# Patient Record
Sex: FEMALE | Race: WHITE | Employment: OTHER | URBAN - METROPOLITAN AREA
[De-identification: names, ages, dates, MRNs, and addresses within clinical notes are randomized per-mention and may not be internally consistent; named-entity substitution may affect disease eponyms.]

---

## 2017-01-02 ENCOUNTER — APPOINTMENT (OUTPATIENT)
Dept: LAB | Age: 77
End: 2017-01-02
Payer: MEDICARE

## 2017-01-02 DIAGNOSIS — C50.911 MALIGNANT NEOPLASM OF RIGHT FEMALE BREAST, UNSPECIFIED SITE OF BREAST: Chronic | ICD-10-CM

## 2017-01-02 LAB
ATRIAL RATE: 56 BPM
P AXIS: 83 DEGREES
P-R INTERVAL: 196 MS
Q-T INTERVAL: 462 MS
QRS DURATION: 96 MS
QTC CALCULATION (BEZET): 445 MS
R AXIS: -42 DEGREES
T AXIS: 43 DEGREES
VENTRICULAR RATE: 56 BPM

## 2017-01-02 PROCEDURE — 93005 ELECTROCARDIOGRAM TRACING: CPT

## 2017-01-02 PROCEDURE — 93010 ELECTROCARDIOGRAM REPORT: CPT | Performed by: INTERNAL MEDICINE

## 2017-01-09 ENCOUNTER — TELEPHONE (OUTPATIENT)
Dept: INTERNAL MEDICINE CLINIC | Facility: CLINIC | Age: 77
End: 2017-01-09

## 2017-01-09 NOTE — TELEPHONE ENCOUNTER
Received a call from [facility] with [facility] [facility] requesting a referral for PT for pt. I will need to speak with [facility] for further details. [facility]

## 2017-01-11 ENCOUNTER — ANESTHESIA EVENT (OUTPATIENT)
Dept: SURGERY | Facility: HOSPITAL | Age: 77
End: 2017-01-11
Payer: MEDICARE

## 2017-01-11 ENCOUNTER — ANESTHESIA (OUTPATIENT)
Dept: SURGERY | Facility: HOSPITAL | Age: 77
End: 2017-01-11
Payer: MEDICARE

## 2017-01-11 ENCOUNTER — SURGERY (OUTPATIENT)
Age: 77
End: 2017-01-11

## 2017-01-11 NOTE — ANESTHESIA PREPROCEDURE EVALUATION
PRE-OP EVALUATION    Patient Name: Melinda Das    Pre-op Diagnosis: AMASTIA ACQUIRED      Procedure(s):  FAT GRAFTING TO BILATERAL RECONSTRUCTED BREASTS      Surgeon(s) and Role:     Erick Alonzo MD - Primary    Pre-op vitals reviewed.           C Khushi  1.   Normal Lexiscan sestamibi myocardial perfusion scan. 2.   Normal left ventricular wall motion.    3.   Left ventricular ejection fraction of 73%.    4.   Compared to previous study in November 2013, a previous questionable perfusion defect is COLONOSCOPY, POSSIBLE BIOPSY, POSSIBLE POLYPECTOMY 00696;  Surgeon: Leslie Pablo MD;  Location: [facility]    MASTECTOMY RIGHT  7/2015    REDUCTION LEFT  10/2015    LUMPECTOMY RIGHT Right [id number]    OTHER SURGICAL HISTORY  1992    Comment JOSE MARTIN

## 2017-01-11 NOTE — ANESTHESIA POSTPROCEDURE EVALUATION
[address] Patient Status:  Hospital Outpatient Surgery   Age/Gender 77year old female MRN [id number]   [facility] SUR[facility]ERY Attending Roosevelt Hobbs MD   Hosp Day # 0 PCP Erika Wright MD       Anesthesia Post-op Not

## 2017-01-12 ENCOUNTER — HOSPITAL ENCOUNTER (OUTPATIENT)
Age: 77
Discharge: HOME | End: 2017-01-12
Attending: FAMILY MEDICINE
Payer: MEDICARE

## 2017-01-12 VITALS
SYSTOLIC BLOOD PRESSURE: 138 MMHG | RESPIRATION RATE: 18 BRPM | OXYGEN SATURATION: 97 % | DIASTOLIC BLOOD PRESSURE: 73 MMHG | TEMPERATURE: 98 F | HEART RATE: 97 BPM

## 2017-01-12 DIAGNOSIS — R33.8 ACUTE URINARY RETENTION: Primary | ICD-10-CM

## 2017-01-12 DIAGNOSIS — N30.01 ACUTE CYSTITIS WITH HEMATURIA: ICD-10-CM

## 2017-01-12 LAB
POCT BILIRUBIN URINE: NEGATIVE
POCT GLUCOSE URINE: NEGATIVE MG/DL
POCT KETONE URINE: NEGATIVE MG/DL
POCT NITRITE URINE: NEGATIVE
POCT PH URINE: 7 (ref 5–8)
POCT PROTEIN URINE: NEGATIVE MG/DL
POCT SPECIFIC GRAVITY URINE: 1.01
POCT URINE COLOR: YELLOW
POCT UROBILINOGEN URINE: 0.2 MG/DL

## 2017-01-12 PROCEDURE — 87086 URINE CULTURE/COLONY COUNT: CPT | Performed by: FAMILY MEDICINE

## 2017-01-12 PROCEDURE — 99214 OFFICE O/P EST MOD 30 MIN: CPT

## 2017-01-12 PROCEDURE — 51701 INSERT BLADDER CATHETER: CPT

## 2017-01-12 PROCEDURE — 81002 URINALYSIS NONAUTO W/O SCOPE: CPT | Performed by: FAMILY MEDICINE

## 2017-01-12 RX ORDER — CIPROFLOXACIN 250 MG/1
250 TABLET, FILM COATED ORAL 2 TIMES DAILY
Qty: 14 TABLET | Refills: 0 | Status: SHIPPED | OUTPATIENT
Start: 2017-01-12 | End: 2017-01-19

## 2017-01-12 NOTE — ED PROVIDER NOTES
Patient Seen in: [address] [facility]    History   Patient presents with:  Dysuria    Stated Complaint: poss uti s/p surgery    HPI    77year old female with difficulty in micturition, frequency , with lower abdominal pressure since 1 am Postmenopausal atrophic vaginitis 3/2/2016   • Rectocele 6/1/2016   • Female stress incontinence 4/18/2016   • Primary localized osteoarthrosis, lower leg 2/22/2012   • Coronary atherosclerosis      stents   • Cancer [facility]) 1993     right breast cancer   • Surgeon: Rhina Rogers MD;  Location: [facility]    PATIENT DOCUMENTED NOT TO HAVE EXPERIENCED ANY OF THE FOLLOWING EVENTS N/A 4/20/2016    Comment Procedure: COLONOSCOPY, POSSIBLE BIOPSY, POSSIBLE POLYPECTOMY 03377;  Surgeon: Rhina Rogers • Breast Cancer Self 52         Smoking Status: Former Smoker                   Packs/Day: 0.25  Years: 22        Quit date: 06/17/1997    Smokeless Status: Never Used                        Comment: SMOKED 20 YEARS QUIT 1997    Alcohol Use: Yes normal mood and affect.  Her behavior is normal. Judgment and thought content normal.            ED Course     Labs Reviewed   POCT URINALYSIS DIPSTICK - Abnormal; Notable for the following:     Urine Clarity Cloudy (*)     Blood, Urine Small (*)     Leukoc

## 2017-01-12 NOTE — ED INITIAL ASSESSMENT (HPI)
Pt states she had surgery yesterday to do some kind of clean up procedures following a right breast implant. The procedure lasted 3 hours. She did not have any urinary catheter.   States she was awake since 1am with urinary urgency frequency and pressure

## 2017-01-12 NOTE — ED NOTES
Pt had 600 ccs residual urine per straight cath after voiding. Dr. Williams Carvalho notified. Pt tolerated well.

## 2017-01-31 ENCOUNTER — TELEPHONE (OUTPATIENT)
Dept: UROLOGY | Facility: HOSPITAL | Age: 77
End: 2017-01-31

## 2017-01-31 NOTE — TELEPHONE ENCOUNTER
Pt's daughter called. Pt had surgery 1/11/17 for breast reconstruction revision. Her daughter took her into urgent care for urinary retention. They treated her w/ IV cleocin based on urine dipstick which showed moderate leukocytes.  She went home on cipro 2

## 2017-02-01 ENCOUNTER — TELEPHONE (OUTPATIENT)
Dept: UROLOGY | Facility: HOSPITAL | Age: 77
End: 2017-02-01

## 2017-02-01 NOTE — TELEPHONE ENCOUNTER
After speaking w/ Dr Castro Oneil today, I spoke w/ pt daughter. Discussed that we looked back at Dr Fransisco Garsia notes from 2014 and she had incomplete emptying then at times. It is important for pt to [id number] when she gets into these bouts of her being unable to empty.  Edwin Cruz

## 2017-02-08 ENCOUNTER — PRIOR ORIGINAL RECORDS (OUTPATIENT)
Dept: OTHER | Age: 77
End: 2017-02-08

## 2017-02-08 RX ORDER — LEVOTHYROXINE SODIUM 0.1 MG/1
TABLET ORAL
Qty: 90 TABLET | Refills: 0 | Status: SHIPPED | OUTPATIENT
Start: 2017-02-08 | End: 2017-05-05

## 2017-02-22 ENCOUNTER — OFFICE VISIT (OUTPATIENT)
Dept: UROLOGY | Facility: HOSPITAL | Age: 77
End: 2017-02-22
Attending: OBSTETRICS & GYNECOLOGY
Payer: MEDICARE

## 2017-02-22 VITALS
HEIGHT: 62.5 IN | BODY MASS INDEX: 23.5 KG/M2 | DIASTOLIC BLOOD PRESSURE: 72 MMHG | SYSTOLIC BLOOD PRESSURE: 118 MMHG | WEIGHT: 131 LBS

## 2017-02-22 DIAGNOSIS — Z98.890 POST-OPERATIVE STATE: ICD-10-CM

## 2017-02-22 DIAGNOSIS — R33.9 INCOMPLETE EMPTYING OF BLADDER: Primary | ICD-10-CM

## 2017-02-22 DIAGNOSIS — N39.0 RECURRENT UTI: ICD-10-CM

## 2017-02-22 LAB
BILIRUB UR QL STRIP.AUTO: NEGATIVE
COLOR UR AUTO: YELLOW
CONTROL RUN WITHIN 24 HOURS?: YES
GLUCOSE UR STRIP.AUTO-MCNC: NEGATIVE MG/DL
KETONES UR STRIP.AUTO-MCNC: NEGATIVE MG/DL
NITRITE UR QL STRIP.AUTO: NEGATIVE
NITRITE URINE: NEGATIVE
PH UR STRIP.AUTO: 7 [PH] (ref 4.5–8)
PROT UR STRIP.AUTO-MCNC: NEGATIVE MG/DL
SP GR UR STRIP.AUTO: 1.01 (ref 1–1.03)
UROBILINOGEN UR STRIP.AUTO-MCNC: <2 MG/DL
WBC #/AREA URNS AUTO: >50 /HPF
WBC CLUMPS UR QL AUTO: PRESENT

## 2017-02-22 PROCEDURE — 87086 URINE CULTURE/COLONY COUNT: CPT | Performed by: OBSTETRICS & GYNECOLOGY

## 2017-02-22 PROCEDURE — 81002 URINALYSIS NONAUTO W/O SCOPE: CPT

## 2017-02-22 PROCEDURE — 99211 OFF/OP EST MAY X REQ PHY/QHP: CPT

## 2017-02-22 PROCEDURE — 87077 CULTURE AEROBIC IDENTIFY: CPT | Performed by: OBSTETRICS & GYNECOLOGY

## 2017-02-22 PROCEDURE — 81001 URINALYSIS AUTO W/SCOPE: CPT | Performed by: OBSTETRICS & GYNECOLOGY

## 2017-02-22 RX ORDER — MENTHOL 5.8 MG/1
LOZENGE ORAL
COMMUNITY
End: 2021-11-16

## 2017-02-23 ENCOUNTER — LAB ENCOUNTER (OUTPATIENT)
Dept: LAB | Age: 77
End: 2017-02-23
Attending: INTERNAL MEDICINE
Payer: MEDICARE

## 2017-02-23 ENCOUNTER — HOSPITAL ENCOUNTER (OUTPATIENT)
Dept: GENERAL RADIOLOGY | Age: 77
Discharge: HOME | End: 2017-02-23
Attending: INTERNAL MEDICINE
Payer: MEDICARE

## 2017-02-23 ENCOUNTER — OFFICE VISIT (OUTPATIENT)
Dept: INTERNAL MEDICINE CLINIC | Facility: CLINIC | Age: 77
End: 2017-02-23

## 2017-02-23 VITALS
TEMPERATURE: 99 F | HEIGHT: 62 IN | SYSTOLIC BLOOD PRESSURE: 128 MMHG | RESPIRATION RATE: 17 BRPM | WEIGHT: 134.25 LBS | DIASTOLIC BLOOD PRESSURE: 70 MMHG | OXYGEN SATURATION: 97 % | BODY MASS INDEX: 24.7 KG/M2 | HEART RATE: 86 BPM

## 2017-02-23 DIAGNOSIS — R05.9 COUGH: ICD-10-CM

## 2017-02-23 DIAGNOSIS — E11.9 TYPE 2 DIABETES MELLITUS WITHOUT COMPLICATION, WITHOUT LONG-TERM CURRENT USE OF INSULIN (HCC): Primary | ICD-10-CM

## 2017-02-23 DIAGNOSIS — E11.9 TYPE 2 DIABETES MELLITUS WITHOUT COMPLICATION, WITHOUT LONG-TERM CURRENT USE OF INSULIN (HCC): Chronic | ICD-10-CM

## 2017-02-23 DIAGNOSIS — R05.9 COUGH: Primary | ICD-10-CM

## 2017-02-23 LAB
EST. AVERAGE GLUCOSE BLD GHB EST-MCNC: 151 MG/DL (ref 68–126)
HBA1C MFR BLD HPLC: 6.9 % (ref ?–5.7)

## 2017-02-23 PROCEDURE — 99213 OFFICE O/P EST LOW 20 MIN: CPT | Performed by: INTERNAL MEDICINE

## 2017-02-23 PROCEDURE — 36415 COLL VENOUS BLD VENIPUNCTURE: CPT

## 2017-02-23 PROCEDURE — 83036 HEMOGLOBIN GLYCOSYLATED A1C: CPT

## 2017-02-23 PROCEDURE — 71020 XR CHEST PA + LAT CHEST (CPT=71020): CPT

## 2017-02-23 RX ORDER — CODEINE PHOSPHATE AND GUAIFENESIN 10; 100 MG/5ML; MG/5ML
5 SOLUTION ORAL EVERY 6 HOURS PRN
Qty: 240 ML | Refills: 0 | Status: SHIPPED | OUTPATIENT
Start: 2017-02-23 | End: 2017-03-05

## 2017-02-23 RX ORDER — LEVOFLOXACIN 500 MG/1
500 TABLET, FILM COATED ORAL DAILY
Qty: 10 TABLET | Refills: 0 | Status: SHIPPED | OUTPATIENT
Start: 2017-02-23 | End: 2017-03-05

## 2017-02-23 RX ORDER — ALBUTEROL SULFATE 90 UG/1
2 AEROSOL, METERED RESPIRATORY (INHALATION) EVERY 6 HOURS PRN
Qty: 2 INHALER | Refills: 0 | Status: SHIPPED | OUTPATIENT
Start: 2017-02-23 | End: 2017-03-25

## 2017-02-23 NOTE — PROGRESS NOTES
Rachael Garces [facility]  is a 77year old female who presents for upper respiratory symptoms    Patient presents with:  Cough: with phlem x 2 weeks        HPI:   Pt reports  respiratory symptoms for  Few days   .        Current Outpatient Prescriptions Oral Tab Take 1 tablet (250 mg total) by mouth 2 (two) times daily.  Disp: 14 tablet Rfl: 0      Past Medical History   Diagnosis Date   • Other and unspecified hyperlipidemia    • Esophageal reflux    • Hypothyroidism    • Pure hypercholesterolemia    • Un pseudocholinesterase deficiency.    • PONV (postoperative nausea and vomiting)         Smoking Status: Former Smoker                   Packs/Day: 0.25  Years: 22        Quit date: 06/17/1997    Smokeless Status: Never Used                        Comment: ASHLEY these issues and agrees to the plan. The patient is asked to Return in about 1 week (around 3/2/2017). Deb Lea MD

## 2017-04-13 ENCOUNTER — OFFICE VISIT (OUTPATIENT)
Dept: INTERNAL MEDICINE CLINIC | Facility: CLINIC | Age: 77
End: 2017-04-13

## 2017-04-13 ENCOUNTER — TELEPHONE (OUTPATIENT)
Dept: UROLOGY | Facility: HOSPITAL | Age: 77
End: 2017-04-13

## 2017-04-13 VITALS
WEIGHT: 134.25 LBS | HEART RATE: 64 BPM | SYSTOLIC BLOOD PRESSURE: 140 MMHG | TEMPERATURE: 98 F | BODY MASS INDEX: 25.02 KG/M2 | OXYGEN SATURATION: 97 % | RESPIRATION RATE: 16 BRPM | HEIGHT: 61.5 IN | DIASTOLIC BLOOD PRESSURE: 70 MMHG

## 2017-04-13 DIAGNOSIS — N30.00 ACUTE CYSTITIS WITHOUT HEMATURIA: Primary | ICD-10-CM

## 2017-04-13 DIAGNOSIS — N39.0 URINARY TRACT INFECTION, SITE UNSPECIFIED: ICD-10-CM

## 2017-04-13 PROCEDURE — 99213 OFFICE O/P EST LOW 20 MIN: CPT | Performed by: INTERNAL MEDICINE

## 2017-04-13 PROCEDURE — 81001 URINALYSIS AUTO W/SCOPE: CPT | Performed by: INTERNAL MEDICINE

## 2017-04-13 RX ORDER — SULFAMETHOXAZOLE AND TRIMETHOPRIM 800; 160 MG/1; MG/1
1 TABLET ORAL 2 TIMES DAILY
Qty: 20 TABLET | Refills: 0 | Status: SHIPPED | OUTPATIENT
Start: 2017-04-13 | End: 2017-04-23

## 2017-04-13 RX ORDER — PHENAZOPYRIDINE HYDROCHLORIDE 200 MG/1
200 TABLET, FILM COATED ORAL 3 TIMES DAILY PRN
Qty: 10 TABLET | Refills: 0 | Status: SHIPPED | OUTPATIENT
Start: 2017-04-13 | End: 2017-04-16

## 2017-04-13 NOTE — TELEPHONE ENCOUNTER
Pt left message on voice mail, feels like she has a UTI, called patient back, no answer, left message to call the office to see if she can drop off a specimen

## 2017-04-13 NOTE — PROGRESS NOTES
Andrea [facility] [address]  is a 77year old female.     Patient presents with:  UTI      HPI:   Patient presents with symptoms of UTI  Few days   The maintenance dose Cipro as given to her by the gynecologist    Current Outpatient Prescriptions:  Sulfam disorder of thyroid    • Recurrent UTI      group B strep   • 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)    • Incomplete bladder emptying Melva Keith)      trial of CIC June 2014   • Cervical spondylosis without myelopathy    • Vitamin D defici Comment: SMOKED 20 YEARS QUIT 1997    Alcohol Use: Yes           0.0 oz/week       0 Standard drinks or equivalent per week       Comment: 1-2 a month        REVIEW OF SYSTEMS:   GENERAL HEALTH: feels well otherwise  SKIN: denies any unusual skin lesions o

## 2017-04-17 ENCOUNTER — OFFICE VISIT (OUTPATIENT)
Dept: UROLOGY | Facility: HOSPITAL | Age: 77
End: 2017-04-17
Attending: OBSTETRICS & GYNECOLOGY
Payer: MEDICARE

## 2017-04-17 VITALS
HEIGHT: 61.5 IN | WEIGHT: 134 LBS | SYSTOLIC BLOOD PRESSURE: 120 MMHG | DIASTOLIC BLOOD PRESSURE: 72 MMHG | BODY MASS INDEX: 24.98 KG/M2

## 2017-04-17 DIAGNOSIS — R33.9 INCOMPLETE EMPTYING OF BLADDER: Primary | ICD-10-CM

## 2017-04-17 DIAGNOSIS — N39.0 RECURRENT UTI: ICD-10-CM

## 2017-04-17 DIAGNOSIS — N95.2 POSTMENOPAUSAL ATROPHIC VAGINITIS: ICD-10-CM

## 2017-04-17 PROCEDURE — 51701 INSERT BLADDER CATHETER: CPT

## 2017-04-17 RX ORDER — CIPROFLOXACIN 250 MG/1
250 TABLET, FILM COATED ORAL DAILY
Qty: 90 TABLET | Refills: 3 | Status: SHIPPED | OUTPATIENT
Start: 2017-04-17 | End: 2018-03-03

## 2017-04-17 NOTE — PROCEDURES
Spoke w/ Dr Imani Nicholas today and informed him of PVR.  Also looked at past cultures w/ him:  4/13/17- UTI w/ no culture  2/22/17- >100,000-Staph  1/12/17- No growth  11/10/16->100,000 Staph  9/27/16- 50,000 Pseudomonas a.  8/30/16- > 100,000 Pseudomonas  8/8/16-

## 2017-04-17 NOTE — PROCEDURES
Patient here for PVR check. Has hx of not emptying completely. Pt was recently tx for UTI 4/13/17, taking Bactrim DS. I spoke w/ Fady Baker from Dr Elijah Gonsales office and apparently they did not send a culture  Patient up to the bathroom, voided 175 mL in hat.   Sharonda Mueller

## 2017-05-05 RX ORDER — LEVOTHYROXINE SODIUM 0.1 MG/1
TABLET ORAL
Qty: 90 TABLET | Refills: 0 | Status: SHIPPED | OUTPATIENT
Start: 2017-05-05 | End: 2017-08-02

## 2017-05-12 ENCOUNTER — APPOINTMENT (OUTPATIENT)
Dept: LAB | Facility: HOSPITAL | Age: 77
End: 2017-05-12
Attending: OBSTETRICS & GYNECOLOGY
Payer: MEDICARE

## 2017-05-12 ENCOUNTER — HOSPITAL ENCOUNTER (OUTPATIENT)
Dept: MAMMOGRAPHY | Facility: HOSPITAL | Age: 77
Discharge: HOME | End: 2017-05-12
Attending: INTERNAL MEDICINE
Payer: MEDICARE

## 2017-05-12 DIAGNOSIS — N95.2 POSTMENOPAUSAL ATROPHIC VAGINITIS: ICD-10-CM

## 2017-05-12 DIAGNOSIS — D05.82 OTHER SPECIFIED TYPE OF CARCINOMA IN SITU OF LEFT BREAST: ICD-10-CM

## 2017-05-12 DIAGNOSIS — N39.0 RECURRENT UTI: ICD-10-CM

## 2017-05-12 DIAGNOSIS — R33.9 INCOMPLETE EMPTYING OF BLADDER: ICD-10-CM

## 2017-05-12 DIAGNOSIS — R92.8 ABNORMAL MAMMOGRAM OF LEFT BREAST: ICD-10-CM

## 2017-05-12 PROCEDURE — 87086 URINE CULTURE/COLONY COUNT: CPT

## 2017-05-12 PROCEDURE — 77061 BREAST TOMOSYNTHESIS UNI: CPT | Performed by: INTERNAL MEDICINE

## 2017-05-12 PROCEDURE — 76642 ULTRASOUND BREAST LIMITED: CPT | Performed by: INTERNAL MEDICINE

## 2017-05-12 PROCEDURE — 81001 URINALYSIS AUTO W/SCOPE: CPT

## 2017-05-12 PROCEDURE — 77065 DX MAMMO INCL CAD UNI: CPT | Performed by: INTERNAL MEDICINE

## 2017-05-15 ENCOUNTER — TELEPHONE (OUTPATIENT)
Dept: UROLOGY | Facility: HOSPITAL | Age: 77
End: 2017-05-15

## 2017-07-14 ENCOUNTER — APPOINTMENT (OUTPATIENT)
Dept: GENERAL RADIOLOGY | Age: 77
End: 2017-07-14
Attending: FAMILY MEDICINE
Payer: MEDICARE

## 2017-07-14 ENCOUNTER — HOSPITAL ENCOUNTER (OUTPATIENT)
Age: 77
Discharge: HOME | End: 2017-07-14
Attending: FAMILY MEDICINE
Payer: MEDICARE

## 2017-07-14 VITALS
OXYGEN SATURATION: 99 % | SYSTOLIC BLOOD PRESSURE: 127 MMHG | RESPIRATION RATE: 20 BRPM | DIASTOLIC BLOOD PRESSURE: 50 MMHG | HEART RATE: 62 BPM | TEMPERATURE: 98 F | BODY MASS INDEX: 25 KG/M2 | WEIGHT: 132 LBS

## 2017-07-14 DIAGNOSIS — S99.922A FOOT TRAUMA, LEFT, INITIAL ENCOUNTER: Primary | ICD-10-CM

## 2017-07-14 PROCEDURE — 73630 X-RAY EXAM OF FOOT: CPT | Performed by: FAMILY MEDICINE

## 2017-07-14 PROCEDURE — 99213 OFFICE O/P EST LOW 20 MIN: CPT

## 2017-07-14 PROCEDURE — 99214 OFFICE O/P EST MOD 30 MIN: CPT

## 2017-07-14 RX ORDER — NAPROXEN 500 MG/1
500 TABLET ORAL 2 TIMES DAILY PRN
Qty: 20 TABLET | Refills: 0 | Status: SHIPPED | OUTPATIENT
Start: 2017-07-14 | End: 2017-07-14

## 2017-07-14 NOTE — ED INITIAL ASSESSMENT (HPI)
Patient states about 10 days ago she started to have foot pain. Patient states today she can't bend her toes up or down without having pain.

## 2017-07-17 RX ORDER — NAPROXEN 500 MG/1
TABLET ORAL
Qty: 180 TABLET | Refills: 0 | Status: SHIPPED | OUTPATIENT
Start: 2017-07-17 | End: 2018-03-15 | Stop reason: ALTCHOICE

## 2017-08-03 RX ORDER — LEVOTHYROXINE SODIUM 0.1 MG/1
TABLET ORAL
Qty: 90 TABLET | Refills: 0 | Status: SHIPPED | OUTPATIENT
Start: 2017-08-03 | End: 2017-10-22

## 2017-10-23 RX ORDER — LEVOTHYROXINE SODIUM 0.1 MG/1
TABLET ORAL
Qty: 90 TABLET | Refills: 0 | Status: SHIPPED | OUTPATIENT
Start: 2017-10-23 | End: 2018-01-22

## 2017-12-11 ENCOUNTER — TELEPHONE (OUTPATIENT)
Dept: UROLOGY | Facility: HOSPITAL | Age: 77
End: 2017-12-11

## 2017-12-11 RX ORDER — ESTRADIOL 10 UG/1
10 INSERT VAGINAL
Qty: 24 TABLET | Refills: 3 | Status: SHIPPED | OUTPATIENT
Start: 2017-12-11 | End: 2018-01-10

## 2018-01-23 RX ORDER — LEVOTHYROXINE SODIUM 0.1 MG/1
TABLET ORAL
Qty: 90 TABLET | Refills: 0 | Status: SHIPPED | OUTPATIENT
Start: 2018-01-23 | End: 2018-05-01

## 2018-02-21 PROBLEM — M19.122 POST-TRAUMATIC OSTEOARTHRITIS OF LEFT ELBOW: Status: ACTIVE | Noted: 2018-02-21

## 2018-03-03 ENCOUNTER — OFFICE VISIT (OUTPATIENT)
Dept: INTERNAL MEDICINE CLINIC | Facility: CLINIC | Age: 78
End: 2018-03-03

## 2018-03-03 VITALS
HEIGHT: 62 IN | DIASTOLIC BLOOD PRESSURE: 70 MMHG | WEIGHT: 137.25 LBS | SYSTOLIC BLOOD PRESSURE: 118 MMHG | TEMPERATURE: 99 F | RESPIRATION RATE: 14 BRPM | HEART RATE: 68 BPM | BODY MASS INDEX: 25.26 KG/M2

## 2018-03-03 DIAGNOSIS — E11.9 TYPE 2 DIABETES MELLITUS WITHOUT COMPLICATION, WITHOUT LONG-TERM CURRENT USE OF INSULIN (HCC): Chronic | ICD-10-CM

## 2018-03-03 DIAGNOSIS — R35.0 FREQUENCY OF URINATION: Primary | ICD-10-CM

## 2018-03-03 DIAGNOSIS — Z00.00 LABORATORY EXAMINATION ORDERED AS PART OF A ROUTINE GENERAL MEDICAL EXAMINATION: ICD-10-CM

## 2018-03-03 LAB
BILIRUBIN: NEGATIVE
GLUCOSE (URINE DIPSTICK): NEGATIVE MG/DL
KETONES (URINE DIPSTICK): NEGATIVE MG/DL
MULTISTIX EXPIRATION DATE: NORMAL DATE
MULTISTIX LOT#: NORMAL NUMERIC
NITRITE, URINE: NEGATIVE
PH, URINE: 7 (ref 4.5–8)
PROTEIN (URINE DIPSTICK): NEGATIVE MG/DL
SPECIFIC GRAVITY: 1.02 (ref 1–1.03)
URINE-COLOR: YELLOW
UROBILINOGEN,SEMI-QN: 0.2 MG/DL (ref 0–1.9)

## 2018-03-03 PROCEDURE — 87077 CULTURE AEROBIC IDENTIFY: CPT | Performed by: INTERNAL MEDICINE

## 2018-03-03 PROCEDURE — 87086 URINE CULTURE/COLONY COUNT: CPT | Performed by: INTERNAL MEDICINE

## 2018-03-03 PROCEDURE — 99213 OFFICE O/P EST LOW 20 MIN: CPT | Performed by: INTERNAL MEDICINE

## 2018-03-03 PROCEDURE — 81003 URINALYSIS AUTO W/O SCOPE: CPT | Performed by: INTERNAL MEDICINE

## 2018-03-03 RX ORDER — CIPROFLOXACIN 500 MG/1
500 TABLET, FILM COATED ORAL 2 TIMES DAILY
Qty: 10 TABLET | Refills: 0 | Status: SHIPPED | OUTPATIENT
Start: 2018-03-03 | End: 2018-03-24 | Stop reason: ALTCHOICE

## 2018-03-03 RX ORDER — PHENAZOPYRIDINE HYDROCHLORIDE 200 MG/1
200 TABLET, FILM COATED ORAL 3 TIMES DAILY PRN
Qty: 10 TABLET | Refills: 0 | Status: SHIPPED | OUTPATIENT
Start: 2018-03-03 | End: 2018-03-06

## 2018-03-03 NOTE — PROGRESS NOTES
Tico Cabrera [facility]   is a 69year old female. Patient presents with:  UTI: Est Pt. c/c back pain, urgency,       HPI:   Patient presents with symptoms of UTI few days  No fever.   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  Denies any vaginal symptoms,menstural abnormalities- does have some suprapubic discomfort    GI: no nausea or vomiting  NEURO: denies headaches    EXAM:

## 2018-03-05 ENCOUNTER — APPOINTMENT (OUTPATIENT)
Dept: CT IMAGING | Age: 78
End: 2018-03-05
Attending: FAMILY MEDICINE
Payer: MEDICARE

## 2018-03-05 ENCOUNTER — TELEPHONE (OUTPATIENT)
Dept: INTERNAL MEDICINE CLINIC | Facility: CLINIC | Age: 78
End: 2018-03-05

## 2018-03-05 ENCOUNTER — OFFICE VISIT (OUTPATIENT)
Dept: INTERNAL MEDICINE CLINIC | Facility: CLINIC | Age: 78
End: 2018-03-05

## 2018-03-05 ENCOUNTER — HOSPITAL ENCOUNTER (OUTPATIENT)
Age: 78
Discharge: HOME | End: 2018-03-05
Attending: FAMILY MEDICINE
Payer: MEDICARE

## 2018-03-05 VITALS
RESPIRATION RATE: 18 BRPM | OXYGEN SATURATION: 98 % | DIASTOLIC BLOOD PRESSURE: 58 MMHG | HEART RATE: 62 BPM | SYSTOLIC BLOOD PRESSURE: 147 MMHG | TEMPERATURE: 98 F

## 2018-03-05 VITALS
TEMPERATURE: 99 F | HEART RATE: 66 BPM | DIASTOLIC BLOOD PRESSURE: 70 MMHG | BODY MASS INDEX: 25.21 KG/M2 | HEIGHT: 62 IN | SYSTOLIC BLOOD PRESSURE: 118 MMHG | RESPIRATION RATE: 12 BRPM | WEIGHT: 137 LBS

## 2018-03-05 DIAGNOSIS — M54.50 ACUTE LEFT-SIDED LOW BACK PAIN WITHOUT SCIATICA: Primary | ICD-10-CM

## 2018-03-05 DIAGNOSIS — N39.0 URINARY TRACT INFECTION WITHOUT HEMATURIA, SITE UNSPECIFIED: ICD-10-CM

## 2018-03-05 DIAGNOSIS — N12 PYELONEPHRITIS: ICD-10-CM

## 2018-03-05 DIAGNOSIS — R10.9 LEFT FLANK PAIN: Primary | ICD-10-CM

## 2018-03-05 LAB
#LYMPHOCYTE IC: 1.7 X10ˆ3/UL (ref 0.9–3.2)
#MXD IC: 0.9 X10ˆ3/UL (ref 0.1–1)
#NEUTROPHIL IC: 6 X10ˆ3/UL (ref 1.3–6.7)
CREAT SERPL-MCNC: 0.7 MG/DL (ref 0.55–1.02)
GLUCOSE BLD-MCNC: 139 MG/DL (ref 70–99)
HCT IC: 38.3 % (ref 37–54)
HGB IC: 12.7 G/DL (ref 11.7–16)
ISTAT BLOOD GAS TCO2: 32 MMOL/L (ref 22–32)
ISTAT BUN: 15 MG/DL (ref 8–20)
ISTAT CHLORIDE: 99 MMOL/L (ref 101–111)
ISTAT HEMATOCRIT: 40 % (ref 34–50)
ISTAT IONIZED CALCIUM: 1.23 MMOL/L (ref 1.12–1.32)
ISTAT POTASSIUM: 4.1 MMOL/L (ref 3.6–5.1)
ISTAT SODIUM: 139 MMOL/L (ref 136–144)
LYMPHOCYTES NFR BLD AUTO: 20.3 %
MCH IC: 29.9 PG (ref 27–33.2)
MCHC IC: 33.2 G/DL (ref 31–37)
MCV IC: 90.1 FL (ref 81–100)
MIXED CELL %: 10.6 %
NEUTROPHILS NFR BLD AUTO: 69.1 %
PLT IC: 192 X10ˆ3/UL (ref 150–450)
POCT BILIRUBIN URINE: NEGATIVE
POCT GLUCOSE URINE: 100 MG/DL
POCT KETONE URINE: NEGATIVE MG/DL
POCT LEUKOCYTE ESTERASE URINE: NEGATIVE
POCT NITRITE URINE: POSITIVE
POCT PH URINE: 6.5 (ref 5–8)
POCT PROTEIN URINE: NEGATIVE MG/DL
POCT SPECIFIC GRAVITY URINE: 1.01
POCT URINE CLARITY: CLEAR
POCT UROBILINOGEN URINE: 1 MG/DL
RBC IC: 4.25 X10ˆ6/UL (ref 3.8–5.1)
WBC IC: 8.6 X10ˆ3/UL (ref 4–13)

## 2018-03-05 PROCEDURE — 74176 CT ABD & PELVIS W/O CONTRAST: CPT | Performed by: FAMILY MEDICINE

## 2018-03-05 PROCEDURE — 99215 OFFICE O/P EST HI 40 MIN: CPT

## 2018-03-05 PROCEDURE — 87086 URINE CULTURE/COLONY COUNT: CPT | Performed by: FAMILY MEDICINE

## 2018-03-05 PROCEDURE — 96374 THER/PROPH/DIAG INJ IV PUSH: CPT

## 2018-03-05 PROCEDURE — 99213 OFFICE O/P EST LOW 20 MIN: CPT | Performed by: INTERNAL MEDICINE

## 2018-03-05 PROCEDURE — 81002 URINALYSIS NONAUTO W/O SCOPE: CPT | Performed by: FAMILY MEDICINE

## 2018-03-05 PROCEDURE — 80047 BASIC METABLC PNL IONIZED CA: CPT

## 2018-03-05 PROCEDURE — 85025 COMPLETE CBC W/AUTO DIFF WBC: CPT | Performed by: FAMILY MEDICINE

## 2018-03-05 PROCEDURE — 99214 OFFICE O/P EST MOD 30 MIN: CPT

## 2018-03-05 RX ORDER — KETOROLAC TROMETHAMINE 30 MG/ML
30 INJECTION, SOLUTION INTRAMUSCULAR; INTRAVENOUS ONCE
Status: COMPLETED | OUTPATIENT
Start: 2018-03-05 | End: 2018-03-05

## 2018-03-05 RX ORDER — SULFAMETHOXAZOLE AND TRIMETHOPRIM 800; 160 MG/1; MG/1
1 TABLET ORAL 2 TIMES DAILY
Qty: 14 TABLET | Refills: 0 | Status: SHIPPED | OUTPATIENT
Start: 2018-03-05 | End: 2018-03-24 | Stop reason: ALTCHOICE

## 2018-03-05 NOTE — TELEPHONE ENCOUNTER
Patient having fever and chills see needs to be seen in the [facility] care

## 2018-03-05 NOTE — ED INITIAL ASSESSMENT (HPI)
Pt began 1 week ago with frequent urination and lower pelvis pain and back pain, She wasa placed on medicine after seeing Dr Alix Gagnon, and now symptoms have worsened and gone to her back

## 2018-03-05 NOTE — PATIENT INSTRUCTIONS
Patient claims that the pain is quite intense and feels feverish with chills was advised to go to the emergency room as needed some stat blood work possible CT scan versus an ultrasound of the abdomen to rule out any intra-abdominal pathology possible pyel

## 2018-03-05 NOTE — TELEPHONE ENCOUNTER
Daughter stated that mother has taken cipro and phenazopyridine with no relief. Daughter feels that she is getting worse. C/o chills, feeling feverish, nausea, abdominal pain, LBP. Advised to come into office for f/u.  Would you like pt to have labs

## 2018-03-05 NOTE — TELEPHONE ENCOUNTER
Patient's daughter called stating that her mother was seen on 03/03/18 for frequent urination and burning but she seems to be getting worse with chills and fever. Daughter concerned.

## 2018-03-05 NOTE — ED PROVIDER NOTES
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 Lower mid back paraspinal muscle tenderness. No CVA tenderness. Neurological: She is alert and oriented to person, place, and time. Nursing note and vitals reviewed.           ED Course     Labs Reviewed   POCT URINALYSIS DIPSTICK - Abnorma

## 2018-03-05 NOTE — PROGRESS NOTES
Sammi Moreno    is a 69year old female.     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  Denies any vaginal symptoms,menstural abnorm

## 2018-03-12 ENCOUNTER — APPOINTMENT (OUTPATIENT)
Dept: LAB | Age: 78
End: 2018-03-12
Attending: INTERNAL MEDICINE
Payer: MEDICARE

## 2018-03-12 LAB
ALBUMIN SERPL-MCNC: 3.5 G/DL (ref 3.5–4.8)
ALP LIVER SERPL-CCNC: 78 U/L (ref 55–142)
ALT SERPL-CCNC: 19 U/L (ref 14–54)
AST SERPL-CCNC: 17 U/L (ref 15–41)
BASOPHILS # BLD AUTO: 0.06 X10(3) UL (ref 0–0.1)
BASOPHILS NFR BLD AUTO: 1.6 %
BILIRUB SERPL-MCNC: 0.3 MG/DL (ref 0.1–2)
BUN BLD-MCNC: 14 MG/DL (ref 8–20)
CALCIUM BLD-MCNC: 9.4 MG/DL (ref 8.3–10.3)
CHLORIDE: 102 MMOL/L (ref 101–111)
CHOLEST SMN-MCNC: 174 MG/DL (ref ?–200)
CO2: 28 MMOL/L (ref 22–32)
CREAT BLD-MCNC: 1.04 MG/DL (ref 0.55–1.02)
CREAT UR-SCNC: 33.3 MG/DL
EOSINOPHIL # BLD AUTO: 0.14 X10(3) UL (ref 0–0.3)
EOSINOPHIL NFR BLD AUTO: 3.7 %
ERYTHROCYTE [DISTWIDTH] IN BLOOD BY AUTOMATED COUNT: 13.6 % (ref 11.5–16)
EST. AVERAGE GLUCOSE BLD GHB EST-MCNC: 148 MG/DL (ref 68–126)
GLUCOSE BLD-MCNC: 92 MG/DL (ref 70–99)
HBA1C MFR BLD HPLC: 6.8 % (ref ?–5.7)
HCT VFR BLD AUTO: 38 % (ref 34–50)
HDLC SERPL-MCNC: 61 MG/DL (ref 45–?)
HDLC SERPL: 2.85 {RATIO} (ref ?–4.44)
HGB BLD-MCNC: 12 G/DL (ref 12–16)
IMMATURE GRANULOCYTE COUNT: 0.01 X10(3) UL (ref 0–1)
IMMATURE GRANULOCYTE RATIO %: 0.3 %
LDLC SERPL CALC-MCNC: 93 MG/DL (ref ?–130)
LYMPHOCYTES # BLD AUTO: 1.67 X10(3) UL (ref 0.9–4)
LYMPHOCYTES NFR BLD AUTO: 44.3 %
M PROTEIN MFR SERPL ELPH: 7.8 G/DL (ref 6.1–8.3)
MCH RBC QN AUTO: 29 PG (ref 27–33.2)
MCHC RBC AUTO-ENTMCNC: 31.6 G/DL (ref 31–37)
MCV RBC AUTO: 91.8 FL (ref 81–100)
MICROALBUMIN UR-MCNC: <0.5 MG/DL
MONOCYTES # BLD AUTO: 0.44 X10(3) UL (ref 0.1–1)
MONOCYTES NFR BLD AUTO: 11.7 %
NEUTROPHIL ABS PRELIM: 1.45 X10 (3) UL (ref 1.3–6.7)
NEUTROPHILS # BLD AUTO: 1.45 X10(3) UL (ref 1.3–6.7)
NEUTROPHILS NFR BLD AUTO: 38.4 %
NONHDLC SERPL-MCNC: 113 MG/DL (ref ?–130)
PLATELET # BLD AUTO: 281 10(3)UL (ref 150–450)
POTASSIUM SERPL-SCNC: 4.3 MMOL/L (ref 3.6–5.1)
RBC # BLD AUTO: 4.14 X10(6)UL (ref 3.8–5.1)
RED CELL DISTRIBUTION WIDTH-SD: 45.8 FL (ref 35.1–46.3)
SODIUM SERPL-SCNC: 136 MMOL/L (ref 136–144)
THYROXINE (T4): 7.2 UG/DL (ref 4.5–10.9)
TRIGL SERPL-MCNC: 101 MG/DL (ref ?–150)
TSI SER-ACNC: 3.96 MIU/ML (ref 0.35–5.5)
VLDLC SERPL CALC-MCNC: 20 MG/DL (ref 5–40)
WBC # BLD AUTO: 3.8 X10(3) UL (ref 4–13)

## 2018-03-12 PROCEDURE — 82570 ASSAY OF URINE CREATININE: CPT | Performed by: INTERNAL MEDICINE

## 2018-03-12 PROCEDURE — 84436 ASSAY OF TOTAL THYROXINE: CPT | Performed by: INTERNAL MEDICINE

## 2018-03-12 PROCEDURE — 80053 COMPREHEN METABOLIC PANEL: CPT | Performed by: INTERNAL MEDICINE

## 2018-03-12 PROCEDURE — 36415 COLL VENOUS BLD VENIPUNCTURE: CPT | Performed by: INTERNAL MEDICINE

## 2018-03-12 PROCEDURE — 84443 ASSAY THYROID STIM HORMONE: CPT | Performed by: INTERNAL MEDICINE

## 2018-03-12 PROCEDURE — 83036 HEMOGLOBIN GLYCOSYLATED A1C: CPT | Performed by: INTERNAL MEDICINE

## 2018-03-12 PROCEDURE — 82043 UR ALBUMIN QUANTITATIVE: CPT | Performed by: INTERNAL MEDICINE

## 2018-03-12 PROCEDURE — 85025 COMPLETE CBC W/AUTO DIFF WBC: CPT | Performed by: INTERNAL MEDICINE

## 2018-03-12 PROCEDURE — 80061 LIPID PANEL: CPT | Performed by: INTERNAL MEDICINE

## 2018-03-15 ENCOUNTER — OFFICE VISIT (OUTPATIENT)
Dept: INTERNAL MEDICINE CLINIC | Facility: CLINIC | Age: 78
End: 2018-03-15

## 2018-03-15 VITALS
HEART RATE: 78 BPM | HEIGHT: 62 IN | BODY MASS INDEX: 24.66 KG/M2 | SYSTOLIC BLOOD PRESSURE: 144 MMHG | RESPIRATION RATE: 16 BRPM | TEMPERATURE: 98 F | OXYGEN SATURATION: 97 % | DIASTOLIC BLOOD PRESSURE: 66 MMHG | WEIGHT: 134 LBS

## 2018-03-15 DIAGNOSIS — E11.9 TYPE 2 DIABETES MELLITUS WITHOUT COMPLICATION, WITHOUT LONG-TERM CURRENT USE OF INSULIN (HCC): Chronic | ICD-10-CM

## 2018-03-15 DIAGNOSIS — Z00.00 ROUTINE GENERAL MEDICAL EXAMINATION AT A HEALTH CARE FACILITY: Primary | ICD-10-CM

## 2018-03-15 DIAGNOSIS — I10 ESSENTIAL HYPERTENSION: Chronic | ICD-10-CM

## 2018-03-15 DIAGNOSIS — I25.810 CORONARY ARTERY DISEASE INVOLVING CORONARY BYPASS GRAFT OF NATIVE HEART WITHOUT ANGINA PECTORIS: Chronic | ICD-10-CM

## 2018-03-15 PROCEDURE — G0009 ADMIN PNEUMOCOCCAL VACCINE: HCPCS | Performed by: INTERNAL MEDICINE

## 2018-03-15 PROCEDURE — 90732 PPSV23 VACC 2 YRS+ SUBQ/IM: CPT | Performed by: INTERNAL MEDICINE

## 2018-03-15 PROCEDURE — G0439 PPPS, SUBSEQ VISIT: HCPCS | Performed by: INTERNAL MEDICINE

## 2018-03-15 RX ORDER — METFORMIN HYDROCHLORIDE 750 MG/1
750 TABLET, EXTENDED RELEASE ORAL DAILY
Qty: 30 TABLET | Refills: 3 | Status: SHIPPED | OUTPATIENT
Start: 2018-03-15 | End: 2019-01-11

## 2018-03-15 RX ORDER — LOSARTAN POTASSIUM 50 MG/1
50 TABLET ORAL DAILY
Qty: 30 TABLET | Refills: 3 | Status: SHIPPED | OUTPATIENT
Start: 2018-03-15 | End: 2018-06-13

## 2018-03-15 NOTE — PROGRESS NOTES
Mike BAER [birth year] is a 69year old female. Patient presents with:  Physical      HPI:   No new cc    Current Outpatient Prescriptions:  MetFORMIN HCl  MG Oral Tablet 24 Hr Take 1 tablet (750 mg total) by mouth daily.  Disp: 30 tablet Rf 1992 8/4/2015   • Esophageal reflux    • Exposure to unspecified radiation     last dose 1992   • Female stress incontinence 4/18/2016   • Gall stones    • GERD (gastroesophageal reflux disease) 2/22/2012   • Glucose intolerance (impaired glucose tolerance no weight loss or gain . HEENT/Neck:   Head no headache, no dizziness, no lightheadedness. Eyes sees an eye MD will send her records, no redness, no drainage. Ears OCC earaches, no fullness, normal hearing, no tinnitus.  Nose and Sinuses no recurrent cold none. Insomnia none. EXAM:   /66   Pulse 78   Temp 98.3 °F (36.8 °C) (Oral)   Resp 16   Ht 62\"   Wt 134 lb   SpO2 97%   BMI 24.51 kg/m²     GENERAL:   Build: normal .   General Appearance: alert and oriented, pleasant.    HEENT:   Ear DIABETIC FOOT EXAM BILATERAL  INSPECTION normal  CIRCULATION normal  MONOFILAMENT normal           ASSESSMENT AND PLAN:   Danya Roth was seen today for physical.    Diagnoses and all orders for this visit:    Routine general medical examination at [facility]

## 2018-03-21 ENCOUNTER — PRIOR ORIGINAL RECORDS (OUTPATIENT)
Dept: OTHER | Age: 78
End: 2018-03-21

## 2018-03-24 ENCOUNTER — TELEPHONE (OUTPATIENT)
Dept: INTERNAL MEDICINE CLINIC | Facility: CLINIC | Age: 78
End: 2018-03-24

## 2018-03-24 DIAGNOSIS — N30.00 ACUTE CYSTITIS WITHOUT HEMATURIA: Primary | ICD-10-CM

## 2018-03-24 RX ORDER — NITROFURANTOIN 25; 75 MG/1; MG/1
100 CAPSULE ORAL 2 TIMES DAILY
Qty: 14 CAPSULE | Refills: 0 | Status: SHIPPED | OUTPATIENT
Start: 2018-03-24 | End: 2018-03-31

## 2018-03-24 NOTE — TELEPHONE ENCOUNTER
Patient called complaining of ongoing UTI. Was seen on 3/15/18 and continues to feel burning sensatilon. Please advise.

## 2018-04-04 ENCOUNTER — OFFICE VISIT (OUTPATIENT)
Dept: INTERNAL MEDICINE CLINIC | Facility: CLINIC | Age: 78
End: 2018-04-04

## 2018-04-04 ENCOUNTER — HOSPITAL ENCOUNTER (OUTPATIENT)
Dept: GENERAL RADIOLOGY | Age: 78
Discharge: HOME | End: 2018-04-04
Attending: NURSE PRACTITIONER
Payer: MEDICARE

## 2018-04-04 ENCOUNTER — OFFICE VISIT (OUTPATIENT)
Dept: UROLOGY | Facility: HOSPITAL | Age: 78
End: 2018-04-04
Attending: OBSTETRICS & GYNECOLOGY
Payer: MEDICARE

## 2018-04-04 VITALS
TEMPERATURE: 98 F | RESPIRATION RATE: 16 BRPM | WEIGHT: 135 LBS | HEART RATE: 53 BPM | SYSTOLIC BLOOD PRESSURE: 136 MMHG | BODY MASS INDEX: 24.84 KG/M2 | OXYGEN SATURATION: 98 % | DIASTOLIC BLOOD PRESSURE: 74 MMHG | HEIGHT: 62 IN

## 2018-04-04 VITALS
HEIGHT: 62 IN | WEIGHT: 135 LBS | SYSTOLIC BLOOD PRESSURE: 120 MMHG | BODY MASS INDEX: 24.84 KG/M2 | DIASTOLIC BLOOD PRESSURE: 68 MMHG

## 2018-04-04 DIAGNOSIS — N39.41 URGE INCONTINENCE: ICD-10-CM

## 2018-04-04 DIAGNOSIS — M25.511 ARTHRALGIA OF RIGHT ACROMIOCLAVICULAR JOINT: Primary | ICD-10-CM

## 2018-04-04 DIAGNOSIS — N95.2 POSTMENOPAUSAL ATROPHIC VAGINITIS: ICD-10-CM

## 2018-04-04 DIAGNOSIS — N39.0 RECURRENT UTI: ICD-10-CM

## 2018-04-04 DIAGNOSIS — R33.9 INCOMPLETE EMPTYING OF BLADDER: Primary | ICD-10-CM

## 2018-04-04 DIAGNOSIS — M25.511 ARTHRALGIA OF RIGHT ACROMIOCLAVICULAR JOINT: ICD-10-CM

## 2018-04-04 PROCEDURE — 87086 URINE CULTURE/COLONY COUNT: CPT | Performed by: OBSTETRICS & GYNECOLOGY

## 2018-04-04 PROCEDURE — 51701 INSERT BLADDER CATHETER: CPT

## 2018-04-04 PROCEDURE — 81002 URINALYSIS NONAUTO W/O SCOPE: CPT

## 2018-04-04 PROCEDURE — 99211 OFF/OP EST MAY X REQ PHY/QHP: CPT

## 2018-04-04 PROCEDURE — 99213 OFFICE O/P EST LOW 20 MIN: CPT | Performed by: NURSE PRACTITIONER

## 2018-04-04 PROCEDURE — 73000 X-RAY EXAM OF COLLAR BONE: CPT | Performed by: NURSE PRACTITIONER

## 2018-04-04 RX ORDER — EZETIMIBE 10 MG/1
10 TABLET ORAL
COMMUNITY
End: 2019-08-07

## 2018-04-04 RX ORDER — SULFAMETHOXAZOLE AND TRIMETHOPRIM 400; 80 MG/1; MG/1
1 TABLET ORAL NIGHTLY
Qty: 90 TABLET | Refills: 3 | Status: SHIPPED | OUTPATIENT
Start: 2018-04-04 | End: 2019-01-11

## 2018-04-04 RX ORDER — CYCLOBENZAPRINE HYDROCHLORIDE 7.5 MG/1
7.5 TABLET, FILM COATED ORAL 3 TIMES DAILY PRN
Qty: 15 TABLET | Refills: 0 | Status: SHIPPED | OUTPATIENT
Start: 2018-04-04 | End: 2019-01-11 | Stop reason: ALTCHOICE

## 2018-04-04 RX ORDER — ESTRADIOL 0.1 MG/G
CREAM VAGINAL
Qty: 1 TUBE | Refills: 3 | Status: SHIPPED | OUTPATIENT
Start: 2018-04-04

## 2018-04-04 NOTE — PROGRESS NOTES
.Patient agrees to self  catheterization. Discussed procedure and steps with patient, and written instructions were also provided.   Patient demonstrated procedure with RN, discussed frequency of self-cathing, ordering supplies, follow up and signs and sym

## 2018-04-04 NOTE — PROGRESS NOTES
CHIEF COMPLAINT:     Patient presents with:  Shoulder Pain: 1x week Right shoulder pain - pt did not fall or injure herself      HPI:   Radha Kitchen is a 69year old female.  Patient's presenting with an injury to: Right shoulder  Pertinent history: Radi COMPLEX/VITAMIN C) Oral Tab Take by mouth. Disp:  Rfl:    Fluticasone Propionate 50 MCG/ACT Nasal Suspension 1 spray by Each Nare route daily. Disp:  Rfl:    Pravastatin Sodium 10 MG Oral Tab Take 10 mg by mouth daily.  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   Exam of Right shoulder and neck   - swelling: none  - tenderness:  pain with palpation over right sternoclavicular joint  - deformity/defect: none obvious  - crepitus: none  - ecchymo

## 2018-04-05 ENCOUNTER — TELEPHONE (OUTPATIENT)
Dept: UROLOGY | Facility: HOSPITAL | Age: 78
End: 2018-04-05

## 2018-04-05 NOTE — TELEPHONE ENCOUNTER
Called LM on pt VM regarding how she did with CISC this morning. Also informed pt we need to measure voiding amounts. Request pt call back and come get measuring equipment.

## 2018-04-06 ENCOUNTER — NURSE ONLY (OUTPATIENT)
Dept: UROLOGY | Facility: HOSPITAL | Age: 78
End: 2018-04-06
Attending: OBSTETRICS & GYNECOLOGY
Payer: MEDICARE

## 2018-04-06 VITALS — RESPIRATION RATE: 20 BRPM | BODY MASS INDEX: 24.84 KG/M2 | HEIGHT: 62 IN | WEIGHT: 135 LBS

## 2018-04-06 DIAGNOSIS — N95.2 POSTMENOPAUSAL ATROPHIC VAGINITIS: ICD-10-CM

## 2018-04-06 DIAGNOSIS — R33.9 INCOMPLETE EMPTYING OF BLADDER: Primary | ICD-10-CM

## 2018-04-06 PROCEDURE — 51701 INSERT BLADDER CATHETER: CPT

## 2018-04-06 NOTE — PROCEDURES
.Patient here for education on self catheterization. Pt had a difficult time trying to find urethra at home after having CISC teaching last visit. Discussed procedure and steps with patient, and written instructions were also provided.   Patient demonstrat

## 2018-04-09 ENCOUNTER — HOSPITAL ENCOUNTER (OUTPATIENT)
Dept: GENERAL RADIOLOGY | Age: 78
Discharge: HOME | End: 2018-04-09
Attending: THORACIC SURGERY (CARDIOTHORACIC VASCULAR SURGERY)
Payer: MEDICARE

## 2018-04-09 DIAGNOSIS — T81.89XA PROTRUDING STERNAL WIRES, INITIAL ENCOUNTER: ICD-10-CM

## 2018-04-09 PROCEDURE — 71046 X-RAY EXAM CHEST 2 VIEWS: CPT | Performed by: THORACIC SURGERY (CARDIOTHORACIC VASCULAR SURGERY)

## 2018-05-01 ENCOUNTER — TELEPHONE (OUTPATIENT)
Dept: UROLOGY | Facility: HOSPITAL | Age: 78
End: 2018-05-01

## 2018-05-01 RX ORDER — LEVOTHYROXINE SODIUM 0.1 MG/1
TABLET ORAL
Qty: 90 TABLET | Refills: 0 | Status: SHIPPED | OUTPATIENT
Start: 2018-05-01 | End: 2018-08-06

## 2018-06-14 NOTE — TELEPHONE ENCOUNTER
Letty Feliz - will need to [address] for an indefinite period of time due to incomplete emptying - will be CISC TID to QID and PRN

## 2018-06-21 ENCOUNTER — HOSPITAL ENCOUNTER (OUTPATIENT)
Dept: MAMMOGRAPHY | Facility: HOSPITAL | Age: 78
Discharge: HOME | End: 2018-06-21
Attending: INTERNAL MEDICINE
Payer: MEDICARE

## 2018-06-21 DIAGNOSIS — Z86.000 PERSONAL HISTORY OF IN-SITU NEOPLASM OF BREAST: ICD-10-CM

## 2018-06-21 PROCEDURE — 77061 BREAST TOMOSYNTHESIS UNI: CPT | Performed by: INTERNAL MEDICINE

## 2018-06-21 PROCEDURE — 76641 ULTRASOUND BREAST COMPLETE: CPT | Performed by: INTERNAL MEDICINE

## 2018-06-21 PROCEDURE — 77065 DX MAMMO INCL CAD UNI: CPT | Performed by: INTERNAL MEDICINE

## 2018-08-07 RX ORDER — LEVOTHYROXINE SODIUM 0.1 MG/1
TABLET ORAL
Qty: 90 TABLET | Refills: 0 | Status: SHIPPED | OUTPATIENT
Start: 2018-08-07 | End: 2018-11-01

## 2018-08-07 NOTE — TELEPHONE ENCOUNTER
Medication(s) to Refill:   Pending Prescriptions Disp Refills    LEVOTHYROXINE SODIUM 100 MCG Oral Tab [Pharmacy Med Name: LEVOTHYROXINE 0.100MG (100MCG) TAB] 90 tablet 0     Sig: TAKE 1 TABLET BY MOUTH EVERY DAY AS DIRECTED           Last Time Medication

## 2018-10-02 ENCOUNTER — OFFICE VISIT (OUTPATIENT)
Dept: INTERNAL MEDICINE CLINIC | Facility: CLINIC | Age: 78
End: 2018-10-02
Payer: MEDICARE

## 2018-10-02 DIAGNOSIS — H66.92 OTITIS OF LEFT EAR: ICD-10-CM

## 2018-10-02 DIAGNOSIS — J01.00 ACUTE NON-RECURRENT MAXILLARY SINUSITIS: ICD-10-CM

## 2018-10-02 DIAGNOSIS — N30.01 ACUTE CYSTITIS WITH HEMATURIA: Primary | ICD-10-CM

## 2018-10-02 PROCEDURE — 87086 URINE CULTURE/COLONY COUNT: CPT | Performed by: NURSE PRACTITIONER

## 2018-10-02 PROCEDURE — 87088 URINE BACTERIA CULTURE: CPT | Performed by: NURSE PRACTITIONER

## 2018-10-02 PROCEDURE — 87186 SC STD MICRODIL/AGAR DIL: CPT | Performed by: NURSE PRACTITIONER

## 2018-10-02 PROCEDURE — 81003 URINALYSIS AUTO W/O SCOPE: CPT | Performed by: NURSE PRACTITIONER

## 2018-10-02 PROCEDURE — 99213 OFFICE O/P EST LOW 20 MIN: CPT | Performed by: NURSE PRACTITIONER

## 2018-10-02 RX ORDER — CEFUROXIME AXETIL 250 MG/1
500 TABLET ORAL 2 TIMES DAILY
Qty: 40 TABLET | Refills: 0 | Status: SHIPPED | OUTPATIENT
Start: 2018-10-02 | End: 2018-10-20

## 2018-10-02 NOTE — PROGRESS NOTES
CHIEF COMPLAINT:   Patient presents with:  UTI: burning, pressure, frequency       HPI:   Stalin Bernard is a 69year old female who presents with symptoms of UTI. Complaining of urinary frequency, urgency, dysuria for last 7 days.  Pt does catheterize t Pravastatin Sodium 10 MG Oral Tab Take 10 mg by mouth daily. Disp:  Rfl:    ascorbic acid 500 MG Oral Tab Take 500 mg by mouth daily. Disp:  Rfl:    Calcium Carbonate-Vitamin D (CALCIUM + D OR) Take 1 tablet by mouth daily.  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 ASSESSMENT AND PLAN:   Idris Goel is a 69year old female presents with UTI symptoms as well as URI and Otitis.  Discussed antibiotic choice to cover multiple system infectio

## 2018-10-20 DIAGNOSIS — J01.00 ACUTE NON-RECURRENT MAXILLARY SINUSITIS: ICD-10-CM

## 2018-10-20 DIAGNOSIS — N30.01 ACUTE CYSTITIS WITH HEMATURIA: ICD-10-CM

## 2018-10-20 DIAGNOSIS — H66.92 OTITIS OF LEFT EAR: ICD-10-CM

## 2018-10-20 RX ORDER — CEFUROXIME AXETIL 250 MG/1
500 TABLET ORAL 2 TIMES DAILY
Qty: 40 TABLET | Refills: 0 | Status: SHIPPED | OUTPATIENT
Start: 2018-10-20 | End: 2018-10-30

## 2018-10-20 NOTE — PROGRESS NOTES
Received call from patient's daughter, Paradise Hooker (in chart as contact). Patient with recurrent urinary symptoms. She was seen in clinic on 10/2, prescribed cefuroxime for treatment of both UTI and sinusitis. Grew e.  Coli resistant to ampicillin, bact

## 2018-11-01 ENCOUNTER — TELEPHONE (OUTPATIENT)
Dept: UROLOGY | Facility: HOSPITAL | Age: 78
End: 2018-11-01

## 2018-11-01 DIAGNOSIS — R35.0 FREQUENCY OF URINATION: Primary | ICD-10-CM

## 2018-11-01 RX ORDER — LEVOTHYROXINE SODIUM 0.1 MG/1
TABLET ORAL
Qty: 90 TABLET | Refills: 0 | Status: SHIPPED | OUTPATIENT
Start: 2018-11-01 | End: 2019-02-06

## 2018-11-01 NOTE — TELEPHONE ENCOUNTER
Per daughter,  Patient recently finished  Cefuroxime Axetil 250 MG Oral Tab;  Take 2 tablets (500 mg total) by mouth BID for 10 days - she had a positive urine culture on 10/2/18 - The rx was taken for total of 20 days because the first ten days patient was

## 2018-11-01 NOTE — TELEPHONE ENCOUNTER
Refill requested:   Requested Prescriptions     Pending Prescriptions Disp Refills   • LEVOTHYROXINE SODIUM 100 MCG Oral Tab [Pharmacy Med Name: LEVOTHYROXINE 0.100MG (100MCG) TAB] 90 tablet 0     Sig: TAKE 1 TABLET BY MOUTH EVERY DAY AS DIRECTED         P

## 2018-11-02 ENCOUNTER — APPOINTMENT (OUTPATIENT)
Dept: LAB | Age: 78
End: 2018-11-02
Attending: INTERNAL MEDICINE
Payer: MEDICARE

## 2018-11-02 DIAGNOSIS — R35.0 FREQUENCY OF URINATION: ICD-10-CM

## 2018-11-02 PROCEDURE — 87186 SC STD MICRODIL/AGAR DIL: CPT

## 2018-11-02 PROCEDURE — 87088 URINE BACTERIA CULTURE: CPT

## 2018-11-02 PROCEDURE — 87086 URINE CULTURE/COLONY COUNT: CPT

## 2018-11-02 PROCEDURE — 81001 URINALYSIS AUTO W/SCOPE: CPT

## 2018-11-05 ENCOUNTER — TELEPHONE (OUTPATIENT)
Dept: UROLOGY | Facility: HOSPITAL | Age: 78
End: 2018-11-05

## 2018-11-05 RX ORDER — NITROFURANTOIN 25; 75 MG/1; MG/1
100 CAPSULE ORAL 2 TIMES DAILY
Qty: 14 CAPSULE | Refills: 0 | Status: SHIPPED | OUTPATIENT
Start: 2018-11-05 | End: 2018-11-12

## 2018-11-05 RX ORDER — CIPROFLOXACIN 500 MG/1
500 TABLET, FILM COATED ORAL 2 TIMES DAILY
Qty: 14 TABLET | Refills: 0 | Status: SHIPPED | OUTPATIENT
Start: 2018-11-05 | End: 2018-11-05

## 2018-11-05 RX ORDER — NITROFURANTOIN MACROCRYSTALS 100 MG/1
100 CAPSULE ORAL NIGHTLY
Qty: 30 CAPSULE | Refills: 3 | Status: SHIPPED | OUTPATIENT
Start: 2018-11-05 | End: 2019-03-01 | Stop reason: ALTCHOICE

## 2018-11-05 NOTE — TELEPHONE ENCOUNTER
After speaking to Dr Bebo Owusu 100mg BID po X 7days. Reviewed w/ pt plan will be to start macrodantin suppression nightly. Pt and daughter verbalized an understanding and agreed to plan.

## 2018-11-05 NOTE — TELEPHONE ENCOUNTER
Dr. Vaughn Parra reviewed urine culture results, VORB Cipro 500mg BID x 7 days and then pt to start SS Macrodantin QD (discontinue SS Bactrim).   Called pt daughter, Joe Restrepo, reviewed lab results, new orders, and encouraged pt need to CISC more frequent to improve em

## 2018-11-05 NOTE — TELEPHONE ENCOUNTER
Addendum: Pharmacy called to say pt had adverse reaction to Cipro in past.  Called daughter, Louis Angeles, states that she remembers they went to ER for pt with hives after starting Cipro she now remembers. Added to allergy list. Dr. Gracie Moreno paged.

## 2018-11-28 ENCOUNTER — TELEPHONE (OUTPATIENT)
Dept: UROLOGY | Facility: HOSPITAL | Age: 78
End: 2018-11-28

## 2018-11-28 ENCOUNTER — APPOINTMENT (OUTPATIENT)
Dept: LAB | Age: 78
End: 2018-11-28
Attending: OBSTETRICS & GYNECOLOGY
Payer: MEDICARE

## 2018-11-28 DIAGNOSIS — R30.0 DYSURIA: Primary | ICD-10-CM

## 2018-11-28 DIAGNOSIS — R30.0 DYSURIA: ICD-10-CM

## 2018-11-28 PROCEDURE — 87086 URINE CULTURE/COLONY COUNT: CPT

## 2018-11-28 RX ORDER — CEPHALEXIN 500 MG/1
500 CAPSULE ORAL 4 TIMES DAILY
Qty: 28 CAPSULE | Refills: 0 | Status: SHIPPED | OUTPATIENT
Start: 2018-11-28 | End: 2019-01-11 | Stop reason: ALTCHOICE

## 2018-11-28 NOTE — TELEPHONE ENCOUNTER
Gerardo Uriostegui daughter called, stating her mother is having burning, odor and abd pain. Daughter states she's afraid it's another UTI.    Informed pt to have Conception Spoon give a urine sample at a nearby [place] and we will put order in for Urine Culture

## 2018-11-28 NOTE — TELEPHONE ENCOUNTER
Spoke with dr. Isma Freeman regarding pt's urinary c/o. Dr. Isma Freeman ordered Keflex for her. Order placed,  Informed daughter of this.

## 2018-11-30 ENCOUNTER — TELEPHONE (OUTPATIENT)
Dept: UROLOGY | Facility: HOSPITAL | Age: 78
End: 2018-11-30

## 2018-11-30 NOTE — TELEPHONE ENCOUNTER
Phoned pt and informed of neg urine cx. Pt states she is feeling better on abx. Informed pt she did not need to take it. Pt verbalized an understanding.

## 2018-12-31 ENCOUNTER — PRIOR ORIGINAL RECORDS (OUTPATIENT)
Dept: OTHER | Age: 78
End: 2018-12-31

## 2019-01-08 ENCOUNTER — HOSPITAL ENCOUNTER (EMERGENCY)
Facility: HOSPITAL | Age: 79
Discharge: HOME | End: 2019-01-08
Attending: EMERGENCY MEDICINE
Payer: MEDICARE

## 2019-01-08 ENCOUNTER — APPOINTMENT (OUTPATIENT)
Dept: CT IMAGING | Facility: HOSPITAL | Age: 79
End: 2019-01-08
Attending: EMERGENCY MEDICINE
Payer: MEDICARE

## 2019-01-08 VITALS
SYSTOLIC BLOOD PRESSURE: 146 MMHG | RESPIRATION RATE: 18 BRPM | DIASTOLIC BLOOD PRESSURE: 63 MMHG | HEIGHT: 62 IN | HEART RATE: 60 BPM | OXYGEN SATURATION: 97 % | WEIGHT: 132 LBS | TEMPERATURE: 99 F | BODY MASS INDEX: 24.29 KG/M2

## 2019-01-08 DIAGNOSIS — R10.9 FLANK PAIN: Primary | ICD-10-CM

## 2019-01-08 LAB
ALBUMIN SERPL-MCNC: 3.6 G/DL (ref 3.1–4.5)
ALBUMIN/GLOB SERPL: 0.8 {RATIO} (ref 1–2)
ALP LIVER SERPL-CCNC: 84 U/L (ref 55–142)
ALT SERPL-CCNC: 22 U/L (ref 14–54)
ANION GAP SERPL CALC-SCNC: 4 MMOL/L (ref 0–18)
AST SERPL-CCNC: 19 U/L (ref 15–41)
BASOPHILS # BLD AUTO: 0.05 X10(3) UL (ref 0–0.1)
BASOPHILS NFR BLD AUTO: 0.8 %
BILIRUB SERPL-MCNC: 0.3 MG/DL (ref 0.1–2)
BILIRUB UR QL STRIP.AUTO: NEGATIVE
BUN BLD-MCNC: 13 MG/DL (ref 8–20)
BUN/CREAT SERPL: 18.1 (ref 10–20)
CALCIUM BLD-MCNC: 9.4 MG/DL (ref 8.3–10.3)
CHLORIDE SERPL-SCNC: 105 MMOL/L (ref 101–111)
CLARITY UR REFRACT.AUTO: CLEAR
CO2 SERPL-SCNC: 31 MMOL/L (ref 22–32)
CREAT BLD-MCNC: 0.72 MG/DL (ref 0.55–1.02)
EOSINOPHIL # BLD AUTO: 0.2 X10(3) UL (ref 0–0.3)
EOSINOPHIL NFR BLD AUTO: 3.3 %
ERYTHROCYTE [DISTWIDTH] IN BLOOD BY AUTOMATED COUNT: 13.8 % (ref 11.5–16)
GLOBULIN PLAS-MCNC: 4.5 G/DL (ref 2.8–4.4)
GLUCOSE BLD-MCNC: 119 MG/DL (ref 70–99)
GLUCOSE UR STRIP.AUTO-MCNC: NEGATIVE MG/DL
HCT VFR BLD AUTO: 39 % (ref 34–50)
HGB BLD-MCNC: 12.7 G/DL (ref 12–16)
IMMATURE GRANULOCYTE COUNT: 0.01 X10(3) UL (ref 0–1)
IMMATURE GRANULOCYTE RATIO %: 0.2 %
KETONES UR STRIP.AUTO-MCNC: NEGATIVE MG/DL
LYMPHOCYTES # BLD AUTO: 1.62 X10(3) UL (ref 0.9–4)
LYMPHOCYTES NFR BLD AUTO: 26.6 %
M PROTEIN MFR SERPL ELPH: 8.1 G/DL (ref 6.4–8.2)
MCH RBC QN AUTO: 29.2 PG (ref 27–33.2)
MCHC RBC AUTO-ENTMCNC: 32.6 G/DL (ref 31–37)
MCV RBC AUTO: 89.7 FL (ref 81–100)
MONOCYTES # BLD AUTO: 0.66 X10(3) UL (ref 0.1–1)
MONOCYTES NFR BLD AUTO: 10.9 %
NEUTROPHIL ABS PRELIM: 3.54 X10 (3) UL (ref 1.3–6.7)
NEUTROPHILS # BLD AUTO: 3.54 X10(3) UL (ref 1.3–6.7)
NEUTROPHILS NFR BLD AUTO: 58.2 %
NITRITE UR QL STRIP.AUTO: NEGATIVE
OSMOLALITY SERPL CALC.SUM OF ELEC: 291 MOSM/KG (ref 275–295)
PH UR STRIP.AUTO: 7 [PH] (ref 4.5–8)
PLATELET # BLD AUTO: 212 10(3)UL (ref 150–450)
POTASSIUM SERPL-SCNC: 3.9 MMOL/L (ref 3.6–5.1)
PROT UR STRIP.AUTO-MCNC: NEGATIVE MG/DL
RBC # BLD AUTO: 4.35 X10(6)UL (ref 3.8–5.1)
RED CELL DISTRIBUTION WIDTH-SD: 45.4 FL (ref 35.1–46.3)
SODIUM SERPL-SCNC: 140 MMOL/L (ref 136–144)
SP GR UR STRIP.AUTO: <1.005 (ref 1–1.03)
UROBILINOGEN UR STRIP.AUTO-MCNC: <2 MG/DL
WBC # BLD AUTO: 6.1 X10(3) UL (ref 4–13)

## 2019-01-08 PROCEDURE — 74176 CT ABD & PELVIS W/O CONTRAST: CPT | Performed by: EMERGENCY MEDICINE

## 2019-01-08 PROCEDURE — 87086 URINE CULTURE/COLONY COUNT: CPT | Performed by: EMERGENCY MEDICINE

## 2019-01-08 PROCEDURE — 81001 URINALYSIS AUTO W/SCOPE: CPT | Performed by: EMERGENCY MEDICINE

## 2019-01-08 PROCEDURE — 99285 EMERGENCY DEPT VISIT HI MDM: CPT

## 2019-01-08 PROCEDURE — 36415 COLL VENOUS BLD VENIPUNCTURE: CPT

## 2019-01-08 PROCEDURE — 80053 COMPREHEN METABOLIC PANEL: CPT | Performed by: EMERGENCY MEDICINE

## 2019-01-08 PROCEDURE — 99284 EMERGENCY DEPT VISIT MOD MDM: CPT

## 2019-01-08 PROCEDURE — 85025 COMPLETE CBC W/AUTO DIFF WBC: CPT | Performed by: EMERGENCY MEDICINE

## 2019-01-08 NOTE — ED INITIAL ASSESSMENT (HPI)
Pt here with c/o urinary symptoms with right sided flank pain, intermittently since October, frequent UTI. Patient states she is currently being treated, on abx, intermittent fevers/chills, with nausea.

## 2019-01-08 NOTE — ED PROVIDER NOTES
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  w/ BSO   • KNEE REPLACEMENT SURGERY  12/27/1 BMI 24.14 kg/m²         Physical Exam  General: This a pleasant nontoxic appearing patient in no apparent distress alert and oriented ×3  HEENT: Pupils are equal reactive to light. Extra ocular motions are intact.   No scleral icterus or conjunctival pallo Please view results for these tests on the individual orders.    RAINBOW DRAW BLUE   RAINBOW DRAW LAVENDER   RAINBOW DRAW LIGHT GREEN   RAINBOW DRAW GOLD   URINE CULTURE, ROUTINE   CBC W/ DIFFERENTIAL          Ct Abdomen+pelvis Kidneystone 2d Rndr(no Iv

## 2019-01-09 NOTE — ED NOTES
Assumed care, report received from [facility], [address] [place] [address]. Pt resting in bed, states pain better 4/10. She denies LUZ ELENA. Pt requesting water, OK'd by Dr. Orlando Salguero. She was also given juice brought in by family.  Pt denies further need, call light in reach

## 2019-01-11 ENCOUNTER — OFFICE VISIT (OUTPATIENT)
Dept: INTERNAL MEDICINE CLINIC | Facility: CLINIC | Age: 79
End: 2019-01-11
Payer: MEDICARE

## 2019-01-11 VITALS
OXYGEN SATURATION: 95 % | TEMPERATURE: 97 F | HEART RATE: 61 BPM | HEIGHT: 62 IN | RESPIRATION RATE: 20 BRPM | SYSTOLIC BLOOD PRESSURE: 130 MMHG | DIASTOLIC BLOOD PRESSURE: 74 MMHG | BODY MASS INDEX: 25.17 KG/M2 | WEIGHT: 136.75 LBS

## 2019-01-11 DIAGNOSIS — R35.0 URINE FREQUENCY: Primary | ICD-10-CM

## 2019-01-11 PROCEDURE — 1111F DSCHRG MED/CURRENT MED MERGE: CPT | Performed by: INTERNAL MEDICINE

## 2019-01-11 PROCEDURE — 99213 OFFICE O/P EST LOW 20 MIN: CPT | Performed by: INTERNAL MEDICINE

## 2019-01-11 RX ORDER — PHENAZOPYRIDINE HYDROCHLORIDE 200 MG/1
200 TABLET, FILM COATED ORAL 3 TIMES DAILY PRN
Qty: 10 TABLET | Refills: 0 | Status: SHIPPED | OUTPATIENT
Start: 2019-01-11 | End: 2019-01-14

## 2019-01-11 NOTE — PROGRESS NOTES
Idris Goel    is a 69year old female. Patient presents with:  Hospital F/U      HPI:   Go up emergency room. Patient had extensive workup. Feels the need to go to the bathroom to urinate along with urgency.   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

## 2019-01-14 ENCOUNTER — TELEPHONE (OUTPATIENT)
Dept: UROLOGY | Facility: HOSPITAL | Age: 79
End: 2019-01-14

## 2019-01-15 NOTE — TELEPHONE ENCOUNTER
Pt daughter called after speaking to Dr Tolu Clark. OK to switch to Dr Scarlet Galicia. Pt daughter only wanting her mom to see Dr Amirah Mooney.  Appt made for end of Feb

## 2019-02-06 RX ORDER — LEVOTHYROXINE SODIUM 0.1 MG/1
TABLET ORAL
Qty: 90 TABLET | Refills: 0 | Status: SHIPPED | OUTPATIENT
Start: 2019-02-06 | End: 2019-04-25

## 2019-02-06 NOTE — TELEPHONE ENCOUNTER
Last OV: 1/11/19 with Dr. Zach Hoffmann  Last refill date: 11/1/18     #/refills: #90, 0 refills  When pt was asked to return for OV: 4 weeks (around 2/8/19)   Upcoming appt/reason: no upcoming appt  Last labs 1/8/19  Last thyroid level 3/12/18

## 2019-02-26 ENCOUNTER — OFFICE VISIT (OUTPATIENT)
Dept: UROLOGY | Facility: HOSPITAL | Age: 79
End: 2019-02-26
Attending: OBSTETRICS & GYNECOLOGY
Payer: MEDICARE

## 2019-02-26 VITALS
HEIGHT: 62 IN | WEIGHT: 136 LBS | SYSTOLIC BLOOD PRESSURE: 122 MMHG | BODY MASS INDEX: 25.03 KG/M2 | DIASTOLIC BLOOD PRESSURE: 74 MMHG

## 2019-02-26 DIAGNOSIS — N32.81 OVERACTIVE DETRUSOR: ICD-10-CM

## 2019-02-26 DIAGNOSIS — N39.41 URGE INCONTINENCE: ICD-10-CM

## 2019-02-26 DIAGNOSIS — R33.9 INCOMPLETE EMPTYING OF BLADDER: Primary | ICD-10-CM

## 2019-02-26 DIAGNOSIS — N39.0 RECURRENT UTI: ICD-10-CM

## 2019-02-26 DIAGNOSIS — N95.2 POSTMENOPAUSAL ATROPHIC VAGINITIS: ICD-10-CM

## 2019-02-26 LAB
BILIRUB UR QL STRIP.AUTO: NEGATIVE
CLARITY UR REFRACT.AUTO: CLEAR
COLOR UR AUTO: YELLOW
CONTROL RUN WITHIN 24 HOURS?: YES
GLUCOSE UR STRIP.AUTO-MCNC: NEGATIVE MG/DL
KETONES UR STRIP.AUTO-MCNC: NEGATIVE MG/DL
LEUKOCYTE ESTERASE URINE: NEGATIVE
NITRITE UR QL STRIP.AUTO: NEGATIVE
NITRITE URINE: NEGATIVE
PH UR STRIP.AUTO: 7 [PH] (ref 4.5–8)
PROT UR STRIP.AUTO-MCNC: NEGATIVE MG/DL
SP GR UR STRIP.AUTO: 1.01 (ref 1–1.03)
UROBILINOGEN UR STRIP.AUTO-MCNC: <2 MG/DL

## 2019-02-26 PROCEDURE — 81001 URINALYSIS AUTO W/SCOPE: CPT | Performed by: OBSTETRICS & GYNECOLOGY

## 2019-02-26 PROCEDURE — 81002 URINALYSIS NONAUTO W/O SCOPE: CPT

## 2019-02-26 PROCEDURE — 99212 OFFICE O/P EST SF 10 MIN: CPT

## 2019-02-26 PROCEDURE — 87186 SC STD MICRODIL/AGAR DIL: CPT | Performed by: OBSTETRICS & GYNECOLOGY

## 2019-02-26 PROCEDURE — 87077 CULTURE AEROBIC IDENTIFY: CPT | Performed by: OBSTETRICS & GYNECOLOGY

## 2019-02-26 PROCEDURE — 87086 URINE CULTURE/COLONY COUNT: CPT | Performed by: OBSTETRICS & GYNECOLOGY

## 2019-02-26 NOTE — PROGRESS NOTES
Patient presents to follow up incomplete bladder emptying    She is currently using CISC 2x/d (100-400cc)  Nocturia x2-3  UDS w/ DO in past    Vag estrogen twice weekly  2016 USVS, RI, MUS, cysto    Denies bulge  No RITESH  Some UUI, urinary urgency  Bowels r routine/constipation regimen    Diagnostic Items:  urine testing    Medications Discussed:  Estrace Cream  Fiber  Miralax  OAB meds, abx for UTIs, suppression for UTIs    Treatment Plan, Non-surgical:   RN teaching/pt education done  Fiber supplement  Stim

## 2019-02-26 NOTE — PATIENT INSTRUCTIONS
Stop nitrofurantoin at bedtime  Continue vag estrogen twice weekly  Daily fiber  Continue to cath twice daily  Pursue pelvic floor PT    TRAVIS [facility]    BOWEL REGIMEN    Constipation can have detrimental effec

## 2019-03-01 ENCOUNTER — TELEPHONE (OUTPATIENT)
Dept: UROLOGY | Facility: HOSPITAL | Age: 79
End: 2019-03-01

## 2019-03-01 RX ORDER — NITROFURANTOIN 25; 75 MG/1; MG/1
100 CAPSULE ORAL 2 TIMES DAILY
Qty: 14 CAPSULE | Refills: 0 | Status: SHIPPED | OUTPATIENT
Start: 2019-03-01 | End: 2019-03-13

## 2019-03-01 NOTE — TELEPHONE ENCOUNTER
Dr. John Prescott reviewed labs, TORB Macrobid BID x 7 days. Called pt and notified of culture results and new orders. Pt reports she is having more burning and discomfort at night. Verbalizes understanding of new orders and agreeable to plan.   Pt to call if sx per

## 2019-03-04 ENCOUNTER — TELEPHONE (OUTPATIENT)
Dept: UROLOGY | Facility: HOSPITAL | Age: 79
End: 2019-03-04

## 2019-03-04 DIAGNOSIS — N39.0 UTI (URINARY TRACT INFECTION): Primary | ICD-10-CM

## 2019-03-04 NOTE — TELEPHONE ENCOUNTER
Called Patient [phone] fax line. Called mobile number and left message to do a FERNANDO 3 days after finish of antibiotics. Monday, March 11. IF patient started antibiotics on March 1.   TORZAHRA Patterson 3 days after completion of antibiotics  Placed

## 2019-03-11 ENCOUNTER — APPOINTMENT (OUTPATIENT)
Dept: LAB | Age: 79
End: 2019-03-11
Attending: OBSTETRICS & GYNECOLOGY
Payer: MEDICARE

## 2019-03-11 DIAGNOSIS — N39.0 UTI (URINARY TRACT INFECTION): ICD-10-CM

## 2019-03-11 PROCEDURE — 87086 URINE CULTURE/COLONY COUNT: CPT

## 2019-03-11 PROCEDURE — 87088 URINE BACTERIA CULTURE: CPT

## 2019-03-11 PROCEDURE — 87186 SC STD MICRODIL/AGAR DIL: CPT

## 2019-03-13 ENCOUNTER — TELEPHONE (OUTPATIENT)
Dept: UROLOGY | Facility: HOSPITAL | Age: 79
End: 2019-03-13

## 2019-03-13 RX ORDER — METHENAMINE HIPPURATE 1000 MG/1
1 TABLET ORAL 2 TIMES DAILY
Qty: 60 TABLET | Refills: 3 | Status: SHIPPED | OUTPATIENT
Start: 2019-03-24 | End: 2019-07-01

## 2019-03-13 RX ORDER — NITROFURANTOIN 25; 75 MG/1; MG/1
100 CAPSULE ORAL 2 TIMES DAILY
Qty: 20 CAPSULE | Refills: 0 | Status: SHIPPED | OUTPATIENT
Start: 2019-03-13 | End: 2019-03-23

## 2019-03-13 NOTE — TELEPHONE ENCOUNTER
Phoned pt daughter w/ urine cx results. TORB Dr Governor Charles macrobid 100mg bid X 10days. Pt then will start hiprex 1g BID po. Pt should also take Vit c w/ hiprex. Pt daughter, Paramjit Vernon verbalized an understanding and agreed to plan.

## 2019-03-14 ENCOUNTER — TELEPHONE (OUTPATIENT)
Dept: UROLOGY | Facility: HOSPITAL | Age: 79
End: 2019-03-14

## 2019-03-14 NOTE — TELEPHONE ENCOUNTER
Spoke to pt daughter Thuan Aguero. She had asked yesterday if mom needed a FERNANDO. After speaking to Dr Adi Thomson, she would only like pt tested if she is sx. Daughter already had known this and was familiar w/ urine that can be colonized. Will call if mom has sx.

## 2019-03-19 ENCOUNTER — TELEPHONE (OUTPATIENT)
Dept: UROLOGY | Facility: HOSPITAL | Age: 79
End: 2019-03-19

## 2019-03-19 NOTE — TELEPHONE ENCOUNTER
Daughter called to let it be known that the cause of the burning with cathing is from hexachlorobenzene in the lubricant. Daughter called [employer] as well to change lubricant.

## 2019-03-20 ENCOUNTER — APPOINTMENT (OUTPATIENT)
Dept: PHYSICAL THERAPY | Facility: HOSPITAL | Age: 79
End: 2019-03-20
Attending: OBSTETRICS & GYNECOLOGY
Payer: MEDICARE

## 2019-03-25 ENCOUNTER — HOSPITAL ENCOUNTER (OUTPATIENT)
Dept: PHYSICAL THERAPY | Facility: HOSPITAL | Age: 79
Setting detail: THERAPIES SERIES
Discharge: HOME | End: 2019-03-25
Attending: OBSTETRICS & GYNECOLOGY
Payer: MEDICARE

## 2019-03-25 PROCEDURE — 97163 PT EVAL HIGH COMPLEX 45 MIN: CPT

## 2019-03-25 PROCEDURE — 97110 THERAPEUTIC EXERCISES: CPT

## 2019-03-25 PROCEDURE — 97112 NEUROMUSCULAR REEDUCATION: CPT

## 2019-03-25 NOTE — PROGRESS NOTES
MUSCULOSKELETAL AND PELVIC FLOOR EVALUATION:   Referring Physician: Dr. Christy Huitron  Diagnosis: Incomplete emptying of bladder, overactive detrusor     Date of Service: 3/25/2019     PATIENT SUMMARY   Jerry Miranda is a 69year old y/o female who presents decreased pelvic floor muscle contraction and coordination, nocturia and poor bowel and bladder habits.  Chronic UTI's appear to have overly sensitized pelvic floor muscle and surrounding tissues leading to excessive urgency and poor bowel and bladder habit have some pelvic floor muscle contraction with manual cueing from physical therapist.  Charges: PT Kerial High Complexity, April, NM Re-ed      Total Timed Treatment: 65 min     Total Treatment Time: 65 min   In agreement with PFDI score and clinical rationale care.    X___________________________________________________ Date____________________    Certification From: 5/98/2602  To:6/23/2019

## 2019-04-01 ENCOUNTER — HOSPITAL ENCOUNTER (OUTPATIENT)
Dept: PHYSICAL THERAPY | Facility: HOSPITAL | Age: 79
Setting detail: THERAPIES SERIES
Discharge: HOME | End: 2019-04-01
Attending: OBSTETRICS & GYNECOLOGY
Payer: MEDICARE

## 2019-04-01 PROCEDURE — 97112 NEUROMUSCULAR REEDUCATION: CPT

## 2019-04-01 PROCEDURE — 97110 THERAPEUTIC EXERCISES: CPT

## 2019-04-01 NOTE — PROGRESS NOTES
Dx: Incomplete emptying of bladder, overactive detrusor          Authorized # of Visits:  Medicare         Next MD visit: none scheduled  Fall Risk: standard         Precautions: n/a             Subjective: Doing home exercise program and trying to not per interpretation of results.      External sensor was placed and leads were attached  Resting tone  kegel's  Tonic  Phasic  circles                                                                                           Charges: TEx2, NM Re-edx1   Total Dade Marts

## 2019-04-04 ENCOUNTER — PRIOR ORIGINAL RECORDS (OUTPATIENT)
Dept: OTHER | Age: 79
End: 2019-04-04

## 2019-04-08 ENCOUNTER — HOSPITAL ENCOUNTER (OUTPATIENT)
Dept: PHYSICAL THERAPY | Facility: HOSPITAL | Age: 79
Setting detail: THERAPIES SERIES
Discharge: HOME | End: 2019-04-08
Attending: OBSTETRICS & GYNECOLOGY
Payer: MEDICARE

## 2019-04-08 PROCEDURE — 97112 NEUROMUSCULAR REEDUCATION: CPT

## 2019-04-08 PROCEDURE — 97140 MANUAL THERAPY 1/> REGIONS: CPT

## 2019-04-08 PROCEDURE — 97110 THERAPEUTIC EXERCISES: CPT

## 2019-04-08 NOTE — PROGRESS NOTES
Dx: Incomplete emptying of bladder, overactive detrusor          Authorized # of Visits:  Medicare         Next MD visit: none scheduled  Fall Risk: standard         Precautions: n/a             Subjective: Was sick last week.  Did not remember to do kegel reviewed    Calming SNS strategies         Biofeedback with constant interpretation of results.      External sensor was placed and leads were attached  Resting tone  kegel's  Tonic  Phasic  circles       NuStep 5min L3      Kegel +  iso hip add  10\" x10

## 2019-04-15 ENCOUNTER — HOSPITAL ENCOUNTER (OUTPATIENT)
Dept: PHYSICAL THERAPY | Facility: HOSPITAL | Age: 79
Setting detail: THERAPIES SERIES
Discharge: HOME | End: 2019-04-15
Attending: OBSTETRICS & GYNECOLOGY
Payer: MEDICARE

## 2019-04-15 PROCEDURE — 97110 THERAPEUTIC EXERCISES: CPT

## 2019-04-15 PROCEDURE — 97140 MANUAL THERAPY 1/> REGIONS: CPT

## 2019-04-15 PROCEDURE — 97112 NEUROMUSCULAR REEDUCATION: CPT

## 2019-04-15 NOTE — PROGRESS NOTES
Dx: Incomplete emptying of bladder, overactive detrusor          Authorized # of Visits:  Medicare         Next MD visit: none scheduled  Fall Risk: standard         Precautions: n/a             Subjective: Some times she's able to control bladder better. 4/1/2019  Tx#: 2/8 Date: 4/8  Tx#: 3/ Date: 4/15  Tx#: 4/ Date: Tx#: 5/ Date: Tx#: 6/ Date: Tx#: 7/ Date:    Tx#: 8/   PFM NM  Re-ed     Bladder diary    Bladder habit education    Alberto    RITESH/UI strategies    PFM NM  Re-ed     Bladder diary again

## 2019-04-16 ENCOUNTER — TELEPHONE (OUTPATIENT)
Dept: UROLOGY | Facility: HOSPITAL | Age: 79
End: 2019-04-16

## 2019-04-16 NOTE — TELEPHONE ENCOUNTER
Called patient to see if still using the Estrace Cream, she is and is happy with it. Explained we have a new compound company we can go through, but  Patient States she is happy with [employer], does not want to change at this time.   Ian erickson

## 2019-04-22 ENCOUNTER — HOSPITAL ENCOUNTER (OUTPATIENT)
Dept: PHYSICAL THERAPY | Facility: HOSPITAL | Age: 79
Setting detail: THERAPIES SERIES
Discharge: HOME | End: 2019-04-22
Attending: OBSTETRICS & GYNECOLOGY
Payer: MEDICARE

## 2019-04-22 PROCEDURE — 97112 NEUROMUSCULAR REEDUCATION: CPT

## 2019-04-22 PROCEDURE — 97110 THERAPEUTIC EXERCISES: CPT

## 2019-04-22 NOTE — PROGRESS NOTES
Dx: Incomplete emptying of bladder, overactive detrusor          Authorized # of Visits:  Medicare         Next MD visit: none scheduled  Fall Risk: standard         Precautions: n/a             Subjective: Frequency decreasing.  Leakage is less and using a of care as pt tolerates with focus on pelvic floor muscle strengthening and coordination with bladder retraining. Date: 4/1/2019  Tx#: 2/8 Date: 4/8  Tx#: 3/ Date: 4/15  Tx#: 4/ Date: 4/22  Tx#: 5/ Date: Tx#: 6/ Date: Tx#: 7/ Date:    Tx#: 8/   PFM

## 2019-04-26 RX ORDER — LEVOTHYROXINE SODIUM 0.1 MG/1
TABLET ORAL
Qty: 90 TABLET | Refills: 0 | Status: SHIPPED | OUTPATIENT
Start: 2019-04-26 | End: 2019-08-02

## 2019-04-26 NOTE — TELEPHONE ENCOUNTER
Last OV: 1/11/19 with Dr. Anayeli Moore  Last refill date: 2/6/19     #/refills: #90, 0 refills  When pt was asked to return for OV: 4 weeks  Upcoming appt/reason: no upcoming appt  Last labs 1/8/19 (CMP and CBC)  Last thyroid levels 3/12/18

## 2019-05-06 ENCOUNTER — APPOINTMENT (OUTPATIENT)
Dept: PHYSICAL THERAPY | Facility: HOSPITAL | Age: 79
End: 2019-05-06
Attending: OBSTETRICS & GYNECOLOGY
Payer: MEDICARE

## 2019-05-12 ENCOUNTER — HOSPITAL ENCOUNTER (EMERGENCY)
Facility: HOSPITAL | Age: 79
Discharge: HOME | End: 2019-05-12
Attending: EMERGENCY MEDICINE
Payer: MEDICARE

## 2019-05-12 ENCOUNTER — APPOINTMENT (OUTPATIENT)
Dept: GENERAL RADIOLOGY | Facility: HOSPITAL | Age: 79
End: 2019-05-12
Attending: EMERGENCY MEDICINE
Payer: MEDICARE

## 2019-05-12 VITALS
OXYGEN SATURATION: 96 % | HEIGHT: 62 IN | WEIGHT: 134 LBS | BODY MASS INDEX: 24.66 KG/M2 | HEART RATE: 53 BPM | SYSTOLIC BLOOD PRESSURE: 127 MMHG | RESPIRATION RATE: 17 BRPM | DIASTOLIC BLOOD PRESSURE: 58 MMHG | TEMPERATURE: 97 F

## 2019-05-12 DIAGNOSIS — R55 VASOVAGAL NEAR SYNCOPE: Primary | ICD-10-CM

## 2019-05-12 PROCEDURE — 71045 X-RAY EXAM CHEST 1 VIEW: CPT | Performed by: EMERGENCY MEDICINE

## 2019-05-12 PROCEDURE — 93005 ELECTROCARDIOGRAM TRACING: CPT

## 2019-05-12 PROCEDURE — 85025 COMPLETE CBC W/AUTO DIFF WBC: CPT | Performed by: EMERGENCY MEDICINE

## 2019-05-12 PROCEDURE — 99285 EMERGENCY DEPT VISIT HI MDM: CPT

## 2019-05-12 PROCEDURE — 99284 EMERGENCY DEPT VISIT MOD MDM: CPT

## 2019-05-12 PROCEDURE — 93010 ELECTROCARDIOGRAM REPORT: CPT

## 2019-05-12 PROCEDURE — 36415 COLL VENOUS BLD VENIPUNCTURE: CPT

## 2019-05-12 PROCEDURE — 80053 COMPREHEN METABOLIC PANEL: CPT | Performed by: EMERGENCY MEDICINE

## 2019-05-12 PROCEDURE — 84484 ASSAY OF TROPONIN QUANT: CPT | Performed by: EMERGENCY MEDICINE

## 2019-05-12 NOTE — ED INITIAL ASSESSMENT (HPI)
About a half hour ago patient started having right arm pain, chest pain, dizzy, diaphoretic. BS at home was 135.

## 2019-05-12 NOTE — ED PROVIDER NOTES
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  w/ BSO   • KNEE REPLACEMENT SURGERY  12/27/13    left    • KNEE TOTAL REPLACEMENT Left 1 nontoxic appearing patient in no apparent distress alert and oriented ×3  HEENT: Pupils are equal reactive to light. Extra ocular motions are intact. No scleral icterus or conjunctival pallor: Neck is supple without tenderness on palpation.   Head is atra EKG    Rate, intervals and axes as noted on EKG Report.   Rate: 56  Rhythm: Sinus Rhythm  Reading: Sinus rhythm left axis deviation nonspecific ST-T wave changes unchanged from previous EKG January 2017              Xr Chest Ap Portable  (cpt=71045)

## 2019-05-20 ENCOUNTER — TELEPHONE (OUTPATIENT)
Dept: INTERNAL MEDICINE CLINIC | Facility: CLINIC | Age: 79
End: 2019-05-20

## 2019-05-20 NOTE — TELEPHONE ENCOUNTER
Dr. Ricarda Larson office requesting patients most recent labs from our office - pt has not had labs ordered by PCP since 3/12/18 - the labs were printed and faxed to Dr. Ricarda Larson office @ [phone]

## 2019-05-21 ENCOUNTER — APPOINTMENT (OUTPATIENT)
Dept: LAB | Age: 79
End: 2019-05-21
Attending: OBSTETRICS & GYNECOLOGY
Payer: MEDICARE

## 2019-05-21 ENCOUNTER — TELEPHONE (OUTPATIENT)
Dept: UROLOGY | Facility: HOSPITAL | Age: 79
End: 2019-05-21

## 2019-05-21 DIAGNOSIS — R30.0 BURNING WITH URINATION: ICD-10-CM

## 2019-05-21 DIAGNOSIS — R30.0 DYSURIA: Primary | ICD-10-CM

## 2019-05-21 DIAGNOSIS — R30.0 DYSURIA: ICD-10-CM

## 2019-05-21 PROCEDURE — 87186 SC STD MICRODIL/AGAR DIL: CPT

## 2019-05-21 PROCEDURE — 87086 URINE CULTURE/COLONY COUNT: CPT

## 2019-05-21 PROCEDURE — 87077 CULTURE AEROBIC IDENTIFY: CPT

## 2019-05-21 PROCEDURE — 87147 CULTURE TYPE IMMUNOLOGIC: CPT

## 2019-05-21 RX ORDER — SULFAMETHOXAZOLE AND TRIMETHOPRIM 800; 160 MG/1; MG/1
1 TABLET ORAL 2 TIMES DAILY
Qty: 14 TABLET | Refills: 0 | Status: SHIPPED | OUTPATIENT
Start: 2019-05-21 | End: 2019-05-28

## 2019-05-21 NOTE — TELEPHONE ENCOUNTER
Pt's daughter called that Mariano Watkins has dark yellow urine with burning, painful and odorus.    Instructed patient will place order for urine culture and will talk to Dr. Elda Muñoz what kind of abx she wants started and will call daughter, Stacy Mejia, back with that

## 2019-05-23 ENCOUNTER — TELEPHONE (OUTPATIENT)
Dept: UROLOGY | Facility: HOSPITAL | Age: 79
End: 2019-05-23

## 2019-05-23 ENCOUNTER — HOSPITAL ENCOUNTER (OUTPATIENT)
Dept: CV DIAGNOSTICS | Facility: HOSPITAL | Age: 79
Discharge: HOME | End: 2019-05-23
Attending: SPECIALIST
Payer: MEDICARE

## 2019-05-23 DIAGNOSIS — I10 HTN (HYPERTENSION): ICD-10-CM

## 2019-05-23 DIAGNOSIS — E78.5 HYPERLIPIDEMIA: ICD-10-CM

## 2019-05-23 DIAGNOSIS — R94.31 ABNORMAL EKG: ICD-10-CM

## 2019-05-23 DIAGNOSIS — E11.9 DIABETES (HCC): ICD-10-CM

## 2019-05-23 DIAGNOSIS — E03.9 HYPOTHYROIDISM: ICD-10-CM

## 2019-05-23 PROCEDURE — 93306 TTE W/DOPPLER COMPLETE: CPT | Performed by: SPECIALIST

## 2019-05-23 PROCEDURE — 78452 HT MUSCLE IMAGE SPECT MULT: CPT | Performed by: SPECIALIST

## 2019-05-23 PROCEDURE — 93018 CV STRESS TEST I&R ONLY: CPT | Performed by: SPECIALIST

## 2019-05-23 PROCEDURE — 93017 CV STRESS TEST TRACING ONLY: CPT | Performed by: SPECIALIST

## 2019-05-23 RX ORDER — NITROFURANTOIN 25; 75 MG/1; MG/1
100 CAPSULE ORAL 2 TIMES DAILY
Qty: 14 CAPSULE | Refills: 0 | Status: SHIPPED | OUTPATIENT
Start: 2019-05-23 | End: 2019-05-30

## 2019-05-23 NOTE — TELEPHONE ENCOUNTER
Called patients daughter, Korina Hael,  to inform her , Rach Freeman is on the wrong abx. We will call in new abx, macrobid, but Rach Freeman should stop the hiprex for the 7 days of macrobid. Sandi Silence understanding & will stop the hiprex.

## 2019-05-28 ENCOUNTER — OFFICE VISIT (OUTPATIENT)
Dept: UROLOGY | Facility: HOSPITAL | Age: 79
End: 2019-05-28
Attending: OBSTETRICS & GYNECOLOGY
Payer: MEDICARE

## 2019-05-28 VITALS
WEIGHT: 134 LBS | DIASTOLIC BLOOD PRESSURE: 58 MMHG | SYSTOLIC BLOOD PRESSURE: 102 MMHG | BODY MASS INDEX: 24.66 KG/M2 | HEIGHT: 62 IN

## 2019-05-28 DIAGNOSIS — N39.0 RECURRENT UTI: ICD-10-CM

## 2019-05-28 DIAGNOSIS — N81.84 PELVIC MUSCLE WASTING: ICD-10-CM

## 2019-05-28 DIAGNOSIS — N95.2 POSTMENOPAUSAL ATROPHIC VAGINITIS: ICD-10-CM

## 2019-05-28 DIAGNOSIS — R33.9 INCOMPLETE EMPTYING OF BLADDER: Primary | ICD-10-CM

## 2019-05-28 PROCEDURE — 99211 OFF/OP EST MAY X REQ PHY/QHP: CPT

## 2019-05-28 NOTE — PROGRESS NOTES
Patient presents to follow up UTIs, incomplete bladder emptying  Here with daughter    She is currently using CISC 2x/d, PT     She reports +improvement   Wants more PT  Currently being treated for UTI  Less bladder spasms, better bladder control      BP [facility]  [phone]

## 2019-06-10 ENCOUNTER — TELEPHONE (OUTPATIENT)
Dept: UROLOGY | Facility: HOSPITAL | Age: 79
End: 2019-06-10

## 2019-06-10 ENCOUNTER — APPOINTMENT (OUTPATIENT)
Dept: LAB | Age: 79
End: 2019-06-10
Attending: INTERNAL MEDICINE
Payer: MEDICARE

## 2019-06-10 DIAGNOSIS — R30.0 DYSURIA: Primary | ICD-10-CM

## 2019-06-10 DIAGNOSIS — R30.0 DYSURIA: ICD-10-CM

## 2019-06-10 PROCEDURE — 87186 SC STD MICRODIL/AGAR DIL: CPT

## 2019-06-10 PROCEDURE — 87086 URINE CULTURE/COLONY COUNT: CPT

## 2019-06-10 PROCEDURE — 87088 URINE BACTERIA CULTURE: CPT

## 2019-06-10 NOTE — TELEPHONE ENCOUNTER
Finished second week of Macrobid on 6/5/19 - and UTI symptoms subsided - slight dysuria began over weekend  - is using Estrace and Hipprex  Will order urine culture

## 2019-06-11 ENCOUNTER — TELEPHONE (OUTPATIENT)
Dept: UROLOGY | Facility: HOSPITAL | Age: 79
End: 2019-06-11

## 2019-06-11 RX ORDER — NITROFURANTOIN 25; 75 MG/1; MG/1
100 CAPSULE ORAL 2 TIMES DAILY
Qty: 14 CAPSULE | Refills: 0 | Status: SHIPPED | OUTPATIENT
Start: 2019-06-11 | End: 2019-06-18

## 2019-06-11 NOTE — TELEPHONE ENCOUNTER
Called daughter, Thuan Aguero, back to let her know  Dori Valdez is starting her on the Macrobid x 7 days. Order sent to her pharmacy.

## 2019-06-12 ENCOUNTER — TELEPHONE (OUTPATIENT)
Dept: UROLOGY | Facility: HOSPITAL | Age: 79
End: 2019-06-12

## 2019-06-12 NOTE — TELEPHONE ENCOUNTER
Talked with patient regarding culture results and to continue macrobid, she states she is feeling better and will complete the course of ABX.

## 2019-06-12 NOTE — TELEPHONE ENCOUNTER
Pt daughter Abby Ronquillo called right after Chace Navarro RN got off the phone w/ pt. Chace Navarro had called Catherine Raphael to confirm her UTI and to continue macrobid.  Abby Ronquillo called to ask if Dr Dalton Lamb could order macrobid x 14 days because this is the 3rd UTI in a roll and she feels she is no

## 2019-06-17 RX ORDER — CEPHALEXIN 250 MG/1
250 CAPSULE ORAL NIGHTLY
Qty: 90 CAPSULE | Refills: 1 | Status: SHIPPED | OUTPATIENT
Start: 2019-06-17 | End: 2019-08-20 | Stop reason: ALTCHOICE

## 2019-06-18 ENCOUNTER — HOSPITAL ENCOUNTER (OUTPATIENT)
Dept: MAMMOGRAPHY | Facility: HOSPITAL | Age: 79
Discharge: HOME | End: 2019-06-18
Attending: INTERNAL MEDICINE
Payer: MEDICARE

## 2019-06-18 ENCOUNTER — HOSPITAL ENCOUNTER (OUTPATIENT)
Dept: PHYSICAL THERAPY | Facility: HOSPITAL | Age: 79
Setting detail: THERAPIES SERIES
Discharge: HOME | End: 2019-06-18
Attending: OBSTETRICS & GYNECOLOGY
Payer: MEDICARE

## 2019-06-18 DIAGNOSIS — R33.9 INCOMPLETE EMPTYING OF BLADDER: ICD-10-CM

## 2019-06-18 DIAGNOSIS — D05.82 OTHER TYPE OF CARCINOMA IN SITU OF LEFT BREAST: ICD-10-CM

## 2019-06-18 DIAGNOSIS — Z92.3 PERSONAL HISTORY OF RADIATION EXPOSURE: ICD-10-CM

## 2019-06-18 DIAGNOSIS — Z86.000 PERSONAL HISTORY OF IN-SITU NEOPLASM OF BREAST: ICD-10-CM

## 2019-06-18 PROCEDURE — 97110 THERAPEUTIC EXERCISES: CPT

## 2019-06-18 PROCEDURE — 77061 BREAST TOMOSYNTHESIS UNI: CPT | Performed by: INTERNAL MEDICINE

## 2019-06-18 PROCEDURE — 77065 DX MAMMO INCL CAD UNI: CPT | Performed by: INTERNAL MEDICINE

## 2019-06-18 PROCEDURE — 97112 NEUROMUSCULAR REEDUCATION: CPT

## 2019-06-18 NOTE — PROGRESS NOTES
Dx: Incomplete emptying of bladder, overactive detrusor          Authorized # of Visits:  Medicare         Next MD visit: none scheduled  Fall Risk: standard         Precautions: n/a             Subjective: Was doing very well in physical therapy, but then restrictions  ROM Summary: grossly WFL (B) LE - R TKA   Flexibility Summary: WFL  Strength Summary: not tested secondary to time restrictions     Informed consent for internal evaluation consent given:  Yes     External Observation:   Voluntary contraction: visits -progressing  1. Independent in HEP and progression with improved understanding of bladder/bowel health, bladder retraining and long-term management.     2. Patient will demonstrate improved bladder voiding habits to voiding every 2 hours without uri hip add + squat with ball behind back x20    diaphragmatic breathing x30-with instructions   pelvic brace with coordinated breathing x2 min     Sit on ball-  Quick flicks N92    Sit on ball-  pelvic brace + U/LE lift x10    Reviewed HEP      internal pelvi

## 2019-06-25 ENCOUNTER — HOSPITAL ENCOUNTER (OUTPATIENT)
Dept: PHYSICAL THERAPY | Facility: HOSPITAL | Age: 79
Setting detail: THERAPIES SERIES
Discharge: HOME | End: 2019-06-25
Attending: OBSTETRICS & GYNECOLOGY
Payer: MEDICARE

## 2019-06-25 DIAGNOSIS — R33.9 INCOMPLETE EMPTYING OF BLADDER: ICD-10-CM

## 2019-06-25 PROCEDURE — 97110 THERAPEUTIC EXERCISES: CPT

## 2019-06-25 PROCEDURE — 97112 NEUROMUSCULAR REEDUCATION: CPT

## 2019-06-25 NOTE — PROGRESS NOTES
Dx: Incomplete emptying of bladder, overactive detrusor          Authorized # of Visits:  Medicare         Next MD visit: none scheduled  Fall Risk: standard         Precautions: n/a             Subjective: Doing bladder retraining.  Having less OAB and les bladder retraining 1x/week. Date: 4/1/2019  Tx#: 2/8 Date: 4/8  Tx#: 3/ Date: 4/15  Tx#: 4/ Date: 4/22  Tx#: 5/ Date: 6/18  Tx#: 6/ Date: 6/25  Tx#: 7/ Date:    Tx#: 8/   PFM NM  Re-ed     Bladder diary    Bladder habit education    Alberto    RITESH/UI belindat manual cueing retraining supine<>sit with pelvic brace  Discussed incorporated pelvic brace with activities of daily living and when leakage occurs NuStep 5min L5            Hamstring, piriformis stretching  30 sec x1 R/L    diaphragmatic breathing x30-wit

## 2019-07-01 ENCOUNTER — OFFICE VISIT (OUTPATIENT)
Dept: INTERNAL MEDICINE CLINIC | Facility: CLINIC | Age: 79
End: 2019-07-01
Payer: MEDICARE

## 2019-07-01 ENCOUNTER — HOSPITAL ENCOUNTER (OUTPATIENT)
Dept: PHYSICAL THERAPY | Facility: HOSPITAL | Age: 79
Setting detail: THERAPIES SERIES
Discharge: HOME | End: 2019-07-01
Attending: OBSTETRICS & GYNECOLOGY
Payer: MEDICARE

## 2019-07-01 VITALS
TEMPERATURE: 98 F | BODY MASS INDEX: 24.22 KG/M2 | SYSTOLIC BLOOD PRESSURE: 124 MMHG | DIASTOLIC BLOOD PRESSURE: 62 MMHG | HEART RATE: 62 BPM | HEIGHT: 62.5 IN | WEIGHT: 135 LBS | RESPIRATION RATE: 16 BRPM | OXYGEN SATURATION: 98 %

## 2019-07-01 DIAGNOSIS — Z79.4 TYPE 2 DIABETES MELLITUS WITH BOTH EYES AFFECTED BY MILD NONPROLIFERATIVE RETINOPATHY WITHOUT MACULAR EDEMA, WITH LONG-TERM CURRENT USE OF INSULIN (HCC): Chronic | ICD-10-CM

## 2019-07-01 DIAGNOSIS — E11.3293 TYPE 2 DIABETES MELLITUS WITH BOTH EYES AFFECTED BY MILD NONPROLIFERATIVE RETINOPATHY WITHOUT MACULAR EDEMA, WITH LONG-TERM CURRENT USE OF INSULIN (HCC): Chronic | ICD-10-CM

## 2019-07-01 DIAGNOSIS — Z78.0 ASYMPTOMATIC MENOPAUSAL STATE: ICD-10-CM

## 2019-07-01 DIAGNOSIS — Z00.00 ROUTINE GENERAL MEDICAL EXAMINATION AT A HEALTH CARE FACILITY: Primary | ICD-10-CM

## 2019-07-01 DIAGNOSIS — R33.9 INCOMPLETE EMPTYING OF BLADDER: ICD-10-CM

## 2019-07-01 PROCEDURE — 97110 THERAPEUTIC EXERCISES: CPT

## 2019-07-01 PROCEDURE — 99213 OFFICE O/P EST LOW 20 MIN: CPT | Performed by: INTERNAL MEDICINE

## 2019-07-01 PROCEDURE — G0439 PPPS, SUBSEQ VISIT: HCPCS | Performed by: INTERNAL MEDICINE

## 2019-07-01 PROCEDURE — 97112 NEUROMUSCULAR REEDUCATION: CPT

## 2019-07-01 PROCEDURE — 97140 MANUAL THERAPY 1/> REGIONS: CPT

## 2019-07-01 NOTE — PATIENT INSTRUCTIONS
Patient will see me after all tests  We will send ophthalmology records.   At this moment does not desire any hip pain work-up

## 2019-07-01 NOTE — PROGRESS NOTES
Dee Dee Lott [facility]  is a 67year old female. Patient presents with:  Physical      HPI:   No new cc    Current Outpatient Medications:  cephALEXin (KEFLEX) 250 MG Oral Cap Take 1 capsule (250 mg total) by mouth nightly.  Disp: 90 capsule Rfl: 1 4/18/2016   • Gall stones    • GERD (gastroesophageal reflux disease) 2/22/2012   • Glucose intolerance (impaired glucose tolerance)    • High blood pressure    • High cholesterol    • History of blood transfusion     1992   • Hypothyroidism    • Incomplet dizziness, no lightheadedness. Eyes sees an eye MD will send her records, no redness, no drainage. Ears OCC earaches, no fullness, normal hearing, no tinnitus.  Nose and Sinuses no recurrent colds, no discharge, no itching, no hay fever, no nosebleeds, no s EXAM:   /62   Pulse 62   Temp 98.2 °F (36.8 °C) (Oral)   Resp 16   Ht 62.5\"   Wt 135 lb   SpO2 98%   BMI 24.30 kg/m²     GENERAL:   Build: normal .   General Appearance: alert and oriented, pleasant.    HEENT:   Ear canals: normal.   E Inguinal: no adenopathy. Supraclavicular: none. DERMATOLOGY:   Rash: no.      DIABETIC FOOT EXAM BILATERAL  INSPECTION normal  CIRCULATION normal  MONOFILAMENT normal           ASSESSMENT AND PLAN:   Jose Wick was seen today for physical.    Diagnoses an

## 2019-07-01 NOTE — PROGRESS NOTES
Dx: Incomplete emptying of bladder, overactive detrusor          Authorized # of Visits:  Medicare, POC 8 +8         Next MD visit: none scheduled  Fall Risk: standard         Precautions: n/a             Subjective:  Went on a 4 mile walk this morning and floor muscle strengthening and coordination with bladder retraining 1x/week.     Date: 4/1/2019  Tx#: 2/8 Date: 4/8  Tx#: 3/ Date: 4/15  Tx#: 4/ Date: 4/22  Tx#: 5/ Date: 6/18  Tx#: 6/ Date: 6/25  Tx#: 7/ Date: 7/1  Tx#: 8/   PFM NM  Re-ed     Bladder diary x20          pelvic brace + clamshell w RTB x20 R/L Sit on ball-  Quick flicks Y13    Sit on ball-  pelvic brace + U/LE lift x30        Supine-  pelvic brace + iso hip add + bridge x20    STM abdominal/bladder-minimal Manual kegel's,   with coordinated stella

## 2019-07-07 ENCOUNTER — APPOINTMENT (OUTPATIENT)
Dept: ULTRASOUND IMAGING | Facility: HOSPITAL | Age: 79
End: 2019-07-07
Attending: EMERGENCY MEDICINE
Payer: MEDICARE

## 2019-07-07 ENCOUNTER — HOSPITAL ENCOUNTER (EMERGENCY)
Facility: HOSPITAL | Age: 79
Discharge: HOME | End: 2019-07-07
Attending: EMERGENCY MEDICINE
Payer: MEDICARE

## 2019-07-07 ENCOUNTER — APPOINTMENT (OUTPATIENT)
Dept: GENERAL RADIOLOGY | Facility: HOSPITAL | Age: 79
End: 2019-07-07
Attending: EMERGENCY MEDICINE
Payer: MEDICARE

## 2019-07-07 VITALS
OXYGEN SATURATION: 98 % | BODY MASS INDEX: 24.22 KG/M2 | TEMPERATURE: 98 F | DIASTOLIC BLOOD PRESSURE: 47 MMHG | SYSTOLIC BLOOD PRESSURE: 104 MMHG | RESPIRATION RATE: 16 BRPM | HEIGHT: 62.5 IN | WEIGHT: 135 LBS | HEART RATE: 62 BPM

## 2019-07-07 DIAGNOSIS — M25.561 ACUTE PAIN OF RIGHT KNEE: Primary | ICD-10-CM

## 2019-07-07 PROCEDURE — 99284 EMERGENCY DEPT VISIT MOD MDM: CPT | Performed by: EMERGENCY MEDICINE

## 2019-07-07 PROCEDURE — 93971 EXTREMITY STUDY: CPT | Performed by: EMERGENCY MEDICINE

## 2019-07-07 PROCEDURE — 73560 X-RAY EXAM OF KNEE 1 OR 2: CPT | Performed by: EMERGENCY MEDICINE

## 2019-07-07 RX ORDER — TRAMADOL HYDROCHLORIDE 50 MG/1
50 TABLET ORAL EVERY 8 HOURS PRN
Qty: 10 TABLET | Refills: 0 | Status: SHIPPED | OUTPATIENT
Start: 2019-07-07 | End: 2019-08-07 | Stop reason: ALTCHOICE

## 2019-07-07 NOTE — ED PROVIDER NOTES
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  w/ BSO   • KNEE REPLACEMENT SURGERY  12/27/13    left    • KNEE TOTAL REPLACEMENT Left 12/27/2013    Performed by Tanja Reaves Physical Exam  GENERAL: Well-developed, well-nourished female sitting up breathing easily in no apparent distress. Patient is nontoxic in appearance. EXTREMITIES: There is no cyanosis, clubbing, or edema appreciated.  Pulses are 2+ and equal in al have suffered a ligamentous injury to her knee and/or may have a meniscal injury to her knee. Patient instructed to rest at home and return the emergency room if symptoms worsen or if any other problems arise. Patient discharged home at this time.   She w

## 2019-07-08 PROBLEM — M17.11 PRIMARY OSTEOARTHRITIS OF RIGHT KNEE: Status: ACTIVE | Noted: 2019-07-08

## 2019-07-15 ENCOUNTER — HOSPITAL ENCOUNTER (OUTPATIENT)
Dept: PHYSICAL THERAPY | Facility: HOSPITAL | Age: 79
Setting detail: THERAPIES SERIES
Discharge: HOME | End: 2019-07-15
Attending: OBSTETRICS & GYNECOLOGY
Payer: MEDICARE

## 2019-07-15 DIAGNOSIS — R33.9 INCOMPLETE EMPTYING OF BLADDER: ICD-10-CM

## 2019-07-15 PROCEDURE — 97110 THERAPEUTIC EXERCISES: CPT

## 2019-07-15 PROCEDURE — 97014 ELECTRIC STIMULATION THERAPY: CPT

## 2019-07-15 PROCEDURE — 97140 MANUAL THERAPY 1/> REGIONS: CPT

## 2019-07-15 NOTE — PROGRESS NOTES
Dx: Incomplete emptying of bladder, overactive detrusor          Authorized # of Visits:  Medicare, POC 8 +8         Next MD visit: none scheduled  Fall Risk: standard         Precautions: n/a             Subjective: Hurt R knee over the weekend.  Had more 1x/night for improved sleep. 5. PFM contraction before increase intra-abdominal pressure. -MET     PFDI: 101.1/300, 34% Impairment      Plan: Continue plan of care as pt tolerates with focus on pelvic floor muscle strengthening and coordination with shereen x20    diaphragmatic breathing x30-with instructions   pelvic brace with coordinated breathing x2 min     Sit on ball-  Quick flicks X83    Sit on ball-  pelvic brace + U/LE lift x10    Reviewed HEP      internal pelvic floor muscles manual retraining   Si

## 2019-07-17 ENCOUNTER — HOSPITAL ENCOUNTER (OUTPATIENT)
Dept: BONE DENSITY | Age: 79
Discharge: HOME | End: 2019-07-17
Attending: INTERNAL MEDICINE
Payer: MEDICARE

## 2019-07-17 DIAGNOSIS — Z00.00 ROUTINE GENERAL MEDICAL EXAMINATION AT A HEALTH CARE FACILITY: ICD-10-CM

## 2019-07-17 DIAGNOSIS — Z78.0 ASYMPTOMATIC MENOPAUSAL STATE: ICD-10-CM

## 2019-07-17 PROCEDURE — 77080 DXA BONE DENSITY AXIAL: CPT | Performed by: INTERNAL MEDICINE

## 2019-07-22 ENCOUNTER — HOSPITAL ENCOUNTER (OUTPATIENT)
Dept: PHYSICAL THERAPY | Facility: HOSPITAL | Age: 79
Setting detail: THERAPIES SERIES
Discharge: HOME | End: 2019-07-22
Attending: OBSTETRICS & GYNECOLOGY
Payer: MEDICARE

## 2019-07-22 DIAGNOSIS — R33.9 INCOMPLETE EMPTYING OF BLADDER: ICD-10-CM

## 2019-07-22 PROCEDURE — 97110 THERAPEUTIC EXERCISES: CPT

## 2019-07-22 PROCEDURE — 97140 MANUAL THERAPY 1/> REGIONS: CPT

## 2019-07-22 PROCEDURE — 97014 ELECTRIC STIMULATION THERAPY: CPT

## 2019-07-22 NOTE — PROGRESS NOTES
Dx: Incomplete emptying of bladder, overactive detrusor          Authorized # of Visits:  Medicare, POC 8 +8         Next MD visit: none scheduled  Fall Risk: standard         Precautions: n/a             Subjective: Noticed a difference with decreased alexa Plan: Continue plan of care as pt tolerates with focus on pelvic floor muscle strengthening and coordination with bladder retraining 1x/week.  Next visit: re-assess benefit of ES and MFR    Date: 4/1/2019  Tx#: 2/8 Date: 4/8  Tx#: 3/ Date: 4/15  Tx#: 4/ D back x20    diaphragmatic breathing x30-with instructions   pelvic brace with coordinated breathing x2 min     Sit on ball-  Quick flicks O54    Sit on ball-  pelvic brace + U/LE lift x10    Reviewed HEP      internal pelvic floor muscles manual retraining mobilization/MFR/IASTM scar , abdominal MFR, skin rolling abdominal/bladder/sacral multifidus    IFC pudendal nerve-electrodes lumbosacral region x12 min- high ,target  sidelying position         Charges: April (NMR incorporated into charge), ES, MT  Total T

## 2019-07-25 ENCOUNTER — TELEPHONE (OUTPATIENT)
Dept: INTERNAL MEDICINE CLINIC | Facility: CLINIC | Age: 79
End: 2019-07-25

## 2019-07-25 LAB
ABSOLUTE BASOPHILS: 50 CELLS/UL (ref 0–200)
ABSOLUTE EOSINOPHILS: 120 CELLS/UL (ref 15–500)
ABSOLUTE LYMPHOCYTES: 1655 CELLS/UL (ref 850–3900)
ABSOLUTE MONOCYTES: 610 CELLS/UL (ref 200–950)
ABSOLUTE NEUTROPHILS: 2565 CELLS/UL (ref 1500–7800)
ALBUMIN/GLOBULIN RATIO: 1.4 (CALC) (ref 1–2.5)
ALBUMIN: 4.2 G/DL (ref 3.6–5.1)
ALKALINE PHOSPHATASE: 74 U/L (ref 33–130)
ALT: 12 U/L (ref 6–29)
APPEARANCE: CLEAR
AST: 17 U/L (ref 10–35)
BASOPHILS: 1 %
BILIRUBIN, TOTAL: 0.7 MG/DL (ref 0.2–1.2)
BILIRUBIN: NEGATIVE
BUN: 16 MG/DL (ref 7–25)
CALCIUM: 9.6 MG/DL (ref 8.6–10.4)
CARBON DIOXIDE: 32 MMOL/L (ref 20–32)
CHLORIDE: 99 MMOL/L (ref 98–110)
CHOL/HDLC RATIO: 2.2 (CALC)
CHOLESTEROL, TOTAL: 164 MG/DL
COLOR: YELLOW
CREATININE, RANDOM URINE: 52 MG/DL (ref 20–275)
CREATININE: 0.82 MG/DL (ref 0.6–0.93)
EGFR IF AFRICN AM: 79 ML/MIN/1.73M2
EGFR IF NONAFRICN AM: 69 ML/MIN/1.73M2
EOSINOPHILS: 2.4 %
GLOBULIN: 3 G/DL (CALC) (ref 1.9–3.7)
GLUCOSE: 114 MG/DL (ref 65–99)
GLUCOSE: NEGATIVE
HDL CHOLESTEROL: 75 MG/DL
HEMATOCRIT: 40.9 % (ref 35–45)
HEMOGLOBIN A1C: 6.8 % OF TOTAL HGB
HEMOGLOBIN: 12.5 G/DL (ref 11.7–15.5)
KETONES: NEGATIVE
LDL-CHOLESTEROL: 74 MG/DL (CALC)
LYMPHOCYTES: 33.1 %
MCH: 27.5 PG (ref 27–33)
MCHC: 30.6 G/DL (ref 32–36)
MCV: 89.9 FL (ref 80–100)
MICROALBUMIN/CREATININE RATIO, RANDOM URINE: 10 MCG/MG CREAT
MICROALBUMIN: 0.5 MG/DL
MONOCYTES: 12.2 %
MPV: 11.2 FL (ref 7.5–12.5)
NEUTROPHILS: 51.3 %
NITRITE: NEGATIVE
NON-HDL CHOLESTEROL: 89 MG/DL (CALC)
PH: 7.5 (ref 5–8)
PLATELET COUNT: 225 THOUSAND/UL (ref 140–400)
POTASSIUM: 4.3 MMOL/L (ref 3.5–5.3)
PROTEIN, TOTAL: 7.2 G/DL (ref 6.1–8.1)
PROTEIN: NEGATIVE
RDW: 12.7 % (ref 11–15)
RED BLOOD CELL COUNT: 4.55 MILLION/UL (ref 3.8–5.1)
SODIUM: 137 MMOL/L (ref 135–146)
SPECIFIC GRAVITY: 1.01 (ref 1–1.03)
T4 (THYROXINE), TOTAL: 7.1 MCG/DL (ref 5.1–11.9)
TRIGLYCERIDES: 70 MG/DL
TSH: 0.71 MIU/L (ref 0.4–4.5)
WHITE BLOOD CELL COUNT: 5 THOUSAND/UL (ref 3.8–10.8)

## 2019-07-25 NOTE — TELEPHONE ENCOUNTER
Spoke with pt's daughter Stormy Che and advised that Dr. Cristian Toth was currently out of the office this week and would not be able to send in rx.  Advised that any of the covering providers would require an office visit to verify correct medication is being sent i

## 2019-07-25 NOTE — TELEPHONE ENCOUNTER
Daughter informed unable to Rx without appt.  Also advised Kr was unable to return call today     Pt said that is ok will be encouraging mother to change drs

## 2019-07-25 NOTE — TELEPHONE ENCOUNTER
Daughter called back and cancelled the appointment for 3:30pm today with [facility]. Daughter stated that she wants to speak with [facility] directly. I advised her that Glorine Goldberg is currently seeing patients and is unable to call her.  Daughter sta

## 2019-07-25 NOTE — TELEPHONE ENCOUNTER
Spoke with Daughter who is frustrated that mother is stuck in traffic trying to get out of [facility] for the 330 appt today with KR for a sinus infection.  Symptoms have been going on for 2 days Burning in nose and yellow nasal discharge ,no fever , Mother ha

## 2019-07-25 NOTE — TELEPHONE ENCOUNTER
Pt's Daughter calling in on behalf of Manav. Stated she as a bad sinus infection and is seeking a prescription. Offered an appointment, however daughter declined due to her Mother going out of town tomorrow. Please call to discuss symptoms.       Jh

## 2019-07-29 ENCOUNTER — HOSPITAL ENCOUNTER (OUTPATIENT)
Dept: PHYSICAL THERAPY | Facility: HOSPITAL | Age: 79
Setting detail: THERAPIES SERIES
Discharge: HOME | End: 2019-07-29
Attending: OBSTETRICS & GYNECOLOGY
Payer: MEDICARE

## 2019-07-29 DIAGNOSIS — R33.9 INCOMPLETE EMPTYING OF BLADDER: ICD-10-CM

## 2019-07-29 PROCEDURE — 97140 MANUAL THERAPY 1/> REGIONS: CPT

## 2019-07-29 PROCEDURE — 97112 NEUROMUSCULAR REEDUCATION: CPT

## 2019-07-29 PROCEDURE — 97110 THERAPEUTIC EXERCISES: CPT

## 2019-07-29 NOTE — PROGRESS NOTES
Discharge Summary  Pt has attended 11 visits in Physical Therapy.       Dx: Incomplete emptying of bladder, overactive detrusor          Authorized # of Visits:  Medicare, POC 8 +8         Next MD visit: none scheduled  Fall Risk: standard         Precauti incision>restricted in all directions, B lumbar paraspinal tightness- no pain      Skin condition: normal      Internal Evaluation:     Pain: Negative  Spasm: Negative     Voluntary contraction: present (was:absent)  Voluntary relaxation: present (was:abse has agreed to actively participate in planning and for this course of care. Thank you for your referral. If you have any questions, please contact me at Dept: [phone].     Sincerely,  Electronically signed by therapist: Lora Berman PT sec x1 R/L    diaphragmatic breathing x30-with instructions - Shuttle- DLP  25# with pelvic brace + iso hip adduction x30 reps    pelvic brace + sit<>stand x10    standing-  pelvic brace + iso hip add + squat with ball behind back     pelvic brace + lift 2

## 2019-08-05 RX ORDER — LEVOTHYROXINE SODIUM 0.1 MG/1
TABLET ORAL
Qty: 90 TABLET | Refills: 0 | Status: SHIPPED | OUTPATIENT
Start: 2019-08-05 | End: 2019-11-01

## 2019-08-05 NOTE — TELEPHONE ENCOUNTER
Passed protocol    Medication(s) to Refill:   Requested Prescriptions     Pending Prescriptions Disp Refills   • LEVOTHYROXINE SODIUM 100 MCG Oral Tab [Pharmacy Med Name: LEVOTHYROXINE 0.100MG (100MCG) TAB] 90 tablet 0     Sig: TAKE 1 TABLET BY MOUTH EVERY

## 2019-08-07 ENCOUNTER — APPOINTMENT (OUTPATIENT)
Dept: LAB | Age: 79
End: 2019-08-07
Attending: INTERNAL MEDICINE
Payer: MEDICARE

## 2019-08-07 ENCOUNTER — OFFICE VISIT (OUTPATIENT)
Dept: INTERNAL MEDICINE CLINIC | Facility: CLINIC | Age: 79
End: 2019-08-07
Payer: MEDICARE

## 2019-08-07 VITALS
BODY MASS INDEX: 24.56 KG/M2 | HEART RATE: 60 BPM | SYSTOLIC BLOOD PRESSURE: 146 MMHG | TEMPERATURE: 98 F | RESPIRATION RATE: 12 BRPM | HEIGHT: 62 IN | DIASTOLIC BLOOD PRESSURE: 76 MMHG | WEIGHT: 133.5 LBS

## 2019-08-07 DIAGNOSIS — E78.00 PURE HYPERCHOLESTEROLEMIA: ICD-10-CM

## 2019-08-07 DIAGNOSIS — E11.9 TYPE 2 DIABETES MELLITUS WITHOUT COMPLICATION, WITHOUT LONG-TERM CURRENT USE OF INSULIN (HCC): ICD-10-CM

## 2019-08-07 DIAGNOSIS — R82.90 ABNORMAL URINE: Primary | ICD-10-CM

## 2019-08-07 LAB
BILIRUB UR QL STRIP.AUTO: NEGATIVE
CLARITY UR REFRACT.AUTO: CLEAR
GLUCOSE UR STRIP.AUTO-MCNC: NEGATIVE MG/DL
KETONES UR STRIP.AUTO-MCNC: NEGATIVE MG/DL
NITRITE UR QL STRIP.AUTO: NEGATIVE
PH UR STRIP.AUTO: 7 [PH] (ref 4.5–8)
PROT UR STRIP.AUTO-MCNC: NEGATIVE MG/DL
SP GR UR STRIP.AUTO: <1.005 (ref 1–1.03)
UROBILINOGEN UR STRIP.AUTO-MCNC: <2 MG/DL

## 2019-08-07 PROCEDURE — 99213 OFFICE O/P EST LOW 20 MIN: CPT | Performed by: INTERNAL MEDICINE

## 2019-08-07 PROCEDURE — 81001 URINALYSIS AUTO W/SCOPE: CPT | Performed by: INTERNAL MEDICINE

## 2019-08-07 RX ORDER — PRAVASTATIN SODIUM 10 MG
10 TABLET ORAL NIGHTLY
Qty: 90 TABLET | Refills: 1 | Status: SHIPPED | OUTPATIENT
Start: 2019-08-07 | End: 2020-02-14

## 2019-08-07 RX ORDER — EZETIMIBE 10 MG/1
10 TABLET ORAL NIGHTLY
Qty: 90 TABLET | Refills: 1 | Status: SHIPPED | OUTPATIENT
Start: 2019-08-07 | End: 2020-08-13

## 2019-08-07 NOTE — PATIENT INSTRUCTIONS
Months reinforced diet and exercise prior to any medicines for diabetes. Will recheck this again in 6 months.   Patient does have an appointment with a uro-GYN abnormal urine  Labs and DEXA scan discussed with patient

## 2019-08-07 NOTE — PROGRESS NOTES
Elisa Fountain [facility]  is a 67year old female. Patient presents with:  Test Results      HPI:   For lab results and DEXA scan.   Patient has no complaints    Current Outpatient Medications:  Pravastatin Sodium 10 MG Oral Tab Take 1 tablet (10 mg • Esophageal reflux    • Exposure to unspecified radiation     last dose 1992   • Female stress incontinence 4/18/2016   • Gall stones    • GERD (gastroesophageal reflux disease) 2/22/2012   • Glucose intolerance (impaired glucose tolerance)    • High bl BMI 24.42 kg/m²     na      ASSESSMENT AND PLAN:   Jose Wick was seen today for test results.     Diagnoses and all orders for this visit:    Abnormal urine  -     URINALYSIS, ROUTINE    Type 2 diabetes mellitus without complication, without long-term current

## 2019-08-09 ENCOUNTER — OFFICE VISIT (OUTPATIENT)
Dept: INTERNAL MEDICINE CLINIC | Facility: CLINIC | Age: 79
End: 2019-08-09
Payer: MEDICARE

## 2019-08-09 VITALS
DIASTOLIC BLOOD PRESSURE: 62 MMHG | BODY MASS INDEX: 24.75 KG/M2 | HEIGHT: 62 IN | TEMPERATURE: 98 F | RESPIRATION RATE: 12 BRPM | WEIGHT: 134.5 LBS | OXYGEN SATURATION: 96 % | SYSTOLIC BLOOD PRESSURE: 136 MMHG | HEART RATE: 56 BPM

## 2019-08-09 DIAGNOSIS — R09.82 POST-NASAL DRIP: ICD-10-CM

## 2019-08-09 DIAGNOSIS — J02.9 SORE THROAT: Primary | ICD-10-CM

## 2019-08-09 LAB
CONTROL LINE PRESENT WITH A CLEAR BACKGROUND (YES/NO): YES YES/NO
STREP GRP A CUL-SCR: NEGATIVE

## 2019-08-09 PROCEDURE — 99213 OFFICE O/P EST LOW 20 MIN: CPT | Performed by: NURSE PRACTITIONER

## 2019-08-09 PROCEDURE — 87880 STREP A ASSAY W/OPTIC: CPT | Performed by: NURSE PRACTITIONER

## 2019-08-09 RX ORDER — CARBINOXAMINE MALEATE 6 MG/1
1 TABLET ORAL 3 TIMES DAILY
Qty: 12 TABLET | Refills: 0 | COMMUNITY
Start: 2019-08-09 | End: 2019-08-14

## 2019-08-09 NOTE — PATIENT INSTRUCTIONS
Your symptoms are due to post nasal drip and eustachian tube dysfunction (pressure or fluid in your ears). This can be a result of viral illness or allergies.      To help you feel better faster drink plenty of water and get rest. You can also use Flonase o

## 2019-08-09 NOTE — PROGRESS NOTES
CHIEF COMPLAINT:   Patient presents with:  Sinusitis      HPI:   Jeramy Sullivan is a 67year old female who presents for upper respiratory symptoms for  3 weeks.  Patient reports sore throat, yellow colored nasal discharge, denies fever, denies cough, rafa 6/21/2016   • Anesthesia complication     Pt's sons have a history of pseudocholinesterase deficiency.    • Antral gastritis    • Breast CA [facility])    • Calculus of kidney    • Cancer [facility]) 1993    right breast cancer   • Carpal tunnel syndrome    • Cervical Procedure Laterality Date   • ANTERIOR POSTERIOR REPAIR N/A 6/1/2016    Performed by Sendy Juarez MD at [facility] MAIN OR   • BLADDER TRANSVAGINAL TAPING SUSPENSION URETHROLYSIS N/A 6/1/2016    Performed by Sendy Juarez MD at [facility] MAIN OR   • BREAST LAT Social History    Tobacco Use      Smoking status: Former Smoker        Packs/day: 0.25        Years: 25.00        Pack years: 6.25        Quit date: 1997        Years since quittin.1      Smokeless tobacco: Never Used      Tobacco comment: SMO are due to post nasal drip and eustachian tube dysfunction (pressure or fluid in your ears). This can be a result of viral illness or allergies.      To help you feel better faster drink plenty of water and get rest. You can also use Flonase or Nasocort roger

## 2019-08-12 ENCOUNTER — TELEPHONE (OUTPATIENT)
Dept: INTERNAL MEDICINE CLINIC | Facility: CLINIC | Age: 79
End: 2019-08-12

## 2019-08-12 DIAGNOSIS — R82.90 ABNORMAL URINALYSIS: Primary | ICD-10-CM

## 2019-08-12 RX ORDER — SULFAMETHOXAZOLE AND TRIMETHOPRIM 800; 160 MG/1; MG/1
1 TABLET ORAL 2 TIMES DAILY
Qty: 20 TABLET | Refills: 0 | Status: SHIPPED | OUTPATIENT
Start: 2019-08-12 | End: 2019-08-22

## 2019-08-12 NOTE — TELEPHONE ENCOUNTER
Notes recorded by Savannah Meade MD on 8/12/2019 at 9:25 AM CDT  Reviewed results   Would like to give Bactrim double strength 1 twice daily for 10 days.  Repeat urinalysis and urine culture after treatment    Pt notified of results and provider instructi

## 2019-08-14 ENCOUNTER — TELEPHONE (OUTPATIENT)
Dept: INTERNAL MEDICINE CLINIC | Facility: CLINIC | Age: 79
End: 2019-08-14

## 2019-08-14 DIAGNOSIS — R09.82 POST-NASAL DRIP: ICD-10-CM

## 2019-08-14 RX ORDER — CARBINOXAMINE MALEATE 6 MG/1
1 TABLET ORAL 3 TIMES DAILY
Qty: 90 TABLET | Refills: 0 | Status: SHIPPED | OUTPATIENT
Start: 2019-08-14 | End: 2019-08-20 | Stop reason: ALTCHOICE

## 2019-08-14 NOTE — TELEPHONE ENCOUNTER
Patient calling in, without complete information on the samples she was given from [address] [place] [address]. Pt asking for a full prescription to be sent to her Pharmacy. Please call pt with the samples name/brand.

## 2019-08-14 NOTE — TELEPHONE ENCOUNTER
Initiated PA  KEY: [id number]    Your information has been submitted to [employer] is reviewing the PA request and you will receive an electronic response.  You may check for the updated outcome later by reopening this request. The standard fax

## 2019-08-19 NOTE — TELEPHONE ENCOUNTER
Received fax from Insurance stating PA for Ryvent 6 MG tabs have been approved. Approval date: 5-18-19 through 08-. Placed approval in blue tickler file on desk.

## 2019-08-20 ENCOUNTER — TELEPHONE (OUTPATIENT)
Dept: INTERNAL MEDICINE CLINIC | Facility: CLINIC | Age: 79
End: 2019-08-20

## 2019-08-20 ENCOUNTER — OFFICE VISIT (OUTPATIENT)
Dept: INTERNAL MEDICINE CLINIC | Facility: CLINIC | Age: 79
End: 2019-08-20
Payer: MEDICARE

## 2019-08-20 VITALS
TEMPERATURE: 98 F | SYSTOLIC BLOOD PRESSURE: 112 MMHG | HEART RATE: 60 BPM | HEIGHT: 62 IN | DIASTOLIC BLOOD PRESSURE: 58 MMHG | BODY MASS INDEX: 24.38 KG/M2 | RESPIRATION RATE: 12 BRPM | WEIGHT: 132.5 LBS

## 2019-08-20 DIAGNOSIS — R09.82 POST-NASAL DRIP: ICD-10-CM

## 2019-08-20 DIAGNOSIS — H66.92 OTITIS OF LEFT EAR: Primary | ICD-10-CM

## 2019-08-20 PROCEDURE — 99213 OFFICE O/P EST LOW 20 MIN: CPT | Performed by: NURSE PRACTITIONER

## 2019-08-20 NOTE — TELEPHONE ENCOUNTER
Pt did not see PRIYA Pal. Pt saw SHAHID Pierre on 8/9/19. PA for ryvent states approved per TE on 8/14/19.  Spoke with MA.

## 2019-08-20 NOTE — TELEPHONE ENCOUNTER
Patient is having a stabbing ear pain and nasaly, saw edis last week and she gave her samples (ryvent)  but the prescription that she took in the insurance didn't pay and costing her 250.00.  Daughter would like to know if we can prescribe something else

## 2019-08-20 NOTE — PROGRESS NOTES
CHIEF COMPLAINT:   Patient presents with:  Ear Pain: x 2 days   Runny Nose      HPI:   Kavita Mckeon is a non-toxic, well appearing 67year old female for complaints of left ear pain and continued allergic rhinitis with PND.  Pt started on ryvent for PND Vitamin D3 2000 UNITS Oral Cap Take 2,000 Units by mouth daily. Disp:  Rfl:    Olopatadine HCl 0.6 % Nasal Solution 1 spray by Nasal route nightly as needed. Disp:  Rfl: 4   aspirin 81 MG Oral Tab Take 81 mg by mouth daily.  Disp:  Rfl:       Past Medical • Pure hypercholesterolemia    • Rectocele 6/1/2016   • Recurrent UTI     group B strep   • Recurrent UTI 6/24/2014   • S/P total knee replacement using cement 2/4/2015   • Unspecified disorder of thyroid    • Visual impairment     reading glasses   • Mary EXTREMITIES: no cyanosis, clubbing or edema  LYMPH: no lymphadenopathy.       ASSESSMENT AND PLAN:   Tico Cabrera is a 67year old female who presents with:    ASSESSMENT:  Otitis of left ear  (primary encounter diagnosis)  Post-nasal drip    PLAN: Meds · You may use acetaminophen or ibuprofen to control pain, unless another medicine was prescribed.  Note: If you have chronic liver or kidney disease or ever had a stomach ulcer or GI bleeding, talk to your healthcare provider before taking any of these medi Take this medicine by mouth with a glass of water. Take it at night. The tablet may be split in half. Do not chew the tablets. Follow the directions on the prescription label. You can take it with or without food. Do not take more medicine than directed.  Guerda Bridges · an unusual or allergic reaction to levocetirizine, cetirizine, hydroxyzine, other medicines, foods, dyes, or preservatives  · pregnant or trying to get pregnant  · breast-feeding  What should I watch for while using this medicine?   Visit your doctor or h

## 2019-08-20 NOTE — TELEPHONE ENCOUNTER
Pt did not have ear infection at last visit and is on antibiotics currently for a UTI so yes I need to see her

## 2019-08-20 NOTE — PATIENT INSTRUCTIONS
1. Use ear drops as prescribed.   2. Try Xyzal (or generic as listed below) for allergies and post nasal drip    External Ear Infection (Adult)    External otitis (also called “swimmer’s ear”) is an infection in the ear canal. It is often caused by bacter Follow up with your healthcare provider in 1 week, or as advised.   When to seek medical advice  Call your healthcare provider right away if any of these occur:  · Ear pain becomes worse or doesn’t improve after 3 days of treatment  · Redness or swelling of Side effects that usually do not require medical attention (report to your doctor or health care professional if they continue or are bothersome):  · cough  · dizziness  · drowsiness or tiredness  · dry mouth  · muscle aches  What may interact with this me NOTE:This sheet is a summary. It may not cover all possible information. If you have questions about this medicine, talk to your doctor, pharmacist, or health care provider.  Copyright© 2019 [employer]

## 2019-08-20 NOTE — TELEPHONE ENCOUNTER
Talked to daughter, to let her know we did receive fax yesterday stating [place]bra [place] was approved until 8-. Pt went to pharmacy yesterday and cost was at $250.  Told daughter I will contact pharmacy and see why it is so much, if it is $250, I will talk

## 2019-08-23 ENCOUNTER — APPOINTMENT (OUTPATIENT)
Dept: LAB | Age: 79
End: 2019-08-23
Attending: INTERNAL MEDICINE
Payer: MEDICARE

## 2019-08-23 DIAGNOSIS — R82.90 ABNORMAL URINALYSIS: ICD-10-CM

## 2019-08-23 LAB
BILIRUB UR QL STRIP.AUTO: NEGATIVE
COLOR UR AUTO: YELLOW
GLUCOSE UR STRIP.AUTO-MCNC: NEGATIVE MG/DL
HYALINE CASTS #/AREA URNS AUTO: PRESENT /LPF
KETONES UR STRIP.AUTO-MCNC: NEGATIVE MG/DL
NITRITE UR QL STRIP.AUTO: NEGATIVE
PH UR STRIP.AUTO: 6 [PH] (ref 4.5–8)
PROT UR STRIP.AUTO-MCNC: NEGATIVE MG/DL
RBC UR QL AUTO: NEGATIVE
SP GR UR STRIP.AUTO: 1.01 (ref 1–1.03)
UROBILINOGEN UR STRIP.AUTO-MCNC: <2 MG/DL
WBC #/AREA URNS AUTO: >50 /HPF
WBC CLUMPS UR QL AUTO: PRESENT

## 2019-08-23 PROCEDURE — 87088 URINE BACTERIA CULTURE: CPT

## 2019-08-23 PROCEDURE — 87086 URINE CULTURE/COLONY COUNT: CPT

## 2019-08-23 PROCEDURE — 87186 SC STD MICRODIL/AGAR DIL: CPT

## 2019-08-23 PROCEDURE — 81001 URINALYSIS AUTO W/SCOPE: CPT

## 2019-08-27 ENCOUNTER — OFFICE VISIT (OUTPATIENT)
Dept: UROLOGY | Facility: HOSPITAL | Age: 79
End: 2019-08-27
Attending: OBSTETRICS & GYNECOLOGY
Payer: MEDICARE

## 2019-08-27 VITALS
SYSTOLIC BLOOD PRESSURE: 126 MMHG | DIASTOLIC BLOOD PRESSURE: 74 MMHG | WEIGHT: 132 LBS | BODY MASS INDEX: 24.29 KG/M2 | HEIGHT: 62 IN

## 2019-08-27 DIAGNOSIS — N81.84 PELVIC MUSCLE WASTING: ICD-10-CM

## 2019-08-27 DIAGNOSIS — N39.0 FREQUENT UTI: Primary | ICD-10-CM

## 2019-08-27 DIAGNOSIS — N95.2 POSTMENOPAUSAL ATROPHIC VAGINITIS: ICD-10-CM

## 2019-08-27 DIAGNOSIS — R33.9 INCOMPLETE EMPTYING OF BLADDER: ICD-10-CM

## 2019-08-27 PROCEDURE — 99211 OFF/OP EST MAY X REQ PHY/QHP: CPT

## 2019-08-27 RX ORDER — NITROFURANTOIN 25; 75 MG/1; MG/1
100 CAPSULE ORAL 2 TIMES DAILY
Qty: 20 CAPSULE | Refills: 0 | Status: SHIPPED | OUTPATIENT
Start: 2019-08-27 | End: 2019-09-06

## 2019-08-27 RX ORDER — CEPHALEXIN 250 MG/1
250 CAPSULE ORAL NIGHTLY
COMMUNITY
End: 2019-10-22

## 2019-08-27 NOTE — PATIENT INSTRUCTIONS
Cont vaginal estrogen  Stop keflex nightly while taking macrobid  macrobid 100mg twice daily x10d  Keep bowels moving  Cont pelvic floor PT  CISC twice daily    Plan for ucx upon return from [facility]

## 2019-08-27 NOTE — PROGRESS NOTES
Patient presents to follow up UTIs, voiding dysfunction    She is currently using pelvic floor PT  Keflex qhs  8/23 + UTI  Bowels improved  CISC 2x/d    50% improved with PT  Still bothered by urinary urgency, UTIs  Vag estrogen twice weekly    Hipprex 1g

## 2019-09-10 ENCOUNTER — TELEPHONE (OUTPATIENT)
Dept: UROLOGY | Facility: HOSPITAL | Age: 79
End: 2019-09-10

## 2019-09-10 ENCOUNTER — APPOINTMENT (OUTPATIENT)
Dept: LAB | Age: 79
End: 2019-09-10
Attending: OBSTETRICS & GYNECOLOGY
Payer: MEDICARE

## 2019-09-10 DIAGNOSIS — N39.0 FREQUENT UTI: ICD-10-CM

## 2019-09-10 PROCEDURE — 87186 SC STD MICRODIL/AGAR DIL: CPT

## 2019-09-10 PROCEDURE — 87088 URINE BACTERIA CULTURE: CPT

## 2019-09-10 PROCEDURE — 87086 URINE CULTURE/COLONY COUNT: CPT

## 2019-09-10 NOTE — TELEPHONE ENCOUNTER
Pt's daughter called to let us know that patient gave urine specimen to r/o culture. Called daughter back to let her know we will be watching for it and call when we get results. Daughter agrees to plan.

## 2019-09-12 ENCOUNTER — TELEPHONE (OUTPATIENT)
Dept: UROLOGY | Facility: HOSPITAL | Age: 79
End: 2019-09-12

## 2019-09-12 DIAGNOSIS — R39.9 UTI SYMPTOMS: Primary | ICD-10-CM

## 2019-09-12 RX ORDER — NITROFURANTOIN 25; 75 MG/1; MG/1
100 CAPSULE ORAL 2 TIMES DAILY
Qty: 20 CAPSULE | Refills: 0 | Status: SHIPPED | OUTPATIENT
Start: 2019-09-12 | End: 2019-09-22

## 2019-09-12 NOTE — TELEPHONE ENCOUNTER
Pt called to see results of urine culture. Pt does have a UTI. TORB by Dr. Krzysztof Cedillo, macrobid 100mg 2x/day x 10 days. Daughter demanding an order for another urine culture on the 11th day, day after finishing abx.   Will order a future urine culture, ex

## 2019-10-22 ENCOUNTER — TELEPHONE (OUTPATIENT)
Dept: UROLOGY | Facility: HOSPITAL | Age: 79
End: 2019-10-22

## 2019-10-22 RX ORDER — CEPHALEXIN 250 MG/1
250 CAPSULE ORAL NIGHTLY
Qty: 30 CAPSULE | Refills: 2 | Status: SHIPPED | OUTPATIENT
Start: 2019-10-22 | End: 2020-01-22

## 2019-10-22 NOTE — TELEPHONE ENCOUNTER
Pt's daughter called and stated pharmacy is saying Dr. Breanne Vega stopped the keflex. , Pt was not to take   Talked with Dr. Breanne Vega   Pt to be on suppression for 6 months. Informed daughter   Pt was only to be off Keflex while on the [address] for UTI.   Re

## 2019-10-24 ENCOUNTER — TELEPHONE (OUTPATIENT)
Dept: INTERNAL MEDICINE CLINIC | Facility: CLINIC | Age: 79
End: 2019-10-24

## 2019-10-24 ENCOUNTER — IMMUNIZATION (OUTPATIENT)
Dept: INTERNAL MEDICINE CLINIC | Facility: CLINIC | Age: 79
End: 2019-10-24
Payer: MEDICARE

## 2019-10-24 DIAGNOSIS — Z23 NEED FOR VACCINATION: ICD-10-CM

## 2019-10-24 PROCEDURE — G0008 ADMIN INFLUENZA VIRUS VAC: HCPCS | Performed by: INTERNAL MEDICINE

## 2019-10-24 PROCEDURE — 90686 IIV4 VACC NO PRSV 0.5 ML IM: CPT | Performed by: INTERNAL MEDICINE

## 2019-10-24 NOTE — TELEPHONE ENCOUNTER
Patient's Daughter calling in on behalf of George Zavala. Requesting the pneumonia vaccine- order. Pt would like to receive it at the same time she comes in for her flu shot. Please place order.

## 2019-10-24 NOTE — TELEPHONE ENCOUNTER
Per Nurse Keri Tong, patient is not due for the pneumonia vaccine until 2023. Called patient's Daughter and left voice message to call back.

## 2019-10-24 NOTE — TELEPHONE ENCOUNTER
Prevnar 13 given 11/26/16  Pneumovax 23 given 3/15/18. Pt is not due for pneumonia vaccine until at least 3/15/23.

## 2019-11-04 RX ORDER — LEVOTHYROXINE SODIUM 0.1 MG/1
TABLET ORAL
Qty: 90 TABLET | Refills: 0 | Status: SHIPPED | OUTPATIENT
Start: 2019-11-04 | End: 2020-01-31

## 2019-11-04 NOTE — TELEPHONE ENCOUNTER
Passed protocol     Last refill:  8/5/2019 Levothyroxine 100 mcg #90 NR    TSH - 7/24/2019     LOV:   8/20/2019 Conception Wilberforce RTC ?   No FOV scheduled

## 2019-11-21 ENCOUNTER — OFFICE VISIT (OUTPATIENT)
Dept: INTERNAL MEDICINE CLINIC | Facility: CLINIC | Age: 79
End: 2019-11-21
Payer: MEDICARE

## 2019-11-21 VITALS
HEIGHT: 62 IN | SYSTOLIC BLOOD PRESSURE: 126 MMHG | HEART RATE: 61 BPM | TEMPERATURE: 99 F | RESPIRATION RATE: 12 BRPM | WEIGHT: 137.5 LBS | DIASTOLIC BLOOD PRESSURE: 56 MMHG | OXYGEN SATURATION: 97 % | BODY MASS INDEX: 25.3 KG/M2

## 2019-11-21 DIAGNOSIS — J40 BRONCHITIS: Primary | ICD-10-CM

## 2019-11-21 PROCEDURE — 99213 OFFICE O/P EST LOW 20 MIN: CPT | Performed by: NURSE PRACTITIONER

## 2019-11-21 RX ORDER — BENZONATATE 200 MG/1
200 CAPSULE ORAL 3 TIMES DAILY PRN
Qty: 30 CAPSULE | Refills: 0 | Status: SHIPPED | OUTPATIENT
Start: 2019-11-21 | End: 2020-11-11 | Stop reason: ALTCHOICE

## 2019-11-21 NOTE — PATIENT INSTRUCTIONS
1. Tylenol 500 mg every 6-8 hours as needed for ear pain   2. Tessalon cough pills three times a day for cough  3. Cough syrup before bed as needed  4.  Call if no better next week or sooner if worsening shortness of breath, high fever  Acute Bronchitis  Yo provider may prescribe an inhaler to help open up the bronchial tubes. Most of the time, acute bronchitis is caused by a viral infection. Antibiotics are usually not prescribed for viral infections.   · Drink plenty of fluids, such as water, juice, or warm

## 2019-11-21 NOTE — PROGRESS NOTES
CHIEF COMPLAINT:   Patient presents with:  URI      HPI:   Cherise Ortiz is a 67year old female who presents for upper respiratory symptoms for  5 days. Patient reports sore throat, chest congestion, cough with yellow colored sputum, ear pain.  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   • Coronary atherosclerosis     stents   • Coronary atherosclerosis of gato • BLADDER TRANSVAGINAL TAPING SUSPENSION URETHROLYSIS N/A 6/1/2016    Performed by Tamy Tyler MD at [facility] MAIN OR   • BREAST LATISSIMUS FLAP RECONSTRUCTION Right 7/29/2015    Performed by Tressa Winkler MD at [facility] MAIN OR   • BREAST LUMPECTOMY Right 6/ Pack years: 6.25        Quit date: 1997        Years since quittin.4      Smokeless tobacco: Never Used      Tobacco comment: SMOKED 20 YEARS QUIT     Alcohol use: Yes      Comment: 1-2 drinks a month    Drug use: No        REVIEW OF SY Patient Instructions     1. Tylenol 500 mg every 6-8 hours as needed for ear pain   2. Tessalon cough pills three times a day for cough  3. Cough syrup before bed as needed  4.  Call if no better next week or sooner if worsening shortness of breath, high fe · Take medicine as directed. You may be told to take ibuprofen or other over-the-counter medicines. These help relieve inflammation in your bronchial tubes. Your healthcare provider may prescribe an inhaler to help open up the bronchial tubes.  Most of the

## 2019-12-31 ENCOUNTER — PRIOR ORIGINAL RECORDS (OUTPATIENT)
Dept: OTHER | Age: 79
End: 2019-12-31

## 2020-01-13 ENCOUNTER — PATIENT OUTREACH (OUTPATIENT)
Dept: CASE MANAGEMENT | Age: 80
End: 2020-01-13

## 2020-01-22 ENCOUNTER — TELEPHONE (OUTPATIENT)
Dept: UROLOGY | Facility: HOSPITAL | Age: 80
End: 2020-01-22

## 2020-01-22 ENCOUNTER — APPOINTMENT (OUTPATIENT)
Dept: LAB | Age: 80
End: 2020-01-22
Attending: INTERNAL MEDICINE
Payer: MEDICARE

## 2020-01-22 DIAGNOSIS — R39.9 UTI SYMPTOMS: ICD-10-CM

## 2020-01-22 DIAGNOSIS — R39.9 UTI SYMPTOMS: Primary | ICD-10-CM

## 2020-01-22 PROCEDURE — 87086 URINE CULTURE/COLONY COUNT: CPT

## 2020-01-22 RX ORDER — CEPHALEXIN 250 MG/1
CAPSULE ORAL
Qty: 60 CAPSULE | Refills: 2 | Status: SHIPPED | OUTPATIENT
Start: 2020-01-22 | End: 2020-08-31

## 2020-01-22 RX ORDER — NITROFURANTOIN 25; 75 MG/1; MG/1
100 CAPSULE ORAL 2 TIMES DAILY
Qty: 20 CAPSULE | Refills: 0 | Status: SHIPPED | OUTPATIENT
Start: 2020-01-22 | End: 2020-02-01

## 2020-01-22 NOTE — TELEPHONE ENCOUNTER
Pt daughter calling saying her mom has a UTI. Wanting abx called in. Discussed we need a ucx as per our office protocol. Boston Roverto agreeing to take her mom today for testing.  MARICRUZ Raymundo Button macrobid 100mg BID po X 10d

## 2020-01-24 ENCOUNTER — TELEPHONE (OUTPATIENT)
Dept: UROLOGY | Facility: HOSPITAL | Age: 80
End: 2020-01-24

## 2020-01-24 NOTE — TELEPHONE ENCOUNTER
Patient aware of no growth in urine, taking macrobid patient states has had with relief. Patient would like to continue antibiotic. Will start suppression after macrobid is complete.

## 2020-01-31 RX ORDER — LEVOTHYROXINE SODIUM 0.1 MG/1
TABLET ORAL
Qty: 90 TABLET | Refills: 0 | Status: SHIPPED | OUTPATIENT
Start: 2020-01-31 | End: 2020-04-30

## 2020-01-31 NOTE — TELEPHONE ENCOUNTER
Passed protocol    Requesting LEVOTHYROXINE SODIUM 100 MCG Oral Tab  LOV: 8/7/19  RTC: 6 months  Last Relevant Labs: 7/24/19  Filled: 11/4/19 #90 with 0 refills    No future appointments.

## 2020-02-14 DIAGNOSIS — E78.00 PURE HYPERCHOLESTEROLEMIA: ICD-10-CM

## 2020-02-14 RX ORDER — PRAVASTATIN SODIUM 10 MG
TABLET ORAL
Qty: 90 TABLET | Refills: 0 | Status: SHIPPED | OUTPATIENT
Start: 2020-02-14 | End: 2020-11-05

## 2020-02-14 NOTE — TELEPHONE ENCOUNTER
Last OV: 11/21/19 with REBECA Correia   Last refill date: 8/7/19     #/refills: #90, 1 refill  When pt was asked to return for OV: \"The patient is asked to return if sx's persist or worsen\"  Upcoming appt/reason: no upcoming appt  Last labs 7/24/19

## 2020-03-17 ENCOUNTER — TELEPHONE (OUTPATIENT)
Dept: UROLOGY | Facility: HOSPITAL | Age: 80
End: 2020-03-17

## 2020-03-17 ENCOUNTER — APPOINTMENT (OUTPATIENT)
Dept: LAB | Age: 80
End: 2020-03-17
Attending: OBSTETRICS & GYNECOLOGY
Payer: MEDICARE

## 2020-03-17 DIAGNOSIS — R30.0 DYSURIA: ICD-10-CM

## 2020-03-17 DIAGNOSIS — R30.0 DYSURIA: Primary | ICD-10-CM

## 2020-03-17 PROCEDURE — 87086 URINE CULTURE/COLONY COUNT: CPT

## 2020-03-17 RX ORDER — NITROFURANTOIN 25; 75 MG/1; MG/1
100 CAPSULE ORAL 2 TIMES DAILY
Qty: 20 CAPSULE | Refills: 0 | Status: SHIPPED | OUTPATIENT
Start: 2020-03-17 | End: 2020-06-17 | Stop reason: ALTCHOICE

## 2020-03-17 NOTE — TELEPHONE ENCOUNTER
Daughter, Paramjit Vernon, called with reports mom has UTI sx, requesting urine culture. Orders placed. Pt is having burning,pain with urination, very uncomfortable. Asked daughter about CISC #s, pt is cathing BID.  She is unsure of PVR volumes but states \"they a

## 2020-03-23 ENCOUNTER — TELEPHONE (OUTPATIENT)
Dept: UROLOGY | Facility: HOSPITAL | Age: 80
End: 2020-03-23

## 2020-03-23 NOTE — TELEPHONE ENCOUNTER
Daughter called, requests Estrace refill through [facility], Ok per last visit notes.  Called to [facility]

## 2020-04-30 RX ORDER — LEVOTHYROXINE SODIUM 0.1 MG/1
TABLET ORAL
Qty: 90 TABLET | Refills: 0 | Status: SHIPPED | OUTPATIENT
Start: 2020-04-30 | End: 2020-07-27

## 2020-06-04 ENCOUNTER — TELEPHONE (OUTPATIENT)
Dept: UROLOGY | Facility: HOSPITAL | Age: 80
End: 2020-06-04

## 2020-06-04 ENCOUNTER — APPOINTMENT (OUTPATIENT)
Dept: LAB | Age: 80
End: 2020-06-04
Attending: OBSTETRICS & GYNECOLOGY
Payer: MEDICARE

## 2020-06-04 DIAGNOSIS — R30.0 DYSURIA: Primary | ICD-10-CM

## 2020-06-04 DIAGNOSIS — R30.0 DYSURIA: ICD-10-CM

## 2020-06-04 PROCEDURE — 87086 URINE CULTURE/COLONY COUNT: CPT

## 2020-06-04 RX ORDER — NITROFURANTOIN 25; 75 MG/1; MG/1
100 CAPSULE ORAL 2 TIMES DAILY
Qty: 14 CAPSULE | Refills: 0 | Status: SHIPPED | OUTPATIENT
Start: 2020-06-04 | End: 2020-06-17 | Stop reason: ALTCHOICE

## 2020-06-04 NOTE — TELEPHONE ENCOUNTER
Patient's daughter called Patient having frequency, burning and bloating. Would like to to get a urine culture. Cliff Ramirez urine culture and Macrobid 100 mg BID for 7 days disp.  14 tabs will wait for results if symptoms get worse would like to have

## 2020-06-17 ENCOUNTER — TELEPHONE (OUTPATIENT)
Dept: INTERNAL MEDICINE CLINIC | Facility: CLINIC | Age: 80
End: 2020-06-17

## 2020-06-17 ENCOUNTER — TELEMEDICINE (OUTPATIENT)
Dept: INTERNAL MEDICINE CLINIC | Facility: CLINIC | Age: 80
End: 2020-06-17
Payer: MEDICARE

## 2020-06-17 DIAGNOSIS — J01.90 ACUTE NON-RECURRENT SINUSITIS, UNSPECIFIED LOCATION: Primary | ICD-10-CM

## 2020-06-17 PROCEDURE — 99213 OFFICE O/P EST LOW 20 MIN: CPT | Performed by: NURSE PRACTITIONER

## 2020-06-17 RX ORDER — METHYLPREDNISOLONE 4 MG/1
TABLET ORAL
Qty: 1 KIT | Refills: 0 | Status: SHIPPED | OUTPATIENT
Start: 2020-06-17 | End: 2020-09-03 | Stop reason: ALTCHOICE

## 2020-06-17 RX ORDER — OLOPATADINE HYDROCHLORIDE 665 UG/1
1 SPRAY NASAL NIGHTLY PRN
Qty: 1 BOTTLE | Refills: 1 | Status: SHIPPED | OUTPATIENT
Start: 2020-06-17 | End: 2021-09-01

## 2020-06-17 RX ORDER — AZITHROMYCIN 250 MG/1
TABLET, FILM COATED ORAL
Qty: 6 TABLET | Refills: 0 | Status: SHIPPED | OUTPATIENT
Start: 2020-06-17 | End: 2020-06-22

## 2020-06-17 NOTE — PROGRESS NOTES
Virtual Telephone Check-In    Iva Crigler verbally consents to a Virtual/Telephone Check-In visit on 06/17/20. Patient verified identification with name and date of birth.      Patient understands and accepts financial responsibility for any deductible, Suspension 1 spray by Each Nare route daily. • ascorbic acid 500 MG Oral Tab Take 500 mg by mouth daily. • Calcium Carbonate-Vitamin D (CALCIUM + D OR) Take 1 tablet by mouth daily.      • Vitamin D3 2000 UNITS Oral Cap Take 2,000 Units by mouth ricky vaginal vault after hysterectomy (aka 7901)    • Pseudocholinesterase deficiency    • Pure hypercholesterolemia    • Rectocele 6/1/2016   • Recurrent UTI     group B strep   • Recurrent UTI 6/24/2014   • S/P total knee replacement using cement 2/4/2015   • provide continuity of care in the best interest of the provider-patient relationship, due to the ongoing public health crisis/national emergency and because of restrictions of visitation.   There are limitations of this visit as no physical exam could be pe

## 2020-06-22 ENCOUNTER — APPOINTMENT (OUTPATIENT)
Dept: LAB | Age: 80
End: 2020-06-22
Attending: OBSTETRICS & GYNECOLOGY
Payer: MEDICARE

## 2020-06-22 ENCOUNTER — TELEPHONE (OUTPATIENT)
Dept: UROLOGY | Facility: HOSPITAL | Age: 80
End: 2020-06-22

## 2020-06-22 DIAGNOSIS — R30.0 DYSURIA: ICD-10-CM

## 2020-06-22 DIAGNOSIS — R39.15 URGENCY OF URINATION: ICD-10-CM

## 2020-06-22 DIAGNOSIS — R30.0 DYSURIA: Primary | ICD-10-CM

## 2020-06-22 DIAGNOSIS — R39.9 UTI SYMPTOMS: ICD-10-CM

## 2020-06-22 PROCEDURE — 87086 URINE CULTURE/COLONY COUNT: CPT

## 2020-06-22 PROCEDURE — 87186 SC STD MICRODIL/AGAR DIL: CPT

## 2020-06-22 PROCEDURE — 87077 CULTURE AEROBIC IDENTIFY: CPT

## 2020-06-22 NOTE — TELEPHONE ENCOUNTER
Spoke to Daughter Patient having dysuria, urgency and odorous urine. Patient stopped 6/17 nightly Keflex, had a sinus infection was prescribed azithromycin and medrol pack. Plan order urine culture and await results.

## 2020-06-24 ENCOUNTER — HOSPITAL ENCOUNTER (OUTPATIENT)
Dept: MAMMOGRAPHY | Facility: HOSPITAL | Age: 80
Discharge: HOME | End: 2020-06-24
Attending: INTERNAL MEDICINE
Payer: MEDICARE

## 2020-06-24 DIAGNOSIS — Z92.3 PERSONAL HISTORY OF IRRADIATION: ICD-10-CM

## 2020-06-24 DIAGNOSIS — Z86.000 PERSONAL HISTORY OF IN-SITU NEOPLASM OF BREAST: ICD-10-CM

## 2020-06-24 DIAGNOSIS — D05.82 OTHER SPECIFIED TYPE OF CARCINOMA IN SITU OF LEFT BREAST: ICD-10-CM

## 2020-06-24 PROCEDURE — 77065 DX MAMMO INCL CAD UNI: CPT | Performed by: INTERNAL MEDICINE

## 2020-06-24 PROCEDURE — 77061 BREAST TOMOSYNTHESIS UNI: CPT | Performed by: INTERNAL MEDICINE

## 2020-06-25 ENCOUNTER — TELEPHONE (OUTPATIENT)
Dept: UROLOGY | Facility: HOSPITAL | Age: 80
End: 2020-06-25

## 2020-06-25 RX ORDER — NITROFURANTOIN 25; 75 MG/1; MG/1
100 CAPSULE ORAL 2 TIMES DAILY
Qty: 20 CAPSULE | Refills: 0 | Status: SHIPPED | OUTPATIENT
Start: 2020-06-25 | End: 2020-07-05

## 2020-06-25 RX ORDER — NITROFURANTOIN 25; 75 MG/1; MG/1
100 CAPSULE ORAL 2 TIMES DAILY
Qty: 14 CAPSULE | Refills: 0 | Status: SHIPPED | OUTPATIENT
Start: 2020-06-25 | End: 2020-06-25 | Stop reason: CLARIF

## 2020-06-25 NOTE — TELEPHONE ENCOUNTER
MARICRUZ per Dr. Frye Jessica 100 mg tab one tab po BID for 10 days, patient to resume nightly Keflex 250 mg nightly, discussed with patients daughter Lor Oh, verbalizing understanding,medication e-cribed

## 2020-06-30 ENCOUNTER — TELEPHONE (OUTPATIENT)
Dept: UROLOGY | Facility: HOSPITAL | Age: 80
End: 2020-06-30

## 2020-06-30 DIAGNOSIS — R30.0 DYSURIA: Primary | ICD-10-CM

## 2020-06-30 RX ORDER — PHENAZOPYRIDINE HYDROCHLORIDE 200 MG/1
200 TABLET, FILM COATED ORAL 3 TIMES DAILY PRN
Qty: 20 TABLET | Refills: 0 | Status: SHIPPED | OUTPATIENT
Start: 2020-06-30 | End: 2020-09-03 | Stop reason: ALTCHOICE

## 2020-06-30 RX ORDER — POLYETHYLENE GLYCOL 3350 17 G/17G
17 POWDER, FOR SOLUTION ORAL DAILY
COMMUNITY
End: 2020-09-03 | Stop reason: ALTCHOICE

## 2020-06-30 RX ORDER — CALCIUM POLYCARBOPHIL 625 MG
TABLET ORAL
COMMUNITY
End: 2022-02-01

## 2020-06-30 RX ORDER — NITROFURANTOIN 25; 75 MG/1; MG/1
100 CAPSULE ORAL 2 TIMES DAILY
Qty: 10 CAPSULE | Refills: 0 | Status: SHIPPED | OUTPATIENT
Start: 2020-06-30 | End: 2020-07-05

## 2020-06-30 NOTE — TELEPHONE ENCOUNTER
Started Macrobid 100 mg po BID on 6/25/20 for UTI - rx was sent to pharmacy for 10 day supply - ( 20 capsules) - patient reports only has a 7 day supply and also is concerned because she does not feel like it is helping the UTI symptoms of increased urgenc

## 2020-07-22 ENCOUNTER — APPOINTMENT (OUTPATIENT)
Dept: LAB | Age: 80
End: 2020-07-22
Attending: OBSTETRICS & GYNECOLOGY
Payer: MEDICARE

## 2020-07-22 ENCOUNTER — TELEPHONE (OUTPATIENT)
Dept: UROLOGY | Facility: HOSPITAL | Age: 80
End: 2020-07-22

## 2020-07-22 DIAGNOSIS — R30.0 DYSURIA: ICD-10-CM

## 2020-07-22 DIAGNOSIS — R30.0 DYSURIA: Primary | ICD-10-CM

## 2020-07-22 PROCEDURE — 87086 URINE CULTURE/COLONY COUNT: CPT

## 2020-07-22 RX ORDER — NITROFURANTOIN 25; 75 MG/1; MG/1
100 CAPSULE ORAL 2 TIMES DAILY
Qty: 20 CAPSULE | Refills: 0 | Status: SHIPPED | OUTPATIENT
Start: 2020-07-22 | End: 2020-09-03 | Stop reason: ALTCHOICE

## 2020-07-22 NOTE — TELEPHONE ENCOUNTER
Daughter, Michelle Altamirano called, mom is complaining of right sided back hip pain, burning with urination, odorous urine and vaginal mucous discharge.  Leaked large volume of urine yesterday x 2 after cathing before going out.  Denies fever   She is taking daily Bear Chetan

## 2020-07-23 ENCOUNTER — TELEPHONE (OUTPATIENT)
Dept: UROLOGY | Facility: HOSPITAL | Age: 80
End: 2020-07-23

## 2020-07-23 NOTE — TELEPHONE ENCOUNTER
.Phoned patient's daughter and informed of normal urine culture results. She verbalized an understanding, will plan to have patient stop macrobid and resume daily Keflex suppression.   Pt has f/u with Dr. Breanne Vega in August.

## 2020-07-28 RX ORDER — LEVOTHYROXINE SODIUM 0.1 MG/1
100 TABLET ORAL DAILY
Qty: 30 TABLET | Refills: 0 | Status: SHIPPED | OUTPATIENT
Start: 2020-07-28 | End: 2020-09-03

## 2020-07-29 RX ORDER — LEVOTHYROXINE SODIUM 0.1 MG/1
TABLET ORAL
Qty: 90 TABLET | Refills: 0 | OUTPATIENT
Start: 2020-07-29

## 2020-07-29 NOTE — TELEPHONE ENCOUNTER
Refused - duplicate request, filled on 7/28/20 - pt requesting a #90 refused due to pt needing appointment.

## 2020-07-30 ENCOUNTER — OFFICE VISIT (OUTPATIENT)
Dept: SURGERY | Facility: CLINIC | Age: 80
End: 2020-07-30
Payer: MEDICARE

## 2020-07-30 ENCOUNTER — PRIOR ORIGINAL RECORDS (OUTPATIENT)
Dept: OTHER | Age: 80
End: 2020-07-30

## 2020-07-30 VITALS — SYSTOLIC BLOOD PRESSURE: 162 MMHG | HEART RATE: 56 BPM | DIASTOLIC BLOOD PRESSURE: 70 MMHG

## 2020-07-30 DIAGNOSIS — R82.90 URINE FINDING: Primary | ICD-10-CM

## 2020-07-30 DIAGNOSIS — N39.0 RECURRENT UTI: ICD-10-CM

## 2020-07-30 DIAGNOSIS — R33.9 INCOMPLETE BLADDER EMPTYING: ICD-10-CM

## 2020-07-30 LAB
APPEARANCE: CLEAR
BILIRUB UR QL: NEGATIVE
CLARITY UR: CLEAR
COLOR UR: YELLOW
GLUCOSE UR-MCNC: NEGATIVE MG/DL
HYALINE CASTS #/AREA URNS AUTO: 1 /LPF
KETONES UR-MCNC: NEGATIVE MG/DL
MULTISTIX LOT#: 1044 NUMERIC
NITRITE UR QL STRIP.AUTO: NEGATIVE
PH UR: 7 [PH] (ref 5–8)
PH, URINE: 7.5 (ref 4.5–8)
PROT UR-MCNC: NEGATIVE MG/DL
RBC #/AREA URNS AUTO: 1 /HPF
SP GR UR STRIP: 1.01 (ref 1–1.03)
SPECIFIC GRAVITY: 1.01 (ref 1–1.03)
URINE-COLOR: YELLOW
UROBILINOGEN UR STRIP-ACNC: <2
UROBILINOGEN,SEMI-QN: 0.2 MG/DL (ref 0–1.9)
WBC #/AREA URNS AUTO: 2 /HPF

## 2020-07-30 PROCEDURE — 99203 OFFICE O/P NEW LOW 30 MIN: CPT | Performed by: UROLOGY

## 2020-07-30 PROCEDURE — 81003 URINALYSIS AUTO W/O SCOPE: CPT | Performed by: UROLOGY

## 2020-07-30 NOTE — PROGRESS NOTES
Rooming Clinician:     Ever Tadeo is a 79year old female.   Patient presents with:  Consult: Patient presents today c/o frequent UTIs  Kidney Problem: c/o kidney stone   Incontinence:  Stream: varies   Daytime frequency: 8 to 10   Nocturia 2 to 3 ti Take 1 capsule (100 mg total) by mouth 2 (two) times daily. 20 capsule 0   • Calcium Polycarbophil (FIBER) 625 MG Oral Tab Take by mouth. • PEG 3350 17 g Oral Powd Pack Take 17 g by mouth daily.      • Phenazopyridine HCl (PYRIDIUM) 200 MG Oral Tab Take OTHER (SEE COMMENTS)    Comment:Unknown  Penicillins             RASH  Shrimp                  UNKNOWN    Comment:Per allergy testing  Tomatoes                OTHER (SEE COMMENTS)    Comment:headache  Welchol [Colesevela*    UNKNOWN    Comment Pseudocholinesterase deficiency    • Pure hypercholesterolemia    • Rectocele 6/1/2016   • Recurrent UTI     group B strep   • Recurrent UTI 6/24/2014   • S/P total knee replacement using cement 2/4/2015   • Unspecified disorder of thyroid    • Visual impa replacement   • OTHER SURGICAL HISTORY  7/19/2013    Venita DENTON   • OTHER SURGICAL HISTORY  7/16/2014    Cystoscopy- Dr. Gwendolyn Leach   • OTHER SURGICAL HISTORY  6/1/2016    Bladder sling   • RADIATION RIGHT      1992   • REDUCTION LEFT  10/2015   • TOTAL KNEE RE  07/24/2019    K 4.3 07/24/2019    CL 99 07/24/2019    CO2 32 07/24/2019     (H) 07/24/2019    CA 9.6 07/24/2019    ALB 4.2 07/24/2019    ALKPHO 74 07/24/2019    AST 17 07/24/2019    ALT 12 07/24/2019    POCGLU 221 (H) 12/29/2013       X-RA understands and questions have been answered.     Diagnoses and all orders for this visit:    Urine finding  -     URINALYSIS, AUTO, W/O SCOPE  -     URINALYSIS, ROUTINE  -     URINE CULTURE, ROUTINE    Recurrent UTI    Incomplete bladder emptying        Fo

## 2020-07-31 NOTE — PROGRESS NOTES
Your recent catheterized urine culture shows no growth and is normal.  Recommend follow up in the office as directed.     Sincerely,  Dora Mane MD

## 2020-08-07 ENCOUNTER — TELEPHONE (OUTPATIENT)
Dept: SURGERY | Facility: CLINIC | Age: 80
End: 2020-08-07

## 2020-08-07 NOTE — TELEPHONE ENCOUNTER
My chart message sent to patient with results        ----- Message from Rachael Chung MD sent at 7/31/2020  3:11 PM CDT -----  Your recent catheterized urine culture shows no growth and is normal.  Recommend follow up in the office as directed.     [facility]

## 2020-08-12 DIAGNOSIS — E78.00 PURE HYPERCHOLESTEROLEMIA: ICD-10-CM

## 2020-08-13 RX ORDER — EZETIMIBE 10 MG/1
TABLET ORAL
Qty: 90 TABLET | Refills: 0 | Status: SHIPPED | OUTPATIENT
Start: 2020-08-13 | End: 2020-11-18

## 2020-08-13 NOTE — TELEPHONE ENCOUNTER
Protocol failed - labs needed - Pt has upcoming appointment    Requesting ezetimibe 10 MG Oral Tab  LOV: 8/7/20  RTC: 6 months  Last Relevant Labs: 7/24/19  Filled: 8/7/20 #90 with 1 refills    Future Appointments   Date Time Provider Scott Moore

## 2020-08-31 RX ORDER — CEPHALEXIN 250 MG/1
CAPSULE ORAL
Qty: 60 CAPSULE | Refills: 3 | Status: SHIPPED | OUTPATIENT
Start: 2020-08-31 | End: 2021-07-14

## 2020-09-03 ENCOUNTER — OFFICE VISIT (OUTPATIENT)
Dept: INTERNAL MEDICINE CLINIC | Facility: CLINIC | Age: 80
End: 2020-09-03
Payer: MEDICARE

## 2020-09-03 VITALS
BODY MASS INDEX: 24.48 KG/M2 | SYSTOLIC BLOOD PRESSURE: 130 MMHG | OXYGEN SATURATION: 97 % | RESPIRATION RATE: 12 BRPM | HEIGHT: 62 IN | TEMPERATURE: 98 F | WEIGHT: 133 LBS | DIASTOLIC BLOOD PRESSURE: 64 MMHG | HEART RATE: 66 BPM

## 2020-09-03 DIAGNOSIS — E11.9 TYPE 2 DIABETES MELLITUS WITHOUT COMPLICATION, WITHOUT LONG-TERM CURRENT USE OF INSULIN (HCC): Chronic | ICD-10-CM

## 2020-09-03 DIAGNOSIS — Z00.00 ROUTINE GENERAL MEDICAL EXAMINATION AT A HEALTH CARE FACILITY: Primary | ICD-10-CM

## 2020-09-03 DIAGNOSIS — E78.00 PURE HYPERCHOLESTEROLEMIA: Chronic | ICD-10-CM

## 2020-09-03 DIAGNOSIS — I10 ESSENTIAL HYPERTENSION: Chronic | ICD-10-CM

## 2020-09-03 DIAGNOSIS — G25.2 COARSE TREMORS: ICD-10-CM

## 2020-09-03 DIAGNOSIS — E03.4 HYPOTHYROIDISM DUE TO ACQUIRED ATROPHY OF THYROID: Chronic | ICD-10-CM

## 2020-09-03 PROBLEM — M17.11 PRIMARY OSTEOARTHRITIS OF RIGHT KNEE: Chronic | Status: ACTIVE | Noted: 2019-07-08

## 2020-09-03 PROCEDURE — G0439 PPPS, SUBSEQ VISIT: HCPCS | Performed by: INTERNAL MEDICINE

## 2020-09-03 PROCEDURE — 99213 OFFICE O/P EST LOW 20 MIN: CPT | Performed by: INTERNAL MEDICINE

## 2020-09-03 RX ORDER — LOSARTAN POTASSIUM 25 MG/1
25 TABLET ORAL DAILY
COMMUNITY
Start: 2020-08-25

## 2020-09-03 RX ORDER — LEVOTHYROXINE SODIUM 0.1 MG/1
100 TABLET ORAL DAILY
Qty: 90 TABLET | Refills: 1 | Status: SHIPPED | OUTPATIENT
Start: 2020-09-03 | End: 2020-09-16

## 2020-09-03 NOTE — PROGRESS NOTES
Merwin Skiff   is a 79year old female. Patient presents with:  Physical      HPI:   No new cc  Current Outpatient Medications   Medication Sig Dispense Refill   • Losartan Potassium 25 MG Oral Tab Take 25 mg by mouth daily.      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    HEENT/Neck:   Head no headach Dermatologic:   Rash no. Neurologic:   Patient denies dizziness, fainting, loss of consciousness, weakness. Memory loss none. Tingling/numbness none. Trouble with balance none.  Tremors has had recent worsening  Psychiatric:   Patient denies depression, grossly/intact: yes. Cranial Nerves: CN's II-XII grossly intact. Gait: normal.   Mental Status: Alert & oriented x 3.    Motor: power-normal/tone -normal/co-ordination normal/wasting -none/involuntary movements -noted  Reflexes: normal.   Sensory: rosa maria

## 2020-09-08 ENCOUNTER — TELEPHONE (OUTPATIENT)
Dept: INTERNAL MEDICINE CLINIC | Facility: CLINIC | Age: 80
End: 2020-09-08

## 2020-09-08 DIAGNOSIS — R14.0 BLOATING: Primary | ICD-10-CM

## 2020-09-08 NOTE — TELEPHONE ENCOUNTER
Records have been reviewed. Patient does have a history of gallstones will repeat an ultrasound of the gallbladder to see the status of the stones in view of his symptoms of bloating.   If that is negative or noncontributory she may need an upper endoscopy

## 2020-09-11 ENCOUNTER — LAB ENCOUNTER (OUTPATIENT)
Dept: LAB | Age: 80
End: 2020-09-11
Attending: INTERNAL MEDICINE
Payer: MEDICARE

## 2020-09-11 ENCOUNTER — TELEPHONE (OUTPATIENT)
Dept: INTERNAL MEDICINE CLINIC | Facility: CLINIC | Age: 80
End: 2020-09-11

## 2020-09-11 DIAGNOSIS — E78.00 PURE HYPERCHOLESTEROLEMIA: Chronic | ICD-10-CM

## 2020-09-11 DIAGNOSIS — E03.4 HYPOTHYROIDISM DUE TO ACQUIRED ATROPHY OF THYROID: Chronic | ICD-10-CM

## 2020-09-11 DIAGNOSIS — E11.9 TYPE 2 DIABETES MELLITUS WITHOUT COMPLICATION, WITHOUT LONG-TERM CURRENT USE OF INSULIN (HCC): Chronic | ICD-10-CM

## 2020-09-11 DIAGNOSIS — Z00.00 ROUTINE GENERAL MEDICAL EXAMINATION AT A HEALTH CARE FACILITY: ICD-10-CM

## 2020-09-11 DIAGNOSIS — R14.0 BLOATING: Primary | ICD-10-CM

## 2020-09-11 LAB
ALBUMIN SERPL-MCNC: 3.4 G/DL (ref 3.4–5)
ALBUMIN/GLOB SERPL: 0.8 {RATIO} (ref 1–2)
ALP LIVER SERPL-CCNC: 77 U/L (ref 55–142)
ALT SERPL-CCNC: 20 U/L (ref 13–56)
ANION GAP SERPL CALC-SCNC: 3 MMOL/L (ref 0–18)
AST SERPL-CCNC: 19 U/L (ref 15–37)
BASOPHILS # BLD AUTO: 0.04 X10(3) UL (ref 0–0.2)
BASOPHILS NFR BLD AUTO: 0.8 %
BILIRUB SERPL-MCNC: 0.4 MG/DL (ref 0.1–2)
BILIRUB UR QL STRIP.AUTO: NEGATIVE
BUN BLD-MCNC: 17 MG/DL (ref 7–18)
BUN/CREAT SERPL: 22.7 (ref 10–20)
CALCIUM BLD-MCNC: 9.5 MG/DL (ref 8.5–10.1)
CHLORIDE SERPL-SCNC: 101 MMOL/L (ref 98–112)
CHOLEST SMN-MCNC: 141 MG/DL (ref ?–200)
CLARITY UR REFRACT.AUTO: CLEAR
CO2 SERPL-SCNC: 30 MMOL/L (ref 21–32)
COLOR UR AUTO: YELLOW
CREAT BLD-MCNC: 0.75 MG/DL (ref 0.55–1.02)
CREAT UR-SCNC: 63.5 MG/DL
DEPRECATED RDW RBC AUTO: 45.1 FL (ref 35.1–46.3)
EOSINOPHIL # BLD AUTO: 0.11 X10(3) UL (ref 0–0.7)
EOSINOPHIL NFR BLD AUTO: 2.1 %
ERYTHROCYTE [DISTWIDTH] IN BLOOD BY AUTOMATED COUNT: 13.2 % (ref 11–15)
EST. AVERAGE GLUCOSE BLD GHB EST-MCNC: 143 MG/DL (ref 68–126)
GLOBULIN PLAS-MCNC: 4.1 G/DL (ref 2.8–4.4)
GLUCOSE BLD-MCNC: 133 MG/DL (ref 70–99)
GLUCOSE UR STRIP.AUTO-MCNC: NEGATIVE MG/DL
HBA1C MFR BLD HPLC: 6.6 % (ref ?–5.7)
HCT VFR BLD AUTO: 40.4 % (ref 35–48)
HDLC SERPL-MCNC: 67 MG/DL (ref 40–59)
HGB BLD-MCNC: 12.6 G/DL (ref 12–16)
IMM GRANULOCYTES # BLD AUTO: 0.01 X10(3) UL (ref 0–1)
IMM GRANULOCYTES NFR BLD: 0.2 %
KETONES UR STRIP.AUTO-MCNC: NEGATIVE MG/DL
LDLC SERPL CALC-MCNC: 55 MG/DL (ref ?–100)
LYMPHOCYTES # BLD AUTO: 1.4 X10(3) UL (ref 1–4)
LYMPHOCYTES NFR BLD AUTO: 27 %
M PROTEIN MFR SERPL ELPH: 7.5 G/DL (ref 6.4–8.2)
MCH RBC QN AUTO: 28.7 PG (ref 26–34)
MCHC RBC AUTO-ENTMCNC: 31.2 G/DL (ref 31–37)
MCV RBC AUTO: 92 FL (ref 80–100)
MICROALBUMIN UR-MCNC: <0.5 MG/DL
MONOCYTES # BLD AUTO: 0.57 X10(3) UL (ref 0.1–1)
MONOCYTES NFR BLD AUTO: 11 %
NEUTROPHILS # BLD AUTO: 3.05 X10 (3) UL (ref 1.5–7.7)
NEUTROPHILS # BLD AUTO: 3.05 X10(3) UL (ref 1.5–7.7)
NEUTROPHILS NFR BLD AUTO: 58.9 %
NITRITE UR QL STRIP.AUTO: NEGATIVE
NONHDLC SERPL-MCNC: 74 MG/DL (ref ?–130)
OSMOLALITY SERPL CALC.SUM OF ELEC: 281 MOSM/KG (ref 275–295)
PATIENT FASTING Y/N/NP: YES
PATIENT FASTING Y/N/NP: YES
PH UR STRIP.AUTO: 7 [PH] (ref 4.5–8)
PLATELET # BLD AUTO: 214 10(3)UL (ref 150–450)
POTASSIUM SERPL-SCNC: 4.4 MMOL/L (ref 3.5–5.1)
PROT UR STRIP.AUTO-MCNC: NEGATIVE MG/DL
RBC # BLD AUTO: 4.39 X10(6)UL (ref 3.8–5.3)
RBC UR QL AUTO: NEGATIVE
SODIUM SERPL-SCNC: 134 MMOL/L (ref 136–145)
SP GR UR STRIP.AUTO: 1.01 (ref 1–1.03)
T4 SERPL-MCNC: 9.8 UG/DL (ref 4.8–13.9)
TRIGL SERPL-MCNC: 94 MG/DL (ref 30–149)
TSI SER-ACNC: 0.2 MIU/ML (ref 0.36–3.74)
UROBILINOGEN UR STRIP.AUTO-MCNC: <2 MG/DL
VLDLC SERPL CALC-MCNC: 19 MG/DL (ref 0–30)
WBC # BLD AUTO: 5.2 X10(3) UL (ref 4–11)

## 2020-09-11 PROCEDURE — 85025 COMPLETE CBC W/AUTO DIFF WBC: CPT

## 2020-09-11 PROCEDURE — 36415 COLL VENOUS BLD VENIPUNCTURE: CPT

## 2020-09-11 PROCEDURE — 81001 URINALYSIS AUTO W/SCOPE: CPT

## 2020-09-11 PROCEDURE — 80061 LIPID PANEL: CPT

## 2020-09-11 PROCEDURE — 82570 ASSAY OF URINE CREATININE: CPT

## 2020-09-11 PROCEDURE — 84436 ASSAY OF TOTAL THYROXINE: CPT

## 2020-09-11 PROCEDURE — 84443 ASSAY THYROID STIM HORMONE: CPT

## 2020-09-11 PROCEDURE — 83036 HEMOGLOBIN GLYCOSYLATED A1C: CPT

## 2020-09-11 PROCEDURE — 82043 UR ALBUMIN QUANTITATIVE: CPT

## 2020-09-11 PROCEDURE — 80053 COMPREHEN METABOLIC PANEL: CPT

## 2020-09-11 NOTE — TELEPHONE ENCOUNTER
Per stat scheduling no availability at [facility]. Pt scheduled tomorrow (9/12) at 1030 at [place]. Pt dtr notified and verbalized understanding.

## 2020-09-11 NOTE — TELEPHONE ENCOUNTER
Per daughter Jeff Kenney, pt is not scheduled for US abdomen until 9/29/20 and is having difficulty tolerating food. Daughter wondering if US can be ordered as stat. I spoke with  via telephone and per TO from , ok to order US abdomen as STAT. TORB.

## 2020-09-11 NOTE — TELEPHONE ENCOUNTER
Daughter Fatuma Vázquez) called and stated that she contacted central scheduling to set up her mom's abdominal ultrasound for stomach pains. Daughter is upset because she can't get in until 09/29 at any location.  She wants to know if it's ok for her to wait that

## 2020-09-12 ENCOUNTER — HOSPITAL ENCOUNTER (OUTPATIENT)
Dept: ULTRASOUND IMAGING | Age: 80
Discharge: HOME | End: 2020-09-12
Attending: INTERNAL MEDICINE
Payer: MEDICARE

## 2020-09-12 DIAGNOSIS — R14.0 BLOATING: ICD-10-CM

## 2020-09-12 PROCEDURE — 76700 US EXAM ABDOM COMPLETE: CPT | Performed by: INTERNAL MEDICINE

## 2020-09-14 ENCOUNTER — TELEPHONE (OUTPATIENT)
Dept: INTERNAL MEDICINE CLINIC | Facility: CLINIC | Age: 80
End: 2020-09-14

## 2020-09-14 NOTE — TELEPHONE ENCOUNTER
Patient's daughter calling for results US from weekend please have different doctor review and callback

## 2020-09-24 ENCOUNTER — OFFICE VISIT (OUTPATIENT)
Dept: SURGERY | Facility: CLINIC | Age: 80
End: 2020-09-24
Payer: MEDICARE

## 2020-09-24 VITALS — HEART RATE: 72 BPM | TEMPERATURE: 97 F | SYSTOLIC BLOOD PRESSURE: 134 MMHG | DIASTOLIC BLOOD PRESSURE: 68 MMHG

## 2020-09-24 DIAGNOSIS — K80.10 CALCULUS OF GALLBLADDER WITH CHRONIC CHOLECYSTITIS WITHOUT OBSTRUCTION: Primary | ICD-10-CM

## 2020-09-24 PROCEDURE — 99204 OFFICE O/P NEW MOD 45 MIN: CPT | Performed by: SURGERY

## 2020-09-24 NOTE — H&P
New Patient Visit Note       Active Problems      1.  Calculus of gallbladder with chronic cholecystitis without obstruction        Chief Complaint   Patient presents with:  Gallbladder: Referral from Dr Torrez Plate been having issues for baby aspirin daily. Allergies  Chiquita Wolfe is allergic to ciprofloxacin; levaquin [levofloxacin]; toviaz; chocolate; hexachlorophene; lipitor [atorvastatin]; mushrooms; pecan; penicillins; shrimp; tomatoes; and welchol [colesevelam].     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 Ella Mistry   • OTHER SURGICAL HISTORY  6/1/2016    Bladder sli Solution, 1 spray by Nasal route nightly as needed. , Disp: 1 Bottle, Rfl: 1  •  PRAVASTATIN SODIUM 10 MG Oral Tab, TAKE 1 TABLET(10 MG) BY MOUTH EVERY NIGHT, Disp: 90 tablet, Rfl: 0  •  benzonatate 200 MG Oral Cap, Take 1 capsule (200 mg total) by mouth 3 Psychiatric/Behavioral: Negative for behavioral problems and sleep disturbance. Physical Findings   /68   Pulse 72   Temp 96.8 °F (36 °C)   Physical Exam   Constitutional: She is oriented to person, place, and time.  She appears well-developed cholecystitis without obstruction  (primary encounter diagnosis)      Plan     · The patient will be scheduled for laparoscopic cholecystectomy with cholangiogram.  ·   · The kayla-operative care plan was discussed with the patient, who voices understanding

## 2020-09-24 NOTE — H&P (VIEW-ONLY)
New Patient Visit Note       Active Problems      1.  Calculus of gallbladder with chronic cholecystitis without obstruction        Chief Complaint   Patient presents with:  Gallbladder: Referral from Dr Sada Caceres been having issues for She states she is on blood thinner medications with a baby aspirin daily.     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 , Disp: 1 Bottle, Rfl: 1  •  PRAVASTATIN SODIUM 10 MG Oral Tab, TAKE 1 TABLET(10 MG) BY MOUTH Neurological: Negative for tremors, syncope and weakness. Hematological: Negative for adenopathy. Does not bruise/bleed easily. Psychiatric/Behavioral: Negative for behavioral problems and sleep disturbance.        Physical Findings   /68   Pulse Psychiatric: She has a normal mood and affect. Her speech is normal and behavior is normal. Judgment and thought content normal.   Nursing note and vitals reviewed.           Assessment   Calculus of gallbladder with chronic cholecystitis without obstructio

## 2020-09-27 PROBLEM — K80.10 CALCULUS OF GALLBLADDER WITH CHRONIC CHOLECYSTITIS WITHOUT OBSTRUCTION: Status: ACTIVE | Noted: 2020-09-27

## 2020-09-28 ENCOUNTER — OFFICE VISIT (OUTPATIENT)
Dept: NEUROLOGY | Facility: CLINIC | Age: 80
End: 2020-09-28
Payer: MEDICARE

## 2020-09-28 ENCOUNTER — APPOINTMENT (OUTPATIENT)
Dept: LAB | Age: 80
End: 2020-09-28
Attending: SURGERY
Payer: MEDICARE

## 2020-09-28 ENCOUNTER — TELEPHONE (OUTPATIENT)
Dept: SURGERY | Facility: CLINIC | Age: 80
End: 2020-09-28

## 2020-09-28 ENCOUNTER — LAB ENCOUNTER (OUTPATIENT)
Dept: LAB | Age: 80
End: 2020-09-28
Attending: INTERNAL MEDICINE
Payer: MEDICARE

## 2020-09-28 VITALS
BODY MASS INDEX: 22 KG/M2 | RESPIRATION RATE: 16 BRPM | SYSTOLIC BLOOD PRESSURE: 126 MMHG | DIASTOLIC BLOOD PRESSURE: 52 MMHG | HEART RATE: 60 BPM | WEIGHT: 132 LBS

## 2020-09-28 DIAGNOSIS — Z01.818 PREOP TESTING: ICD-10-CM

## 2020-09-28 DIAGNOSIS — R82.90 URINE FINDING: Primary | ICD-10-CM

## 2020-09-28 DIAGNOSIS — R82.90 URINE FINDING: ICD-10-CM

## 2020-09-28 DIAGNOSIS — G25.0 BENIGN ESSENTIAL TREMOR: ICD-10-CM

## 2020-09-28 DIAGNOSIS — G25.0 BENIGN HEAD TREMOR: Primary | ICD-10-CM

## 2020-09-28 PROCEDURE — 99204 OFFICE O/P NEW MOD 45 MIN: CPT | Performed by: OTHER

## 2020-09-28 PROCEDURE — 87086 URINE CULTURE/COLONY COUNT: CPT

## 2020-09-28 PROCEDURE — 81001 URINALYSIS AUTO W/SCOPE: CPT

## 2020-09-28 NOTE — PROGRESS NOTES
HPI:    Patient ID: Jeramy Sullivan is a 79year old female.   PCP: Dr Joyce Manzano    HPI     Ms Roderick Gil is a 78-year-old right-handed female with history of breast cancer in remission, coronary artery disease, hypertension, diabetes, hyperlipidemia, cervic staging category Tis: lobular carcinoma in situ (LCIS), 1992 8/4/2015   • Osteoarthrosis, unspecified whether generalized or localized, unspecified site     generalized   • Other and unspecified hyperlipidemia    • [facility]    • PO HYSTERECTOMY  1985    precancer and irregular bleeding.  w/ BSO   • KNEE REPLACEMENT SURGERY  12/27/13    left    • KNEE TOTAL REPLACEMENT Left 12/27/2013    Performed by Lynn Walters MD at [address]   • LUMPECTOMY RIGHT Right [id number]   • MASTECTO Hematological: Negative. Psychiatric/Behavioral: Negative. All other systems reviewed and are negative.            Current Outpatient Medications   Medication Sig Dispense Refill   • Levothyroxine Sodium 88 MCG Oral Tab Take 1 tablet (88 mcg total) OTHER (SEE COMMENTS)    Comment:Unknown  Penicillins             RASH  Shrimp                  UNKNOWN    Comment:Per allergy testing  Tomatoes                OTHER (SEE COMMENTS)    Comment:headache  Welchol [Colesevela*    UNKNOWN    Comment:GI with mild head and voice tremor suggestive of benign essential tremor  Discussed with the patient and her daughter about symptoms, causes and treatment options.   Patient would like observe and try simple measures- lifestyle changes, exercise and acupunctur

## 2020-09-28 NOTE — TELEPHONE ENCOUNTER
RN called patient and spoke to both the patient and the daughter, Chanelle Fowler. Per patient, she is has been getting up multiple times at night, burning sensation when she urinates, pain that stabs on/off and pain to her right lower abd.      RN explained that the

## 2020-09-29 ENCOUNTER — TELEPHONE (OUTPATIENT)
Dept: SURGERY | Facility: CLINIC | Age: 80
End: 2020-09-29

## 2020-09-29 ENCOUNTER — ANESTHESIA EVENT (OUTPATIENT)
Dept: SURGERY | Facility: HOSPITAL | Age: 80
End: 2020-09-29
Payer: MEDICARE

## 2020-09-29 ENCOUNTER — OFFICE VISIT (OUTPATIENT)
Dept: INTERNAL MEDICINE CLINIC | Facility: CLINIC | Age: 80
End: 2020-09-29
Payer: MEDICARE

## 2020-09-29 ENCOUNTER — TELEPHONE (OUTPATIENT)
Dept: INTERNAL MEDICINE CLINIC | Facility: CLINIC | Age: 80
End: 2020-09-29

## 2020-09-29 VITALS
SYSTOLIC BLOOD PRESSURE: 121 MMHG | BODY MASS INDEX: 21.69 KG/M2 | RESPIRATION RATE: 16 BRPM | DIASTOLIC BLOOD PRESSURE: 80 MMHG | OXYGEN SATURATION: 98 % | WEIGHT: 130.19 LBS | HEIGHT: 65 IN | HEART RATE: 59 BPM | TEMPERATURE: 99 F

## 2020-09-29 DIAGNOSIS — N30.00 ACUTE CYSTITIS WITHOUT HEMATURIA: ICD-10-CM

## 2020-09-29 DIAGNOSIS — Z01.818 PREOP EXAM FOR INTERNAL MEDICINE: Primary | ICD-10-CM

## 2020-09-29 PROCEDURE — 99213 OFFICE O/P EST LOW 20 MIN: CPT | Performed by: INTERNAL MEDICINE

## 2020-09-29 NOTE — TELEPHONE ENCOUNTER
----- Message from Alfred Worley MD sent at 9/29/2020  3:07 PM CDT -----  Your recent urine culture is negative. Continue current medical management with antibiotic and intermittent catheterizations as we discussed.  recommend follow up in the office 3

## 2020-09-29 NOTE — PROGRESS NOTES
Your recent urine culture is negative. Continue current medical management with antibiotic and intermittent catheterizations as we discussed. recommend follow up in the office 3 months.     Sincerely,  Sin Cat MD

## 2020-09-29 NOTE — PAT NURSING NOTE
Per [facility]SS, pre-op medical clearance ordered by surgeon. Called Dr. Robert Michael office and s/w Maricarmen Pena to inform that we did not receive medical clearance for this patient.  Per Maricarmen Pena, the patient will be seeing a partner of her PCP this afternoon for medical c

## 2020-09-29 NOTE — ANESTHESIA PREPROCEDURE EVALUATION
PRE-OP EVALUATION    Patient Name: Mike Daily    Pre-op Diagnosis: Calculus of gallbladder with chronic cholecystitis without obstruction [K80.10]    Procedure(s):  LAPAROSCOPIC CHOLECYSTECTOMY WITH CHOLANGIOGRAM    Surgeon(s) and Role:     * [facility]    Negative pulmonary ROS.                        Neuro/Psych                 (+) neuromuscular disease                   Past Surgical History:   Procedure Laterality Date   • ANTERIOR POSTERIOR REPAIR N/A 6/1/2016    Per Bladder sling   • RADIATION RIGHT      1992   • REDUCTION LEFT  10/2015   • TOTAL KNEE REPLACEMENT      LEFT   • UPPER GI ENDOSCOPY,EXAM  2/25/11    repeat 5 yeas     Social History    Tobacco Use      Smoking status: Former Smoker        Packs/day: 0.2

## 2020-09-29 NOTE — TELEPHONE ENCOUNTER
Faxed over [facility] 16. progress notes from today pre-op ov [facility]. Received confirmation. Task completed.

## 2020-09-29 NOTE — TELEPHONE ENCOUNTER
I called patient who is scheduled tomorrow for lap christina surgery. Dr Natacha Beckwith requested medical clearance and we have not received anything yet.  Patient states she is seeing someone today for the clearance, I informed patient that if we do not have the evy

## 2020-09-29 NOTE — TELEPHONE ENCOUNTER
Spoke with patient and reviewed urine culture and Dr Tom Collins recommendation. Verbalized understanding and will make an appt in 3 months or sooner if need be.

## 2020-09-29 NOTE — PROGRESS NOTES
Kavita Mckeon is a 79year old female who presents for a pre-operative physical exam.   Kavita Mckeon is scheduled for a cholecystectomy procedure at [address] on 9/30/2020 performed by Dr. Jeramy Murphy, who has requested that I provide pre-operative consulta 88 MCG Oral Tab, Take 1 tablet (88 mcg total) by mouth daily. , Disp: 90 tablet, Rfl: 0  •  Losartan Potassium 25 MG Oral Tab, Take 25 mg by mouth daily. , Disp: , Rfl:   •  CEPHALEXIN 250 MG Oral Cap, TAKE 1 CAPSULE(250 MG) BY MOUTH EVERY NIGHT, Disp: 60 ca 1992 (8/4/2015), Esophageal reflux, Exposure to unspecified radiation, Female stress incontinence (4/18/2016), Gall stones, GERD (gastroesophageal reflux disease) (2/22/2012), Glucose intolerance (impaired glucose tolerance), High blood pressure, High chol history; other surgical history (7/19/2013); other surgical history (7/16/2014); other surgical history (6/1/2016); Breast reconstruction (Right, 2015); and mastectomy right (7/2015).   Family: family history includes Breast Cancer (age of onset: 46) in her risk, 0.9% risk of major cardiac event. Patient is considered a low risk patient undergoing a moderate risk procedure, and is ok to proceed with surgery.   Note and pertinent pre-operative testing results will be faxed to referring surgeon's office and/or

## 2020-09-30 ENCOUNTER — ANESTHESIA (OUTPATIENT)
Dept: SURGERY | Facility: HOSPITAL | Age: 80
End: 2020-09-30
Payer: MEDICARE

## 2020-09-30 ENCOUNTER — APPOINTMENT (OUTPATIENT)
Dept: GENERAL RADIOLOGY | Facility: HOSPITAL | Age: 80
End: 2020-09-30
Attending: SURGERY
Payer: MEDICARE

## 2020-09-30 ENCOUNTER — HOSPITAL ENCOUNTER (OUTPATIENT)
Facility: HOSPITAL | Age: 80
Setting detail: HOSPITAL OUTPATIENT SURGERY
Discharge: HOME | End: 2020-09-30
Attending: SURGERY | Admitting: SURGERY
Payer: MEDICARE

## 2020-09-30 VITALS
WEIGHT: 127.88 LBS | OXYGEN SATURATION: 99 % | DIASTOLIC BLOOD PRESSURE: 55 MMHG | RESPIRATION RATE: 16 BRPM | SYSTOLIC BLOOD PRESSURE: 123 MMHG | TEMPERATURE: 97 F | HEIGHT: 65 IN | BODY MASS INDEX: 21.31 KG/M2 | HEART RATE: 53 BPM

## 2020-09-30 DIAGNOSIS — Z01.818 PREOP TESTING: Primary | ICD-10-CM

## 2020-09-30 DIAGNOSIS — K80.10 CALCULUS OF GALLBLADDER WITH CHRONIC CHOLECYSTITIS WITHOUT OBSTRUCTION: ICD-10-CM

## 2020-09-30 PROCEDURE — 74300 X-RAY BILE DUCTS/PANCREAS: CPT | Performed by: SURGERY

## 2020-09-30 PROCEDURE — 0FT44ZZ RESECTION OF GALLBLADDER, PERCUTANEOUS ENDOSCOPIC APPROACH: ICD-10-PCS | Performed by: SURGERY

## 2020-09-30 PROCEDURE — 47563 LAPARO CHOLECYSTECTOMY/GRAPH: CPT | Performed by: PHYSICIAN ASSISTANT

## 2020-09-30 PROCEDURE — BF101ZZ FLUOROSCOPY OF BILE DUCTS USING LOW OSMOLAR CONTRAST: ICD-10-PCS | Performed by: SURGERY

## 2020-09-30 PROCEDURE — 47563 LAPARO CHOLECYSTECTOMY/GRAPH: CPT | Performed by: SURGERY

## 2020-09-30 RX ORDER — HYDROCODONE BITARTRATE AND ACETAMINOPHEN 5; 325 MG/1; MG/1
1 TABLET ORAL AS NEEDED
Status: DISCONTINUED | OUTPATIENT
Start: 2020-09-30 | End: 2020-09-30

## 2020-09-30 RX ORDER — ONDANSETRON 2 MG/ML
INJECTION INTRAMUSCULAR; INTRAVENOUS AS NEEDED
Status: DISCONTINUED | OUTPATIENT
Start: 2020-09-30 | End: 2020-09-30 | Stop reason: SURG

## 2020-09-30 RX ORDER — BUPIVACAINE HYDROCHLORIDE AND EPINEPHRINE 5; 5 MG/ML; UG/ML
INJECTION, SOLUTION EPIDURAL; INTRACAUDAL; PERINEURAL AS NEEDED
Status: DISCONTINUED | OUTPATIENT
Start: 2020-09-30 | End: 2020-09-30 | Stop reason: HOSPADM

## 2020-09-30 RX ORDER — EPHEDRINE SULFATE 50 MG/ML
INJECTION, SOLUTION INTRAVENOUS AS NEEDED
Status: DISCONTINUED | OUTPATIENT
Start: 2020-09-30 | End: 2020-09-30 | Stop reason: SURG

## 2020-09-30 RX ORDER — DOCUSATE SODIUM 100 MG/1
100 CAPSULE, LIQUID FILLED ORAL 3 TIMES DAILY
Qty: 90 CAPSULE | Refills: 0 | Status: SHIPPED | OUTPATIENT
Start: 2020-09-30

## 2020-09-30 RX ORDER — METOCLOPRAMIDE HYDROCHLORIDE 5 MG/ML
10 INJECTION INTRAMUSCULAR; INTRAVENOUS AS NEEDED
Status: DISCONTINUED | OUTPATIENT
Start: 2020-09-30 | End: 2020-09-30

## 2020-09-30 RX ORDER — SULFAMETHOXAZOLE AND TRIMETHOPRIM 400; 80 MG/1; MG/1
1 TABLET ORAL 2 TIMES DAILY
Qty: 14 TABLET | Refills: 0 | Status: SHIPPED | OUTPATIENT
Start: 2020-09-30 | End: 2020-10-13

## 2020-09-30 RX ORDER — NEOSTIGMINE METHYLSULFATE 1 MG/ML
INJECTION INTRAVENOUS AS NEEDED
Status: DISCONTINUED | OUTPATIENT
Start: 2020-09-30 | End: 2020-09-30 | Stop reason: SURG

## 2020-09-30 RX ORDER — TRAMADOL HYDROCHLORIDE 50 MG/1
50 TABLET ORAL EVERY 8 HOURS PRN
Qty: 20 TABLET | Refills: 0 | Status: SHIPPED | OUTPATIENT
Start: 2020-09-30 | End: 2020-10-13 | Stop reason: ALTCHOICE

## 2020-09-30 RX ORDER — CLINDAMYCIN PHOSPHATE 900 MG/50ML
900 INJECTION INTRAVENOUS ONCE
Status: COMPLETED | OUTPATIENT
Start: 2020-09-30 | End: 2020-09-30

## 2020-09-30 RX ORDER — DEXTROSE MONOHYDRATE 25 G/50ML
50 INJECTION, SOLUTION INTRAVENOUS
Status: DISCONTINUED | OUTPATIENT
Start: 2020-09-30 | End: 2020-09-30

## 2020-09-30 RX ORDER — HEPARIN SODIUM 5000 [USP'U]/ML
5000 INJECTION, SOLUTION INTRAVENOUS; SUBCUTANEOUS ONCE
Status: COMPLETED | OUTPATIENT
Start: 2020-09-30 | End: 2020-09-30

## 2020-09-30 RX ORDER — NALOXONE HYDROCHLORIDE 0.4 MG/ML
80 INJECTION, SOLUTION INTRAMUSCULAR; INTRAVENOUS; SUBCUTANEOUS AS NEEDED
Status: DISCONTINUED | OUTPATIENT
Start: 2020-09-30 | End: 2020-09-30

## 2020-09-30 RX ORDER — MIDAZOLAM HYDROCHLORIDE 1 MG/ML
INJECTION INTRAMUSCULAR; INTRAVENOUS AS NEEDED
Status: DISCONTINUED | OUTPATIENT
Start: 2020-09-30 | End: 2020-09-30 | Stop reason: SURG

## 2020-09-30 RX ORDER — ROCURONIUM BROMIDE 10 MG/ML
INJECTION, SOLUTION INTRAVENOUS AS NEEDED
Status: DISCONTINUED | OUTPATIENT
Start: 2020-09-30 | End: 2020-09-30 | Stop reason: SURG

## 2020-09-30 RX ORDER — TRAMADOL HYDROCHLORIDE 50 MG/1
50 TABLET ORAL ONCE
Status: COMPLETED | OUTPATIENT
Start: 2020-09-30 | End: 2020-09-30

## 2020-09-30 RX ORDER — HYDROCODONE BITARTRATE AND ACETAMINOPHEN 5; 325 MG/1; MG/1
TABLET ORAL
Qty: 10 TABLET | Refills: 0 | Status: SHIPPED | OUTPATIENT
Start: 2020-09-30 | End: 2020-09-30

## 2020-09-30 RX ORDER — ONDANSETRON 2 MG/ML
4 INJECTION INTRAMUSCULAR; INTRAVENOUS AS NEEDED
Status: DISCONTINUED | OUTPATIENT
Start: 2020-09-30 | End: 2020-09-30

## 2020-09-30 RX ORDER — GLYCOPYRROLATE 0.2 MG/ML
INJECTION, SOLUTION INTRAMUSCULAR; INTRAVENOUS AS NEEDED
Status: DISCONTINUED | OUTPATIENT
Start: 2020-09-30 | End: 2020-09-30 | Stop reason: SURG

## 2020-09-30 RX ORDER — DEXAMETHASONE SODIUM PHOSPHATE 4 MG/ML
4 VIAL (ML) INJECTION AS NEEDED
Status: DISCONTINUED | OUTPATIENT
Start: 2020-09-30 | End: 2020-09-30

## 2020-09-30 RX ORDER — DEXAMETHASONE SODIUM PHOSPHATE 4 MG/ML
VIAL (ML) INJECTION AS NEEDED
Status: DISCONTINUED | OUTPATIENT
Start: 2020-09-30 | End: 2020-09-30 | Stop reason: SURG

## 2020-09-30 RX ORDER — HYDROCODONE BITARTRATE AND ACETAMINOPHEN 5; 325 MG/1; MG/1
2 TABLET ORAL AS NEEDED
Status: DISCONTINUED | OUTPATIENT
Start: 2020-09-30 | End: 2020-09-30

## 2020-09-30 RX ORDER — LIDOCAINE HYDROCHLORIDE 10 MG/ML
INJECTION, SOLUTION EPIDURAL; INFILTRATION; INTRACAUDAL; PERINEURAL AS NEEDED
Status: DISCONTINUED | OUTPATIENT
Start: 2020-09-30 | End: 2020-09-30 | Stop reason: SURG

## 2020-09-30 RX ORDER — IBUPROFEN 200 MG
600 TABLET ORAL ONCE AS NEEDED
Status: COMPLETED | OUTPATIENT
Start: 2020-09-30 | End: 2020-09-30

## 2020-09-30 RX ORDER — HYDROMORPHONE HYDROCHLORIDE 1 MG/ML
INJECTION, SOLUTION INTRAMUSCULAR; INTRAVENOUS; SUBCUTANEOUS
Status: COMPLETED
Start: 2020-09-30 | End: 2020-09-30

## 2020-09-30 RX ORDER — ACETAMINOPHEN 500 MG
1000 TABLET ORAL ONCE
Status: DISCONTINUED | OUTPATIENT
Start: 2020-09-30 | End: 2020-09-30 | Stop reason: HOSPADM

## 2020-09-30 RX ORDER — SODIUM CHLORIDE, SODIUM LACTATE, POTASSIUM CHLORIDE, CALCIUM CHLORIDE 600; 310; 30; 20 MG/100ML; MG/100ML; MG/100ML; MG/100ML
INJECTION, SOLUTION INTRAVENOUS CONTINUOUS
Status: DISCONTINUED | OUTPATIENT
Start: 2020-09-30 | End: 2020-09-30

## 2020-09-30 RX ORDER — HYDROMORPHONE HYDROCHLORIDE 1 MG/ML
0.4 INJECTION, SOLUTION INTRAMUSCULAR; INTRAVENOUS; SUBCUTANEOUS EVERY 5 MIN PRN
Status: DISCONTINUED | OUTPATIENT
Start: 2020-09-30 | End: 2020-09-30

## 2020-09-30 RX ADMIN — NEOSTIGMINE METHYLSULFATE 3 MG: 1 INJECTION INTRAVENOUS at 11:06:00

## 2020-09-30 RX ADMIN — ONDANSETRON 4 MG: 2 INJECTION INTRAMUSCULAR; INTRAVENOUS at 10:14:00

## 2020-09-30 RX ADMIN — CLINDAMYCIN PHOSPHATE 900 MG: 900 INJECTION INTRAVENOUS at 10:13:00

## 2020-09-30 RX ADMIN — ROCURONIUM BROMIDE 20 MG: 10 INJECTION, SOLUTION INTRAVENOUS at 10:14:00

## 2020-09-30 RX ADMIN — DEXAMETHASONE SODIUM PHOSPHATE 4 MG: 4 MG/ML VIAL (ML) INJECTION at 10:14:00

## 2020-09-30 RX ADMIN — SODIUM CHLORIDE, SODIUM LACTATE, POTASSIUM CHLORIDE, CALCIUM CHLORIDE: 600; 310; 30; 20 INJECTION, SOLUTION INTRAVENOUS at 11:18:00

## 2020-09-30 RX ADMIN — MIDAZOLAM HYDROCHLORIDE 2 MG: 1 INJECTION INTRAMUSCULAR; INTRAVENOUS at 10:04:00

## 2020-09-30 RX ADMIN — EPHEDRINE SULFATE 15 MG: 50 INJECTION, SOLUTION INTRAVENOUS at 10:33:00

## 2020-09-30 RX ADMIN — LIDOCAINE HYDROCHLORIDE 25 MG: 10 INJECTION, SOLUTION EPIDURAL; INFILTRATION; INTRACAUDAL; PERINEURAL at 10:10:00

## 2020-09-30 RX ADMIN — ROCURONIUM BROMIDE 10 MG: 10 INJECTION, SOLUTION INTRAVENOUS at 10:11:00

## 2020-09-30 RX ADMIN — GLYCOPYRROLATE 0.4 MG: 0.2 INJECTION, SOLUTION INTRAMUSCULAR; INTRAVENOUS at 11:06:00

## 2020-09-30 NOTE — PROGRESS NOTES
Pt reports she is improving after nausea and pain medicine. Daughter continues to be aggressive towards RN stating she wants to take her mother now at this time.  Pt daughter educated again that once patient has met criteria for discharge she will be disch

## 2020-09-30 NOTE — PROGRESS NOTES
Patient daughter aggressive towards RN stating mother needs to spend the night due to patient having severe pain and unstable vital signs. Pt daughter educated regarding stable vital signs and expectations after surgery.  Daughter informed mother is in stab

## 2020-09-30 NOTE — PROGRESS NOTES
Patient and daughter given detailed discharge instructions and pain management at home. Pt and daughter verbalized understanding. Pt left [facility] in stable condition via wheelchair.

## 2020-09-30 NOTE — ANESTHESIA PROCEDURE NOTES
Airway  Urgency: elective      General Information and Staff    Patient location during procedure: OR  Anesthesiologist: Kylee Hernandez MD  Performed: anesthesiologist     Indications and Patient Condition  Indications for airway management: anesthesia  Sed

## 2020-09-30 NOTE — ANESTHESIA POSTPROCEDURE EVALUATION
[facility]    Jung [place]asaw Patient Status:  Hospital Outpatient Surgery   Age/Gender 79year old female MRN [id number]   [facility] SURGERY Attending Rebeca Britt MD   Hosp Day # 0 PCP Aranza Nevarez MD       Anesthesia Post-op N

## 2020-09-30 NOTE — PROGRESS NOTES
Pt daughter states pt has complaint of dysuria. I placed Bactrim Rx for pt . Take as directed.      Navneet Don MD

## 2020-09-30 NOTE — INTERVAL H&P NOTE
Pre-op Diagnosis: Calculus of gallbladder with chronic cholecystitis without obstruction [K80.10]    The above referenced H&P was reviewed by Jennifer Vivas MD on 9/30/2020, the patient was examined and no significant changes have occurred in the patien

## 2020-10-01 ENCOUNTER — TELEPHONE (OUTPATIENT)
Dept: SURGERY | Facility: CLINIC | Age: 80
End: 2020-10-01

## 2020-10-01 NOTE — TELEPHONE ENCOUNTER
9/29  Lap Lola and pt was only voiding small amounts. Daughter states patient straight cathed herself for 700ml. She routinely caths herself twice a day. Instructed to cath when pt is feeling full or follow routine she had prior to surgery.  Informed this

## 2020-10-11 VITALS
WEIGHT: 139.4 LBS | SYSTOLIC BLOOD PRESSURE: 132 MMHG | BODY MASS INDEX: 25.65 KG/M2 | DIASTOLIC BLOOD PRESSURE: 64 MMHG | HEIGHT: 62 IN

## 2020-10-11 VITALS
SYSTOLIC BLOOD PRESSURE: 126 MMHG | HEIGHT: 62 IN | BODY MASS INDEX: 25.21 KG/M2 | DIASTOLIC BLOOD PRESSURE: 64 MMHG | WEIGHT: 136.99 LBS

## 2020-10-11 VITALS
SYSTOLIC BLOOD PRESSURE: 128 MMHG | DIASTOLIC BLOOD PRESSURE: 72 MMHG | WEIGHT: 136.6 LBS | HEIGHT: 62 IN | BODY MASS INDEX: 25.14 KG/M2

## 2020-10-13 ENCOUNTER — OFFICE VISIT (OUTPATIENT)
Dept: INTERNAL MEDICINE CLINIC | Facility: CLINIC | Age: 80
End: 2020-10-13
Payer: MEDICARE

## 2020-10-13 ENCOUNTER — OFFICE VISIT (OUTPATIENT)
Dept: SURGERY | Facility: CLINIC | Age: 80
End: 2020-10-13

## 2020-10-13 VITALS
WEIGHT: 132.81 LBS | HEART RATE: 64 BPM | OXYGEN SATURATION: 97 % | BODY MASS INDEX: 22.13 KG/M2 | RESPIRATION RATE: 16 BRPM | SYSTOLIC BLOOD PRESSURE: 136 MMHG | HEIGHT: 65 IN | TEMPERATURE: 99 F | DIASTOLIC BLOOD PRESSURE: 70 MMHG

## 2020-10-13 VITALS
BODY MASS INDEX: 21 KG/M2 | WEIGHT: 127 LBS | SYSTOLIC BLOOD PRESSURE: 114 MMHG | TEMPERATURE: 97 F | DIASTOLIC BLOOD PRESSURE: 69 MMHG | RESPIRATION RATE: 16 BRPM | HEART RATE: 61 BPM

## 2020-10-13 DIAGNOSIS — I10 ESSENTIAL HYPERTENSION: Primary | ICD-10-CM

## 2020-10-13 DIAGNOSIS — Z90.49 HISTORY OF CHOLECYSTECTOMY: Primary | ICD-10-CM

## 2020-10-13 DIAGNOSIS — E11.9 TYPE 2 DIABETES MELLITUS WITHOUT COMPLICATION, WITHOUT LONG-TERM CURRENT USE OF INSULIN (HCC): ICD-10-CM

## 2020-10-13 PROBLEM — K80.10 CALCULUS OF GALLBLADDER WITH CHRONIC CHOLECYSTITIS WITHOUT OBSTRUCTION: Status: RESOLVED | Noted: 2020-09-27 | Resolved: 2020-10-13

## 2020-10-13 PROCEDURE — 90662 IIV NO PRSV INCREASED AG IM: CPT | Performed by: INTERNAL MEDICINE

## 2020-10-13 PROCEDURE — G0008 ADMIN INFLUENZA VIRUS VAC: HCPCS | Performed by: INTERNAL MEDICINE

## 2020-10-13 PROCEDURE — 99024 POSTOP FOLLOW-UP VISIT: CPT | Performed by: PHYSICIAN ASSISTANT

## 2020-10-13 PROCEDURE — 99213 OFFICE O/P EST LOW 20 MIN: CPT | Performed by: INTERNAL MEDICINE

## 2020-10-13 NOTE — PROGRESS NOTES
Post Operative Visit Note       Active Problems  1.  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  w/ BSO   • KNEE REPLACEMENT SURGERY  12/27/13    left    • KNEE TOTAL REPLACEMENT Left 12/27/2013    Performed by Yanni Arce MD a Sexual activity: Never    Other Topics      Concerns:        Caffeine Concern: Yes          2 cups of tea daily        Exercise: Yes          3-4x/week       Current Outpatient Medications:   •  docusate sodium (COLACE) 100 MG Oral Cap, Take 1 capsule (100 •  Calcium Carbonate-Vitamin D (CALCIUM + D OR), Take 1 tablet by mouth daily. , Disp: , Rfl:   •  Vitamin D3 2000 UNITS Oral Cap, Take 2,000 Units by mouth daily. , Disp: , Rfl:   •  aspirin 81 MG Oral Tab, Take 81 mg by mouth daily. , Disp: , Rfl:       R Neurological: She is alert and oriented to person, place, and time. Skin: Skin is warm and dry. Psychiatric: She has a normal mood and affect. Her behavior is normal.   Nursing note and vitals reviewed.           Assessment   History of cholecystectom

## 2020-10-13 NOTE — PROGRESS NOTES
Doc Camargo [facility]  is a 79year old female. 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  Rapid heart beat at

## 2020-11-03 DIAGNOSIS — E78.00 PURE HYPERCHOLESTEROLEMIA: ICD-10-CM

## 2020-11-04 NOTE — TELEPHONE ENCOUNTER
Medication(s) to Refill:   Requested Prescriptions     Pending Prescriptions Disp Refills   • PRAVASTATIN SODIUM 10 MG Oral Tab [Pharmacy Med Name: PRAVASTATIN 10MG TABLETS] 90 tablet 0     Sig: TAKE 1 TABLET(10 MG) BY MOUTH EVERY NIGHT       LOV: 10-13-20

## 2020-11-05 RX ORDER — PRAVASTATIN SODIUM 10 MG
TABLET ORAL
Qty: 90 TABLET | Refills: 0 | Status: SHIPPED | OUTPATIENT
Start: 2020-11-05 | End: 2021-03-11

## 2020-11-11 ENCOUNTER — OFFICE VISIT (OUTPATIENT)
Dept: INTERNAL MEDICINE CLINIC | Facility: CLINIC | Age: 80
End: 2020-11-11
Payer: MEDICARE

## 2020-11-11 ENCOUNTER — APPOINTMENT (OUTPATIENT)
Dept: CT IMAGING | Age: 80
End: 2020-11-11
Attending: NURSE PRACTITIONER
Payer: MEDICARE

## 2020-11-11 ENCOUNTER — TELEPHONE (OUTPATIENT)
Dept: INTERNAL MEDICINE CLINIC | Facility: CLINIC | Age: 80
End: 2020-11-11

## 2020-11-11 ENCOUNTER — APPOINTMENT (OUTPATIENT)
Dept: ULTRASOUND IMAGING | Age: 80
End: 2020-11-11
Attending: NURSE PRACTITIONER
Payer: MEDICARE

## 2020-11-11 ENCOUNTER — HOSPITAL ENCOUNTER (EMERGENCY)
Age: 80
Discharge: HOME | End: 2020-11-11
Attending: EMERGENCY MEDICINE
Payer: MEDICARE

## 2020-11-11 VITALS
WEIGHT: 130 LBS | DIASTOLIC BLOOD PRESSURE: 68 MMHG | TEMPERATURE: 98 F | SYSTOLIC BLOOD PRESSURE: 132 MMHG | HEIGHT: 65 IN | BODY MASS INDEX: 21.66 KG/M2 | HEART RATE: 58 BPM

## 2020-11-11 VITALS
BODY MASS INDEX: 23.74 KG/M2 | WEIGHT: 129 LBS | HEIGHT: 62 IN | SYSTOLIC BLOOD PRESSURE: 149 MMHG | DIASTOLIC BLOOD PRESSURE: 52 MMHG | HEART RATE: 54 BPM | TEMPERATURE: 98 F | RESPIRATION RATE: 16 BRPM | OXYGEN SATURATION: 98 %

## 2020-11-11 DIAGNOSIS — R10.9 RIGHT SIDED ABDOMINAL PAIN: Primary | ICD-10-CM

## 2020-11-11 DIAGNOSIS — Z90.49 S/P LAPAROSCOPIC CHOLECYSTECTOMY: ICD-10-CM

## 2020-11-11 DIAGNOSIS — R10.9 ABDOMINAL PAIN, ACUTE: Primary | ICD-10-CM

## 2020-11-11 DIAGNOSIS — K59.00 CONSTIPATION, UNSPECIFIED CONSTIPATION TYPE: ICD-10-CM

## 2020-11-11 PROCEDURE — 99285 EMERGENCY DEPT VISIT HI MDM: CPT

## 2020-11-11 PROCEDURE — 74176 CT ABD & PELVIS W/O CONTRAST: CPT | Performed by: NURSE PRACTITIONER

## 2020-11-11 PROCEDURE — 85025 COMPLETE CBC W/AUTO DIFF WBC: CPT | Performed by: EMERGENCY MEDICINE

## 2020-11-11 PROCEDURE — 96361 HYDRATE IV INFUSION ADD-ON: CPT

## 2020-11-11 PROCEDURE — 96360 HYDRATION IV INFUSION INIT: CPT

## 2020-11-11 PROCEDURE — 81003 URINALYSIS AUTO W/O SCOPE: CPT | Performed by: EMERGENCY MEDICINE

## 2020-11-11 PROCEDURE — 80053 COMPREHEN METABOLIC PANEL: CPT | Performed by: EMERGENCY MEDICINE

## 2020-11-11 PROCEDURE — 99214 OFFICE O/P EST MOD 30 MIN: CPT | Performed by: NURSE PRACTITIONER

## 2020-11-11 PROCEDURE — 85025 COMPLETE CBC W/AUTO DIFF WBC: CPT | Performed by: NURSE PRACTITIONER

## 2020-11-11 PROCEDURE — 83690 ASSAY OF LIPASE: CPT | Performed by: NURSE PRACTITIONER

## 2020-11-11 PROCEDURE — 76700 US EXAM ABDOM COMPLETE: CPT | Performed by: NURSE PRACTITIONER

## 2020-11-11 PROCEDURE — 80053 COMPREHEN METABOLIC PANEL: CPT | Performed by: NURSE PRACTITIONER

## 2020-11-11 PROCEDURE — 83690 ASSAY OF LIPASE: CPT | Performed by: EMERGENCY MEDICINE

## 2020-11-11 RX ORDER — SODIUM CHLORIDE 9 MG/ML
INJECTION, SOLUTION INTRAVENOUS CONTINUOUS
Status: DISCONTINUED | OUTPATIENT
Start: 2020-11-11 | End: 2020-11-11

## 2020-11-11 RX ORDER — LORATADINE 10 MG/1
10 TABLET ORAL DAILY
COMMUNITY
End: 2021-06-02

## 2020-11-11 NOTE — ED INITIAL ASSESSMENT (HPI)
Had gallbladder removed on 9/30-- now for approx 2 wks c/o intermittent sharp r sided mid abd pain- having multiple short lasting episodes of pain every day

## 2020-11-11 NOTE — ED PROVIDER NOTES
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  w/ BSO   • KNEE REPLACEMENT SURGERY  12/27/13    left    • KNEE TOTAL REPLACEMENT Left 12/27/2013    Performed by Juanna Leyden, MD a HPI.  Constitutional and vital signs reviewed. All other systems reviewed and negative except as noted above.     Physical Exam     ED Triage Vitals [11/11/20 1213]   /60   Pulse 51   Resp 16   Temp 97.6 °F (36.4 °C)   Temp src Oral   SpO2 100 % -----------         ------                     CBC W/ DIFFERENTIAL[[id number]]                              Final result                 Please view results for these tests on the individual orders.    URINALYSIS WITH CULTURE REFLEX Mild left hydronephrosis. CT of the abdomen/pelvis is suggested for further evaluation. 3. Limited evaluation of pancreas due to obscuration by bowel gas. Please see above for further details.   Dictated by (CST): Arun Jacques MD on 11/11/2020 at 2 retained fecal debris throughout the colon consistent with constipation. ABDOMINAL WALL:  No mass or hernia. BONES:  No bony lesion or fracture. PELVIC ORGANS:  Normal for age. LUNG BASES:  No visible pulmonary or pleural disease. OTHER:  Negative.

## 2020-11-11 NOTE — TELEPHONE ENCOUNTER
Spoke with patient's dtr okay per HIPAA-dtr indicates patient had gallbladder surgery about a month ago, patient has sharp pain on right side of abdomen periodically on and off, not worse after eating, no worse if she presses on abdomen area.  NO fevers, ch

## 2020-11-11 NOTE — PROGRESS NOTES
Patient presents with:  Pain: Pt has pain right now. Pain is on R side. Lower abdomen pain that is stabbing. Pt BM are every other day. Pt states she avoids fatty foods, eats lean meats and veg. and fruits.  Pain started yesterday off and on but has been go Current Outpatient Medications   Medication Sig Dispense Refill   • Probiotic Product (PROBIOTIC-10) Oral Chew Tab Chew by mouth. • loratadine 10 MG Oral Tab Take 10 mg by mouth daily.      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   • Esophageal reflux    • Exposure to unspecif 7/29/2015    Performed by Jeromy Rahman MD at [facility] MAIN OR   • BREAST LUMPECTOMY Right 6/26/2015    Performed by Ligia Crews MD at [facility] MAIN OR   • BREAST RECONSTRUCTION Right 2015   • BREAST RECONSTRUCTION SECOND STAGE/ REVISION Bilateral 10/28/2015 History    Tobacco Use      Smoking status: Former Smoker        Packs/day: 0.25        Years: 25.00        Pack years: 6.25        Quit date: 1997        Years since quittin.4      Smokeless tobacco: Never Used      Tobacco comment: SMOKED 20 YE

## 2020-11-11 NOTE — TELEPHONE ENCOUNTER
Patient scheduled an appointment on [facility] for 11am today for \"pain in right side. \" please call to triage further

## 2020-11-18 DIAGNOSIS — E78.00 PURE HYPERCHOLESTEROLEMIA: ICD-10-CM

## 2020-11-19 RX ORDER — EZETIMIBE 10 MG/1
10 TABLET ORAL NIGHTLY
Qty: 90 TABLET | Refills: 0 | Status: SHIPPED | OUTPATIENT
Start: 2020-11-19 | End: 2021-03-11

## 2020-11-19 NOTE — TELEPHONE ENCOUNTER
LOV: 11/11/2020 with REBECA Rutherford  RTC: no follow-up on file  Last Relevant Labs: 11/11/2020   Filled: 8/13/2020   #90 with 0 refills    No future appointments.

## 2020-11-25 ENCOUNTER — OFFICE VISIT (OUTPATIENT)
Dept: INFUSION THERAPY | Age: 80
End: 2020-11-25
Attending: INTERNAL MEDICINE

## 2020-11-25 ENCOUNTER — LAB SERVICES (OUTPATIENT)
Dept: INFUSION THERAPY | Age: 80
End: 2020-11-25
Attending: INTERNAL MEDICINE

## 2020-11-25 VITALS
WEIGHT: 130 LBS | DIASTOLIC BLOOD PRESSURE: 60 MMHG | HEART RATE: 52 BPM | SYSTOLIC BLOOD PRESSURE: 137 MMHG | BODY MASS INDEX: 23.92 KG/M2 | HEIGHT: 62 IN

## 2020-11-25 DIAGNOSIS — Z85.3 HISTORY OF BREAST CANCER IN FEMALE: Primary | ICD-10-CM

## 2020-11-25 PROCEDURE — 99213 OFFICE O/P EST LOW 20 MIN: CPT | Performed by: INTERNAL MEDICINE

## 2020-11-25 RX ORDER — CALCIUM POLYCARBOPHIL 625 MG
TABLET ORAL
COMMUNITY

## 2020-11-25 RX ORDER — EZETIMIBE 10 MG/1
10 TABLET ORAL
COMMUNITY
Start: 2020-11-19

## 2020-11-25 RX ORDER — LEVOTHYROXINE SODIUM 88 UG/1
88 TABLET ORAL
COMMUNITY
Start: 2020-09-16

## 2020-11-25 RX ORDER — ASCORBIC ACID 500 MG
500 TABLET ORAL
COMMUNITY

## 2020-11-25 RX ORDER — PRAVASTATIN SODIUM 10 MG
TABLET ORAL
COMMUNITY
Start: 2015-04-02 | End: 2020-11-25 | Stop reason: ALTCHOICE

## 2020-11-25 RX ORDER — LORATADINE 10 MG/1
10 TABLET ORAL
COMMUNITY

## 2020-11-25 RX ORDER — ESTRADIOL 0.1 MG/G
CREAM VAGINAL
COMMUNITY
Start: 2018-04-04

## 2020-11-25 RX ORDER — OLOPATADINE HYDROCHLORIDE 665 UG/1
SPRAY NASAL
COMMUNITY
Start: 2020-11-05

## 2020-11-25 RX ORDER — LOSARTAN POTASSIUM 25 MG/1
25 TABLET ORAL
COMMUNITY
Start: 2020-08-25

## 2020-11-25 RX ORDER — DOCUSATE SODIUM 100 MG/1
100 CAPSULE, LIQUID FILLED ORAL
COMMUNITY
Start: 2020-09-30

## 2020-11-25 RX ORDER — VITAMIN B COMPLEX
1 CAPSULE ORAL
COMMUNITY
Start: 2010-10-11

## 2020-11-25 RX ORDER — CEPHALEXIN 250 MG/1
CAPSULE ORAL
COMMUNITY
Start: 2020-08-31 | End: 2020-11-25 | Stop reason: ALTCHOICE

## 2020-11-25 RX ORDER — FLUTICASONE PROPIONATE 50 MCG
1 SPRAY, SUSPENSION (ML) NASAL
COMMUNITY

## 2020-11-25 RX ORDER — OMEPRAZOLE 10 MG/1
10 CAPSULE, DELAYED RELEASE ORAL
COMMUNITY

## 2020-11-25 RX ORDER — ACETAMINOPHEN 160 MG
2000 TABLET,DISINTEGRATING ORAL
COMMUNITY

## 2020-11-25 ASSESSMENT — PATIENT HEALTH QUESTIONNAIRE - PHQ9
2. FEELING DOWN, DEPRESSED OR HOPELESS: NOT AT ALL
CLINICAL INTERPRETATION OF PHQ2 SCORE: NO FURTHER SCREENING NEEDED
SUM OF ALL RESPONSES TO PHQ9 QUESTIONS 1 AND 2: 0
CLINICAL INTERPRETATION OF PHQ9 SCORE: NO FURTHER SCREENING NEEDED
1. LITTLE INTEREST OR PLEASURE IN DOING THINGS: NOT AT ALL
SUM OF ALL RESPONSES TO PHQ9 QUESTIONS 1 AND 2: 0

## 2020-12-03 ENCOUNTER — OFFICE VISIT (OUTPATIENT)
Dept: INTERNAL MEDICINE CLINIC | Facility: CLINIC | Age: 80
End: 2020-12-03
Payer: MEDICARE

## 2020-12-03 VITALS
TEMPERATURE: 99 F | DIASTOLIC BLOOD PRESSURE: 64 MMHG | RESPIRATION RATE: 14 BRPM | HEIGHT: 62 IN | WEIGHT: 129 LBS | SYSTOLIC BLOOD PRESSURE: 112 MMHG | BODY MASS INDEX: 23.74 KG/M2 | HEART RATE: 68 BPM

## 2020-12-03 DIAGNOSIS — M54.31 SCIATICA OF RIGHT SIDE: Primary | ICD-10-CM

## 2020-12-03 PROCEDURE — 99213 OFFICE O/P EST LOW 20 MIN: CPT | Performed by: NURSE PRACTITIONER

## 2020-12-03 RX ORDER — NAPROXEN 250 MG/1
250 TABLET ORAL 2 TIMES DAILY WITH MEALS
Qty: 10 TABLET | Refills: 1 | Status: SHIPPED | OUTPATIENT
Start: 2020-12-03 | End: 2021-06-02

## 2020-12-03 RX ORDER — OMEPRAZOLE 10 MG/1
10 CAPSULE, DELAYED RELEASE ORAL DAILY
COMMUNITY
End: 2021-09-01

## 2020-12-03 RX ORDER — METHOCARBAMOL 500 MG/1
500 TABLET, FILM COATED ORAL 3 TIMES DAILY PRN
Qty: 15 TABLET | Refills: 1 | Status: SHIPPED | OUTPATIENT
Start: 2020-12-03 | End: 2021-06-02

## 2020-12-03 NOTE — PATIENT INSTRUCTIONS
Sciatica     Sciatica is a condition that causes pain in the lower back that spreads down into the buttock, hip, and leg. Sometimes the leg pain can happen without any back pain.  Sciatica happens when a spinal nerve is irritated or has pressure put on it · When in bed, try to find a position that is comfortable. A firm mattress is best. Try lying flat on your back with pillows under your knees. You can also try lying on your side with your knees bent up toward your chest and a pillow between your knees.   · © 4277-9610 [employer] [address]. [address] [facility] [place] [address] [place]. All rights reserved. This information is not intended as a substitute for professional medical care. Always follow your healthcare professional's instructions.         Back Ex 1. Stand next to a chair. Hold onto the chair with your right hand for support. Cross your right leg behind your left leg. 2. Lean your right hip toward the right. Feel the stretch at the outside of your hip. 3. Hold for 30 to 60 seconds. Then relax.   4.

## 2020-12-03 NOTE — PROGRESS NOTES
CHIEF COMPLAINT:     Patient presents with:  Hip Pain: R side hip, pt states when she walks it acts up more. Pt has been icing and elevating, and using heat compresses.       HPI:   Jeramy Sullivan is a 79year old female who presents today with a chief com • Olopatadine HCl 0.6 % Nasal Solution 1 spray by Nasal route nightly as needed. 1 Bottle 1   • Estradiol (ESTRACE) 0.1 MG/GM Vaginal Cream Apply 1 gram vaginally 2-3 times per week.  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 no ecchymosis. no redness, warmth to touch. ROM:   Normal for age internal rotation, external rotation, flexion, extension, abduction, adduction.  Discomfort over piriformis w A healthcare provider makes a diagnosis of sciatica from your symptoms and a physical exam. Unless you had an injury from a car accident or fall, you usually won’t have X-rays taken at this time.  This is because the nerves and disks in your back can’t be s · You may use acetaminophen or ibuprofen to ease pain, unless another pain medicine was prescribed. Note: If you have chronic liver or kidney disease, talk with your healthcare provider before taking these medicines.  Also talk with your provider if [facility] © 9234-7210 [employer] [address]. [address] [facility] [place], [address]. All rights reserved. This information is not intended as a substitute for professional medical care. Always follow your healthcare professional's instructions.         Back Ex The patient indicates understanding of these issues and agrees to the plan.

## 2020-12-07 DIAGNOSIS — E03.4 HYPOTHYROIDISM DUE TO ACQUIRED ATROPHY OF THYROID: Chronic | ICD-10-CM

## 2020-12-08 RX ORDER — LEVOTHYROXINE SODIUM 88 UG/1
88 TABLET ORAL DAILY
Qty: 90 TABLET | Refills: 0 | Status: SHIPPED | OUTPATIENT
Start: 2020-12-08 | End: 2021-03-11

## 2020-12-08 RX ORDER — LEVOTHYROXINE SODIUM 88 UG/1
88 TABLET ORAL DAILY
Qty: 90 TABLET | Refills: 0 | OUTPATIENT
Start: 2020-12-08

## 2020-12-08 NOTE — TELEPHONE ENCOUNTER
Failed protocol - TSH value between 0.350 and 5.500 IU/ml    Requesting Levothyroxine Sodium 88 MCG Oral Tab  LOV: 10/13/20  RTC: 6 months  Last Relevant Labs: 9/11/20  Filled: 9/16/20 #90 with 0 refills    No future appointments.

## 2020-12-23 ENCOUNTER — TELEMEDICINE (OUTPATIENT)
Dept: INTERNAL MEDICINE CLINIC | Facility: CLINIC | Age: 80
End: 2020-12-23
Payer: MEDICARE

## 2020-12-23 DIAGNOSIS — J01.40 ACUTE NON-RECURRENT PANSINUSITIS: Primary | ICD-10-CM

## 2020-12-23 PROCEDURE — 99213 OFFICE O/P EST LOW 20 MIN: CPT | Performed by: NURSE PRACTITIONER

## 2020-12-23 RX ORDER — CEFDINIR 300 MG/1
300 CAPSULE ORAL 2 TIMES DAILY
Qty: 20 CAPSULE | Refills: 0 | Status: SHIPPED | OUTPATIENT
Start: 2020-12-23 | End: 2021-01-02

## 2020-12-23 NOTE — PROGRESS NOTES
Virtual Telephone Check-In    Kavita Mckeon verbally consents to a Virtual/Telephone Check-In visit on 12/23/20. Patient verified identification with name and date of birth.      Patient understands and accepts financial responsibility for any deductible, 625 MG Oral Tab Take by mouth. • Olopatadine HCl 0.6 % Nasal Solution 1 spray by Nasal route nightly as needed. 1 Bottle 1   • Estradiol (ESTRACE) 0.1 MG/GM Vaginal Cream Apply 1 gram vaginally 2-3 times per week.  1 Tube 3   • Montelukast Sodium 10 MG in situ (LCIS), 1992 8/4/2015   • Osteoarthrosis, unspecified whether generalized or localized, unspecified site     generalized   • Other and unspecified hyperlipidemia    • [facility]    • PONV (postoperative nausea and vomiting) allergies but has tolerated cephalosporins in the past.   - cefdinir 300 MG Oral Cap; Take 1 capsule (300 mg total) by mouth 2 (two) times daily for 10 days. Dispense: 20 capsule;  Refill: 0          Please note that the following visit was completed using

## 2020-12-31 ENCOUNTER — PRIOR ORIGINAL RECORDS (OUTPATIENT)
Dept: OTHER | Age: 80
End: 2020-12-31

## 2021-01-27 DIAGNOSIS — Z23 NEED FOR VACCINATION: ICD-10-CM

## 2021-02-02 ENCOUNTER — IMMUNIZATION (OUTPATIENT)
Dept: LAB | Age: 81
End: 2021-02-02
Attending: HOSPITALIST
Payer: MEDICARE

## 2021-02-02 DIAGNOSIS — Z23 NEED FOR VACCINATION: Primary | ICD-10-CM

## 2021-02-02 PROCEDURE — 0001A SARSCOV2 VAC 30MCG/0.3ML IM: CPT

## 2021-02-10 ENCOUNTER — PATIENT MESSAGE (OUTPATIENT)
Dept: INTERNAL MEDICINE CLINIC | Facility: CLINIC | Age: 81
End: 2021-02-10

## 2021-02-10 NOTE — TELEPHONE ENCOUNTER
From: Keiko Shepherd  To: ZACKERY Rodriguez  Sent: 2/10/2021 8:47 AM CST  Subject: Visit Follow-up Question    Good Morning Brayan Ingram,  This is Cristopher Pump Tresa Delgado Daughter).  Sherrill Holstein had a sinus infection over the Christmas holiday, She was on an ant

## 2021-02-10 NOTE — TELEPHONE ENCOUNTER
Daughter Abby Ronquillo called back to schedule appointment. Offered appt with Donnell Manning NP tomorrow, but daughter does not want to wait. Stated that the back of patient's throat is yellow and also spitting up yellow phlegm. Please advise.    # [phone]

## 2021-02-10 NOTE — TELEPHONE ENCOUNTER
KR spoke with patient's dtr asking if you would renew her Cefdinir 300mg for symptoms, see my chart message.

## 2021-02-10 NOTE — TELEPHONE ENCOUNTER
No rx at this time, will need video visit or can go to [facility]

## 2021-02-11 ENCOUNTER — TELEMEDICINE (OUTPATIENT)
Dept: INTERNAL MEDICINE CLINIC | Facility: CLINIC | Age: 81
End: 2021-02-11

## 2021-02-11 DIAGNOSIS — J01.40 SUBACUTE PANSINUSITIS: Primary | ICD-10-CM

## 2021-02-11 PROCEDURE — 99213 OFFICE O/P EST LOW 20 MIN: CPT | Performed by: NURSE PRACTITIONER

## 2021-02-11 RX ORDER — DOXYCYCLINE HYCLATE 100 MG/1
100 CAPSULE ORAL 2 TIMES DAILY
Qty: 10 CAPSULE | Refills: 0 | Status: SHIPPED | OUTPATIENT
Start: 2021-02-11 | End: 2021-02-16

## 2021-02-11 NOTE — PROGRESS NOTES
Virtual Telephone Check-In    Jessica Mejias verbally consents to a Virtual/Telephone Check-In visit on 02/11/21. Patient verified identification with name and date of birth.      Patient understands and accepts financial responsibility for any deductible, BY MOUTH EVERY NIGHT 60 capsule 3   • Calcium Polycarbophil (FIBER) 625 MG Oral Tab Take by mouth. • Olopatadine HCl 0.6 % Nasal Solution 1 spray by Nasal route nightly as needed.  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   This has been done in good samara to provide continuity of care in the best interest of the

## 2021-02-16 ENCOUNTER — PATIENT MESSAGE (OUTPATIENT)
Dept: INTERNAL MEDICINE CLINIC | Facility: CLINIC | Age: 81
End: 2021-02-16

## 2021-02-16 DIAGNOSIS — J01.40 SUBACUTE PANSINUSITIS: ICD-10-CM

## 2021-02-16 RX ORDER — DOXYCYCLINE HYCLATE 100 MG/1
100 CAPSULE ORAL 2 TIMES DAILY
Qty: 10 CAPSULE | Refills: 0 | Status: SHIPPED | OUTPATIENT
Start: 2021-02-16 | End: 2021-02-21

## 2021-02-16 NOTE — TELEPHONE ENCOUNTER
From: Roberto Blanco  To: ZACKERY Desouza  Sent: 2/16/2021 11:02 AM CST  Subject: Other    Good Morning Margie, Per your instructions. ...... I still have yellow mucus at the back of my throat, my nose. ....... I am still blowing yellow.  I tried a new

## 2021-02-23 ENCOUNTER — IMMUNIZATION (OUTPATIENT)
Dept: LAB | Age: 81
End: 2021-02-23
Attending: HOSPITALIST
Payer: MEDICARE

## 2021-02-23 DIAGNOSIS — Z23 NEED FOR VACCINATION: Primary | ICD-10-CM

## 2021-02-23 PROCEDURE — 0002A SARSCOV2 VAC 30MCG/0.3ML IM: CPT

## 2021-03-10 DIAGNOSIS — E03.4 HYPOTHYROIDISM DUE TO ACQUIRED ATROPHY OF THYROID: Chronic | ICD-10-CM

## 2021-03-10 DIAGNOSIS — E78.00 PURE HYPERCHOLESTEROLEMIA: ICD-10-CM

## 2021-03-11 RX ORDER — PRAVASTATIN SODIUM 10 MG
TABLET ORAL
Qty: 90 TABLET | Refills: 0 | Status: SHIPPED | OUTPATIENT
Start: 2021-03-11 | End: 2021-07-31

## 2021-03-11 RX ORDER — LEVOTHYROXINE SODIUM 88 UG/1
TABLET ORAL
Qty: 90 TABLET | Refills: 0 | Status: SHIPPED | OUTPATIENT
Start: 2021-03-11 | End: 2021-06-10

## 2021-03-11 RX ORDER — EZETIMIBE 10 MG/1
TABLET ORAL
Qty: 90 TABLET | Refills: 0 | Status: SHIPPED | OUTPATIENT
Start: 2021-03-11 | End: 2021-09-09

## 2021-03-11 NOTE — TELEPHONE ENCOUNTER
Medication(s) to Refill:   Requested Prescriptions     Pending Prescriptions Disp Refills   • LEVOTHYROXINE SODIUM 88 MCG Oral Tab [Pharmacy Med Name: LEVOTHYROXINE 0.088MG (88MCG) TAB] 90 tablet 0     Sig: TAKE 1 TABLET(88 MCG) BY MOUTH DAILY   • MAURICIO

## 2021-04-01 ENCOUNTER — TELEPHONE (OUTPATIENT)
Dept: ORTHOPEDICS CLINIC | Facility: CLINIC | Age: 81
End: 2021-04-01

## 2021-04-01 NOTE — TELEPHONE ENCOUNTER
Patient has an appointment scheduled with Dr. Chepe Vazquez on 4/1 for bilateral hip pain. Will patient need imaging prior to appt?

## 2021-04-02 ENCOUNTER — HOSPITAL ENCOUNTER (OUTPATIENT)
Dept: GENERAL RADIOLOGY | Age: 81
Discharge: HOME | End: 2021-04-02
Attending: ORTHOPAEDIC SURGERY
Payer: MEDICARE

## 2021-04-02 ENCOUNTER — OFFICE VISIT (OUTPATIENT)
Dept: ORTHOPEDICS CLINIC | Facility: CLINIC | Age: 81
End: 2021-04-02
Payer: MEDICARE

## 2021-04-02 VITALS — HEART RATE: 50 BPM | WEIGHT: 135.81 LBS | HEIGHT: 64 IN | BODY MASS INDEX: 23.18 KG/M2 | OXYGEN SATURATION: 98 %

## 2021-04-02 DIAGNOSIS — M16.0 PRIMARY OSTEOARTHRITIS OF BOTH HIPS: ICD-10-CM

## 2021-04-02 DIAGNOSIS — M25.551 BILATERAL HIP PAIN: ICD-10-CM

## 2021-04-02 DIAGNOSIS — M25.552 BILATERAL HIP PAIN: ICD-10-CM

## 2021-04-02 DIAGNOSIS — M25.551 BILATERAL HIP PAIN: Primary | ICD-10-CM

## 2021-04-02 DIAGNOSIS — M76.30 TENDINITIS OF ILIOTIBIAL BAND, UNSPECIFIED LATERALITY: ICD-10-CM

## 2021-04-02 DIAGNOSIS — M25.552 BILATERAL HIP PAIN: Primary | ICD-10-CM

## 2021-04-02 PROCEDURE — 99203 OFFICE O/P NEW LOW 30 MIN: CPT | Performed by: ORTHOPAEDIC SURGERY

## 2021-04-02 PROCEDURE — 20611 DRAIN/INJ JOINT/BURSA W/US: CPT | Performed by: ORTHOPAEDIC SURGERY

## 2021-04-02 PROCEDURE — 73523 X-RAY EXAM HIPS BI 5/> VIEWS: CPT | Performed by: ORTHOPAEDIC SURGERY

## 2021-04-02 RX ORDER — TRIAMCINOLONE ACETONIDE 40 MG/ML
80 INJECTION, SUSPENSION INTRA-ARTICULAR; INTRAMUSCULAR ONCE
Status: COMPLETED | OUTPATIENT
Start: 2021-04-02 | End: 2021-04-02

## 2021-04-02 RX ADMIN — TRIAMCINOLONE ACETONIDE 80 MG: 40 INJECTION, SUSPENSION INTRA-ARTICULAR; INTRAMUSCULAR at 13:50:00

## 2021-04-02 NOTE — PATIENT INSTRUCTIONS
What Is Arthritis? Arthritis is a disease that affects the joints. Joints are the parts where bones meet and move. It can affect any joint in your body. There are many types of arthritis.  They include:   · Osteoarthritis  · Rheumatoid arthritis  · Gout  · The joint's range of motion can become limited. Symptoms  Arthritis can affect any joint. Weight-bearing joints, such as the hips and knees, are often affected. Common symptoms are joint pain and stiffness.  Pain and stiffness may get worse with inactivi pounds. Losing one single pound takes multiple pounds of pressure off the joints. Losing 10 pounds can take 50 pounds of pressure off the joints. The tips below may help:  · Start a weight-loss program with the help of your healthcare provider.   · Ask your exercise part of your life. Talk with your healthcare provider about what is safe for you. Make sure you:  · Choose exercises that improve joint motion and make your muscles stronger. Learn how to do exercises correctly and safely.  Consider talking with a Isometric exercises are done by tightening the muscles without moving the joint. This may be a good way to strengthen the muscles around a stiff joint. · Avoid deep flexion or deep squatting (do not bend the knee more than 90 degrees).   · Focus on closed- flexibility activities such as yoga and terrell chi may improve pain and joint motion.  You might try:  · Yoga, including chair yoga, helps to keep your joints strong and flexible  · Terrell Chi, an ancient type of exercise with slow, gentle movements  · Water exer your wrists or other joints  · A brace to support a weak knee joint  · Orthotics for toe and foot involvement    Medical and surgical treatments  Discuss medical treatments with your healthcare provider to help reduce your pain and improve joint mobility. The cartilage is smoothed. Any pieces of cartilage that have broken off are removed. · Total joint replacement. The entire joint is taken out and replaced with a manmade joint using metal, ceramic, or plastic.  This is most often done with the knee or hip

## 2021-04-02 NOTE — H&P
[facility] New Patient Note    CC: Patient presents with:  Hip Pain: BILATERAL HIP PAIN       HPI: This 79year old female presents today with complaints of bilateral hip pain. Patient reports that the symptoms have been going on for a few months. 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  w/ BSO   • KNEE (COLACE) 100 MG Oral Cap Take 1 capsule (100 mg total) by mouth 3 (three) times daily. 90 capsule 0   • Losartan Potassium 25 MG Oral Tab Take 25 mg by mouth daily.      • CEPHALEXIN 250 MG Oral Cap TAKE 1 CAPSULE(250 MG) BY MOUTH EVERY NIGHT 60 capsule 3 Cancer Self 51   • Heart Disease Neg    • Stroke Neg      Social History    Occupational History      Not on file    Tobacco Use      Smoking status: Former Smoker        Packs/day: 0.25        Years: 25.00        Pack years: 6.25        Quit date: 6/17/19 degrees. • Negative [place]field. Kim's test cause some mild discomfort. Knee stable to varus and valgus stress throughout range of motion.   Posterior drawer with stable palpable click, anterior drawer less than 5 mm  • Neuromuscular: 5 out of 5 hip fle injection both for diagnostic and therapeutic purposes. Additionally, we did discuss physical therapy especially given IT band tenderness. Patient understands and agrees with this discussion and plan. Injection was performed of both hips today.   I will

## 2021-04-09 ENCOUNTER — TELEPHONE (OUTPATIENT)
Dept: UROLOGY | Facility: HOSPITAL | Age: 81
End: 2021-04-09

## 2021-04-09 NOTE — TELEPHONE ENCOUNTER
Pt daughter Chanelle Fowler calling for refill of estradiol. Pt gets cream from [facility]. Will change to WIP. Orders faxed.

## 2021-04-16 ENCOUNTER — OFFICE VISIT (OUTPATIENT)
Dept: PHYSICAL THERAPY | Facility: HOSPITAL | Age: 81
End: 2021-04-16
Attending: ORTHOPAEDIC SURGERY
Payer: MEDICARE

## 2021-04-16 DIAGNOSIS — M16.0 PRIMARY OSTEOARTHRITIS OF BOTH HIPS: ICD-10-CM

## 2021-04-16 DIAGNOSIS — M25.551 BILATERAL HIP PAIN: ICD-10-CM

## 2021-04-16 DIAGNOSIS — M25.552 BILATERAL HIP PAIN: ICD-10-CM

## 2021-04-16 DIAGNOSIS — M76.30 TENDINITIS OF ILIOTIBIAL BAND, UNSPECIFIED LATERALITY: ICD-10-CM

## 2021-04-16 PROCEDURE — 97163 PT EVAL HIGH COMPLEX 45 MIN: CPT

## 2021-04-16 PROCEDURE — 97110 THERAPEUTIC EXERCISES: CPT

## 2021-04-16 NOTE — PROGRESS NOTES
LOWER EXTREMITY EVALUATION:   Referring Physician: Dr. Wanda Alvarado  Diagnosis: Bilateral hip pain (M25.551,M25.552)  Primary osteoarthritis of both hips (M16.0)  Tendinitis of iliotibial band, unspecified laterality (M76.30)     Date of Service: 4/16/2021 describes prior level of function as: lives with her daughter, house with bedrooms upstairs; independent and highly functioning. Have been going to dance aerobics for the last 10 years but had to stop a couple of months due to hip pain.  Recently doing up t screen:  Sensation: intact to light touch and pin prick (B) LE's L2-S1  DTR's: 1+/4 (B) Achilles and patellar tendon  Mm strength: 5/5 (B) LE's L2-S1    Lumbar AROM:  Flexion: reaches to mid-shins  Extension: WNL, reports sensation of tensioning/pain R upp 7 x weekly - 3 sets - 10 reps   Hooklying Single Knee to Chest Stretch - 2 x daily - 7 x weekly - 1 sets - 10 reps - 10 sec hold   Supine Double Knee to Chest - 2 x daily - 7 x weekly - 1 sets - 10 reps - 10 sec hold   Supine Hip and Knee Flexion AROM with Exercise Program instruction    Education or treatment limitation: None  Rehab Potential:good        Patient/Family/Caregiver was advised of these findings, precautions, and treatment options and has agreed to actively participate in planning and for this

## 2021-04-19 ENCOUNTER — TELEPHONE (OUTPATIENT)
Dept: ORTHOPEDICS CLINIC | Facility: CLINIC | Age: 81
End: 2021-04-19

## 2021-04-19 NOTE — TELEPHONE ENCOUNTER
Patient's daughter, Alex Jackson called stating Marie Frazier had short term pain relief from the cortisone injections to both hips on 4/2/21. Is there an over the counter pain reliever Alex Jackson can give her mother? Marie Frazier started PT on Friday, 4/16/21.

## 2021-04-20 ENCOUNTER — OFFICE VISIT (OUTPATIENT)
Dept: UROLOGY | Facility: HOSPITAL | Age: 81
End: 2021-04-20
Attending: OBSTETRICS & GYNECOLOGY
Payer: MEDICARE

## 2021-04-20 VITALS
BODY MASS INDEX: 23 KG/M2 | RESPIRATION RATE: 16 BRPM | DIASTOLIC BLOOD PRESSURE: 78 MMHG | TEMPERATURE: 98 F | SYSTOLIC BLOOD PRESSURE: 124 MMHG | WEIGHT: 135 LBS

## 2021-04-20 DIAGNOSIS — N81.84 PELVIC MUSCLE WASTING: ICD-10-CM

## 2021-04-20 DIAGNOSIS — N39.41 URGE INCONTINENCE: Primary | ICD-10-CM

## 2021-04-20 DIAGNOSIS — R35.0 URINARY FREQUENCY: ICD-10-CM

## 2021-04-20 DIAGNOSIS — R33.9 INCOMPLETE EMPTYING OF BLADDER: ICD-10-CM

## 2021-04-20 DIAGNOSIS — R39.9 UTI SYMPTOMS: ICD-10-CM

## 2021-04-20 DIAGNOSIS — N39.0 FREQUENT UTI: ICD-10-CM

## 2021-04-20 DIAGNOSIS — N95.2 POSTMENOPAUSAL ATROPHIC VAGINITIS: ICD-10-CM

## 2021-04-20 DIAGNOSIS — N32.81 DETRUSOR INSTABILITY: ICD-10-CM

## 2021-04-20 PROCEDURE — 87086 URINE CULTURE/COLONY COUNT: CPT | Performed by: OBSTETRICS & GYNECOLOGY

## 2021-04-20 PROCEDURE — 99212 OFFICE O/P EST SF 10 MIN: CPT

## 2021-04-20 PROCEDURE — 51701 INSERT BLADDER CATHETER: CPT

## 2021-04-20 NOTE — PATIENT INSTRUCTIONS
Keflex every other night at bedtime to prevent UTIs  Call with s/sx of UTI - we will call with today's urine test  Cont vag estrogen twice weekly  Cont CISC twice daily, more as needed  Pursue PTNS for urinary incontinence

## 2021-04-20 NOTE — PROGRESS NOTES
Patient presents to follow up UTIs, vulvovag atrophy, incomplete bladder empyting    She is currently using vag estrogen twice weekly, CISC BID daily (more prn)  Keflex 250mg at bedtime   Last urine 11/2020 neg  Vague UTI sx today    She reports +improveme DO Charlene, FACOG, FACS

## 2021-04-23 ENCOUNTER — OFFICE VISIT (OUTPATIENT)
Dept: PHYSICAL THERAPY | Facility: HOSPITAL | Age: 81
End: 2021-04-23
Attending: ORTHOPAEDIC SURGERY
Payer: MEDICARE

## 2021-04-23 PROCEDURE — 97110 THERAPEUTIC EXERCISES: CPT

## 2021-04-23 PROCEDURE — 97140 MANUAL THERAPY 1/> REGIONS: CPT

## 2021-04-23 NOTE — PROGRESS NOTES
Dx: Bilateral hip pain (M25.551,M25.552)  Primary osteoarthritis of both hips (M16.0)  Tendinitis of iliotibial band, unspecified laterality (M76.30)             Insurance (Authorized # of Visits):  8 per [address]           Authorizing Physician: Dr. Chepe Vazquez will be independent and compliant with comprehensive HEP to maintain progress achieved in PT     Plan: neurodynamic mobilization; STM to R posterior hip; hip ROM; hip strengthening as tolerated  Date: 4/23/2021  TX#: 2/8 Date:                 TX#: 3/ Date:

## 2021-04-26 ENCOUNTER — APPOINTMENT (OUTPATIENT)
Dept: PHYSICAL THERAPY | Facility: HOSPITAL | Age: 81
End: 2021-04-26
Attending: ORTHOPAEDIC SURGERY
Payer: MEDICARE

## 2021-04-26 ENCOUNTER — TELEPHONE (OUTPATIENT)
Dept: PHYSICAL THERAPY | Facility: HOSPITAL | Age: 81
End: 2021-04-26

## 2021-04-26 PROCEDURE — 97140 MANUAL THERAPY 1/> REGIONS: CPT

## 2021-04-26 PROCEDURE — 97110 THERAPEUTIC EXERCISES: CPT

## 2021-04-26 NOTE — PROGRESS NOTES
Dx: Bilateral hip pain (M25.551,M25.552)  Primary osteoarthritis of both hips (M16.0)  Tendinitis of iliotibial band, unspecified laterality (M76.30)             Insurance (Authorized # of Visits):  8 per [address]           Authorizing Physician: Dr. Abimael Mosquera comfortably. · Pt will demonstrate improved ROM with decreased stiffness (B) hips to facilitate less difficulty with daily activities.   · Pt will be independent and compliant with comprehensive HEP to maintain progress achieved in PT     Plan: neurodynam

## 2021-04-30 ENCOUNTER — APPOINTMENT (OUTPATIENT)
Dept: PHYSICAL THERAPY | Facility: HOSPITAL | Age: 81
End: 2021-04-30
Attending: ORTHOPAEDIC SURGERY
Payer: MEDICARE

## 2021-05-03 ENCOUNTER — OFFICE VISIT (OUTPATIENT)
Dept: PHYSICAL THERAPY | Facility: HOSPITAL | Age: 81
End: 2021-05-03
Attending: ORTHOPAEDIC SURGERY
Payer: MEDICARE

## 2021-05-03 PROCEDURE — 97110 THERAPEUTIC EXERCISES: CPT

## 2021-05-03 PROCEDURE — 97140 MANUAL THERAPY 1/> REGIONS: CPT

## 2021-05-03 NOTE — PROGRESS NOTES
Dx: Bilateral hip pain (M25.551,M25.552)  Primary osteoarthritis of both hips (M16.0)  Tendinitis of iliotibial band, unspecified laterality (M76.30)             Insurance (Authorized # of Visits):  8 per [id number] [facility] [address] [facility] [address]           Authorizing Physician: Dr. Ennis Flair with decreased stiffness (B) hips to facilitate less difficulty with daily activities.   · Pt will be independent and compliant with comprehensive HEP to maintain progress achieved in PT     Plan: neurodynamic mobilization; STM to R posterior hip; hip ROM; Trunk Rotation with Swiss Ball - 2 x daily - 7 x weekly - 3 sets - 10 reps   Hooklying Single Knee to Chest Stretch - 2 x daily - 7 x weekly - 1 sets - 10 reps - 10 sec hold   Supine Double Knee to Chest - 2 x daily - 7 x weekly - 1 sets - 10 reps - 10 sec

## 2021-05-05 ENCOUNTER — APPOINTMENT (OUTPATIENT)
Dept: PHYSICAL THERAPY | Facility: HOSPITAL | Age: 81
End: 2021-05-05
Attending: ORTHOPAEDIC SURGERY
Payer: MEDICARE

## 2021-05-05 PROCEDURE — 97140 MANUAL THERAPY 1/> REGIONS: CPT

## 2021-05-05 PROCEDURE — 97110 THERAPEUTIC EXERCISES: CPT

## 2021-05-05 NOTE — PROGRESS NOTES
Dx: Bilateral hip pain (M25.551,M25.552)  Primary osteoarthritis of both hips (M16.0)  Tendinitis of iliotibial band, unspecified laterality (M76.30)             Insurance (Authorized # of Visits):  8 per [address]           Authorizing Physician: Dr. Alyson Zhu ROM with decreased stiffness (B) hips to facilitate less difficulty with daily activities.   · Pt will be independent and compliant with comprehensive HEP to maintain progress achieved in PT     Plan: 6MWT; neurodynamic mobilization; STM to R posterior hip; Double leg press 1 black and 1 grey cords 3 x 10  Single leg press 1 grey cord 2 x 10 L/R  Sit to stand with Theraband for hip ABD x 10 Double leg press 2 black cords 3 x 10  Single leg press 1 black cord 2 x 10 L/R  Sit to stand with Theraband for hip ABD

## 2021-05-07 ENCOUNTER — TELEPHONE (OUTPATIENT)
Dept: UROLOGY | Facility: HOSPITAL | Age: 81
End: 2021-05-07

## 2021-05-07 DIAGNOSIS — R30.0 DYSURIA: Primary | ICD-10-CM

## 2021-05-07 NOTE — TELEPHONE ENCOUNTER
Pt calls with dysuria and frequency, requesting to submit urine culture. Orders placed. Pt is taking Keflex suppression every other day at this time as prescribed, recommend she take keflex daily, pending culture results.   Using Estrace twice weekly, CIS

## 2021-05-08 ENCOUNTER — LAB ENCOUNTER (OUTPATIENT)
Dept: LAB | Age: 81
End: 2021-05-08
Attending: UROLOGY
Payer: MEDICARE

## 2021-05-08 DIAGNOSIS — R30.0 DYSURIA: ICD-10-CM

## 2021-05-08 PROCEDURE — 87086 URINE CULTURE/COLONY COUNT: CPT

## 2021-05-12 ENCOUNTER — OFFICE VISIT (OUTPATIENT)
Dept: PHYSICAL THERAPY | Facility: HOSPITAL | Age: 81
End: 2021-05-12
Attending: ORTHOPAEDIC SURGERY
Payer: MEDICARE

## 2021-05-12 PROCEDURE — 97140 MANUAL THERAPY 1/> REGIONS: CPT

## 2021-05-12 PROCEDURE — 97110 THERAPEUTIC EXERCISES: CPT

## 2021-05-12 PROCEDURE — 97112 NEUROMUSCULAR REEDUCATION: CPT

## 2021-05-12 NOTE — PROGRESS NOTES
Dx: Bilateral hip pain (M25.551,M25.552)  Primary osteoarthritis of both hips (M16.0)  Tendinitis of iliotibial band, unspecified laterality (M76.30)             Insurance (Authorized # of Visits):  8 per [address] [facility] [address] [facility] [address]           Authorizing Physician: Dr. Princess Alvarez radicular symptoms for 7 consecutive days to improve function with ADL   · Pt will have decreased posterior hip muscle tension to tolerate sitting for 30 minutes comfortably.    · Pt will demonstrate improved ROM with decreased stiffness (B) hips to facilit with end-range stretching  L long axis traction    R clam shell, GTB 2 x 10 Bridge x 15 L clam shells with manual resistance x 15 L clam shells with manual resistance x 20 L clam shells with manual resistance x 20   R rotational lumbar mobilization Gr II, x 1, there ex x 1, re-ed x 1       Total Timed Treatment: 40 min  Total Treatment Time: 45 min

## 2021-05-14 ENCOUNTER — OFFICE VISIT (OUTPATIENT)
Dept: PHYSICAL THERAPY | Facility: HOSPITAL | Age: 81
End: 2021-05-14
Attending: ORTHOPAEDIC SURGERY
Payer: MEDICARE

## 2021-05-14 PROCEDURE — 97110 THERAPEUTIC EXERCISES: CPT

## 2021-05-14 PROCEDURE — 97140 MANUAL THERAPY 1/> REGIONS: CPT

## 2021-05-14 PROCEDURE — 97112 NEUROMUSCULAR REEDUCATION: CPT

## 2021-05-14 NOTE — PROGRESS NOTES
Dx: Bilateral hip pain (M25.551,M25.552)  Primary osteoarthritis of both hips (M16.0)  Tendinitis of iliotibial band, unspecified laterality (M76.30)             Insurance (Authorized # of Visits):  8 per [address]           Authorizing Physician: Dr. Lito Reyes distal ITB as needed; balance and proprioception training  Date: 4/23/2021  TX#: 2/8 Date:  4/26/2021            TX#: 3/8 Date:   5/3/2021             TX#: 4/8 Date:  5/5/2021               TX#: 5/8 Date: 5/12/2021  Tx#: 6/8 Date: 5/14/2021  Tx#: 7/8   PARVEZ [place] shells with manual resistance x 20 L clam shells with manual resistance x 20    R rotational lumbar mobilization Gr II, both knees flexed R rotational lumbar mobilization Gr II, both knees flexed Bridge x 15 Bridge x 20 Bridge x 20  STM to L distal ITB 5' Arm Support - 1 x daily - 7 x weekly - 1 sets - 10 reps    Charges: man x 1, there ex x 2       Total Timed Treatment: 40 min  Total Treatment Time: 40 min

## 2021-05-21 ENCOUNTER — OFFICE VISIT (OUTPATIENT)
Dept: PHYSICAL THERAPY | Facility: HOSPITAL | Age: 81
End: 2021-05-21
Attending: ORTHOPAEDIC SURGERY
Payer: MEDICARE

## 2021-05-21 PROCEDURE — 97110 THERAPEUTIC EXERCISES: CPT

## 2021-05-21 PROCEDURE — 97140 MANUAL THERAPY 1/> REGIONS: CPT

## 2021-05-21 NOTE — PROGRESS NOTES
Progress Summary  Pt has attended 8 visits in Physical Therapy. Subjective: Dunia Mcdaniel reports good improvement with (B) lower extremity symptoms. Reports no longer having numbness and tingling in R lower leg and pain in R buttock.  States that L hip pain WNL  Gastroc-soleus: R mild restrictions; L mild restrictions    Strength/MMT: (* denotes performed with pain)  Hip Knee Foot/Ankle   Flexion: R 5/5; L 5/5  Hip Abduction: R: 3+/5; L: 4-/5 Flexion: R 5/5; L 5/5  Extension: R 5/5; L 5/5    DF: R 5/5; L 5/5 or a total of 6 visits over a 90 day period. Treatment will include: Manual therapy: soft tissue and joint mobilizations to restore normal joint mechanics and decrease pain;  Therapeutic exercises including ROM, strengthening, stretching program; Neuromuscu lateral glide with flexion and with internal rotation  L hip PROM   STM to R posterior hip 12' (tingling  STM to R posterior hip 7' STM to R posterior hip 5' STM to R posterior hip 5' STM to R posterior hip 5' STM to R posterior hip 5' STM to R posterior h Double leg press 1 black and 1 grey cords 3 x 10  Single leg press 1 grey cord 2 x 10 L/R  Sit to stand with Theraband for hip ABD x 10 Double leg press 2 black cords 3 x 10  Single leg press 1 black cord 2 x 10 L/R  Sit to stand with Theraband for hip ABD

## 2021-05-26 ENCOUNTER — OFFICE VISIT (OUTPATIENT)
Dept: PHYSICAL THERAPY | Facility: HOSPITAL | Age: 81
End: 2021-05-26
Attending: ORTHOPAEDIC SURGERY
Payer: MEDICARE

## 2021-05-26 PROCEDURE — 97110 THERAPEUTIC EXERCISES: CPT

## 2021-05-26 PROCEDURE — 97140 MANUAL THERAPY 1/> REGIONS: CPT

## 2021-05-26 NOTE — PROGRESS NOTES
Dx: Bilateral hip pain (M25.551,M25.552)  Primary osteoarthritis of both hips (M16.0)  Tendinitis of iliotibial band, unspecified laterality (M76.30)             Insurance (Authorized # of Visits):  8 + 6 per POC           Authorizing Physician: Dr. Сергей Kirk tenderness along left iliotibial band to facilitate decreased knee and hip pain with walking.  New  · Pt will be independent and compliant with comprehensive HEP to maintain progress achieved in PT     Plan: hip abduction strengthening; lumbar-pelvic stabil with manual resistance 1 x 20 L clam shells with manual resistance 2 x 12   (B) hip abduction in hkly with green TB Clam shells with manual resistance x 10 L/R L hip caudal glide via femuar L hip caudal glide via femur  Bridge x 15  Bridge with Green TB fo black cords 3 x 10  Single leg press 1 black and 1 grey cords 1 x 20 L/R  T board calf stretch  T board A/P rocking  Cone taps x 15 L/R  6\" FSU's x 10 L/R  6\" Lateral step ups x 10 L/R Double leg press 2 black cords 3 x 10  Single leg press 1 black and 1

## 2021-05-28 ENCOUNTER — OFFICE VISIT (OUTPATIENT)
Dept: PHYSICAL THERAPY | Facility: HOSPITAL | Age: 81
End: 2021-05-28
Attending: ORTHOPAEDIC SURGERY
Payer: MEDICARE

## 2021-05-28 PROCEDURE — 97110 THERAPEUTIC EXERCISES: CPT

## 2021-05-28 PROCEDURE — 97140 MANUAL THERAPY 1/> REGIONS: CPT

## 2021-05-28 NOTE — PROGRESS NOTES
Dx: Bilateral hip pain (M25.551,M25.552)  Primary osteoarthritis of both hips (M16.0)  Tendinitis of iliotibial band, unspecified laterality (M76.30)             Insurance (Authorized # of Visits):  8 + 6 per POC           Authorizing Physician: Dr. Misael Doran New  · Pt will be independent and compliant with comprehensive HEP to maintain progress achieved in PT     Plan: hip abduction strengthening; lumbar-pelvic stabilization; STM to R piriformis and L distal ITB as needed; balance and proprioception training shells with manual resistance 1 x 20 R/L clam shells with manual resistance 1 x 20 L clam shells with manual resistance 2 x 12 L clam shells with manual resistance 2 x 12   (B) hip abduction in hkly with green TB Clam shells with manual resistance x 10 L/R 3 x 10  Single leg press 1 black and 1 grey cords 1 x 12 L/R  Sit to stand with Theraband for hip ABD x 10  T board calf stretch and A/P rocking  6\" FSU's x 10 L/R 0HHA  SLB training Double leg press 2 black cords 3 x 10  Single leg press 1 black and 1 gr

## 2021-06-02 ENCOUNTER — OFFICE VISIT (OUTPATIENT)
Dept: INTERNAL MEDICINE CLINIC | Facility: CLINIC | Age: 81
End: 2021-06-02
Payer: MEDICARE

## 2021-06-02 VITALS
TEMPERATURE: 98 F | SYSTOLIC BLOOD PRESSURE: 126 MMHG | HEART RATE: 58 BPM | OXYGEN SATURATION: 98 % | WEIGHT: 132.25 LBS | RESPIRATION RATE: 16 BRPM | BODY MASS INDEX: 24.34 KG/M2 | HEIGHT: 62 IN | DIASTOLIC BLOOD PRESSURE: 56 MMHG

## 2021-06-02 DIAGNOSIS — L08.9 SKIN INFECTION: Primary | ICD-10-CM

## 2021-06-02 DIAGNOSIS — R09.82 POST-NASAL DRIP: ICD-10-CM

## 2021-06-02 PROCEDURE — 99213 OFFICE O/P EST LOW 20 MIN: CPT | Performed by: NURSE PRACTITIONER

## 2021-06-02 RX ORDER — MUPIROCIN CALCIUM 20 MG/G
1 CREAM TOPICAL 2 TIMES DAILY
Qty: 15 G | Refills: 0 | Status: SHIPPED | OUTPATIENT
Start: 2021-06-02 | End: 2021-06-16

## 2021-06-02 RX ORDER — ALLOPURINOL 300 MG/1
2 TABLET ORAL 2 TIMES DAILY
COMMUNITY
Start: 2021-04-28 | End: 2021-09-01

## 2021-06-02 RX ORDER — CETIRIZINE HYDROCHLORIDE 10 MG/1
10 TABLET ORAL DAILY PRN
COMMUNITY
Start: 2021-04-28 | End: 2021-09-01

## 2021-06-02 RX ORDER — MUPIROCIN CALCIUM 20 MG/G
1 CREAM TOPICAL DAILY
Qty: 15 G | Refills: 0 | Status: SHIPPED | OUTPATIENT
Start: 2021-06-02 | End: 2021-06-02

## 2021-06-02 RX ORDER — PHENAZOPYRIDINE HYDROCHLORIDE 200 MG/1
TABLET, FILM COATED ORAL
COMMUNITY
End: 2021-09-01

## 2021-06-02 RX ORDER — FLUOCINOLONE ACETONIDE 0.11 MG/ML
OIL AURICULAR (OTIC)
COMMUNITY
Start: 2021-04-28 | End: 2021-09-01

## 2021-06-02 NOTE — PROGRESS NOTES
CHIEF COMPLAINT:     Patient presents with:  Nose Problem: nasal sores       HPI:   Arthur Barnhart is a [de-identified]year old female here with concerns of nasal sores.  Pt states she went to ENT since she has had recurrent nasal congestion and PND despite oral antih Bottle 1   • Estradiol (ESTRACE) 0.1 MG/GM Vaginal Cream Apply 1 gram vaginally 2-3 times per week. 1 Tube 3   • Montelukast Sodium 10 MG Oral Tab TK 1 T PO QD  6   • B Complex-C-Folic Acid (PX B COMPLEX/VITAMIN C) Oral Tab Take by mouth.      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 Skin infection  - Mupirocin Calcium 2 % External Cream; Apply 1 Application topically 2 (two) times daily for 14 days. Dispense: 15 g; Refill: 0  Questionable folliculitis vs. Impetigo, no apparent yellow discharge.  If no improvement in 5

## 2021-06-09 DIAGNOSIS — E03.4 HYPOTHYROIDISM DUE TO ACQUIRED ATROPHY OF THYROID: Chronic | ICD-10-CM

## 2021-06-10 RX ORDER — LEVOTHYROXINE SODIUM 88 UG/1
TABLET ORAL
Qty: 90 TABLET | Refills: 0 | Status: SHIPPED | OUTPATIENT
Start: 2021-06-10 | End: 2021-09-09

## 2021-06-14 ENCOUNTER — OFFICE VISIT (OUTPATIENT)
Dept: PHYSICAL THERAPY | Facility: HOSPITAL | Age: 81
End: 2021-06-14
Attending: INTERNAL MEDICINE
Payer: MEDICARE

## 2021-06-14 PROCEDURE — 97140 MANUAL THERAPY 1/> REGIONS: CPT

## 2021-06-14 PROCEDURE — 97110 THERAPEUTIC EXERCISES: CPT

## 2021-06-14 NOTE — PROGRESS NOTES
Dx: Bilateral hip pain (M25.551,M25.552)  Primary osteoarthritis of both hips (M16.0)  Tendinitis of iliotibial band, unspecified laterality (M76.30)             Insurance (Authorized # of Visits):  8 + 6 per POC           Authorizing Physician: Dr. Terrie Goel to 4/5 to facilitate improved pelvic stability with walking. New  · Pt will demonstrate decreased tone and tenderness along left iliotibial band to facilitate decreased knee and hip pain with walking.  New  · Pt will be independent and compliant with compre stretch 3 x 30 sec R piriformis stretch 3 x 30 sec L hip extension mobilization in sdly L hip extension mobilization in sdly Bridge  2 x 10   R long axis traction 10 x 20 sec R long axis traction 10 x 20 sec R clam shells with manual resistance 1 x 12 R cl stand x 5 SB rolls forward x 10  Diagonal x 5 L/R  Sit to stand x 5 Double leg press 1 black and 1 grey cords 3 x 10  Single leg press 1 grey cord 2 x 10 L/R  Sit to stand with Theraband for hip ABD x 10 Double leg press 2 black cords 3 x 10  Single leg pr Edge of Bed - 1 x daily - 7 x weekly - 1 sets - 5 reps - 20 sec hold   Supine Figure 4 Piriformis Stretch - 1 x daily - 7 x weekly - 1 sets - 5 reps - 20 sec hold   Clamshell with Resistance - 1 x daily - 7 x weekly - 10 reps - 3 sets   Supine Bridge - 1 x

## 2021-06-18 ENCOUNTER — OFFICE VISIT (OUTPATIENT)
Dept: PHYSICAL THERAPY | Facility: HOSPITAL | Age: 81
End: 2021-06-18
Attending: INTERNAL MEDICINE
Payer: MEDICARE

## 2021-06-18 PROCEDURE — 97140 MANUAL THERAPY 1/> REGIONS: CPT

## 2021-06-18 PROCEDURE — 97110 THERAPEUTIC EXERCISES: CPT

## 2021-06-18 NOTE — PROGRESS NOTES
Discharge Summary  Pt has attended , cancelled 12 visits in Physical Therapy. Subjective: Gale Beaulieu reports 80% improvement with right and left hip pain but continues to experience intermittent pain of much lesser intensity.  She reports no episodes of L k toe ext: R 5/5; L 5/5     Special tests:    30 sec sit to stand: 13 reps  SLR: R: (-) 80 deg; L: (-) 75 deg  FADDIR: WNL (B)  TU sec; mildly unsteady with turning  6\" forward step ups and downs: good coordination and control; no upper extremity robin as possible to [phone]. I certify the need for these services furnished under this plan of treatment and while under my care.     X___________________________________________________ Date____________________    Certification From: 8/21/5070  To:9/16/ piriformis stretch 3 x 30 sec R piriformis stretch 3 x 30 sec R piriformis stretch 3 x 30 sec R piriformis stretch 3 x 30 sec R piriformis stretch 3 x 30 sec R piriformis stretch 3 x 30 sec L hip extension mobilization in sdly L hip extension mobilization flexed R rotational lumbar mobilization Gr II, both knees flexed Bridge x 15 Bridge x 20 Bridge x 20  STM to L distal ITB 5'  Bridge and march x 12 L/R         SB rolls forward x 10  Forward and left x 5  Sit to stand x 5 SB rolls forward x 10  Diagonal x Hooklying Single Knee to Chest Stretch - 2 x daily - 7 x weekly - 1 sets - 10 reps - 10 sec hold   Supine Double Knee to Chest - 2 x daily - 7 x weekly - 1 sets - 10 reps - 10 sec hold   Hip Flexor Stretch at Edge of Bed - 1 x daily - 7 x weekly - 1 sets

## 2021-06-21 NOTE — TELEPHONE ENCOUNTER
2021     Yaquelin Paul (: 1992) is a 30 y.o. male, here for evaluation of the following medical concerns:    Chief Complaint   Patient presents with    Abdominal Pain     diarrhea eating mainly soup, alot of acid reflux,         Orders Only on 2021  WBC                                           Date: 2021  Value: 8.9         Ref range: 4.0 - 11.0 K/uL    Status: Final  RBC                                           Date: 2021  Value: 4.99        Ref range: 4.20 - 5.90 M/uL   Status: Final  Hemoglobin                                    Date: 2021  Value: 14.4        Ref range: 13.5 - 17.5 g/dL   Status: Final  Hematocrit                                    Date: 2021  Value: 42.7        Ref range: 40.5 - 52.5 %      Status: Final  MCV                                           Date: 2021  Value: 85.7        Ref range: 80.0 - 100.0 fL    Status: Final  MCH                                           Date: 2021  Value: 28.9        Ref range: 26.0 - 34.0 pg     Status: Final  MCHC                                          Date: 2021  Value: 33.8        Ref range: 31.0 - 36.0 g/dL   Status: Final  RDW                                           Date: 2021  Value: 13.1        Ref range: 12.4 - 15.4 %      Status: Final  Platelets                                     Date: 2021  Value: 236         Ref range: 135 - 450 K/uL     Status: Final  MPV                                           Date: 2021  Value: 8.9         Ref range: 5.0 - 10.5 fL      Status: Final  Neutrophils %                                 Date: 2021  Value: 62.2        Ref range: %                  Status: Final  Lymphocytes %                                 Date: 2021  Value: 28.1        Ref range: %                  Status: Final  Monocytes %                                   Date: 2021  Value: 5.8         Ref range: %                  Status: Final Called patient's daughter (okay per em ) and informed her she can take over the counter nsaids such as Aleve, Advil and tylenol. Patient's daughter understood. Eosinophils %                                 Date: 06/17/2021  Value: 3.2         Ref range: %                  Status: Final  Basophils %                                   Date: 06/17/2021  Value: 0.7         Ref range: %                  Status: Final  Neutrophils Absolute                          Date: 06/17/2021  Value: 5.5         Ref range: 1.7 - 7.7 K/uL     Status: Final  Lymphocytes Absolute                          Date: 06/17/2021  Value: 2.5         Ref range: 1.0 - 5.1 K/uL     Status: Final  Monocytes Absolute                            Date: 06/17/2021  Value: 0.5         Ref range: 0.0 - 1.3 K/uL     Status: Final  Eosinophils Absolute                          Date: 06/17/2021  Value: 0.3         Ref range: 0.0 - 0.6 K/uL     Status: Final  Basophils Absolute                            Date: 06/17/2021  Value: 0.1         Ref range: 0.0 - 0.2 K/uL     Status: Final  Sodium                                        Date: 06/17/2021  Value: 140         Ref range: 136 - 145 mmol/L   Status: Final  Potassium                                     Date: 06/17/2021  Value: 4.6         Ref range: 3.5 - 5.1 mmol/L   Status: Final  Chloride                                      Date: 06/17/2021  Value: 104         Ref range: 99 - 110 mmol/L    Status: Final  CO2                                           Date: 06/17/2021  Value: 25          Ref range: 21 - 32 mmol/L     Status: Final  Anion Gap                                     Date: 06/17/2021  Value: 11          Ref range: 3 - 16             Status: Final  Glucose                                       Date: 06/17/2021  Value: 94          Ref range: 70 - 99 mg/dL      Status: Final  BUN                                           Date: 06/17/2021  Value: 13          Ref range: 7 - 20 mg/dL       Status: Final  CREATININE                                    Date: 06/17/2021  Value: 1.0         Ref range: 0.9 - 1.3 mg/dL    Status: Final  GFR Non- Date: 06/17/2021  Value: >60         Ref range: >60                Status: Final                Comment: >60 mL/min/1.73m2 EGFR, calc. for ages 25 and older using the  MDRD formula (not corrected for weight), is valid for stable  renal function. GFR                           Date: 06/17/2021  Value: >60         Ref range: >60                Status: Final                Comment: Chronic Kidney Disease: less than 60 ml/min/1.73 sq.m. Kidney Failure: less than 15 ml/min/1.73 sq.m. Results valid for patients 18 years and older.     Calcium                                       Date: 06/17/2021  Value: 9.8         Ref range: 8.3 - 10.6 mg/dL   Status: Final  Amylase                                       Date: 06/17/2021  Value: 62          Ref range: 25 - 115 U/L       Status: Final  Color, UA                                     Date: 06/17/2021  Value: YELLOW      Ref range: Straw/Yellow       Status: Final  Clarity, UA                                   Date: 06/17/2021  Value: Clear       Ref range: Clear              Status: Final  Glucose, Ur                                   Date: 06/17/2021  Value: Negative    Ref range: Negative mg/dL     Status: Final  Bilirubin Urine                               Date: 06/17/2021  Value: Negative    Ref range: Negative           Status: Final  Ketones, Urine                                Date: 06/17/2021  Value: Negative    Ref range: Negative mg/dL     Status: Final  Specific [place], UA                          Date: 06/17/2021  Value: 1.030       Ref range: 1.005 - 1.030      Status: Final  Blood, Urine                                  Date: 06/17/2021  Value: Negative    Ref range: Negative           Status: Final  pH, UA                                        Date: 06/17/2021  Value: 6.0         Ref range: 5.0 - 8.0          Status: Final  Protein, UA                                   Date: 06/17/2021  Value: Negative    Ref Date: 06/17/2021  Value: 24          Ref range: 10 - 40 U/L        Status: Final  AST                                           Date: 06/17/2021  Value: 28          Ref range: 15 - 37 U/L        Status: Final  Total Bilirubin                               Date: 06/17/2021  Value: 0.4         Ref range: 0.0 - 1.0 mg/dL    Status: Final  Bilirubin, Direct                             Date: 06/17/2021  Value: <0.2        Ref range: 0.0 - 0.3 mg/dL    Status: Final  Bilirubin, Indirect                           Date: 06/17/2021  Value: see below   Ref range: 0.0 - 1.0 mg/dL    Status: Final                Comment: Indirect Bilirubin cannot be calculated since Total Bilirubin  and/or Direct Bilirubin is below measurable range.    ------------  Bowel movement down to only once a day now and its not solid yet. Abdominal pain is much better and upper part of the abdomen no longer tender to examination today and he is eating bland foods and eating more and doing better. And he has stopped ibuprofen. Explained at length in the future to make sure the bland diet and can take Tylenol and Pepto-Bismol and yogurt oatmeal rice cereal all those kind of foods to help with the diarrhea in future      Review of Systems   Constitutional: Negative for appetite change, chills, fever and unexpected weight change. HENT: Negative for congestion, ear discharge, ear pain, nosebleeds, rhinorrhea, sinus pressure, sinus pain, sore throat and trouble swallowing. Eyes: Negative for pain and discharge. Respiratory: Negative for cough, chest tightness, shortness of breath and wheezing. Cardiovascular: Negative for chest pain, palpitations and leg swelling. Gastrointestinal: Negative for abdominal pain, blood in stool, nausea and vomiting. Endocrine: Negative for polydipsia and polyphagia. Genitourinary: Negative for difficulty urinating, enuresis, flank pain and hematuria.    Musculoskeletal: Negative for myalgias. Skin: Negative for rash. Neurological: Negative for facial asymmetry, weakness, light-headedness, numbness and headaches. Psychiatric/Behavioral: Negative for confusion. Current Outpatient Medications on File Prior to Visit   Medication Sig Dispense Refill    famotidine (PEPCID) 20 MG tablet Take 1 tablet by mouth 2 times daily 60 tablet 0     No current facility-administered medications on file prior to visit. Past Medical History:   Diagnosis Date    Acne     Childhood asthma     Migraine headache with aura 10/18/2012      Social History     Tobacco Use    Smoking status: Former Smoker     Packs/day: 0.50     Types: Cigarettes     Quit date: 2018     Years since quittin.6    Smokeless tobacco: Former User     Types: Chew     Quit date: 2020   Substance Use Topics    Alcohol use: No      Family History   Problem Relation Age of Onset    Asthma Sister     Diabetes Father     Cancer Mother         breast     High Cholesterol Mother     Other Mother         Imparied glucose torito.  Lung Cancer Maternal Grandmother     Lung Cancer Maternal Grandfather         Vitals:    21 1546   BP: 118/74   Site: Left Upper Arm   Position: Sitting   Cuff Size: Medium Adult   Pulse: 58   Resp: 16   Temp: 98.5 °F (36.9 °C)   SpO2: 98%   Weight: 281 lb 6.4 oz (127.6 kg)   Height: 5' 11\" (1.803 m)     Estimated body mass index is 39.25 kg/m² as calculated from the following:    Height as of this encounter: 5' 11\" (1.803 m). Weight as of this encounter: 281 lb 6.4 oz (127.6 kg). Physical Exam  Vitals and nursing note reviewed. Constitutional:       General: He is not in acute distress. HENT:      Head: Normocephalic and atraumatic. Right Ear: External ear normal.      Left Ear: External ear normal.   Eyes:      General: Lids are normal.      Conjunctiva/sclera: Conjunctivae normal.      Pupils: Pupils are equal, round, and reactive to light.    Neck: Thyroid: No thyromegaly. Vascular: No JVD. Trachea: No tracheal deviation. Cardiovascular:      Rate and Rhythm: Normal rate and regular rhythm. Heart sounds: Normal heart sounds. No gallop. Pulmonary:      Effort: Pulmonary effort is normal. No respiratory distress. Breath sounds: Normal breath sounds. No wheezing or rales. Abdominal:      General: Bowel sounds are normal.      Palpations: Abdomen is soft. There is no mass. Tenderness: There is no abdominal tenderness. Musculoskeletal:         General: No tenderness. Cervical back: Neck supple. Comments: No leg edema or calf tenderness   Lymphadenopathy:      Cervical: No cervical adenopathy. Skin:     General: Skin is warm and dry. Findings: No rash. Neurological:      Mental Status: He is alert and oriented to person, place, and time. Cranial Nerves: No cranial nerve deficit. Sensory: No sensory deficit. Psychiatric:         Behavior: Behavior normal.         Thought Content: Thought content normal.         ASSESSMENT/PLAN:  1. Diarrhea of presumed infectious origin  [place] foods    2. Class 2 obesity due to excess calories without serious comorbidity with body mass index (BMI) of 39.0 to 39.9 in adult    Lose weight down to 200 pounds over couple of years. Return if symptoms worsen or fail to improve. Patient Instructions   Keep bland diet and increase food and keep electrolyte solutions. Or Cran apple juice and rice or oatmeal and yogurt smoothies. Extensive counseling done regarding DIET AND EXERCISE to lose weight to avoid morbidities associated with overweight. Try to do SCHEDULED 3 - 4 miles brisk walk or elliptical machine use at least 4 days a week and  Dumbbell 2-5 lb arm exercises--4 sets of 4 group arm muscles -- 4 days a week.     Breakfast : 4 egg white omelet or scrambled eggs with bell pepper,onion,tomato,spinach etc or boiled eggs for breakfast.     Lunch : Deck of card size meat (baked, broiled or grilled ) with leafy vegetables - spinach / kale / mustard green / lettuce etc. for salad. Supper : [employer] grilled meats -deck of cards sized with 3/4th dinner plate full of vegetables -green bean or broccoli or cauliflower or carrots. If needed , buy bread 35 to 40 kcal- two slices at a time only and Tortillas 50- 90 kcal only at one meal.    Least or no bread, potato, pasta, highly processed foods, fried foods, sweets etc.    Do annual physical in 8 to 9 months with checking sugar and cholesterol. Electronically signed by Nehemias Prince MD on 6/21/2021 at 4:30 PM     This dictation was generated by voice recognition computer software. Although all attempts are made to edit the dictation for accuracy, there may be errors in the transcription that are not intended.

## 2021-07-14 RX ORDER — CEPHALEXIN 250 MG/1
CAPSULE ORAL
Qty: 60 CAPSULE | Refills: 3 | Status: SHIPPED | OUTPATIENT
Start: 2021-07-14

## 2021-07-14 NOTE — TELEPHONE ENCOUNTER
Pt and daughter Patel Kim called regarding refill request. Per Dr Cecil Varghese last note in Apr, had recommended pt take Keflex every other day x 3 mo and f/u after PTNS. Pt reports she tried every other day for 2wks and then her freq, dysuria sx returned.  Pt has

## 2021-07-29 DIAGNOSIS — E78.00 PURE HYPERCHOLESTEROLEMIA: ICD-10-CM

## 2021-07-31 RX ORDER — PRAVASTATIN SODIUM 10 MG
TABLET ORAL
Qty: 90 TABLET | Refills: 0 | Status: SHIPPED | OUTPATIENT
Start: 2021-07-31 | End: 2022-01-03

## 2021-07-31 NOTE — TELEPHONE ENCOUNTER
Passed protocol        Requesting PRAVASTATIN SODIUM 10 MG Oral Tab  LOV: 6/2/21  RTC: NA  Last Relevant Labs: 9/11/20  Filled: 3/11/21 #90 with 0 refills    No future appointments.

## 2021-09-01 ENCOUNTER — HOSPITAL ENCOUNTER (OUTPATIENT)
Dept: GENERAL RADIOLOGY | Age: 81
Discharge: HOME | End: 2021-09-01
Attending: INTERNAL MEDICINE
Payer: MEDICARE

## 2021-09-01 ENCOUNTER — OFFICE VISIT (OUTPATIENT)
Dept: INTERNAL MEDICINE CLINIC | Facility: CLINIC | Age: 81
End: 2021-09-01
Payer: MEDICARE

## 2021-09-01 VITALS
BODY MASS INDEX: 24.41 KG/M2 | RESPIRATION RATE: 12 BRPM | WEIGHT: 132.63 LBS | HEART RATE: 52 BPM | DIASTOLIC BLOOD PRESSURE: 52 MMHG | HEIGHT: 62 IN | TEMPERATURE: 98 F | SYSTOLIC BLOOD PRESSURE: 116 MMHG

## 2021-09-01 DIAGNOSIS — M25.561 CHRONIC PAIN OF RIGHT KNEE: ICD-10-CM

## 2021-09-01 DIAGNOSIS — Z85.3 HISTORY OF BREAST CANCER: ICD-10-CM

## 2021-09-01 DIAGNOSIS — E03.4 HYPOTHYROIDISM DUE TO ACQUIRED ATROPHY OF THYROID: ICD-10-CM

## 2021-09-01 DIAGNOSIS — R22.41 NODULE OF SKIN OF RIGHT LOWER EXTREMITY: ICD-10-CM

## 2021-09-01 DIAGNOSIS — Z12.31 ENCOUNTER FOR SCREENING MAMMOGRAM FOR MALIGNANT NEOPLASM OF BREAST: ICD-10-CM

## 2021-09-01 DIAGNOSIS — G89.29 CHRONIC PAIN OF RIGHT KNEE: ICD-10-CM

## 2021-09-01 DIAGNOSIS — E11.9 TYPE 2 DIABETES MELLITUS WITHOUT COMPLICATION, WITHOUT LONG-TERM CURRENT USE OF INSULIN (HCC): ICD-10-CM

## 2021-09-01 DIAGNOSIS — G89.29 CHRONIC PAIN OF RIGHT KNEE: Primary | ICD-10-CM

## 2021-09-01 DIAGNOSIS — M19.90 ARTHRITIS: ICD-10-CM

## 2021-09-01 DIAGNOSIS — H93.8X1 IRRITATION OF RIGHT EAR: ICD-10-CM

## 2021-09-01 DIAGNOSIS — E78.5 HYPERLIPIDEMIA, UNSPECIFIED HYPERLIPIDEMIA TYPE: ICD-10-CM

## 2021-09-01 DIAGNOSIS — M25.561 CHRONIC PAIN OF RIGHT KNEE: Primary | ICD-10-CM

## 2021-09-01 DIAGNOSIS — Z12.39 ENCOUNTER FOR SCREENING FOR MALIGNANT NEOPLASM OF BREAST, UNSPECIFIED SCREENING MODALITY: ICD-10-CM

## 2021-09-01 PROCEDURE — 99214 OFFICE O/P EST MOD 30 MIN: CPT | Performed by: INTERNAL MEDICINE

## 2021-09-01 PROCEDURE — 73560 X-RAY EXAM OF KNEE 1 OR 2: CPT | Performed by: INTERNAL MEDICINE

## 2021-09-01 RX ORDER — HYDROCORTISONE AND ACETIC ACID 1.1; 2.41 G/100ML; G/100ML
3 SOLUTION AURICULAR (OTIC) 3 TIMES DAILY
Qty: 10 ML | Refills: 0 | Status: SHIPPED | OUTPATIENT
Start: 2021-09-01 | End: 2021-09-03

## 2021-09-01 RX ORDER — AZELASTINE 1 MG/ML
1 SPRAY, METERED NASAL 2 TIMES DAILY
COMMUNITY
End: 2021-11-16

## 2021-09-01 NOTE — PROGRESS NOTES
HPI/Subjective:   Ever Tadeo is a [de-identified]year old female with past medical history of HTN, HLD, hypothyroidism, CAD, DM, GERD, DCIS of R breast (1992) who presents for No chief complaint on file.      Patient of Dr. Jericho Callaway who presents for the following: tablet     • AYR 0.65 % Nasal Solution 2 sprays by Nasal route 2 (two) times daily. • D-Mannose 500 MG Oral Cap Take by mouth.      • EZETIMIBE 10 MG Oral Tab TAKE 1 TABLET(10 MG) BY MOUTH EVERY NIGHT 90 tablet 0   • Omeprazole 10 MG Oral Capsule [place] or wheezing   Cardiovascular: RRR; S1, S2; no murmurs; no carotid bruits; no edema   Neurological: awake, alert, oriented x3; CNII-XII grossly intact;  MSK: full ROM in BL LE; strength 5/5 no swelling of knees on exam today   Behavioral/Psych: euthymic; ap

## 2021-09-02 ENCOUNTER — TELEPHONE (OUTPATIENT)
Dept: INTERNAL MEDICINE CLINIC | Facility: CLINIC | Age: 81
End: 2021-09-02

## 2021-09-02 NOTE — TELEPHONE ENCOUNTER
pts daughter called and stated the rx that was sent is costing almost $200.  Requesting for an alternative     Hydrocortisone-Acetic Acid 1-2 % Otic Solution

## 2021-09-03 RX ORDER — FLUOCINOLONE ACETONIDE 0.11 MG/ML
5 OIL AURICULAR (OTIC) 2 TIMES DAILY
Qty: 20 ML | Refills: 0 | Status: SHIPPED | OUTPATIENT
Start: 2021-09-03 | End: 2021-09-10

## 2021-09-08 DIAGNOSIS — E03.4 HYPOTHYROIDISM DUE TO ACQUIRED ATROPHY OF THYROID: Chronic | ICD-10-CM

## 2021-09-08 DIAGNOSIS — E78.00 PURE HYPERCHOLESTEROLEMIA: ICD-10-CM

## 2021-09-08 NOTE — TELEPHONE ENCOUNTER
Left detailed voicemail on patient primary number, stating alternative rx has been sent to her local pharmacy.

## 2021-09-09 RX ORDER — LEVOTHYROXINE SODIUM 88 UG/1
TABLET ORAL
Qty: 90 TABLET | Refills: 0 | Status: SHIPPED | OUTPATIENT
Start: 2021-09-09 | End: 2021-12-08

## 2021-09-09 RX ORDER — EZETIMIBE 10 MG/1
TABLET ORAL
Qty: 90 TABLET | Refills: 0 | Status: SHIPPED | OUTPATIENT
Start: 2021-09-09 | End: 2021-12-08

## 2021-09-09 NOTE — TELEPHONE ENCOUNTER
Labs ordered on 9/1/2021 - not complete; sent pt a reminder [employer] message.      Passed protocol - Ezetimibe  Failed protocol - Levothyroxine    Last refill:  EZETIMIBE 10 MG Oral Tab 90 tablet 0 3/11/2021    Sig:   TAKE 1 TABLET(10 MG) BY MOUTH EVERY NIGH

## 2021-09-15 ENCOUNTER — OFFICE VISIT (OUTPATIENT)
Dept: ORTHOPEDICS CLINIC | Facility: CLINIC | Age: 81
End: 2021-09-15
Payer: MEDICARE

## 2021-09-15 VITALS — HEIGHT: 63 IN | OXYGEN SATURATION: 99 % | BODY MASS INDEX: 23.53 KG/M2 | WEIGHT: 132.81 LBS | HEART RATE: 53 BPM

## 2021-09-15 DIAGNOSIS — M17.11 PRIMARY OSTEOARTHRITIS OF RIGHT KNEE: Primary | ICD-10-CM

## 2021-09-15 DIAGNOSIS — M76.31 ILIOTIBIAL BAND TENDONITIS OF RIGHT SIDE: ICD-10-CM

## 2021-09-15 PROCEDURE — 20610 DRAIN/INJ JOINT/BURSA W/O US: CPT | Performed by: ORTHOPAEDIC SURGERY

## 2021-09-15 PROCEDURE — 99213 OFFICE O/P EST LOW 20 MIN: CPT | Performed by: ORTHOPAEDIC SURGERY

## 2021-09-15 RX ORDER — TRIAMCINOLONE ACETONIDE 40 MG/ML
40 INJECTION, SUSPENSION INTRA-ARTICULAR; INTRAMUSCULAR ONCE
Status: COMPLETED | OUTPATIENT
Start: 2021-09-15 | End: 2021-09-15

## 2021-09-15 RX ADMIN — TRIAMCINOLONE ACETONIDE 40 MG: 40 INJECTION, SUSPENSION INTRA-ARTICULAR; INTRAMUSCULAR at 12:24:00

## 2021-09-15 NOTE — PROGRESS NOTES
[facility] Progress Note    Subjective: Patient reports pain in the right knee for the past month. Pain is around the outside of the knee. Pain does not radiate. Pain is worse with weightbearing as well as with rest and attempting to sleep.     Obj

## 2021-10-01 ENCOUNTER — MED REC SCAN ONLY (OUTPATIENT)
Dept: ORTHOPEDICS CLINIC | Facility: CLINIC | Age: 81
End: 2021-10-01

## 2021-10-11 ENCOUNTER — IMMUNIZATION (OUTPATIENT)
Dept: LAB | Facility: HOSPITAL | Age: 81
End: 2021-10-11
Attending: EMERGENCY MEDICINE
Payer: MEDICARE

## 2021-10-11 ENCOUNTER — TELEPHONE (OUTPATIENT)
Dept: INTERNAL MEDICINE CLINIC | Facility: CLINIC | Age: 81
End: 2021-10-11

## 2021-10-11 DIAGNOSIS — Z23 NEED FOR VACCINATION: Primary | ICD-10-CM

## 2021-10-11 PROCEDURE — 0003A SARSCOV2 VAC 30MCG/0.3ML IM: CPT

## 2021-10-11 NOTE — TELEPHONE ENCOUNTER
Pts daughter called stating when they called [facility] to schedule her mammogram, they stated the order is incorrect.  They advised pts daughter to contact our office to delete the order and re-order the mammogram.     Pts daughter states the pt got a mastectomy o

## 2021-10-11 NOTE — TELEPHONE ENCOUNTER
Spoke with Nikole Thurston notified new order mammogram placed. Nikole Thurston verbalized understanding.

## 2021-10-19 ENCOUNTER — TELEPHONE (OUTPATIENT)
Dept: UROLOGY | Facility: HOSPITAL | Age: 81
End: 2021-10-19

## 2021-10-19 ENCOUNTER — LAB ENCOUNTER (OUTPATIENT)
Dept: LAB | Age: 81
End: 2021-10-19
Attending: OBSTETRICS & GYNECOLOGY
Payer: MEDICARE

## 2021-10-19 DIAGNOSIS — R30.0 DYSURIA: Primary | ICD-10-CM

## 2021-10-19 PROCEDURE — 87086 URINE CULTURE/COLONY COUNT: CPT

## 2021-10-19 RX ORDER — NITROFURANTOIN 25; 75 MG/1; MG/1
100 CAPSULE ORAL 2 TIMES DAILY
Qty: 14 CAPSULE | Refills: 0 | Status: SHIPPED | OUTPATIENT
Start: 2021-10-19 | End: 2021-10-26

## 2021-10-19 RX ORDER — PHENAZOPYRIDINE HYDROCHLORIDE 200 MG/1
200 TABLET, FILM COATED ORAL 3 TIMES DAILY PRN
Qty: 10 TABLET | Refills: 0 | Status: SHIPPED | OUTPATIENT
Start: 2021-10-19 | End: 2021-11-16

## 2021-10-19 NOTE — TELEPHONE ENCOUNTER
Daughter Maria [address] called, feels her Mom is getting a UTI,c/o dysuria, requesting order placed for urine culture, order placed in Epic for  urine culture, LEO Jaquez Pyridium 200 mg one tab po TID as needed #10, Macrobid 100 mg tabs, one tab po BID fo

## 2021-11-01 ENCOUNTER — MED REC SCAN ONLY (OUTPATIENT)
Dept: ORTHOPEDICS CLINIC | Facility: CLINIC | Age: 81
End: 2021-11-01

## 2021-11-04 ENCOUNTER — TELEPHONE (OUTPATIENT)
Dept: INTERNAL MEDICINE CLINIC | Facility: CLINIC | Age: 81
End: 2021-11-04

## 2021-11-04 NOTE — TELEPHONE ENCOUNTER
Spoke with patient's daughter/Beryl, reporting s/s ongoing for the past few weeks    Shortness of breath   Light-headedness  Nausea  Hot flashes  Paleness    Denies chest pain, fever, cough. No other s/s at this time.      Patient's daughter states she was

## 2021-11-11 ENCOUNTER — LAB ENCOUNTER (OUTPATIENT)
Dept: LAB | Age: 81
End: 2021-11-11
Attending: INTERNAL MEDICINE
Payer: MEDICARE

## 2021-11-11 ENCOUNTER — HOSPITAL ENCOUNTER (OUTPATIENT)
Dept: MAMMOGRAPHY | Age: 81
Discharge: HOME | End: 2021-11-11
Attending: INTERNAL MEDICINE
Payer: MEDICARE

## 2021-11-11 DIAGNOSIS — Z85.3 HISTORY OF BREAST CANCER: ICD-10-CM

## 2021-11-11 DIAGNOSIS — E11.9 TYPE 2 DIABETES MELLITUS WITHOUT COMPLICATION, WITHOUT LONG-TERM CURRENT USE OF INSULIN (HCC): ICD-10-CM

## 2021-11-11 DIAGNOSIS — E78.5 HYPERLIPIDEMIA, UNSPECIFIED HYPERLIPIDEMIA TYPE: ICD-10-CM

## 2021-11-11 DIAGNOSIS — E03.4 HYPOTHYROIDISM DUE TO ACQUIRED ATROPHY OF THYROID: ICD-10-CM

## 2021-11-11 PROCEDURE — 80053 COMPREHEN METABOLIC PANEL: CPT

## 2021-11-11 PROCEDURE — 82043 UR ALBUMIN QUANTITATIVE: CPT

## 2021-11-11 PROCEDURE — 84443 ASSAY THYROID STIM HORMONE: CPT

## 2021-11-11 PROCEDURE — 83036 HEMOGLOBIN GLYCOSYLATED A1C: CPT

## 2021-11-11 PROCEDURE — 82570 ASSAY OF URINE CREATININE: CPT

## 2021-11-11 PROCEDURE — 77065 DX MAMMO INCL CAD UNI: CPT | Performed by: INTERNAL MEDICINE

## 2021-11-11 PROCEDURE — 77061 BREAST TOMOSYNTHESIS UNI: CPT | Performed by: INTERNAL MEDICINE

## 2021-11-11 PROCEDURE — 36415 COLL VENOUS BLD VENIPUNCTURE: CPT

## 2021-11-16 ENCOUNTER — OFFICE VISIT (OUTPATIENT)
Dept: INTERNAL MEDICINE CLINIC | Facility: CLINIC | Age: 81
End: 2021-11-16
Payer: MEDICARE

## 2021-11-16 VITALS
HEART RATE: 68 BPM | SYSTOLIC BLOOD PRESSURE: 122 MMHG | HEIGHT: 62 IN | WEIGHT: 132 LBS | RESPIRATION RATE: 16 BRPM | DIASTOLIC BLOOD PRESSURE: 60 MMHG | OXYGEN SATURATION: 98 % | TEMPERATURE: 98 F | BODY MASS INDEX: 24.29 KG/M2

## 2021-11-16 DIAGNOSIS — I25.810 CORONARY ARTERY DISEASE INVOLVING CORONARY BYPASS GRAFT OF NATIVE HEART WITHOUT ANGINA PECTORIS: Chronic | ICD-10-CM

## 2021-11-16 DIAGNOSIS — E11.9 TYPE 2 DIABETES MELLITUS WITHOUT COMPLICATION, WITHOUT LONG-TERM CURRENT USE OF INSULIN (HCC): Chronic | ICD-10-CM

## 2021-11-16 DIAGNOSIS — E78.00 PURE HYPERCHOLESTEROLEMIA: Chronic | ICD-10-CM

## 2021-11-16 DIAGNOSIS — C50.911 MALIGNANT NEOPLASM OF RIGHT FEMALE BREAST, UNSPECIFIED ESTROGEN RECEPTOR STATUS, UNSPECIFIED SITE OF BREAST (HCC): ICD-10-CM

## 2021-11-16 DIAGNOSIS — Z00.00 ROUTINE GENERAL MEDICAL EXAMINATION AT A HEALTH CARE FACILITY: Primary | ICD-10-CM

## 2021-11-16 DIAGNOSIS — E55.9 VITAMIN D DEFICIENCY: ICD-10-CM

## 2021-11-16 DIAGNOSIS — Z79.4 TYPE 2 DIABETES MELLITUS WITH BOTH EYES AFFECTED BY MILD NONPROLIFERATIVE RETINOPATHY WITHOUT MACULAR EDEMA, WITH LONG-TERM CURRENT USE OF INSULIN (HCC): ICD-10-CM

## 2021-11-16 DIAGNOSIS — E11.3293 TYPE 2 DIABETES MELLITUS WITH BOTH EYES AFFECTED BY MILD NONPROLIFERATIVE RETINOPATHY WITHOUT MACULAR EDEMA, WITH LONG-TERM CURRENT USE OF INSULIN (HCC): ICD-10-CM

## 2021-11-16 PROBLEM — N39.0 RECURRENT UTI: Status: RESOLVED | Noted: 2020-07-30 | Resolved: 2021-11-16

## 2021-11-16 PROCEDURE — G0439 PPPS, SUBSEQ VISIT: HCPCS | Performed by: INTERNAL MEDICINE

## 2021-11-16 PROCEDURE — 90662 IIV NO PRSV INCREASED AG IM: CPT | Performed by: INTERNAL MEDICINE

## 2021-11-16 PROCEDURE — 93000 ELECTROCARDIOGRAM COMPLETE: CPT | Performed by: INTERNAL MEDICINE

## 2021-11-16 PROCEDURE — G0008 ADMIN INFLUENZA VIRUS VAC: HCPCS | Performed by: INTERNAL MEDICINE

## 2021-11-16 RX ORDER — IPRATROPIUM BROMIDE 42 UG/1
SPRAY, METERED NASAL
COMMUNITY
Start: 2021-11-12

## 2021-11-16 RX ORDER — FLUOCINOLONE ACETONIDE 0.11 MG/ML
OIL AURICULAR (OTIC)
COMMUNITY
Start: 2021-09-12 | End: 2022-02-01

## 2021-11-16 NOTE — PROGRESS NOTES
Jeramy Sullivan   is a [de-identified]year old female.     Patient presents with:  Wellness Visit: AWV       HPI:   No new cc  Current Outpatient Medications   Medication Sig Dispense Refill   • Ipratropium Bromide 0.06 % Nasal Solution USE 2 SPRAYS IN [facility] artery    • Diabetes [facility])     Diet controlled   • Diverticulosis    • Ductal carcinoma in situ (DCIS) of right breast, 1992 8/4/2015   • Esophageal reflux    • Exposure to unspecified radiation     last dose 1992   • Female stress incontinence 4/18/2016 OF SYSTEMS:     Review of Systems   General/Constitutional:   General able to do usual activities, good exercise tolerance, good general state of health, no fatigue, no fever, no weakness, no weight loss or gain .    HEENT/Neck:   Head no headache, no dizzi no. Muscle weakness none. Peripheral Vascular:   General no varicosities, no claudication. Dermatologic:   Rash no. Neurologic:   Patient denies dizziness, fainting, loss of consciousness, weakness. Memory loss none. Tingling/numbness none.  Trouble w secondary to old fx  NEUROLOGICAL:   Babinski: negative/all reflexes are normal.   Cerebellar Testing grossly/intact: yes. Cranial Nerves: CN's II-XII grossly intact. Gait: normal.   Mental Status: Alert & oriented x 3.    Motor: power-normal/tone -norm months (around 5/16/2022).   Kayla Redd MD

## 2021-12-01 ENCOUNTER — MED REC SCAN ONLY (OUTPATIENT)
Dept: ORTHOPEDICS CLINIC | Facility: CLINIC | Age: 81
End: 2021-12-01

## 2021-12-04 DIAGNOSIS — E78.00 PURE HYPERCHOLESTEROLEMIA: ICD-10-CM

## 2021-12-04 DIAGNOSIS — E03.4 HYPOTHYROIDISM DUE TO ACQUIRED ATROPHY OF THYROID: Chronic | ICD-10-CM

## 2021-12-06 ENCOUNTER — OFFICE VISIT (OUTPATIENT)
Dept: ORTHOPEDICS CLINIC | Facility: CLINIC | Age: 81
End: 2021-12-06
Payer: MEDICARE

## 2021-12-06 ENCOUNTER — HOSPITAL ENCOUNTER (OUTPATIENT)
Dept: GENERAL RADIOLOGY | Age: 81
Discharge: HOME | End: 2021-12-06
Attending: ORTHOPAEDIC SURGERY
Payer: MEDICARE

## 2021-12-06 VITALS — HEART RATE: 61 BPM | OXYGEN SATURATION: 98 %

## 2021-12-06 DIAGNOSIS — M17.11 PRIMARY OSTEOARTHRITIS OF RIGHT KNEE: ICD-10-CM

## 2021-12-06 DIAGNOSIS — M17.11 PRIMARY OSTEOARTHRITIS OF RIGHT KNEE: Primary | ICD-10-CM

## 2021-12-06 PROCEDURE — 73564 X-RAY EXAM KNEE 4 OR MORE: CPT | Performed by: ORTHOPAEDIC SURGERY

## 2021-12-06 PROCEDURE — 99213 OFFICE O/P EST LOW 20 MIN: CPT | Performed by: ORTHOPAEDIC SURGERY

## 2021-12-06 NOTE — PROGRESS NOTES
[facility] Progress Note    Subjective: Patient returns for discussion of her right knee. She states that the injection gave good relief for about a month but then wore off.   She has been doing physical therapy, notes that this has made somewhat of injection appointment, advised we can get her in within the week but if there are any issues she will send a [employer] message.     George Falcon MD, [address] Orthopedic Surgery  Phone [phone]  Fax [phone]

## 2021-12-08 ENCOUNTER — LAB ENCOUNTER (OUTPATIENT)
Dept: LAB | Age: 81
End: 2021-12-08
Attending: INTERNAL MEDICINE
Payer: MEDICARE

## 2021-12-08 DIAGNOSIS — E78.00 PURE HYPERCHOLESTEROLEMIA: Chronic | ICD-10-CM

## 2021-12-08 DIAGNOSIS — Z00.00 ROUTINE GENERAL MEDICAL EXAMINATION AT A HEALTH CARE FACILITY: ICD-10-CM

## 2021-12-08 PROCEDURE — 36415 COLL VENOUS BLD VENIPUNCTURE: CPT

## 2021-12-08 PROCEDURE — 80061 LIPID PANEL: CPT

## 2021-12-08 PROCEDURE — 85025 COMPLETE CBC W/AUTO DIFF WBC: CPT

## 2021-12-08 RX ORDER — EZETIMIBE 10 MG/1
TABLET ORAL
Qty: 90 TABLET | Refills: 0 | Status: SHIPPED | OUTPATIENT
Start: 2021-12-08 | End: 2022-02-07

## 2021-12-08 RX ORDER — LEVOTHYROXINE SODIUM 88 UG/1
TABLET ORAL
Qty: 90 TABLET | Refills: 0 | Status: SHIPPED | OUTPATIENT
Start: 2021-12-08

## 2021-12-14 ENCOUNTER — TELEMEDICINE (OUTPATIENT)
Dept: INTERNAL MEDICINE CLINIC | Facility: CLINIC | Age: 81
End: 2021-12-14

## 2021-12-14 ENCOUNTER — TELEPHONE (OUTPATIENT)
Dept: INTERNAL MEDICINE CLINIC | Facility: CLINIC | Age: 81
End: 2021-12-14

## 2021-12-14 DIAGNOSIS — Z11.52 ENCOUNTER FOR SCREENING FOR COVID-19: Primary | ICD-10-CM

## 2021-12-14 DIAGNOSIS — J32.9 SINUSITIS, UNSPECIFIED CHRONICITY, UNSPECIFIED LOCATION: Primary | ICD-10-CM

## 2021-12-14 PROCEDURE — 99213 OFFICE O/P EST LOW 20 MIN: CPT | Performed by: INTERNAL MEDICINE

## 2021-12-14 RX ORDER — DOXYCYCLINE HYCLATE 100 MG/1
100 CAPSULE ORAL 2 TIMES DAILY
Qty: 14 CAPSULE | Refills: 0 | Status: SHIPPED | OUTPATIENT
Start: 2021-12-14 | End: 2021-12-21

## 2021-12-14 NOTE — PROGRESS NOTES
Subjective:   Luis Simental is a [de-identified]year old female  who presents for Sinus Problem     2 weeks of phlegm and congestion. Some facial tenderness. Has been trying saline nasal spray with only temporary relief. Denies f/c/sob.        History/Other:   Ch positives and negatives noted in the the HPI. Objective: There were no vitals taken for this visit. Estimated body mass index is 24.14 kg/m² as calculated from the following:    Height as of 11/16/21: 5' 2\" (1.575 m).     Weight as of 11/16/21: 13

## 2021-12-14 NOTE — TELEPHONE ENCOUNTER
Pt scheduled via "[employer]"hart for [place] office appointment today at 2:20pm for possible sinus infection, no recent covid testing on file. Ok to keep in office?     Future Appointments   Date Time Provider Scott Hernandez   12/14/2021  2:20 PM Lynette Arteaga,

## 2021-12-14 NOTE — TELEPHONE ENCOUNTER
Called patient, Informed of covid test order and appointment change.  Daughter stated she isn't going to do the covid test.     JAVIER

## 2021-12-20 ENCOUNTER — TELEPHONE (OUTPATIENT)
Dept: INTERNAL MEDICINE CLINIC | Facility: CLINIC | Age: 81
End: 2021-12-20

## 2021-12-20 NOTE — TELEPHONE ENCOUNTER
Form faxed with confirmation. Copy made and placed in accordion file pod 2. Original sent to scanning.

## 2022-01-01 DIAGNOSIS — E78.00 PURE HYPERCHOLESTEROLEMIA: ICD-10-CM

## 2022-01-03 RX ORDER — PRAVASTATIN SODIUM 10 MG
TABLET ORAL
Qty: 90 TABLET | Refills: 0 | Status: SHIPPED | OUTPATIENT
Start: 2022-01-03

## 2022-01-10 NOTE — TELEPHONE ENCOUNTER
Faxed [facility] Detailed Written Order to #[phone]. Signed and dated by Dr. Rosa Andrew 1/10/2022. Original to scan, copy in Dr. Saravia Colder file.

## 2022-01-13 RX ORDER — LANCETS 30 GAUGE
EACH MISCELLANEOUS
Qty: 100 EACH | Refills: 0 | Status: SHIPPED | OUTPATIENT
Start: 2022-01-13

## 2022-02-01 ENCOUNTER — OFFICE VISIT (OUTPATIENT)
Dept: INTERNAL MEDICINE CLINIC | Facility: CLINIC | Age: 82
End: 2022-02-01
Payer: MEDICARE

## 2022-02-01 VITALS
HEART RATE: 66 BPM | RESPIRATION RATE: 14 BRPM | OXYGEN SATURATION: 99 % | DIASTOLIC BLOOD PRESSURE: 62 MMHG | TEMPERATURE: 98 F | BODY MASS INDEX: 24.84 KG/M2 | WEIGHT: 135 LBS | HEIGHT: 62 IN | SYSTOLIC BLOOD PRESSURE: 110 MMHG

## 2022-02-01 DIAGNOSIS — E04.2 MULTIPLE THYROID NODULES: Primary | ICD-10-CM

## 2022-02-01 PROCEDURE — 99213 OFFICE O/P EST LOW 20 MIN: CPT | Performed by: INTERNAL MEDICINE

## 2022-02-01 RX ORDER — CHLORHEXIDINE GLUCONATE 0.12 MG/ML
15 RINSE ORAL NIGHTLY
Status: ON HOLD | COMMUNITY
Start: 2021-12-27 | End: 2022-03-08

## 2022-02-01 RX ORDER — BLOOD SUGAR DIAGNOSTIC
STRIP MISCELLANEOUS DAILY
COMMUNITY
Start: 2021-12-13 | End: 2022-03-22

## 2022-02-01 RX ORDER — BLOOD-GLUCOSE METER
1 EACH MISCELLANEOUS DAILY
COMMUNITY
Start: 2021-12-13

## 2022-02-01 NOTE — PATIENT INSTRUCTIONS
We will make an attempt to do the and obtain those records further recommendations will be done then

## 2022-02-02 ENCOUNTER — HOSPITAL ENCOUNTER (OUTPATIENT)
Dept: ULTRASOUND IMAGING | Age: 82
Discharge: HOME | End: 2022-02-02
Attending: INTERNAL MEDICINE
Payer: MEDICARE

## 2022-02-02 DIAGNOSIS — E04.2 MULTIPLE THYROID NODULES: ICD-10-CM

## 2022-02-02 PROCEDURE — 76536 US EXAM OF HEAD AND NECK: CPT | Performed by: INTERNAL MEDICINE

## 2022-02-07 RX ORDER — EZETIMIBE 10 MG/1
TABLET ORAL
Qty: 90 TABLET | Refills: 1 | Status: SHIPPED | OUTPATIENT
Start: 2022-02-07

## 2022-02-07 NOTE — TELEPHONE ENCOUNTER
Ezetimibe 10 mg  Filled 12-8-21  Qty 90  0 refills  No upcoming appt.   LOV 11-16-21  Labs: 12-8-21: Lipid

## 2022-02-17 RX ORDER — LOSARTAN POTASSIUM 25 MG/1
TABLET ORAL
Qty: 90 TABLET | Refills: 0 | Status: SHIPPED | OUTPATIENT
Start: 2022-02-17

## 2022-02-17 NOTE — TELEPHONE ENCOUNTER
Protocol passed     Requesting: Losartan 25mg     LOV: 2/1/22   RTC: no follow ups on file   Filled: patient reported   Recent Labs: 11/11/21     Upcoming OV: none scheduled 19:35

## 2022-03-08 ENCOUNTER — HOSPITAL ENCOUNTER (OUTPATIENT)
Facility: HOSPITAL | Age: 82
Setting detail: OBSERVATION
Discharge: HOME | End: 2022-03-09
Attending: EMERGENCY MEDICINE | Admitting: HOSPITALIST
Payer: MEDICARE

## 2022-03-08 ENCOUNTER — APPOINTMENT (OUTPATIENT)
Dept: CT IMAGING | Facility: HOSPITAL | Age: 82
End: 2022-03-08
Attending: EMERGENCY MEDICINE
Payer: MEDICARE

## 2022-03-08 ENCOUNTER — HOSPITAL ENCOUNTER (OUTPATIENT)
Age: 82
Discharge: ANOTHER HOSPITAL | End: 2022-03-08
Payer: MEDICARE

## 2022-03-08 ENCOUNTER — APPOINTMENT (OUTPATIENT)
Dept: GENERAL RADIOLOGY | Facility: HOSPITAL | Age: 82
End: 2022-03-08
Attending: EMERGENCY MEDICINE
Payer: MEDICARE

## 2022-03-08 VITALS
SYSTOLIC BLOOD PRESSURE: 130 MMHG | HEART RATE: 66 BPM | BODY MASS INDEX: 25 KG/M2 | DIASTOLIC BLOOD PRESSURE: 61 MMHG | WEIGHT: 134.94 LBS | TEMPERATURE: 98 F | OXYGEN SATURATION: 100 % | RESPIRATION RATE: 18 BRPM

## 2022-03-08 DIAGNOSIS — R55 SYNCOPE, NEAR: ICD-10-CM

## 2022-03-08 DIAGNOSIS — R07.9 ACUTE CHEST PAIN: Primary | ICD-10-CM

## 2022-03-08 DIAGNOSIS — R06.02 SHORTNESS OF BREATH: ICD-10-CM

## 2022-03-08 DIAGNOSIS — R42 LIGHTHEADEDNESS: ICD-10-CM

## 2022-03-08 PROBLEM — R73.9 HYPERGLYCEMIA: Status: ACTIVE | Noted: 2022-03-08

## 2022-03-08 LAB
ALBUMIN SERPL-MCNC: 3.5 G/DL (ref 3.4–5)
ALBUMIN/GLOB SERPL: 0.8 {RATIO} (ref 1–2)
ALP LIVER SERPL-CCNC: 83 U/L
ALT SERPL-CCNC: 22 U/L
ANION GAP SERPL CALC-SCNC: 3 MMOL/L (ref 0–18)
AST SERPL-CCNC: 23 U/L (ref 15–37)
ATRIAL RATE: 60 BPM
BASOPHILS # BLD AUTO: 0.06 X10(3) UL (ref 0–0.2)
BASOPHILS NFR BLD AUTO: 0.7 %
BILIRUB SERPL-MCNC: 0.3 MG/DL (ref 0.1–2)
BUN BLD-MCNC: 16 MG/DL (ref 7–18)
CALCIUM BLD-MCNC: 9.5 MG/DL (ref 8.5–10.1)
CHLORIDE SERPL-SCNC: 104 MMOL/L (ref 98–112)
CO2 SERPL-SCNC: 31 MMOL/L (ref 21–32)
CREAT BLD-MCNC: 0.91 MG/DL
D DIMER PPP FEU-MCNC: 2.95 UG/ML FEU (ref ?–0.8)
EOSINOPHIL # BLD AUTO: 0.02 X10(3) UL (ref 0–0.7)
EOSINOPHIL NFR BLD AUTO: 0.2 %
ERYTHROCYTE [DISTWIDTH] IN BLOOD BY AUTOMATED COUNT: 12.7 %
EST. AVERAGE GLUCOSE BLD GHB EST-MCNC: 148 MG/DL (ref 68–126)
GLOBULIN PLAS-MCNC: 4.3 G/DL (ref 2.8–4.4)
GLUCOSE BLD-MCNC: 112 MG/DL (ref 70–99)
GLUCOSE BLD-MCNC: 121 MG/DL (ref 70–99)
GLUCOSE BLD-MCNC: 148 MG/DL (ref 70–99)
GLUCOSE BLD-MCNC: 150 MG/DL (ref 70–99)
HBA1C MFR BLD: 6.8 % (ref ?–5.7)
HCT VFR BLD AUTO: 42.6 %
HGB BLD-MCNC: 13.6 G/DL
IMM GRANULOCYTES # BLD AUTO: 0.02 X10(3) UL (ref 0–1)
IMM GRANULOCYTES NFR BLD: 0.2 %
LYMPHOCYTES # BLD AUTO: 1.38 X10(3) UL (ref 1–4)
LYMPHOCYTES NFR BLD AUTO: 16.1 %
MCH RBC QN AUTO: 29.1 PG (ref 26–34)
MCV RBC AUTO: 91 FL
MONOCYTES # BLD AUTO: 0.55 X10(3) UL (ref 0.1–1)
MONOCYTES NFR BLD AUTO: 6.4 %
NEUTROPHILS # BLD AUTO: 6.52 X10 (3) UL (ref 1.5–7.7)
NEUTROPHILS # BLD AUTO: 6.52 X10(3) UL (ref 1.5–7.7)
NEUTROPHILS NFR BLD AUTO: 76.4 %
NT-PROBNP SERPL-MCNC: 1755 PG/ML (ref ?–450)
OSMOLALITY SERPL CALC.SUM OF ELEC: 290 MOSM/KG (ref 275–295)
P AXIS: 70 DEGREES
P-R INTERVAL: 190 MS
PLATELET # BLD AUTO: 246 10(3)UL (ref 150–450)
POTASSIUM SERPL-SCNC: 4.2 MMOL/L (ref 3.5–5.1)
PROT SERPL-MCNC: 7.8 G/DL (ref 6.4–8.2)
Q-T INTERVAL: 434 MS
QRS DURATION: 100 MS
QTC CALCULATION (BEZET): 434 MS
R AXIS: -42 DEGREES
RBC # BLD AUTO: 4.68 X10(6)UL
SARS-COV-2 RNA RESP QL NAA+PROBE: NOT DETECTED
SODIUM SERPL-SCNC: 138 MMOL/L (ref 136–145)
T AXIS: 50 DEGREES
TROPONIN I HIGH SENSITIVITY: 14 NG/L
VENTRICULAR RATE: 60 BPM
WBC # BLD AUTO: 8.6 X10(3) UL (ref 4–11)

## 2022-03-08 PROCEDURE — 93005 ELECTROCARDIOGRAM TRACING: CPT

## 2022-03-08 PROCEDURE — 71045 X-RAY EXAM CHEST 1 VIEW: CPT | Performed by: EMERGENCY MEDICINE

## 2022-03-08 PROCEDURE — 99214 OFFICE O/P EST MOD 30 MIN: CPT

## 2022-03-08 PROCEDURE — 93010 ELECTROCARDIOGRAM REPORT: CPT

## 2022-03-08 PROCEDURE — 99220 INITIAL OBSERVATION CARE,LEVL III: CPT | Performed by: INTERNAL MEDICINE

## 2022-03-08 PROCEDURE — 82962 GLUCOSE BLOOD TEST: CPT

## 2022-03-08 PROCEDURE — 71260 CT THORAX DX C+: CPT | Performed by: EMERGENCY MEDICINE

## 2022-03-08 RX ORDER — LEVOTHYROXINE SODIUM 88 UG/1
88 TABLET ORAL
Status: DISCONTINUED | OUTPATIENT
Start: 2022-03-09 | End: 2022-03-09

## 2022-03-08 RX ORDER — METOCLOPRAMIDE HYDROCHLORIDE 5 MG/ML
5 INJECTION INTRAMUSCULAR; INTRAVENOUS EVERY 8 HOURS PRN
Status: DISCONTINUED | OUTPATIENT
Start: 2022-03-08 | End: 2022-03-09

## 2022-03-08 RX ORDER — ENOXAPARIN SODIUM 100 MG/ML
40 INJECTION SUBCUTANEOUS DAILY
Status: DISCONTINUED | OUTPATIENT
Start: 2022-03-09 | End: 2022-03-09

## 2022-03-08 RX ORDER — SENNOSIDES 8.6 MG
17.2 TABLET ORAL NIGHTLY PRN
Status: DISCONTINUED | OUTPATIENT
Start: 2022-03-08 | End: 2022-03-09

## 2022-03-08 RX ORDER — DEXTROSE MONOHYDRATE 25 G/50ML
50 INJECTION, SOLUTION INTRAVENOUS
Status: DISCONTINUED | OUTPATIENT
Start: 2022-03-08 | End: 2022-03-09

## 2022-03-08 RX ORDER — IOHEXOL 350 MG/ML
100 INJECTION, SOLUTION INTRAVENOUS
Status: COMPLETED | OUTPATIENT
Start: 2022-03-08 | End: 2022-03-08

## 2022-03-08 RX ORDER — ACETAMINOPHEN 325 MG/1
650 TABLET ORAL EVERY 6 HOURS PRN
Status: DISCONTINUED | OUTPATIENT
Start: 2022-03-08 | End: 2022-03-09

## 2022-03-08 RX ORDER — NICOTINE POLACRILEX 4 MG
15 LOZENGE BUCCAL
Status: DISCONTINUED | OUTPATIENT
Start: 2022-03-08 | End: 2022-03-09

## 2022-03-08 RX ORDER — ONDANSETRON 2 MG/ML
4 INJECTION INTRAMUSCULAR; INTRAVENOUS EVERY 6 HOURS PRN
Status: DISCONTINUED | OUTPATIENT
Start: 2022-03-08 | End: 2022-03-09

## 2022-03-08 RX ORDER — ASPIRIN 81 MG/1
81 TABLET ORAL DAILY
Status: DISCONTINUED | OUTPATIENT
Start: 2022-03-09 | End: 2022-03-09

## 2022-03-08 RX ORDER — EZETIMIBE 10 MG/1
10 TABLET ORAL NIGHTLY
Status: DISCONTINUED | OUTPATIENT
Start: 2022-03-08 | End: 2022-03-09

## 2022-03-08 RX ORDER — LOSARTAN POTASSIUM 25 MG/1
25 TABLET ORAL DAILY
Status: DISCONTINUED | OUTPATIENT
Start: 2022-03-09 | End: 2022-03-09

## 2022-03-08 RX ORDER — POLYETHYLENE GLYCOL 3350 17 G/17G
17 POWDER, FOR SOLUTION ORAL DAILY PRN
Status: DISCONTINUED | OUTPATIENT
Start: 2022-03-08 | End: 2022-03-09

## 2022-03-08 RX ORDER — PANTOPRAZOLE SODIUM 20 MG/1
20 TABLET, DELAYED RELEASE ORAL
Status: DISCONTINUED | OUTPATIENT
Start: 2022-03-09 | End: 2022-03-09

## 2022-03-08 RX ORDER — CEPHALEXIN 250 MG/1
250 CAPSULE ORAL EVERY OTHER DAY
Status: DISCONTINUED | OUTPATIENT
Start: 2022-03-09 | End: 2022-03-09

## 2022-03-08 RX ORDER — PRAVASTATIN SODIUM 10 MG
10 TABLET ORAL NIGHTLY
Status: DISCONTINUED | OUTPATIENT
Start: 2022-03-08 | End: 2022-03-09

## 2022-03-08 RX ORDER — NICOTINE POLACRILEX 4 MG
30 LOZENGE BUCCAL
Status: DISCONTINUED | OUTPATIENT
Start: 2022-03-08 | End: 2022-03-09

## 2022-03-08 RX ORDER — BISACODYL 10 MG
10 SUPPOSITORY, RECTAL RECTAL
Status: DISCONTINUED | OUTPATIENT
Start: 2022-03-08 | End: 2022-03-09

## 2022-03-08 RX ORDER — MELATONIN
3 NIGHTLY PRN
Status: DISCONTINUED | OUTPATIENT
Start: 2022-03-08 | End: 2022-03-09

## 2022-03-08 RX ORDER — OMEPRAZOLE 20 MG/1
20 CAPSULE, DELAYED RELEASE ORAL EVERY MORNING
COMMUNITY

## 2022-03-08 RX ORDER — SODIUM PHOSPHATE, DIBASIC AND SODIUM PHOSPHATE, MONOBASIC 7; 19 G/133ML; G/133ML
1 ENEMA RECTAL ONCE AS NEEDED
Status: DISCONTINUED | OUTPATIENT
Start: 2022-03-08 | End: 2022-03-09

## 2022-03-08 RX ORDER — ASPIRIN 81 MG/1
324 TABLET, CHEWABLE ORAL ONCE
Status: COMPLETED | OUTPATIENT
Start: 2022-03-08 | End: 2022-03-08

## 2022-03-08 NOTE — ED INITIAL ASSESSMENT (HPI)
Patient states she has had on and off dizziness- being worked up with cardiologist. Patient states today noticed BP to be low, HR to be high and having some SOB. Went to [facility] and sent here for further testing.

## 2022-03-08 NOTE — ED INITIAL ASSESSMENT (HPI)
Pt co intermittent episodes of lightheadedness, SOB, chest tightness since Saturday. Noted pressures at home of 80 systolic and times of SPO2 of 93, . Denies n/v/d. Has been seeing a cardiologists b.c she has been having these episodes of dizziness over the past several months.   Scheduled for chest PET scan on Monday for futher eval.  Pt sts this is the worst she has felt though

## 2022-03-08 NOTE — ED QUICK NOTES
Orders for admission, patient is aware of plan and ready to go upstairs. Any questions, please call ED AMY Morocho at extension [phone]. Vaccinated? Yes  Type of COVID test sent: Rapid  COVID Suspicion level: Low    Titratable drug(s) infusing:  Rate: NONE     LOC at time of transport: [address], [place].     Other pertinent information: 20G (L) AC, SL. 324 ASA given; SBA with walker; Continent    [facility] Cardiology (consulted)  [facility] (consulted)    CIWA score= N/A  NIH score=N/A

## 2022-03-09 ENCOUNTER — APPOINTMENT (OUTPATIENT)
Dept: ULTRASOUND IMAGING | Facility: HOSPITAL | Age: 82
End: 2022-03-09
Attending: INTERNAL MEDICINE
Payer: MEDICARE

## 2022-03-09 VITALS
HEART RATE: 67 BPM | OXYGEN SATURATION: 100 % | WEIGHT: 130 LBS | DIASTOLIC BLOOD PRESSURE: 57 MMHG | BODY MASS INDEX: 23.92 KG/M2 | SYSTOLIC BLOOD PRESSURE: 118 MMHG | HEIGHT: 62 IN | RESPIRATION RATE: 17 BRPM | TEMPERATURE: 98 F

## 2022-03-09 LAB
ANION GAP SERPL CALC-SCNC: 2 MMOL/L (ref 0–18)
BUN BLD-MCNC: 15 MG/DL (ref 7–18)
CALCIUM BLD-MCNC: 8.9 MG/DL (ref 8.5–10.1)
CHLORIDE SERPL-SCNC: 107 MMOL/L (ref 98–112)
CO2 SERPL-SCNC: 29 MMOL/L (ref 21–32)
CREAT BLD-MCNC: 0.8 MG/DL
GLUCOSE BLD-MCNC: 109 MG/DL (ref 70–99)
GLUCOSE BLD-MCNC: 164 MG/DL (ref 70–99)
MAGNESIUM SERPL-MCNC: 2 MG/DL (ref 1.6–2.6)
OSMOLALITY SERPL CALC.SUM OF ELEC: 287 MOSM/KG (ref 275–295)
POTASSIUM SERPL-SCNC: 3.9 MMOL/L (ref 3.5–5.1)
SODIUM SERPL-SCNC: 138 MMOL/L (ref 136–145)

## 2022-03-09 PROCEDURE — 99217 OBSERVATION CARE DISCHARGE: CPT | Performed by: INTERNAL MEDICINE

## 2022-03-09 PROCEDURE — 93970 EXTREMITY STUDY: CPT | Performed by: INTERNAL MEDICINE

## 2022-03-09 NOTE — PROGRESS NOTES
03/09/22 1525 03/09/22 1527 03/09/22 1528   Vital Signs   /56  (os lying) 160/66  (os sitting) 118/57  (os standing)   MAP (mmHg) 80 88 72   BP Location Left arm Left arm Left arm   BP Method Automatic Automatic Automatic   Patient Position Lying Sitting Standing     Orthostatic vitals

## 2022-03-09 NOTE — PROGRESS NOTES
Explained discharge instructions including mediations and follow ups to the patient, verbalizes understanding. IV removed, catheter intact. Tele monitor discontinued. Taken via transport to [facility].

## 2022-03-09 NOTE — PLAN OF CARE
Problem: CARDIOVASCULAR - ADULT  Goal: Maintains optimal cardiac output and hemodynamic stability  Description: INTERVENTIONS:  - Monitor vital signs, rhythm, and trends  - Monitor for bleeding, hypotension and signs of decreased cardiac output  - Evaluate effectiveness of vasoactive medications to optimize hemodynamic stability  - Monitor arterial and/or venous puncture sites for bleeding and/or hematoma  - Assess quality of pulses, skin color and temperature  - Assess for signs of decreased coronary artery perfusion - ex. Angina  - Evaluate fluid balance, assess for edema, trend weights  Outcome: Progressing  Goal: Absence of cardiac arrhythmias or at baseline  Description: INTERVENTIONS:  - Continuous cardiac monitoring, monitor vital signs, obtain 12 lead EKG if indicated  - Evaluate effectiveness of antiarrhythmic and heart rate control medications as ordered  - Initiate emergency measures for life threatening arrhythmias  - Monitor electrolytes and administer replacement therapy as ordered  Outcome: Progressing   Received from ER. VSS, resps unlabored on RA. Bilateral lung sounds clear. No BLE noted. BPP2+. Pt denies any dizziness at this time. CTA chest: negative for PE; CXR: negative. Pro BNP 1,755. Pt oriented to room and use of call light. Awaiting hosptialist and cardiology to see for orders. Continue fall precautions.

## 2022-03-09 NOTE — PLAN OF CARE
Assumed care at 0730. Pt is A&Ox4, baseline tremor. Pt is on RA with , sats maintaining >90%, lungs clear. NSR on tele, VSS. No complaints of cardiac symptoms. Continent of B&B. Denies pain at this time. Up w/ SBA, tolerating well. Plan of care reviewed with patient, verbalizes understanding, all needs addressed at this time, pt seems to be resting comfortably. Problem: CARDIOVASCULAR - ADULT  Goal: Maintains optimal cardiac output and hemodynamic stability  Description: INTERVENTIONS:  - Monitor vital signs, rhythm, and trends  - Monitor for bleeding, hypotension and signs of decreased cardiac output  - Evaluate effectiveness of vasoactive medications to optimize hemodynamic stability  - Monitor arterial and/or venous puncture sites for bleeding and/or hematoma  - Assess quality of pulses, skin color and temperature  - Assess for signs of decreased coronary artery perfusion - ex.  Angina  - Evaluate fluid balance, assess for edema, trend weights  3/9/2022 0953 by Mar Ochoa RN  Outcome: Progressing  3/9/2022 0953 by Mar Ochoa RN  Outcome: Progressing  Goal: Absence of cardiac arrhythmias or at baseline  Description: INTERVENTIONS:  - Continuous cardiac monitoring, monitor vital signs, obtain 12 lead EKG if indicated  - Evaluate effectiveness of antiarrhythmic and heart rate control medications as ordered  - Initiate emergency measures for life threatening arrhythmias  - Monitor electrolytes and administer replacement therapy as ordered  3/9/2022 0953 by Mar Ochoa RN  Outcome: Progressing  3/9/2022 0953 by Mar Ochoa RN  Outcome: Progressing     Problem: RESPIRATORY - ADULT  Goal: Achieves optimal ventilation and oxygenation  Description: INTERVENTIONS:  - Assess for changes in respiratory status  - Assess for changes in mentation and behavior  - Position to facilitate oxygenation and minimize respiratory effort  - Oxygen supplementation based on oxygen saturation or ABGs  - Provide Smoking Cessation handout, if applicable  - Encourage broncho-pulmonary hygiene including cough, deep breathe, Incentive Spirometry  - Assess the need for suctioning and perform as needed  - Assess and instruct to report SOB or any respiratory difficulty  - Respiratory Therapy support as indicated  - Manage/alleviate anxiety  - Monitor for signs/symptoms of CO2 retention  3/9/2022 0953 by Ashley Pedersen RN  Outcome: Progressing  3/9/2022 0953 by Ashley Pedersen RN  Outcome: Progressing

## 2022-03-09 NOTE — PLAN OF CARE
Alert and oriented x4 on tele monitor hr 50's sinus josé. Denies any pain. Updated w/ poc and verbalized understanding. All needs attended and will continue to monitor. Call light within reach. Problem: CARDIOVASCULAR - ADULT  Goal: Maintains optimal cardiac output and hemodynamic stability  Description: INTERVENTIONS:  - Monitor vital signs, rhythm, and trends  - Monitor for bleeding, hypotension and signs of decreased cardiac output  - Evaluate effectiveness of vasoactive medications to optimize hemodynamic stability  - Monitor arterial and/or venous puncture sites for bleeding and/or hematoma  - Assess quality of pulses, skin color and temperature  - Assess for signs of decreased coronary artery perfusion - ex.  Angina  - Evaluate fluid balance, assess for edema, trend weights  Outcome: Progressing  Goal: Absence of cardiac arrhythmias or at baseline  Description: INTERVENTIONS:  - Continuous cardiac monitoring, monitor vital signs, obtain 12 lead EKG if indicated  - Evaluate effectiveness of antiarrhythmic and heart rate control medications as ordered  - Initiate emergency measures for life threatening arrhythmias  - Monitor electrolytes and administer replacement therapy as ordered  Outcome: Progressing     Problem: RESPIRATORY - ADULT  Goal: Achieves optimal ventilation and oxygenation  Description: INTERVENTIONS:  - Assess for changes in respiratory status  - Assess for changes in mentation and behavior  - Position to facilitate oxygenation and minimize respiratory effort  - Oxygen supplementation based on oxygen saturation or ABGs  - Provide Smoking Cessation handout, if applicable  - Encourage broncho-pulmonary hygiene including cough, deep breathe, Incentive Spirometry  - Assess the need for suctioning and perform as needed  - Assess and instruct to report SOB or any respiratory difficulty  - Respiratory Therapy support as indicated  - Manage/alleviate anxiety  - Monitor for signs/symptoms of CO2 retention  Outcome: Progressing

## 2022-03-10 ENCOUNTER — PATIENT OUTREACH (OUTPATIENT)
Dept: CASE MANAGEMENT | Age: 82
End: 2022-03-10

## 2022-03-10 PROCEDURE — 1111F DSCHRG MED/CURRENT MED MERGE: CPT

## 2022-03-10 NOTE — PROGRESS NOTES
NCM called preferred number and daughter Blaine Ramirez answered stating that this number was her's and provided NCM with pt's phone number. NCM added the number provided as pt's home phone. NCM called patient and LMTCB, providing contact information for self and TCC.

## 2022-03-11 ENCOUNTER — TELEPHONE (OUTPATIENT)
Dept: INTERNAL MEDICINE CLINIC | Facility: CLINIC | Age: 82
End: 2022-03-11

## 2022-03-11 NOTE — TELEPHONE ENCOUNTER
JAVIER, Spoke to pt for TCM today. Pt states that she will call back to schedule. TCM/HFU appt recommended by 3/16/2022 as pt is a high risk for readmission.       BOOK BY DATE (last date for TCM): 3/23/2022

## 2022-03-11 NOTE — TELEPHONE ENCOUNTER
Spoke with pt daughter, she does not want to schedule pt at this time so she is not overwhelmed. She stated after her testing is done and all results come back, she will schedule f/u.      FYI

## 2022-03-14 RX ORDER — LEVOTHYROXINE SODIUM 88 UG/1
TABLET ORAL
Qty: 90 TABLET | Refills: 0 | Status: SHIPPED | OUTPATIENT
Start: 2022-03-14

## 2022-03-14 NOTE — TELEPHONE ENCOUNTER
Protocol passed     Requesting: levothyroxine 88mcg     LOV: 2/1/22   RTC: no follow ups on file   Filled: 12/8/21 #90 0 refills   Recent Labs: 11/11/21     Upcoming OV: none scheduled

## 2022-03-22 RX ORDER — BLOOD SUGAR DIAGNOSTIC
STRIP MISCELLANEOUS
Qty: 100 STRIP | Refills: 0 | Status: SHIPPED | OUTPATIENT
Start: 2022-03-22

## 2022-03-23 ENCOUNTER — OFFICE VISIT (OUTPATIENT)
Dept: INTERNAL MEDICINE CLINIC | Facility: CLINIC | Age: 82
End: 2022-03-23
Payer: MEDICARE

## 2022-03-23 VITALS
HEART RATE: 53 BPM | SYSTOLIC BLOOD PRESSURE: 124 MMHG | DIASTOLIC BLOOD PRESSURE: 64 MMHG | RESPIRATION RATE: 14 BRPM | BODY MASS INDEX: 24.48 KG/M2 | TEMPERATURE: 98 F | HEIGHT: 62 IN | WEIGHT: 133 LBS | OXYGEN SATURATION: 98 %

## 2022-03-23 DIAGNOSIS — R42 DIZZINESS: Primary | ICD-10-CM

## 2022-03-23 PROBLEM — R55 SYNCOPE, NEAR: Status: RESOLVED | Noted: 2022-03-08 | Resolved: 2022-03-23

## 2022-03-23 PROBLEM — R73.9 HYPERGLYCEMIA: Chronic | Status: ACTIVE | Noted: 2022-03-08

## 2022-03-23 PROBLEM — R07.9 ACUTE CHEST PAIN: Status: RESOLVED | Noted: 2022-03-08 | Resolved: 2022-03-23

## 2022-03-23 PROCEDURE — 99214 OFFICE O/P EST MOD 30 MIN: CPT | Performed by: INTERNAL MEDICINE

## 2022-03-23 NOTE — PATIENT INSTRUCTIONS
Patient and daughter informed that it is advisable for them to see tertiary care center such as [facility] specialty movement disorder clinics. It may be also worthwhile for her to see a group of cardiologist from that center rule out causes of orthostatic hypotension. Part of the problem is that the records are not accessible in view of the various software systems per daughter they will start off at [facility].

## 2022-03-30 ENCOUNTER — TELEPHONE (OUTPATIENT)
Dept: INTERNAL MEDICINE CLINIC | Facility: CLINIC | Age: 82
End: 2022-03-30

## 2022-04-01 RX ORDER — PRAVASTATIN SODIUM 10 MG
TABLET ORAL
Qty: 90 TABLET | Refills: 0 | Status: SHIPPED | OUTPATIENT
Start: 2022-04-01

## 2022-04-01 NOTE — TELEPHONE ENCOUNTER
Protocol passed     Requesting: pravastatin 10mg     LOV: 3/23/22   RTC: 4 weeks   Filled: 1/3/22 #90 0 refills   Recent Labs: 11/11/21     Upcoming OV: none scheduled

## 2022-04-05 ENCOUNTER — TELEPHONE (OUTPATIENT)
Dept: INTERNAL MEDICINE CLINIC | Facility: CLINIC | Age: 82
End: 2022-04-05

## 2022-04-05 NOTE — TELEPHONE ENCOUNTER
Did discuss with patient she has an appointment to see Dr Juan Gillespie  Also she was advised by Dr. Tita Mayorga to follow-up with Dr. Mayela Saravia none from the same group for further evaluation of possible orthostatic hypotension.   Patient informed and will make the scheduled follow-up

## 2022-04-06 ENCOUNTER — TELEPHONE (OUTPATIENT)
Dept: UROLOGY | Facility: CLINIC | Age: 82
End: 2022-04-06

## 2022-04-06 ENCOUNTER — LAB ENCOUNTER (OUTPATIENT)
Dept: LAB | Age: 82
End: 2022-04-06
Attending: OBSTETRICS & GYNECOLOGY
Payer: MEDICARE

## 2022-04-06 DIAGNOSIS — R39.9 UTI SYMPTOMS: ICD-10-CM

## 2022-04-06 PROCEDURE — 87086 URINE CULTURE/COLONY COUNT: CPT

## 2022-04-06 PROCEDURE — 87186 SC STD MICRODIL/AGAR DIL: CPT

## 2022-04-06 PROCEDURE — 87077 CULTURE AEROBIC IDENTIFY: CPT

## 2022-04-06 RX ORDER — NITROFURANTOIN 25; 75 MG/1; MG/1
100 CAPSULE ORAL 2 TIMES DAILY
Qty: 14 CAPSULE | Refills: 0 | Status: SHIPPED | OUTPATIENT
Start: 2022-04-06

## 2022-04-12 ENCOUNTER — TELEPHONE (OUTPATIENT)
Dept: UROLOGY | Facility: CLINIC | Age: 82
End: 2022-04-12

## 2022-04-12 ENCOUNTER — OFFICE VISIT (OUTPATIENT)
Dept: NEUROLOGY | Facility: CLINIC | Age: 82
End: 2022-04-12
Payer: MEDICARE

## 2022-04-12 VITALS
DIASTOLIC BLOOD PRESSURE: 72 MMHG | SYSTOLIC BLOOD PRESSURE: 130 MMHG | BODY MASS INDEX: 23.74 KG/M2 | WEIGHT: 129 LBS | HEART RATE: 58 BPM | HEIGHT: 62 IN

## 2022-04-12 DIAGNOSIS — G25.0 BENIGN ESSENTIAL TREMOR: ICD-10-CM

## 2022-04-12 DIAGNOSIS — R20.8 BURNING SENSATION OF FEET: ICD-10-CM

## 2022-04-12 DIAGNOSIS — G25.0 BENIGN HEAD TREMOR: ICD-10-CM

## 2022-04-12 PROCEDURE — 99214 OFFICE O/P EST MOD 30 MIN: CPT | Performed by: OTHER

## 2022-04-12 RX ORDER — GABAPENTIN 100 MG/1
200 CAPSULE ORAL NIGHTLY
Qty: 60 CAPSULE | Refills: 0 | Status: SHIPPED | OUTPATIENT
Start: 2022-04-12

## 2022-04-12 NOTE — TELEPHONE ENCOUNTER
TC from daughter Emily Dinero stating that pt has 2 tabs of macrobid left in 7 day course of empiric treatment. Pt c/o GI upset, mostly bloating. Also still has burning w/urination. Ana Nicolas PA have pt finish course of antibiotics with food and if still w/symptoms re-check ucx. Daughter Emily Dinero aware. ucx ordered for future at [facility] lab.

## 2022-04-13 ENCOUNTER — LAB ENCOUNTER (OUTPATIENT)
Dept: LAB | Age: 82
End: 2022-04-13
Attending: STUDENT IN AN ORGANIZED HEALTH CARE EDUCATION/TRAINING PROGRAM
Payer: MEDICARE

## 2022-04-13 DIAGNOSIS — R30.0 DYSURIA: ICD-10-CM

## 2022-04-13 PROBLEM — G25.0 BENIGN ESSENTIAL TREMOR: Status: ACTIVE | Noted: 2022-04-13

## 2022-04-13 PROCEDURE — 87086 URINE CULTURE/COLONY COUNT: CPT

## 2022-04-20 RX ORDER — LANCETS 30 GAUGE
EACH MISCELLANEOUS
Qty: 100 EACH | Refills: 0 | Status: SHIPPED | OUTPATIENT
Start: 2022-04-20

## 2022-04-27 ENCOUNTER — LAB ENCOUNTER (OUTPATIENT)
Dept: LAB | Age: 82
End: 2022-04-27
Attending: Other
Payer: MEDICARE

## 2022-04-27 DIAGNOSIS — R20.8 BURNING SENSATION OF FEET: ICD-10-CM

## 2022-04-27 LAB — VIT B12 SERPL-MCNC: 514 PG/ML (ref 193–986)

## 2022-04-27 PROCEDURE — 83521 IG LIGHT CHAINS FREE EACH: CPT

## 2022-04-27 PROCEDURE — 36415 COLL VENOUS BLD VENIPUNCTURE: CPT

## 2022-04-27 PROCEDURE — 86334 IMMUNOFIX E-PHORESIS SERUM: CPT

## 2022-04-27 PROCEDURE — 84165 PROTEIN E-PHORESIS SERUM: CPT

## 2022-04-27 PROCEDURE — 82607 VITAMIN B-12: CPT

## 2022-05-03 ENCOUNTER — OFFICE VISIT (OUTPATIENT)
Dept: INTERNAL MEDICINE CLINIC | Facility: CLINIC | Age: 82
End: 2022-05-03
Payer: MEDICARE

## 2022-05-03 ENCOUNTER — TELEPHONE (OUTPATIENT)
Dept: ORTHOPEDICS CLINIC | Facility: CLINIC | Age: 82
End: 2022-05-03

## 2022-05-03 ENCOUNTER — HOSPITAL ENCOUNTER (OUTPATIENT)
Dept: GENERAL RADIOLOGY | Age: 82
Discharge: HOME | End: 2022-05-03
Attending: ORTHOPAEDIC SURGERY
Payer: MEDICARE

## 2022-05-03 VITALS
SYSTOLIC BLOOD PRESSURE: 134 MMHG | BODY MASS INDEX: 25.03 KG/M2 | TEMPERATURE: 98 F | RESPIRATION RATE: 16 BRPM | HEIGHT: 62 IN | WEIGHT: 136 LBS | OXYGEN SATURATION: 100 % | HEART RATE: 55 BPM | DIASTOLIC BLOOD PRESSURE: 68 MMHG

## 2022-05-03 DIAGNOSIS — M25.561 RIGHT KNEE PAIN, UNSPECIFIED CHRONICITY: ICD-10-CM

## 2022-05-03 DIAGNOSIS — M17.11 PRIMARY OSTEOARTHRITIS OF RIGHT KNEE: Primary | ICD-10-CM

## 2022-05-03 PROCEDURE — 99213 OFFICE O/P EST LOW 20 MIN: CPT | Performed by: INTERNAL MEDICINE

## 2022-05-03 PROCEDURE — 73564 X-RAY EXAM KNEE 4 OR MORE: CPT | Performed by: ORTHOPAEDIC SURGERY

## 2022-05-03 RX ORDER — AZITHROMYCIN 250 MG/1
250 TABLET, FILM COATED ORAL AS DIRECTED
COMMUNITY
Start: 2022-04-15 | End: 2022-05-03 | Stop reason: ALTCHOICE

## 2022-05-03 RX ORDER — BENZONATATE 100 MG/1
CAPSULE ORAL
COMMUNITY
Start: 2022-04-15 | End: 2022-05-03 | Stop reason: ALTCHOICE

## 2022-05-03 NOTE — PATIENT INSTRUCTIONS
Patient could use some anti-inflammatories better knee brace. Patient tentatively has an appointment set up with Dr. Eliseo Abdi.

## 2022-05-04 LAB
ALBUMIN SERPL ELPH-MCNC: 3.93 G/DL (ref 3.75–5.21)
ALBUMIN/GLOB SERPL: 1.28 {RATIO} (ref 1–2)
ALPHA1 GLOB SERPL ELPH-MCNC: 0.27 G/DL (ref 0.19–0.46)
ALPHA2 GLOB SERPL ELPH-MCNC: 0.76 G/DL (ref 0.48–1.05)
B-GLOBULIN SERPL ELPH-MCNC: 0.92 G/DL (ref 0.68–1.23)
GAMMA GLOB SERPL ELPH-MCNC: 1.12 G/DL (ref 0.62–1.7)
KAPPA LC FREE SER-MCNC: 2.09 MG/DL (ref 0.33–1.94)
KAPPA LC FREE/LAMBDA FREE SER NEPH: 1.23 {RATIO} (ref 0.26–1.65)
LAMBDA LC FREE SERPL-MCNC: 1.71 MG/DL (ref 0.57–2.63)
PROT SERPL-MCNC: 7 G/DL (ref 6.4–8.2)

## 2022-05-31 DIAGNOSIS — E03.4 HYPOTHYROIDISM DUE TO ACQUIRED ATROPHY OF THYROID: Chronic | ICD-10-CM

## 2022-05-31 DIAGNOSIS — E78.00 PURE HYPERCHOLESTEROLEMIA: ICD-10-CM

## 2022-06-01 RX ORDER — LEVOTHYROXINE SODIUM 88 UG/1
TABLET ORAL
Qty: 90 TABLET | Refills: 0 | Status: SHIPPED | OUTPATIENT
Start: 2022-06-01

## 2022-06-01 RX ORDER — PRAVASTATIN SODIUM 10 MG
TABLET ORAL
Qty: 90 TABLET | Refills: 0 | Status: SHIPPED | OUTPATIENT
Start: 2022-06-01

## 2022-06-15 RX ORDER — BLOOD SUGAR DIAGNOSTIC
STRIP MISCELLANEOUS
Qty: 100 STRIP | Refills: 0 | Status: SHIPPED | OUTPATIENT
Start: 2022-06-15

## 2022-06-17 ENCOUNTER — OFFICE VISIT (OUTPATIENT)
Dept: ORTHOPEDICS CLINIC | Facility: CLINIC | Age: 82
End: 2022-06-17
Payer: MEDICARE

## 2022-06-17 VITALS — HEIGHT: 62 IN | HEART RATE: 58 BPM | WEIGHT: 129 LBS | OXYGEN SATURATION: 98 % | BODY MASS INDEX: 23.74 KG/M2

## 2022-06-17 DIAGNOSIS — M17.11 PRIMARY OSTEOARTHRITIS OF RIGHT KNEE: Primary | ICD-10-CM

## 2022-06-17 PROCEDURE — 99213 OFFICE O/P EST LOW 20 MIN: CPT | Performed by: ORTHOPAEDIC SURGERY

## 2022-06-17 NOTE — PROGRESS NOTES
[facility] Progress Note    Subjective: Returns for her right knee. She states that she has activity limiting pain in the knee. It is present with activity and at rest, bothers her at sleep at night as well. She does have pain on the outside of the knee joint, this is the worst spot. She did have injection that only helped for a few weeks. She has tried medications. Pain remains lifestyle limiting. Objective: Patient is awake alert no distress. She has slight valgus alignment to the right knee. There is no effusion. She has tenderness to palpation about the lateral joint line of the knee. Imaging: X-rays of the right knee personally viewed, independently interpreted and radiology report read. Demonstrates moderate valgus osteoarthritis with lateral more than medial joint space loss, osteophyte formation, subchondral sclerosis      Assessment/Plan: 71-year-old female with symptomatic radiographically moderate right knee osteoarthritis. The patient has reasonably attempted nonsurgical treatments as mentioned above and remains limited in activities of daily living secondary to pain from knee arthritis. I discussed risks and benefits of surgery, with risks including but not limited to infection, blood clots, fracture, bleeding/need for blood transfusion, injury to blood vessels and nerves, loosening or failure of components, stiffness, continued pain, need for further intervention or revision surgery. Additionally I discussed expectations with regards to the postoperative recovery timeframe, the possibility of mechanical clicking with the knee, numbness on the lateral side of the knee/leg from sacrifice of the infrapatellar branch of the saphenous nerve, and potential for difficulty/pain with kneeling and performing deep flexion activities. The patient understands this discussion and would like to proceed with knee replacement surgery.   We did discuss primary care evaluation and clearance prior to surgery. Additionally she would need to see cardiology. She does not need to see dental, she is in the process of getting implants but is free of infection and has no further work that she needs to have done at this time. We did discuss obtaining full-length lower extremity standing films. We did not review the binder today but did provide it to her. She is going to read through and come up with any questions, we will set up an nurse appointment or go over the phone with our . She would need full-length lower extremity standing films. She is looking to do this probably in October or November.     Ella Bunn MD, [address] [place] [address] Orthopedic Surgery  Phone [phone]  Fax [phone]

## 2022-06-24 ENCOUNTER — TELEPHONE (OUTPATIENT)
Dept: UROLOGY | Facility: CLINIC | Age: 82
End: 2022-06-24

## 2022-06-24 NOTE — TELEPHONE ENCOUNTER
Requesting estradiol cream refill with [facility]. Spoke to Clemente who stated will call to schedule an appointment on Monday for her Mom. LV was 4/20/21.

## 2022-06-28 ENCOUNTER — LAB ENCOUNTER (OUTPATIENT)
Dept: LAB | Age: 82
End: 2022-06-28
Attending: OBSTETRICS & GYNECOLOGY
Payer: MEDICARE

## 2022-06-28 DIAGNOSIS — R30.0 DYSURIA: ICD-10-CM

## 2022-06-28 PROCEDURE — 87186 SC STD MICRODIL/AGAR DIL: CPT

## 2022-06-28 PROCEDURE — 87086 URINE CULTURE/COLONY COUNT: CPT

## 2022-06-28 PROCEDURE — 87088 URINE BACTERIA CULTURE: CPT

## 2022-06-30 ENCOUNTER — TELEPHONE (OUTPATIENT)
Dept: INTERNAL MEDICINE CLINIC | Facility: CLINIC | Age: 82
End: 2022-06-30

## 2022-06-30 ENCOUNTER — HOSPITAL ENCOUNTER (EMERGENCY)
Age: 82
Discharge: HOME | End: 2022-06-30
Attending: STUDENT IN AN ORGANIZED HEALTH CARE EDUCATION/TRAINING PROGRAM
Payer: MEDICARE

## 2022-06-30 VITALS
RESPIRATION RATE: 18 BRPM | SYSTOLIC BLOOD PRESSURE: 160 MMHG | TEMPERATURE: 98 F | OXYGEN SATURATION: 97 % | BODY MASS INDEX: 23.92 KG/M2 | HEART RATE: 53 BPM | WEIGHT: 130 LBS | HEIGHT: 62 IN | DIASTOLIC BLOOD PRESSURE: 63 MMHG

## 2022-06-30 DIAGNOSIS — U07.1 COVID-19 VIRUS INFECTION: Primary | ICD-10-CM

## 2022-06-30 LAB — SARS-COV-2 RNA RESP QL NAA+PROBE: DETECTED

## 2022-06-30 PROCEDURE — 99284 EMERGENCY DEPT VISIT MOD MDM: CPT

## 2022-06-30 RX ORDER — BEBTELOVIMAB 87.5 MG/ML
175 INJECTION, SOLUTION INTRAVENOUS ONCE
Status: COMPLETED | OUTPATIENT
Start: 2022-06-30 | End: 2022-06-30

## 2022-06-30 RX ORDER — BENZONATATE 100 MG/1
100 CAPSULE ORAL 3 TIMES DAILY PRN
Qty: 30 CAPSULE | Refills: 0 | Status: SHIPPED | OUTPATIENT
Start: 2022-06-30 | End: 2022-07-30

## 2022-06-30 NOTE — ED INITIAL ASSESSMENT (HPI)
Pt reports tested + for covid this am. C/o headache, sore throat, cough/runnynose.  Denies sob and cp   Requesting monoclonals

## 2022-06-30 NOTE — TELEPHONE ENCOUNTER
[phone] spoke with daughter Vic Peguero (on verbal). Discussed both Paxlovid and MAB treatment options. Vic Peguero will discuss with pt and either come in to UC for MAB, or call back to request a covering MD to order Paxlovid.

## 2022-06-30 NOTE — TELEPHONE ENCOUNTER
Pt daughter Norma Wallace stated pt tested positive for covid with home test on 6/30. Norma Wallace stated pt symptoms started on 6/29 with congestion, cough, body aches, fatigue and headache. Pt then came on the phone and stated she felt like she was on fire last night and used a cold compress to cool down but does not have fever today. Norma Wallace stated they would like to start paxlovid. No avail appts today with any provider. Okay to overbook with any provider for video? Norma Wallace would like to speak with a nurse regarding next steps.      Norma Wallace [phone]

## 2022-06-30 NOTE — TELEPHONE ENCOUNTER
Pt's daughter called to check up on prescription status. Per LL pt needs VV to discuss treatment with current medications pt is taking too. Advised daughter of LL's recommendations, daughter was upset medication cannot be prescribed without VV. As I was explaining the options to what we could do next, daughter hung up.

## 2022-07-01 ENCOUNTER — TELEPHONE (OUTPATIENT)
Dept: CASE MANAGEMENT | Age: 82
End: 2022-07-01

## 2022-07-01 NOTE — TELEPHONE ENCOUNTER
Pt received MAB infusion at  ED on 6/30/22 for COVID-19. Please follow-up with pt for post-infusion assessment and home monitoring if needed. Thank you.

## 2022-07-01 NOTE — TELEPHONE ENCOUNTER
LS- ('s patient) would you like patient to have [facility] home monitoring? COVID + (PCR)- Day 4, MAB 6/30/22 (Day 3). Spoke with patient via phone for condition update. Reports feeling \"much better today than yesterday\". Reports having a cough, improving sinus pressure/runny nose. Denies shortness of breath, chest pain, tightness or wheezing. Was given incentive spirometer, has been getting \"1500-for readings\". Has been taking fluid and foods without difficulty.

## 2022-07-06 NOTE — TELEPHONE ENCOUNTER
[phone] LMTCB or read and reply to [facility]. [facility] sent to pt.

## 2022-07-15 ENCOUNTER — PATIENT MESSAGE (OUTPATIENT)
Dept: INTERNAL MEDICINE CLINIC | Facility: CLINIC | Age: 82
End: 2022-07-15

## 2022-07-15 RX ORDER — DOXYCYCLINE 100 MG/1
100 CAPSULE ORAL 2 TIMES DAILY
Qty: 14 CAPSULE | Refills: 0 | Status: SHIPPED | OUTPATIENT
Start: 2022-07-15 | End: 2022-07-22

## 2022-07-15 NOTE — TELEPHONE ENCOUNTER
Doxycyline sent to [facility], should follow up with Dr. Daniel Tesfaye next week if not feeling better.   Should also use twice daily steroid nasal spray (fluticasonmamie et Rajwinder Phipps)

## 2022-07-15 NOTE — TELEPHONE ENCOUNTER
RP, pt received MAB on 6/30 - we only reached pt once for covid home monitoring calls. Just received this [facility] from daughter. Would you like to prescribe something, or should I ask SV if she can squeeze VV @ 11:00 am today?  ty

## 2022-07-15 NOTE — TELEPHONE ENCOUNTER
From: Lidya Lynne  Sent: 7/15/2022 10:13 AM CDT  To: [facility] Clinical Staff  Subject: Covid Home Monitoring    Good morning, Wen,  This is Reagan Schwab daughter. ... Anne Conroy was actually doing very good initially. But over the past few days we are going backwards, she is complaining about the amount of mucus, coughing and very nasally with a lot of sinus pressure. Kavitha White She is still finishing out the cough medicine pills. But I am starting to think it's now a sinus infection. Thoughts?? Dr. Daniel Tesfaye doesn't have any appointments until Julu 25th. . My number [phone]

## 2022-07-15 NOTE — TELEPHONE ENCOUNTER
Called and spoke to pt's daughter Felicia Conde (on verbal). Informed of Rx from [facility] v/u. Felicia Conde had just scheduled appt with DV on 7/18 - Felicia Conde said cancel that, she will schedule f/u with VM week of 7/25 in case pt does not improve after the 7 days of Doxycycline.

## 2022-07-27 ENCOUNTER — TELEPHONE (OUTPATIENT)
Dept: UROLOGY | Facility: CLINIC | Age: 82
End: 2022-07-27

## 2022-07-27 DIAGNOSIS — R30.0 DYSURIA: Primary | ICD-10-CM

## 2022-07-27 RX ORDER — NITROFURANTOIN 25; 75 MG/1; MG/1
100 CAPSULE ORAL 2 TIMES DAILY
Qty: 20 CAPSULE | Refills: 0 | Status: SHIPPED | OUTPATIENT
Start: 2022-07-27 | End: 2022-08-01

## 2022-07-27 NOTE — TELEPHONE ENCOUNTER
Still having dysuria does not think it went away last culture 6/28/2022 Ecoli treated with Macrobid.   Plan to have urine culture would like to start antibiotics

## 2022-07-27 NOTE — TELEPHONE ENCOUNTER
MARICRUZ Jaquez Macrobid 100 mg BID for 10 days dispense #20 tabs.   Daughter aware confirmed  appointment with Dr Rob Fontanez

## 2022-07-28 ENCOUNTER — LAB ENCOUNTER (OUTPATIENT)
Dept: LAB | Age: 82
End: 2022-07-28
Attending: STUDENT IN AN ORGANIZED HEALTH CARE EDUCATION/TRAINING PROGRAM
Payer: MEDICARE

## 2022-07-28 DIAGNOSIS — R30.0 DYSURIA: ICD-10-CM

## 2022-07-28 PROCEDURE — 87086 URINE CULTURE/COLONY COUNT: CPT

## 2022-07-30 ENCOUNTER — APPOINTMENT (OUTPATIENT)
Dept: GENERAL RADIOLOGY | Facility: HOSPITAL | Age: 82
End: 2022-07-30
Attending: STUDENT IN AN ORGANIZED HEALTH CARE EDUCATION/TRAINING PROGRAM
Payer: MEDICARE

## 2022-07-30 ENCOUNTER — HOSPITAL ENCOUNTER (INPATIENT)
Facility: HOSPITAL | Age: 82
LOS: 2 days | Discharge: HOME | End: 2022-08-01
Attending: STUDENT IN AN ORGANIZED HEALTH CARE EDUCATION/TRAINING PROGRAM | Admitting: INTERNAL MEDICINE
Payer: MEDICARE

## 2022-07-30 DIAGNOSIS — I48.91 ATRIAL FIBRILLATION WITH RAPID VENTRICULAR RESPONSE (HCC): Primary | ICD-10-CM

## 2022-07-30 LAB
ALBUMIN SERPL-MCNC: 3.5 G/DL (ref 3.4–5)
ALBUMIN/GLOB SERPL: 0.8 {RATIO} (ref 1–2)
ALP LIVER SERPL-CCNC: 84 U/L
ALT SERPL-CCNC: 17 U/L
ANION GAP SERPL CALC-SCNC: 4 MMOL/L (ref 0–18)
AST SERPL-CCNC: 20 U/L (ref 15–37)
ATRIAL RATE: 107 BPM
BASOPHILS # BLD AUTO: 0.04 X10(3) UL (ref 0–0.2)
BASOPHILS NFR BLD AUTO: 0.6 %
BILIRUB SERPL-MCNC: 0.4 MG/DL (ref 0.1–2)
BILIRUB UR QL STRIP.AUTO: NEGATIVE
BUN BLD-MCNC: 17 MG/DL (ref 7–18)
CALCIUM BLD-MCNC: 9.1 MG/DL (ref 8.5–10.1)
CHLORIDE SERPL-SCNC: 102 MMOL/L (ref 98–112)
CLARITY UR REFRACT.AUTO: CLEAR
CO2 SERPL-SCNC: 28 MMOL/L (ref 21–32)
COLOR UR AUTO: YELLOW
CREAT BLD-MCNC: 0.92 MG/DL
EOSINOPHIL # BLD AUTO: 0.06 X10(3) UL (ref 0–0.7)
EOSINOPHIL NFR BLD AUTO: 0.9 %
ERYTHROCYTE [DISTWIDTH] IN BLOOD BY AUTOMATED COUNT: 13.4 %
EST. AVERAGE GLUCOSE BLD GHB EST-MCNC: 151 MG/DL (ref 68–126)
GLOBULIN PLAS-MCNC: 4.2 G/DL (ref 2.8–4.4)
GLUCOSE BLD-MCNC: 121 MG/DL (ref 70–99)
GLUCOSE BLD-MCNC: 129 MG/DL (ref 70–99)
GLUCOSE BLD-MCNC: 195 MG/DL (ref 70–99)
GLUCOSE UR STRIP.AUTO-MCNC: NEGATIVE MG/DL
HBA1C MFR BLD: 6.9 % (ref ?–5.7)
HCT VFR BLD AUTO: 41.1 %
HGB BLD-MCNC: 13.2 G/DL
IMM GRANULOCYTES # BLD AUTO: 0.01 X10(3) UL (ref 0–1)
IMM GRANULOCYTES NFR BLD: 0.1 %
KETONES UR STRIP.AUTO-MCNC: NEGATIVE MG/DL
LEUKOCYTE ESTERASE UR QL STRIP.AUTO: NEGATIVE
LYMPHOCYTES # BLD AUTO: 1.66 X10(3) UL (ref 1–4)
LYMPHOCYTES NFR BLD AUTO: 23.9 %
MAGNESIUM SERPL-MCNC: 1.8 MG/DL (ref 1.6–2.6)
MCH RBC QN AUTO: 29 PG (ref 26–34)
MCHC RBC AUTO-ENTMCNC: 32.1 G/DL (ref 31–37)
MCV RBC AUTO: 90.3 FL
MONOCYTES # BLD AUTO: 0.64 X10(3) UL (ref 0.1–1)
MONOCYTES NFR BLD AUTO: 9.2 %
NEUTROPHILS # BLD AUTO: 4.55 X10 (3) UL (ref 1.5–7.7)
NEUTROPHILS # BLD AUTO: 4.55 X10(3) UL (ref 1.5–7.7)
NEUTROPHILS NFR BLD AUTO: 65.3 %
NITRITE UR QL STRIP.AUTO: NEGATIVE
OSMOLALITY SERPL CALC.SUM OF ELEC: 285 MOSM/KG (ref 275–295)
PH UR STRIP.AUTO: 7 [PH] (ref 5–8)
PLATELET # BLD AUTO: 201 10(3)UL (ref 150–450)
POTASSIUM SERPL-SCNC: 4.1 MMOL/L (ref 3.5–5.1)
PROT SERPL-MCNC: 7.7 G/DL (ref 6.4–8.2)
PROT UR STRIP.AUTO-MCNC: NEGATIVE MG/DL
Q-T INTERVAL: 338 MS
QRS DURATION: 98 MS
QTC CALCULATION (BEZET): 431 MS
R AXIS: -41 DEGREES
RBC # BLD AUTO: 4.55 X10(6)UL
SARS-COV-2 RNA RESP QL NAA+PROBE: NOT DETECTED
SODIUM SERPL-SCNC: 134 MMOL/L (ref 136–145)
SP GR UR STRIP.AUTO: 1.01 (ref 1–1.03)
T AXIS: 123 DEGREES
T4 FREE SERPL-MCNC: 1.2 NG/DL (ref 0.8–1.7)
TROPONIN I HIGH SENSITIVITY: 9 NG/L
TSI SER-ACNC: 0.75 MIU/ML (ref 0.36–3.74)
UROBILINOGEN UR STRIP.AUTO-MCNC: 0.2 MG/DL
VENTRICULAR RATE: 98 BPM
WBC # BLD AUTO: 7 X10(3) UL (ref 4–11)

## 2022-07-30 PROCEDURE — 99223 1ST HOSP IP/OBS HIGH 75: CPT | Performed by: INTERNAL MEDICINE

## 2022-07-30 PROCEDURE — 71045 X-RAY EXAM CHEST 1 VIEW: CPT | Performed by: STUDENT IN AN ORGANIZED HEALTH CARE EDUCATION/TRAINING PROGRAM

## 2022-07-30 RX ORDER — MAGNESIUM OXIDE 400 MG/1
400 TABLET ORAL ONCE
Status: COMPLETED | OUTPATIENT
Start: 2022-07-30 | End: 2022-07-30

## 2022-07-30 RX ORDER — ACETAMINOPHEN 500 MG
1000 TABLET ORAL EVERY 8 HOURS PRN
Status: DISCONTINUED | OUTPATIENT
Start: 2022-07-30 | End: 2022-08-01

## 2022-07-30 RX ORDER — BISACODYL 10 MG
10 SUPPOSITORY, RECTAL RECTAL
Status: DISCONTINUED | OUTPATIENT
Start: 2022-07-30 | End: 2022-08-01

## 2022-07-30 RX ORDER — SENNOSIDES 8.6 MG
17.2 TABLET ORAL NIGHTLY PRN
Status: DISCONTINUED | OUTPATIENT
Start: 2022-07-30 | End: 2022-08-01

## 2022-07-30 RX ORDER — PANTOPRAZOLE SODIUM 20 MG/1
20 TABLET, DELAYED RELEASE ORAL
Status: DISCONTINUED | OUTPATIENT
Start: 2022-07-31 | End: 2022-08-01

## 2022-07-30 RX ORDER — LEVOTHYROXINE SODIUM 88 UG/1
88 TABLET ORAL DAILY
Status: DISCONTINUED | OUTPATIENT
Start: 2022-07-30 | End: 2022-07-30 | Stop reason: SDUPTHER

## 2022-07-30 RX ORDER — ONDANSETRON 2 MG/ML
4 INJECTION INTRAMUSCULAR; INTRAVENOUS EVERY 6 HOURS PRN
Status: DISCONTINUED | OUTPATIENT
Start: 2022-07-30 | End: 2022-08-01

## 2022-07-30 RX ORDER — ASPIRIN 81 MG/1
81 TABLET, CHEWABLE ORAL DAILY
Status: DISCONTINUED | OUTPATIENT
Start: 2022-07-30 | End: 2022-08-01

## 2022-07-30 RX ORDER — DEXTROSE MONOHYDRATE 25 G/50ML
50 INJECTION, SOLUTION INTRAVENOUS
Status: DISCONTINUED | OUTPATIENT
Start: 2022-07-30 | End: 2022-08-01

## 2022-07-30 RX ORDER — POLYETHYLENE GLYCOL 3350 17 G/17G
17 POWDER, FOR SOLUTION ORAL DAILY PRN
Status: DISCONTINUED | OUTPATIENT
Start: 2022-07-30 | End: 2022-08-01

## 2022-07-30 RX ORDER — ENOXAPARIN SODIUM 100 MG/ML
40 INJECTION SUBCUTANEOUS DAILY
Status: DISCONTINUED | OUTPATIENT
Start: 2022-07-30 | End: 2022-07-31

## 2022-07-30 RX ORDER — PRAVASTATIN SODIUM 10 MG
10 TABLET ORAL NIGHTLY
Status: DISCONTINUED | OUTPATIENT
Start: 2022-07-30 | End: 2022-08-01

## 2022-07-30 RX ORDER — NICOTINE POLACRILEX 4 MG
15 LOZENGE BUCCAL
Status: DISCONTINUED | OUTPATIENT
Start: 2022-07-30 | End: 2022-08-01

## 2022-07-30 RX ORDER — EZETIMIBE 10 MG/1
10 TABLET ORAL NIGHTLY
Status: DISCONTINUED | OUTPATIENT
Start: 2022-07-30 | End: 2022-08-01

## 2022-07-30 RX ORDER — NICOTINE POLACRILEX 4 MG
30 LOZENGE BUCCAL
Status: DISCONTINUED | OUTPATIENT
Start: 2022-07-30 | End: 2022-08-01

## 2022-07-30 RX ORDER — GABAPENTIN 100 MG/1
200 CAPSULE ORAL NIGHTLY
Status: DISCONTINUED | OUTPATIENT
Start: 2022-07-30 | End: 2022-08-01

## 2022-07-30 RX ORDER — SODIUM PHOSPHATE, DIBASIC AND SODIUM PHOSPHATE, MONOBASIC 7; 19 G/133ML; G/133ML
1 ENEMA RECTAL ONCE AS NEEDED
Status: DISCONTINUED | OUTPATIENT
Start: 2022-07-30 | End: 2022-08-01

## 2022-07-30 RX ORDER — MELATONIN
3 NIGHTLY PRN
Status: DISCONTINUED | OUTPATIENT
Start: 2022-07-30 | End: 2022-08-01

## 2022-07-30 RX ORDER — LEVOTHYROXINE SODIUM 88 UG/1
88 TABLET ORAL
Status: DISCONTINUED | OUTPATIENT
Start: 2022-07-31 | End: 2022-08-01

## 2022-07-30 RX ORDER — METOCLOPRAMIDE HYDROCHLORIDE 5 MG/ML
5 INJECTION INTRAMUSCULAR; INTRAVENOUS EVERY 8 HOURS PRN
Status: DISCONTINUED | OUTPATIENT
Start: 2022-07-30 | End: 2022-08-01

## 2022-07-30 NOTE — ED QUICK NOTES
Pt with steady gait to the bathroom with one assist - tolerated well and denies pain/abril. Awaiting inpt room cleaning.

## 2022-07-30 NOTE — ED INITIAL ASSESSMENT (HPI)
Pt states woke up this morning feeling dizzy, states noted her BP to be low and her HR elevated. Denies c/o chest pain.

## 2022-07-30 NOTE — PROGRESS NOTES
Received pt from ED. Tele monitor connected. Pt resting comfortably in bed. Family at bedside. Oriented to room. Call light within reach. Will continue to monitor.

## 2022-07-30 NOTE — ED QUICK NOTES
Call to inpt RN = staff with [facility] = to call back when available. Pt remains resting comfortably with adult daughter at bs.

## 2022-07-30 NOTE — ED QUICK NOTES
Orders for admission, patient is aware of plan and ready to go upstairs. Any questions, please call ED AMY Boswell  at extension B POD     Vaccinated?   Type of COVID test sent:RAPID sent  COVID Suspicion level: Low/High      Titratable drug(s) infusing:  Rate:CARDIZEM    LOC at time of transport:A/O X 4    Other pertinent information:    CIWA score=  NIH score=

## 2022-07-31 ENCOUNTER — APPOINTMENT (OUTPATIENT)
Dept: CV DIAGNOSTICS | Facility: HOSPITAL | Age: 82
End: 2022-07-31
Attending: NURSE PRACTITIONER
Payer: MEDICARE

## 2022-07-31 LAB
ANION GAP SERPL CALC-SCNC: 5 MMOL/L (ref 0–18)
ATRIAL RATE: 214 BPM
BASOPHILS # BLD AUTO: 0.05 X10(3) UL (ref 0–0.2)
BASOPHILS NFR BLD AUTO: 1.1 %
BUN BLD-MCNC: 14 MG/DL (ref 7–18)
CALCIUM BLD-MCNC: 8.9 MG/DL (ref 8.5–10.1)
CHLORIDE SERPL-SCNC: 108 MMOL/L (ref 98–112)
CO2 SERPL-SCNC: 27 MMOL/L (ref 21–32)
CREAT BLD-MCNC: 0.7 MG/DL
EOSINOPHIL # BLD AUTO: 0.17 X10(3) UL (ref 0–0.7)
EOSINOPHIL NFR BLD AUTO: 3.7 %
ERYTHROCYTE [DISTWIDTH] IN BLOOD BY AUTOMATED COUNT: 13.4 %
GLUCOSE BLD-MCNC: 127 MG/DL (ref 70–99)
GLUCOSE BLD-MCNC: 136 MG/DL (ref 70–99)
GLUCOSE BLD-MCNC: 146 MG/DL (ref 70–99)
GLUCOSE BLD-MCNC: 149 MG/DL (ref 70–99)
GLUCOSE BLD-MCNC: 164 MG/DL (ref 70–99)
HCT VFR BLD AUTO: 38.3 %
HGB BLD-MCNC: 12.3 G/DL
IMM GRANULOCYTES # BLD AUTO: 0.01 X10(3) UL (ref 0–1)
IMM GRANULOCYTES NFR BLD: 0.2 %
LYMPHOCYTES # BLD AUTO: 1.58 X10(3) UL (ref 1–4)
LYMPHOCYTES NFR BLD AUTO: 34.1 %
MAGNESIUM SERPL-MCNC: 1.9 MG/DL (ref 1.6–2.6)
MCH RBC QN AUTO: 29 PG (ref 26–34)
MCHC RBC AUTO-ENTMCNC: 32.1 G/DL (ref 31–37)
MCV RBC AUTO: 90.3 FL
MONOCYTES # BLD AUTO: 0.51 X10(3) UL (ref 0.1–1)
MONOCYTES NFR BLD AUTO: 11 %
NEUTROPHILS # BLD AUTO: 2.32 X10 (3) UL (ref 1.5–7.7)
NEUTROPHILS # BLD AUTO: 2.32 X10(3) UL (ref 1.5–7.7)
NEUTROPHILS NFR BLD AUTO: 49.9 %
OSMOLALITY SERPL CALC.SUM OF ELEC: 293 MOSM/KG (ref 275–295)
PLATELET # BLD AUTO: 199 10(3)UL (ref 150–450)
POTASSIUM SERPL-SCNC: 4 MMOL/L (ref 3.5–5.1)
Q-T INTERVAL: 400 MS
QRS DURATION: 106 MS
QTC CALCULATION (BEZET): 412 MS
R AXIS: -43 DEGREES
RBC # BLD AUTO: 4.24 X10(6)UL
SODIUM SERPL-SCNC: 140 MMOL/L (ref 136–145)
T AXIS: -12 DEGREES
VENTRICULAR RATE: 64 BPM
WBC # BLD AUTO: 4.6 X10(3) UL (ref 4–11)

## 2022-07-31 PROCEDURE — 99232 SBSQ HOSP IP/OBS MODERATE 35: CPT | Performed by: INTERNAL MEDICINE

## 2022-07-31 PROCEDURE — 93306 TTE W/DOPPLER COMPLETE: CPT | Performed by: NURSE PRACTITIONER

## 2022-07-31 RX ORDER — FLUTICASONE PROPIONATE 50 MCG
1 SPRAY, SUSPENSION (ML) NASAL NIGHTLY
Status: DISCONTINUED | OUTPATIENT
Start: 2022-07-31 | End: 2022-08-01

## 2022-07-31 RX ORDER — SODIUM CHLORIDE 9 MG/ML
INJECTION, SOLUTION INTRAVENOUS CONTINUOUS
Status: DISCONTINUED | OUTPATIENT
Start: 2022-07-31 | End: 2022-07-31

## 2022-07-31 NOTE — PLAN OF CARE
patient alert and oriented x4 on  Room air , controlled AFIB on cardiac monitor, 0400 am checked orthostatic v/s, low bp and hr controlled 60- 70's , stopped Cardizem gtt and informed cardiology group , patient asymptomatic , no acute distress noted, will start fluids NS 100ML/HR, EKG done , cards  APN at bedside  Will continue to monitor closely      Problem: Patient/Family Goals  Goal: Patient/Family Long Term Goal  Description: Patient's Long Term Goal: DISCHARGE TO HOME SOON     Interventions:  - follow orders, cardilogy consult, labs, v/s, monitor hr, rhythm , Cardizem gtt titrate ,follow up appoinments  - See additional Care Plan goals for specific interventions  7/31/2022 0644 by Jaspreet Cintron RN  Outcome: Progressing  7/31/2022 0641 by Jaspreet Cintron RN  Outcome: Progressing  Goal: Patient/Family Short Term Goal  Description: Patient's Short Term Goal: safety, pain mgt , rhythm control    Interventions:   - frequent monitor and assist , pain management , comfortable position , update plan of care  - See additional Care Plan goals for specific interventions  7/31/2022 0644 by Jaspreet Cintron RN  Outcome: Progressing  7/31/2022 0641 by Jaspreet Cintron RN  Outcome: Progressing     Problem: Diabetes/Glucose Control  Goal: Glucose maintained within prescribed range  Description: INTERVENTIONS:  - Monitor Blood Glucose as ordered  - Assess for signs and symptoms of hyperglycemia and hypoglycemia  - Administer ordered medications to maintain glucose within target range  - Assess barriers to adequate nutritional intake and initiate nutrition consult as needed  - Instruct patient on self management of diabetes  7/31/2022 0644 by Jaspreet Cintron RN  Outcome: Progressing  7/31/2022 0641 by Jaspreet Cintron RN  Outcome: Progressing     Problem: CARDIOVASCULAR - ADULT  Goal: Absence of cardiac arrhythmias or at baseline  Description: INTERVENTIONS:  - Continuous cardiac monitoring, monitor vital signs, obtain 12 lead EKG if indicated  - Evaluate effectiveness of antiarrhythmic and heart rate control medications as ordered  - Initiate emergency measures for life threatening arrhythmias  - Monitor electrolytes and administer replacement therapy as ordered  7/31/2022 0644 by Tanya Valdes RN  Outcome: Progressing  7/31/2022 0641 by Tanya Valdes RN  Outcome: Progressing     Problem: GASTROINTESTINAL - ADULT  Goal: Minimal or absence of nausea and vomiting  Description: INTERVENTIONS:  - Maintain adequate hydration with IV or PO as ordered and tolerated  - Nasogastric tube to low intermittent suction as ordered  - Evaluate effectiveness of ordered antiemetic medications  - Provide nonpharmacologic comfort measures as appropriate  - Advance diet as tolerated, if ordered  - Obtain nutritional consult as needed  - Evaluate fluid balance  7/31/2022 0644 by Tanya Valdes RN  Outcome: Progressing  7/31/2022 0641 by Tanya Valdes RN  Outcome: Progressing  Goal: Maintains adequate nutritional intake (undernourished)  Description: INTERVENTIONS:  - Monitor percentage of each meal consumed  - Identify factors contributing to decreased intake, treat as appropriate  - Assist with meals as needed  - Monitor I&O, WT and lab values  - Obtain nutritional consult as needed  - Optimize oral hygiene and moisture  - Encourage food from home; allow for food preferences  - Enhance eating environment  7/31/2022 0644 by Tanya Valdes RN  Outcome: Progressing  7/31/2022 0641 by Tanya Valdes RN  Outcome: Progressing     Problem: CARDIOVASCULAR - ADULT  Goal: Absence of cardiac arrhythmias or at baseline  Description: INTERVENTIONS:  - Continuous cardiac monitoring, monitor vital signs, obtain 12 lead EKG if indicated  - Evaluate effectiveness of antiarrhythmic and heart rate control medications as ordered  - Initiate emergency measures for life threatening arrhythmias  - Monitor electrolytes and administer replacement therapy as ordered  7/31/2022 0644 by Rosalind Wright RN  Outcome: Progressing  7/31/2022 0641 by Rosalind Wright RN  Outcome: Progressing     Problem: METABOLIC/FLUID AND ELECTROLYTES - ADULT  Goal: Glucose maintained within prescribed range  Description: INTERVENTIONS:  - Monitor Blood Glucose as ordered  - Assess for signs and symptoms of hyperglycemia and hypoglycemia  - Administer ordered medications to maintain glucose within target range  - Assess barriers to adequate nutritional intake and initiate nutrition consult as needed  - Instruct patient on self management of diabetes  7/31/2022 0644 by Rosalind Wright RN  Outcome: Progressing  7/31/2022 0641 by Rosalind Wright RN  Outcome: Progressing  Goal: Electrolytes maintained within normal limits  Description: INTERVENTIONS:  - Monitor labs and rhythm and assess patient for signs and symptoms of electrolyte imbalances  - Administer electrolyte replacement as ordered  - Monitor response to electrolyte replacements, including rhythm and repeat lab results as appropriate  - Fluid restriction as ordered  - Instruct patient on fluid and nutrition restrictions as appropriate  7/31/2022 0644 by Rosalind Wright RN  Outcome: Progressing  7/31/2022 0641 by Rosalind Wright RN  Outcome: Progressing  Goal: Hemodynamic stability and optimal renal function maintained  Description: INTERVENTIONS:  - Monitor labs and assess for signs and symptoms of volume excess or deficit  - Monitor intake, output and patient weight  - Monitor urine specific gravity, serum osmolarity and serum sodium as indicated or ordered  - Monitor response to interventions for patient's volume status, including labs, urine output, blood pressure (other measures as available)  - Encourage oral intake as appropriate  - Instruct patient on fluid and nutrition restrictions as appropriate  7/31/2022 0644 by Rosalind Wright RN  Outcome: Progressing  7/31/2022 0641 by Joie Tang, RN  Outcome: Progressing

## 2022-07-31 NOTE — PLAN OF CARE
Cardizem gtt discontinued at 0430  HR remains controlled  Scds  Awaiting cardiology to see  Right limb alert  Echo pending  Possible dc tomorrow

## 2022-08-01 VITALS
OXYGEN SATURATION: 100 % | RESPIRATION RATE: 16 BRPM | WEIGHT: 133.19 LBS | DIASTOLIC BLOOD PRESSURE: 61 MMHG | HEIGHT: 62 IN | TEMPERATURE: 98 F | HEART RATE: 61 BPM | SYSTOLIC BLOOD PRESSURE: 129 MMHG | BODY MASS INDEX: 24.51 KG/M2

## 2022-08-01 LAB
GLUCOSE BLD-MCNC: 127 MG/DL (ref 70–99)
GLUCOSE BLD-MCNC: 143 MG/DL (ref 70–99)
GLUCOSE BLD-MCNC: 187 MG/DL (ref 70–99)

## 2022-08-01 PROCEDURE — 99239 HOSP IP/OBS DSCHRG MGMT >30: CPT | Performed by: INTERNAL MEDICINE

## 2022-08-01 RX ORDER — METOPROLOL SUCCINATE 25 MG/1
12.5 TABLET, EXTENDED RELEASE ORAL
Qty: 30 TABLET | Refills: 3 | Status: SHIPPED | OUTPATIENT
Start: 2022-08-02

## 2022-08-01 NOTE — PLAN OF CARE
Pt alert and oriented x4. Continuous tele monitoring in place. Sinus josé noted on the monitor. Cardiology aware that pt converted from Afib to a sinus rhythm. Denies pain, SOB and dizziness. Steady while ambulating with SBA. Seen and cleared for dc to home today by both cardiology and hospitalist.   Pt agreeable to dc. Tele and peripheral iv to be removed prior to DC. Pt will be transported via private vehicle by daughter with all personal belongings.      Problem: Diabetes/Glucose Control  Goal: Glucose maintained within prescribed range  Description: INTERVENTIONS:  - Monitor Blood Glucose as ordered  - Assess for signs and symptoms of hyperglycemia and hypoglycemia  - Administer ordered medications to maintain glucose within target range  - Assess barriers to adequate nutritional intake and initiate nutrition consult as needed  - Instruct patient on self management of diabetes  Outcome: Progressing     Problem: CARDIOVASCULAR - ADULT  Goal: Absence of cardiac arrhythmias or at baseline  Description: INTERVENTIONS:  - Continuous cardiac monitoring, monitor vital signs, obtain 12 lead EKG if indicated  - Evaluate effectiveness of antiarrhythmic and heart rate control medications as ordered  - Initiate emergency measures for life threatening arrhythmias  - Monitor electrolytes and administer replacement therapy as ordered  Outcome: Progressing     Problem: GASTROINTESTINAL - ADULT  Goal: Minimal or absence of nausea and vomiting  Description: INTERVENTIONS:  - Maintain adequate hydration with IV or PO as ordered and tolerated  - Nasogastric tube to low intermittent suction as ordered  - Evaluate effectiveness of ordered antiemetic medications  - Provide nonpharmacologic comfort measures as appropriate  - Advance diet as tolerated, if ordered  - Obtain nutritional consult as needed  - Evaluate fluid balance  Outcome: Progressing  Goal: Maintains adequate nutritional intake (undernourished)  Description: INTERVENTIONS:  - Monitor percentage of each meal consumed  - Identify factors contributing to decreased intake, treat as appropriate  - Assist with meals as needed  - Monitor I&O, WT and lab values  - Obtain nutritional consult as needed  - Optimize oral hygiene and moisture  - Encourage food from home; allow for food preferences  - Enhance eating environment  Outcome: Progressing     Problem: METABOLIC/FLUID AND ELECTROLYTES - ADULT  Goal: Glucose maintained within prescribed range  Description: INTERVENTIONS:  - Monitor Blood Glucose as ordered  - Assess for signs and symptoms of hyperglycemia and hypoglycemia  - Administer ordered medications to maintain glucose within target range  - Assess barriers to adequate nutritional intake and initiate nutrition consult as needed  - Instruct patient on self management of diabetes  Outcome: Progressing  Goal: Electrolytes maintained within normal limits  Description: INTERVENTIONS:  - Monitor labs and rhythm and assess patient for signs and symptoms of electrolyte imbalances  - Administer electrolyte replacement as ordered  - Monitor response to electrolyte replacements, including rhythm and repeat lab results as appropriate  - Fluid restriction as ordered  - Instruct patient on fluid and nutrition restrictions as appropriate  Outcome: Progressing  Goal: Hemodynamic stability and optimal renal function maintained  Description: INTERVENTIONS:  - Monitor labs and assess for signs and symptoms of volume excess or deficit  - Monitor intake, output and patient weight  - Monitor urine specific gravity, serum osmolarity and serum sodium as indicated or ordered  - Monitor response to interventions for patient's volume status, including labs, urine output, blood pressure (other measures as available)  - Encourage oral intake as appropriate  - Instruct patient on fluid and nutrition restrictions as appropriate  Outcome: Progressing     Problem: MUSCULOSKELETAL - ADULT  Goal: Return mobility to safest level of function  Description: INTERVENTIONS:  - Assess patient stability and activity tolerance for standing, transferring and ambulating w/ or w/o assistive devices  - Assist with transfers and ambulation using safe patient handling equipment as needed  - Ensure adequate protection for wounds/incisions during mobilization  - Obtain PT/OT consults as needed  - Advance activity as appropriate  - Communicate ordered activity level and limitations with patient/family  Outcome: Progressing  Goal: Maintain proper alignment of affected body part  Description: INTERVENTIONS:  - Support and protect limb and body alignment per provider's orders  - Instruct and reinforce with patient and family use of appropriate assistive device and precautions (e.g. spinal or hip dislocation precautions)  Outcome: Progressing     Problem: Impaired Functional Mobility  Goal: Achieve highest/safest level of mobility/gait  Description: Interventions:  - Assess patient's functional ability and stability  - Promote increasing activity/tolerance for mobility and gait  - Educate and engage patient/family in tolerated activity level and precautions  Outcome: Progressing     Problem: Impaired Cognition  Goal: Patient will exhibit improved attention, thought processing and/or memory  Description: Interventions:  Outcome: Progressing

## 2022-08-01 NOTE — PLAN OF CARE
Patient alert and oriented x4  on room air ,C AFIB  On cardiac monitor ,V/Sgood, denies any cp/ chest discomfort, voiding good clear urin, puirwick applied , plan of care provided and made comfortable , echo result pending , no acute distress noted, will monitor closely    Problem: Patient/Family Goals  Goal: Patient/Family Long Term Goal  Description: Patient's Long Term Goal: DISCHARGE TO HOME SOON     Interventions:  - follow orders, cardilogy consult, labs, v/s, monitor hr, rhythm , Cardizem gtt titrate ,follow up appoinments  - See additional Care Plan goals for specific interventions  Outcome: Progressing  Goal: Patient/Family Short Term Goal  Description: Patient's Short Term Goal: safety, pain mgt , rhythm control    Interventions:   - frequent monitor and assist , pain management , comfortable position , update plan of care  - See additional Care Plan goals for specific interventions  Outcome: Progressing     Problem: Diabetes/Glucose Control  Goal: Glucose maintained within prescribed range  Description: INTERVENTIONS:  - Monitor Blood Glucose as ordered  - Assess for signs and symptoms of hyperglycemia and hypoglycemia  - Administer ordered medications to maintain glucose within target range  - Assess barriers to adequate nutritional intake and initiate nutrition consult as needed  - Instruct patient on self management of diabetes  Outcome: Progressing     Problem: CARDIOVASCULAR - ADULT  Goal: Absence of cardiac arrhythmias or at baseline  Description: INTERVENTIONS:  - Continuous cardiac monitoring, monitor vital signs, obtain 12 lead EKG if indicated  - Evaluate effectiveness of antiarrhythmic and heart rate control medications as ordered  - Initiate emergency measures for life threatening arrhythmias  - Monitor electrolytes and administer replacement therapy as ordered  Outcome: Progressing     Problem: GASTROINTESTINAL - ADULT  Goal: Minimal or absence of nausea and vomiting  Description: INTERVENTIONS:  - Maintain adequate hydration with IV or PO as ordered and tolerated  - Nasogastric tube to low intermittent suction as ordered  - Evaluate effectiveness of ordered antiemetic medications  - Provide nonpharmacologic comfort measures as appropriate  - Advance diet as tolerated, if ordered  - Obtain nutritional consult as needed  - Evaluate fluid balance  Outcome: Progressing  Goal: Maintains adequate nutritional intake (undernourished)  Description: INTERVENTIONS:  - Monitor percentage of each meal consumed  - Identify factors contributing to decreased intake, treat as appropriate  - Assist with meals as needed  - Monitor I&O, WT and lab values  - Obtain nutritional consult as needed  - Optimize oral hygiene and moisture  - Encourage food from home; allow for food preferences  - Enhance eating environment  Outcome: Progressing     Problem: GASTROINTESTINAL - ADULT  Goal: Maintains adequate nutritional intake (undernourished)  Description: INTERVENTIONS:  - Monitor percentage of each meal consumed  - Identify factors contributing to decreased intake, treat as appropriate  - Assist with meals as needed  - Monitor I&O, WT and lab values  - Obtain nutritional consult as needed  - Optimize oral hygiene and moisture  - Encourage food from home; allow for food preferences  - Enhance eating environment  Outcome: Progressing     Problem: METABOLIC/FLUID AND ELECTROLYTES - ADULT  Goal: Glucose maintained within prescribed range  Description: INTERVENTIONS:  - Monitor Blood Glucose as ordered  - Assess for signs and symptoms of hyperglycemia and hypoglycemia  - Administer ordered medications to maintain glucose within target range  - Assess barriers to adequate nutritional intake and initiate nutrition consult as needed  - Instruct patient on self management of diabetes  Outcome: Progressing  Goal: Electrolytes maintained within normal limits  Description: INTERVENTIONS:  - Monitor labs and rhythm and assess patient for signs and symptoms of electrolyte imbalances  - Administer electrolyte replacement as ordered  - Monitor response to electrolyte replacements, including rhythm and repeat lab results as appropriate  - Fluid restriction as ordered  - Instruct patient on fluid and nutrition restrictions as appropriate  Outcome: Progressing  Goal: Hemodynamic stability and optimal renal function maintained  Description: INTERVENTIONS:  - Monitor labs and assess for signs and symptoms of volume excess or deficit  - Monitor intake, output and patient weight  - Monitor urine specific gravity, serum osmolarity and serum sodium as indicated or ordered  - Monitor response to interventions for patient's volume status, including labs, urine output, blood pressure (other measures as available)  - Encourage oral intake as appropriate  - Instruct patient on fluid and nutrition restrictions as appropriate  Outcome: Progressing     Problem: HEMATOLOGIC - ADULT  Goal: Maintains hematologic stability  Description: INTERVENTIONS  - Assess for signs and symptoms of bleeding or hemorrhage  - Monitor labs and vital signs for trends  - Administer supportive blood products/factors, fluids and medications as ordered and appropriate  - Administer supportive blood products/factors as ordered and appropriate  Outcome: Progressing     Problem: Impaired Cognition  Goal: Patient will exhibit improved attention, thought processing and/or memory  Description: Interventions:    Outcome: Progressing     Problem: Impaired Functional Mobility  Goal: Achieve highest/safest level of mobility/gait  Description: Interventions:  - Assess patient's functional ability and stability  - Promote increasing activity/tolerance for mobility and gait  - Educate and engage patient/family in tolerated activity level and precautions    Outcome: Progressing

## 2022-08-01 NOTE — TELEPHONE ENCOUNTER
TC to pt to inform of negative urine culture from 7/28. Daughter Vic Peguero answered and mentioned pt in [facility] currently being treated for atrial fib. Pt was feeling better with NTF, symptoms are resolved now. Aaron using purewick at night. Reminded daughter that pt is supposed to resume Keflex suppression every other night. Daughter voices understanding. No further questions at this time.

## 2022-08-01 NOTE — CM/SW NOTE
Script for eliquis electronically sent to patient's [facility] (phone  [phone])--spoke with representative, script not yet received--informed could take up to an hour to populate--will call back    Informed by [facility] pharmacist--cost of eliquis $500.oo for 30-day supply 90 day supply $597. oo--could give pt 30-day free savings card--message sent to [facility] apn--await responsemet with patient, discussed above--will have [facility] fill script and apply free 30 day card and deliver to patient today--they will update pt's [facility]

## 2022-08-03 ENCOUNTER — PATIENT OUTREACH (OUTPATIENT)
Dept: CASE MANAGEMENT | Age: 82
End: 2022-08-03

## 2022-08-03 DIAGNOSIS — Z02.9 ENCOUNTERS FOR UNSPECIFIED ADMINISTRATIVE PURPOSE: ICD-10-CM

## 2022-08-03 PROCEDURE — 1111F DSCHRG MED/CURRENT MED MERGE: CPT

## 2022-08-03 NOTE — PROGRESS NOTES
MINAZAHRA for post hospital follow up. [facility] contact information provided as well as [facility] office number, [phone].

## 2022-08-03 NOTE — PROGRESS NOTES
LM for pt to call [facility] for TCM since discharge. [facility] phone number was provided for pt to call back. Contacted Dr. Wilder Sparrow back line to see if the appt scheduled on 8/8/22 can be changed to TCM, s/w Natalya, she confirmed the appt can be changed to TCM therefore she changed the appt.

## 2022-08-08 NOTE — TELEPHONE ENCOUNTER
Calling to check on Estrace cream - has not received any  Contact from [phone] notified and they will reach out to patient -   CISC - performing BID - amounts vary - but reports that voids/PVR's are fairly even - PVR's becoming slightly less 08-Aug-2022 19:17

## 2022-08-10 ENCOUNTER — HOSPITAL ENCOUNTER (OUTPATIENT)
Dept: GENERAL RADIOLOGY | Age: 82
Discharge: HOME | End: 2022-08-10
Attending: INTERNAL MEDICINE
Payer: MEDICARE

## 2022-08-10 ENCOUNTER — OFFICE VISIT (OUTPATIENT)
Dept: INTERNAL MEDICINE CLINIC | Facility: CLINIC | Age: 82
End: 2022-08-10
Payer: MEDICARE

## 2022-08-10 ENCOUNTER — HOSPITAL ENCOUNTER (EMERGENCY)
Facility: HOSPITAL | Age: 82
Discharge: HOME | End: 2022-08-10
Attending: EMERGENCY MEDICINE
Payer: MEDICARE

## 2022-08-10 ENCOUNTER — APPOINTMENT (OUTPATIENT)
Dept: ULTRASOUND IMAGING | Facility: HOSPITAL | Age: 82
End: 2022-08-10
Attending: EMERGENCY MEDICINE
Payer: MEDICARE

## 2022-08-10 VITALS
HEART RATE: 55 BPM | DIASTOLIC BLOOD PRESSURE: 70 MMHG | BODY MASS INDEX: 24.81 KG/M2 | RESPIRATION RATE: 16 BRPM | TEMPERATURE: 98 F | OXYGEN SATURATION: 98 % | HEIGHT: 62 IN | SYSTOLIC BLOOD PRESSURE: 160 MMHG | WEIGHT: 134.81 LBS

## 2022-08-10 VITALS
HEIGHT: 62 IN | SYSTOLIC BLOOD PRESSURE: 172 MMHG | BODY MASS INDEX: 23.92 KG/M2 | HEART RATE: 55 BPM | TEMPERATURE: 98 F | WEIGHT: 130 LBS | DIASTOLIC BLOOD PRESSURE: 82 MMHG | OXYGEN SATURATION: 98 % | RESPIRATION RATE: 20 BRPM

## 2022-08-10 DIAGNOSIS — I25.810 CORONARY ARTERY DISEASE INVOLVING CORONARY BYPASS GRAFT OF NATIVE HEART WITHOUT ANGINA PECTORIS: Chronic | ICD-10-CM

## 2022-08-10 DIAGNOSIS — I48.91 ATRIAL FIBRILLATION WITH RAPID VENTRICULAR RESPONSE (HCC): ICD-10-CM

## 2022-08-10 DIAGNOSIS — M79.671 RIGHT FOOT PAIN: ICD-10-CM

## 2022-08-10 DIAGNOSIS — Z09 HOSPITAL DISCHARGE FOLLOW-UP: Primary | ICD-10-CM

## 2022-08-10 DIAGNOSIS — I49.5 SICK SINUS SYNDROME (HCC): ICD-10-CM

## 2022-08-10 DIAGNOSIS — R22.41 LOCALIZED SWELLING OF RIGHT FOOT: Primary | ICD-10-CM

## 2022-08-10 DIAGNOSIS — E78.00 PURE HYPERCHOLESTEROLEMIA: Chronic | ICD-10-CM

## 2022-08-10 DIAGNOSIS — S90.31XA CONTUSION OF RIGHT FOOT, INITIAL ENCOUNTER: ICD-10-CM

## 2022-08-10 DIAGNOSIS — I10 PRIMARY HYPERTENSION: Chronic | ICD-10-CM

## 2022-08-10 LAB
ALBUMIN SERPL-MCNC: 3.5 G/DL (ref 3.4–5)
ALBUMIN/GLOB SERPL: 0.8 {RATIO} (ref 1–2)
ALP LIVER SERPL-CCNC: 79 U/L
ALT SERPL-CCNC: 16 U/L
ANION GAP SERPL CALC-SCNC: 4 MMOL/L (ref 0–18)
AST SERPL-CCNC: 15 U/L (ref 15–37)
BASOPHILS # BLD AUTO: 0.04 X10(3) UL (ref 0–0.2)
BASOPHILS NFR BLD AUTO: 0.7 %
BILIRUB SERPL-MCNC: 0.2 MG/DL (ref 0.1–2)
BUN BLD-MCNC: 13 MG/DL (ref 7–18)
CALCIUM BLD-MCNC: 9.5 MG/DL (ref 8.5–10.1)
CHLORIDE SERPL-SCNC: 104 MMOL/L (ref 98–112)
CO2 SERPL-SCNC: 30 MMOL/L (ref 21–32)
CREAT BLD-MCNC: 0.71 MG/DL
EOSINOPHIL # BLD AUTO: 0.08 X10(3) UL (ref 0–0.7)
EOSINOPHIL NFR BLD AUTO: 1.3 %
ERYTHROCYTE [DISTWIDTH] IN BLOOD BY AUTOMATED COUNT: 13.2 %
GFR SERPLBLD BASED ON 1.73 SQ M-ARVRAT: 85 ML/MIN/1.73M2 (ref 60–?)
GLOBULIN PLAS-MCNC: 4.3 G/DL (ref 2.8–4.4)
GLUCOSE BLD-MCNC: 139 MG/DL (ref 70–99)
HCT VFR BLD AUTO: 37 %
HGB BLD-MCNC: 12 G/DL
IMM GRANULOCYTES # BLD AUTO: 0.02 X10(3) UL (ref 0–1)
IMM GRANULOCYTES NFR BLD: 0.3 %
LYMPHOCYTES # BLD AUTO: 1.64 X10(3) UL (ref 1–4)
LYMPHOCYTES NFR BLD AUTO: 26.8 %
MCH RBC QN AUTO: 28.7 PG (ref 26–34)
MCHC RBC AUTO-ENTMCNC: 32.4 G/DL (ref 31–37)
MCV RBC AUTO: 88.5 FL
MONOCYTES # BLD AUTO: 0.71 X10(3) UL (ref 0.1–1)
MONOCYTES NFR BLD AUTO: 11.6 %
NEUTROPHILS # BLD AUTO: 3.63 X10 (3) UL (ref 1.5–7.7)
NEUTROPHILS # BLD AUTO: 3.63 X10(3) UL (ref 1.5–7.7)
NEUTROPHILS NFR BLD AUTO: 59.3 %
NT-PROBNP SERPL-MCNC: 225 PG/ML (ref ?–450)
OSMOLALITY SERPL CALC.SUM OF ELEC: 288 MOSM/KG (ref 275–295)
PLATELET # BLD AUTO: 259 10(3)UL (ref 150–450)
POTASSIUM SERPL-SCNC: 3.9 MMOL/L (ref 3.5–5.1)
PROT SERPL-MCNC: 7.8 G/DL (ref 6.4–8.2)
RBC # BLD AUTO: 4.18 X10(6)UL
SARS-COV-2 RNA RESP QL NAA+PROBE: NOT DETECTED
SODIUM SERPL-SCNC: 138 MMOL/L (ref 136–145)
TROPONIN I HIGH SENSITIVITY: 6 NG/L
WBC # BLD AUTO: 6.1 X10(3) UL (ref 4–11)

## 2022-08-10 PROCEDURE — 99495 TRANSJ CARE MGMT MOD F2F 14D: CPT | Performed by: INTERNAL MEDICINE

## 2022-08-10 PROCEDURE — 1111F DSCHRG MED/CURRENT MED MERGE: CPT | Performed by: INTERNAL MEDICINE

## 2022-08-10 PROCEDURE — 99284 EMERGENCY DEPT VISIT MOD MDM: CPT

## 2022-08-10 PROCEDURE — 93010 ELECTROCARDIOGRAM REPORT: CPT

## 2022-08-10 PROCEDURE — 84484 ASSAY OF TROPONIN QUANT: CPT | Performed by: STUDENT IN AN ORGANIZED HEALTH CARE EDUCATION/TRAINING PROGRAM

## 2022-08-10 PROCEDURE — 85025 COMPLETE CBC W/AUTO DIFF WBC: CPT | Performed by: STUDENT IN AN ORGANIZED HEALTH CARE EDUCATION/TRAINING PROGRAM

## 2022-08-10 PROCEDURE — 93971 EXTREMITY STUDY: CPT | Performed by: EMERGENCY MEDICINE

## 2022-08-10 PROCEDURE — 83880 ASSAY OF NATRIURETIC PEPTIDE: CPT | Performed by: STUDENT IN AN ORGANIZED HEALTH CARE EDUCATION/TRAINING PROGRAM

## 2022-08-10 PROCEDURE — 73630 X-RAY EXAM OF FOOT: CPT | Performed by: INTERNAL MEDICINE

## 2022-08-10 PROCEDURE — 93005 ELECTROCARDIOGRAM TRACING: CPT

## 2022-08-10 PROCEDURE — 36415 COLL VENOUS BLD VENIPUNCTURE: CPT

## 2022-08-10 PROCEDURE — 80053 COMPREHEN METABOLIC PANEL: CPT | Performed by: STUDENT IN AN ORGANIZED HEALTH CARE EDUCATION/TRAINING PROGRAM

## 2022-08-10 RX ORDER — ACETAMINOPHEN AND CODEINE PHOSPHATE 300; 30 MG/1; MG/1
2 TABLET ORAL ONCE
Status: COMPLETED | OUTPATIENT
Start: 2022-08-10 | End: 2022-08-10

## 2022-08-10 RX ORDER — ACETAMINOPHEN AND CODEINE PHOSPHATE 300; 30 MG/1; MG/1
1 TABLET ORAL EVERY 6 HOURS PRN
Qty: 30 TABLET | Refills: 0 | Status: SHIPPED | OUTPATIENT
Start: 2022-08-10

## 2022-08-10 NOTE — ED INITIAL ASSESSMENT (HPI)
Pt to the ER via walk in for right foot swelling. Pt states legs have been discolored for a couple of days but the swelling is new from today. Pt denies SOB/ CP/N/V/D. Pt states she was recently dx with afib. Pt presents to the ER a/ox4. Resp even and nonlabored. Skin warm and dry.

## 2022-08-12 LAB
ATRIAL RATE: 56 BPM
P AXIS: 105 DEGREES
P-R INTERVAL: 202 MS
Q-T INTERVAL: 450 MS
QRS DURATION: 102 MS
QTC CALCULATION (BEZET): 434 MS
R AXIS: -41 DEGREES
T AXIS: 15 DEGREES
VENTRICULAR RATE: 56 BPM

## 2022-08-12 NOTE — PROGRESS NOTES
Multiple attempts to reach pt and messages left with no return call. Pt went in for HFU appt with Dr. Yessenia Hobson on 8/10/22. Encounter closing.

## 2022-08-31 DIAGNOSIS — E03.4 HYPOTHYROIDISM DUE TO ACQUIRED ATROPHY OF THYROID: Chronic | ICD-10-CM

## 2022-09-01 RX ORDER — LEVOTHYROXINE SODIUM 88 UG/1
TABLET ORAL
Qty: 90 TABLET | Refills: 0 | Status: SHIPPED | OUTPATIENT
Start: 2022-09-01

## 2022-09-01 NOTE — TELEPHONE ENCOUNTER
Levothyroxine 88 mcg  Filled 6-1-22  Qty 90  0 refills  No upcoming appt.   LOV 2-1-22   Labs: 7-30-22 Free T4, TSH W Reflex

## 2022-09-02 ENCOUNTER — TELEPHONE (OUTPATIENT)
Dept: UROLOGY | Facility: CLINIC | Age: 82
End: 2022-09-02

## 2022-09-02 RX ORDER — CEPHALEXIN 250 MG/1
250 CAPSULE ORAL EVERY OTHER DAY
Qty: 45 CAPSULE | Refills: 0 | Status: SHIPPED | OUTPATIENT
Start: 2022-09-02

## 2022-09-02 NOTE — TELEPHONE ENCOUNTER
From daughter Michoacano Leavitt, on pt HIPAA, asking for refill of pt Kelfex 250mg po every other day. Pt has been on this since April 2021. Messaged Maxi Bowman said it was okay to refill, but needs f/u appt.

## 2022-09-11 ENCOUNTER — TELEPHONE (OUTPATIENT)
Dept: UROLOGY | Facility: CLINIC | Age: 82
End: 2022-09-11

## 2022-09-11 DIAGNOSIS — R30.0 DYSURIA: Primary | ICD-10-CM

## 2022-09-11 RX ORDER — NITROFURANTOIN 25; 75 MG/1; MG/1
100 CAPSULE ORAL 2 TIMES DAILY
Qty: 14 CAPSULE | Refills: 0 | Status: SHIPPED | OUTPATIENT
Start: 2022-09-11 | End: 2022-09-18

## 2022-09-11 NOTE — TELEPHONE ENCOUNTER
TC from pt's daughter, Romeo Benson, via on call answering service   Daughter thinks pt has a UTI  Pt is complaining of dysuria, increased nocturia, labile mood per daughter   Allergies and previous cultures reviewed  Will rx macrobid 100 mg bid x 7 days to pt's preferred pharmacy   Pt to stop keflex suppression while actively taking abx, resume keflex once abx completed  Encouraged PO hydration, AZO prn for pain   Encouraged to call if symptoms persist or worsen (would need ucx)   All questions answered  Family understands and agrees to plan

## 2022-09-13 ENCOUNTER — LAB ENCOUNTER (OUTPATIENT)
Dept: LAB | Age: 82
End: 2022-09-13
Attending: STUDENT IN AN ORGANIZED HEALTH CARE EDUCATION/TRAINING PROGRAM
Payer: MEDICARE

## 2022-09-13 VITALS — BODY MASS INDEX: 23.92 KG/M2 | WEIGHT: 130 LBS | HEIGHT: 62 IN

## 2022-09-13 DIAGNOSIS — R30.0 DYSURIA: ICD-10-CM

## 2022-09-13 PROCEDURE — 87086 URINE CULTURE/COLONY COUNT: CPT

## 2022-09-13 PROCEDURE — 87186 SC STD MICRODIL/AGAR DIL: CPT

## 2022-09-14 ENCOUNTER — HOSPITAL ENCOUNTER (INPATIENT)
Facility: HOSPITAL | Age: 82
LOS: 2 days | Discharge: HOME HEALTH | End: 2022-09-16
Attending: EMERGENCY MEDICINE | Admitting: HOSPITALIST
Payer: MEDICARE

## 2022-09-14 DIAGNOSIS — N12 PYELONEPHRITIS: Primary | ICD-10-CM

## 2022-09-14 LAB
ALBUMIN SERPL-MCNC: 3.5 G/DL (ref 3.4–5)
ALBUMIN/GLOB SERPL: 0.8 {RATIO} (ref 1–2)
ALP LIVER SERPL-CCNC: 88 U/L
ALT SERPL-CCNC: 20 U/L
ANION GAP SERPL CALC-SCNC: 5 MMOL/L (ref 0–18)
AST SERPL-CCNC: 20 U/L (ref 15–37)
BASOPHILS # BLD AUTO: 0.05 X10(3) UL (ref 0–0.2)
BASOPHILS NFR BLD AUTO: 0.9 %
BILIRUB SERPL-MCNC: 0.2 MG/DL (ref 0.1–2)
BILIRUB UR QL STRIP.AUTO: NEGATIVE
BUN BLD-MCNC: 14 MG/DL (ref 7–18)
CALCIUM BLD-MCNC: 9.5 MG/DL (ref 8.5–10.1)
CHLORIDE SERPL-SCNC: 101 MMOL/L (ref 98–112)
CLARITY UR REFRACT.AUTO: CLEAR
CO2 SERPL-SCNC: 32 MMOL/L (ref 21–32)
COLOR UR AUTO: YELLOW
CREAT BLD-MCNC: 0.82 MG/DL
EOSINOPHIL # BLD AUTO: 0.11 X10(3) UL (ref 0–0.7)
EOSINOPHIL NFR BLD AUTO: 1.9 %
ERYTHROCYTE [DISTWIDTH] IN BLOOD BY AUTOMATED COUNT: 13.8 %
GFR SERPLBLD BASED ON 1.73 SQ M-ARVRAT: 72 ML/MIN/1.73M2 (ref 60–?)
GLOBULIN PLAS-MCNC: 4.4 G/DL (ref 2.8–4.4)
GLUCOSE BLD-MCNC: 179 MG/DL (ref 70–99)
GLUCOSE UR STRIP.AUTO-MCNC: NEGATIVE MG/DL
HCT VFR BLD AUTO: 37.3 %
HGB BLD-MCNC: 12 G/DL
IMM GRANULOCYTES # BLD AUTO: 0.01 X10(3) UL (ref 0–1)
IMM GRANULOCYTES NFR BLD: 0.2 %
KETONES UR STRIP.AUTO-MCNC: NEGATIVE MG/DL
LACTATE SERPL-SCNC: 1.6 MMOL/L (ref 0.4–2)
LYMPHOCYTES # BLD AUTO: 1.74 X10(3) UL (ref 1–4)
LYMPHOCYTES NFR BLD AUTO: 30.2 %
MCH RBC QN AUTO: 28.4 PG (ref 26–34)
MCHC RBC AUTO-ENTMCNC: 32.2 G/DL (ref 31–37)
MCV RBC AUTO: 88.4 FL
MONOCYTES # BLD AUTO: 0.6 X10(3) UL (ref 0.1–1)
MONOCYTES NFR BLD AUTO: 10.4 %
NEUTROPHILS # BLD AUTO: 3.25 X10 (3) UL (ref 1.5–7.7)
NEUTROPHILS # BLD AUTO: 3.25 X10(3) UL (ref 1.5–7.7)
NEUTROPHILS NFR BLD AUTO: 56.4 %
NITRITE UR QL STRIP.AUTO: NEGATIVE
OSMOLALITY SERPL CALC.SUM OF ELEC: 291 MOSM/KG (ref 275–295)
PH UR STRIP.AUTO: 7 [PH] (ref 5–8)
PLATELET # BLD AUTO: 278 10(3)UL (ref 150–450)
POTASSIUM SERPL-SCNC: 4 MMOL/L (ref 3.5–5.1)
PROT SERPL-MCNC: 7.9 G/DL (ref 6.4–8.2)
PROT UR STRIP.AUTO-MCNC: NEGATIVE MG/DL
RBC # BLD AUTO: 4.22 X10(6)UL
SARS-COV-2 RNA RESP QL NAA+PROBE: NOT DETECTED
SODIUM SERPL-SCNC: 138 MMOL/L (ref 136–145)
SP GR UR STRIP.AUTO: 1.01 (ref 1–1.03)
UROBILINOGEN UR STRIP.AUTO-MCNC: 0.2 MG/DL
WBC # BLD AUTO: 5.8 X10(3) UL (ref 4–11)

## 2022-09-14 PROCEDURE — 99497 ADVNCD CARE PLAN 30 MIN: CPT | Performed by: HOSPITALIST

## 2022-09-14 PROCEDURE — 99223 1ST HOSP IP/OBS HIGH 75: CPT | Performed by: HOSPITALIST

## 2022-09-14 RX ORDER — POLYETHYLENE GLYCOL 3350 17 G/17G
17 POWDER, FOR SOLUTION ORAL DAILY PRN
Status: DISCONTINUED | OUTPATIENT
Start: 2022-09-14 | End: 2022-09-16

## 2022-09-14 RX ORDER — NICOTINE POLACRILEX 4 MG
15 LOZENGE BUCCAL
Status: DISCONTINUED | OUTPATIENT
Start: 2022-09-14 | End: 2022-09-16

## 2022-09-14 RX ORDER — METOCLOPRAMIDE HYDROCHLORIDE 5 MG/ML
10 INJECTION INTRAMUSCULAR; INTRAVENOUS EVERY 8 HOURS PRN
Status: DISCONTINUED | OUTPATIENT
Start: 2022-09-14 | End: 2022-09-16

## 2022-09-14 RX ORDER — HYDROCODONE BITARTRATE AND ACETAMINOPHEN 5; 325 MG/1; MG/1
1 TABLET ORAL EVERY 4 HOURS PRN
Status: DISCONTINUED | OUTPATIENT
Start: 2022-09-14 | End: 2022-09-16

## 2022-09-14 RX ORDER — DEXTROSE MONOHYDRATE 25 G/50ML
50 INJECTION, SOLUTION INTRAVENOUS
Status: DISCONTINUED | OUTPATIENT
Start: 2022-09-14 | End: 2022-09-16

## 2022-09-14 RX ORDER — LEVOTHYROXINE SODIUM 88 UG/1
88 TABLET ORAL
Status: DISCONTINUED | OUTPATIENT
Start: 2022-09-15 | End: 2022-09-16

## 2022-09-14 RX ORDER — HYDROCODONE BITARTRATE AND ACETAMINOPHEN 5; 325 MG/1; MG/1
2 TABLET ORAL EVERY 4 HOURS PRN
Status: DISCONTINUED | OUTPATIENT
Start: 2022-09-14 | End: 2022-09-16

## 2022-09-14 RX ORDER — ECHINACEA PURPUREA EXTRACT 125 MG
1 TABLET ORAL
Status: DISCONTINUED | OUTPATIENT
Start: 2022-09-14 | End: 2022-09-16

## 2022-09-14 RX ORDER — NICOTINE POLACRILEX 4 MG
30 LOZENGE BUCCAL
Status: DISCONTINUED | OUTPATIENT
Start: 2022-09-14 | End: 2022-09-16

## 2022-09-14 RX ORDER — PANTOPRAZOLE SODIUM 20 MG/1
20 TABLET, DELAYED RELEASE ORAL
Status: DISCONTINUED | OUTPATIENT
Start: 2022-09-15 | End: 2022-09-16

## 2022-09-14 RX ORDER — SENNOSIDES 8.6 MG
17.2 TABLET ORAL NIGHTLY PRN
Status: DISCONTINUED | OUTPATIENT
Start: 2022-09-14 | End: 2022-09-16

## 2022-09-14 RX ORDER — SODIUM PHOSPHATE, DIBASIC AND SODIUM PHOSPHATE, MONOBASIC 7; 19 G/133ML; G/133ML
1 ENEMA RECTAL ONCE AS NEEDED
Status: DISCONTINUED | OUTPATIENT
Start: 2022-09-14 | End: 2022-09-16

## 2022-09-14 RX ORDER — MELATONIN
3 NIGHTLY PRN
Status: DISCONTINUED | OUTPATIENT
Start: 2022-09-14 | End: 2022-09-16

## 2022-09-14 RX ORDER — BISACODYL 10 MG
10 SUPPOSITORY, RECTAL RECTAL
Status: DISCONTINUED | OUTPATIENT
Start: 2022-09-14 | End: 2022-09-16

## 2022-09-14 RX ORDER — EZETIMIBE 10 MG/1
10 TABLET ORAL NIGHTLY
Status: DISCONTINUED | OUTPATIENT
Start: 2022-09-14 | End: 2022-09-16

## 2022-09-14 RX ORDER — GABAPENTIN 100 MG/1
200 CAPSULE ORAL NIGHTLY
Status: DISCONTINUED | OUTPATIENT
Start: 2022-09-14 | End: 2022-09-16

## 2022-09-14 RX ORDER — ONDANSETRON 2 MG/ML
4 INJECTION INTRAMUSCULAR; INTRAVENOUS EVERY 6 HOURS PRN
Status: DISCONTINUED | OUTPATIENT
Start: 2022-09-14 | End: 2022-09-16

## 2022-09-14 RX ORDER — PRAVASTATIN SODIUM 10 MG
10 TABLET ORAL NIGHTLY
Status: DISCONTINUED | OUTPATIENT
Start: 2022-09-14 | End: 2022-09-16

## 2022-09-14 RX ORDER — ACETAMINOPHEN 500 MG
1000 TABLET ORAL EVERY 4 HOURS PRN
Status: DISCONTINUED | OUTPATIENT
Start: 2022-09-14 | End: 2022-09-16

## 2022-09-14 NOTE — ED INITIAL ASSESSMENT (HPI)
Pt to ed with c/o urinary symptoms since Saturday, PT placed on macrobid on 9/18,  pain and burning continues.  Pt has urine culture pending   + right flank pain x 2 days

## 2022-09-15 ENCOUNTER — HOSPITAL ENCOUNTER (OUTPATIENT)
Dept: INTERVENTIONAL RADIOLOGY/VASCULAR | Facility: HOSPITAL | Age: 82
Discharge: HOME | End: 2022-09-15
Attending: INTERNAL MEDICINE
Payer: MEDICARE

## 2022-09-15 ENCOUNTER — APPOINTMENT (OUTPATIENT)
Dept: CT IMAGING | Facility: HOSPITAL | Age: 82
End: 2022-09-15
Attending: HOSPITALIST
Payer: MEDICARE

## 2022-09-15 LAB
GLUCOSE BLD-MCNC: 107 MG/DL (ref 70–99)
GLUCOSE BLD-MCNC: 123 MG/DL (ref 70–99)
GLUCOSE BLD-MCNC: 128 MG/DL (ref 70–99)
GLUCOSE BLD-MCNC: 152 MG/DL (ref 70–99)

## 2022-09-15 PROCEDURE — 05H633Z INSERTION OF INFUSION DEVICE INTO LEFT SUBCLAVIAN VEIN, PERCUTANEOUS APPROACH: ICD-10-PCS | Performed by: HOSPITALIST

## 2022-09-15 PROCEDURE — 99232 SBSQ HOSP IP/OBS MODERATE 35: CPT | Performed by: HOSPITALIST

## 2022-09-15 PROCEDURE — 76377 3D RENDER W/INTRP POSTPROCES: CPT | Performed by: HOSPITALIST

## 2022-09-15 PROCEDURE — B547ZZA ULTRASONOGRAPHY OF LEFT SUBCLAVIAN VEIN, GUIDANCE: ICD-10-PCS | Performed by: HOSPITALIST

## 2022-09-15 PROCEDURE — 74178 CT ABD&PLV WO CNTR FLWD CNTR: CPT | Performed by: HOSPITALIST

## 2022-09-15 RX ORDER — IOHEXOL 350 MG/ML
85 INJECTION, SOLUTION INTRAVENOUS
Status: COMPLETED | OUTPATIENT
Start: 2022-09-15 | End: 2022-09-15

## 2022-09-15 NOTE — ED QUICK NOTES
Orders for admission, patient is aware of plan and ready to go upstairs. Any questions, please call ED RN [facility].      Patient Covid vaccination status: Fully vaccinated     COVID Test Ordered in ED: Rapid SARS-CoV-2 by PCR    COVID Suspicion at Admission: Low clinical suspicion for COVID    Running Infusions:  None    Mental Status/LOC at time of transport: a/o x 4    Other pertinent information:   CIWA score: N/A   NIH score:  N/A

## 2022-09-15 NOTE — PROGRESS NOTES
Pharmacy Note: Renal dose adjustment of Cefepime    Anh Boogie is a 81year old female who has been prescribed Cefepime 1g every 8 hours. CrCl is 42.6 ml/min so the dose has been adjusted  to 1gm every 12 hours per hospital renal dose adjustment protocol. Pharmacy will follow and adjust dose as warranted for renal function changes.      Thank you,   Cha Mccarty, [facility]  9/15/2022  12:34 AM

## 2022-09-15 NOTE — CM/SW NOTE
09/15/22 1600   CM/SW Referral Data   Referral Source Physician   Reason for Referral Discharge planning   Informant Patient;Daughter;EMR;Clinical Staff Member   Patient Info   Patient's Current Mental Status at Time of Assessment Alert;Oriented   Patient's [address]   Number of Levels in Home 2   Patient lives with Daughter   Patient Status Prior to Admission   Independent with ADLs and Mobility Yes   Discharge Needs   Anticipated D/C needs Home health care; Infusion care       Patient is an 80 y/o woman admitted with pyelonephritis. Received order for pt who will need IV abx. Met with pt and pt's dtr at bedside to discuss DC planning. Pt lives with her dtr and stated she is normally independent with ADLs and does not use DME. Pt's dtr available and supportive as needed. Pt has no previous history of HHC. She has been to [place] [address] for acute rehab in the past.  Discussed DC Planning and need for IV abx. Reviewed options for infusion/IV abx: 1) nursing home vs 2) home with home health care. Per MD, pt requires Q12 IV abx and so outpatient infusion is not an option. Pt/dtr stated preference for home with [facility] and home infusion. They feel able to manage IV abx at home with [facility] support. No other DC needs/concerns at this time. Referrals sent to [facility] and [facility] Providers via [address]. Await responses. / to remain available for support and/or discharge planning.      Amber Domínguez [facility]  Discharge Planner  [phone]

## 2022-09-15 NOTE — PLAN OF CARE
Patient A & O x4. VSS, on RA. C/o mild pain to right flank. IV abx started per order. Voiding freely, purewick in place. Safety measures in place. Instructed to use call light.

## 2022-09-15 NOTE — PLAN OF CARE
CT urogram done this am.  Midline placed for home IV antibiotics. Receiving IV Cefepime q12 hours as per order. Ambulates ad patrice. States she feels like she's emptying bladder when voiding but will check PVR with next void. Pt and daughter verbalized understanding of POC. Will continue to monitor.

## 2022-09-15 NOTE — CM/SW NOTE
Jose JESUS order sent to [facility]. MSW also sent clinicals to [place] [address]. MSW to f/u with pt for choices when known.

## 2022-09-16 VITALS
SYSTOLIC BLOOD PRESSURE: 152 MMHG | DIASTOLIC BLOOD PRESSURE: 64 MMHG | TEMPERATURE: 98 F | WEIGHT: 130.06 LBS | BODY MASS INDEX: 23.93 KG/M2 | OXYGEN SATURATION: 99 % | RESPIRATION RATE: 20 BRPM | HEIGHT: 62 IN | HEART RATE: 52 BPM

## 2022-09-16 LAB
GLUCOSE BLD-MCNC: 135 MG/DL (ref 70–99)
GLUCOSE BLD-MCNC: 136 MG/DL (ref 70–99)

## 2022-09-16 PROCEDURE — 99238 HOSP IP/OBS DSCHRG MGMT 30/<: CPT | Performed by: HOSPITALIST

## 2022-09-16 NOTE — PLAN OF CARE
Patient A/O x4, VSS on RA, denies pain at this time. , SCDs. Pt straight caths self at home 2x day, PVR done- 450ml, straight cath done per protocol. IV abx continued. Plan for dc w/IV abx via midline. Safety measures in place.

## 2022-09-16 NOTE — HOME CARE LIAISON
Received referral via [facility] for Home Health services. Spoke w/ patient and provided with list of [facility] providers from [address] [place] [address], choice is [facility]. Agency reserved in [address] [place] [address] and contact information placed on AVS.Financial interest disclosure provided.  Notified Maxine Whitaker

## 2022-09-16 NOTE — PLAN OF CARE
Patient A&Ox4, VSS on room air. Patients denies pain or discomfort at this time. Up in chair for meals, ambulating well. MIDline to LUE, dressing C/D/I. IV ABX continued. Tolerating regular diet, voiding freely, will check PVR. LBM 9/14, laxative given, encouraged fluid intake. Spoke to cardiology regarding HR, will see patient outpatient. Spoke to patients daughter regarding plan of care. Plan to DC home with IV ABX. Plan of care reviewed with patient, all questions answered, verbalized understanding. 1430: DC paperwork reviewed with patient and daughter at bedside. Reviewed instructions, restrictions, medications and follow up appointments.

## 2022-09-16 NOTE — CM/SW NOTE
Patient accepted by [facility] [place] [address] services. Spoke with Polo Toth from [facility] who will meet with pt/family today to [facility]. Spoke with Hernando Jain from [facility] who confirmed pt accepted for home infusion services and is covered at 100%. Informed her of plan for [facility] and DC today after 1pm dose. They will contact pt/family to discuss delivery of medications/supplies. No further needs at this time. / to remain available for support and/or discharge planning.      Axel Mc, [facility]  Discharge Planner  [phone]

## 2022-09-16 NOTE — PROGRESS NOTES
Patient seen and examined. Discharge if ok with consultants and if IV abx set up. Hospitalist portion of med rec completed.     Kwadwo Duff MD  [facility]  Internal Medicine Hospitalist

## 2022-09-19 ENCOUNTER — LAB REQUISITION (OUTPATIENT)
Dept: LAB | Facility: HOSPITAL | Age: 82
End: 2022-09-19

## 2022-09-19 ENCOUNTER — PATIENT OUTREACH (OUTPATIENT)
Dept: CASE MANAGEMENT | Age: 82
End: 2022-09-19

## 2022-09-19 DIAGNOSIS — N12 PYELONEPHRITIS: ICD-10-CM

## 2022-09-19 DIAGNOSIS — I10 ESSENTIAL (PRIMARY) HYPERTENSION: ICD-10-CM

## 2022-09-19 DIAGNOSIS — Z02.9 ENCOUNTERS FOR UNSPECIFIED ADMINISTRATIVE PURPOSE: ICD-10-CM

## 2022-09-19 LAB
ANION GAP SERPL CALC-SCNC: 8 MMOL/L (ref 0–18)
BASOPHILS # BLD AUTO: 0.07 X10(3) UL (ref 0–0.2)
BASOPHILS NFR BLD AUTO: 1.4 %
BUN BLD-MCNC: 19 MG/DL (ref 7–18)
BUN/CREAT SERPL: 25.7 (ref 10–20)
CALCIUM BLD-MCNC: 9.6 MG/DL (ref 8.5–10.1)
CHLORIDE SERPL-SCNC: 103 MMOL/L (ref 98–112)
CO2 SERPL-SCNC: 26 MMOL/L (ref 21–32)
CREAT BLD-MCNC: 0.74 MG/DL
DEPRECATED RDW RBC AUTO: 46.6 FL (ref 35.1–46.3)
EOSINOPHIL # BLD AUTO: 0.12 X10(3) UL (ref 0–0.7)
EOSINOPHIL NFR BLD AUTO: 2.5 %
ERYTHROCYTE [DISTWIDTH] IN BLOOD BY AUTOMATED COUNT: 13.7 % (ref 11–15)
GFR SERPLBLD BASED ON 1.73 SQ M-ARVRAT: 81 ML/MIN/1.73M2 (ref 60–?)
GLUCOSE BLD-MCNC: 112 MG/DL (ref 70–99)
HCT VFR BLD AUTO: 39.7 %
HGB BLD-MCNC: 12.3 G/DL
IMM GRANULOCYTES # BLD AUTO: 0.02 X10(3) UL (ref 0–1)
IMM GRANULOCYTES NFR BLD: 0.4 %
LYMPHOCYTES # BLD AUTO: 1.62 X10(3) UL (ref 1–4)
LYMPHOCYTES NFR BLD AUTO: 33.1 %
MCH RBC QN AUTO: 28.4 PG (ref 26–34)
MCHC RBC AUTO-ENTMCNC: 31 G/DL (ref 31–37)
MCV RBC AUTO: 91.7 FL
MONOCYTES # BLD AUTO: 0.62 X10(3) UL (ref 0.1–1)
MONOCYTES NFR BLD AUTO: 12.7 %
NEUTROPHILS # BLD AUTO: 2.44 X10 (3) UL (ref 1.5–7.7)
NEUTROPHILS # BLD AUTO: 2.44 X10(3) UL (ref 1.5–7.7)
NEUTROPHILS NFR BLD AUTO: 49.9 %
OSMOLALITY SERPL CALC.SUM OF ELEC: 287 MOSM/KG (ref 275–295)
PLATELET # BLD AUTO: 285 10(3)UL (ref 150–450)
POTASSIUM SERPL-SCNC: 5.1 MMOL/L (ref 3.5–5.1)
RBC # BLD AUTO: 4.33 X10(6)UL
SODIUM SERPL-SCNC: 137 MMOL/L (ref 136–145)
WBC # BLD AUTO: 4.9 X10(3) UL (ref 4–11)

## 2022-09-19 PROCEDURE — 85025 COMPLETE CBC W/AUTO DIFF WBC: CPT | Performed by: INTERNAL MEDICINE

## 2022-09-19 PROCEDURE — 80048 BASIC METABOLIC PNL TOTAL CA: CPT | Performed by: INTERNAL MEDICINE

## 2022-09-19 PROCEDURE — 1111F DSCHRG MED/CURRENT MED MERGE: CPT

## 2022-09-19 RX ORDER — BLOOD SUGAR DIAGNOSTIC
STRIP MISCELLANEOUS
Qty: 100 STRIP | Refills: 0 | Status: SHIPPED | OUTPATIENT
Start: 2022-09-19

## 2022-09-19 NOTE — PROGRESS NOTES
NCM attempted to contact the patient for HFU. Phone rang a few times, appeared to be answered and then disconnected. NCM will try again later.

## 2022-09-20 ENCOUNTER — TELEPHONE (OUTPATIENT)
Dept: UROLOGY | Facility: CLINIC | Age: 82
End: 2022-09-20

## 2022-09-20 NOTE — TELEPHONE ENCOUNTER
TC to pt in follow up   No answer, LM   Encouraged to finish abx as prescribed   Will plan to follow up sooner than November - will have  reach out to schedule   Encouraged to call back w/ questions or concerns

## 2022-09-23 ENCOUNTER — OFFICE VISIT (OUTPATIENT)
Dept: INTERNAL MEDICINE CLINIC | Facility: CLINIC | Age: 82
End: 2022-09-23

## 2022-09-23 VITALS
DIASTOLIC BLOOD PRESSURE: 74 MMHG | HEART RATE: 56 BPM | HEIGHT: 62 IN | WEIGHT: 135 LBS | TEMPERATURE: 97 F | OXYGEN SATURATION: 100 % | BODY MASS INDEX: 24.84 KG/M2 | SYSTOLIC BLOOD PRESSURE: 157 MMHG | RESPIRATION RATE: 20 BRPM

## 2022-09-23 DIAGNOSIS — N30.00 ACUTE CYSTITIS WITHOUT HEMATURIA: Primary | ICD-10-CM

## 2022-09-23 DIAGNOSIS — E11.9 TYPE 2 DIABETES MELLITUS WITHOUT COMPLICATION, WITHOUT LONG-TERM CURRENT USE OF INSULIN (HCC): ICD-10-CM

## 2022-09-23 DIAGNOSIS — I49.5 SICK SINUS SYNDROME (HCC): ICD-10-CM

## 2022-09-23 DIAGNOSIS — N12 PYELONEPHRITIS: ICD-10-CM

## 2022-09-23 DIAGNOSIS — I48.0 PAROXYSMAL ATRIAL FIBRILLATION (HCC): ICD-10-CM

## 2022-09-23 DIAGNOSIS — G25.0 BENIGN ESSENTIAL TREMOR: ICD-10-CM

## 2022-09-23 PROCEDURE — 1111F DSCHRG MED/CURRENT MED MERGE: CPT | Performed by: NURSE PRACTITIONER

## 2022-09-23 PROCEDURE — 99495 TRANSJ CARE MGMT MOD F2F 14D: CPT | Performed by: NURSE PRACTITIONER

## 2022-09-23 RX ORDER — GABAPENTIN 100 MG/1
200 CAPSULE ORAL NIGHTLY
Qty: 60 CAPSULE | Refills: 0 | Status: SHIPPED | OUTPATIENT
Start: 2022-09-23

## 2022-09-23 RX ORDER — METOPROLOL SUCCINATE 25 MG/1
12.5 TABLET, EXTENDED RELEASE ORAL DAILY
COMMUNITY

## 2022-09-26 ENCOUNTER — LAB REQUISITION (OUTPATIENT)
Dept: LAB | Facility: HOSPITAL | Age: 82
End: 2022-09-26

## 2022-09-26 DIAGNOSIS — I10 ESSENTIAL (PRIMARY) HYPERTENSION: ICD-10-CM

## 2022-09-26 LAB
ANION GAP SERPL CALC-SCNC: 6 MMOL/L (ref 0–18)
BASOPHILS # BLD AUTO: 0.06 X10(3) UL (ref 0–0.2)
BASOPHILS NFR BLD AUTO: 1.1 %
BUN BLD-MCNC: 15 MG/DL (ref 7–18)
CALCIUM BLD-MCNC: 9.9 MG/DL (ref 8.5–10.1)
CHLORIDE SERPL-SCNC: 103 MMOL/L (ref 98–112)
CO2 SERPL-SCNC: 27 MMOL/L (ref 21–32)
CREAT BLD-MCNC: 0.66 MG/DL
EOSINOPHIL # BLD AUTO: 0.08 X10(3) UL (ref 0–0.7)
EOSINOPHIL NFR BLD AUTO: 1.5 %
ERYTHROCYTE [DISTWIDTH] IN BLOOD BY AUTOMATED COUNT: 14.1 %
GFR SERPLBLD BASED ON 1.73 SQ M-ARVRAT: 88 ML/MIN/1.73M2 (ref 60–?)
GLUCOSE BLD-MCNC: 145 MG/DL (ref 70–99)
HCT VFR BLD AUTO: 38.4 %
HGB BLD-MCNC: 12.1 G/DL
IMM GRANULOCYTES # BLD AUTO: 0.02 X10(3) UL (ref 0–1)
IMM GRANULOCYTES NFR BLD: 0.4 %
LYMPHOCYTES # BLD AUTO: 1.33 X10(3) UL (ref 1–4)
LYMPHOCYTES NFR BLD AUTO: 24.3 %
MCH RBC QN AUTO: 28.8 PG (ref 26–34)
MCHC RBC AUTO-ENTMCNC: 31.5 G/DL (ref 31–37)
MCV RBC AUTO: 91.4 FL
MONOCYTES # BLD AUTO: 0.54 X10(3) UL (ref 0.1–1)
MONOCYTES NFR BLD AUTO: 9.9 %
NEUTROPHILS # BLD AUTO: 3.44 X10 (3) UL (ref 1.5–7.7)
NEUTROPHILS # BLD AUTO: 3.44 X10(3) UL (ref 1.5–7.7)
NEUTROPHILS NFR BLD AUTO: 62.8 %
OSMOLALITY SERPL CALC.SUM OF ELEC: 285 MOSM/KG (ref 275–295)
PLATELET # BLD AUTO: 206 10(3)UL (ref 150–450)
POTASSIUM SERPL-SCNC: 4.9 MMOL/L (ref 3.5–5.1)
RBC # BLD AUTO: 4.2 X10(6)UL
SODIUM SERPL-SCNC: 136 MMOL/L (ref 136–145)
WBC # BLD AUTO: 5.5 X10(3) UL (ref 4–11)

## 2022-09-26 PROCEDURE — 85025 COMPLETE CBC W/AUTO DIFF WBC: CPT | Performed by: INTERNAL MEDICINE

## 2022-09-26 PROCEDURE — 80048 BASIC METABOLIC PNL TOTAL CA: CPT | Performed by: INTERNAL MEDICINE

## 2022-09-28 ENCOUNTER — OFFICE VISIT (OUTPATIENT)
Dept: INTERNAL MEDICINE CLINIC | Facility: CLINIC | Age: 82
End: 2022-09-28

## 2022-09-28 VITALS
RESPIRATION RATE: 16 BRPM | SYSTOLIC BLOOD PRESSURE: 128 MMHG | HEIGHT: 62 IN | BODY MASS INDEX: 25.03 KG/M2 | HEART RATE: 59 BPM | TEMPERATURE: 97 F | DIASTOLIC BLOOD PRESSURE: 80 MMHG | WEIGHT: 136 LBS | OXYGEN SATURATION: 97 %

## 2022-09-28 DIAGNOSIS — I48.0 PAROXYSMAL ATRIAL FIBRILLATION (HCC): ICD-10-CM

## 2022-09-28 DIAGNOSIS — I49.5 SICK SINUS SYNDROME (HCC): ICD-10-CM

## 2022-09-28 DIAGNOSIS — N12 PYELONEPHRITIS: Primary | ICD-10-CM

## 2022-09-28 PROBLEM — G25.0 BENIGN ESSENTIAL TREMOR: Chronic | Status: ACTIVE | Noted: 2022-04-13

## 2022-09-28 LAB
BILIRUB UR QL STRIP.AUTO: NEGATIVE
CLARITY UR REFRACT.AUTO: CLEAR
COLOR UR AUTO: YELLOW
GLUCOSE UR STRIP.AUTO-MCNC: NEGATIVE MG/DL
KETONES UR STRIP.AUTO-MCNC: NEGATIVE MG/DL
LEUKOCYTE ESTERASE UR QL STRIP.AUTO: NEGATIVE
NITRITE UR QL STRIP.AUTO: NEGATIVE
PH UR STRIP.AUTO: 7 [PH] (ref 5–8)
PROT UR STRIP.AUTO-MCNC: NEGATIVE MG/DL
RBC UR QL AUTO: NEGATIVE
SP GR UR STRIP.AUTO: 1.01 (ref 1–1.03)
UROBILINOGEN UR STRIP.AUTO-MCNC: <2 MG/DL

## 2022-09-28 PROCEDURE — 1111F DSCHRG MED/CURRENT MED MERGE: CPT | Performed by: INTERNAL MEDICINE

## 2022-09-28 PROCEDURE — 81003 URINALYSIS AUTO W/O SCOPE: CPT | Performed by: INTERNAL MEDICINE

## 2022-09-28 PROCEDURE — 87086 URINE CULTURE/COLONY COUNT: CPT | Performed by: INTERNAL MEDICINE

## 2022-09-28 PROCEDURE — 99214 OFFICE O/P EST MOD 30 MIN: CPT | Performed by: INTERNAL MEDICINE

## 2022-09-30 PROBLEM — N30.00 ACUTE CYSTITIS WITHOUT HEMATURIA: Status: ACTIVE | Noted: 2022-09-30

## 2022-09-30 PROBLEM — E11.9 TYPE 2 DIABETES MELLITUS WITHOUT COMPLICATION, WITHOUT LONG-TERM CURRENT USE OF INSULIN (HCC): Status: ACTIVE | Noted: 2022-09-30

## 2022-09-30 PROBLEM — N10 ACUTE PYELONEPHRITIS: Status: ACTIVE | Noted: 2022-09-14

## 2022-10-06 ENCOUNTER — LAB ENCOUNTER (OUTPATIENT)
Dept: LAB | Age: 82
End: 2022-10-06
Attending: INTERNAL MEDICINE
Payer: MEDICARE

## 2022-10-06 ENCOUNTER — VIRTUAL PHONE E/M (OUTPATIENT)
Dept: INTERNAL MEDICINE CLINIC | Facility: CLINIC | Age: 82
End: 2022-10-06
Payer: MEDICARE

## 2022-10-06 ENCOUNTER — TELEPHONE (OUTPATIENT)
Dept: INTERNAL MEDICINE CLINIC | Facility: CLINIC | Age: 82
End: 2022-10-06

## 2022-10-06 DIAGNOSIS — R39.89 SUSPECTED UTI: ICD-10-CM

## 2022-10-06 DIAGNOSIS — R39.89 SUSPECTED UTI: Primary | ICD-10-CM

## 2022-10-06 DIAGNOSIS — N30.00 ACUTE CYSTITIS WITHOUT HEMATURIA: Primary | ICD-10-CM

## 2022-10-06 LAB
BILIRUB UR QL STRIP.AUTO: NEGATIVE
COLOR UR AUTO: YELLOW
GLUCOSE UR STRIP.AUTO-MCNC: NEGATIVE MG/DL
KETONES UR STRIP.AUTO-MCNC: NEGATIVE MG/DL
NITRITE UR QL STRIP.AUTO: NEGATIVE
PH UR STRIP.AUTO: 6 [PH] (ref 5–8)
PROT UR STRIP.AUTO-MCNC: 100 MG/DL
RBC #/AREA URNS AUTO: >10 /HPF
SP GR UR STRIP.AUTO: 1.01 (ref 1–1.03)
UROBILINOGEN UR STRIP.AUTO-MCNC: <2 MG/DL
WBC #/AREA URNS AUTO: >50 /HPF
WBC CLUMPS UR QL AUTO: PRESENT /HPF

## 2022-10-06 PROCEDURE — 81001 URINALYSIS AUTO W/SCOPE: CPT

## 2022-10-06 PROCEDURE — 99213 OFFICE O/P EST LOW 20 MIN: CPT | Performed by: INTERNAL MEDICINE

## 2022-10-06 NOTE — PROGRESS NOTES
Virtual Telephone Check-In    Geryl Hidden verbally consents to a Virtual/Telephone Check-In visit on 10/06/22. Patient has been referred to the [facility] website at [email or web address] to review the yearly Consent to Treat document. Patient understands and accepts financial responsibility for any deductible, co-insurance and/or co-pays associated with this service. Duration of the service: 13 minutes      Summary of topics discussed: She had a urine recent urinalysis and urine culture which were negative however she now states she feels a little discomfort while urination along with some itching in the area does not feel it is any female issues. However the patient wants to get this checked prior to her trip to [place]. Diagnoses and all orders for this visit:    Acute cystitis without hematuria  -     URINE CULTURE, ROUTINE; Future    Other orders  -     ciprofloxacin (CIPRO) 500 MG Oral Tab; Take 1 tablet (500 mg total) by mouth 2 (two) times daily for 10 days. Creatinine as of 10/7/2022-lab result. Discussed with patient as well as daughter we have to resume that it is a reoccurrence of her infection and start her back on Cipro 500 twice daily for which the patient is agreeable. She is aware she may get all her some muscle aches however is agreeable to trying that. Second addendum. Discussed with patient we will hold off on the Cipro and empirically sent Bactrim till the culture is available.   This was discussed with the patient as well as the daughter    Jaspal Lopez MD

## 2022-10-06 NOTE — TELEPHONE ENCOUNTER
Spoke to daughter Kal Cassidy, informed  ok for change to telephone visit and STAT UA. Kal Cassidy asked if pt can give urine sample to us rather than at lab because pt is very upset, almost in tears and always has problems with regular lab routine. Informed Kal Cassidy to ask for me when they arrive, it will be approximately 11:00am.     Called [facility], ok to walk UA specimen over there, they just need to have pt checked in for the processing/insurance purposes. Lab order placed. 11:14 - pt and daughter Kal Cassidy arrived at [facility]. Assisted pt to the restroom where she independently provided a clean-catch urine specimen while daughter checked pt in at the lab for encounter and insurance billing purposes. Hand-delivered pt's urine specimen to the lab, which was turbid upon gross inspection. Pt and daughter were very grateful for the assistance.

## 2022-10-06 NOTE — TELEPHONE ENCOUNTER
VM ok to change today's appt to video or telephone?  Please advise, ty    Future Appointments   Date Time Provider Scott Hernandez   10/6/2022  2:45 PM Khalif Adhikari MD [facility] [place]   11/21/2022  1:00 PM [facility] [place] ANGELA Hart

## 2022-10-06 NOTE — TELEPHONE ENCOUNTER
Pt daughter Prasanna Ask stated pt has developed the same UTI symptoms that had pt in hospital. Prasanna Ask wants to know if Dr Daniel Tesfaye would be able to order testing at the lab and then do a telephone call instead of coming in office. Per Prasanna Ask, pt is going out of town on Sunday and wants to have results before they leave. Okay to switch to telephone visit or keep appointment in office?     Prasanna Ask - [phone]

## 2022-10-07 ENCOUNTER — LAB ENCOUNTER (OUTPATIENT)
Dept: LAB | Age: 82
End: 2022-10-07
Attending: INTERNAL MEDICINE
Payer: MEDICARE

## 2022-10-07 DIAGNOSIS — N30.00 ACUTE CYSTITIS WITHOUT HEMATURIA: ICD-10-CM

## 2022-10-07 PROCEDURE — 87186 SC STD MICRODIL/AGAR DIL: CPT

## 2022-10-07 PROCEDURE — 87077 CULTURE AEROBIC IDENTIFY: CPT

## 2022-10-07 PROCEDURE — 87086 URINE CULTURE/COLONY COUNT: CPT

## 2022-10-07 RX ORDER — CIPROFLOXACIN 500 MG/1
500 TABLET, FILM COATED ORAL 2 TIMES DAILY
Qty: 20 TABLET | Refills: 0 | Status: SHIPPED | OUTPATIENT
Start: 2022-10-07 | End: 2022-10-07

## 2022-10-07 RX ORDER — SULFAMETHOXAZOLE AND TRIMETHOPRIM 800; 160 MG/1; MG/1
1 TABLET ORAL 2 TIMES DAILY
Qty: 20 TABLET | Refills: 0 | Status: SHIPPED | OUTPATIENT
Start: 2022-10-07 | End: 2022-10-10 | Stop reason: ALTCHOICE

## 2022-10-10 ENCOUNTER — PATIENT MESSAGE (OUTPATIENT)
Dept: INTERNAL MEDICINE CLINIC | Facility: CLINIC | Age: 82
End: 2022-10-10

## 2022-10-10 DIAGNOSIS — N39.0 ACUTE UTI: Primary | ICD-10-CM

## 2022-10-10 RX ORDER — NITROFURANTOIN 25; 75 MG/1; MG/1
100 CAPSULE ORAL 2 TIMES DAILY
Qty: 20 CAPSULE | Refills: 0 | Status: SHIPPED | OUTPATIENT
Start: 2022-10-10 | End: 2022-10-20

## 2022-10-10 NOTE — TELEPHONE ENCOUNTER
[address] [facility], [place], [facility] [address]  [phone]

## 2022-10-10 NOTE — TELEPHONE ENCOUNTER
From: Joshua Gilbert  To: Darvin San MD  Sent: 10/10/2022 1:46 PM CDT  Subject: Test results    What were my results from October 7th urine test ??

## 2022-10-13 ENCOUNTER — TELEPHONE (OUTPATIENT)
Dept: INTERNAL MEDICINE CLINIC | Facility: CLINIC | Age: 82
End: 2022-10-13

## 2022-10-13 DIAGNOSIS — Z92.29 ANTIBIOTIC TREATMENT WITHIN PAST 2 MONTHS: ICD-10-CM

## 2022-10-13 DIAGNOSIS — Z01.818 PRE-OPERATIVE CLEARANCE: Primary | ICD-10-CM

## 2022-10-13 NOTE — TELEPHONE ENCOUNTER
Call received from pt's daughter, Blaine Ramirez. Reports that pt started Macrobid Monday night and is feeling 1000% better! Pt has a surgery scheduled next Thursday 10/20/22 for her pacemaker. They have had to reschedule this surgery 5 times! Surgeon said if they have evidence of UTI being cleared by Wednesday 10/19/22, they can go forward with the surgery. OK given by  for repeat UA.

## 2022-10-18 NOTE — TELEPHONE ENCOUNTER
Pts daughter called again stating her surgery got canceled. she states they will do the urine sample on Friday. Informed pts daughter we are waiting for  to sign order.     JAVIER

## 2022-10-21 ENCOUNTER — LAB ENCOUNTER (OUTPATIENT)
Dept: LAB | Age: 82
End: 2022-10-21
Attending: INTERNAL MEDICINE
Payer: MEDICARE

## 2022-10-21 DIAGNOSIS — Z92.29 ANTIBIOTIC TREATMENT WITHIN PAST 2 MONTHS: ICD-10-CM

## 2022-10-21 LAB
BILIRUB UR QL STRIP.AUTO: NEGATIVE
COLOR UR AUTO: YELLOW
GLUCOSE UR STRIP.AUTO-MCNC: NEGATIVE MG/DL
KETONES UR STRIP.AUTO-MCNC: NEGATIVE MG/DL
LEUKOCYTE ESTERASE UR QL STRIP.AUTO: NEGATIVE
NITRITE UR QL STRIP.AUTO: NEGATIVE
PH UR STRIP.AUTO: 7 [PH] (ref 5–8)
PROT UR STRIP.AUTO-MCNC: NEGATIVE MG/DL
SP GR UR STRIP.AUTO: 1.01 (ref 1–1.03)
UROBILINOGEN UR STRIP.AUTO-MCNC: <2 MG/DL

## 2022-10-21 PROCEDURE — 81001 URINALYSIS AUTO W/SCOPE: CPT

## 2022-10-22 ENCOUNTER — PATIENT MESSAGE (OUTPATIENT)
Dept: INTERNAL MEDICINE CLINIC | Facility: CLINIC | Age: 82
End: 2022-10-22

## 2022-10-24 NOTE — TELEPHONE ENCOUNTER
VM - daughter asking if pt should stay on Keflex, pt is looking for a new urologist. Please advise, ty

## 2022-10-24 NOTE — TELEPHONE ENCOUNTER
From: Osmany Ceballos  To: Masoud Bess MD  Sent: 10/10/2022 1:46 PM CDT  Subject: Test results    What were my results from October 7th urine test ??

## 2022-10-24 NOTE — TELEPHONE ENCOUNTER
Pt did have repeat UA with culture on Friday 10/21/22 - daughter was asking about Keflex as a maintenance medication for prevention of the UTI's. Thank you!

## 2022-10-24 NOTE — TELEPHONE ENCOUNTER
She should repeat a urinalysis urine culture to document that the infection from 10 7 has cleared completely.

## 2022-10-24 NOTE — TELEPHONE ENCOUNTER
Would recommend taking Macrobid half tablet every night as a maintenance please send a 90-day prescription.

## 2022-10-27 RX ORDER — FLUTICASONE PROPIONATE 50 MCG
2 SPRAY, SUSPENSION (ML) NASAL DAILY
COMMUNITY

## 2022-10-29 ENCOUNTER — LAB ENCOUNTER (OUTPATIENT)
Dept: LAB | Age: 82
End: 2022-10-29
Attending: INTERNAL MEDICINE
Payer: MEDICARE

## 2022-10-29 DIAGNOSIS — Z01.812 PRE-PROCEDURAL LABORATORY EXAMINATION: ICD-10-CM

## 2022-10-30 LAB — SARS-COV-2 RNA RESP QL NAA+PROBE: NOT DETECTED

## 2022-11-01 ENCOUNTER — HOSPITAL ENCOUNTER (OUTPATIENT)
Dept: INTERVENTIONAL RADIOLOGY/VASCULAR | Facility: HOSPITAL | Age: 82
Discharge: HOME | End: 2022-11-01
Attending: INTERNAL MEDICINE | Admitting: INTERNAL MEDICINE
Payer: MEDICARE

## 2022-11-01 ENCOUNTER — HOSPITAL ENCOUNTER (OUTPATIENT)
Dept: GENERAL RADIOLOGY | Facility: HOSPITAL | Age: 82
Discharge: HOME | End: 2022-11-01
Attending: INTERNAL MEDICINE | Admitting: INTERNAL MEDICINE
Payer: MEDICARE

## 2022-11-01 VITALS
HEART RATE: 59 BPM | BODY MASS INDEX: 24.11 KG/M2 | DIASTOLIC BLOOD PRESSURE: 47 MMHG | HEIGHT: 62 IN | SYSTOLIC BLOOD PRESSURE: 125 MMHG | RESPIRATION RATE: 15 BRPM | OXYGEN SATURATION: 96 % | WEIGHT: 131 LBS | TEMPERATURE: 96 F

## 2022-11-01 DIAGNOSIS — I49.5 TACHY-BRADY SYNDROME (HCC): ICD-10-CM

## 2022-11-01 LAB
ANION GAP SERPL CALC-SCNC: 3 MMOL/L (ref 0–18)
BUN BLD-MCNC: 13 MG/DL (ref 7–18)
CALCIUM BLD-MCNC: 9.8 MG/DL (ref 8.5–10.1)
CHLORIDE SERPL-SCNC: 106 MMOL/L (ref 98–112)
CO2 SERPL-SCNC: 29 MMOL/L (ref 21–32)
CREAT BLD-MCNC: 0.76 MG/DL
GFR SERPLBLD BASED ON 1.73 SQ M-ARVRAT: 79 ML/MIN/1.73M2 (ref 60–?)
GLUCOSE BLD-MCNC: 128 MG/DL (ref 70–99)
OSMOLALITY SERPL CALC.SUM OF ELEC: 288 MOSM/KG (ref 275–295)
POTASSIUM SERPL-SCNC: 4.2 MMOL/L (ref 3.5–5.1)
SODIUM SERPL-SCNC: 138 MMOL/L (ref 136–145)

## 2022-11-01 PROCEDURE — 99152 MOD SED SAME PHYS/QHP 5/>YRS: CPT | Performed by: INTERNAL MEDICINE

## 2022-11-01 PROCEDURE — 80048 BASIC METABOLIC PNL TOTAL CA: CPT | Performed by: INTERNAL MEDICINE

## 2022-11-01 PROCEDURE — 0JH606Z INSERTION OF PACEMAKER, DUAL CHAMBER INTO CHEST SUBCUTANEOUS TISSUE AND FASCIA, OPEN APPROACH: ICD-10-PCS | Performed by: INTERNAL MEDICINE

## 2022-11-01 PROCEDURE — 99153 MOD SED SAME PHYS/QHP EA: CPT | Performed by: INTERNAL MEDICINE

## 2022-11-01 PROCEDURE — 71045 X-RAY EXAM CHEST 1 VIEW: CPT | Performed by: INTERNAL MEDICINE

## 2022-11-01 PROCEDURE — 33208 INSRT HEART PM ATRIAL & VENT: CPT | Performed by: INTERNAL MEDICINE

## 2022-11-01 PROCEDURE — 02HK3JZ INSERTION OF PACEMAKER LEAD INTO RIGHT VENTRICLE, PERCUTANEOUS APPROACH: ICD-10-PCS | Performed by: INTERNAL MEDICINE

## 2022-11-01 PROCEDURE — 02H63JZ INSERTION OF PACEMAKER LEAD INTO RIGHT ATRIUM, PERCUTANEOUS APPROACH: ICD-10-PCS | Performed by: INTERNAL MEDICINE

## 2022-11-01 RX ORDER — ONDANSETRON 2 MG/ML
4 INJECTION INTRAMUSCULAR; INTRAVENOUS EVERY 6 HOURS PRN
Status: DISCONTINUED | OUTPATIENT
Start: 2022-11-01 | End: 2022-11-01

## 2022-11-01 RX ORDER — VANCOMYCIN HYDROCHLORIDE 1 G/20ML
INJECTION, POWDER, LYOPHILIZED, FOR SOLUTION INTRAVENOUS
Status: COMPLETED
Start: 2022-11-01 | End: 2022-11-01

## 2022-11-01 RX ORDER — LIDOCAINE HYDROCHLORIDE 10 MG/ML
INJECTION, SOLUTION EPIDURAL; INFILTRATION; INTRACAUDAL; PERINEURAL
Status: COMPLETED
Start: 2022-11-01 | End: 2022-11-01

## 2022-11-01 RX ORDER — ONDANSETRON 2 MG/ML
INJECTION INTRAMUSCULAR; INTRAVENOUS
Status: COMPLETED
Start: 2022-11-01 | End: 2022-11-01

## 2022-11-01 RX ORDER — MIDAZOLAM HYDROCHLORIDE 1 MG/ML
INJECTION INTRAMUSCULAR; INTRAVENOUS
Status: COMPLETED
Start: 2022-11-01 | End: 2022-11-01

## 2022-11-01 RX ORDER — SODIUM CHLORIDE 9 MG/ML
INJECTION, SOLUTION INTRAVENOUS
Status: COMPLETED | OUTPATIENT
Start: 2022-11-01 | End: 2022-11-01

## 2022-11-01 RX ORDER — CHLORHEXIDINE GLUCONATE 4 G/100ML
30 SOLUTION TOPICAL
Status: COMPLETED | OUTPATIENT
Start: 2022-11-02 | End: 2022-11-01

## 2022-11-01 RX ORDER — IODIXANOL 320 MG/ML
100 INJECTION, SOLUTION INTRAVASCULAR
Status: COMPLETED | OUTPATIENT
Start: 2022-11-01 | End: 2022-11-01

## 2022-11-01 RX ADMIN — SODIUM CHLORIDE: 9 INJECTION, SOLUTION INTRAVENOUS at 13:46:00

## 2022-11-01 RX ADMIN — CHLORHEXIDINE GLUCONATE 30 ML: 4 SOLUTION TOPICAL at 13:46:00

## 2022-11-01 RX ADMIN — IODIXANOL 10 ML: 320 INJECTION, SOLUTION INTRAVASCULAR at 17:49:00

## 2022-11-01 NOTE — DISCHARGE INSTRUCTIONS
You may restart Eliquis tomorrow 11/2/2022, as previously prescribed    Please keep the Aquacel dressing in place until you are seen at your follow up appointment. There may be steri-strips underneath. Leave them in place until they fall off (or remove after 10 days). After the Aquacel dressing is removed, you may shower. Do not submerge the site under water until the steri-strips fall off. It is normal to have a small amount of drainage on the bandage and to have soreness to the site. Notify the doctor with any signs of infection, including a temperature of 101 or greater, chills, redness, swelling, abnormal drainage, or foul odor from the incision site. Also, if you bleed from the site, hold pressure and call the doctor's office. Do not lift anything heavier than 10 pounds with the affected arm for the next 4 weeks  You will go home with a sling in the affected arm. Keep the sling on for the first 24 hours. After that, wear the sling at night for the next 2 weeks. Avoid drinking alcohol for 24 hours  No driving for 5-7 days    Notify the physician or the on-call RN (for the first night) if:  ~you have shortness of breath or persistent cough  ~you have chest pain  ~you have persistent pain at the site  ~you have sign of infection (fever of 101 or greater, redness, swelling, yellow drainage, or foul odor from the procedure site)    The on-call RN number is [phone]. Use this number if there are any problems or concerns the first night.  After that, please call the doctor's office

## 2022-11-01 NOTE — PROCEDURES
OPERATION(S) PERFORMED:   1. Dual-chamber pacemaker implant to preserve AV synchrony and prevent pacemaker syndrome. 2. Chest fluoroscopy. 3. Left upper extremity venography. : Jennifer Art MD  INDICATION: Sinus node dysfunction,   COMPLICATIONS: None     ESTIMATED BLOOD LOSS: Minimal.  SEDATION: IV was maintained by RN. Patient was assessed by myself and the nursing staff, IV sedation (versed and fentanyl) were administered during continuous ECG, pulse oximeter, and non-invasive hemodynamic monitoring. I was present from the time of sedation being started to the end of the procedure. METHODS: The patient was brought to the outpatient cardiac telemetry unit in a fasting and nonsedated state after providing informed consent. IV sedation was administered during continuous ECG, pulse oximeter, and noninvasive hemodynamic monitoring. After administering 1% lidocaine for local anesthesia, an incision was made parallel to the left deltopectoral groove. The plane of the incision was extended to the prepectoral fascia. A pocket was formed. IV contrast was injected through peripheral IV in the left arm. The axillary vein was widely patent. Access to the axillary vein was achieved via the extrathoracic approach with two separate punctures. The guidewires were advanced to the IVC. The RV lead was placed in the RV apex. Local electrograms and pacing thresholds were measured. In a similar manner, an atrial lead was positioned in the RA appendage. Local electrograms and pacing thresholds were measured. Pacing was performed at 10 v to rule out phrenic nerve stimulation. The pocket was irrigated with antibiotic solution. Bleeding was sought for until hemostasis was achieved. The leads were connected to a pulse generator. They were coiled and placed into the pocket along with the pulse generator. The incision was closed in 2 layers using 2-0 and 3-0 Vicryl.  Steri-Strips were applied followed by a sterile dressing. The left arm was placed in a sling and the patient was transported to telemetry in stable condition. There were no apparent intraoperative complications. CONCLUSIONS:   1. Status post successful implant of a [employer] dual-chamber pacemaker with a active fixation right ventricular (RV) lead, active fixation RA lead:   2. Adequate RA, RV pacing and sensing thresholds.      Reddy Dupont MD  [facility]  Cardiac Electrophysiolgy  [phone]

## 2022-11-02 NOTE — PROGRESS NOTES
Patient received back from cath lab at 1800. Left upper chest with aquacel dressing in place. Patient without c/o pain or discomfort. Dr Latonia Granados at bedside to speak with patient and her daughter. Patient tolerating PO intake. Vanco infusion completed. Bedside CXR completed. Patient with slight c/o itching around dressing, but no redness or rash noted to area to trunk/upper extremities. After recovery, patient able to void in the bathroom and ambulate in the hallway without difficulty. CXR WNL. PIV removed intact. Discharge instructions discussed with patient and her daughter. Questions and concerns addressed. Patient to restart Eliquis Friday 11/4/22. Patient escorted out via wheelchair, with belongings. VSS. Patients daughter is driving her home.

## 2022-11-03 ENCOUNTER — HOSPITAL ENCOUNTER (EMERGENCY)
Facility: HOSPITAL | Age: 82
Discharge: HOME | End: 2022-11-03
Attending: EMERGENCY MEDICINE
Payer: MEDICARE

## 2022-11-03 VITALS
RESPIRATION RATE: 22 BRPM | HEART RATE: 59 BPM | OXYGEN SATURATION: 98 % | SYSTOLIC BLOOD PRESSURE: 174 MMHG | TEMPERATURE: 98 F | WEIGHT: 131 LBS | HEIGHT: 62 IN | BODY MASS INDEX: 24.11 KG/M2 | DIASTOLIC BLOOD PRESSURE: 73 MMHG

## 2022-11-03 DIAGNOSIS — T78.40XA ALLERGIC REACTION, INITIAL ENCOUNTER: Primary | ICD-10-CM

## 2022-11-03 LAB
ANION GAP SERPL CALC-SCNC: 0 MMOL/L (ref 0–18)
AST SERPL-CCNC: 23 U/L (ref 15–37)
BASOPHILS # BLD AUTO: 0.04 X10(3) UL (ref 0–0.2)
BASOPHILS NFR BLD AUTO: 0.8 %
BUN BLD-MCNC: 13 MG/DL (ref 7–18)
CALCIUM BLD-MCNC: 9.1 MG/DL (ref 8.5–10.1)
CHLORIDE SERPL-SCNC: 105 MMOL/L (ref 98–112)
CO2 SERPL-SCNC: 32 MMOL/L (ref 21–32)
CREAT BLD-MCNC: 0.85 MG/DL
EOSINOPHIL # BLD AUTO: 0.12 X10(3) UL (ref 0–0.7)
EOSINOPHIL NFR BLD AUTO: 2.5 %
ERYTHROCYTE [DISTWIDTH] IN BLOOD BY AUTOMATED COUNT: 14 %
GFR SERPLBLD BASED ON 1.73 SQ M-ARVRAT: 69 ML/MIN/1.73M2 (ref 60–?)
GLUCOSE BLD-MCNC: 177 MG/DL (ref 70–99)
HCT VFR BLD AUTO: 37.9 %
HGB BLD-MCNC: 12.3 G/DL
IMM GRANULOCYTES # BLD AUTO: 0.01 X10(3) UL (ref 0–1)
IMM GRANULOCYTES NFR BLD: 0.2 %
LYMPHOCYTES # BLD AUTO: 1.43 X10(3) UL (ref 1–4)
LYMPHOCYTES NFR BLD AUTO: 29.3 %
MCH RBC QN AUTO: 29.1 PG (ref 26–34)
MCHC RBC AUTO-ENTMCNC: 32.5 G/DL (ref 31–37)
MCV RBC AUTO: 89.8 FL
MONOCYTES # BLD AUTO: 0.53 X10(3) UL (ref 0.1–1)
MONOCYTES NFR BLD AUTO: 10.9 %
NEUTROPHILS # BLD AUTO: 2.75 X10 (3) UL (ref 1.5–7.7)
NEUTROPHILS # BLD AUTO: 2.75 X10(3) UL (ref 1.5–7.7)
NEUTROPHILS NFR BLD AUTO: 56.3 %
OSMOLALITY SERPL CALC.SUM OF ELEC: 288 MOSM/KG (ref 275–295)
PLATELET # BLD AUTO: 241 10(3)UL (ref 150–450)
POTASSIUM SERPL-SCNC: 4 MMOL/L (ref 3.5–5.1)
RBC # BLD AUTO: 4.22 X10(6)UL
SODIUM SERPL-SCNC: 137 MMOL/L (ref 136–145)
WBC # BLD AUTO: 4.9 X10(3) UL (ref 4–11)

## 2022-11-03 PROCEDURE — 96374 THER/PROPH/DIAG INJ IV PUSH: CPT

## 2022-11-03 PROCEDURE — 85025 COMPLETE CBC W/AUTO DIFF WBC: CPT | Performed by: EMERGENCY MEDICINE

## 2022-11-03 PROCEDURE — 99284 EMERGENCY DEPT VISIT MOD MDM: CPT

## 2022-11-03 PROCEDURE — S0028 INJECTION, FAMOTIDINE, 20 MG: HCPCS | Performed by: EMERGENCY MEDICINE

## 2022-11-03 PROCEDURE — 80048 BASIC METABOLIC PNL TOTAL CA: CPT | Performed by: EMERGENCY MEDICINE

## 2022-11-03 PROCEDURE — 96375 TX/PRO/DX INJ NEW DRUG ADDON: CPT

## 2022-11-03 RX ORDER — METHYLPREDNISOLONE SODIUM SUCCINATE 125 MG/2ML
125 INJECTION, POWDER, LYOPHILIZED, FOR SOLUTION INTRAMUSCULAR; INTRAVENOUS ONCE
Status: COMPLETED | OUTPATIENT
Start: 2022-11-03 | End: 2022-11-03

## 2022-11-03 RX ORDER — PREDNISONE 20 MG/1
40 TABLET ORAL DAILY
Qty: 10 TABLET | Refills: 0 | Status: ON HOLD | OUTPATIENT
Start: 2022-11-03 | End: 2022-11-08

## 2022-11-03 RX ORDER — FAMOTIDINE 10 MG/ML
20 INJECTION, SOLUTION INTRAVENOUS ONCE
Status: COMPLETED | OUTPATIENT
Start: 2022-11-03 | End: 2022-11-03

## 2022-11-03 RX ORDER — DIPHENHYDRAMINE HYDROCHLORIDE 50 MG/ML
25 INJECTION INTRAMUSCULAR; INTRAVENOUS ONCE
Status: COMPLETED | OUTPATIENT
Start: 2022-11-03 | End: 2022-11-03

## 2022-11-03 NOTE — ED INITIAL ASSESSMENT (HPI)
Patient to ER from home w/ complaints of a red rash to neck for the last two days. Had pacemaker inserted two days ago. Denies LUZ ELENA. No airway involvement.  Took benadryl @ home w/ no relief

## 2022-11-04 NOTE — DISCHARGE INSTRUCTIONS
Benadryl 25 to 50 mg every 6-8 hours  Pepcid 20 mg daily for 2 weeks  Prednisone as prescribed  Return if worse  Recheck with primary care on Monday

## 2022-11-08 ENCOUNTER — TELEPHONE (OUTPATIENT)
Dept: INTERNAL MEDICINE CLINIC | Facility: CLINIC | Age: 82
End: 2022-11-08

## 2022-11-08 NOTE — TELEPHONE ENCOUNTER
Signed order faxed to [facility]#[id number]   Confirmation received   Sent to scan and copy placed in accordion

## 2022-11-13 ENCOUNTER — APPOINTMENT (OUTPATIENT)
Dept: GENERAL RADIOLOGY | Facility: HOSPITAL | Age: 82
DRG: 247 | End: 2022-11-13
Attending: EMERGENCY MEDICINE
Payer: MEDICARE

## 2022-11-13 ENCOUNTER — HOSPITAL ENCOUNTER (INPATIENT)
Facility: HOSPITAL | Age: 82
LOS: 7 days | Discharge: HOME | End: 2022-11-20
Attending: EMERGENCY MEDICINE | Admitting: HOSPITALIST
Payer: MEDICARE

## 2022-11-13 ENCOUNTER — HOSPITAL ENCOUNTER (INPATIENT)
Facility: HOSPITAL | Age: 82
LOS: 7 days | Discharge: HOME | DRG: 247 | End: 2022-11-20
Attending: EMERGENCY MEDICINE | Admitting: HOSPITALIST
Payer: MEDICARE

## 2022-11-13 ENCOUNTER — APPOINTMENT (OUTPATIENT)
Dept: GENERAL RADIOLOGY | Facility: HOSPITAL | Age: 82
End: 2022-11-13
Attending: EMERGENCY MEDICINE
Payer: MEDICARE

## 2022-11-13 DIAGNOSIS — I48.91 ATRIAL FIBRILLATION WITH RVR (HCC): ICD-10-CM

## 2022-11-13 DIAGNOSIS — R07.9 ACUTE CHEST PAIN: Primary | ICD-10-CM

## 2022-11-13 LAB
ALBUMIN SERPL-MCNC: 3.3 G/DL (ref 3.4–5)
ALBUMIN/GLOB SERPL: 0.8 {RATIO} (ref 1–2)
ALP LIVER SERPL-CCNC: 96 U/L
ALT SERPL-CCNC: 25 U/L
ANION GAP SERPL CALC-SCNC: 7 MMOL/L (ref 0–18)
AST SERPL-CCNC: 16 U/L (ref 15–37)
ATRIAL RATE: 127 BPM
BASOPHILS # BLD AUTO: 0.02 X10(3) UL (ref 0–0.2)
BASOPHILS NFR BLD AUTO: 0.2 %
BILIRUB SERPL-MCNC: 0.4 MG/DL (ref 0.1–2)
BILIRUB UR QL STRIP.AUTO: NEGATIVE
BUN BLD-MCNC: 17 MG/DL (ref 7–18)
CALCIUM BLD-MCNC: 9.1 MG/DL (ref 8.5–10.1)
CHLORIDE SERPL-SCNC: 104 MMOL/L (ref 98–112)
CLARITY UR REFRACT.AUTO: CLEAR
CO2 SERPL-SCNC: 27 MMOL/L (ref 21–32)
COLOR UR AUTO: YELLOW
CREAT BLD-MCNC: 0.88 MG/DL
EOSINOPHIL # BLD AUTO: 0.08 X10(3) UL (ref 0–0.7)
EOSINOPHIL NFR BLD AUTO: 0.9 %
ERYTHROCYTE [DISTWIDTH] IN BLOOD BY AUTOMATED COUNT: 14.1 %
EST. AVERAGE GLUCOSE BLD GHB EST-MCNC: 166 MG/DL (ref 68–126)
GFR SERPLBLD BASED ON 1.73 SQ M-ARVRAT: 66 ML/MIN/1.73M2 (ref 60–?)
GLOBULIN PLAS-MCNC: 4.4 G/DL (ref 2.8–4.4)
GLUCOSE BLD-MCNC: 126 MG/DL (ref 70–99)
GLUCOSE BLD-MCNC: 131 MG/DL (ref 70–99)
GLUCOSE BLD-MCNC: 214 MG/DL (ref 70–99)
GLUCOSE BLD-MCNC: 217 MG/DL (ref 70–99)
GLUCOSE UR STRIP.AUTO-MCNC: NEGATIVE MG/DL
HBA1C MFR BLD: 7.4 % (ref ?–5.7)
HCT VFR BLD AUTO: 44.1 %
HGB BLD-MCNC: 14.4 G/DL
IMM GRANULOCYTES # BLD AUTO: 0.06 X10(3) UL (ref 0–1)
IMM GRANULOCYTES NFR BLD: 0.6 %
KETONES UR STRIP.AUTO-MCNC: NEGATIVE MG/DL
LEUKOCYTE ESTERASE UR QL STRIP.AUTO: NEGATIVE
LYMPHOCYTES # BLD AUTO: 1.48 X10(3) UL (ref 1–4)
LYMPHOCYTES NFR BLD AUTO: 15.8 %
MCH RBC QN AUTO: 29 PG (ref 26–34)
MCHC RBC AUTO-ENTMCNC: 32.7 G/DL (ref 31–37)
MCV RBC AUTO: 88.9 FL
MONOCYTES # BLD AUTO: 0.73 X10(3) UL (ref 0.1–1)
MONOCYTES NFR BLD AUTO: 7.8 %
NEUTROPHILS # BLD AUTO: 6.99 X10 (3) UL (ref 1.5–7.7)
NEUTROPHILS # BLD AUTO: 6.99 X10(3) UL (ref 1.5–7.7)
NEUTROPHILS NFR BLD AUTO: 74.7 %
NITRITE UR QL STRIP.AUTO: NEGATIVE
OSMOLALITY SERPL CALC.SUM OF ELEC: 294 MOSM/KG (ref 275–295)
PH UR STRIP.AUTO: 7 [PH] (ref 5–8)
PLATELET # BLD AUTO: 262 10(3)UL (ref 150–450)
POTASSIUM SERPL-SCNC: 4.1 MMOL/L (ref 3.5–5.1)
PROT SERPL-MCNC: 7.7 G/DL (ref 6.4–8.2)
PROT UR STRIP.AUTO-MCNC: NEGATIVE MG/DL
Q-T INTERVAL: 344 MS
QRS DURATION: 96 MS
QTC CALCULATION (BEZET): 494 MS
R AXIS: -42 DEGREES
RBC # BLD AUTO: 4.96 X10(6)UL
SARS-COV-2 RNA RESP QL NAA+PROBE: NOT DETECTED
SODIUM SERPL-SCNC: 138 MMOL/L (ref 136–145)
SP GR UR STRIP.AUTO: 1.01 (ref 1–1.03)
T AXIS: 149 DEGREES
T4 FREE SERPL-MCNC: 0.9 NG/DL (ref 0.8–1.7)
TROPONIN I HIGH SENSITIVITY: 14 NG/L
TSI SER-ACNC: 9.63 MIU/ML (ref 0.36–3.74)
UROBILINOGEN UR STRIP.AUTO-MCNC: <2 MG/DL
VENTRICULAR RATE: 124 BPM
WBC # BLD AUTO: 9.4 X10(3) UL (ref 4–11)

## 2022-11-13 PROCEDURE — 99497 ADVNCD CARE PLAN 30 MIN: CPT | Performed by: HOSPITALIST

## 2022-11-13 PROCEDURE — 71045 X-RAY EXAM CHEST 1 VIEW: CPT | Performed by: EMERGENCY MEDICINE

## 2022-11-13 PROCEDURE — 99223 1ST HOSP IP/OBS HIGH 75: CPT | Performed by: HOSPITALIST

## 2022-11-13 RX ORDER — MORPHINE SULFATE 2 MG/ML
1 INJECTION, SOLUTION INTRAMUSCULAR; INTRAVENOUS EVERY 2 HOUR PRN
Status: DISCONTINUED | OUTPATIENT
Start: 2022-11-13 | End: 2022-11-20

## 2022-11-13 RX ORDER — EZETIMIBE 10 MG/1
10 TABLET ORAL NIGHTLY
Status: DISCONTINUED | OUTPATIENT
Start: 2022-11-13 | End: 2022-11-20

## 2022-11-13 RX ORDER — FLUTICASONE PROPIONATE 50 MCG
2 SPRAY, SUSPENSION (ML) NASAL DAILY
Status: DISCONTINUED | OUTPATIENT
Start: 2022-11-13 | End: 2022-11-20

## 2022-11-13 RX ORDER — NICOTINE POLACRILEX 4 MG
30 LOZENGE BUCCAL
Status: DISCONTINUED | OUTPATIENT
Start: 2022-11-13 | End: 2022-11-20

## 2022-11-13 RX ORDER — LEVOTHYROXINE SODIUM 88 UG/1
88 TABLET ORAL
Status: DISCONTINUED | OUTPATIENT
Start: 2022-11-13 | End: 2022-11-20

## 2022-11-13 RX ORDER — DILTIAZEM HYDROCHLORIDE 5 MG/ML
10 INJECTION INTRAVENOUS ONCE
Status: COMPLETED | OUTPATIENT
Start: 2022-11-13 | End: 2022-11-13

## 2022-11-13 RX ORDER — SODIUM PHOSPHATE, DIBASIC AND SODIUM PHOSPHATE, MONOBASIC 7; 19 G/133ML; G/133ML
1 ENEMA RECTAL ONCE AS NEEDED
Status: DISCONTINUED | OUTPATIENT
Start: 2022-11-13 | End: 2022-11-20

## 2022-11-13 RX ORDER — MORPHINE SULFATE 4 MG/ML
4 INJECTION, SOLUTION INTRAMUSCULAR; INTRAVENOUS EVERY 2 HOUR PRN
Status: DISCONTINUED | OUTPATIENT
Start: 2022-11-13 | End: 2022-11-20

## 2022-11-13 RX ORDER — NICOTINE POLACRILEX 4 MG
15 LOZENGE BUCCAL
Status: DISCONTINUED | OUTPATIENT
Start: 2022-11-13 | End: 2022-11-20

## 2022-11-13 RX ORDER — PRAVASTATIN SODIUM 10 MG
10 TABLET ORAL NIGHTLY
Status: DISCONTINUED | OUTPATIENT
Start: 2022-11-13 | End: 2022-11-20

## 2022-11-13 RX ORDER — CEPHALEXIN 500 MG/1
500 CAPSULE ORAL 3 TIMES DAILY
Status: DISCONTINUED | OUTPATIENT
Start: 2022-11-13 | End: 2022-11-13

## 2022-11-13 RX ORDER — ASPIRIN 81 MG/1
324 TABLET, CHEWABLE ORAL ONCE
Status: COMPLETED | OUTPATIENT
Start: 2022-11-13 | End: 2022-11-13

## 2022-11-13 RX ORDER — BISACODYL 10 MG
10 SUPPOSITORY, RECTAL RECTAL
Status: DISCONTINUED | OUTPATIENT
Start: 2022-11-13 | End: 2022-11-20

## 2022-11-13 RX ORDER — MELATONIN
3 NIGHTLY PRN
Status: DISCONTINUED | OUTPATIENT
Start: 2022-11-13 | End: 2022-11-20

## 2022-11-13 RX ORDER — ACETAMINOPHEN 500 MG
500 TABLET ORAL EVERY 4 HOURS PRN
Status: DISCONTINUED | OUTPATIENT
Start: 2022-11-13 | End: 2022-11-20

## 2022-11-13 RX ORDER — CEPHALEXIN 500 MG/1
500 CAPSULE ORAL 3 TIMES DAILY
COMMUNITY
End: 2022-11-20

## 2022-11-13 RX ORDER — DEXTROSE MONOHYDRATE 25 G/50ML
50 INJECTION, SOLUTION INTRAVENOUS
Status: DISCONTINUED | OUTPATIENT
Start: 2022-11-13 | End: 2022-11-20

## 2022-11-13 RX ORDER — PANTOPRAZOLE SODIUM 20 MG/1
20 TABLET, DELAYED RELEASE ORAL
Status: DISCONTINUED | OUTPATIENT
Start: 2022-11-14 | End: 2022-11-20

## 2022-11-13 RX ORDER — METOCLOPRAMIDE HYDROCHLORIDE 5 MG/ML
5 INJECTION INTRAMUSCULAR; INTRAVENOUS EVERY 8 HOURS PRN
Status: DISCONTINUED | OUTPATIENT
Start: 2022-11-13 | End: 2022-11-20

## 2022-11-13 RX ORDER — GABAPENTIN 100 MG/1
200 CAPSULE ORAL NIGHTLY
Status: DISCONTINUED | OUTPATIENT
Start: 2022-11-13 | End: 2022-11-20

## 2022-11-13 RX ORDER — MORPHINE SULFATE 2 MG/ML
2 INJECTION, SOLUTION INTRAMUSCULAR; INTRAVENOUS EVERY 2 HOUR PRN
Status: DISCONTINUED | OUTPATIENT
Start: 2022-11-13 | End: 2022-11-20

## 2022-11-13 RX ORDER — POLYETHYLENE GLYCOL 3350 17 G/17G
17 POWDER, FOR SOLUTION ORAL DAILY PRN
Status: DISCONTINUED | OUTPATIENT
Start: 2022-11-13 | End: 2022-11-20

## 2022-11-13 RX ORDER — SENNOSIDES 8.6 MG
17.2 TABLET ORAL NIGHTLY PRN
Status: DISCONTINUED | OUTPATIENT
Start: 2022-11-13 | End: 2022-11-20

## 2022-11-13 RX ORDER — METOPROLOL SUCCINATE 50 MG/1
50 TABLET, EXTENDED RELEASE ORAL
Status: DISCONTINUED | OUTPATIENT
Start: 2022-11-13 | End: 2022-11-13

## 2022-11-13 RX ORDER — ONDANSETRON 2 MG/ML
4 INJECTION INTRAMUSCULAR; INTRAVENOUS EVERY 6 HOURS PRN
Status: DISCONTINUED | OUTPATIENT
Start: 2022-11-13 | End: 2022-11-20

## 2022-11-13 NOTE — ED INITIAL ASSESSMENT (HPI)
Patient has pacemaker since nov 1. Was in [place] Wednesday. Thursday morning didn't feel well. Dx with UTI and in afib. Hx afib. Kept in the hospital overnight. Taking keflex for UTI. About 345 this morning woken up from sleep with chest tightness. Right leg pain. Patient states 'its tight and feels like its in a vice. \"

## 2022-11-13 NOTE — ED QUICK NOTES
Orders for admission, patient is aware of plan and ready to go upstairs. Any questions, please call ED RN Marlana Boeck at extension [phone]. Patient Covid vaccination status: Fully vaccinated     COVID Test Ordered in ED: Rapid SARS-CoV-2 by PCR    COVID Suspicion at Admission: No risk with negative rapid    Running Infusions:  None    Mental Status/LOC at time of transport: A/O X4    Other pertinent information: up with stand by assistance. Daughter at bedside.    CIWA score: N/A   NIH score:  N/A

## 2022-11-13 NOTE — PLAN OF CARE
Received pt at approx 1240. Pt is A&Ox4, no complaints of pain. Pt is on room air, lungs clear/diminished, no coughing. Pt is in A-fib, no complaints of chest pain. She is continent of B&B, active bowel sounds, abdomen non-tender, last BM 11-13: complaints of constipation- will give miralax. Pt had a PM placed 11-1: steri strips in place at left upper quadrant. Pt is updated with plan of care. Approx 1800- pt complaining of R on/off calf pain, potassium WNL, no swelling/redness. Hot packs applied. Dr Demetria Quezada notified- 500 ml bolus over 2 hours started. Problem: Patient/Family Goals  Goal: Patient/Family Long Term Goal  Description: Patient's Long Term Goal: \"get better\" 11-13    Interventions:  - med compliance  - follow ups    - See additional Care Plan goals for specific interventions  Outcome: Progressing  Goal: Patient/Family Short Term Goal  Description: Patient's Short Term Goal: \"no pain\" 11-13    Interventions:   - PRN meds  - hot/cold packs  - tell nurse if in pain    - See additional Care Plan goals for specific interventions  Outcome: Progressing     Problem: CARDIOVASCULAR - ADULT  Goal: Maintains optimal cardiac output and hemodynamic stability  Description: INTERVENTIONS:  - Monitor vital signs, rhythm, and trends  - Monitor for bleeding, hypotension and signs of decreased cardiac output  - Evaluate effectiveness of vasoactive medications to optimize hemodynamic stability  - Monitor arterial and/or venous puncture sites for bleeding and/or hematoma  - Assess quality of pulses, skin color and temperature  - Assess for signs of decreased coronary artery perfusion - ex.  Angina  - Evaluate fluid balance, assess for edema, trend weights  Outcome: Progressing  Goal: Absence of cardiac arrhythmias or at baseline  Description: INTERVENTIONS:  - Continuous cardiac monitoring, monitor vital signs, obtain 12 lead EKG if indicated  - Evaluate effectiveness of antiarrhythmic and heart rate control medications as ordered  - Initiate emergency measures for life threatening arrhythmias  - Monitor electrolytes and administer replacement therapy as ordered  Outcome: Progressing

## 2022-11-14 ENCOUNTER — APPOINTMENT (OUTPATIENT)
Dept: CV DIAGNOSTICS | Facility: HOSPITAL | Age: 82
DRG: 247 | End: 2022-11-14
Attending: INTERNAL MEDICINE
Payer: MEDICARE

## 2022-11-14 ENCOUNTER — APPOINTMENT (OUTPATIENT)
Dept: CV DIAGNOSTICS | Facility: HOSPITAL | Age: 82
End: 2022-11-14
Attending: INTERNAL MEDICINE
Payer: MEDICARE

## 2022-11-14 LAB
ALBUMIN SERPL-MCNC: 2.8 G/DL (ref 3.4–5)
ALBUMIN/GLOB SERPL: 0.7 {RATIO} (ref 1–2)
ALP LIVER SERPL-CCNC: 83 U/L
ALT SERPL-CCNC: 22 U/L
ANION GAP SERPL CALC-SCNC: 4 MMOL/L (ref 0–18)
AST SERPL-CCNC: 18 U/L (ref 15–37)
ATRIAL RATE: 76 BPM
BASOPHILS # BLD AUTO: 0.06 X10(3) UL (ref 0–0.2)
BASOPHILS NFR BLD AUTO: 0.9 %
BILIRUB SERPL-MCNC: 0.5 MG/DL (ref 0.1–2)
BUN BLD-MCNC: 17 MG/DL (ref 7–18)
CALCIUM BLD-MCNC: 9 MG/DL (ref 8.5–10.1)
CHLORIDE SERPL-SCNC: 106 MMOL/L (ref 98–112)
CO2 SERPL-SCNC: 28 MMOL/L (ref 21–32)
CREAT BLD-MCNC: 0.76 MG/DL
EOSINOPHIL # BLD AUTO: 0.09 X10(3) UL (ref 0–0.7)
EOSINOPHIL NFR BLD AUTO: 1.3 %
ERYTHROCYTE [DISTWIDTH] IN BLOOD BY AUTOMATED COUNT: 14.4 %
GFR SERPLBLD BASED ON 1.73 SQ M-ARVRAT: 79 ML/MIN/1.73M2 (ref 60–?)
GLOBULIN PLAS-MCNC: 3.9 G/DL (ref 2.8–4.4)
GLUCOSE BLD-MCNC: 138 MG/DL (ref 70–99)
GLUCOSE BLD-MCNC: 150 MG/DL (ref 70–99)
GLUCOSE BLD-MCNC: 159 MG/DL (ref 70–99)
GLUCOSE BLD-MCNC: 185 MG/DL (ref 70–99)
GLUCOSE BLD-MCNC: 215 MG/DL (ref 70–99)
HCT VFR BLD AUTO: 40.9 %
HGB BLD-MCNC: 13.1 G/DL
IMM GRANULOCYTES # BLD AUTO: 0.06 X10(3) UL (ref 0–1)
IMM GRANULOCYTES NFR BLD: 0.9 %
LYMPHOCYTES # BLD AUTO: 1.9 X10(3) UL (ref 1–4)
LYMPHOCYTES NFR BLD AUTO: 27.4 %
MAGNESIUM SERPL-MCNC: 1.6 MG/DL (ref 1.6–2.6)
MAGNESIUM SERPL-MCNC: 1.8 MG/DL (ref 1.6–2.6)
MCH RBC QN AUTO: 28.7 PG (ref 26–34)
MCHC RBC AUTO-ENTMCNC: 32 G/DL (ref 31–37)
MCV RBC AUTO: 89.7 FL
MONOCYTES # BLD AUTO: 0.66 X10(3) UL (ref 0.1–1)
MONOCYTES NFR BLD AUTO: 9.5 %
NEUTROPHILS # BLD AUTO: 4.16 X10 (3) UL (ref 1.5–7.7)
NEUTROPHILS # BLD AUTO: 4.16 X10(3) UL (ref 1.5–7.7)
NEUTROPHILS NFR BLD AUTO: 60 %
OSMOLALITY SERPL CALC.SUM OF ELEC: 291 MOSM/KG (ref 275–295)
PLATELET # BLD AUTO: 233 10(3)UL (ref 150–450)
POTASSIUM SERPL-SCNC: 3.9 MMOL/L (ref 3.5–5.1)
POTASSIUM SERPL-SCNC: 4.1 MMOL/L (ref 3.5–5.1)
PROT SERPL-MCNC: 6.7 G/DL (ref 6.4–8.2)
Q-T INTERVAL: 376 MS
QRS DURATION: 96 MS
QTC CALCULATION (BEZET): 472 MS
R AXIS: -36 DEGREES
RBC # BLD AUTO: 4.56 X10(6)UL
SODIUM SERPL-SCNC: 138 MMOL/L (ref 136–145)
T AXIS: 167 DEGREES
TROPONIN I HIGH SENSITIVITY: 15 NG/L
VENTRICULAR RATE: 95 BPM
WBC # BLD AUTO: 6.9 X10(3) UL (ref 4–11)

## 2022-11-14 PROCEDURE — 99232 SBSQ HOSP IP/OBS MODERATE 35: CPT | Performed by: HOSPITALIST

## 2022-11-14 PROCEDURE — 93306 TTE W/DOPPLER COMPLETE: CPT | Performed by: INTERNAL MEDICINE

## 2022-11-14 RX ORDER — CEPHALEXIN 500 MG/1
500 CAPSULE ORAL 4 TIMES DAILY
Qty: 24 CAPSULE | Refills: 0 | Status: SHIPPED | OUTPATIENT
Start: 2022-11-14 | End: 2022-11-19

## 2022-11-14 RX ORDER — MAGNESIUM OXIDE 400 MG/1
400 TABLET ORAL ONCE
Status: COMPLETED | OUTPATIENT
Start: 2022-11-14 | End: 2022-11-14

## 2022-11-14 RX ORDER — DOFETILIDE 0.25 MG/1
250 CAPSULE ORAL EVERY 12 HOURS
Status: DISCONTINUED | OUTPATIENT
Start: 2022-11-14 | End: 2022-11-16

## 2022-11-14 RX ORDER — SODIUM CHLORIDE 9 MG/ML
INJECTION, SOLUTION INTRAVENOUS
Status: COMPLETED | OUTPATIENT
Start: 2022-11-15 | End: 2022-11-15

## 2022-11-14 NOTE — PLAN OF CARE
Assumed care around 1930. A/Ox4. On RA w/ sats >90. Monitor shows a-fib/ventricular pacing at times. Voids in BR, straight caths self after voiding before bed and upon waking up per pt. Last BM 11/10, stool softener to be given. No reports of pain. Up SBA. Plan for repeat EKG, PPM interrogation in AM, cont IV abx, awaiting blood cx. Safety precautions in place, call light within reach, bed alarm on. Problem: Patient/Family Goals  Goal: Patient/Family Long Term Goal  Description: Patient's Long Term Goal: \"get better\" 11-13    Interventions:  - med compliance  - follow ups    - See additional Care Plan goals for specific interventions  Outcome: Progressing  Goal: Patient/Family Short Term Goal  Description: Patient's Short Term Goal: \"no pain\" 11-13    Interventions:   - PRN meds  - hot/cold packs  - tell nurse if in pain    - See additional Care Plan goals for specific interventions  Outcome: Progressing     Problem: CARDIOVASCULAR - ADULT  Goal: Maintains optimal cardiac output and hemodynamic stability  Description: INTERVENTIONS:  - Monitor vital signs, rhythm, and trends  - Monitor for bleeding, hypotension and signs of decreased cardiac output  - Evaluate effectiveness of vasoactive medications to optimize hemodynamic stability  - Monitor arterial and/or venous puncture sites for bleeding and/or hematoma  - Assess quality of pulses, skin color and temperature  - Assess for signs of decreased coronary artery perfusion - ex.  Angina  - Evaluate fluid balance, assess for edema, trend weights  Outcome: Progressing  Goal: Absence of cardiac arrhythmias or at baseline  Description: INTERVENTIONS:  - Continuous cardiac monitoring, monitor vital signs, obtain 12 lead EKG if indicated  - Evaluate effectiveness of antiarrhythmic and heart rate control medications as ordered  - Initiate emergency measures for life threatening arrhythmias  - Monitor electrolytes and administer replacement therapy as ordered  Outcome: Progressing complains of pain/discomfort

## 2022-11-14 NOTE — OCCUPATIONAL THERAPY NOTE
OT reviewed chart and spoke with RN. Patient is currently occupied with ultrasound. OT will follow and reattempt as scheduling permits and patient appropriate.

## 2022-11-14 NOTE — TREATMENT PLAN
Murphy [employer]ol[employer] Dc PPM   Presenting rhythm: AF 94 BPM.   Battery: > 12 years. Atrium: Fib waves: 1.9 mV, 460 Ohms. Ventricle: R-waves: 21.1 mV, 1 V @ 0.4 ms, 826 Ohms. AF: - 46 % since last interrogated on 11/10/22. AF began on 11/12 and has been MS since 11/14/22. Some initial RVR with ventricular rates to 140's. No other arrhythmias recorded. Implanted 11/1/22. Safety margins maintained.isamar MCKENZIE PPM function.    Vern JOSE

## 2022-11-14 NOTE — PROGRESS NOTES
11/14/22 1125 11/14/22 1129 11/14/22 1131   Vital Signs   BP 90/36 90/65 (!) 77/55   MAP (mmHg) 48 70 60   BP Location Left arm Left arm Left arm   BP Method Automatic Automatic Automatic   Patient Position Lying Sitting Standing      11/14/22 1133   Vital Signs   /62   MAP (mmHg) 73   BP Location Left arm   BP Method Automatic   Patient Position Sitting     Orthostatics. Pt denies dizziness.

## 2022-11-14 NOTE — PLAN OF CARE
Patient VHDR7. On room air. Afib on tele. ECHO done today. Patient sraight cath self two times daily. No C/o pain. Call light within reach. PT/OT consult. Will continue to monitor. Problem: Patient/Family Goals  Goal: Patient/Family Long Term Goal  Description: Patient's Long Term Goal: \"get better\" 11-13    Interventions:  - med compliance  - follow ups    - See additional Care Plan goals for specific interventions  Outcome: Progressing  Goal: Patient/Family Short Term Goal  Description: Patient's Short Term Goal: \"no pain\" 11-13    Interventions:   - PRN meds  - hot/cold packs  - tell nurse if in pain    - See additional Care Plan goals for specific interventions  Outcome: Progressing     Problem: CARDIOVASCULAR - ADULT  Goal: Maintains optimal cardiac output and hemodynamic stability  Description: INTERVENTIONS:  - Monitor vital signs, rhythm, and trends  - Monitor for bleeding, hypotension and signs of decreased cardiac output  - Evaluate effectiveness of vasoactive medications to optimize hemodynamic stability  - Monitor arterial and/or venous puncture sites for bleeding and/or hematoma  - Assess quality of pulses, skin color and temperature  - Assess for signs of decreased coronary artery perfusion - ex.  Angina  - Evaluate fluid balance, assess for edema, trend weights  Outcome: Progressing  Goal: Absence of cardiac arrhythmias or at baseline  Description: INTERVENTIONS:  - Continuous cardiac monitoring, monitor vital signs, obtain 12 lead EKG if indicated  - Evaluate effectiveness of antiarrhythmic and heart rate control medications as ordered  - Initiate emergency measures for life threatening arrhythmias  - Monitor electrolytes and administer replacement therapy as ordered  Outcome: Progressing     Problem: Diabetes/Glucose Control  Goal: Glucose maintained within prescribed range  Description: INTERVENTIONS:  - Monitor Blood Glucose as ordered  - Assess for signs and symptoms of hyperglycemia and hypoglycemia  - Administer ordered medications to maintain glucose within target range  - Assess barriers to adequate nutritional intake and initiate nutrition consult as needed  - Instruct patient on self management of diabetes  Outcome: Progressing

## 2022-11-15 ENCOUNTER — APPOINTMENT (OUTPATIENT)
Dept: INTERVENTIONAL RADIOLOGY/VASCULAR | Facility: HOSPITAL | Age: 82
End: 2022-11-15
Attending: NURSE PRACTITIONER
Payer: MEDICARE

## 2022-11-15 ENCOUNTER — APPOINTMENT (OUTPATIENT)
Dept: INTERVENTIONAL RADIOLOGY/VASCULAR | Facility: HOSPITAL | Age: 82
DRG: 247 | End: 2022-11-15
Attending: NURSE PRACTITIONER
Payer: MEDICARE

## 2022-11-15 LAB
ANION GAP SERPL CALC-SCNC: 5 MMOL/L (ref 0–18)
BUN BLD-MCNC: 21 MG/DL (ref 7–18)
CALCIUM BLD-MCNC: 8.9 MG/DL (ref 8.5–10.1)
CHLORIDE SERPL-SCNC: 107 MMOL/L (ref 98–112)
CO2 SERPL-SCNC: 25 MMOL/L (ref 21–32)
CREAT BLD-MCNC: 0.81 MG/DL
GFR SERPLBLD BASED ON 1.73 SQ M-ARVRAT: 73 ML/MIN/1.73M2 (ref 60–?)
GLUCOSE BLD-MCNC: 122 MG/DL (ref 70–99)
GLUCOSE BLD-MCNC: 167 MG/DL (ref 70–99)
GLUCOSE BLD-MCNC: 178 MG/DL (ref 70–99)
GLUCOSE BLD-MCNC: 203 MG/DL (ref 70–99)
MAGNESIUM SERPL-MCNC: 1.9 MG/DL (ref 1.6–2.6)
OSMOLALITY SERPL CALC.SUM OF ELEC: 291 MOSM/KG (ref 275–295)
POTASSIUM SERPL-SCNC: 4.1 MMOL/L (ref 3.5–5.1)
SODIUM SERPL-SCNC: 137 MMOL/L (ref 136–145)

## 2022-11-15 PROCEDURE — B2151ZZ FLUOROSCOPY OF LEFT HEART USING LOW OSMOLAR CONTRAST: ICD-10-PCS | Performed by: INTERNAL MEDICINE

## 2022-11-15 PROCEDURE — 027034Z DILATION OF CORONARY ARTERY, ONE ARTERY WITH DRUG-ELUTING INTRALUMINAL DEVICE, PERCUTANEOUS APPROACH: ICD-10-PCS | Performed by: INTERNAL MEDICINE

## 2022-11-15 PROCEDURE — 99232 SBSQ HOSP IP/OBS MODERATE 35: CPT | Performed by: HOSPITALIST

## 2022-11-15 PROCEDURE — B2131ZZ FLUOROSCOPY OF MULTIPLE CORONARY ARTERY BYPASS GRAFTS USING LOW OSMOLAR CONTRAST: ICD-10-PCS | Performed by: INTERNAL MEDICINE

## 2022-11-15 PROCEDURE — 4A023N7 MEASUREMENT OF CARDIAC SAMPLING AND PRESSURE, LEFT HEART, PERCUTANEOUS APPROACH: ICD-10-PCS | Performed by: INTERNAL MEDICINE

## 2022-11-15 PROCEDURE — B2111ZZ FLUOROSCOPY OF MULTIPLE CORONARY ARTERIES USING LOW OSMOLAR CONTRAST: ICD-10-PCS | Performed by: INTERNAL MEDICINE

## 2022-11-15 RX ORDER — HEPARIN SODIUM 5000 [USP'U]/ML
INJECTION, SOLUTION INTRAVENOUS; SUBCUTANEOUS
Status: COMPLETED
Start: 2022-11-15 | End: 2022-11-15

## 2022-11-15 RX ORDER — CLOPIDOGREL BISULFATE 75 MG/1
TABLET ORAL
Status: COMPLETED
Start: 2022-11-15 | End: 2022-11-15

## 2022-11-15 RX ORDER — CLOPIDOGREL BISULFATE 75 MG/1
75 TABLET ORAL DAILY
Status: DISCONTINUED | OUTPATIENT
Start: 2022-11-16 | End: 2022-11-20

## 2022-11-15 RX ORDER — MIDAZOLAM HYDROCHLORIDE 1 MG/ML
INJECTION INTRAMUSCULAR; INTRAVENOUS
Status: COMPLETED
Start: 2022-11-15 | End: 2022-11-15

## 2022-11-15 RX ORDER — LIDOCAINE HYDROCHLORIDE 10 MG/ML
INJECTION, SOLUTION EPIDURAL; INFILTRATION; INTRACAUDAL; PERINEURAL
Status: COMPLETED
Start: 2022-11-15 | End: 2022-11-15

## 2022-11-15 RX ORDER — SODIUM CHLORIDE 9 MG/ML
INJECTION, SOLUTION INTRAVENOUS
Status: DISCONTINUED | OUTPATIENT
Start: 2022-11-16 | End: 2022-11-15 | Stop reason: HOSPADM

## 2022-11-15 NOTE — PLAN OF CARE
Patient Manuela Lucas. On room air. A fib on tele. No C/o of pain. Patient straight cath herself twice daily for retention. Plan for angiogram today. Consent signed. Patient remains NPO. Call light within reach. Will continue to monitor. Problem: CARDIOVASCULAR - ADULT  Goal: Maintains optimal cardiac output and hemodynamic stability  Description: INTERVENTIONS:  - Monitor vital signs, rhythm, and trends  - Monitor for bleeding, hypotension and signs of decreased cardiac output  - Evaluate effectiveness of vasoactive medications to optimize hemodynamic stability  - Monitor arterial and/or venous puncture sites for bleeding and/or hematoma  - Assess quality of pulses, skin color and temperature  - Assess for signs of decreased coronary artery perfusion - ex.  Angina  - Evaluate fluid balance, assess for edema, trend weights  Outcome: Progressing     Problem: Diabetes/Glucose Control  Goal: Glucose maintained within prescribed range  Description: INTERVENTIONS:  - Monitor Blood Glucose as ordered  - Assess for signs and symptoms of hyperglycemia and hypoglycemia  - Administer ordered medications to maintain glucose within target range  - Assess barriers to adequate nutritional intake and initiate nutrition consult as needed  - Instruct patient on self management of diabetes  Outcome: Progressing

## 2022-11-15 NOTE — PLAN OF CARE
Problem: Patient/Family Goals  Goal: Patient/Family Long Term Goal  Description: Patient's Long Term Goal: \"get better\"     Interventions:  - med compliance  - follow ups    - See additional Care Plan goals for specific interventions  Outcome: Progressing  Goal: Patient/Family Short Term Goal  Description: Patient's Short Term Goal: \"no pain\"     Interventions:   - PRN meds  - hot/cold packs  - tell nurse if in pain    - See additional Care Plan goals for specific interventions  Outcome: Progressing     Problem: CARDIOVASCULAR - ADULT  Goal: Maintains optimal cardiac output and hemodynamic stability  Description: INTERVENTIONS:  - Monitor vital signs, rhythm, and trends  - Monitor for bleeding, hypotension and signs of decreased cardiac output  - Evaluate effectiveness of vasoactive medications to optimize hemodynamic stability  - Monitor arterial and/or venous puncture sites for bleeding and/or hematoma  - Assess quality of pulses, skin color and temperature  - Assess for signs of decreased coronary artery perfusion - ex.  Angina  - Evaluate fluid balance, assess for edema, trend weights  Outcome: Progressing  Goal: Absence of cardiac arrhythmias or at baseline  Description: INTERVENTIONS:  - Continuous cardiac monitoring, monitor vital signs, obtain 12 lead EKG if indicated  - Evaluate effectiveness of antiarrhythmic and heart rate control medications as ordered  - Initiate emergency measures for life threatening arrhythmias  - Monitor electrolytes and administer replacement therapy as ordered  Outcome: Progressing     Problem: Diabetes/Glucose Control  Goal: Glucose maintained within prescribed range  Description: INTERVENTIONS:  - Monitor Blood Glucose as ordered  - Assess for signs and symptoms of hyperglycemia and hypoglycemia  - Administer ordered medications to maintain glucose within target range  - Assess barriers to adequate nutritional intake and initiate nutrition consult as needed  - Instruct patient on self management of diabetes  Outcome: Progressing

## 2022-11-15 NOTE — PROGRESS NOTES
LV 45%  %  Circ 70-80  RCA 90%  LIMA patent  RCA and OM grafts occluded    PCI of RCA with LA1.  predilw with 3.0 balloon then 3.5 x 38 with Guidezilla  Post dil with 3.5 NC  Excellent result      Better rate control then home

## 2022-11-16 ENCOUNTER — APPOINTMENT (OUTPATIENT)
Dept: CV DIAGNOSTICS | Facility: HOSPITAL | Age: 82
DRG: 247 | End: 2022-11-16
Attending: INTERNAL MEDICINE
Payer: MEDICARE

## 2022-11-16 ENCOUNTER — APPOINTMENT (OUTPATIENT)
Dept: INTERVENTIONAL RADIOLOGY/VASCULAR | Facility: HOSPITAL | Age: 82
End: 2022-11-16
Attending: INTERNAL MEDICINE
Payer: MEDICARE

## 2022-11-16 ENCOUNTER — APPOINTMENT (OUTPATIENT)
Dept: INTERVENTIONAL RADIOLOGY/VASCULAR | Facility: HOSPITAL | Age: 82
DRG: 247 | End: 2022-11-16
Attending: INTERNAL MEDICINE
Payer: MEDICARE

## 2022-11-16 ENCOUNTER — APPOINTMENT (OUTPATIENT)
Dept: CV DIAGNOSTICS | Facility: HOSPITAL | Age: 82
End: 2022-11-16
Attending: INTERNAL MEDICINE
Payer: MEDICARE

## 2022-11-16 LAB
ANION GAP SERPL CALC-SCNC: 6 MMOL/L (ref 0–18)
ATRIAL RATE: 113 BPM
ATRIAL RATE: 131 BPM
BUN BLD-MCNC: 18 MG/DL (ref 7–18)
CALCIUM BLD-MCNC: 8.6 MG/DL (ref 8.5–10.1)
CHLORIDE SERPL-SCNC: 106 MMOL/L (ref 98–112)
CO2 SERPL-SCNC: 26 MMOL/L (ref 21–32)
CREAT BLD-MCNC: 0.74 MG/DL
GFR SERPLBLD BASED ON 1.73 SQ M-ARVRAT: 81 ML/MIN/1.73M2 (ref 60–?)
GLUCOSE BLD-MCNC: 146 MG/DL (ref 70–99)
GLUCOSE BLD-MCNC: 148 MG/DL (ref 70–99)
GLUCOSE BLD-MCNC: 155 MG/DL (ref 70–99)
GLUCOSE BLD-MCNC: 167 MG/DL (ref 70–99)
GLUCOSE BLD-MCNC: 171 MG/DL (ref 70–99)
MAGNESIUM SERPL-MCNC: 1.9 MG/DL (ref 1.6–2.6)
OSMOLALITY SERPL CALC.SUM OF ELEC: 292 MOSM/KG (ref 275–295)
POTASSIUM SERPL-SCNC: 4.2 MMOL/L (ref 3.5–5.1)
Q-T INTERVAL: 272 MS
Q-T INTERVAL: 348 MS
QRS DURATION: 98 MS
QRS DURATION: 98 MS
QTC CALCULATION (BEZET): 381 MS
QTC CALCULATION (BEZET): 483 MS
R AXIS: -37 DEGREES
R AXIS: -39 DEGREES
SODIUM SERPL-SCNC: 138 MMOL/L (ref 136–145)
T AXIS: 156 DEGREES
T AXIS: 163 DEGREES
VENTRICULAR RATE: 116 BPM
VENTRICULAR RATE: 118 BPM

## 2022-11-16 PROCEDURE — 99232 SBSQ HOSP IP/OBS MODERATE 35: CPT | Performed by: HOSPITALIST

## 2022-11-16 PROCEDURE — 93325 DOPPLER ECHO COLOR FLOW MAPG: CPT | Performed by: INTERNAL MEDICINE

## 2022-11-16 PROCEDURE — B24BZZ4 ULTRASONOGRAPHY OF HEART WITH AORTA, TRANSESOPHAGEAL: ICD-10-PCS | Performed by: INTERNAL MEDICINE

## 2022-11-16 PROCEDURE — 93320 DOPPLER ECHO COMPLETE: CPT | Performed by: INTERNAL MEDICINE

## 2022-11-16 RX ORDER — SODIUM CHLORIDE 9 MG/ML
INJECTION, SOLUTION INTRAVENOUS
Status: ACTIVE | OUTPATIENT
Start: 2022-11-17 | End: 2022-11-17

## 2022-11-16 RX ORDER — DOFETILIDE 0.25 MG/1
250 CAPSULE ORAL EVERY 12 HOURS
Status: DISCONTINUED | OUTPATIENT
Start: 2022-11-16 | End: 2022-11-17

## 2022-11-16 RX ORDER — MIDAZOLAM HYDROCHLORIDE 1 MG/ML
INJECTION INTRAMUSCULAR; INTRAVENOUS
Status: DISCONTINUED
Start: 2022-11-16 | End: 2022-11-16 | Stop reason: WASHOUT

## 2022-11-16 RX ORDER — MIDAZOLAM HYDROCHLORIDE 1 MG/ML
INJECTION INTRAMUSCULAR; INTRAVENOUS
Status: COMPLETED
Start: 2022-11-16 | End: 2022-11-16

## 2022-11-16 NOTE — PLAN OF CARE
patient a&ox4 on room air , irregular rhythm on cardiac monitor with periods of paced rhythm , denies any cp/ chest discomfort, s/p LHC , RT groin site intact , no hamatoma/ alberto, ambulated on hallway , tolerated well , no acute distress noted,  npo maintained for BREN procedure in am , voided, due to PVR ,straight cath done, 400 output , feel comfortable, ,will continue to monitor closely    Problem: Patient/Family Goals  Goal: Patient/Family Long Term Goal  Description: Patient's Long Term Goal: \"get better\" 11-16    Interventions:  - med compliance  - follow ups    - See additional Care Plan goals for specific interventions  Outcome: Progressing  Goal: Patient/Family Short Term Goal  Description: Patient's Short Term Goal: \"no pain\" 11-16  Interventions:   - PRN meds  - hot/cold packs  - tell nurse if in pain    - See additional Care Plan goals for specific interventions  Outcome: Progressing     Problem: CARDIOVASCULAR - ADULT  Goal: Maintains optimal cardiac output and hemodynamic stability  Description: INTERVENTIONS:  - Monitor vital signs, rhythm, and trends  - Monitor for bleeding, hypotension and signs of decreased cardiac output  - Evaluate effectiveness of vasoactive medications to optimize hemodynamic stability  - Monitor arterial and/or venous puncture sites for bleeding and/or hematoma  - Assess quality of pulses, skin color and temperature  - Assess for signs of decreased coronary artery perfusion - ex.  Angina  - Evaluate fluid balance, assess for edema, trend weights  Outcome: Progressing  Goal: Absence of cardiac arrhythmias or at baseline  Description: INTERVENTIONS:  - Continuous cardiac monitoring, monitor vital signs, obtain 12 lead EKG if indicated  - Evaluate effectiveness of antiarrhythmic and heart rate control medications as ordered  - Initiate emergency measures for life threatening arrhythmias  - Monitor electrolytes and administer replacement therapy as ordered  Outcome: Progressing Problem: Diabetes/Glucose Control  Goal: Glucose maintained within prescribed range  Description: INTERVENTIONS:  - Monitor Blood Glucose as ordered  - Assess for signs and symptoms of hyperglycemia and hypoglycemia  - Administer ordered medications to maintain glucose within target range  - Assess barriers to adequate nutritional intake and initiate nutrition consult as needed  - Instruct patient on self management of diabetes  Outcome: Progressing

## 2022-11-16 NOTE — CARDIAC REHAB
CAD teaching complete with Pt stent card given. Cardiac rehab offered and explained. Number given to call to schedule orientation.

## 2022-11-16 NOTE — PLAN OF CARE
Patient had BREN with Dr. Janey Phan today. Patient received versed and fentanyl for sedation. See bedside procedure flowsheet for specifics. Patient tolerated procedure well. VSS. Post procedure patient wakeful, OWENS. Report called to CTU 8 RN. Patient transported back to room 8611 by transport via bed.

## 2022-11-16 NOTE — PLAN OF CARE
Pt AO x4. Afib on tele monitor. O2 saturations WNL on room air. Pt updated on POC. Call light within reach. Problem: Patient/Family Goals  Goal: Patient/Family Long Term Goal  Description: Patient's Long Term Goal: \"get better\" 11-13    Interventions:  - med compliance  - follow ups    - See additional Care Plan goals for specific interventions  Outcome: Progressing  Goal: Patient/Family Short Term Goal  Description: Patient's Short Term Goal: \"no pain\" 11-13    Interventions:   - PRN meds  - hot/cold packs  - tell nurse if in pain    - See additional Care Plan goals for specific interventions  Outcome: Progressing     Problem: CARDIOVASCULAR - ADULT  Goal: Maintains optimal cardiac output and hemodynamic stability  Description: INTERVENTIONS:  - Monitor vital signs, rhythm, and trends  - Monitor for bleeding, hypotension and signs of decreased cardiac output  - Evaluate effectiveness of vasoactive medications to optimize hemodynamic stability  - Monitor arterial and/or venous puncture sites for bleeding and/or hematoma  - Assess quality of pulses, skin color and temperature  - Assess for signs of decreased coronary artery perfusion - ex.  Angina  - Evaluate fluid balance, assess for edema, trend weights  Outcome: Progressing  Goal: Absence of cardiac arrhythmias or at baseline  Description: INTERVENTIONS:  - Continuous cardiac monitoring, monitor vital signs, obtain 12 lead EKG if indicated  - Evaluate effectiveness of antiarrhythmic and heart rate control medications as ordered  - Initiate emergency measures for life threatening arrhythmias  - Monitor electrolytes and administer replacement therapy as ordered  Outcome: Progressing     Problem: Diabetes/Glucose Control  Goal: Glucose maintained within prescribed range  Description: INTERVENTIONS:  - Monitor Blood Glucose as ordered  - Assess for signs and symptoms of hyperglycemia and hypoglycemia  - Administer ordered medications to maintain glucose within target range  - Assess barriers to adequate nutritional intake and initiate nutrition consult as needed  - Instruct patient on self management of diabetes  Outcome: Progressing

## 2022-11-17 LAB
ANION GAP SERPL CALC-SCNC: 1 MMOL/L (ref 0–18)
ATRIAL RATE: 104 BPM
ATRIAL RATE: 71 BPM
BUN BLD-MCNC: 14 MG/DL (ref 7–18)
CALCIUM BLD-MCNC: 8.8 MG/DL (ref 8.5–10.1)
CHLORIDE SERPL-SCNC: 108 MMOL/L (ref 98–112)
CO2 SERPL-SCNC: 23 MMOL/L (ref 21–32)
CREAT BLD-MCNC: 0.75 MG/DL
GFR SERPLBLD BASED ON 1.73 SQ M-ARVRAT: 80 ML/MIN/1.73M2 (ref 60–?)
GLUCOSE BLD-MCNC: 120 MG/DL (ref 70–99)
GLUCOSE BLD-MCNC: 145 MG/DL (ref 70–99)
GLUCOSE BLD-MCNC: 148 MG/DL (ref 70–99)
GLUCOSE BLD-MCNC: 151 MG/DL (ref 70–99)
GLUCOSE BLD-MCNC: 159 MG/DL (ref 70–99)
MAGNESIUM SERPL-MCNC: 1.9 MG/DL (ref 1.6–2.6)
OSMOLALITY SERPL CALC.SUM OF ELEC: 277 MOSM/KG (ref 275–295)
P AXIS: 80 DEGREES
P-R INTERVAL: 270 MS
POTASSIUM SERPL-SCNC: 4.4 MMOL/L (ref 3.5–5.1)
Q-T INTERVAL: 398 MS
Q-T INTERVAL: 488 MS
QRS DURATION: 100 MS
QRS DURATION: 100 MS
QTC CALCULATION (BEZET): 530 MS
QTC CALCULATION (BEZET): 533 MS
R AXIS: -39 DEGREES
R AXIS: -39 DEGREES
SODIUM SERPL-SCNC: 132 MMOL/L (ref 136–145)
T AXIS: -84 DEGREES
T AXIS: 151 DEGREES
VENTRICULAR RATE: 108 BPM
VENTRICULAR RATE: 71 BPM

## 2022-11-17 PROCEDURE — 99232 SBSQ HOSP IP/OBS MODERATE 35: CPT | Performed by: HOSPITALIST

## 2022-11-17 RX ORDER — DOFETILIDE 0.12 MG/1
125 CAPSULE ORAL EVERY 12 HOURS
Status: DISCONTINUED | OUTPATIENT
Start: 2022-11-17 | End: 2022-11-20

## 2022-11-17 RX ORDER — MAGNESIUM OXIDE 400 MG/1
400 TABLET ORAL DAILY
Status: DISCONTINUED | OUTPATIENT
Start: 2022-11-17 | End: 2022-11-20

## 2022-11-17 NOTE — PLAN OF CARE
Patient A&Ox4 on room air ,paced rhythm on cardiac monitor, denies any cp/chest discomfort, assisted to walk and straight cath  400 ml after natural  void  , Tikosyn administered 0500Am , , VSS , HR Controlled, afebrile, no acute  Distress noted, plan of care updated, constipation reported , dulcolax suppository administered ,had large hard formed bm ,patient comfortable and resting , will continue to monitor closely      Problem: Patient/Family Goals  Goal: Patient/Family Long Term Goal  Description: Patient's Long Term Goal: \"get better\" 11-13    Interventions:  - med compliance  - follow ups    - See additional Care Plan goals for specific interventions  Outcome: Progressing  Goal: Patient/Family Short Term Goal  Description: Patient's Short Term Goal: \"no pain\" 11-13    Interventions:   - PRN meds  - hot/cold packs  - tell nurse if in pain    - See additional Care Plan goals for specific interventions  Outcome: Progressing     Problem: Diabetes/Glucose Control  Goal: Glucose maintained within prescribed range  Description: INTERVENTIONS:  - Monitor Blood Glucose as ordered  - Assess for signs and symptoms of hyperglycemia and hypoglycemia  - Administer ordered medications to maintain glucose within target range  - Assess barriers to adequate nutritional intake and initiate nutrition consult as needed  - Instruct patient on self management of diabetes  Outcome: Progressing

## 2022-11-17 NOTE — PHYSICAL THERAPY NOTE
PHYSICAL THERAPY TREATMENT NOTE - INPATIENT    Room Number: 6357/7569-C     Session: 1     Number of Visits to Meet Established Goals: 2    History related to current admission: Patient is a 81year old female admitted on 11/13/2022 from home for chest pain, RLE pain, afib, UTI, dizziness. 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 Pt gait trained sans AD c no LOB and denies dizziness. Pt has completed goals and presents with no further IP PT needs. Please reconsult should pt's status change. The AM-PAC '6-Clicks' Inpatient Basic Mobility Short Form was completed and this patient is demonstrating a 11.2% degree of impairment in mobility. Research supports that patients with this level of impairment may benefit from home at d/c. Pt has supportive daughter that assists pt as needed . DISCHARGE RECOMMENDATIONS  PT Discharge Recommendations: Home     PLAN  PT Treatment Plan: Bed mobility; Endurance; Energy conservation;Patient education; Family education;Gait training;Strengthening;Stair training;Transfer training;Balance training  Rehab Potential : Good  Frequency (Obs): 3-5x/week    CURRENT GOALS     Goal #1 Patient is able to demonstrate supine - sit EOB @ level: independent  NT      Goal #2 Patient is able to demonstrate transfers Sit to/from Stand at assistance level: independent  Met    Goal #3 Patient is able to ambulate 150 feet with assist device: none at assistance level: independent     Met    Goal #4 Patient is able to stair climb a flight of stairs with supervision  Met    Goal #5     Goal #6     Goal Comments: Goals established on 11/14/2022 11/17/2022 all goals  achieved       SUBJECTIVE  \"I know if I don't move, I will get weak.  My daughter and I used to walk 10,000 steps a day\"    OBJECTIVE  Precautions: Bed/chair alarm;Limb alert - right (orthostatic)    WEIGHT BEARING RESTRICTION  Weight Bearing Restriction: None                PAIN ASSESSMENT   Ratin  Location: pt denies pain       BALANCE                                                                                                                       Static Sitting: Good  Dynamic Sitting: Good           Static Standing: Good  Dynamic Standing: Fair +    ACTIVITY TOLERANCE                         O2 WALK         AM-PAC '6-Clicks' INPATIENT SHORT FORM - BASIC MOBILITY  How much difficulty does the patient currently have. .. Patient Difficulty: Turning over in bed (including adjusting bedclothes, sheets and blankets)?: None   Patient Difficulty: Sitting down on and standing up from a chair with arms (e.g., wheelchair, bedside commode, etc.): None   Patient Difficulty: Moving from lying on back to sitting on the side of the bed?: None   How much help from another person does the patient currently need. .. Help from Another: Moving to and from a bed to a chair (including a wheelchair)?: None   Help from Another: Need to walk in hospital room?: None   Help from Another: Climbing 3-5 steps with a railing?: A Little       AM-PAC Score:  Raw Score: 23   Approx Degree of Impairment: 11.2%   Standardized Score (AM-PAC Scale): 56.93   CMS Modifier (G-Code): CI    FUNCTIONAL ABILITY STATUS  Gait Assessment   Functional Mobility/Gait Assessment  Gait Assistance: Supervision  Distance (ft): 400  Assistive Device: None  Pattern: Within Functional Limits (decreased sergio and step length, guarded posture)  Stairs: Stairs  How Many Stairs: 12  Device: 1 Rail  Assist: Supervision  Pattern: Ascend and Descend  Ascend and Descend : Reciprocal    Skilled Therapy Provided  Pt presents seated in BS chair, BP monitored as noted . Educated pt on role of PT, goals for session, safe mobility, and fall prevention.   Pt gait trained in [facility] c supervision and  No LoB noted, pt demos cautious gait which becomes more fluid c increased ambulation distance. Pt stair trained FOS supervision. Pt denies SOB and dizziness. Pt encouraged to amb c nsg staff 3x/day and educated on \"call, don't fall\" pt verbalizes understanding. RN informed of above. Bed Mobility:  Rolling: nt   Supine<>Sit: nt   Sit<>Supine: nt     Transfer Mobility:  Sit<>Stand: ind    Stand<>Sit: ind    Gait: supervision d/t h/o hypotension, VSS as noted below     Therapist's Comments: BP   106/55 seated   97/50 standing   150/60 s/p functional mobility       THERAPEUTIC EXERCISES  Lower Extremity Alternating marching  Ankle pumps  Knee extension  LAQ  pt encouraged to perform prior to standing      Upper Extremity      Position Sitting     Repetitions      Sets        Patient End of Session: Up in chair;Needs met;Call light within reach;RN aware of session/findings; All patient questions and concerns addressed

## 2022-11-17 NOTE — PLAN OF CARE
Pt AO x4. Afib and v-paced on tele monitor. O2 saturations WNL. No complaints of chest pain or SOB. Updated on POC. Call light within reach. Problem: Patient/Family Goals  Goal: Patient/Family Long Term Goal  Description: Patient's Long Term Goal: \"get better\" 11-13    Interventions:  - med compliance  - follow ups    - See additional Care Plan goals for specific interventions  Outcome: Progressing  Goal: Patient/Family Short Term Goal  Description: Patient's Short Term Goal: \"no pain\" 11-13    Interventions:   - PRN meds  - hot/cold packs  - tell nurse if in pain    - See additional Care Plan goals for specific interventions  Outcome: Progressing     Problem: CARDIOVASCULAR - ADULT  Goal: Maintains optimal cardiac output and hemodynamic stability  Description: INTERVENTIONS:  - Monitor vital signs, rhythm, and trends  - Monitor for bleeding, hypotension and signs of decreased cardiac output  - Evaluate effectiveness of vasoactive medications to optimize hemodynamic stability  - Monitor arterial and/or venous puncture sites for bleeding and/or hematoma  - Assess quality of pulses, skin color and temperature  - Assess for signs of decreased coronary artery perfusion - ex.  Angina  - Evaluate fluid balance, assess for edema, trend weights  Outcome: Progressing  Goal: Absence of cardiac arrhythmias or at baseline  Description: INTERVENTIONS:  - Continuous cardiac monitoring, monitor vital signs, obtain 12 lead EKG if indicated  - Evaluate effectiveness of antiarrhythmic and heart rate control medications as ordered  - Initiate emergency measures for life threatening arrhythmias  - Monitor electrolytes and administer replacement therapy as ordered  Outcome: Progressing     Problem: Diabetes/Glucose Control  Goal: Glucose maintained within prescribed range  Description: INTERVENTIONS:  - Monitor Blood Glucose as ordered  - Assess for signs and symptoms of hyperglycemia and hypoglycemia  - Administer ordered medications to maintain glucose within target range  - Assess barriers to adequate nutritional intake and initiate nutrition consult as needed  - Instruct patient on self management of diabetes  Outcome: Progressing

## 2022-11-18 LAB
ANION GAP SERPL CALC-SCNC: 4 MMOL/L (ref 0–18)
ATRIAL RATE: 70 BPM
ATRIAL RATE: 71 BPM
BUN BLD-MCNC: 13 MG/DL (ref 7–18)
CALCIUM BLD-MCNC: 8.8 MG/DL (ref 8.5–10.1)
CHLORIDE SERPL-SCNC: 105 MMOL/L (ref 98–112)
CO2 SERPL-SCNC: 29 MMOL/L (ref 21–32)
CREAT BLD-MCNC: 0.67 MG/DL
GFR SERPLBLD BASED ON 1.73 SQ M-ARVRAT: 88 ML/MIN/1.73M2 (ref 60–?)
GLUCOSE BLD-MCNC: 137 MG/DL (ref 70–99)
GLUCOSE BLD-MCNC: 148 MG/DL (ref 70–99)
GLUCOSE BLD-MCNC: 150 MG/DL (ref 70–99)
GLUCOSE BLD-MCNC: 168 MG/DL (ref 70–99)
GLUCOSE BLD-MCNC: 170 MG/DL (ref 70–99)
MAGNESIUM SERPL-MCNC: 1.9 MG/DL (ref 1.6–2.6)
OSMOLALITY SERPL CALC.SUM OF ELEC: 289 MOSM/KG (ref 275–295)
P AXIS: -71 DEGREES
P-R INTERVAL: 250 MS
P-R INTERVAL: 282 MS
POTASSIUM SERPL-SCNC: 4 MMOL/L (ref 3.5–5.1)
Q-T INTERVAL: 454 MS
Q-T INTERVAL: 464 MS
QRS DURATION: 102 MS
QRS DURATION: 104 MS
QTC CALCULATION (BEZET): 493 MS
QTC CALCULATION (BEZET): 501 MS
R AXIS: -39 DEGREES
R AXIS: -41 DEGREES
SODIUM SERPL-SCNC: 138 MMOL/L (ref 136–145)
T AXIS: -37 DEGREES
T AXIS: -6 DEGREES
VENTRICULAR RATE: 70 BPM
VENTRICULAR RATE: 71 BPM

## 2022-11-18 PROCEDURE — 99232 SBSQ HOSP IP/OBS MODERATE 35: CPT | Performed by: HOSPITALIST

## 2022-11-18 NOTE — PLAN OF CARE
Pt AOx4. O2 sats maintained on RA, denies SOB. Vpaced on tele, on eliquis. Pt denies pain. Pt updated on plan of care. Around 15:00: pt c/o increasing urinary frequency and urgency and L back pain. MD notified, orders received to check a UA. PRN tylenol given with some relief. Problem: Patient/Family Goals  Goal: Patient/Family Long Term Goal  Description: Patient's Long Term Goal: \"get better\" 11-13    Interventions:  - med compliance  - follow ups    - See additional Care Plan goals for specific interventions  Outcome: Progressing  Goal: Patient/Family Short Term Goal  Description: Patient's Short Term Goal: \"no pain\" 11-13    Interventions:   - PRN meds  - hot/cold packs  - tell nurse if in pain    - See additional Care Plan goals for specific interventions  Outcome: Progressing     Problem: CARDIOVASCULAR - ADULT  Goal: Maintains optimal cardiac output and hemodynamic stability  Description: INTERVENTIONS:  - Monitor vital signs, rhythm, and trends  - Monitor for bleeding, hypotension and signs of decreased cardiac output  - Evaluate effectiveness of vasoactive medications to optimize hemodynamic stability  - Monitor arterial and/or venous puncture sites for bleeding and/or hematoma  - Assess quality of pulses, skin color and temperature  - Assess for signs of decreased coronary artery perfusion - ex.  Angina  - Evaluate fluid balance, assess for edema, trend weights  Outcome: Progressing  Goal: Absence of cardiac arrhythmias or at baseline  Description: INTERVENTIONS:  - Continuous cardiac monitoring, monitor vital signs, obtain 12 lead EKG if indicated  - Evaluate effectiveness of antiarrhythmic and heart rate control medications as ordered  - Initiate emergency measures for life threatening arrhythmias  - Monitor electrolytes and administer replacement therapy as ordered  Outcome: Progressing     Problem: Diabetes/Glucose Control  Goal: Glucose maintained within prescribed range  Description: INTERVENTIONS:  - Monitor Blood Glucose as ordered  - Assess for signs and symptoms of hyperglycemia and hypoglycemia  - Administer ordered medications to maintain glucose within target range  - Assess barriers to adequate nutritional intake and initiate nutrition consult as needed  - Instruct patient on self management of diabetes  Outcome: Progressing

## 2022-11-19 LAB
ANION GAP SERPL CALC-SCNC: 1 MMOL/L (ref 0–18)
ATRIAL RATE: 73 BPM
BUN BLD-MCNC: 13 MG/DL (ref 7–18)
CALCIUM BLD-MCNC: 9.4 MG/DL (ref 8.5–10.1)
CHLORIDE SERPL-SCNC: 104 MMOL/L (ref 98–112)
CO2 SERPL-SCNC: 31 MMOL/L (ref 21–32)
CREAT BLD-MCNC: 0.72 MG/DL
GFR SERPLBLD BASED ON 1.73 SQ M-ARVRAT: 84 ML/MIN/1.73M2 (ref 60–?)
GLUCOSE BLD-MCNC: 109 MG/DL (ref 70–99)
GLUCOSE BLD-MCNC: 151 MG/DL (ref 70–99)
GLUCOSE BLD-MCNC: 159 MG/DL (ref 70–99)
GLUCOSE BLD-MCNC: 162 MG/DL (ref 70–99)
GLUCOSE BLD-MCNC: 186 MG/DL (ref 70–99)
MAGNESIUM SERPL-MCNC: 2 MG/DL (ref 1.6–2.6)
OSMOLALITY SERPL CALC.SUM OF ELEC: 285 MOSM/KG (ref 275–295)
P-R INTERVAL: 278 MS
POTASSIUM SERPL-SCNC: 3.8 MMOL/L (ref 3.5–5.1)
Q-T INTERVAL: 432 MS
QRS DURATION: 106 MS
QTC CALCULATION (BEZET): 501 MS
R AXIS: 104 DEGREES
SODIUM SERPL-SCNC: 136 MMOL/L (ref 136–145)
T AXIS: -1 DEGREES
VENTRICULAR RATE: 81 BPM

## 2022-11-19 PROCEDURE — 99239 HOSP IP/OBS DSCHRG MGMT >30: CPT | Performed by: HOSPITALIST

## 2022-11-19 RX ORDER — POTASSIUM CHLORIDE 20 MEQ/1
40 TABLET, EXTENDED RELEASE ORAL ONCE
Status: COMPLETED | OUTPATIENT
Start: 2022-11-19 | End: 2022-11-19

## 2022-11-20 VITALS
HEIGHT: 62 IN | OXYGEN SATURATION: 98 % | RESPIRATION RATE: 18 BRPM | HEART RATE: 80 BPM | DIASTOLIC BLOOD PRESSURE: 59 MMHG | WEIGHT: 132.94 LBS | SYSTOLIC BLOOD PRESSURE: 133 MMHG | BODY MASS INDEX: 24.46 KG/M2 | TEMPERATURE: 98 F

## 2022-11-20 LAB
ATRIAL RATE: 441 BPM
ATRIAL RATE: 84 BPM
GLUCOSE BLD-MCNC: 173 MG/DL (ref 70–99)
GLUCOSE BLD-MCNC: 220 MG/DL (ref 70–99)
P-R INTERVAL: 242 MS
P-R INTERVAL: 290 MS
POTASSIUM SERPL-SCNC: 3.9 MMOL/L (ref 3.5–5.1)
Q-T INTERVAL: 402 MS
Q-T INTERVAL: 420 MS
QRS DURATION: 100 MS
QRS DURATION: 98 MS
QTC CALCULATION (BEZET): 475 MS
QTC CALCULATION (BEZET): 487 MS
R AXIS: -37 DEGREES
R AXIS: -38 DEGREES
T AXIS: -35 DEGREES
T AXIS: -37 DEGREES
VENTRICULAR RATE: 81 BPM
VENTRICULAR RATE: 84 BPM

## 2022-11-20 PROCEDURE — 99239 HOSP IP/OBS DSCHRG MGMT >30: CPT | Performed by: HOSPITALIST

## 2022-11-20 RX ORDER — DOFETILIDE 0.12 MG/1
125 CAPSULE ORAL EVERY 12 HOURS
Qty: 60 CAPSULE | Refills: 3 | Status: SHIPPED | OUTPATIENT
Start: 2022-11-20

## 2022-11-20 RX ORDER — CLOPIDOGREL BISULFATE 75 MG/1
75 TABLET ORAL DAILY
Qty: 30 TABLET | Refills: 11 | Status: SHIPPED | OUTPATIENT
Start: 2022-11-20

## 2022-11-20 NOTE — PLAN OF CARE
Pt and VS remained stable this shift. Denies CP/SOB TELE: AV paced up in chair and to BR rose well. Tikosyn per orders with EKG. MD/APN reviewed ok to discharge home. POC updated with pt and dtr at bedside. AVS reviewed verbalized understanding. Off floor to [facility] by transport services. Stable at transfer. 1545: dtr called back to state that pt pharmacy did not have Tikosyn in stock until \"sometime tomorrow\". Pt dtr return to hospital demanding two pills. APN notified was able have [facility] [address]. Fill script. Pt dtr here on floor given address and let know Rx closes at 5pm.    1610: Pt dtr called charge RN to state she was in possesion of a filled Tikosyn  dose for her mom and to give kudos.

## 2022-11-20 NOTE — PLAN OF CARE
Patient's preferred pharmacy did not have dofetilide stocked. [facility] [address] has medication in stock and currently being filled for patient.      Jabari Cunha, APRN  11/20/2022  3:44 PM  [phone]

## 2022-11-21 ENCOUNTER — PATIENT OUTREACH (OUTPATIENT)
Dept: CASE MANAGEMENT | Age: 82
End: 2022-11-21

## 2022-11-22 NOTE — PROGRESS NOTES
MINAZAHRA for post hospital follow up. [facility] contact information provided as well as [facility] office number, [phone].

## 2022-12-05 ENCOUNTER — LAB ENCOUNTER (OUTPATIENT)
Dept: LAB | Age: 82
End: 2022-12-05
Attending: INTERNAL MEDICINE
Payer: MEDICARE

## 2022-12-05 ENCOUNTER — OFFICE VISIT (OUTPATIENT)
Dept: INTERNAL MEDICINE CLINIC | Facility: CLINIC | Age: 82
End: 2022-12-05
Payer: MEDICARE

## 2022-12-05 VITALS
SYSTOLIC BLOOD PRESSURE: 122 MMHG | OXYGEN SATURATION: 96 % | HEIGHT: 62 IN | HEART RATE: 75 BPM | WEIGHT: 136 LBS | BODY MASS INDEX: 25.03 KG/M2 | DIASTOLIC BLOOD PRESSURE: 80 MMHG | TEMPERATURE: 97 F | RESPIRATION RATE: 14 BRPM

## 2022-12-05 DIAGNOSIS — E11.65 TYPE 2 DIABETES MELLITUS WITH HYPERGLYCEMIA, WITHOUT LONG-TERM CURRENT USE OF INSULIN (HCC): ICD-10-CM

## 2022-12-05 DIAGNOSIS — R82.90 ABNORMAL URINE: ICD-10-CM

## 2022-12-05 DIAGNOSIS — Z00.00 LABORATORY EXAMINATION ORDERED AS PART OF A ROUTINE GENERAL MEDICAL EXAMINATION: ICD-10-CM

## 2022-12-05 DIAGNOSIS — Z95.5 S/P PRIMARY ANGIOPLASTY WITH CORONARY STENT: Primary | ICD-10-CM

## 2022-12-05 PROBLEM — Z95.0 HISTORY OF PACEMAKER: Chronic | Status: ACTIVE | Noted: 2022-12-05

## 2022-12-05 PROBLEM — N10 ACUTE PYELONEPHRITIS: Status: RESOLVED | Noted: 2022-09-14 | Resolved: 2022-12-05

## 2022-12-05 PROBLEM — I48.91 ATRIAL FIBRILLATION WITH RVR (HCC): Status: RESOLVED | Noted: 2022-11-13 | Resolved: 2022-12-05

## 2022-12-05 PROBLEM — N30.00 ACUTE CYSTITIS WITHOUT HEMATURIA: Status: RESOLVED | Noted: 2022-09-30 | Resolved: 2022-12-05

## 2022-12-05 PROBLEM — Z95.0 HISTORY OF PACEMAKER: Status: ACTIVE | Noted: 2022-12-05

## 2022-12-05 PROBLEM — R07.9 ACUTE CHEST PAIN: Status: RESOLVED | Noted: 2022-11-13 | Resolved: 2022-12-05

## 2022-12-05 LAB
BILIRUB UR QL STRIP.AUTO: NEGATIVE
CLARITY UR REFRACT.AUTO: CLEAR
CREAT UR-SCNC: 30.7 MG/DL
GLUCOSE UR STRIP.AUTO-MCNC: NEGATIVE MG/DL
KETONES UR STRIP.AUTO-MCNC: NEGATIVE MG/DL
MICROALBUMIN UR-MCNC: <0.5 MG/DL
NITRITE UR QL STRIP.AUTO: NEGATIVE
PH UR STRIP.AUTO: 7 [PH] (ref 5–8)
PROT UR STRIP.AUTO-MCNC: NEGATIVE MG/DL
SP GR UR STRIP.AUTO: 1 (ref 1–1.03)
UROBILINOGEN UR STRIP.AUTO-MCNC: <2 MG/DL

## 2022-12-05 PROCEDURE — 81001 URINALYSIS AUTO W/SCOPE: CPT

## 2022-12-05 PROCEDURE — 87086 URINE CULTURE/COLONY COUNT: CPT

## 2022-12-05 PROCEDURE — 82043 UR ALBUMIN QUANTITATIVE: CPT

## 2022-12-05 PROCEDURE — 82570 ASSAY OF URINE CREATININE: CPT

## 2022-12-05 RX ORDER — FLUTICASONE PROPIONATE 50 MCG
2 SPRAY, SUSPENSION (ML) NASAL DAILY
Qty: 11.1 ML | Refills: 3 | Status: SHIPPED | OUTPATIENT
Start: 2022-12-05

## 2022-12-05 RX ORDER — POLYETHYLENE GLYCOL 3350 17 G/17G
17 POWDER, FOR SOLUTION ORAL 2 TIMES DAILY
Qty: 180 PACKET | Refills: 0 | Status: SHIPPED | OUTPATIENT
Start: 2022-12-05 | End: 2023-03-05

## 2022-12-05 RX ORDER — FAMOTIDINE 20 MG/1
20 TABLET, FILM COATED ORAL 2 TIMES DAILY
COMMUNITY

## 2022-12-05 NOTE — PATIENT INSTRUCTIONS
Minutes was spent just reviewing the records updating the med sheet. Updating the problem list.  None of these were addressed  Follow-up with her cardiologist as well as her EP physician.

## 2022-12-06 ENCOUNTER — PATIENT MESSAGE (OUTPATIENT)
Dept: INTERNAL MEDICINE CLINIC | Facility: CLINIC | Age: 82
End: 2022-12-06

## 2022-12-08 ENCOUNTER — PATIENT MESSAGE (OUTPATIENT)
Dept: INTERNAL MEDICINE CLINIC | Facility: CLINIC | Age: 82
End: 2022-12-08

## 2022-12-08 DIAGNOSIS — E03.4 HYPOTHYROIDISM DUE TO ACQUIRED ATROPHY OF THYROID: Chronic | ICD-10-CM

## 2022-12-08 DIAGNOSIS — N39.0 CHRONIC UTI: Primary | ICD-10-CM

## 2022-12-08 RX ORDER — LEVOTHYROXINE SODIUM 88 UG/1
TABLET ORAL
Qty: 90 TABLET | Refills: 0 | Status: CANCELLED | OUTPATIENT
Start: 2022-12-08

## 2022-12-08 NOTE — TELEPHONE ENCOUNTER
Beryl/pt's daugher calling with [facility] asking if medication was sent to pharmacy. She said they responded yes to the Mevvy. I informed her that we received a message this morning stating     \"Hey Wen, That sounds great. Vandana Shell Vandana Shell Vandana Shell I misunderstood . Vandana Shell Vandana Shell Vandana Shell I thought this was already called in. .I just went to [facility] they have nothing. Gerhardt Slocumb"    I confirmed that we have not yet sent the prescription because we were waiting on their response. They would like to know if it can be sent now because they are at the pharmacy.

## 2022-12-09 RX ORDER — NITROFURANTOIN MACROCRYSTALS 50 MG/1
50 CAPSULE ORAL NIGHTLY
Qty: 90 CAPSULE | Refills: 3 | Status: SHIPPED | OUTPATIENT
Start: 2022-12-09

## 2022-12-10 RX ORDER — LEVOTHYROXINE SODIUM 88 UG/1
TABLET ORAL
Qty: 90 TABLET | Refills: 0 | Status: SHIPPED | OUTPATIENT
Start: 2022-12-10

## 2022-12-10 NOTE — TELEPHONE ENCOUNTER
Protocol failed     Requesting: levothyroxine 88mcg     LOV: 12/5/22   RTC: 2 months   Filled: 9/1/22 # 90 0 refills   Recent Labs:  11/13/22     Upcoming OV: none scheduled

## 2022-12-18 ENCOUNTER — PATIENT MESSAGE (OUTPATIENT)
Dept: INTERNAL MEDICINE CLINIC | Facility: CLINIC | Age: 82
End: 2022-12-18

## 2022-12-18 DIAGNOSIS — N30.00 ACUTE CYSTITIS WITHOUT HEMATURIA: Primary | ICD-10-CM

## 2022-12-19 RX ORDER — DOXYCYCLINE HYCLATE 100 MG
100 TABLET ORAL 2 TIMES DAILY
Qty: 20 TABLET | Refills: 0 | Status: SHIPPED | OUTPATIENT
Start: 2022-12-19 | End: 2022-12-22

## 2022-12-19 NOTE — TELEPHONE ENCOUNTER
RP - pt was just seen 12/5/22 - chronic UTI's and just started Macrobid maintenance dosing 12/10/22. Would you be willing to send Rx for new UTI symptoms and sinus symptoms? Please advise, ty!     Eladio Cirri #[id number]

## 2022-12-19 NOTE — TELEPHONE ENCOUNTER
VM - new urinary sx and sinus symptoms, please advise if you would like pt to be seen by a different provider?  ty

## 2022-12-19 NOTE — TELEPHONE ENCOUNTER
Beryl/Patient daughter    C/O stephie sxs     Sinus infection sxs onset 12/18/22  Left nasal congestion/sinus pain, burning, left jaw pain    Urinary sxs, worse 12/18/22  Frequent urge, bloating, severe burning. ABD pain/pressure.   No fever, no back pain, no blood Started AZO 12/18 pm with very little relief to sxs    Takes macrobid 50mg nightly

## 2022-12-20 ENCOUNTER — LAB ENCOUNTER (OUTPATIENT)
Dept: LAB | Age: 82
End: 2022-12-20
Attending: INTERNAL MEDICINE
Payer: MEDICARE

## 2022-12-20 DIAGNOSIS — Z00.00 LABORATORY EXAMINATION ORDERED AS PART OF A ROUTINE GENERAL MEDICAL EXAMINATION: ICD-10-CM

## 2022-12-20 DIAGNOSIS — N30.00 ACUTE CYSTITIS WITHOUT HEMATURIA: ICD-10-CM

## 2022-12-20 LAB
BILIRUB UR QL CFM: NEGATIVE
CLARITY UR REFRACT.AUTO: CLEAR
SP GR UR REFRACTOMETRY: 1.01 (ref 1–1.03)
WBC #/AREA URNS AUTO: >50 /HPF

## 2022-12-20 PROCEDURE — 87077 CULTURE AEROBIC IDENTIFY: CPT

## 2022-12-20 PROCEDURE — 87186 SC STD MICRODIL/AGAR DIL: CPT

## 2022-12-20 PROCEDURE — 87086 URINE CULTURE/COLONY COUNT: CPT

## 2022-12-20 PROCEDURE — 81001 URINALYSIS AUTO W/SCOPE: CPT

## 2022-12-20 PROCEDURE — 81015 MICROSCOPIC EXAM OF URINE: CPT

## 2022-12-20 NOTE — TELEPHONE ENCOUNTER
Updated [facility] sent to pt and her daughter Edyta Wiggins. The mcm I sent yesterday was read.

## 2022-12-20 NOTE — TELEPHONE ENCOUNTER
In view of her allergies and resistance to antibiotics it would be worthwhile to get a rpt UA AND urine c/s to document infection   In the meantime I will send something to the pharmacy

## 2022-12-21 ENCOUNTER — TELEPHONE (OUTPATIENT)
Dept: INTERNAL MEDICINE CLINIC | Facility: CLINIC | Age: 82
End: 2022-12-21

## 2022-12-21 DIAGNOSIS — N39.0 CHRONIC UTI: Primary | ICD-10-CM

## 2022-12-22 ENCOUNTER — TELEPHONE (OUTPATIENT)
Dept: INTERNAL MEDICINE CLINIC | Facility: CLINIC | Age: 82
End: 2022-12-22

## 2022-12-22 DIAGNOSIS — N39.0 CHRONIC UTI: ICD-10-CM

## 2022-12-22 DIAGNOSIS — N39.0 ACUTE UTI: ICD-10-CM

## 2022-12-22 DIAGNOSIS — N39.3 STRESS INCONTINENCE (FEMALE) (MALE): Primary | ICD-10-CM

## 2022-12-22 NOTE — TELEPHONE ENCOUNTER
Please disregard prev instructions  New instructions  Discontinue doxycycline and nitrofurantion in view of her urine c/s  2) must see her urogyn in view of her recurrent UTI  3) cannot use bactrim for her uti in view of her taking dofetilide -contra indication  4) have no choice but to give her keflex 500 tid for 14 days  Please send rx -she should let us know if she has any reaction-she has taken it before though  5) rot urine c/s after rx

## 2022-12-23 RX ORDER — CEPHALEXIN 500 MG/1
500 CAPSULE ORAL 3 TIMES DAILY
Qty: 42 CAPSULE | Refills: 0 | Status: SHIPPED | OUTPATIENT
Start: 2022-12-23 | End: 2023-01-06

## 2022-12-23 NOTE — TELEPHONE ENCOUNTER
Spoke to daughter Birdie Hurley (on verbal). Gave all information from 's message below. Beryl v/u. Rx sent to [facility] #[id number]. Follow-up lab orders for UA and C/S are already in place.

## 2022-12-27 NOTE — PROCEDURES
[facility]    PATIENT'S NAME: Neo Franco   ATTENDING PHYSICIAN: Mary Truong   OPERATING PHYSICIAN: Kwaku Theodore M.D. PATIENT ACCOUNT#:   [de-identified]    LOCATION:  [address] [place] [address]  MEDICAL RECORD #:   [id number]       YOB: 1940  ADMISSION DATE:       11/13/2022      OPERATION DATE:  11/15/2022    CARDIAC PROCEDURE TRANSCRIPTION    CARDIAC CATHETERIZATION/PERCUTANEOUS CORONARY INTERVENTION     PREOPERATIVE DIAGNOSIS:    POSTOPERATIVE DIAGNOSIS:    PROCEDURE PERFORMED:      DESCRIPTION OF PROCEDURE:  The right groin was anesthetized. A 6-Martiniquais sheath was introduced. Left ventriculogram revealed ejection fraction of 45%. The LAD is 100%. Circumflex is 70% to 80%. The right coronary is 90%. The LIMA is patent. The right coronary artery and obtuse marginal grafts are occluded. PCI was done of the right coronary with an AL1, predilating the vessel with a 3.0 balloon, then a 3.5 x 38 stent was deployed using a Guidezilla inchworm technique and postdilated with a 3.5 NC with an excellent result. IMPRESSION:    1. Mild LV dysfunction. 2.   A 100% LAD. 3.   Patent LIMA to LAD. 4.   Circumflex 70%. 5.   Occluded right coronary artery and obtuse marginal grafts. 6.   A 90% right coronary artery. 7.   Successful stenting of the right coronary artery. RECOMMENDATIONS:  Better rate control and then home.     Dictated By Kwaku Theodore M.D.  d: 12/27/2022 10:16:40  t: 12/27/2022 12:06:19  Job [id number]  [facility] [place]/

## 2023-01-05 ENCOUNTER — PATIENT MESSAGE (OUTPATIENT)
Dept: INTERNAL MEDICINE CLINIC | Facility: CLINIC | Age: 83
End: 2023-01-05

## 2023-01-05 NOTE — TELEPHONE ENCOUNTER
At this moment I would recommend continue taking cephalexin she cannot be taking multiple antibiotics specially in view of her allergies I would strongly recommend using over-the-counter saline rinse to flush nostrils this will make her feel a lot better

## 2023-01-07 ENCOUNTER — HOSPITAL ENCOUNTER (EMERGENCY)
Age: 83
Discharge: HOME | End: 2023-01-07
Attending: EMERGENCY MEDICINE
Payer: MEDICARE

## 2023-01-07 VITALS
SYSTOLIC BLOOD PRESSURE: 146 MMHG | BODY MASS INDEX: 24.29 KG/M2 | TEMPERATURE: 98 F | RESPIRATION RATE: 18 BRPM | HEART RATE: 70 BPM | OXYGEN SATURATION: 99 % | DIASTOLIC BLOOD PRESSURE: 67 MMHG | HEIGHT: 62 IN | WEIGHT: 132 LBS

## 2023-01-07 DIAGNOSIS — T83.511D URINARY TRACT INFECTION ASSOCIATED WITH CATHETERIZATION OF URINARY TRACT, UNSPECIFIED INDWELLING URINARY CATHETER TYPE, SUBSEQUENT ENCOUNTER: Primary | ICD-10-CM

## 2023-01-07 DIAGNOSIS — N39.0 URINARY TRACT INFECTION ASSOCIATED WITH CATHETERIZATION OF URINARY TRACT, UNSPECIFIED INDWELLING URINARY CATHETER TYPE, SUBSEQUENT ENCOUNTER: Primary | ICD-10-CM

## 2023-01-07 LAB
ALBUMIN SERPL-MCNC: 3.4 G/DL (ref 3.4–5)
ALBUMIN/GLOB SERPL: 0.8 {RATIO} (ref 1–2)
ALP LIVER SERPL-CCNC: 96 U/L
ALT SERPL-CCNC: 22 U/L
ANION GAP SERPL CALC-SCNC: 5 MMOL/L (ref 0–18)
AST SERPL-CCNC: 22 U/L (ref 15–37)
BASOPHILS # BLD AUTO: 0.03 X10(3) UL (ref 0–0.2)
BASOPHILS NFR BLD AUTO: 0.4 %
BILIRUB SERPL-MCNC: 0.3 MG/DL (ref 0.1–2)
BILIRUB UR QL STRIP.AUTO: NEGATIVE
BUN BLD-MCNC: 15 MG/DL (ref 7–18)
CALCIUM BLD-MCNC: 9.3 MG/DL (ref 8.5–10.1)
CHLORIDE SERPL-SCNC: 101 MMOL/L (ref 98–112)
CLARITY UR REFRACT.AUTO: CLEAR
CO2 SERPL-SCNC: 31 MMOL/L (ref 21–32)
COLOR UR AUTO: YELLOW
CREAT BLD-MCNC: 0.82 MG/DL
EOSINOPHIL # BLD AUTO: 0.08 X10(3) UL (ref 0–0.7)
EOSINOPHIL NFR BLD AUTO: 1.2 %
ERYTHROCYTE [DISTWIDTH] IN BLOOD BY AUTOMATED COUNT: 14.4 %
GFR SERPLBLD BASED ON 1.73 SQ M-ARVRAT: 72 ML/MIN/1.73M2 (ref 60–?)
GLOBULIN PLAS-MCNC: 4.4 G/DL (ref 2.8–4.4)
GLUCOSE BLD-MCNC: 128 MG/DL (ref 70–99)
GLUCOSE UR STRIP.AUTO-MCNC: NEGATIVE MG/DL
HCT VFR BLD AUTO: 37.7 %
HGB BLD-MCNC: 12.1 G/DL
IMM GRANULOCYTES # BLD AUTO: 0.02 X10(3) UL (ref 0–1)
IMM GRANULOCYTES NFR BLD: 0.3 %
KETONES UR STRIP.AUTO-MCNC: NEGATIVE MG/DL
LYMPHOCYTES # BLD AUTO: 1.35 X10(3) UL (ref 1–4)
LYMPHOCYTES NFR BLD AUTO: 20.1 %
MCH RBC QN AUTO: 28.2 PG (ref 26–34)
MCHC RBC AUTO-ENTMCNC: 32.1 G/DL (ref 31–37)
MCV RBC AUTO: 87.9 FL
MONOCYTES # BLD AUTO: 0.71 X10(3) UL (ref 0.1–1)
MONOCYTES NFR BLD AUTO: 10.6 %
NEUTROPHILS # BLD AUTO: 4.51 X10 (3) UL (ref 1.5–7.7)
NEUTROPHILS # BLD AUTO: 4.51 X10(3) UL (ref 1.5–7.7)
NEUTROPHILS NFR BLD AUTO: 67.4 %
NITRITE UR QL STRIP.AUTO: POSITIVE
OSMOLALITY SERPL CALC.SUM OF ELEC: 286 MOSM/KG (ref 275–295)
PH UR STRIP.AUTO: 6 [PH] (ref 5–8)
PLATELET # BLD AUTO: 257 10(3)UL (ref 150–450)
POTASSIUM SERPL-SCNC: 4.5 MMOL/L (ref 3.5–5.1)
PROT SERPL-MCNC: 7.8 G/DL (ref 6.4–8.2)
PROT UR STRIP.AUTO-MCNC: NEGATIVE MG/DL
RBC # BLD AUTO: 4.29 X10(6)UL
SODIUM SERPL-SCNC: 137 MMOL/L (ref 136–145)
SP GR UR STRIP.AUTO: 1.01 (ref 1–1.03)
UROBILINOGEN UR STRIP.AUTO-MCNC: 0.2 MG/DL
WBC # BLD AUTO: 6.7 X10(3) UL (ref 4–11)

## 2023-01-07 PROCEDURE — 99284 EMERGENCY DEPT VISIT MOD MDM: CPT | Performed by: NURSE PRACTITIONER

## 2023-01-07 PROCEDURE — 87086 URINE CULTURE/COLONY COUNT: CPT | Performed by: EMERGENCY MEDICINE

## 2023-01-07 PROCEDURE — 85025 COMPLETE CBC W/AUTO DIFF WBC: CPT | Performed by: NURSE PRACTITIONER

## 2023-01-07 PROCEDURE — 96365 THER/PROPH/DIAG IV INF INIT: CPT | Performed by: NURSE PRACTITIONER

## 2023-01-07 PROCEDURE — 81015 MICROSCOPIC EXAM OF URINE: CPT | Performed by: EMERGENCY MEDICINE

## 2023-01-07 PROCEDURE — 96361 HYDRATE IV INFUSION ADD-ON: CPT | Performed by: NURSE PRACTITIONER

## 2023-01-07 PROCEDURE — 81001 URINALYSIS AUTO W/SCOPE: CPT | Performed by: EMERGENCY MEDICINE

## 2023-01-07 PROCEDURE — 80053 COMPREHEN METABOLIC PANEL: CPT | Performed by: NURSE PRACTITIONER

## 2023-01-07 RX ORDER — CEPHALEXIN 500 MG/1
500 CAPSULE ORAL 3 TIMES DAILY
Qty: 30 CAPSULE | Refills: 0 | Status: SHIPPED | OUTPATIENT
Start: 2023-01-07 | End: 2023-01-17

## 2023-01-07 NOTE — ED INITIAL ASSESSMENT (HPI)
Pt c/o urinary frequency and dysuria that started 4 days ago. Pt has hx of UTI's.  Also c/o chills and back ache

## 2023-01-07 NOTE — DISCHARGE INSTRUCTIONS
- you need to make sure you are cleaning well before self catheterization.    -Make sure you keep your appointment with urology on January 11 as scheduled    -Take all antibiotics until they are complete    -Encourage fluid intake    -Follow-up with your primary care physician in 2 to 3 days    -Return to the emergency department with any worsening symptoms or concerns

## 2023-01-10 ENCOUNTER — TELEPHONE (OUTPATIENT)
Dept: INTERNAL MEDICINE CLINIC | Facility: CLINIC | Age: 83
End: 2023-01-10

## 2023-01-10 NOTE — TELEPHONE ENCOUNTER
Appt scheduled    Future Appointments   Date Time Provider Scott Jonesi   2/27/2023 10:00 AM Khalif Adhikari MD [facility] [place]

## 2023-01-11 DIAGNOSIS — G25.0 BENIGN ESSENTIAL TREMOR: ICD-10-CM

## 2023-01-11 NOTE — TELEPHONE ENCOUNTER
LOV: 12/5/2022 with Dr. Tish Caicedo  RTC: 2 months  Last Relevant Labs: 1/7/2023   Filled: 9/23/2022   #60 with 0 refills    Future Appointments   Date Time Provider Scott Hernandez   2/27/2023 10:00 AM North Liu MD [facility] [place]

## 2023-01-12 RX ORDER — GABAPENTIN 100 MG/1
CAPSULE ORAL
Qty: 60 CAPSULE | Refills: 0 | Status: SHIPPED | OUTPATIENT
Start: 2023-01-12

## 2023-02-17 DIAGNOSIS — G25.0 BENIGN ESSENTIAL TREMOR: ICD-10-CM

## 2023-02-20 RX ORDER — GABAPENTIN 100 MG/1
CAPSULE ORAL
Qty: 60 CAPSULE | Refills: 0 | Status: SHIPPED | OUTPATIENT
Start: 2023-02-20

## 2023-02-20 NOTE — TELEPHONE ENCOUNTER
No Protocol     Requesting: gabapentin 100mg     LOV: 12/5/22   RTC: 2 months   Filled: 1/12/23 # 60 0 refills   Recent Labs: 12/20/22     Upcoming OV: 2.27.23

## 2023-02-21 ENCOUNTER — TELEPHONE (OUTPATIENT)
Dept: INTERNAL MEDICINE CLINIC | Facility: CLINIC | Age: 83
End: 2023-02-21

## 2023-02-21 ENCOUNTER — LAB ENCOUNTER (OUTPATIENT)
Dept: LAB | Age: 83
End: 2023-02-21
Attending: INTERNAL MEDICINE
Payer: MEDICARE

## 2023-02-21 DIAGNOSIS — E11.65 TYPE 2 DIABETES MELLITUS WITH HYPERGLYCEMIA, WITHOUT LONG-TERM CURRENT USE OF INSULIN (HCC): ICD-10-CM

## 2023-02-21 DIAGNOSIS — Z00.00 LABORATORY EXAMINATION ORDERED AS PART OF A ROUTINE GENERAL MEDICAL EXAMINATION: Primary | ICD-10-CM

## 2023-02-21 DIAGNOSIS — N39.0 CHRONIC UTI: ICD-10-CM

## 2023-02-21 DIAGNOSIS — E78.00 PURE HYPERCHOLESTEROLEMIA: ICD-10-CM

## 2023-02-21 DIAGNOSIS — Z00.00 LABORATORY EXAMINATION ORDERED AS PART OF A ROUTINE GENERAL MEDICAL EXAMINATION: ICD-10-CM

## 2023-02-21 LAB
ALBUMIN SERPL-MCNC: 3.5 G/DL (ref 3.4–5)
ALBUMIN/GLOB SERPL: 0.9 {RATIO} (ref 1–2)
ALP LIVER SERPL-CCNC: 84 U/L
ALT SERPL-CCNC: 23 U/L
ANION GAP SERPL CALC-SCNC: 4 MMOL/L (ref 0–18)
AST SERPL-CCNC: 21 U/L (ref 15–37)
BASOPHILS # BLD AUTO: 0.04 X10(3) UL (ref 0–0.2)
BASOPHILS NFR BLD AUTO: 0.7 %
BILIRUB SERPL-MCNC: 0.5 MG/DL (ref 0.1–2)
BILIRUB UR QL STRIP.AUTO: NEGATIVE
BUN BLD-MCNC: 16 MG/DL (ref 7–18)
CALCIUM BLD-MCNC: 9.2 MG/DL (ref 8.5–10.1)
CHLORIDE SERPL-SCNC: 102 MMOL/L (ref 98–112)
CHOLEST SERPL-MCNC: 156 MG/DL (ref ?–200)
CO2 SERPL-SCNC: 27 MMOL/L (ref 21–32)
COLOR UR AUTO: YELLOW
CREAT BLD-MCNC: 0.79 MG/DL
CREAT UR-SCNC: 49.5 MG/DL
EOSINOPHIL # BLD AUTO: 0.06 X10(3) UL (ref 0–0.7)
EOSINOPHIL NFR BLD AUTO: 1.1 %
ERYTHROCYTE [DISTWIDTH] IN BLOOD BY AUTOMATED COUNT: 14.8 %
EST. AVERAGE GLUCOSE BLD GHB EST-MCNC: 160 MG/DL (ref 68–126)
FASTING PATIENT LIPID ANSWER: YES
FASTING STATUS PATIENT QL REPORTED: YES
GFR SERPLBLD BASED ON 1.73 SQ M-ARVRAT: 75 ML/MIN/1.73M2 (ref 60–?)
GLOBULIN PLAS-MCNC: 4 G/DL (ref 2.8–4.4)
GLUCOSE BLD-MCNC: 150 MG/DL (ref 70–99)
GLUCOSE UR STRIP.AUTO-MCNC: NEGATIVE MG/DL
HBA1C MFR BLD: 7.2 % (ref ?–5.7)
HCT VFR BLD AUTO: 37.5 %
HDLC SERPL-MCNC: 60 MG/DL (ref 40–59)
HGB BLD-MCNC: 12 G/DL
IMM GRANULOCYTES # BLD AUTO: 0.02 X10(3) UL (ref 0–1)
IMM GRANULOCYTES NFR BLD: 0.4 %
KETONES UR STRIP.AUTO-MCNC: NEGATIVE MG/DL
LDLC SERPL CALC-MCNC: 77 MG/DL (ref ?–100)
LYMPHOCYTES # BLD AUTO: 1.18 X10(3) UL (ref 1–4)
LYMPHOCYTES NFR BLD AUTO: 21.5 %
MCH RBC QN AUTO: 28.2 PG (ref 26–34)
MCHC RBC AUTO-ENTMCNC: 32 G/DL (ref 31–37)
MCV RBC AUTO: 88 FL
MICROALBUMIN UR-MCNC: 0.88 MG/DL
MICROALBUMIN/CREAT 24H UR-RTO: 17.8 UG/MG (ref ?–30)
MONOCYTES # BLD AUTO: 0.57 X10(3) UL (ref 0.1–1)
MONOCYTES NFR BLD AUTO: 10.4 %
NEUTROPHILS # BLD AUTO: 3.62 X10 (3) UL (ref 1.5–7.7)
NEUTROPHILS # BLD AUTO: 3.62 X10(3) UL (ref 1.5–7.7)
NEUTROPHILS NFR BLD AUTO: 65.9 %
NITRITE UR QL STRIP.AUTO: NEGATIVE
NONHDLC SERPL-MCNC: 96 MG/DL (ref ?–130)
OSMOLALITY SERPL CALC.SUM OF ELEC: 280 MOSM/KG (ref 275–295)
PH UR STRIP.AUTO: 7 [PH] (ref 5–8)
PLATELET # BLD AUTO: 225 10(3)UL (ref 150–450)
POTASSIUM SERPL-SCNC: 4 MMOL/L (ref 3.5–5.1)
PROT SERPL-MCNC: 7.5 G/DL (ref 6.4–8.2)
PROT UR STRIP.AUTO-MCNC: NEGATIVE MG/DL
RBC # BLD AUTO: 4.26 X10(6)UL
SODIUM SERPL-SCNC: 133 MMOL/L (ref 136–145)
SP GR UR STRIP.AUTO: 1.01 (ref 1–1.03)
T4 SERPL-MCNC: 6.1 UG/DL
TRIGL SERPL-MCNC: 103 MG/DL (ref 30–149)
TSI SER-ACNC: 3.12 MIU/ML (ref 0.36–3.74)
UROBILINOGEN UR STRIP.AUTO-MCNC: <2 MG/DL
VLDLC SERPL CALC-MCNC: 16 MG/DL (ref 0–30)
WBC # BLD AUTO: 5.5 X10(3) UL (ref 4–11)
WBC #/AREA URNS AUTO: >50 /HPF

## 2023-02-21 PROCEDURE — 85025 COMPLETE CBC W/AUTO DIFF WBC: CPT

## 2023-02-21 PROCEDURE — 87186 SC STD MICRODIL/AGAR DIL: CPT

## 2023-02-21 PROCEDURE — 87077 CULTURE AEROBIC IDENTIFY: CPT

## 2023-02-21 PROCEDURE — 82570 ASSAY OF URINE CREATININE: CPT

## 2023-02-21 PROCEDURE — 82043 UR ALBUMIN QUANTITATIVE: CPT

## 2023-02-21 PROCEDURE — 87086 URINE CULTURE/COLONY COUNT: CPT

## 2023-02-21 PROCEDURE — 80053 COMPREHEN METABOLIC PANEL: CPT

## 2023-02-21 PROCEDURE — 36415 COLL VENOUS BLD VENIPUNCTURE: CPT

## 2023-02-21 PROCEDURE — 84436 ASSAY OF TOTAL THYROXINE: CPT

## 2023-02-21 PROCEDURE — 83036 HEMOGLOBIN GLYCOSYLATED A1C: CPT

## 2023-02-21 PROCEDURE — 80061 LIPID PANEL: CPT

## 2023-02-21 PROCEDURE — 84443 ASSAY THYROID STIM HORMONE: CPT

## 2023-02-21 PROCEDURE — 81001 URINALYSIS AUTO W/SCOPE: CPT

## 2023-02-21 NOTE — TELEPHONE ENCOUNTER
Pt walked in upset that there are no lab orders in the system. Pt says she spoke to Dr. Zach Denis and was informed orders will be entered so she can have it done prior to annual wellness.     Pt requesting a f.u call once orders are available    Future Appointments   Date Time Provider Scott Hernandez   2/27/2023 10:00 AM Khalif Adhikari MD [facility] [place]

## 2023-02-23 ENCOUNTER — HOSPITAL ENCOUNTER (EMERGENCY)
Facility: HOSPITAL | Age: 83
Discharge: HOME | End: 2023-02-23
Attending: EMERGENCY MEDICINE
Payer: MEDICARE

## 2023-02-23 ENCOUNTER — TELEPHONE (OUTPATIENT)
Dept: INTERNAL MEDICINE CLINIC | Facility: CLINIC | Age: 83
End: 2023-02-23

## 2023-02-23 VITALS
HEIGHT: 62 IN | OXYGEN SATURATION: 100 % | HEART RATE: 83 BPM | WEIGHT: 132 LBS | TEMPERATURE: 98 F | DIASTOLIC BLOOD PRESSURE: 83 MMHG | RESPIRATION RATE: 17 BRPM | SYSTOLIC BLOOD PRESSURE: 125 MMHG | BODY MASS INDEX: 24.29 KG/M2

## 2023-02-23 DIAGNOSIS — R51.9 ACUTE NONINTRACTABLE HEADACHE, UNSPECIFIED HEADACHE TYPE: ICD-10-CM

## 2023-02-23 DIAGNOSIS — N30.00 ACUTE CYSTITIS WITHOUT HEMATURIA: Primary | ICD-10-CM

## 2023-02-23 DIAGNOSIS — R20.0 HAND NUMBNESS: ICD-10-CM

## 2023-02-23 LAB
ALBUMIN SERPL-MCNC: 3.5 G/DL (ref 3.4–5)
ALBUMIN/GLOB SERPL: 0.9 {RATIO} (ref 1–2)
ALP LIVER SERPL-CCNC: 87 U/L
ALT SERPL-CCNC: 21 U/L
ANION GAP SERPL CALC-SCNC: 5 MMOL/L (ref 0–18)
AST SERPL-CCNC: 20 U/L (ref 15–37)
BASOPHILS # BLD AUTO: 0.04 X10(3) UL (ref 0–0.2)
BASOPHILS NFR BLD AUTO: 0.7 %
BILIRUB SERPL-MCNC: 0.4 MG/DL (ref 0.1–2)
BILIRUB UR QL STRIP.AUTO: NEGATIVE
BUN BLD-MCNC: 17 MG/DL (ref 7–18)
CALCIUM BLD-MCNC: 9 MG/DL (ref 8.5–10.1)
CHLORIDE SERPL-SCNC: 102 MMOL/L (ref 98–112)
CLARITY UR REFRACT.AUTO: CLEAR
CO2 SERPL-SCNC: 28 MMOL/L (ref 21–32)
CREAT BLD-MCNC: 0.79 MG/DL
EOSINOPHIL # BLD AUTO: 0.1 X10(3) UL (ref 0–0.7)
EOSINOPHIL NFR BLD AUTO: 1.8 %
ERYTHROCYTE [DISTWIDTH] IN BLOOD BY AUTOMATED COUNT: 14.6 %
GFR SERPLBLD BASED ON 1.73 SQ M-ARVRAT: 75 ML/MIN/1.73M2 (ref 60–?)
GLOBULIN PLAS-MCNC: 4 G/DL (ref 2.8–4.4)
GLUCOSE BLD-MCNC: 164 MG/DL (ref 70–99)
GLUCOSE UR STRIP.AUTO-MCNC: NEGATIVE MG/DL
HCT VFR BLD AUTO: 36.6 %
HGB BLD-MCNC: 11.9 G/DL
IMM GRANULOCYTES # BLD AUTO: 0.03 X10(3) UL (ref 0–1)
IMM GRANULOCYTES NFR BLD: 0.5 %
KETONES UR STRIP.AUTO-MCNC: NEGATIVE MG/DL
LYMPHOCYTES # BLD AUTO: 1.8 X10(3) UL (ref 1–4)
LYMPHOCYTES NFR BLD AUTO: 32.3 %
MCH RBC QN AUTO: 28.6 PG (ref 26–34)
MCHC RBC AUTO-ENTMCNC: 32.5 G/DL (ref 31–37)
MCV RBC AUTO: 88 FL
MONOCYTES # BLD AUTO: 0.74 X10(3) UL (ref 0.1–1)
MONOCYTES NFR BLD AUTO: 13.3 %
NEUTROPHILS # BLD AUTO: 2.86 X10 (3) UL (ref 1.5–7.7)
NEUTROPHILS # BLD AUTO: 2.86 X10(3) UL (ref 1.5–7.7)
NEUTROPHILS NFR BLD AUTO: 51.4 %
NITRITE UR QL STRIP.AUTO: NEGATIVE
OSMOLALITY SERPL CALC.SUM OF ELEC: 285 MOSM/KG (ref 275–295)
PH UR STRIP.AUTO: 7 [PH] (ref 5–8)
PLATELET # BLD AUTO: 213 10(3)UL (ref 150–450)
POTASSIUM SERPL-SCNC: 3.8 MMOL/L (ref 3.5–5.1)
PROT SERPL-MCNC: 7.5 G/DL (ref 6.4–8.2)
PROT UR STRIP.AUTO-MCNC: NEGATIVE MG/DL
RBC # BLD AUTO: 4.16 X10(6)UL
SODIUM SERPL-SCNC: 135 MMOL/L (ref 136–145)
SP GR UR STRIP.AUTO: 1 (ref 1–1.03)
TROPONIN I HIGH SENSITIVITY: 8 NG/L
UROBILINOGEN UR STRIP.AUTO-MCNC: <2 MG/DL
WBC # BLD AUTO: 5.6 X10(3) UL (ref 4–11)

## 2023-02-23 PROCEDURE — 81001 URINALYSIS AUTO W/SCOPE: CPT | Performed by: EMERGENCY MEDICINE

## 2023-02-23 PROCEDURE — 85025 COMPLETE CBC W/AUTO DIFF WBC: CPT

## 2023-02-23 PROCEDURE — 99284 EMERGENCY DEPT VISIT MOD MDM: CPT

## 2023-02-23 PROCEDURE — 81001 URINALYSIS AUTO W/SCOPE: CPT

## 2023-02-23 PROCEDURE — 84484 ASSAY OF TROPONIN QUANT: CPT | Performed by: EMERGENCY MEDICINE

## 2023-02-23 PROCEDURE — 84484 ASSAY OF TROPONIN QUANT: CPT

## 2023-02-23 PROCEDURE — 80053 COMPREHEN METABOLIC PANEL: CPT | Performed by: EMERGENCY MEDICINE

## 2023-02-23 PROCEDURE — 80053 COMPREHEN METABOLIC PANEL: CPT

## 2023-02-23 PROCEDURE — 96365 THER/PROPH/DIAG IV INF INIT: CPT

## 2023-02-23 PROCEDURE — 85025 COMPLETE CBC W/AUTO DIFF WBC: CPT | Performed by: EMERGENCY MEDICINE

## 2023-02-23 RX ORDER — METHENAMINE HIPPURATE 1000 MG/1
1 TABLET ORAL 2 TIMES DAILY
COMMUNITY
Start: 2023-02-08 | End: 2023-02-27

## 2023-02-23 RX ORDER — CEFADROXIL 500 MG/1
500 CAPSULE ORAL 2 TIMES DAILY
Qty: 20 CAPSULE | Refills: 0 | Status: SHIPPED | OUTPATIENT
Start: 2023-02-23 | End: 2023-03-05

## 2023-02-23 RX ORDER — NITROFURANTOIN 25; 75 MG/1; MG/1
100 CAPSULE ORAL 2 TIMES DAILY
Qty: 20 CAPSULE | Refills: 0 | Status: SHIPPED | OUTPATIENT
Start: 2023-02-23 | End: 2023-02-27

## 2023-02-23 NOTE — TELEPHONE ENCOUNTER
----- Message from Franck Sheehan MD sent at 2/23/2023  9:38 AM CST -----  Reviewed results. See me for CPX  Patient is to see the urogyn in view of her recurrent urinary tract infection for resolution of this issue or she should follow-up with infectious diseases for maintenance antibiotics in view of her recurrent UTI. In the meantime we will place her on Macrobid 100 mg twice daily. That prescription has been sent.

## 2023-02-23 NOTE — ED INITIAL ASSESSMENT (HPI)
Patient presents to the ED with c/o UTI symptoms. Patient was seen at her PCP and was prescribed antibiotics; the pharmacy called and states that patient is allergic to macrobid, so she did not start taking the antibiotic. Patient has hx of frequent UTIs and she has taken [address] in the past. Patient is also c/o weakness/numbness in [address] [place] [address].

## 2023-02-23 NOTE — TELEPHONE ENCOUNTER
VM - medication list updated, pt has appt with you on 2/27/23. While speaking with daughter Harry Gonsales (on verbal) for result note below, Harry Gonsales stated pt was just started on:    METHENAMINE HIPPURATE 1GM TABLETS New     Add as: methenamine 1 g Oral Tab         As preventative antibiotic, by Dr. Darcy Nuñez, [facility], [address] [place] [address] 2/8/23. Medication list updated.

## 2023-02-23 NOTE — TELEPHONE ENCOUNTER
ENMA HERRERA, recommended ED    Daughter Saturnino Serrano called back. Pt is not herself, reported to daughter that she fell, bumped into the wall, is pale, and has numbness in both arms. Recommended Saturnino Serrano bring the pt to the ED to make sure she is not having a reaction to the antibiotics and/or UTI has gotten into pt's bloodstream.     Saturnino Alexandreaw v/u and will bring the pt to ED.

## 2023-02-27 ENCOUNTER — PATIENT MESSAGE (OUTPATIENT)
Dept: INTERNAL MEDICINE CLINIC | Facility: CLINIC | Age: 83
End: 2023-02-27

## 2023-02-27 ENCOUNTER — OFFICE VISIT (OUTPATIENT)
Dept: INTERNAL MEDICINE CLINIC | Facility: CLINIC | Age: 83
End: 2023-02-27
Payer: MEDICARE

## 2023-02-27 VITALS
SYSTOLIC BLOOD PRESSURE: 110 MMHG | TEMPERATURE: 98 F | HEART RATE: 75 BPM | WEIGHT: 132 LBS | BODY MASS INDEX: 24.29 KG/M2 | DIASTOLIC BLOOD PRESSURE: 60 MMHG | HEIGHT: 62 IN | OXYGEN SATURATION: 100 % | RESPIRATION RATE: 16 BRPM

## 2023-02-27 DIAGNOSIS — E11.65 TYPE 2 DIABETES MELLITUS WITH HYPERGLYCEMIA, WITHOUT LONG-TERM CURRENT USE OF INSULIN (HCC): Chronic | ICD-10-CM

## 2023-02-27 DIAGNOSIS — E11.65 TYPE 2 DIABETES MELLITUS WITH HYPERGLYCEMIA, WITHOUT LONG-TERM CURRENT USE OF INSULIN (HCC): Primary | ICD-10-CM

## 2023-02-27 DIAGNOSIS — Z00.00 ROUTINE GENERAL MEDICAL EXAMINATION AT A HEALTH CARE FACILITY: Primary | ICD-10-CM

## 2023-02-27 DIAGNOSIS — C50.011 MALIGNANT NEOPLASM OF NIPPLE OF RIGHT BREAST IN FEMALE, UNSPECIFIED ESTROGEN RECEPTOR STATUS (HCC): Chronic | ICD-10-CM

## 2023-02-27 DIAGNOSIS — E78.00 PURE HYPERCHOLESTEROLEMIA: Chronic | ICD-10-CM

## 2023-02-27 DIAGNOSIS — N99.3 PROLAPSE OF VAGINAL VAULT AFTER HYSTERECTOMY: Chronic | ICD-10-CM

## 2023-02-27 DIAGNOSIS — N30.00 ACUTE CYSTITIS WITHOUT HEMATURIA: ICD-10-CM

## 2023-02-27 DIAGNOSIS — I10 BENIGN ESSENTIAL HTN: Chronic | ICD-10-CM

## 2023-02-27 PROBLEM — I49.5 SICK SINUS SYNDROME (HCC): Chronic | Status: ACTIVE | Noted: 2022-08-10

## 2023-02-27 PROBLEM — Z95.5 S/P PRIMARY ANGIOPLASTY WITH CORONARY STENT: Chronic | Status: ACTIVE | Noted: 2022-12-05

## 2023-02-27 PROBLEM — R73.9 HYPERGLYCEMIA: Chronic | Status: RESOLVED | Noted: 2022-03-08 | Resolved: 2023-02-27

## 2023-02-27 RX ORDER — METFORMIN HYDROCHLORIDE 750 MG/1
TABLET, EXTENDED RELEASE ORAL
Qty: 90 TABLET | Refills: 0 | Status: SHIPPED | OUTPATIENT
Start: 2023-02-27

## 2023-02-27 RX ORDER — GRANULES FOR ORAL 3 G/1
POWDER ORAL
COMMUNITY
Start: 2023-01-11

## 2023-02-27 RX ORDER — METFORMIN HYDROCHLORIDE 750 MG/1
750 TABLET, EXTENDED RELEASE ORAL DAILY
Qty: 30 TABLET | Refills: 0 | Status: SHIPPED | OUTPATIENT
Start: 2023-02-27 | End: 2023-02-27

## 2023-02-27 RX ORDER — SODIUM FLUORIDE AND POTASSIUM NITRATE 5.8; 57.5 MG/ML; MG/ML
GEL, DENTIFRICE DENTAL
COMMUNITY
Start: 2023-01-19

## 2023-02-27 RX ORDER — METOPROLOL SUCCINATE 25 MG/1
25 TABLET, EXTENDED RELEASE ORAL 2 TIMES DAILY
COMMUNITY
Start: 2023-02-07 | End: 2023-02-27

## 2023-02-27 RX ORDER — DIPHENHYDRAMINE HCL 25 MG
TABLET ORAL
COMMUNITY
Start: 2023-01-23

## 2023-02-27 RX ORDER — METHENAMINE HIPPURATE 1000 MG/1
1 TABLET ORAL 2 TIMES DAILY
Qty: 60 TABLET | Refills: 11 | COMMUNITY
Start: 2023-01-11 | End: 2023-02-27

## 2023-02-28 DIAGNOSIS — E78.00 PURE HYPERCHOLESTEROLEMIA: ICD-10-CM

## 2023-02-28 NOTE — TELEPHONE ENCOUNTER
From: Sahara Prasad  To:  Gigi Olvera MD  Sent: 2/27/2023 3:12 PM CST  Subject: Leotha Cast Dr Candelaria Reddy  Can I get the specialist you are recommending for Maribel's eyes

## 2023-02-28 NOTE — TELEPHONE ENCOUNTER
Ophthalmology exam recommended at [address] yesterday, the referral was missed. Placed ophthalmology referral #[id number] to Dr. Sydnee Malcolm, referral auto-authorized. Faxed all referral documents to [facility] at fax #[phone] and received confirmation page. Sent contact information for Dr. Sydnee Malcolm via [facility] to pt/daughter Edyta Wiggins.

## 2023-03-01 RX ORDER — PRAVASTATIN SODIUM 10 MG
TABLET ORAL
Qty: 90 TABLET | Refills: 0 | Status: SHIPPED | OUTPATIENT
Start: 2023-03-01

## 2023-03-02 ENCOUNTER — TELEPHONE (OUTPATIENT)
Dept: INTERNAL MEDICINE CLINIC | Facility: CLINIC | Age: 83
End: 2023-03-02

## 2023-03-02 ENCOUNTER — HOSPITAL ENCOUNTER (EMERGENCY)
Age: 83
Discharge: HOME | End: 2023-03-02
Attending: EMERGENCY MEDICINE
Payer: MEDICARE

## 2023-03-02 VITALS
TEMPERATURE: 98 F | HEIGHT: 62 IN | OXYGEN SATURATION: 100 % | WEIGHT: 130 LBS | BODY MASS INDEX: 23.92 KG/M2 | RESPIRATION RATE: 18 BRPM | HEART RATE: 72 BPM | DIASTOLIC BLOOD PRESSURE: 60 MMHG | SYSTOLIC BLOOD PRESSURE: 144 MMHG

## 2023-03-02 DIAGNOSIS — T78.40XA ALLERGIC REACTION, INITIAL ENCOUNTER: Primary | ICD-10-CM

## 2023-03-02 LAB
BILIRUB UR QL STRIP.AUTO: NEGATIVE
CLARITY UR REFRACT.AUTO: CLEAR
COLOR UR AUTO: YELLOW
GLUCOSE UR STRIP.AUTO-MCNC: NEGATIVE MG/DL
KETONES UR STRIP.AUTO-MCNC: NEGATIVE MG/DL
LEUKOCYTE ESTERASE UR QL STRIP.AUTO: NEGATIVE
NITRITE UR QL STRIP.AUTO: NEGATIVE
PH UR STRIP.AUTO: 7 [PH] (ref 5–8)
PROT UR STRIP.AUTO-MCNC: NEGATIVE MG/DL
SP GR UR STRIP.AUTO: 1.01 (ref 1–1.03)
UROBILINOGEN UR STRIP.AUTO-MCNC: 0.2 MG/DL

## 2023-03-02 PROCEDURE — 81015 MICROSCOPIC EXAM OF URINE: CPT | Performed by: EMERGENCY MEDICINE

## 2023-03-02 PROCEDURE — 99283 EMERGENCY DEPT VISIT LOW MDM: CPT

## 2023-03-02 PROCEDURE — 81001 URINALYSIS AUTO W/SCOPE: CPT | Performed by: EMERGENCY MEDICINE

## 2023-03-02 PROCEDURE — 99284 EMERGENCY DEPT VISIT MOD MDM: CPT

## 2023-03-02 RX ORDER — DIPHENHYDRAMINE HCL 25 MG
25 CAPSULE ORAL ONCE
Status: COMPLETED | OUTPATIENT
Start: 2023-03-02 | End: 2023-03-02

## 2023-03-02 RX ORDER — PREDNISONE 20 MG/1
60 TABLET ORAL ONCE
Status: COMPLETED | OUTPATIENT
Start: 2023-03-02 | End: 2023-03-02

## 2023-03-02 RX ORDER — PREDNISONE 20 MG/1
40 TABLET ORAL DAILY
Qty: 8 TABLET | Refills: 0 | Status: SHIPPED | OUTPATIENT
Start: 2023-03-02 | End: 2023-03-06

## 2023-03-02 NOTE — ED INITIAL ASSESSMENT (HPI)
Pt has been on antibiotics cefadroxil started w a rash all over has been on antibiotics for uti off/on since November.
Rd screen 2/2 BMI <18

## 2023-03-02 NOTE — DISCHARGE INSTRUCTIONS
Stop cefadroxil  Await your urine culture, your urinalysis looks clear today  Benadryl 25 mg every 6-8 hours as needed  Continue Pepcid 20 mg twice a day  Prednisone as prescribed, start tomorrow  Return if worse  Talk to primary care physician tomorrow

## 2023-03-02 NOTE — TELEPHONE ENCOUNTER
Daughter (Janae Leonardo) called in, she stated pt started cefadroxil for UTI 5 days ago. She thinks she's having an allergic reaction to abx. Pt developed hives yesterday on her back. Last night daughter noticed left eye was swollen. Daughter gave mom benadryl dose and put calamine lotion on hives. Daughter states mom is not having any mouth/lip swelling, but eye swelling has gotten worse through out today. She stated she is taking her mom to [facility] and wanted  to know.  JAVIER

## 2023-03-11 DIAGNOSIS — E03.4 HYPOTHYROIDISM DUE TO ACQUIRED ATROPHY OF THYROID: Chronic | ICD-10-CM

## 2023-03-13 RX ORDER — LEVOTHYROXINE SODIUM 88 UG/1
TABLET ORAL
Qty: 90 TABLET | Refills: 0 | Status: SHIPPED | OUTPATIENT
Start: 2023-03-13

## 2023-03-13 NOTE — TELEPHONE ENCOUNTER
Protocol passed     Requesting: levothyroxine 88mcg     LOV: 2/27/23   RTC: 4 months   Filled: 12/10/22 # 90 0 refills   Recent Labs: 2/21/23     Upcoming OV: none scheduled

## 2023-03-21 ENCOUNTER — LAB ENCOUNTER (OUTPATIENT)
Dept: LAB | Age: 83
End: 2023-03-21
Attending: INTERNAL MEDICINE
Payer: MEDICARE

## 2023-03-21 DIAGNOSIS — N30.00 ACUTE CYSTITIS WITHOUT HEMATURIA: ICD-10-CM

## 2023-03-21 LAB
BILIRUB UR QL STRIP.AUTO: NEGATIVE
COLOR UR AUTO: YELLOW
GLUCOSE UR STRIP.AUTO-MCNC: NEGATIVE MG/DL
KETONES UR STRIP.AUTO-MCNC: NEGATIVE MG/DL
NITRITE UR QL STRIP.AUTO: NEGATIVE
PH UR STRIP.AUTO: 7 [PH] (ref 5–8)
PROT UR STRIP.AUTO-MCNC: NEGATIVE MG/DL
SP GR UR STRIP.AUTO: 1 (ref 1–1.03)
UROBILINOGEN UR STRIP.AUTO-MCNC: <2 MG/DL

## 2023-03-21 PROCEDURE — 87086 URINE CULTURE/COLONY COUNT: CPT

## 2023-03-21 PROCEDURE — 87088 URINE BACTERIA CULTURE: CPT

## 2023-03-21 PROCEDURE — 81001 URINALYSIS AUTO W/SCOPE: CPT

## 2023-03-21 PROCEDURE — 87186 SC STD MICRODIL/AGAR DIL: CPT

## 2023-03-23 ENCOUNTER — HOSPITAL ENCOUNTER (EMERGENCY)
Facility: HOSPITAL | Age: 83
Discharge: HOME | End: 2023-03-23
Attending: EMERGENCY MEDICINE
Payer: MEDICARE

## 2023-03-23 VITALS
DIASTOLIC BLOOD PRESSURE: 82 MMHG | BODY MASS INDEX: 24 KG/M2 | RESPIRATION RATE: 16 BRPM | TEMPERATURE: 98 F | HEART RATE: 70 BPM | WEIGHT: 130 LBS | SYSTOLIC BLOOD PRESSURE: 143 MMHG | OXYGEN SATURATION: 100 %

## 2023-03-23 DIAGNOSIS — N39.0 URINARY TRACT INFECTION WITHOUT HEMATURIA, SITE UNSPECIFIED: Primary | ICD-10-CM

## 2023-03-23 LAB
ALBUMIN SERPL-MCNC: 3.5 G/DL (ref 3.4–5)
ALBUMIN/GLOB SERPL: 0.8 {RATIO} (ref 1–2)
ALP LIVER SERPL-CCNC: 94 U/L
ALT SERPL-CCNC: 16 U/L
ANION GAP SERPL CALC-SCNC: 4 MMOL/L (ref 0–18)
AST SERPL-CCNC: 17 U/L (ref 15–37)
BASOPHILS # BLD AUTO: 0.05 X10(3) UL (ref 0–0.2)
BASOPHILS NFR BLD AUTO: 1.1 %
BILIRUB SERPL-MCNC: 0.2 MG/DL (ref 0.1–2)
BILIRUB UR QL STRIP.AUTO: NEGATIVE
BUN BLD-MCNC: 14 MG/DL (ref 7–18)
CALCIUM BLD-MCNC: 9.3 MG/DL (ref 8.5–10.1)
CHLORIDE SERPL-SCNC: 101 MMOL/L (ref 98–112)
CLARITY UR REFRACT.AUTO: CLEAR
CO2 SERPL-SCNC: 30 MMOL/L (ref 21–32)
CREAT BLD-MCNC: 0.83 MG/DL
EOSINOPHIL # BLD AUTO: 0.14 X10(3) UL (ref 0–0.7)
EOSINOPHIL NFR BLD AUTO: 3.1 %
ERYTHROCYTE [DISTWIDTH] IN BLOOD BY AUTOMATED COUNT: 14.2 %
GFR SERPLBLD BASED ON 1.73 SQ M-ARVRAT: 71 ML/MIN/1.73M2 (ref 60–?)
GLOBULIN PLAS-MCNC: 4.3 G/DL (ref 2.8–4.4)
GLUCOSE BLD-MCNC: 175 MG/DL (ref 70–99)
GLUCOSE UR STRIP.AUTO-MCNC: NEGATIVE MG/DL
HCT VFR BLD AUTO: 36.3 %
HGB BLD-MCNC: 12 G/DL
IMM GRANULOCYTES # BLD AUTO: 0.01 X10(3) UL (ref 0–1)
IMM GRANULOCYTES NFR BLD: 0.2 %
KETONES UR STRIP.AUTO-MCNC: NEGATIVE MG/DL
LEUKOCYTE ESTERASE UR QL STRIP.AUTO: NEGATIVE
LYMPHOCYTES # BLD AUTO: 1.67 X10(3) UL (ref 1–4)
LYMPHOCYTES NFR BLD AUTO: 36.9 %
MCH RBC QN AUTO: 29.1 PG (ref 26–34)
MCHC RBC AUTO-ENTMCNC: 33.1 G/DL (ref 31–37)
MCV RBC AUTO: 87.9 FL
MONOCYTES # BLD AUTO: 0.6 X10(3) UL (ref 0.1–1)
MONOCYTES NFR BLD AUTO: 13.2 %
NEUTROPHILS # BLD AUTO: 2.06 X10 (3) UL (ref 1.5–7.7)
NEUTROPHILS # BLD AUTO: 2.06 X10(3) UL (ref 1.5–7.7)
NEUTROPHILS NFR BLD AUTO: 45.5 %
NITRITE UR QL STRIP.AUTO: NEGATIVE
OSMOLALITY SERPL CALC.SUM OF ELEC: 285 MOSM/KG (ref 275–295)
PH UR STRIP.AUTO: 7 [PH] (ref 5–8)
PLATELET # BLD AUTO: 219 10(3)UL (ref 150–450)
POTASSIUM SERPL-SCNC: 4 MMOL/L (ref 3.5–5.1)
PROT SERPL-MCNC: 7.8 G/DL (ref 6.4–8.2)
PROT UR STRIP.AUTO-MCNC: NEGATIVE MG/DL
RBC # BLD AUTO: 4.13 X10(6)UL
SARS-COV-2 RNA RESP QL NAA+PROBE: NOT DETECTED
SODIUM SERPL-SCNC: 135 MMOL/L (ref 136–145)
SP GR UR STRIP.AUTO: 1 (ref 1–1.03)
UROBILINOGEN UR STRIP.AUTO-MCNC: <2 MG/DL
WBC # BLD AUTO: 4.5 X10(3) UL (ref 4–11)

## 2023-03-23 PROCEDURE — 80053 COMPREHEN METABOLIC PANEL: CPT | Performed by: EMERGENCY MEDICINE

## 2023-03-23 PROCEDURE — 36415 COLL VENOUS BLD VENIPUNCTURE: CPT

## 2023-03-23 PROCEDURE — 85025 COMPLETE CBC W/AUTO DIFF WBC: CPT

## 2023-03-23 PROCEDURE — 85025 COMPLETE CBC W/AUTO DIFF WBC: CPT | Performed by: EMERGENCY MEDICINE

## 2023-03-23 PROCEDURE — 80053 COMPREHEN METABOLIC PANEL: CPT

## 2023-03-23 PROCEDURE — 99283 EMERGENCY DEPT VISIT LOW MDM: CPT

## 2023-03-23 PROCEDURE — 81001 URINALYSIS AUTO W/SCOPE: CPT | Performed by: EMERGENCY MEDICINE

## 2023-03-23 PROCEDURE — 99285 EMERGENCY DEPT VISIT HI MDM: CPT

## 2023-03-23 RX ORDER — CEPHALEXIN 500 MG/1
500 CAPSULE ORAL ONCE
Status: COMPLETED | OUTPATIENT
Start: 2023-03-23 | End: 2023-03-23

## 2023-03-23 RX ORDER — CEPHALEXIN 500 MG/1
500 CAPSULE ORAL 4 TIMES DAILY
Qty: 40 CAPSULE | Refills: 0 | Status: SHIPPED | OUTPATIENT
Start: 2023-03-23 | End: 2023-04-02

## 2023-03-23 RX ORDER — GRANULES FOR ORAL 3 G/1
3 POWDER ORAL ONCE
Qty: 1 EACH | Refills: 0 | Status: SHIPPED | OUTPATIENT
Start: 2023-03-23 | End: 2023-03-23

## 2023-03-23 NOTE — ED INITIAL ASSESSMENT (HPI)
Patient reports urinary burning and pain for 4 days. Dx with UTI, (+) E.coli. 7th UTI in 7 months. C/o cough, chills. Denies N/V/D.  Sent for IV antibiotics

## 2023-03-29 DIAGNOSIS — G25.0 BENIGN ESSENTIAL TREMOR: ICD-10-CM

## 2023-03-30 NOTE — TELEPHONE ENCOUNTER
No Protocol     Requesting: gabapentin 100mg     LOV: 2/27/23   RTC: 4 months   Filled: 2/20/23 # 60 0 refills   Recent Labs: 2/21/23     Upcoming OV: none scheduled

## 2023-03-31 RX ORDER — GABAPENTIN 100 MG/1
CAPSULE ORAL
Qty: 60 CAPSULE | Refills: 0 | Status: SHIPPED | OUTPATIENT
Start: 2023-03-31

## 2023-04-07 ENCOUNTER — HOSPITAL ENCOUNTER (OUTPATIENT)
Facility: HOSPITAL | Age: 83
Setting detail: OBSERVATION
Discharge: HOME | End: 2023-04-08
Attending: EMERGENCY MEDICINE | Admitting: INTERNAL MEDICINE
Payer: MEDICARE

## 2023-04-07 ENCOUNTER — APPOINTMENT (OUTPATIENT)
Dept: ULTRASOUND IMAGING | Facility: HOSPITAL | Age: 83
End: 2023-04-07
Attending: EMERGENCY MEDICINE
Payer: MEDICARE

## 2023-04-07 ENCOUNTER — APPOINTMENT (OUTPATIENT)
Dept: CT IMAGING | Facility: HOSPITAL | Age: 83
End: 2023-04-07
Attending: EMERGENCY MEDICINE
Payer: MEDICARE

## 2023-04-07 DIAGNOSIS — R06.09 EXERTIONAL DYSPNEA: Primary | ICD-10-CM

## 2023-04-07 DIAGNOSIS — T78.40XA ALLERGIC REACTION, INITIAL ENCOUNTER: ICD-10-CM

## 2023-04-07 LAB
ALBUMIN SERPL-MCNC: 3.4 G/DL (ref 3.4–5)
ALBUMIN/GLOB SERPL: 0.8 {RATIO} (ref 1–2)
ALP LIVER SERPL-CCNC: 109 U/L
ALT SERPL-CCNC: 37 U/L
ANION GAP SERPL CALC-SCNC: 6 MMOL/L (ref 0–18)
AST SERPL-CCNC: 33 U/L (ref 15–37)
ATRIAL RATE: 70 BPM
BASOPHILS # BLD AUTO: 0.04 X10(3) UL (ref 0–0.2)
BASOPHILS NFR BLD AUTO: 0.7 %
BILIRUB SERPL-MCNC: 0.3 MG/DL (ref 0.1–2)
BILIRUB UR QL STRIP.AUTO: NEGATIVE
BUN BLD-MCNC: 14 MG/DL (ref 7–18)
CALCIUM BLD-MCNC: 9.3 MG/DL (ref 8.5–10.1)
CHLORIDE SERPL-SCNC: 102 MMOL/L (ref 98–112)
CLARITY UR REFRACT.AUTO: CLEAR
CO2 SERPL-SCNC: 29 MMOL/L (ref 21–32)
CREAT BLD-MCNC: 0.8 MG/DL
D DIMER PPP FEU-MCNC: 3.25 UG/ML FEU (ref ?–0.81)
EOSINOPHIL # BLD AUTO: 0.07 X10(3) UL (ref 0–0.7)
EOSINOPHIL NFR BLD AUTO: 1.2 %
ERYTHROCYTE [DISTWIDTH] IN BLOOD BY AUTOMATED COUNT: 14.5 %
GFR SERPLBLD BASED ON 1.73 SQ M-ARVRAT: 74 ML/MIN/1.73M2 (ref 60–?)
GLOBULIN PLAS-MCNC: 4.4 G/DL (ref 2.8–4.4)
GLUCOSE BLD-MCNC: 160 MG/DL (ref 70–99)
GLUCOSE BLD-MCNC: 354 MG/DL (ref 70–99)
GLUCOSE UR STRIP.AUTO-MCNC: NEGATIVE MG/DL
HCT VFR BLD AUTO: 37.8 %
HGB BLD-MCNC: 12.2 G/DL
IMM GRANULOCYTES # BLD AUTO: 0.02 X10(3) UL (ref 0–1)
IMM GRANULOCYTES NFR BLD: 0.4 %
KETONES UR STRIP.AUTO-MCNC: NEGATIVE MG/DL
LEUKOCYTE ESTERASE UR QL STRIP.AUTO: NEGATIVE
LYMPHOCYTES # BLD AUTO: 1.11 X10(3) UL (ref 1–4)
LYMPHOCYTES NFR BLD AUTO: 19.6 %
MCH RBC QN AUTO: 28.2 PG (ref 26–34)
MCHC RBC AUTO-ENTMCNC: 32.3 G/DL (ref 31–37)
MCV RBC AUTO: 87.5 FL
MONOCYTES # BLD AUTO: 0.59 X10(3) UL (ref 0.1–1)
MONOCYTES NFR BLD AUTO: 10.4 %
NEUTROPHILS # BLD AUTO: 3.82 X10 (3) UL (ref 1.5–7.7)
NEUTROPHILS # BLD AUTO: 3.82 X10(3) UL (ref 1.5–7.7)
NEUTROPHILS NFR BLD AUTO: 67.7 %
NITRITE UR QL STRIP.AUTO: NEGATIVE
NT-PROBNP SERPL-MCNC: 299 PG/ML (ref ?–450)
OSMOLALITY SERPL CALC.SUM OF ELEC: 288 MOSM/KG (ref 275–295)
P-R INTERVAL: 266 MS
PH UR STRIP.AUTO: 7 [PH] (ref 5–8)
PLATELET # BLD AUTO: 254 10(3)UL (ref 150–450)
POTASSIUM SERPL-SCNC: 4.1 MMOL/L (ref 3.5–5.1)
PROT SERPL-MCNC: 7.8 G/DL (ref 6.4–8.2)
PROT UR STRIP.AUTO-MCNC: NEGATIVE MG/DL
Q-T INTERVAL: 442 MS
QRS DURATION: 100 MS
QTC CALCULATION (BEZET): 477 MS
R AXIS: -40 DEGREES
RBC # BLD AUTO: 4.32 X10(6)UL
SARS-COV-2 RNA RESP QL NAA+PROBE: NOT DETECTED
SODIUM SERPL-SCNC: 137 MMOL/L (ref 136–145)
SP GR UR STRIP.AUTO: 1 (ref 1–1.03)
T AXIS: -27 DEGREES
TROPONIN I HIGH SENSITIVITY: 6 NG/L
UROBILINOGEN UR STRIP.AUTO-MCNC: <2 MG/DL
VENTRICULAR RATE: 70 BPM
WBC # BLD AUTO: 5.7 X10(3) UL (ref 4–11)

## 2023-04-07 PROCEDURE — 71260 CT THORAX DX C+: CPT | Performed by: EMERGENCY MEDICINE

## 2023-04-07 PROCEDURE — 99223 1ST HOSP IP/OBS HIGH 75: CPT | Performed by: HOSPITALIST

## 2023-04-07 PROCEDURE — 93970 EXTREMITY STUDY: CPT | Performed by: EMERGENCY MEDICINE

## 2023-04-07 RX ORDER — EZETIMIBE 10 MG/1
10 TABLET ORAL NIGHTLY
Status: DISCONTINUED | OUTPATIENT
Start: 2023-04-07 | End: 2023-04-08

## 2023-04-07 RX ORDER — METOCLOPRAMIDE HYDROCHLORIDE 5 MG/ML
10 INJECTION INTRAMUSCULAR; INTRAVENOUS EVERY 8 HOURS PRN
Status: DISCONTINUED | OUTPATIENT
Start: 2023-04-07 | End: 2023-04-08

## 2023-04-07 RX ORDER — SODIUM PHOSPHATE, DIBASIC AND SODIUM PHOSPHATE, MONOBASIC 7; 19 G/133ML; G/133ML
1 ENEMA RECTAL ONCE AS NEEDED
Status: DISCONTINUED | OUTPATIENT
Start: 2023-04-07 | End: 2023-04-08

## 2023-04-07 RX ORDER — CLOPIDOGREL BISULFATE 75 MG/1
75 TABLET ORAL DAILY
Status: DISCONTINUED | OUTPATIENT
Start: 2023-04-07 | End: 2023-04-08

## 2023-04-07 RX ORDER — DEXTROSE MONOHYDRATE 25 G/50ML
50 INJECTION, SOLUTION INTRAVENOUS
Status: DISCONTINUED | OUTPATIENT
Start: 2023-04-07 | End: 2023-04-08

## 2023-04-07 RX ORDER — NICOTINE POLACRILEX 4 MG
30 LOZENGE BUCCAL
Status: DISCONTINUED | OUTPATIENT
Start: 2023-04-07 | End: 2023-04-08

## 2023-04-07 RX ORDER — SENNOSIDES 8.6 MG
17.2 TABLET ORAL NIGHTLY PRN
Status: DISCONTINUED | OUTPATIENT
Start: 2023-04-07 | End: 2023-04-08

## 2023-04-07 RX ORDER — METHYLPREDNISOLONE SODIUM SUCCINATE 125 MG/2ML
80 INJECTION, POWDER, LYOPHILIZED, FOR SOLUTION INTRAMUSCULAR; INTRAVENOUS ONCE
Status: COMPLETED | OUTPATIENT
Start: 2023-04-07 | End: 2023-04-07

## 2023-04-07 RX ORDER — POLYETHYLENE GLYCOL 3350 17 G/17G
17 POWDER, FOR SOLUTION ORAL DAILY PRN
Status: DISCONTINUED | OUTPATIENT
Start: 2023-04-07 | End: 2023-04-08

## 2023-04-07 RX ORDER — GABAPENTIN 100 MG/1
200 CAPSULE ORAL NIGHTLY
Status: DISCONTINUED | OUTPATIENT
Start: 2023-04-07 | End: 2023-04-08

## 2023-04-07 RX ORDER — BISACODYL 10 MG
10 SUPPOSITORY, RECTAL RECTAL
Status: DISCONTINUED | OUTPATIENT
Start: 2023-04-07 | End: 2023-04-08

## 2023-04-07 RX ORDER — METHENAMINE HIPPURATE 1000 MG/1
1 TABLET ORAL 2 TIMES DAILY
COMMUNITY

## 2023-04-07 RX ORDER — PRAVASTATIN SODIUM 10 MG
10 TABLET ORAL NIGHTLY
Status: DISCONTINUED | OUTPATIENT
Start: 2023-04-07 | End: 2023-04-08

## 2023-04-07 RX ORDER — DIPHENHYDRAMINE HYDROCHLORIDE 50 MG/ML
25 INJECTION INTRAMUSCULAR; INTRAVENOUS ONCE
Status: COMPLETED | OUTPATIENT
Start: 2023-04-07 | End: 2023-04-07

## 2023-04-07 RX ORDER — NICOTINE POLACRILEX 4 MG
15 LOZENGE BUCCAL
Status: DISCONTINUED | OUTPATIENT
Start: 2023-04-07 | End: 2023-04-08

## 2023-04-07 RX ORDER — LEVOTHYROXINE SODIUM 88 UG/1
88 TABLET ORAL DAILY
Status: DISCONTINUED | OUTPATIENT
Start: 2023-04-08 | End: 2023-04-08

## 2023-04-07 RX ORDER — FAMOTIDINE 10 MG/ML
20 INJECTION, SOLUTION INTRAVENOUS ONCE
Status: COMPLETED | OUTPATIENT
Start: 2023-04-07 | End: 2023-04-07

## 2023-04-07 RX ORDER — ONDANSETRON 2 MG/ML
4 INJECTION INTRAMUSCULAR; INTRAVENOUS EVERY 6 HOURS PRN
Status: DISCONTINUED | OUTPATIENT
Start: 2023-04-07 | End: 2023-04-08

## 2023-04-07 NOTE — ED QUICK NOTES
Orders for admission, patient is aware of plan and ready to go upstairs. Any questions, please call ED RN Jessa Aguilar at extension [phone].      Patient Covid vaccination status: Fully vaccinated     COVID Test Ordered in ED: Rapid SARS-CoV-2 by PCR    COVID Suspicion at Admission: N/A    Running Infusions:  None    Mental Status/LOC at time of transport: a/ox4    Other pertinent information:   CIWA score: N/A   NIH score:  N/A

## 2023-04-07 NOTE — ED INITIAL ASSESSMENT (HPI)
Pt arrives ambulatory, was seen here 3 weeks ago for UTI- started keflex. X3 days ago states she developed R sided facial swelling and itching to Right side of body. Bilateral ankle swelling. A&Ox4.

## 2023-04-07 NOTE — PROGRESS NOTES
04/07/23 1838 04/07/23 1840 04/07/23 1842   Vital Signs   /65 130/78 120/65   MAP (mmHg) 85 75 83   BP Location Left arm Left arm Left arm   BP Method Automatic Automatic Automatic   Patient Position Lying Sitting Standing

## 2023-04-08 VITALS
DIASTOLIC BLOOD PRESSURE: 86 MMHG | WEIGHT: 137.38 LBS | BODY MASS INDEX: 25.28 KG/M2 | TEMPERATURE: 98 F | OXYGEN SATURATION: 99 % | HEIGHT: 62 IN | RESPIRATION RATE: 15 BRPM | SYSTOLIC BLOOD PRESSURE: 128 MMHG | HEART RATE: 76 BPM

## 2023-04-08 DIAGNOSIS — E78.00 PURE HYPERCHOLESTEROLEMIA: ICD-10-CM

## 2023-04-08 LAB
GLUCOSE BLD-MCNC: 149 MG/DL (ref 70–99)
GLUCOSE BLD-MCNC: 168 MG/DL (ref 70–99)

## 2023-04-08 PROCEDURE — 99239 HOSP IP/OBS DSCHRG MGMT >30: CPT | Performed by: INTERNAL MEDICINE

## 2023-04-08 RX ORDER — PREDNISONE 20 MG/1
40 TABLET ORAL DAILY
Qty: 10 TABLET | Refills: 0 | Status: ON HOLD | OUTPATIENT
Start: 2023-04-08 | End: 2023-04-13

## 2023-04-08 RX ORDER — DIPHENHYDRAMINE HYDROCHLORIDE 50 MG/ML
25 INJECTION INTRAMUSCULAR; INTRAVENOUS ONCE
Status: COMPLETED | OUTPATIENT
Start: 2023-04-08 | End: 2023-04-08

## 2023-04-08 RX ORDER — PREDNISONE 20 MG/1
40 TABLET ORAL DAILY
Qty: 8 TABLET | Refills: 0 | Status: SHIPPED | OUTPATIENT
Start: 2023-04-08 | End: 2023-04-08

## 2023-04-08 NOTE — PLAN OF CARE
Patient received to room 8602 from ER. Patient states that she had an allergic reaction probably from the keflex she has recently  Been on She says that steroids and benadryl have not helped her.

## 2023-04-08 NOTE — PLAN OF CARE
Discharge Note    Explained discharge instructions, including medications and follow up appointments to patient and daughter at bedside. Verbalized understanding, IV removed, tele monitor discontinued, will be transported to home.

## 2023-04-08 NOTE — PLAN OF CARE
Assumed pt care at 0730. A&Ox4. Room air, pt denies any pain or shortness of breath, lung sounds clear, vital signs stable, A-paced on tele. Continent of bowel, pt reports urine self catheterization at home. Up with standby assist. Pt has slight redness around R eye. Plan of care: possible dc home      Plan of care updated with patient. Questions answered, pt verbalized understanding. Call light is within reach, will continue to monitor.       Problem: Patient/Family Goals  Goal: Patient/Family Long Term Goal  Description: Patient's Long Term Goal: Go home    Interventions:  - Medication management  - See additional Care Plan goals for specific interventions  Outcome: Progressing  Goal: Patient/Family Short Term Goal  Description: Patient's Short Term Goal: No swelling and improve SOB    Interventions:   - Medication management  - See additional Care Plan goals for specific interventions  Outcome: Progressing     Problem: Diabetes/Glucose Control  Goal: Glucose maintained within prescribed range  Description: INTERVENTIONS:  - Monitor Blood Glucose as ordered  - Assess for signs and symptoms of hyperglycemia and hypoglycemia  - Administer ordered medications to maintain glucose within target range  - Assess barriers to adequate nutritional intake and initiate nutrition consult as needed  - Instruct patient on self management of diabetes  Outcome: Progressing

## 2023-04-08 NOTE — PLAN OF CARE
Pt reports swelling of legs and face has improved and itchiness has resolved. Redness below right eye. Patient is alert & oriented x4. Pt ambulates w/ SBA. Breath sounds are clear bilateral. On room air. Denies SOB at the moment. A-paced rhythm. No pain reported. Head-to-toe skin assessment done. No signs of wounds, skin dry and intact. Pt is continent of bowel & bladder. Pt self caths at home. Pt was able to void without self cath overnight. Bed in low position and call light within reach. Reviewed plan of care and patient verbalizes understanding.         Problem: Patient/Family Goals  Goal: Patient/Family Long Term Goal  Description: Patient's Long Term Goal: Go home    Interventions:  - Medication management  - See additional Care Plan goals for specific interventions  Outcome: Progressing  Goal: Patient/Family Short Term Goal  Description: Patient's Short Term Goal: No swelling and improve SOB    Interventions:   - Medication management  - See additional Care Plan goals for specific interventions  Outcome: Progressing     Problem: Diabetes/Glucose Control  Goal: Glucose maintained within prescribed range  Description: INTERVENTIONS:  - Monitor Blood Glucose as ordered  - Assess for signs and symptoms of hyperglycemia and hypoglycemia  - Administer ordered medications to maintain glucose within target range  - Assess barriers to adequate nutritional intake and initiate nutrition consult as needed  - Instruct patient on self management of diabetes  Outcome: Progressing

## 2023-04-10 ENCOUNTER — PATIENT OUTREACH (OUTPATIENT)
Dept: CASE MANAGEMENT | Age: 83
End: 2023-04-10

## 2023-04-10 RX ORDER — EZETIMIBE 10 MG/1
TABLET ORAL
Qty: 90 TABLET | Refills: 1 | Status: SHIPPED | OUTPATIENT
Start: 2023-04-10

## 2023-04-10 NOTE — PROGRESS NOTES
NCM spoke with patient briefly, states this is not a good time to talk. Pt states will call NCM back at a better time, NCM confirmed CB contact information.

## 2023-04-10 NOTE — TELEPHONE ENCOUNTER
Protocol passed   Requesting ezetimibe 10mg   Last OV 2/27/23   RTC in 4 months   Last filled on 2/7/22 # 90 1 refill   Relevant labs 2/21/23   No future OV

## 2023-04-12 ENCOUNTER — APPOINTMENT (OUTPATIENT)
Dept: GENERAL RADIOLOGY | Facility: HOSPITAL | Age: 83
End: 2023-04-12
Payer: MEDICARE

## 2023-04-12 ENCOUNTER — HOSPITAL ENCOUNTER (INPATIENT)
Facility: HOSPITAL | Age: 83
LOS: 3 days | Discharge: HOME | End: 2023-04-16
Attending: EMERGENCY MEDICINE | Admitting: HOSPITALIST
Payer: MEDICARE

## 2023-04-12 ENCOUNTER — TELEPHONE (OUTPATIENT)
Dept: INTERNAL MEDICINE CLINIC | Facility: CLINIC | Age: 83
End: 2023-04-12

## 2023-04-12 DIAGNOSIS — R31.9 URINARY TRACT INFECTION WITH HEMATURIA, SITE UNSPECIFIED: Primary | ICD-10-CM

## 2023-04-12 DIAGNOSIS — N39.0 URINARY TRACT INFECTION WITH HEMATURIA, SITE UNSPECIFIED: Primary | ICD-10-CM

## 2023-04-12 DIAGNOSIS — R53.1 WEAKNESS GENERALIZED: ICD-10-CM

## 2023-04-12 LAB
ALBUMIN SERPL-MCNC: 3.7 G/DL (ref 3.4–5)
ALBUMIN/GLOB SERPL: 0.8 {RATIO} (ref 1–2)
ALP LIVER SERPL-CCNC: 110 U/L
ALT SERPL-CCNC: 24 U/L
ANION GAP SERPL CALC-SCNC: 3 MMOL/L (ref 0–18)
AST SERPL-CCNC: 17 U/L (ref 15–37)
BASOPHILS # BLD AUTO: 0.06 X10(3) UL (ref 0–0.2)
BASOPHILS NFR BLD AUTO: 0.9 %
BILIRUB SERPL-MCNC: 0.3 MG/DL (ref 0.1–2)
BILIRUB UR QL STRIP.AUTO: NEGATIVE
BUN BLD-MCNC: 19 MG/DL (ref 7–18)
CALCIUM BLD-MCNC: 9.3 MG/DL (ref 8.5–10.1)
CHLORIDE SERPL-SCNC: 99 MMOL/L (ref 98–112)
CO2 SERPL-SCNC: 31 MMOL/L (ref 21–32)
COLOR UR AUTO: YELLOW
CREAT BLD-MCNC: 0.86 MG/DL
EOSINOPHIL # BLD AUTO: 0.18 X10(3) UL (ref 0–0.7)
EOSINOPHIL NFR BLD AUTO: 2.8 %
ERYTHROCYTE [DISTWIDTH] IN BLOOD BY AUTOMATED COUNT: 14.1 %
GFR SERPLBLD BASED ON 1.73 SQ M-ARVRAT: 68 ML/MIN/1.73M2 (ref 60–?)
GLOBULIN PLAS-MCNC: 4.6 G/DL (ref 2.8–4.4)
GLUCOSE BLD-MCNC: 155 MG/DL (ref 70–99)
GLUCOSE UR STRIP.AUTO-MCNC: NEGATIVE MG/DL
HCT VFR BLD AUTO: 39 %
HGB BLD-MCNC: 13.1 G/DL
IMM GRANULOCYTES # BLD AUTO: 0.06 X10(3) UL (ref 0–1)
IMM GRANULOCYTES NFR BLD: 0.9 %
KETONES UR STRIP.AUTO-MCNC: NEGATIVE MG/DL
LYMPHOCYTES # BLD AUTO: 1.97 X10(3) UL (ref 1–4)
LYMPHOCYTES NFR BLD AUTO: 30.4 %
MCH RBC QN AUTO: 29 PG (ref 26–34)
MCHC RBC AUTO-ENTMCNC: 33.6 G/DL (ref 31–37)
MCV RBC AUTO: 86.3 FL
MONOCYTES # BLD AUTO: 0.92 X10(3) UL (ref 0.1–1)
MONOCYTES NFR BLD AUTO: 14.2 %
NEUTROPHILS # BLD AUTO: 3.28 X10 (3) UL (ref 1.5–7.7)
NEUTROPHILS # BLD AUTO: 3.28 X10(3) UL (ref 1.5–7.7)
NEUTROPHILS NFR BLD AUTO: 50.8 %
NITRITE UR QL STRIP.AUTO: NEGATIVE
OSMOLALITY SERPL CALC.SUM OF ELEC: 281 MOSM/KG (ref 275–295)
PH UR STRIP.AUTO: 6 [PH] (ref 5–8)
PLATELET # BLD AUTO: 255 10(3)UL (ref 150–450)
POTASSIUM SERPL-SCNC: 4 MMOL/L (ref 3.5–5.1)
PROT SERPL-MCNC: 8.3 G/DL (ref 6.4–8.2)
PROT UR STRIP.AUTO-MCNC: NEGATIVE MG/DL
RBC # BLD AUTO: 4.52 X10(6)UL
RBC #/AREA URNS AUTO: >10 /HPF
SODIUM SERPL-SCNC: 133 MMOL/L (ref 136–145)
SP GR UR STRIP.AUTO: 1 (ref 1–1.03)
UROBILINOGEN UR STRIP.AUTO-MCNC: <2 MG/DL
WBC # BLD AUTO: 6.5 X10(3) UL (ref 4–11)
WBC #/AREA URNS AUTO: >50 /HPF

## 2023-04-12 PROCEDURE — 71045 X-RAY EXAM CHEST 1 VIEW: CPT

## 2023-04-12 NOTE — TELEPHONE ENCOUNTER
Call placed to patient's daughter to further triage. LMTCOB    Patient daughter Shelton Ayala) returned call. Daughter states Jose Richmond mom is very weak, looks very pale and has burning sensation to perineal region-both when she urinates and even just sitting\". Daughter states patient was recently discharged from hospital and was advised to follow-up with infectious disease specialist for further evaluation of recurrent urinary tract infections. Appointment is \"next week\". Daughter states Lise Dumont is in the car with me now and I think I am just going to take her to ER to be evaluated further\". Emotional support provided. Daughter states she will keep office updated.

## 2023-04-12 NOTE — TELEPHONE ENCOUNTER
Pt's daughter called in behalf of pt. Requesting urine test because pt thinks she has another UTI. Please advise.

## 2023-04-12 NOTE — ED INITIAL ASSESSMENT (HPI)
Pt to ED with family with reports of generalized weakness since being discharged from hospital last week. Pt now reporting burning with urination again, recent UTI last month.

## 2023-04-13 ENCOUNTER — APPOINTMENT (OUTPATIENT)
Dept: ULTRASOUND IMAGING | Facility: HOSPITAL | Age: 83
End: 2023-04-13
Attending: INTERNAL MEDICINE
Payer: MEDICARE

## 2023-04-13 PROBLEM — R53.1 WEAKNESS GENERALIZED: Status: ACTIVE | Noted: 2023-04-13

## 2023-04-13 LAB
GLUCOSE BLD-MCNC: 121 MG/DL (ref 70–99)
GLUCOSE BLD-MCNC: 129 MG/DL (ref 70–99)
GLUCOSE BLD-MCNC: 143 MG/DL (ref 70–99)
GLUCOSE BLD-MCNC: 171 MG/DL (ref 70–99)
LACTATE SERPL-SCNC: 2 MMOL/L (ref 0.4–2)

## 2023-04-13 PROCEDURE — 76770 US EXAM ABDO BACK WALL COMP: CPT | Performed by: INTERNAL MEDICINE

## 2023-04-13 PROCEDURE — 99223 1ST HOSP IP/OBS HIGH 75: CPT | Performed by: INTERNAL MEDICINE

## 2023-04-13 RX ORDER — CETIRIZINE HYDROCHLORIDE 10 MG/1
10 TABLET ORAL DAILY
COMMUNITY

## 2023-04-13 RX ORDER — GABAPENTIN 100 MG/1
200 CAPSULE ORAL NIGHTLY
Status: DISCONTINUED | OUTPATIENT
Start: 2023-04-13 | End: 2023-04-16

## 2023-04-13 RX ORDER — CEFAZOLIN SODIUM/WATER 2 G/20 ML
2 SYRINGE (ML) INTRAVENOUS EVERY 12 HOURS
Status: DISCONTINUED | OUTPATIENT
Start: 2023-04-13 | End: 2023-04-16

## 2023-04-13 RX ORDER — FLUTICASONE PROPIONATE 50 MCG
2 SPRAY, SUSPENSION (ML) NASAL DAILY
Status: DISCONTINUED | OUTPATIENT
Start: 2023-04-13 | End: 2023-04-16

## 2023-04-13 RX ORDER — EZETIMIBE 10 MG/1
10 TABLET ORAL NIGHTLY
Status: DISCONTINUED | OUTPATIENT
Start: 2023-04-13 | End: 2023-04-16

## 2023-04-13 RX ORDER — ONDANSETRON 2 MG/ML
4 INJECTION INTRAMUSCULAR; INTRAVENOUS EVERY 6 HOURS PRN
Status: DISCONTINUED | OUTPATIENT
Start: 2023-04-13 | End: 2023-04-16

## 2023-04-13 RX ORDER — ACETAMINOPHEN 500 MG
500 TABLET ORAL EVERY 4 HOURS PRN
Status: DISCONTINUED | OUTPATIENT
Start: 2023-04-13 | End: 2023-04-16

## 2023-04-13 RX ORDER — FAMOTIDINE 20 MG/1
20 TABLET, FILM COATED ORAL NIGHTLY
Status: DISCONTINUED | OUTPATIENT
Start: 2023-04-13 | End: 2023-04-16

## 2023-04-13 RX ORDER — PRAVASTATIN SODIUM 10 MG
10 TABLET ORAL NIGHTLY
Status: DISCONTINUED | OUTPATIENT
Start: 2023-04-13 | End: 2023-04-16

## 2023-04-13 RX ORDER — CLOPIDOGREL BISULFATE 75 MG/1
75 TABLET ORAL DAILY
Status: DISCONTINUED | OUTPATIENT
Start: 2023-04-13 | End: 2023-04-16

## 2023-04-13 RX ORDER — METOCLOPRAMIDE HYDROCHLORIDE 5 MG/ML
10 INJECTION INTRAMUSCULAR; INTRAVENOUS EVERY 8 HOURS PRN
Status: DISCONTINUED | OUTPATIENT
Start: 2023-04-13 | End: 2023-04-16

## 2023-04-13 RX ORDER — SODIUM CHLORIDE 9 MG/ML
INJECTION, SOLUTION INTRAVENOUS CONTINUOUS
Status: DISCONTINUED | OUTPATIENT
Start: 2023-04-13 | End: 2023-04-16

## 2023-04-13 RX ORDER — NICOTINE POLACRILEX 4 MG
15 LOZENGE BUCCAL
Status: DISCONTINUED | OUTPATIENT
Start: 2023-04-13 | End: 2023-04-16

## 2023-04-13 RX ORDER — METHENAMINE HIPPURATE 1000 MG/1
1 TABLET ORAL 2 TIMES DAILY
Status: DISCONTINUED | OUTPATIENT
Start: 2023-04-13 | End: 2023-04-16

## 2023-04-13 RX ORDER — DOFETILIDE 0.12 MG/1
125 CAPSULE ORAL EVERY 12 HOURS
Status: DISCONTINUED | OUTPATIENT
Start: 2023-04-13 | End: 2023-04-16

## 2023-04-13 RX ORDER — NICOTINE POLACRILEX 4 MG
30 LOZENGE BUCCAL
Status: DISCONTINUED | OUTPATIENT
Start: 2023-04-13 | End: 2023-04-16

## 2023-04-13 RX ORDER — LEVOTHYROXINE SODIUM 88 UG/1
88 TABLET ORAL DAILY
Status: DISCONTINUED | OUTPATIENT
Start: 2023-04-13 | End: 2023-04-16

## 2023-04-13 RX ORDER — DEXTROSE MONOHYDRATE 25 G/50ML
50 INJECTION, SOLUTION INTRAVENOUS
Status: DISCONTINUED | OUTPATIENT
Start: 2023-04-13 | End: 2023-04-16

## 2023-04-13 NOTE — PROGRESS NOTES
[facility] Pharmacy Note:  Renal Dose Adjustment    Anh Boogie has been prescribed famotidine (PEPCID) 20 mg orally every 12 hours. Estimated Creatinine Clearance: 42.4 mL/min (based on SCr of 0.86 mg/dL). Calculated creatinine clearance is < 50 ml/min, therefore the dose of famotidine (Pepcid) has been changed to 20 mg intravenously every 24 hours per P&T approved protocol. Pharmacy will continue to follow, and if renal function improves, will resume the original order.     Thank you,  Cha Mccarty, [facility]  4/13/2023 1:50 AM

## 2023-04-13 NOTE — PLAN OF CARE
Patient received via stretcher from ER for urinary symptoms, burning with urination and fatigue, frequent UTI's, continent of bowel and bladder, lungs clear on room air, abdomen soft, bowel sounds present, [facility] [place], admission navigator completed. 06:45-ID aware of new consult.

## 2023-04-13 NOTE — ED QUICK NOTES
Orders for admission, patient is aware of plan and ready to go upstairs. Any questions, please call ED RN Evans Carrion at extension [phone]. Patient Covid vaccination status: Fully vaccinated     COVID Test Ordered in ED: None    COVID Suspicion at Admission: N/A    Running Infusions:  None    Mental Status/LOC at time of transport: A&O x4      Other pertinent information: c/o chronic UTI's off and on for 7 mos. Recently completed po Abx.   CIWA score: N/A   NIH score:  N/A

## 2023-04-14 LAB
ANION GAP SERPL CALC-SCNC: 4 MMOL/L (ref 0–18)
BASOPHILS # BLD AUTO: 0.04 X10(3) UL (ref 0–0.2)
BASOPHILS NFR BLD AUTO: 0.5 %
BUN BLD-MCNC: 14 MG/DL (ref 7–18)
CALCIUM BLD-MCNC: 8.6 MG/DL (ref 8.5–10.1)
CHLORIDE SERPL-SCNC: 107 MMOL/L (ref 98–112)
CO2 SERPL-SCNC: 24 MMOL/L (ref 21–32)
CREAT BLD-MCNC: 0.7 MG/DL
EOSINOPHIL # BLD AUTO: 0.08 X10(3) UL (ref 0–0.7)
EOSINOPHIL NFR BLD AUTO: 1.1 %
ERYTHROCYTE [DISTWIDTH] IN BLOOD BY AUTOMATED COUNT: 14 %
GFR SERPLBLD BASED ON 1.73 SQ M-ARVRAT: 87 ML/MIN/1.73M2 (ref 60–?)
GLUCOSE BLD-MCNC: 136 MG/DL (ref 70–99)
GLUCOSE BLD-MCNC: 148 MG/DL (ref 70–99)
GLUCOSE BLD-MCNC: 155 MG/DL (ref 70–99)
GLUCOSE BLD-MCNC: 155 MG/DL (ref 70–99)
GLUCOSE BLD-MCNC: 189 MG/DL (ref 70–99)
HCT VFR BLD AUTO: 36.1 %
HGB BLD-MCNC: 11.7 G/DL
IMM GRANULOCYTES # BLD AUTO: 0.04 X10(3) UL (ref 0–1)
IMM GRANULOCYTES NFR BLD: 0.5 %
LYMPHOCYTES # BLD AUTO: 1.42 X10(3) UL (ref 1–4)
LYMPHOCYTES NFR BLD AUTO: 18.7 %
MCH RBC QN AUTO: 28.3 PG (ref 26–34)
MCHC RBC AUTO-ENTMCNC: 32.4 G/DL (ref 31–37)
MCV RBC AUTO: 87.4 FL
MONOCYTES # BLD AUTO: 1.08 X10(3) UL (ref 0.1–1)
MONOCYTES NFR BLD AUTO: 14.2 %
NEUTROPHILS # BLD AUTO: 4.94 X10 (3) UL (ref 1.5–7.7)
NEUTROPHILS # BLD AUTO: 4.94 X10(3) UL (ref 1.5–7.7)
NEUTROPHILS NFR BLD AUTO: 65 %
OSMOLALITY SERPL CALC.SUM OF ELEC: 284 MOSM/KG (ref 275–295)
PLATELET # BLD AUTO: 262 10(3)UL (ref 150–450)
POTASSIUM SERPL-SCNC: 4 MMOL/L (ref 3.5–5.1)
RBC # BLD AUTO: 4.13 X10(6)UL
SODIUM SERPL-SCNC: 135 MMOL/L (ref 136–145)
WBC # BLD AUTO: 7.6 X10(3) UL (ref 4–11)

## 2023-04-14 PROCEDURE — 99232 SBSQ HOSP IP/OBS MODERATE 35: CPT | Performed by: HOSPITALIST

## 2023-04-14 RX ORDER — CEPHALEXIN 500 MG/1
500 CAPSULE ORAL 2 TIMES DAILY
Qty: 40 CAPSULE | Refills: 0 | Status: SHIPPED | OUTPATIENT
Start: 2023-04-14 | End: 2023-04-15

## 2023-04-15 ENCOUNTER — APPOINTMENT (OUTPATIENT)
Dept: ULTRASOUND IMAGING | Facility: HOSPITAL | Age: 83
End: 2023-04-15
Attending: HOSPITALIST
Payer: MEDICARE

## 2023-04-15 LAB
GLUCOSE BLD-MCNC: 128 MG/DL (ref 70–99)
GLUCOSE BLD-MCNC: 154 MG/DL (ref 70–99)
GLUCOSE BLD-MCNC: 163 MG/DL (ref 70–99)
GLUCOSE BLD-MCNC: 172 MG/DL (ref 70–99)

## 2023-04-15 PROCEDURE — 99232 SBSQ HOSP IP/OBS MODERATE 35: CPT | Performed by: HOSPITALIST

## 2023-04-15 PROCEDURE — 93971 EXTREMITY STUDY: CPT | Performed by: HOSPITALIST

## 2023-04-15 RX ORDER — TRAMADOL HYDROCHLORIDE 50 MG/1
50 TABLET ORAL EVERY 6 HOURS PRN
Status: DISCONTINUED | OUTPATIENT
Start: 2023-04-15 | End: 2023-04-16

## 2023-04-15 RX ORDER — NITROFURANTOIN 25; 75 MG/1; MG/1
100 CAPSULE ORAL 2 TIMES DAILY
Qty: 20 CAPSULE | Refills: 0 | Status: SHIPPED | OUTPATIENT
Start: 2023-04-15 | End: 2023-04-15

## 2023-04-15 RX ORDER — CIPROFLOXACIN 500 MG/1
500 TABLET, FILM COATED ORAL 2 TIMES DAILY
Qty: 20 TABLET | Refills: 0 | Status: SHIPPED | OUTPATIENT
Start: 2023-04-15 | End: 2023-04-25

## 2023-04-15 NOTE — PROGRESS NOTES
[facility] Pharmacy Note:  Renal Adjustment for cefazolin (ANCEF)    Lidya Lynne is a 81year old patient who has been prescribed cefazolin (ANCEF) 2000 mg every 12 hrs. The estimated creatinine clearance is 52.1 mL/min (based on SCr of 0.7 mg/dL). The dose has been adjusted to cefazolin (ANCEF) 1000 mg every 8 hrs per hospital renal dose adjustment protocol for treatment of UTI/cystitis. Pharmacy will follow and adjust dose as warranted for additional renal function changes.     Thank you,    Michael Mcneill, [facility]  4/15/2023  3:11 PM

## 2023-04-15 NOTE — PLAN OF CARE
Patient with frequent urination, post void residual this morning 306 ml. Left upper arm red, tender and swollen, Dr. Chanda Edwards notified, Tylenol and Tramadol given as needed. Patient c/o eye itching, Dr. Denny Odonnell notified. Patient's daughter told this Nurse that patient had allergic reaction form Keflex last Friday, Dr. Denny Odonnell notified. 1706- Post void residual 419, straight cath done, obtained 300 ml clear yellow urine.

## 2023-04-16 VITALS
OXYGEN SATURATION: 98 % | WEIGHT: 138.88 LBS | DIASTOLIC BLOOD PRESSURE: 67 MMHG | BODY MASS INDEX: 24.61 KG/M2 | SYSTOLIC BLOOD PRESSURE: 158 MMHG | TEMPERATURE: 99 F | RESPIRATION RATE: 16 BRPM | HEART RATE: 71 BPM | HEIGHT: 63 IN

## 2023-04-16 LAB
GLUCOSE BLD-MCNC: 146 MG/DL (ref 70–99)
GLUCOSE BLD-MCNC: 185 MG/DL (ref 70–99)

## 2023-04-16 PROCEDURE — 99239 HOSP IP/OBS DSCHRG MGMT >30: CPT | Performed by: HOSPITALIST

## 2023-04-16 RX ORDER — TRAMADOL HYDROCHLORIDE 50 MG/1
50 TABLET ORAL EVERY 6 HOURS PRN
Qty: 30 TABLET | Refills: 0 | Status: SHIPPED | OUTPATIENT
Start: 2023-04-16

## 2023-04-16 NOTE — PLAN OF CARE
Patient voiding frequently. Left arm swollen, red ,warm and tender. Patient afebrile. Tylenol given as needed. For left arm pain. Patient's vital signs stable.

## 2023-04-16 NOTE — DIETARY NOTE
[address] [facility]    Pt seen by RD due to consult for DM diet education. Discussed CHO counting, nutrition label reading, physical activity. Provided \"CHO counting for People with Diabetes\"  Handout, nutrition label reading handout, and how to build a plate handout. All questions/concerns addressed.        Swapna Rucker, MS, RDN, [address] [facility] Dietitian  [phone]

## 2023-04-17 ENCOUNTER — TELEPHONE (OUTPATIENT)
Dept: INTERNAL MEDICINE CLINIC | Facility: CLINIC | Age: 83
End: 2023-04-17

## 2023-04-17 ENCOUNTER — PATIENT OUTREACH (OUTPATIENT)
Dept: CASE MANAGEMENT | Age: 83
End: 2023-04-17

## 2023-04-17 DIAGNOSIS — N39.0 URINARY TRACT INFECTION WITH HEMATURIA, SITE UNSPECIFIED: ICD-10-CM

## 2023-04-17 DIAGNOSIS — Z02.9 ENCOUNTERS FOR UNSPECIFIED ADMINISTRATIVE PURPOSE: ICD-10-CM

## 2023-04-17 DIAGNOSIS — R31.9 URINARY TRACT INFECTION WITH HEMATURIA, SITE UNSPECIFIED: ICD-10-CM

## 2023-04-17 PROCEDURE — 1111F DSCHRG MED/CURRENT MED MERGE: CPT

## 2023-04-17 NOTE — TELEPHONE ENCOUNTER
JAVIER, Spoke to pt for TCM today. Pt declined to schedule with PCP. TCM/HFU appt recommended by 04/23/23 as pt is a high risk for readmission.        BOOK BY DATE (last date for TCM): 04/30/23

## 2023-04-18 NOTE — TELEPHONE ENCOUNTER
Called patient to help make a TCM/HFU. Did advise patient that Dr Sarah Junior is not in office and she is able to see any of the providers that are helping cover. Patient got a bit upset and went ahead to state she does not want to see anyone that isnt Dr Sarah Junior. I did advise patient that that she does have to be seen by 4/30/23. Patient said she will speak to her daughter and will call back to make an appointment.

## 2023-05-01 ENCOUNTER — OFFICE VISIT (OUTPATIENT)
Dept: INTERNAL MEDICINE CLINIC | Facility: CLINIC | Age: 83
End: 2023-05-01
Payer: MEDICARE

## 2023-05-01 VITALS
HEIGHT: 63 IN | DIASTOLIC BLOOD PRESSURE: 60 MMHG | TEMPERATURE: 98 F | OXYGEN SATURATION: 98 % | RESPIRATION RATE: 16 BRPM | WEIGHT: 139 LBS | SYSTOLIC BLOOD PRESSURE: 118 MMHG | HEART RATE: 69 BPM | BODY MASS INDEX: 24.63 KG/M2

## 2023-05-01 DIAGNOSIS — N30.00 ACUTE CYSTITIS WITHOUT HEMATURIA: Primary | ICD-10-CM

## 2023-05-03 ENCOUNTER — PATIENT OUTREACH (OUTPATIENT)
Dept: INFECTIOUS DISEASE | Facility: CLINIC | Age: 83
End: 2023-05-03

## 2023-05-04 NOTE — TELEPHONE ENCOUNTER
Passed protocol     Last refill:  metoprolol tartrate 25 MG Oral Tab 180 tablet 0 2/3/2023 5/4/2023    Sig - Route: Take 1 tablet (25 mg total) by mouth 2 (two) times daily.        LOV:   05/01/2023 Dr Cherry Rivas  [facility]  No FOV scheduled

## 2023-05-05 DIAGNOSIS — G25.0 BENIGN ESSENTIAL TREMOR: ICD-10-CM

## 2023-05-08 RX ORDER — GABAPENTIN 100 MG/1
CAPSULE ORAL
Qty: 180 CAPSULE | Refills: 0 | Status: SHIPPED | OUTPATIENT
Start: 2023-05-08

## 2023-05-23 DIAGNOSIS — E11.65 TYPE 2 DIABETES MELLITUS WITH HYPERGLYCEMIA, WITHOUT LONG-TERM CURRENT USE OF INSULIN (HCC): Chronic | ICD-10-CM

## 2023-05-24 RX ORDER — METFORMIN HYDROCHLORIDE 750 MG/1
TABLET, EXTENDED RELEASE ORAL
Qty: 90 TABLET | Refills: 0 | Status: SHIPPED | OUTPATIENT
Start: 2023-05-24

## 2023-05-31 ENCOUNTER — HOSPITAL ENCOUNTER (EMERGENCY)
Facility: HOSPITAL | Age: 83
Discharge: HOME | End: 2023-05-31
Attending: EMERGENCY MEDICINE
Payer: MEDICARE

## 2023-05-31 ENCOUNTER — APPOINTMENT (OUTPATIENT)
Dept: CT IMAGING | Facility: HOSPITAL | Age: 83
End: 2023-05-31
Attending: EMERGENCY MEDICINE
Payer: MEDICARE

## 2023-05-31 VITALS
WEIGHT: 135 LBS | HEIGHT: 62 IN | SYSTOLIC BLOOD PRESSURE: 137 MMHG | DIASTOLIC BLOOD PRESSURE: 72 MMHG | HEART RATE: 69 BPM | OXYGEN SATURATION: 98 % | TEMPERATURE: 97 F | RESPIRATION RATE: 16 BRPM | BODY MASS INDEX: 24.84 KG/M2

## 2023-05-31 DIAGNOSIS — N30.00 ACUTE CYSTITIS WITHOUT HEMATURIA: Primary | ICD-10-CM

## 2023-05-31 DIAGNOSIS — R10.9 ABDOMINAL PAIN, ACUTE: ICD-10-CM

## 2023-05-31 LAB
ALBUMIN SERPL-MCNC: 3 G/DL (ref 3.4–5)
ALBUMIN/GLOB SERPL: 0.8 {RATIO} (ref 1–2)
ALP LIVER SERPL-CCNC: 78 U/L
ALT SERPL-CCNC: 21 U/L
ANION GAP SERPL CALC-SCNC: 1 MMOL/L (ref 0–18)
AST SERPL-CCNC: 24 U/L (ref 15–37)
ATRIAL RATE: 70 BPM
BASOPHILS # BLD AUTO: 0.05 X10(3) UL (ref 0–0.2)
BASOPHILS NFR BLD AUTO: 0.6 %
BILIRUB SERPL-MCNC: 0.4 MG/DL (ref 0.1–2)
BILIRUB UR QL STRIP.AUTO: NEGATIVE
BUN BLD-MCNC: 18 MG/DL (ref 7–18)
CALCIUM BLD-MCNC: 8.3 MG/DL (ref 8.5–10.1)
CHLORIDE SERPL-SCNC: 107 MMOL/L (ref 98–112)
CO2 SERPL-SCNC: 25 MMOL/L (ref 21–32)
COLOR UR AUTO: YELLOW
CREAT BLD-MCNC: 0.75 MG/DL
EOSINOPHIL # BLD AUTO: 0.05 X10(3) UL (ref 0–0.7)
EOSINOPHIL NFR BLD AUTO: 0.6 %
ERYTHROCYTE [DISTWIDTH] IN BLOOD BY AUTOMATED COUNT: 13.8 %
GFR SERPLBLD BASED ON 1.73 SQ M-ARVRAT: 79 ML/MIN/1.73M2 (ref 60–?)
GLOBULIN PLAS-MCNC: 3.7 G/DL (ref 2.8–4.4)
GLUCOSE BLD-MCNC: 165 MG/DL (ref 70–99)
GLUCOSE UR STRIP.AUTO-MCNC: NEGATIVE MG/DL
HCT VFR BLD AUTO: 34.4 %
HGB BLD-MCNC: 11.1 G/DL
HYALINE CASTS #/AREA URNS AUTO: PRESENT /LPF
IMM GRANULOCYTES # BLD AUTO: 0.06 X10(3) UL (ref 0–1)
IMM GRANULOCYTES NFR BLD: 0.7 %
KETONES UR STRIP.AUTO-MCNC: NEGATIVE MG/DL
LIPASE SERPL-CCNC: 96 U/L (ref 13–75)
LYMPHOCYTES # BLD AUTO: 0.72 X10(3) UL (ref 1–4)
LYMPHOCYTES NFR BLD AUTO: 8.1 %
MCH RBC QN AUTO: 28.4 PG (ref 26–34)
MCHC RBC AUTO-ENTMCNC: 32.3 G/DL (ref 31–37)
MCV RBC AUTO: 88 FL
MONOCYTES # BLD AUTO: 0.48 X10(3) UL (ref 0.1–1)
MONOCYTES NFR BLD AUTO: 5.4 %
NEUTROPHILS # BLD AUTO: 7.48 X10 (3) UL (ref 1.5–7.7)
NEUTROPHILS # BLD AUTO: 7.48 X10(3) UL (ref 1.5–7.7)
NEUTROPHILS NFR BLD AUTO: 84.6 %
NITRITE UR QL STRIP.AUTO: NEGATIVE
OSMOLALITY SERPL CALC.SUM OF ELEC: 282 MOSM/KG (ref 275–295)
P-R INTERVAL: 322 MS
PH UR STRIP.AUTO: 7 [PH] (ref 5–8)
PLATELET # BLD AUTO: 202 10(3)UL (ref 150–450)
POTASSIUM SERPL-SCNC: 4.3 MMOL/L (ref 3.5–5.1)
PROT SERPL-MCNC: 6.7 G/DL (ref 6.4–8.2)
PROT UR STRIP.AUTO-MCNC: NEGATIVE MG/DL
Q-T INTERVAL: 458 MS
QRS DURATION: 102 MS
QTC CALCULATION (BEZET): 494 MS
R AXIS: -50 DEGREES
RBC # BLD AUTO: 3.91 X10(6)UL
RBC UR QL AUTO: NEGATIVE
SODIUM SERPL-SCNC: 133 MMOL/L (ref 136–145)
SP GR UR STRIP.AUTO: 1.01 (ref 1–1.03)
T AXIS: 28 DEGREES
UROBILINOGEN UR STRIP.AUTO-MCNC: <2 MG/DL
VENTRICULAR RATE: 70 BPM
WBC # BLD AUTO: 8.8 X10(3) UL (ref 4–11)
WBC #/AREA URNS AUTO: >50 /HPF

## 2023-05-31 PROCEDURE — 93010 ELECTROCARDIOGRAM REPORT: CPT

## 2023-05-31 PROCEDURE — 87086 URINE CULTURE/COLONY COUNT: CPT | Performed by: EMERGENCY MEDICINE

## 2023-05-31 PROCEDURE — 74178 CT ABD&PLV WO CNTR FLWD CNTR: CPT | Performed by: EMERGENCY MEDICINE

## 2023-05-31 PROCEDURE — 93005 ELECTROCARDIOGRAM TRACING: CPT

## 2023-05-31 PROCEDURE — 96361 HYDRATE IV INFUSION ADD-ON: CPT

## 2023-05-31 PROCEDURE — 96374 THER/PROPH/DIAG INJ IV PUSH: CPT

## 2023-05-31 PROCEDURE — 85025 COMPLETE CBC W/AUTO DIFF WBC: CPT | Performed by: EMERGENCY MEDICINE

## 2023-05-31 PROCEDURE — 81001 URINALYSIS AUTO W/SCOPE: CPT | Performed by: EMERGENCY MEDICINE

## 2023-05-31 PROCEDURE — 83690 ASSAY OF LIPASE: CPT | Performed by: EMERGENCY MEDICINE

## 2023-05-31 PROCEDURE — 99285 EMERGENCY DEPT VISIT HI MDM: CPT

## 2023-05-31 PROCEDURE — 87077 CULTURE AEROBIC IDENTIFY: CPT | Performed by: EMERGENCY MEDICINE

## 2023-05-31 PROCEDURE — 80053 COMPREHEN METABOLIC PANEL: CPT | Performed by: EMERGENCY MEDICINE

## 2023-05-31 PROCEDURE — 99284 EMERGENCY DEPT VISIT MOD MDM: CPT

## 2023-05-31 RX ORDER — ONDANSETRON 2 MG/ML
4 INJECTION INTRAMUSCULAR; INTRAVENOUS ONCE
Status: COMPLETED | OUTPATIENT
Start: 2023-05-31 | End: 2023-05-31

## 2023-05-31 RX ORDER — SODIUM CHLORIDE 9 MG/ML
INJECTION, SOLUTION INTRAVENOUS CONTINUOUS
Status: DISCONTINUED | OUTPATIENT
Start: 2023-05-31 | End: 2023-05-31

## 2023-05-31 RX ORDER — CIPROFLOXACIN 500 MG/1
500 TABLET, FILM COATED ORAL 2 TIMES DAILY
Qty: 10 TABLET | Refills: 0 | Status: SHIPPED | OUTPATIENT
Start: 2023-05-31 | End: 2023-06-05

## 2023-05-31 RX ORDER — ONDANSETRON 2 MG/ML
4 INJECTION INTRAMUSCULAR; INTRAVENOUS ONCE
Status: DISCONTINUED | OUTPATIENT
Start: 2023-05-31 | End: 2023-05-31

## 2023-05-31 NOTE — DISCHARGE INSTRUCTIONS
Follow-up for further evaluation primary physician. Follow-up with urology. Return if new or worse symptoms. Plenty of fluids. Antibiotics as prescribed.

## 2023-05-31 NOTE — ED INITIAL ASSESSMENT (HPI)
Pt arrives via EMS from home c/o nausea and severe lower abdominal cramping that started this AM. Pt states these same symptoms happened a few months ago, she was noted to be in afib, and had a pacemaker placed at that time. Pt denies chest pain, denies SOB.

## 2023-06-01 NOTE — ED PROVIDER NOTES
Patient CT scan shows colitis but no evidence of diverticulitis or kidney stone. Patient discharged home with Cipro to treat her UTI. Her results were discussed with her as well as her daughter at bedside.

## 2023-06-13 ENCOUNTER — OFFICE VISIT (OUTPATIENT)
Dept: INTERNAL MEDICINE CLINIC | Facility: CLINIC | Age: 83
End: 2023-06-13
Payer: MEDICARE

## 2023-06-13 ENCOUNTER — TELEPHONE (OUTPATIENT)
Dept: INTERNAL MEDICINE CLINIC | Facility: CLINIC | Age: 83
End: 2023-06-13

## 2023-06-13 VITALS
DIASTOLIC BLOOD PRESSURE: 70 MMHG | BODY MASS INDEX: 25.58 KG/M2 | SYSTOLIC BLOOD PRESSURE: 120 MMHG | TEMPERATURE: 97 F | WEIGHT: 139 LBS | HEART RATE: 68 BPM | OXYGEN SATURATION: 98 % | RESPIRATION RATE: 16 BRPM | HEIGHT: 62 IN

## 2023-06-13 DIAGNOSIS — R10.30 LOWER ABDOMINAL PAIN: ICD-10-CM

## 2023-06-13 DIAGNOSIS — K52.9 COLITIS: ICD-10-CM

## 2023-06-13 DIAGNOSIS — K59.00 CONSTIPATION, UNSPECIFIED CONSTIPATION TYPE: Primary | ICD-10-CM

## 2023-06-13 PROBLEM — R06.09 EXERTIONAL DYSPNEA: Status: RESOLVED | Noted: 2023-04-07 | Resolved: 2023-06-13

## 2023-06-13 PROBLEM — R31.9 URINARY TRACT INFECTION WITH HEMATURIA, SITE UNSPECIFIED: Status: RESOLVED | Noted: 2023-04-12 | Resolved: 2023-06-13

## 2023-06-13 PROBLEM — N39.0 URINARY TRACT INFECTION WITH HEMATURIA, SITE UNSPECIFIED: Status: RESOLVED | Noted: 2023-04-12 | Resolved: 2023-06-13

## 2023-06-13 PROBLEM — R53.1 WEAKNESS GENERALIZED: Status: RESOLVED | Noted: 2023-04-13 | Resolved: 2023-06-13

## 2023-06-13 PROBLEM — T78.40XA ALLERGIC REACTION, INITIAL ENCOUNTER: Status: RESOLVED | Noted: 2023-04-07 | Resolved: 2023-06-13

## 2023-06-13 PROCEDURE — 99215 OFFICE O/P EST HI 40 MIN: CPT | Performed by: INTERNAL MEDICINE

## 2023-06-13 PROCEDURE — 1111F DSCHRG MED/CURRENT MED MERGE: CPT | Performed by: INTERNAL MEDICINE

## 2023-06-13 RX ORDER — PANTOPRAZOLE SODIUM 40 MG/1
40 TABLET, DELAYED RELEASE ORAL DAILY
COMMUNITY
Start: 2023-05-05 | End: 2023-06-13

## 2023-06-13 RX ORDER — LEVOTHYROXINE SODIUM 88 UG/1
88 TABLET ORAL DAILY
Qty: 90 TABLET | Refills: 0 | Status: SHIPPED | OUTPATIENT
Start: 2023-06-13

## 2023-06-13 RX ORDER — TAMSULOSIN HYDROCHLORIDE 0.4 MG/1
0.4 CAPSULE ORAL DAILY
COMMUNITY
End: 2023-06-13

## 2023-06-13 RX ORDER — PRAVASTATIN SODIUM 10 MG
10 TABLET ORAL NIGHTLY
Qty: 90 TABLET | Refills: 0 | Status: SHIPPED | OUTPATIENT
Start: 2023-06-13

## 2023-06-13 RX ORDER — METOPROLOL SUCCINATE 25 MG/1
25 TABLET, EXTENDED RELEASE ORAL 2 TIMES DAILY
COMMUNITY
Start: 2023-05-18 | End: 2023-06-13

## 2023-06-13 NOTE — PATIENT INSTRUCTIONS
Patient was apparently seen by the urologist who recommended a suprapubic cystostomy versus indwelling Dominique catheter in view of for significant retention residual volume  From patient and daughter that I will need to review all his records there are multiple sources of records with over 76 pages that need to be reviewed prior to me giving any final opinion  Information obtained is piecemeal from [address] [place] [address] down the line the patient will need a colonoscopy after clearance is obtained from cardiology since she is on Eliquis Ambulatory

## 2023-06-13 NOTE — TELEPHONE ENCOUNTER
please let patient know that I did review her records. After a few weeks she should establish contact with a gastroenterologist Dr. Cowan Gear possible colonoscopy in view of her colitis.   She should however get the blessings of her cardiologist to get off the anticoagulants when the procedure is being done  Referral for the gastroenterologist has already been placed

## 2023-06-15 ENCOUNTER — TELEPHONE (OUTPATIENT)
Dept: INTERNAL MEDICINE CLINIC | Facility: CLINIC | Age: 83
End: 2023-06-15

## 2023-06-15 NOTE — TELEPHONE ENCOUNTER
- please advise, ty! Triage call received from pt's daughter Eden Archer (full verbal). She has called all GI doctors from [facility], [address] [place] no one can get pt in sooner than 7/31/23. Since LOV 6/13/23 with , pt has taken Miralax every morning and before dinner. Pt has been eating fruits, salads, carrots, and drinks a good amount of water every day. Pt has only had small amount of pellets of stool one day since 6/7 or 6/8. Pt is continuing to bloat and is so uncomfortable that she cancelled her plans for today. dEen Archer would like to know what  recommends to do next.

## 2023-06-15 NOTE — TELEPHONE ENCOUNTER
She may take magnesium citrate to 6 ounces over-the-counter one-time dose to open her up.   After that she should continue MiraLAX on a regular basis

## 2023-06-15 NOTE — TELEPHONE ENCOUNTER
Spoke to Clemente, gave recommendation for magnesium citrate 6 ounces but only once. Clemente v/u - pt already takes Miralax on a regular basis. If no relief in 24 hours from the magnesium citrate, RN recommended IC or ER for intervention.

## 2023-06-19 ENCOUNTER — LAB ENCOUNTER (OUTPATIENT)
Dept: LAB | Age: 83
End: 2023-06-19
Attending: INTERNAL MEDICINE
Payer: MEDICARE

## 2023-06-19 DIAGNOSIS — N30.00 ACUTE CYSTITIS WITHOUT HEMATURIA: ICD-10-CM

## 2023-06-19 LAB
BILIRUB UR QL STRIP.AUTO: NEGATIVE
CLARITY UR REFRACT.AUTO: CLEAR
GLUCOSE UR STRIP.AUTO-MCNC: NEGATIVE MG/DL
HYALINE CASTS #/AREA URNS AUTO: PRESENT /LPF
KETONES UR STRIP.AUTO-MCNC: NEGATIVE MG/DL
NITRITE UR QL STRIP.AUTO: POSITIVE
PH UR STRIP.AUTO: 6 [PH] (ref 5–8)
PROT UR STRIP.AUTO-MCNC: NEGATIVE MG/DL
SP GR UR STRIP.AUTO: 1.01 (ref 1–1.03)
UROBILINOGEN UR STRIP.AUTO-MCNC: 4 MG/DL
WBC #/AREA URNS AUTO: >50 /HPF
WBC CLUMPS UR QL AUTO: PRESENT /HPF

## 2023-06-19 PROCEDURE — 87186 SC STD MICRODIL/AGAR DIL: CPT

## 2023-06-19 PROCEDURE — 87086 URINE CULTURE/COLONY COUNT: CPT

## 2023-06-19 PROCEDURE — 81001 URINALYSIS AUTO W/SCOPE: CPT

## 2023-06-22 ENCOUNTER — PATIENT MESSAGE (OUTPATIENT)
Dept: INTERNAL MEDICINE CLINIC | Facility: CLINIC | Age: 83
End: 2023-06-22

## 2023-06-22 ENCOUNTER — VIRTUAL PHONE E/M (OUTPATIENT)
Dept: INTERNAL MEDICINE CLINIC | Facility: CLINIC | Age: 83
End: 2023-06-22
Payer: MEDICARE

## 2023-06-22 DIAGNOSIS — N30.00 ACUTE CYSTITIS WITHOUT HEMATURIA: Primary | ICD-10-CM

## 2023-06-22 PROCEDURE — 99213 OFFICE O/P EST LOW 20 MIN: CPT | Performed by: INTERNAL MEDICINE

## 2023-06-22 RX ORDER — NITROFURANTOIN 25; 75 MG/1; MG/1
100 CAPSULE ORAL 2 TIMES DAILY
Qty: 28 CAPSULE | Refills: 0 | Status: SHIPPED | OUTPATIENT
Start: 2023-06-22 | End: 2023-07-06

## 2023-06-22 NOTE — TELEPHONE ENCOUNTER
From: Geryl Hidden  To: Jaspal Lopez MD  Sent: 6/22/2023 9:40 AM CDT  Subject: UTI    Jehovah's witness put her on Cefdinir will this take care of this new infection?   [place] [phone]

## 2023-06-22 NOTE — PROGRESS NOTES
Virtual Telephone Check-In    Cuco Class verbally consents to a Virtual/Telephone Check-In visit on 06/22/23. Patient has been referred to the [facility] website at [email or web address] to review the yearly Consent to Treat document. Patient understands and accepts financial responsibility for any deductible, co-insurance and/or co-pays associated with this service. Duration of the service: 15 minutes      Summary of topics discussed: Patient apparently is having recurrent urinary tract infection went to the emergency room at [facility] and was initially placed on antibiotic subsequently placed on methenamine hippurate as prophylactic for UTI however the patient has a full-blown urinary tract infection with symptoms of burning had a recent culture done the culture shows that she is growing some bacteria. Patient was placed on an appropriate antibiotic for Macrobid which she will take for. 14 days 4 days prior to completion of her medicine she will get in touch with us for a repeat urinalysis urine culture. She is scheduled for a suprapubic cystostomy in August.  This the earliest the urologist could see her. Diagnoses and all orders for this visit:    Acute cystitis without hematuria  -     URINALYSIS, ROUTINE; Future  -     URINE CULTURE, ROUTINE; Future  -     nitrofurantoin monohydrate macro 100 MG Oral Cap; Take 1 capsule (100 mg total) by mouth 2 (two) times daily for 14 days. Repeat urinalysis and urine culture 10 days into treatment.   After which patient should repeat a urine culture to decide about maintenance therapy  Leandra Louis MD

## 2023-07-03 ENCOUNTER — LAB ENCOUNTER (OUTPATIENT)
Dept: LAB | Age: 83
End: 2023-07-03
Attending: INTERNAL MEDICINE
Payer: MEDICARE

## 2023-07-03 DIAGNOSIS — N30.00 ACUTE CYSTITIS WITHOUT HEMATURIA: ICD-10-CM

## 2023-07-03 LAB
BILIRUB UR QL STRIP.AUTO: NEGATIVE
CLARITY UR REFRACT.AUTO: CLEAR
COLOR UR AUTO: YELLOW
GLUCOSE UR STRIP.AUTO-MCNC: NEGATIVE MG/DL
HYALINE CASTS #/AREA URNS AUTO: PRESENT /LPF
KETONES UR STRIP.AUTO-MCNC: NEGATIVE MG/DL
NITRITE UR QL STRIP.AUTO: NEGATIVE
PH UR STRIP.AUTO: 6 [PH] (ref 5–8)
PROT UR STRIP.AUTO-MCNC: NEGATIVE MG/DL
SP GR UR STRIP.AUTO: 1.01 (ref 1–1.03)
UROBILINOGEN UR STRIP.AUTO-MCNC: <2 MG/DL

## 2023-07-03 PROCEDURE — 87086 URINE CULTURE/COLONY COUNT: CPT

## 2023-07-03 PROCEDURE — 81001 URINALYSIS AUTO W/SCOPE: CPT

## 2023-07-05 ENCOUNTER — TELEPHONE (OUTPATIENT)
Dept: CARDIOLOGY CLINIC | Facility: HOSPITAL | Age: 83
End: 2023-07-05

## 2023-07-06 NOTE — PROGRESS NOTES
Normal-  Recommend staying on nitrofurantoin 50 mg every at bedtime to prevent UTI if patient is agreeable please go and send a 30-day prescription with 2 refills.   Patient should follow-up with her urologist-urogyn

## 2023-07-07 ENCOUNTER — TELEPHONE (OUTPATIENT)
Dept: INTERNAL MEDICINE CLINIC | Facility: CLINIC | Age: 83
End: 2023-07-07

## 2023-07-07 DIAGNOSIS — N39.3 STRESS INCONTINENCE (FEMALE) (MALE): ICD-10-CM

## 2023-07-07 RX ORDER — NITROFURANTOIN MACROCRYSTALS 50 MG/1
50 CAPSULE ORAL NIGHTLY
Qty: 30 CAPSULE | Refills: 2 | Status: SHIPPED | OUTPATIENT
Start: 2023-07-07

## 2023-07-07 NOTE — TELEPHONE ENCOUNTER
----- Message from Ayo Alan MD sent at 7/6/2023 10:22 AM CDT -----  Normal-  Recommend staying on nitrofurantoin 50 mg every at bedtime to prevent UTI if patient is agreeable please go and send a 30-day prescription with 2 refills.   Patient should follow-up with her urologist-urogyn

## 2023-07-07 NOTE — TELEPHONE ENCOUNTER
Pts daughter returning call for lab results. Pts daughter on verbal release, please call her back.     Ph:[phone]

## 2023-07-07 NOTE — TELEPHONE ENCOUNTER
Daughter Prasanna Ask notified of results, doc under result note. New order for nitrofurantoin 50 mg with 2 refills sent in.

## 2023-07-11 ENCOUNTER — TELEPHONE (OUTPATIENT)
Dept: CARDIOLOGY CLINIC | Facility: HOSPITAL | Age: 83
End: 2023-07-11

## 2023-07-11 DIAGNOSIS — I48.91 A-FIB (HCC): Primary | ICD-10-CM

## 2023-07-11 NOTE — TELEPHONE ENCOUNTER
Violet referral, given date of August 18th, had pre BREN done in 2022, reviewed by Dr. Natasha Mixon no need to repeat. Will need PAT's (blood work, EKG, and chest x-ray 1-2 weeks prior. Orders in EPIC.  V/u

## 2023-07-17 ENCOUNTER — OFFICE VISIT (OUTPATIENT)
Dept: INTERNAL MEDICINE CLINIC | Facility: CLINIC | Age: 83
End: 2023-07-17
Payer: MEDICARE

## 2023-07-17 ENCOUNTER — TELEPHONE (OUTPATIENT)
Dept: INTERNAL MEDICINE CLINIC | Facility: CLINIC | Age: 83
End: 2023-07-17

## 2023-07-17 VITALS
TEMPERATURE: 98 F | HEIGHT: 62 IN | SYSTOLIC BLOOD PRESSURE: 120 MMHG | OXYGEN SATURATION: 96 % | WEIGHT: 137.63 LBS | HEART RATE: 71 BPM | DIASTOLIC BLOOD PRESSURE: 60 MMHG | BODY MASS INDEX: 25.33 KG/M2

## 2023-07-17 DIAGNOSIS — N30.00 ACUTE CYSTITIS WITHOUT HEMATURIA: ICD-10-CM

## 2023-07-17 DIAGNOSIS — Z01.818 PREOPERATIVE EXAMINATION, UNSPECIFIED: Primary | ICD-10-CM

## 2023-07-17 DIAGNOSIS — N39.3 STRESS INCONTINENCE (FEMALE) (MALE): ICD-10-CM

## 2023-07-17 DIAGNOSIS — E11.65 TYPE 2 DIABETES MELLITUS WITH HYPERGLYCEMIA, WITHOUT LONG-TERM CURRENT USE OF INSULIN (HCC): Chronic | ICD-10-CM

## 2023-07-17 LAB
BILIRUB UR QL STRIP.AUTO: NEGATIVE
GLUCOSE UR STRIP.AUTO-MCNC: NEGATIVE MG/DL
KETONES UR STRIP.AUTO-MCNC: NEGATIVE MG/DL
NITRITE UR QL STRIP.AUTO: POSITIVE
PH UR STRIP.AUTO: 6 [PH] (ref 5–8)
PROT UR STRIP.AUTO-MCNC: NEGATIVE MG/DL
SP GR UR STRIP.AUTO: 1.01 (ref 1–1.03)
UROBILINOGEN UR STRIP.AUTO-MCNC: 4 MG/DL
WBC #/AREA URNS AUTO: >50 /HPF
WBC CLUMPS UR QL AUTO: PRESENT /HPF

## 2023-07-17 PROCEDURE — 87086 URINE CULTURE/COLONY COUNT: CPT | Performed by: INTERNAL MEDICINE

## 2023-07-17 PROCEDURE — 99214 OFFICE O/P EST MOD 30 MIN: CPT | Performed by: INTERNAL MEDICINE

## 2023-07-17 PROCEDURE — 87186 SC STD MICRODIL/AGAR DIL: CPT | Performed by: INTERNAL MEDICINE

## 2023-07-17 PROCEDURE — 87077 CULTURE AEROBIC IDENTIFY: CPT | Performed by: INTERNAL MEDICINE

## 2023-07-17 PROCEDURE — 81001 URINALYSIS AUTO W/SCOPE: CPT | Performed by: INTERNAL MEDICINE

## 2023-07-17 RX ORDER — LANCETS 30 GAUGE
1 EACH MISCELLANEOUS DAILY
Qty: 100 EACH | Refills: 6 | Status: SHIPPED | OUTPATIENT
Start: 2023-07-17

## 2023-07-17 RX ORDER — NITROFURANTOIN MACROCRYSTALS 50 MG/1
50 CAPSULE ORAL NIGHTLY
Qty: 30 CAPSULE | Refills: 2 | Status: SHIPPED | OUTPATIENT
Start: 2023-07-17

## 2023-07-17 NOTE — TELEPHONE ENCOUNTER
Pharmacy called in stating that pt had advised them that she is suppose to take medication 4 times a day. Pharmacy is requesting a call back for clarification.      nitrofurantoin macrocrystal 50 MG Oral Cap    [id number] - [place] - [address] AT [address], [phone], [phone]  [address] [facility] [address]  Phone: [phone] Fax: [phone]

## 2023-07-18 ENCOUNTER — HOSPITAL ENCOUNTER (EMERGENCY)
Facility: HOSPITAL | Age: 83
Discharge: HOME | End: 2023-07-18
Attending: EMERGENCY MEDICINE
Payer: MEDICARE

## 2023-07-18 ENCOUNTER — HOSPITAL ENCOUNTER (EMERGENCY)
Facility: HOSPITAL | Age: 83
Discharge: HOME | DRG: 690 | End: 2023-07-18
Attending: EMERGENCY MEDICINE
Payer: MEDICARE

## 2023-07-18 VITALS
TEMPERATURE: 98 F | OXYGEN SATURATION: 100 % | RESPIRATION RATE: 18 BRPM | SYSTOLIC BLOOD PRESSURE: 136 MMHG | DIASTOLIC BLOOD PRESSURE: 72 MMHG | HEART RATE: 69 BPM

## 2023-07-18 DIAGNOSIS — N39.0 URINARY TRACT INFECTION WITHOUT HEMATURIA, SITE UNSPECIFIED: Primary | ICD-10-CM

## 2023-07-18 DIAGNOSIS — E87.5 SERUM POTASSIUM ELEVATED: ICD-10-CM

## 2023-07-18 LAB
ANION GAP SERPL CALC-SCNC: 6 MMOL/L (ref 0–18)
BASOPHILS # BLD AUTO: 0.06 X10(3) UL (ref 0–0.2)
BASOPHILS NFR BLD AUTO: 1 %
BUN BLD-MCNC: 16 MG/DL (ref 7–18)
CALCIUM BLD-MCNC: 9.3 MG/DL (ref 8.5–10.1)
CHLORIDE SERPL-SCNC: 104 MMOL/L (ref 98–112)
CO2 SERPL-SCNC: 24 MMOL/L (ref 21–32)
CREAT BLD-MCNC: 0.87 MG/DL
EOSINOPHIL # BLD AUTO: 0.05 X10(3) UL (ref 0–0.7)
EOSINOPHIL NFR BLD AUTO: 0.8 %
ERYTHROCYTE [DISTWIDTH] IN BLOOD BY AUTOMATED COUNT: 14 %
GFR SERPLBLD BASED ON 1.73 SQ M-ARVRAT: 66 ML/MIN/1.73M2 (ref 60–?)
GLUCOSE BLD-MCNC: 109 MG/DL (ref 70–99)
HCT VFR BLD AUTO: 36.1 %
HGB BLD-MCNC: 11.7 G/DL
IMM GRANULOCYTES # BLD AUTO: 0.04 X10(3) UL (ref 0–1)
IMM GRANULOCYTES NFR BLD: 0.6 %
LYMPHOCYTES # BLD AUTO: 1.44 X10(3) UL (ref 1–4)
LYMPHOCYTES NFR BLD AUTO: 22.9 %
MCH RBC QN AUTO: 28.2 PG (ref 26–34)
MCHC RBC AUTO-ENTMCNC: 32.4 G/DL (ref 31–37)
MCV RBC AUTO: 87 FL
MONOCYTES # BLD AUTO: 0.55 X10(3) UL (ref 0.1–1)
MONOCYTES NFR BLD AUTO: 8.8 %
NEUTROPHILS # BLD AUTO: 4.14 X10 (3) UL (ref 1.5–7.7)
NEUTROPHILS # BLD AUTO: 4.14 X10(3) UL (ref 1.5–7.7)
NEUTROPHILS NFR BLD AUTO: 65.9 %
OSMOLALITY SERPL CALC.SUM OF ELEC: 280 MOSM/KG (ref 275–295)
PLATELET # BLD AUTO: 259 10(3)UL (ref 150–450)
POTASSIUM SERPL-SCNC: 5.2 MMOL/L (ref 3.5–5.1)
RBC # BLD AUTO: 4.15 X10(6)UL
SODIUM SERPL-SCNC: 134 MMOL/L (ref 136–145)
WBC # BLD AUTO: 6.3 X10(3) UL (ref 4–11)

## 2023-07-18 PROCEDURE — 87077 CULTURE AEROBIC IDENTIFY: CPT | Performed by: EMERGENCY MEDICINE

## 2023-07-18 PROCEDURE — 36415 COLL VENOUS BLD VENIPUNCTURE: CPT

## 2023-07-18 PROCEDURE — 85025 COMPLETE CBC W/AUTO DIFF WBC: CPT | Performed by: EMERGENCY MEDICINE

## 2023-07-18 PROCEDURE — 87086 URINE CULTURE/COLONY COUNT: CPT | Performed by: EMERGENCY MEDICINE

## 2023-07-18 PROCEDURE — 80048 BASIC METABOLIC PNL TOTAL CA: CPT | Performed by: EMERGENCY MEDICINE

## 2023-07-18 PROCEDURE — 99283 EMERGENCY DEPT VISIT LOW MDM: CPT

## 2023-07-18 PROCEDURE — 87186 SC STD MICRODIL/AGAR DIL: CPT | Performed by: EMERGENCY MEDICINE

## 2023-07-18 PROCEDURE — 99285 EMERGENCY DEPT VISIT HI MDM: CPT

## 2023-07-18 RX ORDER — SODIUM POLYSTYRENE SULFONATE 4.1 MEQ/G
30 POWDER, FOR SUSPENSION ORAL; RECTAL ONCE
Status: DISCONTINUED | OUTPATIENT
Start: 2023-07-18 | End: 2023-07-18

## 2023-07-18 RX ORDER — SODIUM POLYSTYRENE SULFONATE 15 G/60ML
15 SUSPENSION ORAL; RECTAL ONCE
Qty: 60 ML | Refills: 0 | Status: SHIPPED | OUTPATIENT
Start: 2023-07-18 | End: 2023-08-05 | Stop reason: CLARIF

## 2023-07-18 RX ORDER — PANTOPRAZOLE SODIUM 20 MG/1
20 TABLET, DELAYED RELEASE ORAL
COMMUNITY
End: 2023-08-05 | Stop reason: CLARIF

## 2023-07-18 RX ORDER — GRANULES FOR ORAL 3 G/1
3 POWDER ORAL ONCE
Status: COMPLETED | OUTPATIENT
Start: 2023-07-18 | End: 2023-07-18

## 2023-07-18 NOTE — ED INITIAL ASSESSMENT (HPI)
6/20 pt began having symptoms for UTI pt was seen by her pcp on 6/21 placed on abx Nitrofurantoin. Pt was retested on 7/3 and placed on lower dosage of 50mg as a maintenance but on 7/12 symptoms worsened, increased so they increased dosage again. Results pending for culture of the urine, but instructed to come to ER for IV treatment of UTI.

## 2023-07-18 NOTE — TELEPHONE ENCOUNTER
[phone] spoke to pharmacist, she took the verbal order for Nitrofurantoin to be 100 MG BID for 10 days. Then pt is to return to 50 MG per day. Pharmacist will fill the order.

## 2023-07-18 NOTE — TELEPHONE ENCOUNTER
Daughter Michoacano Leavitt reporting patient sxs are worsening. Bloating, extreme burning. Continues to use AZO with abx, AZO usually resolves burning, no longer providing any relief  Michoacano Leavitt doesn't think nitrofurantoin is helping any longer. Asks if she should proceed to ED for eval/IV abx. Spoke with  - he agrees with seeking with eval in the ED. Michoacano Leavitt advised  agrees with ED eval and to call us to fol/up after. Verbalized understanding.

## 2023-07-19 ENCOUNTER — TELEPHONE (OUTPATIENT)
Dept: INTERNAL MEDICINE CLINIC | Facility: CLINIC | Age: 83
End: 2023-07-19

## 2023-07-19 ENCOUNTER — PATIENT OUTREACH (OUTPATIENT)
Dept: CASE MANAGEMENT | Age: 83
End: 2023-07-19

## 2023-07-19 DIAGNOSIS — N39.0 ACUTE UTI: Primary | ICD-10-CM

## 2023-07-19 NOTE — TELEPHONE ENCOUNTER
Patient's daughter Dick Hoang called requesting someone to give her a call. .. Patient was seen on Monday, 7/17/23 by Dr. Candelaria Reddy, and the ER yesterday, 7/18/23 regarding UTI, Patient was prescribes:  fosfomycin (Monurol) 3 g oral packet,  after results came back positive. Patient's daughter is asking that something else be prescribed due to what was given not helping the patient.      She can be reached at [phone]

## 2023-07-19 NOTE — TELEPHONE ENCOUNTER
ENMA - pt went to ED yesterday, was given Vega lyles already calling asking for something different. Please advise, ty!

## 2023-07-19 NOTE — PROGRESS NOTES
1st attempt ED f/up apt request  PCP -decline, pt dtr stated they do not want to schedule at this time  Closing encounter

## 2023-07-20 ENCOUNTER — HOSPITAL ENCOUNTER (INPATIENT)
Facility: HOSPITAL | Age: 83
LOS: 2 days | Discharge: HOME | DRG: 690 | End: 2023-07-22
Attending: EMERGENCY MEDICINE | Admitting: HOSPITALIST
Payer: MEDICARE

## 2023-07-20 ENCOUNTER — APPOINTMENT (OUTPATIENT)
Dept: CT IMAGING | Facility: HOSPITAL | Age: 83
DRG: 690 | End: 2023-07-20
Attending: EMERGENCY MEDICINE
Payer: MEDICARE

## 2023-07-20 ENCOUNTER — HOSPITAL ENCOUNTER (INPATIENT)
Facility: HOSPITAL | Age: 83
LOS: 2 days | Discharge: HOME | End: 2023-07-22
Attending: EMERGENCY MEDICINE | Admitting: HOSPITALIST
Payer: MEDICARE

## 2023-07-20 ENCOUNTER — APPOINTMENT (OUTPATIENT)
Dept: CT IMAGING | Facility: HOSPITAL | Age: 83
End: 2023-07-20
Attending: EMERGENCY MEDICINE
Payer: MEDICARE

## 2023-07-20 DIAGNOSIS — N30.01 ACUTE CYSTITIS WITH HEMATURIA: Primary | ICD-10-CM

## 2023-07-20 LAB
ALBUMIN SERPL-MCNC: 3.1 G/DL (ref 3.4–5)
ALBUMIN/GLOB SERPL: 0.7 {RATIO} (ref 1–2)
ALP LIVER SERPL-CCNC: 84 U/L
ALT SERPL-CCNC: 13 U/L
ANION GAP SERPL CALC-SCNC: 6 MMOL/L (ref 0–18)
AST SERPL-CCNC: 12 U/L (ref 15–37)
BASOPHILS # BLD AUTO: 0.06 X10(3) UL (ref 0–0.2)
BASOPHILS NFR BLD AUTO: 1 %
BILIRUB SERPL-MCNC: 0.2 MG/DL (ref 0.1–2)
BILIRUB UR QL STRIP.AUTO: NEGATIVE
BUN BLD-MCNC: 13 MG/DL (ref 7–18)
CALCIUM BLD-MCNC: 9.1 MG/DL (ref 8.5–10.1)
CHLORIDE SERPL-SCNC: 104 MMOL/L (ref 98–112)
CO2 SERPL-SCNC: 27 MMOL/L (ref 21–32)
CREAT BLD-MCNC: 0.87 MG/DL
EGFRCR SERPLBLD CKD-EPI 2021: 66 ML/MIN/1.73M2 (ref 60–?)
EOSINOPHIL # BLD AUTO: 0.08 X10(3) UL (ref 0–0.7)
EOSINOPHIL NFR BLD AUTO: 1.4 %
ERYTHROCYTE [DISTWIDTH] IN BLOOD BY AUTOMATED COUNT: 14.1 %
GLOBULIN PLAS-MCNC: 4.2 G/DL (ref 2.8–4.4)
GLUCOSE BLD-MCNC: 174 MG/DL (ref 70–99)
GLUCOSE UR STRIP.AUTO-MCNC: NEGATIVE MG/DL
HCT VFR BLD AUTO: 33.4 %
HGB BLD-MCNC: 10.5 G/DL
IMM GRANULOCYTES # BLD AUTO: 0.01 X10(3) UL (ref 0–1)
IMM GRANULOCYTES NFR BLD: 0.2 %
KETONES UR STRIP.AUTO-MCNC: NEGATIVE MG/DL
LIPASE SERPL-CCNC: 71 U/L (ref 13–75)
LYMPHOCYTES # BLD AUTO: 1.36 X10(3) UL (ref 1–4)
LYMPHOCYTES NFR BLD AUTO: 23.8 %
MCH RBC QN AUTO: 28.1 PG (ref 26–34)
MCHC RBC AUTO-ENTMCNC: 31.4 G/DL (ref 31–37)
MCV RBC AUTO: 89.3 FL
MONOCYTES # BLD AUTO: 0.66 X10(3) UL (ref 0.1–1)
MONOCYTES NFR BLD AUTO: 11.5 %
NEUTROPHILS # BLD AUTO: 3.55 X10 (3) UL (ref 1.5–7.7)
NEUTROPHILS # BLD AUTO: 3.55 X10(3) UL (ref 1.5–7.7)
NEUTROPHILS NFR BLD AUTO: 62.1 %
NITRITE UR QL STRIP.AUTO: POSITIVE
OSMOLALITY SERPL CALC.SUM OF ELEC: 288 MOSM/KG (ref 275–295)
PH UR STRIP.AUTO: 7 [PH] (ref 5–8)
PLATELET # BLD AUTO: 259 10(3)UL (ref 150–450)
POTASSIUM SERPL-SCNC: 3.7 MMOL/L (ref 3.5–5.1)
PROT SERPL-MCNC: 7.3 G/DL (ref 6.4–8.2)
PROT UR STRIP.AUTO-MCNC: NEGATIVE MG/DL
RBC # BLD AUTO: 3.74 X10(6)UL
RBC #/AREA URNS AUTO: >10 /HPF
SODIUM SERPL-SCNC: 137 MMOL/L (ref 136–145)
SP GR UR STRIP.AUTO: 1.01 (ref 1–1.03)
UROBILINOGEN UR STRIP.AUTO-MCNC: 2 MG/DL
WBC # BLD AUTO: 5.7 X10(3) UL (ref 4–11)
WBC #/AREA URNS AUTO: >50 /HPF
WBC CLUMPS UR QL AUTO: PRESENT /HPF

## 2023-07-20 PROCEDURE — 99223 1ST HOSP IP/OBS HIGH 75: CPT | Performed by: HOSPITALIST

## 2023-07-20 PROCEDURE — 74177 CT ABD & PELVIS W/CONTRAST: CPT | Performed by: EMERGENCY MEDICINE

## 2023-07-20 RX ORDER — SULFAMETHOXAZOLE AND TRIMETHOPRIM 800; 160 MG/1; MG/1
1 TABLET ORAL 2 TIMES DAILY
Qty: 20 TABLET | Refills: 0 | Status: SHIPPED | OUTPATIENT
Start: 2023-07-20 | End: 2023-07-22

## 2023-07-20 NOTE — TELEPHONE ENCOUNTER
[phone] spoke to daughter Vladimir Mondragon - confirmed pharmacy is [id number]. Daughter was hoping the pt would be admitted to [facility] hospital when they went to the ED so they could give IV antibiotics to get rid of this UTI for once and for all. Pt has basically has this UTI since the end of May and she is still having burning with urination. Rx for Bactrim DS sent today.

## 2023-07-20 NOTE — TELEPHONE ENCOUNTER
let the patient know that the patient has numerous drug allergies and interactions that I would give this medicine some time prior to changing any medicines -essentially there are not too many options left  Try Bactrim double strength 1 twice daily for 10 days again she should be aware of the risks with all her allergies

## 2023-07-20 NOTE — ED INITIAL ASSESSMENT (HPI)
Pain while passing urine since  few days . Patient was seen here on Tuesday because of UTI  not on any antibiotic  not improving  after all medications. No fever . On and off UTI  since may.

## 2023-07-21 LAB
EST. AVERAGE GLUCOSE BLD GHB EST-MCNC: 154 MG/DL (ref 68–126)
GLUCOSE BLD-MCNC: 107 MG/DL (ref 70–99)
GLUCOSE BLD-MCNC: 138 MG/DL (ref 70–99)
GLUCOSE BLD-MCNC: 191 MG/DL (ref 70–99)
GLUCOSE BLD-MCNC: 192 MG/DL (ref 70–99)
HBA1C MFR BLD: 7 % (ref ?–5.7)

## 2023-07-21 PROCEDURE — 99233 SBSQ HOSP IP/OBS HIGH 50: CPT | Performed by: HOSPITALIST

## 2023-07-21 RX ORDER — NICOTINE POLACRILEX 4 MG
30 LOZENGE BUCCAL
Status: DISCONTINUED | OUTPATIENT
Start: 2023-07-21 | End: 2023-07-22

## 2023-07-21 RX ORDER — GABAPENTIN 100 MG/1
200 CAPSULE ORAL NIGHTLY
Status: DISCONTINUED | OUTPATIENT
Start: 2023-07-21 | End: 2023-07-22

## 2023-07-21 RX ORDER — DEXTROSE MONOHYDRATE 25 G/50ML
50 INJECTION, SOLUTION INTRAVENOUS
Status: DISCONTINUED | OUTPATIENT
Start: 2023-07-21 | End: 2023-07-22

## 2023-07-21 RX ORDER — PRAVASTATIN SODIUM 10 MG
10 TABLET ORAL NIGHTLY
Status: DISCONTINUED | OUTPATIENT
Start: 2023-07-21 | End: 2023-07-22

## 2023-07-21 RX ORDER — DOFETILIDE 0.12 MG/1
125 CAPSULE ORAL EVERY 12 HOURS
Status: DISCONTINUED | OUTPATIENT
Start: 2023-07-21 | End: 2023-07-22

## 2023-07-21 RX ORDER — LEVOTHYROXINE SODIUM 88 UG/1
88 TABLET ORAL
Status: DISCONTINUED | OUTPATIENT
Start: 2023-07-21 | End: 2023-07-22

## 2023-07-21 RX ORDER — DOFETILIDE 0.12 MG/1
125 CAPSULE ORAL EVERY 12 HOURS
Status: DISCONTINUED | OUTPATIENT
Start: 2023-07-21 | End: 2023-07-21

## 2023-07-21 RX ORDER — CLOPIDOGREL BISULFATE 75 MG/1
75 TABLET ORAL DAILY
Status: DISCONTINUED | OUTPATIENT
Start: 2023-07-21 | End: 2023-07-22

## 2023-07-21 RX ORDER — POTASSIUM CHLORIDE 1.5 G/1.58G
40 POWDER, FOR SOLUTION ORAL ONCE
Status: COMPLETED | OUTPATIENT
Start: 2023-07-21 | End: 2023-07-21

## 2023-07-21 RX ORDER — NICOTINE POLACRILEX 4 MG
15 LOZENGE BUCCAL
Status: DISCONTINUED | OUTPATIENT
Start: 2023-07-21 | End: 2023-07-22

## 2023-07-21 NOTE — PLAN OF CARE
NURSING ADMISSION NOTE      Patient admitted via Cart  Oriented to room. Safety precautions initiated. Bed in low position. Call light in reach.     A&Ox4  Room air  Tele NSR  Up SBA, calls appropriately  K+ supps given  Urology consulted from ED  Rocephin given in ED

## 2023-07-21 NOTE — ED QUICK NOTES
Orders for admission, patient is aware of plan and ready to go upstairs. Any questions, please call ED RN Flaquita at extension [phone].      Patient Covid vaccination status: Fully vaccinated     COVID Test Ordered in ED: None    COVID Suspicion at Admission: N/A    Running Infusions:  None    Mental Status/LOC at time of transport: a/ox4    Other pertinent information:   CIWA score: N/A   NIH score:  N/A

## 2023-07-21 NOTE — DISCHARGE INSTRUCTIONS
Take the antibiotic as directed. Stop by any outpatient [facility] lab for a repeat urine culture 1 week prior to your scheduled surgery with Dr. Ct Noble. Please take a daily over the counter probiotic while you are on the antibiotics as well.

## 2023-07-22 VITALS
SYSTOLIC BLOOD PRESSURE: 111 MMHG | TEMPERATURE: 98 F | BODY MASS INDEX: 26.11 KG/M2 | OXYGEN SATURATION: 98 % | HEART RATE: 70 BPM | WEIGHT: 141.88 LBS | RESPIRATION RATE: 20 BRPM | DIASTOLIC BLOOD PRESSURE: 64 MMHG | HEIGHT: 62 IN

## 2023-07-22 DIAGNOSIS — N39.0 RECURRENT UTI: Primary | ICD-10-CM

## 2023-07-22 LAB
GLUCOSE BLD-MCNC: 114 MG/DL (ref 70–99)
GLUCOSE BLD-MCNC: 145 MG/DL (ref 70–99)
POTASSIUM SERPL-SCNC: 3.9 MMOL/L (ref 3.5–5.1)

## 2023-07-22 PROCEDURE — 99239 HOSP IP/OBS DSCHRG MGMT >30: CPT | Performed by: HOSPITALIST

## 2023-07-22 RX ORDER — CEFDINIR 300 MG/1
CAPSULE ORAL
Qty: 35 CAPSULE | Refills: 0 | Status: SHIPPED | OUTPATIENT
Start: 2023-07-22 | End: 2023-08-05 | Stop reason: CLARIF

## 2023-07-22 NOTE — PROGRESS NOTES
Pt A&Ox4 Room Air  Resting in bed  Denies pain at this time  Meds given per Mar  Dominique in place  +UTI, IV Abx   Accucheck QID, pt refused insulin despite education.    Urology following  Safety precaution maintained  Will continue to monitor

## 2023-07-22 NOTE — PROGRESS NOTES
Assumed care around 0700, A/Ox4, R/A, NSR on tele. Up ad aptrice. Dominique inserted today per uro. Scheduled for o/p suprapubic in mid August. Qid accuchecks. IVSL. Rocephin q24 for UTI. Staff will continue to monitor.

## 2023-07-22 NOTE — PLAN OF CARE
NURSING DISCHARGE NOTE    Discharged Home via Wheelchair. Accompanied by Family member and Support staff  Belongings Taken by patient/family.   IV removed  Dominique drainage system switched to leg bag  Medication and follow ups discussed  Pt left unit in stable condition

## 2023-07-24 ENCOUNTER — PATIENT OUTREACH (OUTPATIENT)
Dept: CASE MANAGEMENT | Age: 83
End: 2023-07-24

## 2023-07-28 ENCOUNTER — LAB ENCOUNTER (OUTPATIENT)
Dept: LAB | Age: 83
End: 2023-07-28
Attending: INTERNAL MEDICINE
Payer: MEDICARE

## 2023-07-28 DIAGNOSIS — E11.65 TYPE 2 DIABETES MELLITUS WITH HYPERGLYCEMIA, WITHOUT LONG-TERM CURRENT USE OF INSULIN (HCC): Chronic | ICD-10-CM

## 2023-07-28 DIAGNOSIS — I48.91 A-FIB (HCC): ICD-10-CM

## 2023-07-28 LAB
ANION GAP SERPL CALC-SCNC: 4 MMOL/L (ref 0–18)
ANTIBODY SCREEN: NEGATIVE
BUN BLD-MCNC: 16 MG/DL (ref 7–18)
CALCIUM BLD-MCNC: 9.3 MG/DL (ref 8.5–10.1)
CHLORIDE SERPL-SCNC: 104 MMOL/L (ref 98–112)
CO2 SERPL-SCNC: 29 MMOL/L (ref 21–32)
CREAT BLD-MCNC: 0.86 MG/DL
EGFRCR SERPLBLD CKD-EPI 2021: 67 ML/MIN/1.73M2 (ref 60–?)
ERYTHROCYTE [DISTWIDTH] IN BLOOD BY AUTOMATED COUNT: 14.6 %
EST. AVERAGE GLUCOSE BLD GHB EST-MCNC: 154 MG/DL (ref 68–126)
FASTING STATUS PATIENT QL REPORTED: YES
GLUCOSE BLD-MCNC: 109 MG/DL (ref 70–99)
HBA1C MFR BLD: 7 % (ref ?–5.7)
HCT VFR BLD AUTO: 37 %
HGB BLD-MCNC: 11.5 G/DL
MCH RBC QN AUTO: 27.8 PG (ref 26–34)
MCHC RBC AUTO-ENTMCNC: 31.1 G/DL (ref 31–37)
MCV RBC AUTO: 89.6 FL
OSMOLALITY SERPL CALC.SUM OF ELEC: 286 MOSM/KG (ref 275–295)
PLATELET # BLD AUTO: 235 10(3)UL (ref 150–450)
POTASSIUM SERPL-SCNC: 4.2 MMOL/L (ref 3.5–5.1)
RBC # BLD AUTO: 4.13 X10(6)UL
RH BLOOD TYPE: POSITIVE
SODIUM SERPL-SCNC: 137 MMOL/L (ref 136–145)
WBC # BLD AUTO: 5.2 X10(3) UL (ref 4–11)

## 2023-07-28 PROCEDURE — 86901 BLOOD TYPING SEROLOGIC RH(D): CPT

## 2023-07-28 PROCEDURE — 86900 BLOOD TYPING SEROLOGIC ABO: CPT

## 2023-07-28 PROCEDURE — 80048 BASIC METABOLIC PNL TOTAL CA: CPT

## 2023-07-28 PROCEDURE — 36415 COLL VENOUS BLD VENIPUNCTURE: CPT

## 2023-07-28 PROCEDURE — 83036 HEMOGLOBIN GLYCOSYLATED A1C: CPT

## 2023-07-28 PROCEDURE — 86850 RBC ANTIBODY SCREEN: CPT

## 2023-07-28 PROCEDURE — 85027 COMPLETE CBC AUTOMATED: CPT

## 2023-07-31 ENCOUNTER — EKG ENCOUNTER (OUTPATIENT)
Dept: LAB | Age: 83
End: 2023-07-31
Attending: INTERNAL MEDICINE
Payer: MEDICARE

## 2023-07-31 ENCOUNTER — HOSPITAL ENCOUNTER (OUTPATIENT)
Dept: GENERAL RADIOLOGY | Age: 83
Discharge: HOME | End: 2023-07-31
Attending: INTERNAL MEDICINE
Payer: MEDICARE

## 2023-07-31 DIAGNOSIS — I48.91 A-FIB (HCC): ICD-10-CM

## 2023-07-31 LAB
ATRIAL RATE: 63 BPM
P AXIS: 169 DEGREES
P-R INTERVAL: 288 MS
Q-T INTERVAL: 468 MS
QRS DURATION: 120 MS
QTC CALCULATION (BEZET): 505 MS
R AXIS: -52 DEGREES
T AXIS: 46 DEGREES
VENTRICULAR RATE: 70 BPM

## 2023-07-31 PROCEDURE — 71046 X-RAY EXAM CHEST 2 VIEWS: CPT | Performed by: INTERNAL MEDICINE

## 2023-07-31 PROCEDURE — 93005 ELECTROCARDIOGRAM TRACING: CPT

## 2023-07-31 PROCEDURE — 93010 ELECTROCARDIOGRAM REPORT: CPT | Performed by: INTERNAL MEDICINE

## 2023-08-02 ENCOUNTER — HOSPITAL ENCOUNTER (EMERGENCY)
Age: 83
Discharge: HOME | End: 2023-08-02
Attending: EMERGENCY MEDICINE
Payer: MEDICARE

## 2023-08-02 VITALS
WEIGHT: 133 LBS | DIASTOLIC BLOOD PRESSURE: 73 MMHG | RESPIRATION RATE: 16 BRPM | OXYGEN SATURATION: 96 % | BODY MASS INDEX: 24.48 KG/M2 | TEMPERATURE: 99 F | SYSTOLIC BLOOD PRESSURE: 142 MMHG | HEART RATE: 70 BPM | HEIGHT: 62 IN

## 2023-08-02 DIAGNOSIS — N39.0 URINARY TRACT INFECTION WITHOUT HEMATURIA, SITE UNSPECIFIED: ICD-10-CM

## 2023-08-02 DIAGNOSIS — T83.011A MALFUNCTION OF FOLEY CATHETER, INITIAL ENCOUNTER (HCC): Primary | ICD-10-CM

## 2023-08-02 LAB
BILIRUB UR QL STRIP.AUTO: NEGATIVE
COLOR UR AUTO: COLORLESS
GLUCOSE UR STRIP.AUTO-MCNC: NEGATIVE MG/DL
KETONES UR STRIP.AUTO-MCNC: NEGATIVE MG/DL
NITRITE UR QL STRIP.AUTO: NEGATIVE
PH UR STRIP.AUTO: 6.5 [PH] (ref 5–8)
PROT UR STRIP.AUTO-MCNC: NEGATIVE MG/DL
SP GR UR STRIP.AUTO: 1.01 (ref 1–1.03)
UROBILINOGEN UR STRIP.AUTO-MCNC: 0.2 MG/DL

## 2023-08-02 PROCEDURE — 87186 SC STD MICRODIL/AGAR DIL: CPT | Performed by: EMERGENCY MEDICINE

## 2023-08-02 PROCEDURE — 81015 MICROSCOPIC EXAM OF URINE: CPT | Performed by: EMERGENCY MEDICINE

## 2023-08-02 PROCEDURE — 99284 EMERGENCY DEPT VISIT MOD MDM: CPT

## 2023-08-02 PROCEDURE — 51702 INSERT TEMP BLADDER CATH: CPT

## 2023-08-02 PROCEDURE — 87086 URINE CULTURE/COLONY COUNT: CPT | Performed by: EMERGENCY MEDICINE

## 2023-08-02 PROCEDURE — 87077 CULTURE AEROBIC IDENTIFY: CPT | Performed by: EMERGENCY MEDICINE

## 2023-08-02 PROCEDURE — 81001 URINALYSIS AUTO W/SCOPE: CPT | Performed by: EMERGENCY MEDICINE

## 2023-08-02 PROCEDURE — 51798 US URINE CAPACITY MEASURE: CPT

## 2023-08-02 NOTE — DISCHARGE SUMMARY
Discharge Summary     Cra Hurtado Patient Status:  Inpatient    5/15/1940 MRN [id number]   [facility] 3NE-A Attending No att. providers found   Hosp Day # 2 PCP Marky Ferreira MD     Date of Admission: 2023  Date of Discharge: 2023  Discharge Disposition: Home or Self Care    Discharge Diagnosis:   #Recurrent UTI-Klebsiella   -empiric abx  -urology eval  #Generalized Weakness  Continue IV abx     #AFib  Rate controlled     #CAD  Continue cardiac meds    History of Present Illness: 81year old female with history of chronic UTIs and neurological problems that lead the urologist to schedule a suprapubic catheter next month. Patient had at least 10 UTIs in the past year or so and she had urological procedures in the past.  She is also status post hysterectomy. She also has multiple antibiotic allergies. She presents emergency room today with generalized weakness fatigue dysuria frequency and not feeling well. Her UA is suggestive of UTI and she received antibiotics in the emergency room. Brief Synopsis: Patient improved with iv fluids and iv abx. Lace+ Score: 79  59-90 High Risk  29-58 Medium Risk  0-28   Low Risk       Procedures during hospitalization:       Incidental or significant findings and recommendations (brief descriptions):      Lab/Test results pending at Discharge:       Consultants:      Discharge Medication List:     Discharge Medications        START taking these medications        Instructions Prescription details   cefdinir 300 MG Caps  Commonly known as: Omnicef      Take twice daily for 14 days and then nightly until gone   Quantity: 35 capsule  Refills: 0            CONTINUE taking these medications        Instructions Prescription details   apixaban 2.5 MG Tabs  Commonly known as: Eliquis      Take 1 tablet (2.5 mg total) by mouth 2 (two) times daily. Quantity: 60 tablet  Refills: 3     CALCIUM + D OR      Take 1 tablet by mouth daily. Refills: 0     cetirizine 10 MG Tabs  Commonly known as: ZyrTEC      Take 1 tablet (10 mg total) by mouth daily as needed. Refills: 0     clopidogrel 75 MG Tabs  Commonly known as: Plavix      Take 1 tablet (75 mg total) by mouth daily. Quantity: 30 tablet  Refills: 11     dofetilide 125 MCG Caps  Commonly known as: Tikosyn      Take 1 capsule (125 mcg total) by mouth every 12 (twelve) hours. Quantity: 60 capsule  Refills: 3     estradiol 0.1 MG/GM Crea  Commonly known as: Estrace      Apply 1 gram vaginally 2-3 times per week. Quantity: 1 Tube  Refills: 3     ezetimibe 10 MG Tabs  Commonly known as: Zetia      TAKE 1 TABLET(10 MG) BY MOUTH EVERY NIGHT   Quantity: 90 tablet  Refills: 1     fluticasone propionate 50 MCG/ACT Susp  Commonly known as: Flonase      2 sprays by Each Nare route daily. Quantity: 11.1 mL  Refills: 3     gabapentin 100 MG Caps  Commonly known as: Neurontin      TAKE 2 CAPSULES(200 MG) BY MOUTH EVERY NIGHT   Quantity: 180 capsule  Refills: 0     levothyroxine 88 MCG Tabs  Commonly known as: Synthroid      Take 1 tablet (88 mcg total) by mouth daily. Quantity: 90 tablet  Refills: 0     metFORMIN  MG Tb24  Commonly known as: Glucophage XR      TAKE 1 TABLET(750 MG) BY MOUTH DAILY   Quantity: 90 tablet  Refills: 0     metoprolol tartrate 25 MG Tabs  Commonly known as: Lopressor      TAKE 1 TABLET(25 MG) BY MOUTH TWICE DAILY   Quantity: 180 tablet  Refills: 0     OneTouch Delica Plus [id number] Misc      1 each daily. Quantity: 100 each  Refills: 6     OneTouch Ultra Strp  Generic drug: Glucose Blood      TEST ONCE DAILY   Quantity: 100 strip  Refills: 0     pantoprazole 20 MG Tbec  Commonly known as: Protonix      Take 1 tablet (20 mg total) by mouth every morning before breakfast.   Refills: 0     pravastatin 10 MG Tabs  Commonly known as: Pravachol      Take 1 tablet (10 mg total) by mouth nightly.    Quantity: 90 tablet  Refills: 0            STOP taking these medications ciprofloxacin 500 MG Tabs  Commonly known as: Cipro        sodium polystyrene sulfonate 15 GM/60ML Susp  Commonly known as: Kayexalate        sulfamethoxazole-trimethoprim -160 MG Tabs per tablet  Commonly known as: Bactrim DS                  Where to Get Your Medications        These medications were sent to [facility] [address] [facility] [address] [place] [address], [phone], [phone]  Shruthi De Leon, Robel Rodrigues [phone]      Phone: [phone]   cefdinir 300 MG Caps         Follow-up appointment:   No follow-up provider specified. Appointments for Next 30 Days 8/2/2023 - 9/1/2023        Date Arrival Time Visit Type Length Department Provider     8/18/2023 12:30 PM  [facility] IVS CATH WATCHMAN SEDATE [[id number]] 120 min [facility] Interventional Suites [address] [place] [address] CATH LAB    Patient Instructions:     When you arrive[facility], then proceed to the ground floor of the [facility] and check in at the [facility] registration desk so we know you have arrived (even if you already completed eCheck-in online). Your physician or physician's representative will follow-up with you and your loved one by phone after you are discharged. A nurse from the Interventional Suites Department will be calling you prior to your procedure to perform a health history screening and give you instructions. The time the nurse assigns you to arrive will be one hour prior to your procedure. This will allow plenty of time to register and to get you prepared for your procedure. If you have any questions, please call [phone]. We are available Monday through Friday, 8:00 AM to 5:00 PM.      Location Instructions: Your appointment is scheduled in the Interventional Suites Department at [facility].&nbsp; The address is [facility] [address], [place].&nbsp;  To reach Registration, park in the [address] [place], [place] [address]way and enter the doors located on the ground floor. &nbsp; Proceed to Registration, located across from the [facility], to the left of the Information Desk. Appointment time you see in [address] is a temporary placeholder.&nbsp; Your arrival time will be updated the afternoon before your scheduled procedure. Supplementary Documentation:   Chris Johnston reviewed:     Vital signs:       Physical Exam:    General:  NAD  Cardiovascular:  S1, S2    -----------------------------------------------------------------------------------------------  PATIENT DISCHARGE INSTRUCTIONS: See electronic chart    Tip: Documentation requirements: For split shared discharge, BOTH providers need to document specific floor, unit, and time spent on the discharge. The note needs to be signed by the provider with > 50% of time and bill under their NPI.    Time spent:  30 minutes         Sissy Blackwell MD

## 2023-08-03 ENCOUNTER — PATIENT OUTREACH (OUTPATIENT)
Dept: CASE MANAGEMENT | Age: 83
End: 2023-08-03

## 2023-08-03 NOTE — PROGRESS NOTES
ED follow up, first attempt. (Dc 8/2)    Zakia Alfaro MD  UROLOGY, Pediatric Urology  [address]  [phone]  Friday 8/11 @3    Patient already has an appointment. Closing encounter.

## 2023-08-05 ENCOUNTER — HOSPITAL ENCOUNTER (INPATIENT)
Facility: HOSPITAL | Age: 83
LOS: 4 days | Discharge: HOME HEALTH | End: 2023-08-09
Attending: EMERGENCY MEDICINE | Admitting: STUDENT IN AN ORGANIZED HEALTH CARE EDUCATION/TRAINING PROGRAM
Payer: MEDICARE

## 2023-08-05 DIAGNOSIS — T83.511A URINARY TRACT INFECTION ASSOCIATED WITH INDWELLING URETHRAL CATHETER, INITIAL ENCOUNTER: Primary | ICD-10-CM

## 2023-08-05 DIAGNOSIS — N39.0 URINARY TRACT INFECTION ASSOCIATED WITH INDWELLING URETHRAL CATHETER, INITIAL ENCOUNTER: Primary | ICD-10-CM

## 2023-08-05 LAB
ALBUMIN SERPL-MCNC: 3.3 G/DL (ref 3.4–5)
ALBUMIN/GLOB SERPL: 0.8 {RATIO} (ref 1–2)
ALP LIVER SERPL-CCNC: 85 U/L
ALT SERPL-CCNC: 19 U/L
ANION GAP SERPL CALC-SCNC: 4 MMOL/L (ref 0–18)
AST SERPL-CCNC: 37 U/L (ref 15–37)
BASOPHILS # BLD AUTO: 0.04 X10(3) UL (ref 0–0.2)
BASOPHILS NFR BLD AUTO: 0.8 %
BILIRUB SERPL-MCNC: 0.4 MG/DL (ref 0.1–2)
BILIRUB UR QL STRIP.AUTO: NEGATIVE
BUN BLD-MCNC: 15 MG/DL (ref 7–18)
CALCIUM BLD-MCNC: 9 MG/DL (ref 8.5–10.1)
CHLORIDE SERPL-SCNC: 104 MMOL/L (ref 98–112)
CO2 SERPL-SCNC: 26 MMOL/L (ref 21–32)
CREAT BLD-MCNC: 0.73 MG/DL
EGFRCR SERPLBLD CKD-EPI 2021: 82 ML/MIN/1.73M2 (ref 60–?)
EOSINOPHIL # BLD AUTO: 0.05 X10(3) UL (ref 0–0.7)
EOSINOPHIL NFR BLD AUTO: 0.9 %
ERYTHROCYTE [DISTWIDTH] IN BLOOD BY AUTOMATED COUNT: 14.3 %
GLOBULIN PLAS-MCNC: 4.4 G/DL (ref 2.8–4.4)
GLUCOSE BLD-MCNC: 113 MG/DL (ref 70–99)
GLUCOSE BLD-MCNC: 93 MG/DL (ref 70–99)
GLUCOSE BLD-MCNC: 94 MG/DL (ref 70–99)
GLUCOSE UR STRIP.AUTO-MCNC: NEGATIVE MG/DL
HCT VFR BLD AUTO: 36.1 %
HGB BLD-MCNC: 11.5 G/DL
IMM GRANULOCYTES # BLD AUTO: 0.01 X10(3) UL (ref 0–1)
IMM GRANULOCYTES NFR BLD: 0.2 %
KETONES UR STRIP.AUTO-MCNC: NEGATIVE MG/DL
LYMPHOCYTES # BLD AUTO: 1.31 X10(3) UL (ref 1–4)
LYMPHOCYTES NFR BLD AUTO: 24.6 %
MCH RBC QN AUTO: 28.1 PG (ref 26–34)
MCHC RBC AUTO-ENTMCNC: 31.9 G/DL (ref 31–37)
MCV RBC AUTO: 88.3 FL
MONOCYTES # BLD AUTO: 0.61 X10(3) UL (ref 0.1–1)
MONOCYTES NFR BLD AUTO: 11.4 %
NEUTROPHILS # BLD AUTO: 3.31 X10 (3) UL (ref 1.5–7.7)
NEUTROPHILS # BLD AUTO: 3.31 X10(3) UL (ref 1.5–7.7)
NEUTROPHILS NFR BLD AUTO: 62.1 %
NITRITE UR QL STRIP.AUTO: NEGATIVE
OSMOLALITY SERPL CALC.SUM OF ELEC: 279 MOSM/KG (ref 275–295)
PH UR STRIP.AUTO: 7 [PH] (ref 5–8)
PLATELET # BLD AUTO: 220 10(3)UL (ref 150–450)
POTASSIUM SERPL-SCNC: 4.6 MMOL/L (ref 3.5–5.1)
PROT SERPL-MCNC: 7.7 G/DL (ref 6.4–8.2)
PROT UR STRIP.AUTO-MCNC: NEGATIVE MG/DL
RBC # BLD AUTO: 4.09 X10(6)UL
SODIUM SERPL-SCNC: 134 MMOL/L (ref 136–145)
SP GR UR STRIP.AUTO: 1 (ref 1–1.03)
UROBILINOGEN UR STRIP.AUTO-MCNC: <2 MG/DL
WBC # BLD AUTO: 5.3 X10(3) UL (ref 4–11)

## 2023-08-05 PROCEDURE — 99223 1ST HOSP IP/OBS HIGH 75: CPT | Performed by: HOSPITALIST

## 2023-08-05 RX ORDER — ENEMA 19; 7 G/133ML; G/133ML
1 ENEMA RECTAL ONCE AS NEEDED
Status: DISCONTINUED | OUTPATIENT
Start: 2023-08-05 | End: 2023-08-09

## 2023-08-05 RX ORDER — EZETIMIBE 10 MG/1
10 TABLET ORAL NIGHTLY
COMMUNITY

## 2023-08-05 RX ORDER — PRAVASTATIN SODIUM 10 MG
10 TABLET ORAL NIGHTLY
COMMUNITY
End: 2023-08-05

## 2023-08-05 RX ORDER — METOPROLOL SUCCINATE 25 MG/1
25 TABLET, EXTENDED RELEASE ORAL 2 TIMES DAILY
Status: DISCONTINUED | OUTPATIENT
Start: 2023-08-05 | End: 2023-08-09

## 2023-08-05 RX ORDER — MELATONIN
3 NIGHTLY PRN
Status: DISCONTINUED | OUTPATIENT
Start: 2023-08-05 | End: 2023-08-09

## 2023-08-05 RX ORDER — CETIRIZINE HYDROCHLORIDE 10 MG/1
10 TABLET ORAL DAILY
Status: DISCONTINUED | OUTPATIENT
Start: 2023-08-05 | End: 2023-08-09

## 2023-08-05 RX ORDER — FLUTICASONE PROPIONATE 50 MCG
2 SPRAY, SUSPENSION (ML) NASAL DAILY
Status: DISCONTINUED | OUTPATIENT
Start: 2023-08-05 | End: 2023-08-09

## 2023-08-05 RX ORDER — SENNOSIDES 8.6 MG
17.2 TABLET ORAL NIGHTLY PRN
Status: DISCONTINUED | OUTPATIENT
Start: 2023-08-05 | End: 2023-08-09

## 2023-08-05 RX ORDER — DOFETILIDE 0.12 MG/1
125 CAPSULE ORAL 2 TIMES DAILY
COMMUNITY

## 2023-08-05 RX ORDER — LEVOTHYROXINE SODIUM 88 UG/1
88 TABLET ORAL DAILY
Status: DISCONTINUED | OUTPATIENT
Start: 2023-08-05 | End: 2023-08-09

## 2023-08-05 RX ORDER — METOPROLOL SUCCINATE 25 MG/1
25 TABLET, EXTENDED RELEASE ORAL 2 TIMES DAILY
COMMUNITY
End: 2023-08-15

## 2023-08-05 RX ORDER — NICOTINE POLACRILEX 4 MG
30 LOZENGE BUCCAL
Status: DISCONTINUED | OUTPATIENT
Start: 2023-08-05 | End: 2023-08-09

## 2023-08-05 RX ORDER — GABAPENTIN 100 MG/1
200 CAPSULE ORAL NIGHTLY
COMMUNITY
End: 2023-08-26

## 2023-08-05 RX ORDER — EZETIMIBE 10 MG/1
10 TABLET ORAL NIGHTLY
COMMUNITY
End: 2023-08-05 | Stop reason: CLARIF

## 2023-08-05 RX ORDER — PANTOPRAZOLE SODIUM 40 MG/1
40 TABLET, DELAYED RELEASE ORAL DAILY
COMMUNITY

## 2023-08-05 RX ORDER — METFORMIN HYDROCHLORIDE 750 MG/1
750 TABLET, EXTENDED RELEASE ORAL DAILY
COMMUNITY
End: 2023-09-13

## 2023-08-05 RX ORDER — BISACODYL 10 MG
10 SUPPOSITORY, RECTAL RECTAL
Status: DISCONTINUED | OUTPATIENT
Start: 2023-08-05 | End: 2023-08-09

## 2023-08-05 RX ORDER — PANTOPRAZOLE SODIUM 40 MG/1
40 TABLET, DELAYED RELEASE ORAL DAILY
Status: DISCONTINUED | OUTPATIENT
Start: 2023-08-05 | End: 2023-08-09

## 2023-08-05 RX ORDER — EZETIMIBE 10 MG/1
10 TABLET ORAL NIGHTLY
Status: DISCONTINUED | OUTPATIENT
Start: 2023-08-05 | End: 2023-08-09

## 2023-08-05 RX ORDER — ECHINACEA PURPUREA EXTRACT 125 MG
1 TABLET ORAL
Status: DISCONTINUED | OUTPATIENT
Start: 2023-08-05 | End: 2023-08-09

## 2023-08-05 RX ORDER — CETIRIZINE HYDROCHLORIDE 10 MG/1
10 TABLET ORAL DAILY
COMMUNITY

## 2023-08-05 RX ORDER — DEXTROSE MONOHYDRATE 25 G/50ML
50 INJECTION, SOLUTION INTRAVENOUS
Status: DISCONTINUED | OUTPATIENT
Start: 2023-08-05 | End: 2023-08-09

## 2023-08-05 RX ORDER — GABAPENTIN 100 MG/1
200 CAPSULE ORAL NIGHTLY
Status: DISCONTINUED | OUTPATIENT
Start: 2023-08-05 | End: 2023-08-09

## 2023-08-05 RX ORDER — DOFETILIDE 0.12 MG/1
125 CAPSULE ORAL 2 TIMES DAILY
Status: DISCONTINUED | OUTPATIENT
Start: 2023-08-05 | End: 2023-08-09

## 2023-08-05 RX ORDER — ONDANSETRON 2 MG/ML
4 INJECTION INTRAMUSCULAR; INTRAVENOUS
Status: DISCONTINUED | OUTPATIENT
Start: 2023-08-05 | End: 2023-08-09

## 2023-08-05 RX ORDER — NICOTINE POLACRILEX 4 MG
15 LOZENGE BUCCAL
Status: DISCONTINUED | OUTPATIENT
Start: 2023-08-05 | End: 2023-08-09

## 2023-08-05 RX ORDER — SODIUM CHLORIDE 9 MG/ML
INJECTION, SOLUTION INTRAVENOUS CONTINUOUS
Status: DISCONTINUED | OUTPATIENT
Start: 2023-08-05 | End: 2023-08-07

## 2023-08-05 RX ORDER — ACETAMINOPHEN 500 MG
1000 TABLET ORAL EVERY 4 HOURS PRN
Status: DISCONTINUED | OUTPATIENT
Start: 2023-08-05 | End: 2023-08-09

## 2023-08-05 RX ORDER — POLYETHYLENE GLYCOL 3350 17 G/17G
17 POWDER, FOR SOLUTION ORAL DAILY PRN
Status: DISCONTINUED | OUTPATIENT
Start: 2023-08-05 | End: 2023-08-09

## 2023-08-05 RX ORDER — PRAVASTATIN SODIUM 10 MG
10 TABLET ORAL NIGHTLY
Status: DISCONTINUED | OUTPATIENT
Start: 2023-08-05 | End: 2023-08-09

## 2023-08-05 NOTE — ED QUICK NOTES
Orders for admission, patient is aware of plan and ready to go upstairs. Any questions, please call ED RN Celia Veloz RN at extension [phone].      Patient Covid vaccination status: Fully vaccinated     COVID Test Ordered in ED: None    COVID Suspicion at Admission: N/A    Running Infusions:    sodium chloride 125 mL/hr at 08/05/23 1532        Mental Status/LOC at time of transport: aaox4    Other pertinent information:   CIWA score: N/A   NIH score:  N/A

## 2023-08-05 NOTE — ED INITIAL ASSESSMENT (HPI)
Patient reports recurrent UTI since May 31st. Currently on cefdinir. Urine culture came back today and told to come back to change antibiotic.

## 2023-08-05 NOTE — PROGRESS NOTES
NURSING ADMISSION NOTE      Patient admitted via Cart  Oriented to room. Safety precautions initiated. Bed in low position. Call light in reach. Admitted from ER. Patient c/o moderate pain on lower abdomen. Patient came with Dominique that was changed in ER today. Patient's vital signs stable.

## 2023-08-05 NOTE — PROGRESS NOTES
ED Antibiotic Dose Adjustment by Pharmacy    cefepime (MAXIPIME) 2 g x1 dose has been ordered. Pharmacy has adjusted the dose to cefepime (MAXIPIME) 1 g x1 per hospital protocol.     Chikis Carreon, PharmD  Clinical Pharmacist Specialist- Emergency Medicine  [facility]  [phone]

## 2023-08-05 NOTE — ED NOTES
Patient already being treated with prophylactic cipro per ED MD note - patient to follow up with urology per ED MD note

## 2023-08-06 LAB
ATRIAL RATE: 70 BPM
GLUCOSE BLD-MCNC: 110 MG/DL (ref 70–99)
GLUCOSE BLD-MCNC: 129 MG/DL (ref 70–99)
GLUCOSE BLD-MCNC: 154 MG/DL (ref 70–99)
GLUCOSE BLD-MCNC: 171 MG/DL (ref 70–99)
P-R INTERVAL: 330 MS
Q-T INTERVAL: 478 MS
QRS DURATION: 102 MS
QTC CALCULATION (BEZET): 516 MS
R AXIS: -54 DEGREES
T AXIS: 11 DEGREES
VENTRICULAR RATE: 70 BPM

## 2023-08-06 PROCEDURE — 99232 SBSQ HOSP IP/OBS MODERATE 35: CPT | Performed by: HOSPITALIST

## 2023-08-06 NOTE — PLAN OF CARE
Patient is A/O x3, Tele placed. Dominique cath intact, denies any urinary discomfort on assessment. VSS and afebrile, denies any SOB or chest pain after dofetilide administration. ABT tolerated. Blood glucose WNL.      Problem: Diabetes/Glucose Control  Goal: Glucose maintained within prescribed range  Description: INTERVENTIONS:  - Monitor Blood Glucose as ordered  - Assess for signs and symptoms of hyperglycemia and hypoglycemia  - Administer ordered medications to maintain glucose within target range  - Assess barriers to adequate nutritional intake and initiate nutrition consult as needed  - Instruct patient on self management of diabetes  Outcome: Progressing     Problem: PAIN - ADULT  Goal: Verbalizes/displays adequate comfort level or patient's stated pain goal  Description: INTERVENTIONS:  - Encourage pt to monitor pain and request assistance  - Assess pain using appropriate pain scale  - Administer analgesics based on type and severity of pain and evaluate response  - Implement non-pharmacological measures as appropriate and evaluate response  - Consider cultural and social influences on pain and pain management  - Manage/alleviate anxiety  - Utilize distraction and/or relaxation techniques  - Monitor for opioid side effects  - Notify MD/LIP if interventions unsuccessful or patient reports new pain  - Anticipate increased pain with activity and pre-medicate as appropriate  Outcome: Progressing

## 2023-08-06 NOTE — PLAN OF CARE
Patient is A/O x4. VSS. Afebrile. Ambulatory. Chronic hooks draining yellow, clear urine. No complaints of pain. 1800 ADA diet; tolerating well. All medications given per [facility]. IVF and abx infusing per MAR. Safety precautions in place, call light within reach.     Problem: Diabetes/Glucose Control  Goal: Glucose maintained within prescribed range  Description: INTERVENTIONS:  - Monitor Blood Glucose as ordered  - Assess for signs and symptoms of hyperglycemia and hypoglycemia  - Administer ordered medications to maintain glucose within target range  - Assess barriers to adequate nutritional intake and initiate nutrition consult as needed  - Instruct patient on self management of diabetes  Outcome: Progressing     Problem: CARDIOVASCULAR - ADULT  Goal: Absence of cardiac arrhythmias or at baseline  Description: INTERVENTIONS:  - Continuous cardiac monitoring, monitor vital signs, obtain 12 lead EKG if indicated  - Evaluate effectiveness of antiarrhythmic and heart rate control medications as ordered  - Initiate emergency measures for life threatening arrhythmias  - Monitor electrolytes and administer replacement therapy as ordered  Outcome: Progressing     Problem: METABOLIC/FLUID AND ELECTROLYTES - ADULT  Goal: Electrolytes maintained within normal limits  Description: INTERVENTIONS:  - Monitor labs and rhythm and assess patient for signs and symptoms of electrolyte imbalances  - Administer electrolyte replacement as ordered  - Monitor response to electrolyte replacements, including rhythm and repeat lab results as appropriate  - Fluid restriction as ordered  - Instruct patient on fluid and nutrition restrictions as appropriate  Outcome: Progressing  Goal: Hemodynamic stability and optimal renal function maintained  Description: INTERVENTIONS:  - Monitor labs and assess for signs and symptoms of volume excess or deficit  - Monitor intake, output and patient weight  - Monitor urine specific gravity, serum osmolarity and serum sodium as indicated or ordered  - Monitor response to interventions for patient's volume status, including labs, urine output, blood pressure (other measures as available)  - Encourage oral intake as appropriate  - Instruct patient on fluid and nutrition restrictions as appropriate  Outcome: Progressing     Problem: METABOLIC/FLUID AND ELECTROLYTES - ADULT  Goal: Electrolytes maintained within normal limits  Description: INTERVENTIONS:  - Monitor labs and rhythm and assess patient for signs and symptoms of electrolyte imbalances  - Administer electrolyte replacement as ordered  - Monitor response to electrolyte replacements, including rhythm and repeat lab results as appropriate  - Fluid restriction as ordered  - Instruct patient on fluid and nutrition restrictions as appropriate  8/6/2023 1450 by Mariah Arnold RN  Outcome: Progressing  8/6/2023 1450 by Mariah Arnold RN  Outcome: Progressing  Goal: Hemodynamic stability and optimal renal function maintained  Description: INTERVENTIONS:  - Monitor labs and assess for signs and symptoms of volume excess or deficit  - Monitor intake, output and patient weight  - Monitor urine specific gravity, serum osmolarity and serum sodium as indicated or ordered  - Monitor response to interventions for patient's volume status, including labs, urine output, blood pressure (other measures as available)  - Encourage oral intake as appropriate  - Instruct patient on fluid and nutrition restrictions as appropriate  8/6/2023 1450 by Mariah Arnold RN  Outcome: Progressing  8/6/2023 1450 by Mariah Arnold RN  Outcome: Progressing

## 2023-08-06 NOTE — PHYSICAL THERAPY NOTE
PHYSICAL THERAPY EVALUATION - INPATIENT     Room Number: 406/406-A  Evaluation Date: 8/6/2023  Type of Evaluation: Initial  Physician Order: PT Eval and Treat    Presenting Problem: urinary symptoms  Co-Morbidities : UTI, a-fib, Breast Ca, HTN, Diabetes, Coronary disease, gastric reflux, hypothyroidism, hyperlipidemia  Reason for Therapy: Mobility Dysfunction and Discharge Planning    Last admission:  7/20-7/22 due to generalized weakness, fatigue, dysuria frequency, discharged home  ASSESSMENT   Pt is a 81year old female admitted on 8/5/2023 for urinary symptoms. Functional outcome measures completed include [facility]. The AM-PAC '6-Clicks' Inpatient Basic Mobility Short Form was completed and this patient is demonstrating a Approx Degree of Impairment: 0%  degree of impairment in mobility. Research supports that patients with this level of impairment may benefit from . PT Discharge Recommendations: Home      PLAN  Patient has been evaluated and presents with no skilled Physical Therapy needs at this time. Patient discharged from Physical Therapy services. Please re-order if a new functional limitation presents during this admission. GOALS  Patient was able to achieve the following goals . .. Patient was able to transfer At previous, functional level  Safely and independently   Patient able to ambulate on level surfaces At previous, functional level  Safely and independently         HOME SITUATION  Type of Home: House   Home Layout: Two level;Bed/bath upstairs  Stairs to Enter : 1     Stairs to Bedroom: 12  Railing: Yes    Lives With: Daughter  Drives: Yes  Patient Owned Equipment: Rolling walker       Prior Level of [place]: Patient is able to ambulate without device, is able to drive, independent with ADL/gait/self-care.     SUBJECTIVE  \"I will get up with you\"      OBJECTIVE  Precautions: None  Fall Risk: Standard fall risk    WEIGHT BEARING RESTRICTION  Weight Bearing Restriction: None PAIN ASSESSMENT  Ratin          COGNITION  Overall Cognitive Status:  WFL - within functional limits    RANGE OF MOTION AND STRENGTH ASSESSMENT  Upper extremity ROM and strength are within functional limits     Lower extremity ROM is within functional limits     Lower extremity strength is within functional limits       BALANCE  Static Sitting: Normal  Dynamic Sitting: Normal  Static Standing: Normal  Dynamic Standing: Normal    ADDITIONAL TESTS                                    ACTIVITY TOLERANCE                         O2 WALK       NEUROLOGICAL FINDINGS                        AM-PAC '6-Clicks' INPATIENT SHORT FORM - BASIC MOBILITY  How much difficulty does the patient currently have. .. Patient Difficulty: Turning over in bed (including adjusting bedclothes, sheets and blankets)?: None   Patient Difficulty: Sitting down on and standing up from a chair with arms (e.g., wheelchair, bedside commode, etc.): None   Patient Difficulty: Moving from lying on back to sitting on the side of the bed?: None   How much help from another person does the patient currently need. .. Help from Another: Moving to and from a bed to a chair (including a wheelchair)?: None   Help from Another: Need to walk in hospital room?: None   Help from Another: Climbing 3-5 steps with a railing?: None       AM-PAC Score:  Raw Score: 24   Approx Degree of Impairment: 0%   Standardized Score (AM-PAC Scale): 61.14   CMS Modifier (G-Code): CH    FUNCTIONAL ABILITY STATUS  Gait Assessment   Functional Mobility/Gait Assessment  Gait Assistance: Independent  Distance (ft): 190, 80  Assistive Device: None  Pattern: Within Functional Limits  Stairs: Stairs  How Many Stairs: 10  Device: 1 Rail  Assist: Independent  Pattern: Ascend and Descend  Ascend and Descend : Reciprocal    Skilled Therapy Provided     Bed Mobility:  Rolling: Independent  Supine to sit:  Independent   Sit to supine: Independent     Transfer Mobility:  Sit to stand: Independent   Stand to sit: Independent  Gait = Independent    Therapist's comments:RN approved session, patient was received in bed with HOB elevated. Patient performed supine<>sit, sit<<>stand without device with (-)difficulty, patient was able to ascend/descend 10 stairs ( 5 and 5 due to short IV line) reciprocal pattern using R rail to ascend. Patient is back in bed at the end of the session. Exercise/Education Provided:  Discussed role of PT, goals for the session, POC. Patient and daughter were encouraged to have patient continue to ambulate in the hallway while in-house to maintain current independent level and prevent decline in functional strength and endurance; both are in agreement and verbalized understanding. Patient End of Session: In bed; With [facility] staff;Needs met;Call light within reach;RN aware of session/findings; All patient questions and concerns addressed; Family present    Patient Evaluation Complexity Level:  History Moderate - 1 or 2 personal factors and/or co-morbidities   Examination of body systems Low - addressing 1-2 elements   Clinical Presentation Low - Stable   Clinical Decision Making Low Complexity       PT Session Time: 20 minutes  Gait Training: 10 minutes  Therapeutic Activity: 5 minutes  Neuromuscular Re-education:  minutes  Therapeutic Exercise:  minutes

## 2023-08-07 LAB
GLUCOSE BLD-MCNC: 121 MG/DL (ref 70–99)
GLUCOSE BLD-MCNC: 135 MG/DL (ref 70–99)
GLUCOSE BLD-MCNC: 148 MG/DL (ref 70–99)
GLUCOSE BLD-MCNC: 169 MG/DL (ref 70–99)

## 2023-08-07 PROCEDURE — 99232 SBSQ HOSP IP/OBS MODERATE 35: CPT | Performed by: HOSPITALIST

## 2023-08-07 RX ORDER — LANCETS 30 GAUGE
1 EACH MISCELLANEOUS DAILY
Qty: 100 EACH | Refills: 6 | Status: SHIPPED | OUTPATIENT
Start: 2023-08-07

## 2023-08-07 NOTE — PLAN OF CARE
Received pt a/o x3-4. VSS. Afebrile. Denies pain. IVF & IV abx infusing per MAR. Dominique catheter patent, draining clear yellow urine. Tolerating diet. Call light within reach. Safety precautions in place. Report given to oncoming RN, Brie Devries.      Problem: CARDIOVASCULAR - ADULT  Goal: Absence of cardiac arrhythmias or at baseline  Description: INTERVENTIONS:  - Continuous cardiac monitoring, monitor vital signs, obtain 12 lead EKG if indicated  - Evaluate effectiveness of antiarrhythmic and heart rate control medications as ordered  - Initiate emergency measures for life threatening arrhythmias  - Monitor electrolytes and administer replacement therapy as ordered  Outcome: Progressing     Problem: METABOLIC/FLUID AND ELECTROLYTES - ADULT  Goal: Electrolytes maintained within normal limits  Description: INTERVENTIONS:  - Monitor labs and rhythm and assess patient for signs and symptoms of electrolyte imbalances  - Administer electrolyte replacement as ordered  - Monitor response to electrolyte replacements, including rhythm and repeat lab results as appropriate  - Fluid restriction as ordered  - Instruct patient on fluid and nutrition restrictions as appropriate  Outcome: Progressing     Problem: PAIN - ADULT  Goal: Verbalizes/displays adequate comfort level or patient's stated pain goal  Description: INTERVENTIONS:  - Encourage pt to monitor pain and request assistance  - Assess pain using appropriate pain scale  - Administer analgesics based on type and severity of pain and evaluate response  - Implement non-pharmacological measures as appropriate and evaluate response  - Consider cultural and social influences on pain and pain management  - Manage/alleviate anxiety  - Utilize distraction and/or relaxation techniques  - Monitor for opioid side effects  - Notify MD/LIP if interventions unsuccessful or patient reports new pain  - Anticipate increased pain with activity and pre-medicate as appropriate  Outcome: Progressing

## 2023-08-07 NOTE — CDS QUERY
Dear Nohelia Gale,  Clinical information in the patient's medical record (provided below) includes documentation of urinary tract infection associated with indwelling joiner catheter by the E.R. physician. Based on your clinical judgement:  IS THE URINARY TRACT INFECTION ASSOCIATED WITH OR DUE TO CHRONIC INDWELLING JOINER CATHETER? [   x ] Yes, there is an association and/or causal relationship between the Urinary Tract Infection & Chronic Indwelling Joiner Catheter. [    ] No, there is no association and/or causal relationship between the Urinary Tract infection & Chronic Indwelling Joiner Catheter. [    ] Other (please specify): ______________________________________    Documentation from the Medical Record:  RISK FACTORS: Chronic Indwelling suprapubic joinre catheter, Recurrent UTI's  CLINICAL INDICATORS:  -URINE CULTURE: 50,000-99,000 CFU/ML Pseudomonas aeruginosa  -8/5 ED- Patient reports recurrent UTI since May 31st. Currently on cefdinir. Urine culture came back today and told to come back to change antibiotic.  -8/5 ED notes- Patient was seen in the emergency department 3 days ago. Patient has a Joiner catheter and was having suprapubic discomfort and feeling like she was retaining urine. She has a history of recurrent UTIs. She is scheduled for placement of suprapubic catheter. Bladder scanning there showed no urinary retention. Joiner catheter was replaced. Urinalysis revealed 20-50 WBCs. Patient was on cefdinir. Urine culture revealed significant colonies Pseudomonas sensitive to all antibiotic.  #Urinary tract infection associated with indwelling urethral catheter (POA)    -8/5-8/6 Hospitalist PN- H&P- #Acute Cystitis/pyelonephritis  CX: PSAR Allergy to levaquin/cipro -Cefepime  #Chronic joiner  Replaced -SPT scheduled for 8/11    TREATMENT: IVF, joiner catheter replacement, IV cefepime        For questions regarding this query, please contact Clinical Documentation : Becca Lebron RN [phone]                        THIS FORM IS A PERMANENT PART OF THE MEDICAL RECORD

## 2023-08-07 NOTE — CM/SW NOTE
SW noted that patient has a DC order in place. SW met with patient at bedside to discuss DC planning due to SW receiving a consult order for home IV ABX for patient. SW met with patient at bedside and her daughter was on the phone. Patient's daughter Vladimir Mondragon reported that she is appealing patient's DC due to multiple admissions with the same issue and her questions not being resolved. She reported that she does not feel comfortable with the IV ABX plan being that they have been doing the ABX at home and it has not worked. JULIA presented patient with DND and copy of Medicare Rights. Patient's daughter is calling Irchris Redmond to begin the appeal. SW notified CM and ER CM team as well as Medical records. SW will continue to follow for plan of care changes and remain available for any additional DC needs or concerns.      Carlos HICKMAN, [facility]  Discharge Planner   [id number]

## 2023-08-07 NOTE — VASCULAR ACCESS
Consulted to place midline for antibiotics on discharge. Arrived to pt's bedside. Pt and daughter refusing at this time. Pt's nurse, Aryan rivers.

## 2023-08-07 NOTE — OCCUPATIONAL THERAPY NOTE
OT orders received, chart reviewed. Attempted to see patient for OT eval this pm, however patient just arriving in bathroom, patient and daughter politely requesting therapist return tomorrow as patient wished to spend some time washing up at the sink. Will reattempt tomorrow as able. RN aware.

## 2023-08-07 NOTE — PLAN OF CARE
Pt. Alert and oriented, denies pain. Occasional facial tic noted. AV paced on monitor. Denies pain.  IV fluids infusing, hooks to gravity

## 2023-08-07 NOTE — CDS QUERY
Dear Hamzah Rosen,  Clinical information in the patient's medical record (provided below) includes documentation of urinary tract infection associated with indwelling joiner catheter by the E.R. physician. Based on your clinical judgement:  IS THE URINARY TRACT INFECTION ASSOCIATED WITH OR DUE TO CHRONIC INDWELLING JOINER CATHETER? [   x ] Yes, there is an association and/or causal relationship between the Urinary Tract Infection & Chronic Indwelling Joiner Catheter. [    ] No, there is no association and/or causal relationship between the Urinary Tract infection & Chronic Indwelling Joiner Catheter. [    ] Other (please specify): ______________________________________    Documentation from the Medical Record:  RISK FACTORS: Chronic Indwelling suprapubic joiner catheter, Recurrent UTI's  CLINICAL INDICATORS:  -URINE CULTURE: 50,000-99,000 CFU/ML Pseudomonas aeruginosa  -8/5 ED- Patient reports recurrent UTI since May 31st. Currently on cefdinir. Urine culture came back today and told to come back to change antibiotic.  -8/5 ED notes- Patient was seen in the emergency department 3 days ago. Patient has a Joiner catheter and was having suprapubic discomfort and feeling like she was retaining urine. She has a history of recurrent UTIs. She is scheduled for placement of suprapubic catheter. Bladder scanning there showed no urinary retention. Joiner catheter was replaced. Urinalysis revealed 20-50 WBCs. Patient was on cefdinir. Urine culture revealed significant colonies Pseudomonas sensitive to all antibiotic.  #Urinary tract infection associated with indwelling urethral catheter (POA)    -8/5-8/6 Hospitalist PN- H&P- #Acute Cystitis/pyelonephritis  CX: PSAR Allergy to levaquin/cipro -Cefepime  #Chronic joiner  Replaced -SPT scheduled for 8/11    TREATMENT: IVF, joiner catheter replacement, IV cefepime        For questions regarding this query, please contact Clinical Documentation : Cristy Galeazzi RN [phone]                        THIS FORM IS A PERMANENT PART OF THE MEDICAL RECORD

## 2023-08-07 NOTE — PLAN OF CARE
Pt a/o x4. VSS, remained afebrile. Meds given per MAR. No complaints of pain. Patient refused midline placement. Appealed discharge orders. IV abx infused. Ambulated halls, tolerated well. Dominique intact and draining. Patient has outpatient suprapubic catheter placement scheduled for Friday and per outpatient urologist to hold eliquis. Safety precautions in place. Call light within reach.      Problem: METABOLIC/FLUID AND ELECTROLYTES - ADULT  Goal: Electrolytes maintained within normal limits  Description: INTERVENTIONS:  - Monitor labs and rhythm and assess patient for signs and symptoms of electrolyte imbalances  - Administer electrolyte replacement as ordered  - Monitor response to electrolyte replacements, including rhythm and repeat lab results as appropriate  - Fluid restriction as ordered  - Instruct patient on fluid and nutrition restrictions as appropriate  Outcome: Progressing  Goal: Hemodynamic stability and optimal renal function maintained  Description: INTERVENTIONS:  - Monitor labs and assess for signs and symptoms of volume excess or deficit  - Monitor intake, output and patient weight  - Monitor urine specific gravity, serum osmolarity and serum sodium as indicated or ordered  - Monitor response to interventions for patient's volume status, including labs, urine output, blood pressure (other measures as available)  - Encourage oral intake as appropriate  - Instruct patient on fluid and nutrition restrictions as appropriate  Outcome: Progressing     Problem: CARDIOVASCULAR - ADULT  Goal: Absence of cardiac arrhythmias or at baseline  Description: INTERVENTIONS:  - Continuous cardiac monitoring, monitor vital signs, obtain 12 lead EKG if indicated  - Evaluate effectiveness of antiarrhythmic and heart rate control medications as ordered  - Initiate emergency measures for life threatening arrhythmias  - Monitor electrolytes and administer replacement therapy as ordered  Outcome: Progressing

## 2023-08-08 LAB
GLUCOSE BLD-MCNC: 119 MG/DL (ref 70–99)
GLUCOSE BLD-MCNC: 125 MG/DL (ref 70–99)
GLUCOSE BLD-MCNC: 133 MG/DL (ref 70–99)
GLUCOSE BLD-MCNC: 139 MG/DL (ref 70–99)

## 2023-08-08 PROCEDURE — 99232 SBSQ HOSP IP/OBS MODERATE 35: CPT | Performed by: HOSPITALIST

## 2023-08-08 NOTE — PLAN OF CARE
Pt A/O x4. VSS. Afebrile. Pt denies pain throughout day. Medications admin per MAR. Pt up to bathroom and chair w/ standby assist. Pt showered. Pt has hooks in place, draining. Daughter called and updated on POC. Pt to have midline placed per ID for eventual DC. Pt DC discussed w/ SW. Fall and safety precautions in place. Call light within reach.

## 2023-08-08 NOTE — CM/SW NOTE
Pt's appeal verdict received this PM. Following discussion with both Dr. Christine Yeboah and JULIA, pt agrees to midline placement and DC with IV antibiotics. Dr Gilma Iniguez called and orders placed for Midline placement. Spoke to Alec Lucas in Vascular Access re: need for placement early tomorrow, as pt will need to discharge by 12:00 tomorrow to avoid financial responsibility. Infusion referrals sent via [facility]. Will need Midline information attached once available. Home Health referrals sent as well. JULIA Hanson to follow up in the am to secure [place] and IV infusion services/ assist w/ discharge.     BRANDON AngelesN, [facility]    [id number]

## 2023-08-08 NOTE — CM/SW NOTE
SW received notice that patient's appeal verdict has been determined by Haritha Cazares. Appeal verdict is in agreement with discharge. SW met with patient at bedside and patient's daughter Blaine Ramirez was called in via phone call. SW presented them with [facility] letter regarding payment for non-covered stay as of tomorrow at 12pm. Patient has until then to DC. Patient is now agreeable with Midline placement and home infusions. VALERY Galeano sent referrals for [facility] and Home IV ABX and JLUIA will follow up with providers tomorrow for acceptance. SW will continue to follow for plan of care changes and remain available for any additional DC needs or concerns.      Pennie Ferreira MSW, [facility]  Discharge Planner   [id number]

## 2023-08-08 NOTE — OCCUPATIONAL THERAPY NOTE
OT orders received per functional mobility screening protocol. Chart reviewed and case discussed w/ nursing team.  Patient is up ad patrice in room and completing self-care ad patrice. Patient's daughter is very involved in her care. Patient presents with no immediate skilled OT needs at this time. Will sign off.

## 2023-08-08 NOTE — PLAN OF CARE
Pt A&Ox4 on room air, no complaints of pain, tolerating medications well per mar. Holding eliquis per urology for out pt procedure. Has facial and arm tremors. Pt IV infiltrated, was really upset that she needed a new one. Placed new IV, pt okay now. RT arm precautions. IV abx given. Call light within reach, frequent checks made, needs met.          Problem: Diabetes/Glucose Control  Goal: Glucose maintained within prescribed range  Description: INTERVENTIONS:  - Monitor Blood Glucose as ordered  - Assess for signs and symptoms of hyperglycemia and hypoglycemia  - Administer ordered medications to maintain glucose within target range  - Assess barriers to adequate nutritional intake and initiate nutrition consult as needed  - Instruct patient on self management of diabetes  Outcome: Progressing     Problem: CARDIOVASCULAR - ADULT  Goal: Absence of cardiac arrhythmias or at baseline  Description: INTERVENTIONS:  - Continuous cardiac monitoring, monitor vital signs, obtain 12 lead EKG if indicated  - Evaluate effectiveness of antiarrhythmic and heart rate control medications as ordered  - Initiate emergency measures for life threatening arrhythmias  - Monitor electrolytes and administer replacement therapy as ordered  Outcome: Progressing     Problem: METABOLIC/FLUID AND ELECTROLYTES - ADULT  Goal: Electrolytes maintained within normal limits  Description: INTERVENTIONS:  - Monitor labs and rhythm and assess patient for signs and symptoms of electrolyte imbalances  - Administer electrolyte replacement as ordered  - Monitor response to electrolyte replacements, including rhythm and repeat lab results as appropriate  - Fluid restriction as ordered  - Instruct patient on fluid and nutrition restrictions as appropriate  Outcome: Progressing  Goal: Hemodynamic stability and optimal renal function maintained  Description: INTERVENTIONS:  - Monitor labs and assess for signs and symptoms of volume excess or deficit  - Monitor intake, output and patient weight  - Monitor urine specific gravity, serum osmolarity and serum sodium as indicated or ordered  - Monitor response to interventions for patient's volume status, including labs, urine output, blood pressure (other measures as available)  - Encourage oral intake as appropriate  - Instruct patient on fluid and nutrition restrictions as appropriate  Outcome: Progressing     Problem: PAIN - ADULT  Goal: Verbalizes/displays adequate comfort level or patient's stated pain goal  Description: INTERVENTIONS:  - Encourage pt to monitor pain and request assistance  - Assess pain using appropriate pain scale  - Administer analgesics based on type and severity of pain and evaluate response  - Implement non-pharmacological measures as appropriate and evaluate response  - Consider cultural and social influences on pain and pain management  - Manage/alleviate anxiety  - Utilize distraction and/or relaxation techniques  - Monitor for opioid side effects  - Notify MD/LIP if interventions unsuccessful or patient reports new pain  - Anticipate increased pain with activity and pre-medicate as appropriate  Outcome: Progressing

## 2023-08-09 VITALS
BODY MASS INDEX: 25.54 KG/M2 | WEIGHT: 138.81 LBS | HEIGHT: 62 IN | SYSTOLIC BLOOD PRESSURE: 150 MMHG | TEMPERATURE: 98 F | DIASTOLIC BLOOD PRESSURE: 58 MMHG | RESPIRATION RATE: 18 BRPM | HEART RATE: 69 BPM | OXYGEN SATURATION: 97 %

## 2023-08-09 LAB
GLUCOSE BLD-MCNC: 113 MG/DL (ref 70–99)
GLUCOSE BLD-MCNC: 136 MG/DL (ref 70–99)

## 2023-08-09 PROCEDURE — 99239 HOSP IP/OBS DSCHRG MGMT >30: CPT | Performed by: HOSPITALIST

## 2023-08-09 PROCEDURE — 05HY33Z INSERTION OF INFUSION DEVICE INTO UPPER VEIN, PERCUTANEOUS APPROACH: ICD-10-PCS | Performed by: HOSPITALIST

## 2023-08-09 NOTE — PLAN OF CARE
Pt A/O x4. VSS. Afebrile. Pt denies pain throughout morning. Medications admin per MAR. Pt had midline placed per order. Pt to DC w/ shreyas and IV abx at home w/ Elie Higgins. Discussed w/ SW.  Pt ambulated safely in . Chronic hooks in place. Fall and safety precautions in place. Call light within reach.

## 2023-08-09 NOTE — CM/SW NOTE
SW met with patient at bedside to provider her with information for [facility] accepting agencies and also for [facility]. Patient is agreeable to working with [facility] at [address]. SW reserved their services and request IV ABX delivery tonight and start of care for home health services tomorrow morning with [facility].     Patient reported that her friend will be transporting her home at 11:30am this morning. RN aware. Update: JULIA confirmed [facility] is able to complete [facility] tomorrow morning and IV ABX delivery is scheduled for tomorrow between 9-11am by [facility]. SW will continue to follow for plan of care changes and remain available for any additional DC needs or concerns.      Neil HICKMAN, [facility]  Discharge Planner   [id number]

## 2023-08-09 NOTE — PROGRESS NOTES
NURSING DISCHARGE NOTE    Discharged Home via Wheelchair. Accompanied by Support staff  Belongings Taken by patient/family. Pt discharged home w/ HH at approx. 1215. AVS discussed w/ pt. All belongings sent w/ pt.

## 2023-08-09 NOTE — HOME CARE LIAISON
Received referral via [facility] for Home Health services. Spoke w/ patient at the bedside and  VALERY Hanson provided with list of Jarred Wiley providers from [facility], choice is Susannah Albarran . Agency reserved in [facility] and contact information placed on AVS. Financial interest disclosure provided. Discussed IV medication and who will be in the home for education and teaching for IV abx.  Daughter is agreeable as well for all services 2 weeks

## 2023-08-09 NOTE — DISCHARGE INSTRUCTIONS
Sometimes managing your health at home requires assistance. The [facility] team has recognized your preference to use [facility]. They can be reached by phone at [phone]. The fax number for your reference is [phone]. . A representative from the home health agency will contact you or your family to schedule your first visit.       [facility]  [phone]

## 2023-08-10 ENCOUNTER — PATIENT OUTREACH (OUTPATIENT)
Dept: CASE MANAGEMENT | Age: 83
End: 2023-08-10

## 2023-08-10 ENCOUNTER — TELEPHONE (OUTPATIENT)
Dept: INTERNAL MEDICINE CLINIC | Facility: CLINIC | Age: 83
End: 2023-08-10

## 2023-08-10 DIAGNOSIS — Z02.9 ENCOUNTERS FOR UNSPECIFIED ADMINISTRATIVE PURPOSE: Primary | ICD-10-CM

## 2023-08-10 NOTE — PROGRESS NOTES
LM for pt to call [facility] for TCM since discharge. NCM phone number was provided for pt to call back. [facility]CC contact information was provided for pt to call back as well.

## 2023-08-14 ENCOUNTER — LAB REQUISITION (OUTPATIENT)
Dept: LAB | Facility: HOSPITAL | Age: 83
End: 2023-08-14
Payer: MEDICARE

## 2023-08-14 DIAGNOSIS — T83.511A INFECTION AND INFLAMMATORY REACTION DUE TO INDWELLING URETHRAL CATHETER, INITIAL ENCOUNTER (HCC): ICD-10-CM

## 2023-08-14 LAB
ANION GAP SERPL CALC-SCNC: 3 MMOL/L (ref 0–18)
BASOPHILS # BLD AUTO: 0.05 X10(3) UL (ref 0–0.2)
BASOPHILS NFR BLD AUTO: 0.9 %
BUN BLD-MCNC: 15 MG/DL (ref 7–18)
CALCIUM BLD-MCNC: 9.3 MG/DL (ref 8.5–10.1)
CHLORIDE SERPL-SCNC: 102 MMOL/L (ref 98–112)
CO2 SERPL-SCNC: 27 MMOL/L (ref 21–32)
CREAT BLD-MCNC: 0.72 MG/DL
EGFRCR SERPLBLD CKD-EPI 2021: 83 ML/MIN/1.73M2 (ref 60–?)
EOSINOPHIL # BLD AUTO: 0.06 X10(3) UL (ref 0–0.7)
EOSINOPHIL NFR BLD AUTO: 1.1 %
ERYTHROCYTE [DISTWIDTH] IN BLOOD BY AUTOMATED COUNT: 14.5 %
GLUCOSE BLD-MCNC: 159 MG/DL (ref 70–99)
HCT VFR BLD AUTO: 34.6 %
HGB BLD-MCNC: 11.1 G/DL
IMM GRANULOCYTES # BLD AUTO: 0.01 X10(3) UL (ref 0–1)
IMM GRANULOCYTES NFR BLD: 0.2 %
LYMPHOCYTES # BLD AUTO: 1.3 X10(3) UL (ref 1–4)
LYMPHOCYTES NFR BLD AUTO: 23.9 %
MCH RBC QN AUTO: 28 PG (ref 26–34)
MCHC RBC AUTO-ENTMCNC: 32.1 G/DL (ref 31–37)
MCV RBC AUTO: 87.4 FL
MONOCYTES # BLD AUTO: 0.52 X10(3) UL (ref 0.1–1)
MONOCYTES NFR BLD AUTO: 9.5 %
NEUTROPHILS # BLD AUTO: 3.51 X10 (3) UL (ref 1.5–7.7)
NEUTROPHILS # BLD AUTO: 3.51 X10(3) UL (ref 1.5–7.7)
NEUTROPHILS NFR BLD AUTO: 64.4 %
OSMOLALITY SERPL CALC.SUM OF ELEC: 278 MOSM/KG (ref 275–295)
PLATELET # BLD AUTO: 220 10(3)UL (ref 150–450)
POTASSIUM SERPL-SCNC: 4.2 MMOL/L (ref 3.5–5.1)
RBC # BLD AUTO: 3.96 X10(6)UL
SODIUM SERPL-SCNC: 132 MMOL/L (ref 136–145)
WBC # BLD AUTO: 5.5 X10(3) UL (ref 4–11)

## 2023-08-14 PROCEDURE — 85025 COMPLETE CBC W/AUTO DIFF WBC: CPT | Performed by: INTERNAL MEDICINE

## 2023-08-14 PROCEDURE — 80048 BASIC METABOLIC PNL TOTAL CA: CPT | Performed by: INTERNAL MEDICINE

## 2023-08-15 ENCOUNTER — OFFICE VISIT (OUTPATIENT)
Dept: INTERNAL MEDICINE CLINIC | Facility: CLINIC | Age: 83
End: 2023-08-15
Payer: MEDICARE

## 2023-08-15 VITALS
HEART RATE: 70 BPM | BODY MASS INDEX: 25.33 KG/M2 | WEIGHT: 137.63 LBS | RESPIRATION RATE: 16 BRPM | HEIGHT: 62 IN | TEMPERATURE: 98 F | OXYGEN SATURATION: 98 %

## 2023-08-15 DIAGNOSIS — E11.65 TYPE 2 DIABETES MELLITUS WITH HYPERGLYCEMIA, WITHOUT LONG-TERM CURRENT USE OF INSULIN (HCC): Chronic | ICD-10-CM

## 2023-08-15 DIAGNOSIS — N30.00 ACUTE CYSTITIS WITHOUT HEMATURIA: Primary | ICD-10-CM

## 2023-08-15 PROCEDURE — 99495 TRANSJ CARE MGMT MOD F2F 14D: CPT | Performed by: INTERNAL MEDICINE

## 2023-08-15 RX ORDER — METOPROLOL SUCCINATE 25 MG/1
25 TABLET, EXTENDED RELEASE ORAL 2 TIMES DAILY
Qty: 180 TABLET | Refills: 0 | Status: SHIPPED | OUTPATIENT
Start: 2023-08-15

## 2023-08-15 RX ORDER — CEFEPIME HYDROCHLORIDE 2 G/1
1 INJECTION, POWDER, FOR SOLUTION INTRAVENOUS EVERY 12 HOURS
COMMUNITY
Start: 2023-08-09 | End: 2023-08-15

## 2023-08-15 NOTE — PATIENT INSTRUCTIONS
All meds verified updated lab work discussed with patient. Had discussion with patient about her diabetes. Close monitoring of the sugar should be done. See me as needed.   Follow-up with consults per the recommendations

## 2023-08-18 ENCOUNTER — ANESTHESIA EVENT (OUTPATIENT)
Dept: INTERVENTIONAL RADIOLOGY/VASCULAR | Facility: HOSPITAL | Age: 83
End: 2023-08-18
Payer: MEDICARE

## 2023-08-18 ENCOUNTER — HOSPITAL ENCOUNTER (INPATIENT)
Dept: CV DIAGNOSTICS | Facility: HOSPITAL | Age: 83
Discharge: HOME | End: 2023-08-18
Attending: INTERNAL MEDICINE
Payer: MEDICARE

## 2023-08-18 ENCOUNTER — HOSPITAL ENCOUNTER (INPATIENT)
Dept: INTERVENTIONAL RADIOLOGY/VASCULAR | Facility: HOSPITAL | Age: 83
LOS: 1 days | Discharge: HOME | End: 2023-08-18
Attending: INTERNAL MEDICINE | Admitting: INTERNAL MEDICINE
Payer: MEDICARE

## 2023-08-18 VITALS
OXYGEN SATURATION: 99 % | HEIGHT: 62 IN | BODY MASS INDEX: 25.55 KG/M2 | HEART RATE: 72 BPM | RESPIRATION RATE: 16 BRPM | DIASTOLIC BLOOD PRESSURE: 72 MMHG | SYSTOLIC BLOOD PRESSURE: 139 MMHG | TEMPERATURE: 97 F | WEIGHT: 138.88 LBS

## 2023-08-18 DIAGNOSIS — I48.91 A-FIB (HCC): ICD-10-CM

## 2023-08-18 LAB
GLUCOSE BLD-MCNC: 118 MG/DL (ref 70–99)
ISTAT ACTIVATED CLOTTING TIME: 263 SECONDS (ref 74–137)

## 2023-08-18 PROCEDURE — 33340 PERQ CLSR TCAT L ATR APNDGE: CPT | Performed by: INTERNAL MEDICINE

## 2023-08-18 PROCEDURE — 93312 ECHO TRANSESOPHAGEAL: CPT | Performed by: ANESTHESIOLOGY

## 2023-08-18 PROCEDURE — 02L73DK OCCLUSION OF LEFT ATRIAL APPENDAGE WITH INTRALUMINAL DEVICE, PERCUTANEOUS APPROACH: ICD-10-PCS | Performed by: INTERNAL MEDICINE

## 2023-08-18 PROCEDURE — 82962 GLUCOSE BLOOD TEST: CPT

## 2023-08-18 PROCEDURE — 93355 ECHO TRANSESOPHAGEAL (TEE): CPT | Performed by: INTERNAL MEDICINE

## 2023-08-18 PROCEDURE — 85347 COAGULATION TIME ACTIVATED: CPT

## 2023-08-18 PROCEDURE — B245ZZ4 ULTRASONOGRAPHY OF LEFT HEART, TRANSESOPHAGEAL: ICD-10-PCS | Performed by: INTERNAL MEDICINE

## 2023-08-18 RX ORDER — ONDANSETRON 2 MG/ML
4 INJECTION INTRAMUSCULAR; INTRAVENOUS EVERY 6 HOURS PRN
Status: DISCONTINUED | OUTPATIENT
Start: 2023-08-18 | End: 2023-08-18 | Stop reason: HOSPADM

## 2023-08-18 RX ORDER — NICOTINE POLACRILEX 4 MG
30 LOZENGE BUCCAL
Status: DISCONTINUED | OUTPATIENT
Start: 2023-08-18 | End: 2023-08-18 | Stop reason: HOSPADM

## 2023-08-18 RX ORDER — PHENYLEPHRINE HCL 10 MG/ML
VIAL (ML) INJECTION AS NEEDED
Status: DISCONTINUED | OUTPATIENT
Start: 2023-08-18 | End: 2023-08-18 | Stop reason: SURG

## 2023-08-18 RX ORDER — HYDROMORPHONE HYDROCHLORIDE 1 MG/ML
0.4 INJECTION, SOLUTION INTRAMUSCULAR; INTRAVENOUS; SUBCUTANEOUS EVERY 5 MIN PRN
Status: DISCONTINUED | OUTPATIENT
Start: 2023-08-18 | End: 2023-08-18 | Stop reason: HOSPADM

## 2023-08-18 RX ORDER — METOCLOPRAMIDE HYDROCHLORIDE 5 MG/ML
10 INJECTION INTRAMUSCULAR; INTRAVENOUS EVERY 8 HOURS PRN
Status: DISCONTINUED | OUTPATIENT
Start: 2023-08-18 | End: 2023-08-18 | Stop reason: HOSPADM

## 2023-08-18 RX ORDER — CLOPIDOGREL BISULFATE 75 MG/1
75 TABLET ORAL DAILY
Status: DISCONTINUED | OUTPATIENT
Start: 2023-08-19 | End: 2023-08-18

## 2023-08-18 RX ORDER — NICOTINE POLACRILEX 4 MG
15 LOZENGE BUCCAL
Status: DISCONTINUED | OUTPATIENT
Start: 2023-08-18 | End: 2023-08-18 | Stop reason: HOSPADM

## 2023-08-18 RX ORDER — HYDROMORPHONE HYDROCHLORIDE 1 MG/ML
0.2 INJECTION, SOLUTION INTRAMUSCULAR; INTRAVENOUS; SUBCUTANEOUS EVERY 5 MIN PRN
Status: DISCONTINUED | OUTPATIENT
Start: 2023-08-18 | End: 2023-08-18 | Stop reason: HOSPADM

## 2023-08-18 RX ORDER — PROTAMINE SULFATE 10 MG/ML
INJECTION, SOLUTION INTRAVENOUS AS NEEDED
Status: DISCONTINUED | OUTPATIENT
Start: 2023-08-18 | End: 2023-08-18 | Stop reason: SURG

## 2023-08-18 RX ORDER — HEPARIN SODIUM 5000 [USP'U]/ML
INJECTION, SOLUTION INTRAVENOUS; SUBCUTANEOUS AS NEEDED
Status: DISCONTINUED | OUTPATIENT
Start: 2023-08-18 | End: 2023-08-18 | Stop reason: SURG

## 2023-08-18 RX ORDER — ASPIRIN 81 MG/1
81 TABLET ORAL DAILY
Qty: 30 TABLET | Refills: 0 | Status: SHIPPED | OUTPATIENT
Start: 2023-08-19

## 2023-08-18 RX ORDER — DEXTROSE MONOHYDRATE 25 G/50ML
50 INJECTION, SOLUTION INTRAVENOUS
Status: DISCONTINUED | OUTPATIENT
Start: 2023-08-18 | End: 2023-08-18 | Stop reason: HOSPADM

## 2023-08-18 RX ORDER — METOPROLOL TARTRATE 5 MG/5ML
2.5 INJECTION INTRAVENOUS ONCE
Status: DISCONTINUED | OUTPATIENT
Start: 2023-08-18 | End: 2023-08-18 | Stop reason: HOSPADM

## 2023-08-18 RX ORDER — CLOPIDOGREL BISULFATE 75 MG/1
75 TABLET ORAL DAILY
Qty: 30 TABLET | Refills: 5 | Status: SHIPPED | OUTPATIENT
Start: 2023-08-18

## 2023-08-18 RX ORDER — MEPERIDINE HYDROCHLORIDE 25 MG/ML
12.5 INJECTION INTRAMUSCULAR; INTRAVENOUS; SUBCUTANEOUS AS NEEDED
Status: DISCONTINUED | OUTPATIENT
Start: 2023-08-18 | End: 2023-08-18 | Stop reason: HOSPADM

## 2023-08-18 RX ORDER — LIDOCAINE HYDROCHLORIDE 10 MG/ML
INJECTION, SOLUTION EPIDURAL; INFILTRATION; INTRACAUDAL; PERINEURAL AS NEEDED
Status: DISCONTINUED | OUTPATIENT
Start: 2023-08-18 | End: 2023-08-18 | Stop reason: SURG

## 2023-08-18 RX ORDER — HYDROCODONE BITARTRATE AND ACETAMINOPHEN 5; 325 MG/1; MG/1
1 TABLET ORAL ONCE AS NEEDED
Status: DISCONTINUED | OUTPATIENT
Start: 2023-08-18 | End: 2023-08-18 | Stop reason: HOSPADM

## 2023-08-18 RX ORDER — SODIUM CHLORIDE, SODIUM LACTATE, POTASSIUM CHLORIDE, CALCIUM CHLORIDE 600; 310; 30; 20 MG/100ML; MG/100ML; MG/100ML; MG/100ML
INJECTION, SOLUTION INTRAVENOUS CONTINUOUS
Status: DISCONTINUED | OUTPATIENT
Start: 2023-08-18 | End: 2023-08-18 | Stop reason: HOSPADM

## 2023-08-18 RX ORDER — HYDROCODONE BITARTRATE AND ACETAMINOPHEN 5; 325 MG/1; MG/1
2 TABLET ORAL ONCE AS NEEDED
Status: DISCONTINUED | OUTPATIENT
Start: 2023-08-18 | End: 2023-08-18 | Stop reason: HOSPADM

## 2023-08-18 RX ORDER — HYDROMORPHONE HYDROCHLORIDE 1 MG/ML
0.6 INJECTION, SOLUTION INTRAMUSCULAR; INTRAVENOUS; SUBCUTANEOUS EVERY 5 MIN PRN
Status: DISCONTINUED | OUTPATIENT
Start: 2023-08-18 | End: 2023-08-18 | Stop reason: HOSPADM

## 2023-08-18 RX ORDER — IODIXANOL 320 MG/ML
100 INJECTION, SOLUTION INTRAVASCULAR
Status: DISCONTINUED | OUTPATIENT
Start: 2023-08-18 | End: 2023-08-18

## 2023-08-18 RX ORDER — NALOXONE HYDROCHLORIDE 0.4 MG/ML
80 INJECTION, SOLUTION INTRAMUSCULAR; INTRAVENOUS; SUBCUTANEOUS AS NEEDED
Status: DISCONTINUED | OUTPATIENT
Start: 2023-08-18 | End: 2023-08-18 | Stop reason: HOSPADM

## 2023-08-18 RX ORDER — ACETAMINOPHEN 325 MG/1
650 TABLET ORAL EVERY 4 HOURS PRN
Status: DISCONTINUED | OUTPATIENT
Start: 2023-08-18 | End: 2023-08-18

## 2023-08-18 RX ORDER — CEFAZOLIN SODIUM/WATER 2 G/20 ML
SYRINGE (ML) INTRAVENOUS AS NEEDED
Status: DISCONTINUED | OUTPATIENT
Start: 2023-08-18 | End: 2023-08-18 | Stop reason: SURG

## 2023-08-18 RX ORDER — LABETALOL HYDROCHLORIDE 5 MG/ML
5 INJECTION, SOLUTION INTRAVENOUS EVERY 5 MIN PRN
Status: DISCONTINUED | OUTPATIENT
Start: 2023-08-18 | End: 2023-08-18 | Stop reason: HOSPADM

## 2023-08-18 RX ORDER — ACETAMINOPHEN AND CODEINE PHOSPHATE 300; 30 MG/1; MG/1
1 TABLET ORAL EVERY 4 HOURS PRN
Status: DISCONTINUED | OUTPATIENT
Start: 2023-08-18 | End: 2023-08-18

## 2023-08-18 RX ORDER — SODIUM CHLORIDE 9 MG/ML
INJECTION, SOLUTION INTRAVENOUS CONTINUOUS
Status: DISCONTINUED | OUTPATIENT
Start: 2023-08-18 | End: 2023-08-18

## 2023-08-18 RX ORDER — ROCURONIUM BROMIDE 10 MG/ML
INJECTION, SOLUTION INTRAVENOUS AS NEEDED
Status: DISCONTINUED | OUTPATIENT
Start: 2023-08-18 | End: 2023-08-18 | Stop reason: SURG

## 2023-08-18 RX ORDER — ASPIRIN 81 MG/1
81 TABLET ORAL DAILY
Status: DISCONTINUED | OUTPATIENT
Start: 2023-08-19 | End: 2023-08-18

## 2023-08-18 RX ORDER — ACETAMINOPHEN 500 MG
1000 TABLET ORAL ONCE AS NEEDED
Status: DISCONTINUED | OUTPATIENT
Start: 2023-08-18 | End: 2023-08-18 | Stop reason: HOSPADM

## 2023-08-18 RX ORDER — PROTAMINE SULFATE 10 MG/ML
INJECTION, SOLUTION INTRAVENOUS
Status: COMPLETED
Start: 2023-08-18 | End: 2023-08-18

## 2023-08-18 RX ORDER — LIDOCAINE HYDROCHLORIDE 10 MG/ML
INJECTION, SOLUTION EPIDURAL; INFILTRATION; INTRACAUDAL; PERINEURAL
Status: COMPLETED
Start: 2023-08-18 | End: 2023-08-18

## 2023-08-18 RX ORDER — ACETAMINOPHEN AND CODEINE PHOSPHATE 300; 30 MG/1; MG/1
2 TABLET ORAL EVERY 4 HOURS PRN
Status: DISCONTINUED | OUTPATIENT
Start: 2023-08-18 | End: 2023-08-18

## 2023-08-18 RX ORDER — MIDAZOLAM HYDROCHLORIDE 1 MG/ML
1 INJECTION INTRAMUSCULAR; INTRAVENOUS EVERY 5 MIN PRN
Status: DISCONTINUED | OUTPATIENT
Start: 2023-08-18 | End: 2023-08-18 | Stop reason: HOSPADM

## 2023-08-18 RX ORDER — HEPARIN SODIUM 5000 [USP'U]/ML
INJECTION, SOLUTION INTRAVENOUS; SUBCUTANEOUS
Status: COMPLETED
Start: 2023-08-18 | End: 2023-08-18

## 2023-08-18 RX ADMIN — ROCURONIUM BROMIDE 30 MG: 10 INJECTION, SOLUTION INTRAVENOUS at 12:07:00

## 2023-08-18 RX ADMIN — LIDOCAINE HYDROCHLORIDE 50 MG: 10 INJECTION, SOLUTION EPIDURAL; INFILTRATION; INTRACAUDAL; PERINEURAL at 12:07:00

## 2023-08-18 RX ADMIN — CEFAZOLIN SODIUM/WATER 2 G: 2 G/20 ML SYRINGE (ML) INTRAVENOUS at 12:23:00

## 2023-08-18 RX ADMIN — HEPARIN SODIUM 5000 UNITS: 5000 INJECTION, SOLUTION INTRAVENOUS; SUBCUTANEOUS at 12:37:00

## 2023-08-18 RX ADMIN — PHENYLEPHRINE HCL 100 MCG: 10 MG/ML VIAL (ML) INJECTION at 12:58:00

## 2023-08-18 RX ADMIN — PHENYLEPHRINE HCL 200 MCG: 10 MG/ML VIAL (ML) INJECTION at 12:41:00

## 2023-08-18 RX ADMIN — HEPARIN SODIUM 3000 UNITS: 5000 INJECTION, SOLUTION INTRAVENOUS; SUBCUTANEOUS at 12:58:00

## 2023-08-18 RX ADMIN — PROTAMINE SULFATE 50 MG: 10 INJECTION, SOLUTION INTRAVENOUS at 13:14:00

## 2023-08-18 RX ADMIN — SODIUM CHLORIDE: 9 INJECTION, SOLUTION INTRAVENOUS at 11:30:00

## 2023-08-18 NOTE — PROGRESS NOTES
Pt received s/p Watchman placement with Dr. Kaylene Salinas and Dr. Katharina Rahman from PACU. Right femoral venous access site closed with vascade. Site CDI, no bleeding or hematoma present. Recovery complete. Discharge instructions given to pt and pt's valentínughelkin. IV and Midline discontinued, per pt's request since ABX therapy completed. Pt taken via wheelchair to [facility] for discharge. Pt's daughter driving pt home.

## 2023-08-18 NOTE — PROCEDURES
PROCEDURE PERFORMED:   1. Watchman Flx device implant. 2. Transseptal catheterization with left atrial pressure recording. INDICATIONS FOR PROCEDURE: History of hIGH RISK FOR BLEEDING [address]. CHADSVasc score 6-(htn, ageX2, dm, gender, vascular disease)    OPERATORS:   Toño Torres MD, Devan Ross MD, Frida Edmond MD    DESCRIPTION OF PROCEDURE: The patient was brought to the endovascular suite in a fasting and nonsedated state after providing informed consent. Sedation was administered by the general anesthesia service. See separate operative report for groin access and transeptal catheterization. A Watchman access sheath was then advanced over the the safari exchange wire. A pigtail catheter was then inserted over the long guidewire, and the long guidewire was then withdrawn. The 6-Croatian pigtail catheter was then advanced to the distal part of the left atrial appendage. The Watchman delivery sheath was advanced over the pigtail catheter within the left atrial appendage. The pigtail catheter was withdrawn, and the Watchman delivery sheath was aspirated and flushed. BREN measurements were obtained to select the Watchman device size. Based upon measurements in multiple views, a size of 24 mm was selected for device placement. The Watchman delivery sheath was then inserted into the Watchman access sheath. Once the delivery sheath was positioned in the left atrial appendage, the device was delivered as the access sheath were gently withdrawn. Following deployment of the Watchman device, device stability was evaluated with the PASS criteria. PASS criteria measurements were evaluated both fluoroscopically with left atrial appendage angiography along with BREN imaging.  PASS criteria included adequate positioning, stability of the device with a slight tug test to confirm the Watchman struts were adequately anchored in the left atrial appendage, and size of the device was evaluated by measuring percentage of compression between 8% and 20%. Also, a seal of the device was measured by color flow Doppler, the size of the residual leak was 0. The device was then released from the delivery sheath, and the delivery sheath and access sheath were gently withdrawn into the right atrium. There was no pericardial effusion visualized. The 14-[place]orese sheath was removed, and the right femoral vein was closed using a Vascade closure device. CONCLUSIONS:   1. Successful deployment of a 24 mm Watchman Flx device. Deployment was confirmed by demonstrating the appropriate PASS criteria of position, anchor, size, and seal (Color flow leak was 0 mm). This is outlined in the procedure note. 2. No pericardial effusion by BREN at the conclusion of the procedure. 3. The patient was transported to postanesthesia recovery in stable condition.    4. CHADSVasc score 6.      _______________________________________  Du Jackson MD  Cardiac Electrophysiololgy  [address] [place] [address]  8/18/2023

## 2023-08-18 NOTE — ANESTHESIA PROCEDURE NOTES
Airway  Date/Time: 8/18/2023 12:09 PM  Urgency: elective      General Information and Staff    Patient location during procedure: OR  Anesthesiologist: Bhavesh Sampson MD  Performed: anesthesiologist   Performed by: Bhavesh Sampson MD  Authorized by: Bhavesh Sampson MD      Indications and Patient Condition  Indications for airway management: anesthesia  Sedation level: deep  Preoxygenated: yes  Patient position: sniffing  Mask difficulty assessment: 1 - vent by mask    Final Airway Details  Final airway type: endotracheal airway      Successful airway: ETT  Cuffed: yes   Successful intubation technique: direct laryngoscopy  Endotracheal tube insertion site: oral  Blade: Katelyn  Blade size: #3  ETT size (mm): 7.0    Cormack-Lehane Classification: grade I - full view of glottis  Placement verified by: capnometry   Measured from: lips  ETT to lips (cm): 21  Number of attempts at approach: 1

## 2023-08-18 NOTE — ANESTHESIA PROCEDURE NOTES
Peripheral IV  Date/Time: 8/18/2023 12:07 PM  Inserted by: Ildefonso Aguirre MD    Placement  Needle size: 18 G  Laterality: left  Location: forearm  Local anesthetic: none  Site prep: alcohol  Technique: anatomical landmarks  Attempts: 1

## 2023-08-18 NOTE — ANESTHESIA PROCEDURE NOTES
Arterial Line    Performed by: Curtis Pradhan MD  Authorized by: Curtis Pradhan MD    General Information and Staff    Procedure Start:   Anesthesiologist:  Curtis Pradhan MD  Performed By:  Anesthesiologist  Patient Location:  OR  Indication: continuous blood pressure monitoring and blood sampling needed    Site Identification: real time ultrasound guided    Preanesthetic Checklist: 2 patient identifiers, IV checked, risks and benefits discussed, monitors and equipment checked, pre-op evaluation, timeout performed, anesthesia consent and sterile technique used    Procedure Details    Catheter Size:  20 G  Catheter Length:  1 and 3/4 inch  Catheter Type:  Arrow  Seldinger Technique?: Yes    Laterality:  Left  Site:  Radial artery  Site Prep: chlorhexidine    Line Secured:  Tape and Tegaderm    Assessment    Events: patient tolerated procedure well with no complications      Medications      Additional Comments

## 2023-08-18 NOTE — ANESTHESIA POSTPROCEDURE EVALUATION
[address] Patient Status:  Inpatient   Age/Gender 81year old female MRN [id number]   Location [address] Attending Rosa Almonte MD   Hosp Day # 0 PCP Barney Aden MD       Anesthesia Post-op Note        Procedure Summary       Date: 08/18/23 Room / Location: [address] [place] [address]    Anesthesia Start: 1200 Anesthesia Stop: 8916    Procedure: CATH WATCHMAN Diagnosis:       A-fib ([address]      A-fib ([address]    Scheduled Providers: Voncile Pallas, MD Anesthesiologist: Voncile Pallas, MD    Anesthesia Type: general ASA Status: Not recorded            Anesthesia Type: general    Vitals Value Taken Time   /85 08/18/23 1343   Temp 97.1 08/18/23 1343   Pulse 70 08/18/23 1343   Resp 18 08/18/23 1343   SpO2 95 08/18/23 1343       Patient Location: PACU    Anesthesia Type: general    Airway Patency: patent    Postop Pain Control: adequate    Mental Status: mildly sedated but able to meaningfully participate in the post-anesthesia evaluation    Nausea/Vomiting: none    Cardiopulmonary/Hydration status: stable euvolemic    Complications: no apparent anesthesia related complications    Postop vital signs: stable    Dental Exam: Unchanged from Preop    Patient to be discharged from PACU when criteria met.

## 2023-08-18 NOTE — PROCEDURES
[facility] CARDIOLOGY  BREN Procedure Note    Cuco Class Location:      MRN [id number]   Admission Date 8/18/2023 Operation Date 8/18/2023   Attending Physician Nicole Prieto MD Operating Physician Janeth Francisco MD     Pre-Operative Diagnosis: Paroxysmal atrial fibrillation. History of pacemaker. Post-Operative Diagnosis: Successful BREN guided 24 mm Watchman FLX MICHAEL occluder device placement. Procedure Performed: BREN, intraoperative with 2 and three-dimensional imaging, Doppler and color flow mapping. BREN guided MICHAEL occluder device placement. Indications: BREN assessment of atrial appendage anatomy and atrial septal anatomy preprocedure. BREN imaging guidance for transseptal catheterization and MICHAEL occluder device placement intra-procedure. Post implant assessment of anatomy and function. Procedural comments: Complete Omniplane transesophageal echocardiography with 2 and three-dimensional imaging, Doppler and color flow mapping was performed in the cardiac catheterization laboratory. The study was done in conjunction with a BREN guided transseptal heart catheterization and MICHAEL occluder device placement performed by Gabino Souza and Jamilah. The study was done under general endotracheal tube anesthesia, the BREN probe placed by Dr. Gabino Roa. Images were obtained from the standard windows and were of excellent quality. The patient tolerated the BREN well, with no immediate complications. Findings: Preprocedural imaging shows a paced rhythm. Transvenous AV sequential pacemaker leads enter the right-sided chambers via the superior vena cava. The atrial septum is thin and mobile, but does not meet criteria for atrial septal aneurysm. It bows between both atria during the cardiac cycle. There are no intracardiac masses or clots. There is no pericardial effusion. The left atrial appendage has so-called broccoli-type anatomy. Appendage diameters range from 1.6-2 cm, with appended steps of 1.7-2.3 cm. Based on these measurements, the decision was made to proceed with attempted placement of a 24 mm Watchman FLX occluder device. With BREN imaging guidance, the transseptal catheter was positioned on the right atrial side of the septum and the inferior-posterior portion. Once appropriate tenting towards the left atrium was confirmed, transseptal catheterization was performed under direct BREN visualization. The transseptal wire, dilator and sheath were advanced sequentially through the septum and into the left atrium. Using a double curve catheter, 24 mm Watchman FLX MICHAEL occluder device was positioned appropriately in the left atrial appendage. A single device deployment was performed. The device centered nicely on the ostium of the appendage. Complete interrogation was performed before and after a tug test.    The tug test confirmed stability. Device diameters range from 1.9-2 cm, consistent with 15-20% compression. No peridevice leak was noted on color flow interrogation. Three-dimensional imaging confirmed excellent positioning at the ostium of the appendage. PASS criteria were fulfilled. The device was then released under direct 3D visualization. Final 2 and three-dimensional imaging showed excellent positioning of the occluder device. The delivery catheter was then withdrawn back into the right atrium. Color flow interrogation demonstrated trivial bidirectional shunting through the septal puncture site. There was no evidence for pericardial effusion or other early mechanical complication. Summary of Case: Successful BREN guided 24 mm Watchman FLX MICHAEL occluder device placement as described above.     Yessenia Hobson MD  8/18/2023  1:24 PM

## 2023-08-18 NOTE — ANESTHESIA PROCEDURE NOTES
Procedure Performed: BREN       Start Time:        End Time:      Preanesthesia Checklist:  Patient identified, IV assessed, risks and benefits discussed, monitors and equipment assessed, procedure being performed at surgeon's request and anesthesia consent obtained.     General Procedure Information  Diagnostic Indications for Echo:  assessment of ascending aorta  Location performed:  OR  Intubated  Bite block placed  Heart visualized  Probe Insertion:  Easy  Probe Type:  Multiplane  Modalities:  Color flow mapping, pulse wave Doppler and continuous wave Doppler        Anesthesia Information  Performed Personally  Anesthesiologist:  Trae Andrew MD      Post  Post Intervention Follow-up Study:See addtional report                       Complications:None

## 2023-08-18 NOTE — PROCEDURES
PROCEDURE PERFORMED:   1. Watchman FLX device implant. 2. Transseptal catheterization with left atrial pressure recording. INDICATIONS FOR PROCEDURE: History of risk of bleeding on [address]. CHADSVasc score 5 (age-3, gender, HTN, vasc). OPERATORS:  General Jamarcus MD. Praveena Pozo MD,     DESCRIPTION OF PROCEDURE: The patient was brought to the endovascular suite in a fasting and nonsedated state after providing informed consent. Sedation was administered by the general anesthesia service. The right and left groins were prepped and draped in a sterile fashion. After administering 1% lidocaine for local anesthesia, vascular access was achieved with US guidance. Transeptal access was achieved with BREN guidance. Following transseptal access, a second heparin bolus was given followed by intermittent. The transeptal was exchanged for the [address] delivery sheath. Protamine was given, hemostasis was achieved with a vascade closure device. See separate operative report for device deployment. CONCLUSIONS:   1. Successful deployment of a 24 mm Watchman Flx/. Deployment was confirmed by demonstrating the appropriate criteria of position, anchor, size, and seal (Color flow leak was 0 mm). This is outlined in the procedure note. 2. No pericardial effusion by BREN at the conclusion of the procedure. 3. The patient was transported to postanesthesia recovery in stable condition. 4. CHADSVasc score 5.       General Jamarcus MD  [address] [place] [address]  Interventional Cardiology

## 2023-08-20 LAB
BLOOD TYPE BARCODE: 6200
UNIT VOLUME: 350 ML

## 2023-08-21 NOTE — PROGRESS NOTES
Multiple attempts to reach pt and messages left with no return call. Patient went in for HFU appt with PCP on 8/15/23. Encounter closing.

## 2023-08-22 ENCOUNTER — TELEPHONE (OUTPATIENT)
Dept: INTERNAL MEDICINE CLINIC | Facility: CLINIC | Age: 83
End: 2023-08-22

## 2023-08-22 NOTE — TELEPHONE ENCOUNTER
Krystyna/[facility] is calling to let Dr. Iron Cornell know that patient completed abx and per daughter they don't need the nurse anymore. Patient will be discharged from home health this week.

## 2023-08-25 NOTE — TELEPHONE ENCOUNTER
No protocol     Last refill:  Medication Quantity Refills Start End   gabapentin 100 MG Oral Cap       Sig:   Take 2 capsules (200 mg total) by mouth nightly.          LOV:   08/15/2023 Dr Keli Melvin RTC 3 months  No FOV scheduled

## 2023-08-26 RX ORDER — GABAPENTIN 100 MG/1
200 CAPSULE ORAL NIGHTLY
Qty: 180 CAPSULE | Refills: 0 | Status: SHIPPED | OUTPATIENT
Start: 2023-08-26

## 2023-09-12 RX ORDER — PRAVASTATIN SODIUM 10 MG
10 TABLET ORAL NIGHTLY
Qty: 90 TABLET | Refills: 0 | Status: SHIPPED | OUTPATIENT
Start: 2023-09-12

## 2023-09-12 RX ORDER — LEVOTHYROXINE SODIUM 88 UG/1
88 TABLET ORAL DAILY
Qty: 90 TABLET | Refills: 0 | Status: SHIPPED | OUTPATIENT
Start: 2023-09-12

## 2023-09-13 RX ORDER — METFORMIN HYDROCHLORIDE 750 MG/1
750 TABLET, EXTENDED RELEASE ORAL DAILY
Qty: 90 TABLET | Refills: 0 | Status: SHIPPED | OUTPATIENT
Start: 2023-09-13

## 2023-09-18 NOTE — ED PROVIDER NOTES
Patient Seen in: [facility]    History   Patient presents with: Foot Pain    Stated Complaint: LT FOOT PAIN    HPI    Patient is a 64-year-old female who presents today with left foot pain.   She states she dropped a 20 ounce wa Spoke to patient.  Spider came from some greens from the store.  She felt something but didn't really think of anything.  She noticed this morning on her arm a spider bite.  Pt does not have a fever currently.  Red and raised.   Pt did not take any antihistamine.     Advised she could take a benadryl. If she had any on hand. Advised it may make her drowsy.     She had one before on her leg and came to see Dr. Jarvis. She ended up with cellulitis. Then she ended up with a blood clot.     Writer advised scheduling appointment.     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 Respiratory: Negative. Cardiovascular: Negative. Gastrointestinal: Negative. Musculoskeletal:        L foot pain   Skin: Negative. Neurological: Negative.         [address] [facility]    Call in 1 week  If symptoms worsen      Medications Prescribed:  Current Discharge Medication List    START taking these medications    naproxen 500 MG Oral Tab  Take 1 tablet (500 mg t

## 2023-09-26 ENCOUNTER — TELEPHONE (OUTPATIENT)
Dept: INTERNAL MEDICINE CLINIC | Facility: CLINIC | Age: 83
End: 2023-09-26

## 2023-09-26 NOTE — TELEPHONE ENCOUNTER
Signed order faxed to [facility]   NOMIL#[id number]   Confirmation received   Sent to scan and copy placed in accordion

## 2023-10-02 RX ORDER — LANCETS 30 GAUGE
1 EACH MISCELLANEOUS DAILY
Qty: 100 EACH | Refills: 6 | Status: SHIPPED | OUTPATIENT
Start: 2023-10-02

## 2023-10-03 ENCOUNTER — TELEPHONE (OUTPATIENT)
Dept: CARDIOLOGY CLINIC | Facility: HOSPITAL | Age: 83
End: 2023-10-03

## 2023-10-04 ENCOUNTER — TELEPHONE (OUTPATIENT)
Dept: INTERNAL MEDICINE CLINIC | Facility: CLINIC | Age: 83
End: 2023-10-04

## 2023-10-17 ENCOUNTER — HOSPITAL ENCOUNTER (OUTPATIENT)
Dept: GENERAL RADIOLOGY | Age: 83
Discharge: HOME | End: 2023-10-17
Attending: INTERNAL MEDICINE
Payer: MEDICARE

## 2023-10-17 ENCOUNTER — OFFICE VISIT (OUTPATIENT)
Dept: INTERNAL MEDICINE CLINIC | Facility: CLINIC | Age: 83
End: 2023-10-17
Payer: MEDICARE

## 2023-10-17 VITALS
WEIGHT: 138 LBS | DIASTOLIC BLOOD PRESSURE: 60 MMHG | HEIGHT: 62 IN | HEART RATE: 71 BPM | SYSTOLIC BLOOD PRESSURE: 134 MMHG | BODY MASS INDEX: 25.4 KG/M2 | OXYGEN SATURATION: 96 % | RESPIRATION RATE: 16 BRPM | TEMPERATURE: 98 F

## 2023-10-17 DIAGNOSIS — M19.022: ICD-10-CM

## 2023-10-17 DIAGNOSIS — M54.12 CERVICAL RADICULOPATHY: ICD-10-CM

## 2023-10-17 DIAGNOSIS — M54.12 CERVICAL RADICULOPATHY: Primary | ICD-10-CM

## 2023-10-17 PROCEDURE — 73080 X-RAY EXAM OF ELBOW: CPT | Performed by: INTERNAL MEDICINE

## 2023-10-17 PROCEDURE — 90662 IIV NO PRSV INCREASED AG IM: CPT | Performed by: INTERNAL MEDICINE

## 2023-10-17 PROCEDURE — 99214 OFFICE O/P EST MOD 30 MIN: CPT | Performed by: INTERNAL MEDICINE

## 2023-10-17 PROCEDURE — 72050 X-RAY EXAM NECK SPINE 4/5VWS: CPT | Performed by: INTERNAL MEDICINE

## 2023-10-17 PROCEDURE — G0008 ADMIN INFLUENZA VIRUS VAC: HCPCS | Performed by: INTERNAL MEDICINE

## 2023-10-17 RX ORDER — PREDNISOLONE ACETATE 10 MG/ML
1 SUSPENSION/ DROPS OPHTHALMIC 2 TIMES DAILY
COMMUNITY
Start: 2023-10-04

## 2023-10-17 RX ORDER — ONDANSETRON 4 MG/1
4 TABLET, ORALLY DISINTEGRATING ORAL EVERY 8 HOURS PRN
COMMUNITY
Start: 2023-08-19 | End: 2023-10-17 | Stop reason: ALTCHOICE

## 2023-10-17 RX ORDER — METHENAMINE HIPPURATE 1000 MG/1
1 TABLET ORAL 2 TIMES DAILY
COMMUNITY
Start: 2023-09-21

## 2023-10-17 RX ORDER — DIGOXIN 125 UG/1
125 TABLET ORAL DAILY
COMMUNITY
Start: 2023-08-22

## 2023-10-17 RX ORDER — DICYCLOMINE HCL 20 MG
20 TABLET ORAL 4 TIMES DAILY
COMMUNITY
Start: 2023-08-19 | End: 2023-10-17 | Stop reason: ALTCHOICE

## 2023-10-23 NOTE — PAT NURSING NOTE
PAT call with patient's daughter Anna Fisher. The following instructions were given an sent through the patient's My Chart. Questions answered and she verbalized understanding. Preprocedure Instructions    Visitor Instructions    Adult Patients: 2 Adult Care Partners can accompany the patient day of procedure. 2 Care Partners may visit [phone] during inpatient stay    PreOp Instructions    You are scheduled for: a Cardiac Procedure    Date of Procedure: 10/25/23    Diet Instructions: Do not eat or drink anything after midnight    Medications: Medications you are allowed to take can be taken with a sip of water the morning of your procedure    Medications to Stop: Hold herbal supplements and vitamins    Diabetic Instructions: Do not take morning dose of your diabetic medications    Arrival Time: You will receive a call the afternoon before your procedure after 3 pm on what time you should arrive the day of your procedure    Driving After Procedure: If sedation is given, you WILL NOT be able to drive home. You will need a responsible adult  to drive you home. Discharge Teaching: Your nurse will give you specific instructions before discharge; Any questions, please call the physician's office       Park in the [employer] [facility]. Check in at the [place] reception desk. Our  will be there to check you in for your procedure. Please bring your insurance cards and ID with you.  [employer] parking is available starting at 5 am.

## 2023-10-23 NOTE — H&P
====================================================================  History reviewed and up to date. No changes to H&P. The patient is scheduled for BREN post-WATCHMAN evaluation. Patient was consented. The risks and benefits of the procedure were explained to the patient.      Syed Thibodeaux MD  10/25/2023  12:54 PM

## 2023-10-24 RX ORDER — EZETIMIBE 10 MG/1
10 TABLET ORAL NIGHTLY
Qty: 90 TABLET | Refills: 0 | Status: SHIPPED | OUTPATIENT
Start: 2023-10-24

## 2023-10-24 NOTE — TELEPHONE ENCOUNTER
LOV: 10/17/23   RTC: no follow ups on file   Filled: 7/19/23 # 90 0 refills   Future Appointments   Date Time Provider Scott Hernandez   10/25/2023  6:00 AM [address] CARD ECHO BREN 1 ECARD ECHO [facility] AT [facility]   10/25/2023  9:00 AM [address] IVS HOLDING [address] [facility]

## 2023-10-25 ENCOUNTER — HOSPITAL ENCOUNTER (OUTPATIENT)
Dept: CV DIAGNOSTICS | Facility: HOSPITAL | Age: 83
Discharge: HOME | End: 2023-10-25
Attending: INTERNAL MEDICINE

## 2023-10-25 ENCOUNTER — HOSPITAL ENCOUNTER (OUTPATIENT)
Dept: INTERVENTIONAL RADIOLOGY/VASCULAR | Facility: HOSPITAL | Age: 83
Discharge: HOME | End: 2023-10-25
Attending: INTERNAL MEDICINE | Admitting: INTERNAL MEDICINE

## 2023-10-25 VITALS
RESPIRATION RATE: 17 BRPM | HEART RATE: 71 BPM | TEMPERATURE: 98 F | DIASTOLIC BLOOD PRESSURE: 85 MMHG | BODY MASS INDEX: 24.29 KG/M2 | OXYGEN SATURATION: 94 % | WEIGHT: 132 LBS | SYSTOLIC BLOOD PRESSURE: 105 MMHG | HEIGHT: 62 IN

## 2023-10-25 DIAGNOSIS — I48.91 A-FIB (HCC): ICD-10-CM

## 2023-10-25 DIAGNOSIS — Z95.818 PRESENCE OF WATCHMAN LEFT ATRIAL APPENDAGE CLOSURE DEVICE: ICD-10-CM

## 2023-10-25 LAB — GLUCOSE BLD-MCNC: 167 MG/DL (ref 70–99)

## 2023-10-25 PROCEDURE — 82962 GLUCOSE BLOOD TEST: CPT

## 2023-10-25 PROCEDURE — B24BZZ4 ULTRASONOGRAPHY OF HEART WITH AORTA, TRANSESOPHAGEAL: ICD-10-PCS | Performed by: INTERNAL MEDICINE

## 2023-10-25 PROCEDURE — 93325 DOPPLER ECHO COLOR FLOW MAPG: CPT | Performed by: INTERNAL MEDICINE

## 2023-10-25 PROCEDURE — 93312 ECHO TRANSESOPHAGEAL: CPT | Performed by: INTERNAL MEDICINE

## 2023-10-25 PROCEDURE — 99153 MOD SED SAME PHYS/QHP EA: CPT | Performed by: INTERNAL MEDICINE

## 2023-10-25 PROCEDURE — 93320 DOPPLER ECHO COMPLETE: CPT | Performed by: INTERNAL MEDICINE

## 2023-10-25 PROCEDURE — 99152 MOD SED SAME PHYS/QHP 5/>YRS: CPT | Performed by: INTERNAL MEDICINE

## 2023-10-25 RX ORDER — MIDAZOLAM HYDROCHLORIDE 1 MG/ML
INJECTION INTRAMUSCULAR; INTRAVENOUS
Status: COMPLETED
Start: 2023-10-25 | End: 2023-10-25

## 2023-10-25 RX ORDER — SODIUM CHLORIDE 9 MG/ML
INJECTION, SOLUTION INTRAVENOUS
Status: DISCONTINUED | OUTPATIENT
Start: 2023-10-26 | End: 2023-10-25

## 2023-10-25 RX ORDER — MIDAZOLAM HYDROCHLORIDE 1 MG/ML
3 INJECTION INTRAMUSCULAR; INTRAVENOUS ONCE
Status: COMPLETED | OUTPATIENT
Start: 2023-10-25 | End: 2023-10-25

## 2023-10-25 RX ADMIN — MIDAZOLAM HYDROCHLORIDE 3 MG: 1 INJECTION INTRAMUSCULAR; INTRAVENOUS at 10:30:00

## 2023-10-25 NOTE — PROCEDURES
Cardiology Transesophageal Echo Note    PRE-PROCEDURE DIAGNOSIS:  Atrial fibrillation and post-WATCHMAN device assessment    PROCEDURE: Transesophageal Echocardiogram.    SEDATION:   Topical spray x 1  Versed: 3 mg  Fentanyl: 100 mcg  I personally supervised the intravenous administration of conscious sedation. This was performed with continuous respiratory, hemodynamic, and electrocardiographic monitoring. Sedation was supervised from 9:40 AM - 10:05 AM, a total of 25 minutes. DESCRIPTION:   Informed consent was obtained after risks and benefits of the procedure were explained. Oral hurricaine spray was placed in the oropharynx. Conscious sedation was given. After adequate anesthesia, the BREN probe was placed in the oropharynx with the esophagus being successfully intubated. Standard transgastric and multiplane views were obtained and available findings are as follows:    COMPLICATIONS: none    FINDINGS:   Overall normal LVSF with an estimated LVEF 60-65% and without wall motion abnormalities. Chamber sizes were within normal limits. Left atrial enlargement. Mild mitral and tricuspid insufficiency and normal pulmonic valves were seen. There was a trileaflet aortic valve without stenosis or insufficiency. There was no evidence for reversal of flow in the pulmonary veins. There was a atrial septal defect by color flow interrogation consistent with prior transseptal puncture. The aorta had mild scattered atherosclerotic plaque but no evidence for mobile atheromata or thrombus. No pericardial effusion. WATCHMAN device noted in left atrial appendage (MICHAEL). Device appears well seated. Color flow interrogation revealed no kayla-device flow   There was no evidence for intracardiac mass or thrombus  over the WATCHMAN device.     Jose Martin Charles MD  10/25/2023  12:55 PM

## 2023-10-25 NOTE — IVS NOTE
Bedside BREN completed. VSS. Secretions clear. Dr. Ian Florian spoke to patient and daughter post procedure. AVS reviewed. All questions answered. PIV removed. Discharged via wheelchair with all belongings.

## 2023-10-27 ENCOUNTER — APPOINTMENT (OUTPATIENT)
Dept: CT IMAGING | Facility: HOSPITAL | Age: 83
End: 2023-10-27
Attending: EMERGENCY MEDICINE
Payer: MEDICARE

## 2023-10-27 ENCOUNTER — HOSPITAL ENCOUNTER (EMERGENCY)
Facility: HOSPITAL | Age: 83
Discharge: HOME | End: 2023-10-27
Attending: EMERGENCY MEDICINE
Payer: MEDICARE

## 2023-10-27 VITALS
HEART RATE: 71 BPM | WEIGHT: 132 LBS | RESPIRATION RATE: 18 BRPM | HEIGHT: 62 IN | TEMPERATURE: 97 F | SYSTOLIC BLOOD PRESSURE: 169 MMHG | DIASTOLIC BLOOD PRESSURE: 76 MMHG | BODY MASS INDEX: 24.29 KG/M2 | OXYGEN SATURATION: 98 %

## 2023-10-27 DIAGNOSIS — R39.89 BLADDER PAIN: Primary | ICD-10-CM

## 2023-10-27 LAB
ALBUMIN SERPL-MCNC: 3.7 G/DL (ref 3.4–5)
ALBUMIN/GLOB SERPL: 0.9 {RATIO} (ref 1–2)
ALP LIVER SERPL-CCNC: 90 U/L
ALT SERPL-CCNC: 22 U/L
ANION GAP SERPL CALC-SCNC: 5 MMOL/L (ref 0–18)
AST SERPL-CCNC: 21 U/L (ref 15–37)
BASOPHILS # BLD AUTO: 0.06 X10(3) UL (ref 0–0.2)
BASOPHILS NFR BLD AUTO: 0.9 %
BILIRUB SERPL-MCNC: 0.3 MG/DL (ref 0.1–2)
BILIRUB UR QL STRIP.AUTO: NEGATIVE
BUN BLD-MCNC: 11 MG/DL (ref 7–18)
CALCIUM BLD-MCNC: 9.7 MG/DL (ref 8.5–10.1)
CHLORIDE SERPL-SCNC: 103 MMOL/L (ref 98–112)
CLARITY UR REFRACT.AUTO: CLEAR
CO2 SERPL-SCNC: 28 MMOL/L (ref 21–32)
CREAT BLD-MCNC: 0.85 MG/DL
EGFRCR SERPLBLD CKD-EPI 2021: 68 ML/MIN/1.73M2 (ref 60–?)
EOSINOPHIL # BLD AUTO: 0.1 X10(3) UL (ref 0–0.7)
EOSINOPHIL NFR BLD AUTO: 1.6 %
ERYTHROCYTE [DISTWIDTH] IN BLOOD BY AUTOMATED COUNT: 14.6 %
FLUAV + FLUBV RNA SPEC NAA+PROBE: NEGATIVE
FLUAV + FLUBV RNA SPEC NAA+PROBE: NEGATIVE
GLOBULIN PLAS-MCNC: 4.1 G/DL (ref 2.8–4.4)
GLUCOSE BLD-MCNC: 136 MG/DL (ref 70–99)
GLUCOSE UR STRIP.AUTO-MCNC: NORMAL MG/DL
HCT VFR BLD AUTO: 35.6 %
HGB BLD-MCNC: 11.7 G/DL
IMM GRANULOCYTES # BLD AUTO: 0.03 X10(3) UL (ref 0–1)
IMM GRANULOCYTES NFR BLD: 0.5 %
KETONES UR STRIP.AUTO-MCNC: NEGATIVE MG/DL
LACTATE SERPL-SCNC: 1.1 MMOL/L (ref 0.4–2)
LEUKOCYTE ESTERASE UR QL STRIP.AUTO: 75
LYMPHOCYTES # BLD AUTO: 1.5 X10(3) UL (ref 1–4)
LYMPHOCYTES NFR BLD AUTO: 23.4 %
MCH RBC QN AUTO: 27.9 PG (ref 26–34)
MCHC RBC AUTO-ENTMCNC: 32.9 G/DL (ref 31–37)
MCV RBC AUTO: 85 FL
MONOCYTES # BLD AUTO: 0.63 X10(3) UL (ref 0.1–1)
MONOCYTES NFR BLD AUTO: 9.8 %
NEUTROPHILS # BLD AUTO: 4.08 X10 (3) UL (ref 1.5–7.7)
NEUTROPHILS # BLD AUTO: 4.08 X10(3) UL (ref 1.5–7.7)
NEUTROPHILS NFR BLD AUTO: 63.8 %
NITRITE UR QL STRIP.AUTO: NEGATIVE
OSMOLALITY SERPL CALC.SUM OF ELEC: 283 MOSM/KG (ref 275–295)
PH UR STRIP.AUTO: 7 [PH] (ref 5–8)
PLATELET # BLD AUTO: 237 10(3)UL (ref 150–450)
POTASSIUM SERPL-SCNC: 4 MMOL/L (ref 3.5–5.1)
PROCALCITONIN SERPL-MCNC: <0.05 NG/ML (ref ?–0.16)
PROT SERPL-MCNC: 7.8 G/DL (ref 6.4–8.2)
PROT UR STRIP.AUTO-MCNC: NEGATIVE MG/DL
RBC # BLD AUTO: 4.19 X10(6)UL
RBC UR QL AUTO: NEGATIVE
RSV RNA SPEC NAA+PROBE: NEGATIVE
SARS-COV-2 RNA RESP QL NAA+PROBE: NOT DETECTED
SODIUM SERPL-SCNC: 136 MMOL/L (ref 136–145)
SP GR UR STRIP.AUTO: <1.005 (ref 1–1.03)
UROBILINOGEN UR STRIP.AUTO-MCNC: NORMAL MG/DL
WBC # BLD AUTO: 6.4 X10(3) UL (ref 4–11)

## 2023-10-27 PROCEDURE — 81001 URINALYSIS AUTO W/SCOPE: CPT

## 2023-10-27 PROCEDURE — 80053 COMPREHEN METABOLIC PANEL: CPT

## 2023-10-27 PROCEDURE — 87086 URINE CULTURE/COLONY COUNT: CPT | Performed by: EMERGENCY MEDICINE

## 2023-10-27 PROCEDURE — 81001 URINALYSIS AUTO W/SCOPE: CPT | Performed by: EMERGENCY MEDICINE

## 2023-10-27 PROCEDURE — 96361 HYDRATE IV INFUSION ADD-ON: CPT

## 2023-10-27 PROCEDURE — 87040 BLOOD CULTURE FOR BACTERIA: CPT | Performed by: EMERGENCY MEDICINE

## 2023-10-27 PROCEDURE — 85025 COMPLETE CBC W/AUTO DIFF WBC: CPT | Performed by: EMERGENCY MEDICINE

## 2023-10-27 PROCEDURE — 0241U SARS-COV-2/FLU A AND B/RSV BY PCR (GENEXPERT): CPT | Performed by: EMERGENCY MEDICINE

## 2023-10-27 PROCEDURE — 99285 EMERGENCY DEPT VISIT HI MDM: CPT

## 2023-10-27 PROCEDURE — 83605 ASSAY OF LACTIC ACID: CPT | Performed by: EMERGENCY MEDICINE

## 2023-10-27 PROCEDURE — 99284 EMERGENCY DEPT VISIT MOD MDM: CPT

## 2023-10-27 PROCEDURE — 85025 COMPLETE CBC W/AUTO DIFF WBC: CPT

## 2023-10-27 PROCEDURE — 36415 COLL VENOUS BLD VENIPUNCTURE: CPT

## 2023-10-27 PROCEDURE — 96360 HYDRATION IV INFUSION INIT: CPT

## 2023-10-27 PROCEDURE — 80053 COMPREHEN METABOLIC PANEL: CPT | Performed by: EMERGENCY MEDICINE

## 2023-10-27 PROCEDURE — 84145 PROCALCITONIN (PCT): CPT | Performed by: EMERGENCY MEDICINE

## 2023-10-27 PROCEDURE — 74177 CT ABD & PELVIS W/CONTRAST: CPT | Performed by: EMERGENCY MEDICINE

## 2023-10-27 RX ORDER — SODIUM CHLORIDE 9 MG/ML
125 INJECTION, SOLUTION INTRAVENOUS CONTINUOUS
Status: DISCONTINUED | OUTPATIENT
Start: 2023-10-27 | End: 2023-10-27

## 2023-10-27 NOTE — ED INITIAL ASSESSMENT (HPI)
2x weeks urinary pain. Pt dx w/ UTI 9/18/23 and been on 14 days of ABX. Pt reports feeling flushed and chills.  Pt reports no flank pain

## 2023-10-28 ENCOUNTER — PATIENT MESSAGE (OUTPATIENT)
Dept: INTERNAL MEDICINE CLINIC | Facility: CLINIC | Age: 83
End: 2023-10-28

## 2023-10-28 DIAGNOSIS — C50.011 MALIGNANT NEOPLASM OF NIPPLE OF RIGHT BREAST IN FEMALE, UNSPECIFIED ESTROGEN RECEPTOR STATUS (HCC): Primary | ICD-10-CM

## 2023-10-28 NOTE — DISCHARGE INSTRUCTIONS
As we discussed, be sure to follow-up with your primary care doctor to discuss imaging of your left breast.

## 2023-10-30 NOTE — TELEPHONE ENCOUNTER
Please let patient know the order has been placed but this is something she should follow-up with the breast oncologist -I think it was Dr. Sheldon Acharya and her or her oncologist  She will need their expertise

## 2023-10-30 NOTE — TELEPHONE ENCOUNTER
VM - CT abd pelv completed 10/27/23 -     2. There is left breast nodularity noted medially. Given the patient's history of breast cancer, mammography recommended for further evaluation. Can US be ordered instead of mammogram? Please advise, ty!

## 2023-11-02 ENCOUNTER — PATIENT OUTREACH (OUTPATIENT)
Dept: CASE MANAGEMENT | Age: 83
End: 2023-11-02

## 2023-11-02 NOTE — PROGRESS NOTES
1st attempt ER f/up apt request  No answer, LVMTCB to schedule apts  URO -existing apt (11/8)  PCP -unable to contact  Closing encounter

## 2023-11-10 ENCOUNTER — HOSPITAL ENCOUNTER (OUTPATIENT)
Dept: MAMMOGRAPHY | Facility: HOSPITAL | Age: 83
Discharge: HOME | End: 2023-11-10
Attending: INTERNAL MEDICINE
Payer: MEDICARE

## 2023-11-10 DIAGNOSIS — C50.011 MALIGNANT NEOPLASM OF NIPPLE OF RIGHT BREAST IN FEMALE, UNSPECIFIED ESTROGEN RECEPTOR STATUS (HCC): ICD-10-CM

## 2023-11-10 PROCEDURE — 77065 DX MAMMO INCL CAD UNI: CPT | Performed by: INTERNAL MEDICINE

## 2023-11-10 PROCEDURE — 77061 BREAST TOMOSYNTHESIS UNI: CPT | Performed by: INTERNAL MEDICINE

## 2023-11-20 NOTE — PROCEDURES
After informed consent, the patient's right and left hips were marked, prepped with topical antiseptic, and locally anesthetized with skin refrigerant followed by injection of 1% lidocaine to create a skin wheal anteriorly at an insertion site a few finger
No

## 2023-11-27 RX ORDER — GABAPENTIN 100 MG/1
200 CAPSULE ORAL NIGHTLY
Qty: 180 CAPSULE | Refills: 0 | Status: SHIPPED | OUTPATIENT
Start: 2023-11-27

## 2023-11-27 NOTE — TELEPHONE ENCOUNTER
No protocol     Gabapentin 100mg     LOV: 10/17/23   RTC: no follow ups on filed   Filled: 8/26/23 # 180 0 refills   Labs: 10/27/23   Future Appointments   Date Time Provider Scott Hernandez   2/27/2024 10:30 AM DO DEDE Levi [facility] AT [facility]

## 2023-11-28 ENCOUNTER — HOSPITAL ENCOUNTER (EMERGENCY)
Age: 83
Discharge: HOME | End: 2023-11-28
Attending: EMERGENCY MEDICINE
Payer: MEDICARE

## 2023-11-28 ENCOUNTER — APPOINTMENT (OUTPATIENT)
Dept: GENERAL RADIOLOGY | Age: 83
End: 2023-11-28
Attending: PHYSICIAN ASSISTANT
Payer: MEDICARE

## 2023-11-28 VITALS
SYSTOLIC BLOOD PRESSURE: 133 MMHG | DIASTOLIC BLOOD PRESSURE: 72 MMHG | HEART RATE: 70 BPM | HEIGHT: 62 IN | OXYGEN SATURATION: 100 % | RESPIRATION RATE: 18 BRPM | BODY MASS INDEX: 24.11 KG/M2 | WEIGHT: 131 LBS | TEMPERATURE: 98 F

## 2023-11-28 DIAGNOSIS — M54.50 ACUTE BILATERAL LOW BACK PAIN WITHOUT SCIATICA: Primary | ICD-10-CM

## 2023-11-28 PROCEDURE — 99284 EMERGENCY DEPT VISIT MOD MDM: CPT

## 2023-11-28 PROCEDURE — 72100 X-RAY EXAM L-S SPINE 2/3 VWS: CPT | Performed by: PHYSICIAN ASSISTANT

## 2023-11-28 PROCEDURE — 99283 EMERGENCY DEPT VISIT LOW MDM: CPT

## 2023-11-28 RX ORDER — TRAMADOL HYDROCHLORIDE 50 MG/1
50 TABLET ORAL EVERY 6 HOURS PRN
Qty: 12 TABLET | Refills: 0 | Status: SHIPPED | OUTPATIENT
Start: 2023-11-28 | End: 2023-12-11 | Stop reason: ALTCHOICE

## 2023-11-28 RX ORDER — HYDROCODONE BITARTRATE AND ACETAMINOPHEN 5; 325 MG/1; MG/1
1 TABLET ORAL ONCE
Status: COMPLETED | OUTPATIENT
Start: 2023-11-28 | End: 2023-11-28

## 2023-11-28 NOTE — ED INITIAL ASSESSMENT (HPI)
Pt was moving clothes and started having lower back pain. No radiation of pain. No numbness or tingling.  No loss of bowel or bladder

## 2023-12-11 ENCOUNTER — OFFICE VISIT (OUTPATIENT)
Dept: INTERNAL MEDICINE CLINIC | Facility: CLINIC | Age: 83
End: 2023-12-11
Payer: MEDICARE

## 2023-12-11 VITALS
OXYGEN SATURATION: 99 % | HEART RATE: 74 BPM | TEMPERATURE: 97 F | DIASTOLIC BLOOD PRESSURE: 72 MMHG | SYSTOLIC BLOOD PRESSURE: 120 MMHG | BODY MASS INDEX: 23.74 KG/M2 | WEIGHT: 134 LBS | HEIGHT: 62.9 IN | RESPIRATION RATE: 15 BRPM

## 2023-12-11 DIAGNOSIS — I48.0 PAROXYSMAL ATRIAL FIBRILLATION (HCC): Chronic | ICD-10-CM

## 2023-12-11 DIAGNOSIS — E03.9 HYPOTHYROIDISM, UNSPECIFIED TYPE: ICD-10-CM

## 2023-12-11 DIAGNOSIS — E83.51 HYPOCALCEMIA: ICD-10-CM

## 2023-12-11 DIAGNOSIS — Z51.81 MEDICATION MONITORING ENCOUNTER: ICD-10-CM

## 2023-12-11 DIAGNOSIS — N39.0 RECURRENT UTI: Primary | ICD-10-CM

## 2023-12-11 PROCEDURE — 1111F DSCHRG MED/CURRENT MED MERGE: CPT | Performed by: INTERNAL MEDICINE

## 2023-12-11 PROCEDURE — 99215 OFFICE O/P EST HI 40 MIN: CPT | Performed by: INTERNAL MEDICINE

## 2023-12-11 RX ORDER — LEVOFLOXACIN 250 MG/1
TABLET, FILM COATED ORAL
COMMUNITY
Start: 2023-11-20 | End: 2023-12-11 | Stop reason: ALTCHOICE

## 2023-12-11 RX ORDER — METHENAMINE, SODIUM PHOSPHATE, MONOBASIC, MONOHYDRATE, PHENYL SALICYLATE, METHYLENE BLUE, AND HYOSCYAMINE SULFATE 118; 40.8; 36; 10; .12 MG/1; MG/1; MG/1; MG/1; MG/1
1 CAPSULE ORAL 3 TIMES DAILY PRN
COMMUNITY

## 2023-12-11 RX ORDER — MELATONIN
1000 DAILY
COMMUNITY
Start: 2023-12-01 | End: 2023-12-31

## 2023-12-11 RX ORDER — NITROFURANTOIN 25; 75 MG/1; MG/1
100 CAPSULE ORAL NIGHTLY
Qty: 30 CAPSULE | Refills: 0 | Status: SHIPPED | OUTPATIENT
Start: 2023-12-21

## 2023-12-18 RX ORDER — METFORMIN HYDROCHLORIDE 750 MG/1
750 TABLET, EXTENDED RELEASE ORAL DAILY
Qty: 90 TABLET | Refills: 0 | Status: SHIPPED | OUTPATIENT
Start: 2023-12-18

## 2023-12-18 RX ORDER — LEVOTHYROXINE SODIUM 88 UG/1
88 TABLET ORAL DAILY
Qty: 90 TABLET | Refills: 0 | Status: SHIPPED | OUTPATIENT
Start: 2023-12-18

## 2023-12-18 RX ORDER — PRAVASTATIN SODIUM 10 MG
10 TABLET ORAL NIGHTLY
Qty: 90 TABLET | Refills: 0 | Status: SHIPPED | OUTPATIENT
Start: 2023-12-18

## 2023-12-18 NOTE — TELEPHONE ENCOUNTER
Protocol passed     Levothyroxine 88mcg  Pravastatin 10mg   Metformin ER 750mg     LOV: 12/11/23   RTC: 4 weeks   Labs: 7/28/23   Future Appointments   Date Time Provider Scott Hernandez   12/20/2023  9:45 AM REF BBK REF EMG8 Ref BBK 8   2/27/2024 10:30 AM Marcie Flood DO [address] [facility]

## 2023-12-19 RX ORDER — DOFETILIDE 0.12 MG/1
125 CAPSULE ORAL 2 TIMES DAILY
Refills: 0 | OUTPATIENT
Start: 2023-12-19

## 2023-12-20 ENCOUNTER — LAB ENCOUNTER (OUTPATIENT)
Dept: LAB | Age: 83
End: 2023-12-20
Attending: INTERNAL MEDICINE
Payer: MEDICARE

## 2023-12-20 DIAGNOSIS — E83.51 HYPOCALCEMIA: ICD-10-CM

## 2023-12-20 DIAGNOSIS — E03.9 HYPOTHYROIDISM, UNSPECIFIED TYPE: ICD-10-CM

## 2023-12-20 DIAGNOSIS — Z51.81 MEDICATION MONITORING ENCOUNTER: ICD-10-CM

## 2023-12-20 LAB
ALBUMIN SERPL-MCNC: 3.5 G/DL (ref 3.4–5)
ALBUMIN/GLOB SERPL: 0.9 {RATIO} (ref 1–2)
ALP LIVER SERPL-CCNC: 86 U/L
ALT SERPL-CCNC: 19 U/L
ANION GAP SERPL CALC-SCNC: 5 MMOL/L (ref 0–18)
AST SERPL-CCNC: 19 U/L (ref 15–37)
BASOPHILS # BLD AUTO: 0.06 X10(3) UL (ref 0–0.2)
BASOPHILS NFR BLD AUTO: 1.1 %
BILIRUB SERPL-MCNC: 0.3 MG/DL (ref 0.1–2)
BUN BLD-MCNC: 15 MG/DL (ref 9–23)
CALCIUM BLD-MCNC: 8.9 MG/DL (ref 8.5–10.1)
CHLORIDE SERPL-SCNC: 102 MMOL/L (ref 98–112)
CO2 SERPL-SCNC: 29 MMOL/L (ref 21–32)
CREAT BLD-MCNC: 0.94 MG/DL
EGFRCR SERPLBLD CKD-EPI 2021: 61 ML/MIN/1.73M2 (ref 60–?)
EOSINOPHIL # BLD AUTO: 0.1 X10(3) UL (ref 0–0.7)
EOSINOPHIL NFR BLD AUTO: 1.8 %
ERYTHROCYTE [DISTWIDTH] IN BLOOD BY AUTOMATED COUNT: 14.6 %
FASTING STATUS PATIENT QL REPORTED: NO
GLOBULIN PLAS-MCNC: 3.9 G/DL (ref 2.8–4.4)
GLUCOSE BLD-MCNC: 204 MG/DL (ref 70–99)
HCT VFR BLD AUTO: 39.3 %
HGB BLD-MCNC: 12 G/DL
IMM GRANULOCYTES # BLD AUTO: 0.02 X10(3) UL (ref 0–1)
IMM GRANULOCYTES NFR BLD: 0.4 %
LYMPHOCYTES # BLD AUTO: 1.35 X10(3) UL (ref 1–4)
LYMPHOCYTES NFR BLD AUTO: 24.4 %
MAGNESIUM SERPL-MCNC: 1.7 MG/DL (ref 1.6–2.6)
MCH RBC QN AUTO: 27.4 PG (ref 26–34)
MCHC RBC AUTO-ENTMCNC: 30.5 G/DL (ref 31–37)
MCV RBC AUTO: 89.7 FL
MONOCYTES # BLD AUTO: 0.59 X10(3) UL (ref 0.1–1)
MONOCYTES NFR BLD AUTO: 10.6 %
NEUTROPHILS # BLD AUTO: 3.42 X10 (3) UL (ref 1.5–7.7)
NEUTROPHILS # BLD AUTO: 3.42 X10(3) UL (ref 1.5–7.7)
NEUTROPHILS NFR BLD AUTO: 61.7 %
OSMOLALITY SERPL CALC.SUM OF ELEC: 289 MOSM/KG (ref 275–295)
PLATELET # BLD AUTO: 251 10(3)UL (ref 150–450)
POTASSIUM SERPL-SCNC: 4.4 MMOL/L (ref 3.5–5.1)
PROT SERPL-MCNC: 7.4 G/DL (ref 6.4–8.2)
RBC # BLD AUTO: 4.38 X10(6)UL
SODIUM SERPL-SCNC: 136 MMOL/L (ref 136–145)
T4 FREE SERPL-MCNC: 1 NG/DL (ref 0.8–1.7)
TSI SER-ACNC: 3.44 MIU/ML (ref 0.36–3.74)
VIT D+METAB SERPL-MCNC: 21.9 NG/ML (ref 30–100)
WBC # BLD AUTO: 5.5 X10(3) UL (ref 4–11)

## 2023-12-20 PROCEDURE — 83735 ASSAY OF MAGNESIUM: CPT

## 2023-12-20 PROCEDURE — 82306 VITAMIN D 25 HYDROXY: CPT

## 2023-12-20 PROCEDURE — 80053 COMPREHEN METABOLIC PANEL: CPT

## 2023-12-20 PROCEDURE — 85025 COMPLETE CBC W/AUTO DIFF WBC: CPT

## 2023-12-20 PROCEDURE — 36415 COLL VENOUS BLD VENIPUNCTURE: CPT

## 2023-12-20 PROCEDURE — 84439 ASSAY OF FREE THYROXINE: CPT

## 2023-12-20 PROCEDURE — 84443 ASSAY THYROID STIM HORMONE: CPT

## 2023-12-20 PROCEDURE — 82330 ASSAY OF CALCIUM: CPT

## 2023-12-21 LAB — LC CALCIUM, IONIZED: 5 MG/DL

## 2024-01-06 ENCOUNTER — PATIENT MESSAGE (OUTPATIENT)
Dept: INTERNAL MEDICINE CLINIC | Facility: CLINIC | Age: 84
End: 2024-01-06

## 2024-01-08 RX ORDER — POLYETHYLENE GLYCOL 3350 17 G/17G
17 POWDER, FOR SOLUTION ORAL DAILY PRN
Qty: 90 EACH | Refills: 1 | Status: SHIPPED | OUTPATIENT
Start: 2024-01-08

## 2024-01-08 NOTE — TELEPHONE ENCOUNTER
Last rx from Dr Johnson          Polyethylene Glycol 3350 (MIRALAX) 17 g Oral Powd Pack (Discontinued) 180 packet 0 12/5/2022 4/8/2023   Sig:   Take 17 g by mouth 2 (two) times daily.     Route:   Oral         Medication Ordered Dose Frequency Start End   polyethylene glycol (PEG 3350) (Miralax) 17 g oral packet 17 g (Discontinued) 17 g DAILY PRN 8/5/2023 1720 8/9/2023 1416   Route:   Oral     Admin Amount:   17 g     PRN Reason(s):   constipation         LOV:   12/11/2023 Dr Silva   Constipation- discussed supportive care/miralax      LOV:   10/17/2023 Dr Johnson     02/27/2023 Dr Johnson    Polyethylene Glycol 3350 (MIRALAX) 17 g Oral Powd Pack Take 17 g by mouth 2 (two) times daily. 180 packet 0

## 2024-01-09 DIAGNOSIS — N39.0 RECURRENT UTI: ICD-10-CM

## 2024-01-09 DIAGNOSIS — E83.51 HYPOCALCEMIA: ICD-10-CM

## 2024-01-09 RX ORDER — METHENAMINE HIPPURATE 1000 MG/1
1 TABLET ORAL 2 TIMES DAILY
Qty: 60 TABLET | Refills: 0 | Status: SHIPPED | OUTPATIENT
Start: 2024-01-09

## 2024-01-09 NOTE — TELEPHONE ENCOUNTER
methenamine 1 g Oral Tab         Sig: Take 1 tablet (1 g total) by mouth 2 (two) times daily.    Disp: Not specified    Refills: 0    Start: 1/9/2024    Class: Normal    Non-formulary    Last ordered: 2 months ago (10/17/2023) by Reported External/Patient       [id number] [address] AT [facility] [address] [facility] [address] , [phone], [phone]          LOV:  12/11/2023  RTC:   4 weeks  Recent Labs:  12/20/23    Upcoming OV  none

## 2024-01-10 NOTE — TELEPHONE ENCOUNTER
NO PROTOCOL    nitrofurantoin monohydrate macro 100 MG Oral Cap         Sig: Take 1 capsule (100 mg total) by mouth nightly.    Disp: 30 capsule    Refills: 0    Start: 1/9/2024    Class: Normal    Non-formulary For: Recurrent UTI, Hypocalcemia    Last ordered: 1 month ago (12/11/2023) by Nkechi Ariza MD       To be filled at: [facility] #[id number] [facility] [address] [facility] [address] [facility] [address] , [phone], [phone]     LOV: 12/11/23 w/ DV   RTC: 01/08/24   RECENT LABS: 12/20/23   FOV: NO FOV

## 2024-01-11 RX ORDER — NITROFURANTOIN 25; 75 MG/1; MG/1
100 CAPSULE ORAL NIGHTLY
Qty: 30 CAPSULE | Refills: 0 | OUTPATIENT
Start: 2024-01-11

## 2024-01-14 DIAGNOSIS — N39.0 RECURRENT UTI: ICD-10-CM

## 2024-01-14 DIAGNOSIS — E83.51 HYPOCALCEMIA: ICD-10-CM

## 2024-01-15 DIAGNOSIS — E83.51 HYPOCALCEMIA: ICD-10-CM

## 2024-01-15 DIAGNOSIS — N39.0 RECURRENT UTI: ICD-10-CM

## 2024-01-15 RX ORDER — NITROFURANTOIN 25; 75 MG/1; MG/1
100 CAPSULE ORAL NIGHTLY
Qty: 30 CAPSULE | Refills: 0 | Status: SHIPPED | OUTPATIENT
Start: 2024-01-15

## 2024-01-15 NOTE — TELEPHONE ENCOUNTER
Nitrofurantoin monohydrate macro  Filled 12-21-23  Qty 30  0 refills  Future Appointments   Date Time Provider Department Center   2/27/2024 10:30 AM Fallon Ugalde DO [facility]   LOV 12-11-23 DV  RTC 4 wks

## 2024-01-16 RX ORDER — NITROFURANTOIN 25; 75 MG/1; MG/1
100 CAPSULE ORAL NIGHTLY
Qty: 30 CAPSULE | Refills: 0 | OUTPATIENT
Start: 2024-01-16

## 2024-01-20 NOTE — TELEPHONE ENCOUNTER
Protocol failed   Medication(s) to Refill:   Requested Prescriptions     Pending Prescriptions Disp Refills   • LEVOTHYROXINE SODIUM 88 MCG Oral Tab [Pharmacy Med Name: LEVOTHYROXINE 0.088MG (88MCG) TAB] 90 tablet 0     Sig: TAKE 1 TABLET(88 MCG) BY MOUTH
4

## 2024-01-23 RX ORDER — EZETIMIBE 10 MG/1
10 TABLET ORAL NIGHTLY
Qty: 90 TABLET | Refills: 0 | Status: SHIPPED | OUTPATIENT
Start: 2024-01-23

## 2024-01-23 NOTE — TELEPHONE ENCOUNTER
Name from pharmacy: EZETIMIBE 10MG TABLETS          Will file in chart as: EZETIMIBE 10 MG Oral Tab    Sig: TAKE 1 TABLET(10 MG) BY MOUTH EVERY NIGHT    Disp: 90 tablet    Refills: 0 (Pharmacy requested: Not specified)    Start: 1/23/2024    Class: Normal    Non-formulary    Last ordered: 3 months ago (10/24/2023) by Abdon Johnson MD    Last refill: 10/24/2023    Rx #: [id number]    Cholesterol Medication Protocol [id number]/23/2024 12:18 PM   Protocol Details ALT < 80    ALT resulted within past year    Lipid panel within past 12 months    Appointment within past 12 or next 3 months      [id number] [place], [place] - [address] AT [place] [address], [phone], [phone]

## 2024-02-12 ENCOUNTER — APPOINTMENT (OUTPATIENT)
Dept: GENERAL RADIOLOGY | Age: 84
End: 2024-02-12
Attending: EMERGENCY MEDICINE
Payer: MEDICARE

## 2024-02-12 ENCOUNTER — HOSPITAL ENCOUNTER (EMERGENCY)
Age: 84
Discharge: HOME | End: 2024-02-12
Attending: EMERGENCY MEDICINE
Payer: MEDICARE

## 2024-02-12 VITALS
SYSTOLIC BLOOD PRESSURE: 151 MMHG | DIASTOLIC BLOOD PRESSURE: 76 MMHG | HEART RATE: 76 BPM | BODY MASS INDEX: 24.29 KG/M2 | TEMPERATURE: 98 F | RESPIRATION RATE: 16 BRPM | HEIGHT: 62 IN | WEIGHT: 132 LBS | OXYGEN SATURATION: 100 %

## 2024-02-12 DIAGNOSIS — N39.0 RECURRENT UTI: Primary | ICD-10-CM

## 2024-02-12 DIAGNOSIS — G62.9 PERIPHERAL POLYNEUROPATHY: ICD-10-CM

## 2024-02-12 LAB
ALBUMIN SERPL-MCNC: 3.7 G/DL (ref 3.4–5)
ALBUMIN/GLOB SERPL: 0.8 {RATIO} (ref 1–2)
ALP LIVER SERPL-CCNC: 104 U/L
ALT SERPL-CCNC: 31 U/L
ANION GAP SERPL CALC-SCNC: 4 MMOL/L (ref 0–18)
AST SERPL-CCNC: 23 U/L (ref 15–37)
BASOPHILS # BLD AUTO: 0.07 X10(3) UL (ref 0–0.2)
BASOPHILS NFR BLD AUTO: 1.2 %
BILIRUB SERPL-MCNC: 0.3 MG/DL (ref 0.1–2)
BILIRUB UR QL STRIP.AUTO: NEGATIVE
BUN BLD-MCNC: 12 MG/DL (ref 9–23)
CALCIUM BLD-MCNC: 9.4 MG/DL (ref 8.5–10.1)
CHLORIDE SERPL-SCNC: 101 MMOL/L (ref 98–112)
CLARITY UR REFRACT.AUTO: CLEAR
CO2 SERPL-SCNC: 30 MMOL/L (ref 21–32)
COLOR UR AUTO: YELLOW
CREAT BLD-MCNC: 0.81 MG/DL
EGFRCR SERPLBLD CKD-EPI 2021: 72 ML/MIN/1.73M2 (ref 60–?)
EOSINOPHIL # BLD AUTO: 0.15 X10(3) UL (ref 0–0.7)
EOSINOPHIL NFR BLD AUTO: 2.6 %
ERYTHROCYTE [DISTWIDTH] IN BLOOD BY AUTOMATED COUNT: 14.6 %
GLOBULIN PLAS-MCNC: 4.6 G/DL (ref 2.8–4.4)
GLUCOSE BLD-MCNC: 185 MG/DL (ref 70–99)
GLUCOSE UR STRIP.AUTO-MCNC: NEGATIVE MG/DL
HCT VFR BLD AUTO: 39 %
HGB BLD-MCNC: 12.5 G/DL
IMM GRANULOCYTES # BLD AUTO: 0.05 X10(3) UL (ref 0–1)
IMM GRANULOCYTES NFR BLD: 0.9 %
KETONES UR STRIP.AUTO-MCNC: NEGATIVE MG/DL
LACTATE SERPL-SCNC: 1.7 MMOL/L (ref 0.4–2)
LYMPHOCYTES # BLD AUTO: 1.54 X10(3) UL (ref 1–4)
LYMPHOCYTES NFR BLD AUTO: 27 %
MCH RBC QN AUTO: 27.5 PG (ref 26–34)
MCHC RBC AUTO-ENTMCNC: 32.1 G/DL (ref 31–37)
MCV RBC AUTO: 85.9 FL
MONOCYTES # BLD AUTO: 0.76 X10(3) UL (ref 0.1–1)
MONOCYTES NFR BLD AUTO: 13.3 %
NEUTROPHILS # BLD AUTO: 3.14 X10 (3) UL (ref 1.5–7.7)
NEUTROPHILS # BLD AUTO: 3.14 X10(3) UL (ref 1.5–7.7)
NEUTROPHILS NFR BLD AUTO: 55 %
NITRITE UR QL STRIP.AUTO: NEGATIVE
OSMOLALITY SERPL CALC.SUM OF ELEC: 285 MOSM/KG (ref 275–295)
PH UR STRIP.AUTO: 6 [PH] (ref 5–8)
PLATELET # BLD AUTO: 242 10(3)UL (ref 150–450)
POTASSIUM SERPL-SCNC: 4.2 MMOL/L (ref 3.5–5.1)
PROT SERPL-MCNC: 8.3 G/DL (ref 6.4–8.2)
PROT UR STRIP.AUTO-MCNC: NEGATIVE MG/DL
RBC # BLD AUTO: 4.54 X10(6)UL
SODIUM SERPL-SCNC: 135 MMOL/L (ref 136–145)
SP GR UR STRIP.AUTO: 1.01 (ref 1–1.03)
TROPONIN I SERPL HS-MCNC: 10 NG/L
UROBILINOGEN UR STRIP.AUTO-MCNC: 0.2 MG/DL
WBC # BLD AUTO: 5.7 X10(3) UL (ref 4–11)

## 2024-02-12 PROCEDURE — 99285 EMERGENCY DEPT VISIT HI MDM: CPT

## 2024-02-12 PROCEDURE — 80053 COMPREHEN METABOLIC PANEL: CPT | Performed by: EMERGENCY MEDICINE

## 2024-02-12 PROCEDURE — 87086 URINE CULTURE/COLONY COUNT: CPT | Performed by: EMERGENCY MEDICINE

## 2024-02-12 PROCEDURE — 71045 X-RAY EXAM CHEST 1 VIEW: CPT | Performed by: EMERGENCY MEDICINE

## 2024-02-12 PROCEDURE — 81015 MICROSCOPIC EXAM OF URINE: CPT | Performed by: EMERGENCY MEDICINE

## 2024-02-12 PROCEDURE — 93005 ELECTROCARDIOGRAM TRACING: CPT

## 2024-02-12 PROCEDURE — 93010 ELECTROCARDIOGRAM REPORT: CPT

## 2024-02-12 PROCEDURE — 84484 ASSAY OF TROPONIN QUANT: CPT | Performed by: EMERGENCY MEDICINE

## 2024-02-12 PROCEDURE — 83605 ASSAY OF LACTIC ACID: CPT | Performed by: EMERGENCY MEDICINE

## 2024-02-12 PROCEDURE — 36415 COLL VENOUS BLD VENIPUNCTURE: CPT

## 2024-02-12 PROCEDURE — 99284 EMERGENCY DEPT VISIT MOD MDM: CPT

## 2024-02-12 PROCEDURE — 81001 URINALYSIS AUTO W/SCOPE: CPT | Performed by: EMERGENCY MEDICINE

## 2024-02-12 PROCEDURE — 85025 COMPLETE CBC W/AUTO DIFF WBC: CPT | Performed by: EMERGENCY MEDICINE

## 2024-02-12 NOTE — ED INITIAL ASSESSMENT (HPI)
Pt to ed with c/o frequent urination and pelvic pressure, pt on day 5 of ABX with continued symptoms.   Left groin pain on Saturday with left thigh swelling, abdominal bloating.   PT feel \"buzzing\" to left arm that started 1 week ago, pacemaker placed over a year ago. Denies CP

## 2024-02-12 NOTE — ED PROVIDER NOTES
Patient Seen in: [facility] [place]      History     Chief Complaint   Patient presents with    Urinary Symptoms     Stated Complaint: urinary symptoms    Subjective:   HPI    83-year-old female who presents to the emergency department complaining of having multiple complaints.  The patient had some urinary symptoms that have been persistent over the past 5 days which she said pelvic and suprapubic discomfort.  She does have a suprapubic catheter in place that she clamped but she can urinate on her own.  She has had multiple urinary tract infections  And was recently seen on 2/5/2020 for her recurrent urinary tract infection urinary retention on review of her records.  The patient is also complained having some tingling sensation in her left upper extremity that she says feels like her arm is buzzing.  She is also had some swelling in her legs and occasional episodes of shortness of breath.  The patient's daughter is very frustrated because of the continued recurrent urinary tract infections despite being on antibiotic therapy.  She tells me that at times her mother will take medications for 3 days and symptoms seem to go away but she has been on medication for 5 days and symptoms are persistent.  They were trying to get into see the urologist and the primary care physician with a unable to do so.  They contacted the cardiologist who said to come to the ER because of the multiple symptoms and to get some labs done to assess for possibility of etiologies.  The patient denies any fevers.  No nausea or vomiting.  The patient's buzzing sensation in her left arm started for approximately 1 week.  She still been feeling some bloating in the abdomen despite being treated for urinary tract infection for the past 5 days.  Objective:   Past Medical History:   Diagnosis Date    A-fib (HCC)     Abnormal mammogram 06/21/2016    Allergic rhinitis 1980    Anesthesia complication     Pt's sons have a  history of pseudocholinesterase deficiency.    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    Esophageal reflux     Exposure to unspecified radiation     last dose 1992    Female stress incontinence 04/18/2016    Gall stones     GERD (gastroesophageal reflux disease) 02/22/2012    Glucose intolerance (impaired glucose tolerance)     High blood pressure     High cholesterol     History of blood transfusion     1992    Hypothyroidism     Incomplete bladder emptying (aka 66477)     trial of CIC June 2014    L knee global 3/27/14 12/30/2013    Malignant neoplasm of breast (female), unspecified site     R    Neoplasm of right breast, 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), 1992 08/04/2015    Osteoarthrosis, unspecified whether generalized or localized, unspecified site     generalized    Other and unspecified hyperlipidemia     Peripheral vascular disease (HCC)     PONV (postoperative nausea and vomiting)     Post-traumatic osteoarthritis of left elbow 02/21/2018    Postmenopausal atrophic vaginitis 03/02/2016    Primary localized osteoarthrosis, lower leg 02/22/2012    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)     Pseudocholinesterase deficiency     Pt's son     Pure hypercholesterolemia     Rectocele 06/01/2016    Recurrent UTI     group B strep    Recurrent UTI 06/24/2014    S/P total knee replacement using cement 02/04/2015    Unspecified disorder of thyroid     Visual impairment     reading glasses    Vitamin D deficiency               Past Surgical History:    Procedure Laterality Date    ANGIOPLASTY (CORONARY)      BREAST LUMPECTOMY Right 2015    Procedure: BREAST LUMPECTOMY;  Surgeon: Castro Marie MD;  Location: [facility]    BREAST RECONSTRUCTION Right       1977    CABG  1997    x3    CARDIAC PACEMAKER PLACEMENT      CATARACT  2017    CATH BARE METAL STENT (BMS)      CHOLECYSTECTOMY      COLONOSCOPY  2016    tics; repeat 10 yrs    COLONOSCOPY  2017    COLONOSCOPY,DIAGNOSTIC N/A 2016    Procedure: COLONOSCOPY, POSSIBLE BIOPSY, POSSIBLE POLYPECTOMY 92305;  Surgeon: Rodolfo Ann MD;  Location: [facility]    CYSTOSCOPY,RX FEMALE URETHRAL SYND  05/10/2013    cysto Ricardo DENTON    D & C  1976    HYSTERECTOMY  1985    precancer and irregular bleeding. w/ BSO    KNEE REPLACEMENT SURGERY  2013    left     LUMPECTOMY RIGHT Right [id number]    MASTECTOMY RIGHT  2015      1964    OOPHORECTOMY      AGE 47    OPEN HEART SURGERY (PBP)      OTHER SURGICAL HISTORY      LUMPECTOMY RIGHT BREAST     OTHER SURGICAL HISTORY      A PMM LEFT KNEE , replacement    OTHER SURGICAL HISTORY  2013    Venita DENTON    OTHER SURGICAL HISTORY  2014    Cystoscopy- Dr. Figueroa    OTHER SURGICAL HISTORY  2016    Bladder sling    PACEMAKER MONITOR      PATIENT DOCUMENTED NOT TO HAVE EXPERIENCED ANY OF THE FOLLOWING EVENTS N/A 2016    Procedure: COLONOSCOPY, POSSIBLE BIOPSY, POSSIBLE POLYPECTOMY 66188;  Surgeon: Rodolfo Ann MD;  Location: [facility]    PATIENT WITHOUGH PREOPERATIVE ORDER FOR IV ANTIBIOTIC SURGICAL SITE INFECTION PROPHYLAXIS. N/A 2016    Procedure: COLONOSCOPY, POSSIBLE BIOPSY, POSSIBLE POLYPECTOMY 42687;  Surgeon: Rodolfo Ann MD;  Location: [facility]    RADIATION RIGHT          REDUCTION LEFT  10/2015    TOTAL KNEE REPLACEMENT      LEFT    UPPER GI ENDOSCOPY,EXAM  2011    repeat 5 yeas                Social History     Socioeconomic History    Marital status:     Tobacco Use    Smoking status: Former     Packs/day: 0.00     Years: 0.00     Additional pack years: 0.00     Total pack years: 0.00     Types: Cigarettes     Quit date: 1997     Years since quittin.6    Smokeless tobacco: Never   Vaping Use    Vaping Use: Never used   Substance and Sexual Activity    Alcohol use: Not Currently    Drug use: No   Other Topics Concern    Caffeine Concern Yes    Exercise Yes     Social Determinants of Health     Financial Resource Strain: Low Risk  (2023)    Financial Resource Strain     Difficulty of Paying Living Expenses: Not very hard     Med Affordability: No   Transportation Needs: No Transportation Needs (2023)    Transportation Needs     Lack of Transportation: No              Review of Systems    Positive for stated complaint: urinary symptoms  Other systems are as noted in HPI.  Constitutional and vital signs reviewed.      All other systems reviewed and negative except as noted above.    Physical Exam     ED Triage Vitals [24 1714]   /75   Pulse 73   Resp 18   Temp 98.3 °F (36.8 °C)   Temp src Temporal   SpO2 100 %   O2 Device None (Room air)       Current:/75   Pulse 73   Temp 98.3 °F (36.8 °C) (Temporal)   Resp 18   Ht 157.5 cm (5' 2\")   Wt 59.9 kg   SpO2 100%   BMI 24.14 kg/m²         Physical Exam  General: This a pleasant nontoxic appearing patient in no apparent distress alert and oriented ×3  HEENT: Pupils are equal reactive to light.  Extra ocular motions are intact.  No scleral icterus or conjunctival pallor: Neck is supple without tenderness on palpation.  Head is atraumatic normocephalic.  Oral mucosa moist.  Tongue is midline.  No posterior pharyngeal lesions.  Tympanic members are intact and clear bilaterally.  No JVD or adenopathy.  Lungs: Clear to auscultation bilaterally.  No wheezes, rhonchi, or rales appreciated.  No accessory muscle use noted for breathing.  Cardiac: Regular rate and rhythm.  Normal S1  and 2 without murmurs or ectopy appreciated  Abdomen: Soft on examination without tenderness to deep palpation or to percussion.  No masses appreciated.  Bowel sounds are normoactive.  No CVA tenderness.  Suprapubic catheter in place.  No exquisite tenderness.  There is some contact dermatitis noted surrounding the insertion site.  Extremities: No cyanosis, no edema or clubbing.  Pulses are +2.  Full range of motion is noted of the extremities without deformities.  No tenderness.  Neurologically intact.       ED Course     Labs Reviewed   COMP METABOLIC PANEL (14) - Abnormal; Notable for the following components:       Result Value    Glucose 185 (*)     Sodium 135 (*)     Total Protein 8.3 (*)     Globulin  4.6 (*)     A/G Ratio 0.8 (*)     All other components within normal limits   URINALYSIS WITH CULTURE REFLEX - Abnormal; Notable for the following components:    Blood Urine Trace-lysed (*)     Leukocyte Esterase Urine Trace (*)     All other components within normal limits   UA MICROSCOPIC ONLY, URINE - Abnormal; Notable for the following components:    Bacteria Urine Rare (*)     Squamous Epi. Cells Few (*)     All other components within normal limits   LACTIC ACID, PLASMA - Normal   TROPONIN I HIGH SENSITIVITY - Normal   CBC WITH DIFFERENTIAL WITH PLATELET    Narrative:     The following orders were created for panel order CBC With Differential With Platelet.  Procedure                               Abnormality         Status                     ---------                               -----------         ------                     CBC W/ DIFFERENTIAL[[id number]]                              Final result                 Please view results for these tests on the individual orders.   URINE CULTURE, ROUTINE   CBC W/ DIFFERENTIAL     EKG    Rate, intervals and axes as noted on EKG Report.  Rate: 70  Rhythm: Atrial paced rhythm with prolonged AV conduction  Reading: Atrial paced rhythm prolonged AV conduction  right bundle branch block left anterior fascicular block compared to the EKG August 2023 there T wave inversions noted in V1 and V2 as well as V3 likely more consistent with a right bundle branch block which was not seen on the previous EKG has there is an RSR prime pattern as well            Narrative  PROCEDURE:  XR CHEST AP PORTABLE  (CPT=71045)     TECHNIQUE:  AP chest radiograph was obtained.     COMPARISON:  [facility] XR, XR CHEST PA + LAT CHEST (CPT=71046), 7/31/2023, 11:24 AM.     INDICATIONS:  urinary symptoms     PATIENT STATED HISTORY: (As transcribed by Technologist)  Patient has had UTI for past 1.5 weeks. Pacemaker surgery done in 11/22.                   Impression  CONCLUSION:  Shallow inspiration.     Postsurgical changes median sternotomy.     Heart size and pulmonary vascularity is within normal limits.     Pacemaker leads in RA and RV.     No pneumothorax.        LOCATION:  [id number]                 Dictated by (CST): Yusra Scott MD on 2/12/2024 at 6:12 PM      Finalized by (CST): Yusra Scott MD on 2/12/2024 at 6:13 PM                MDM    Differential diagnosis includes but is not limited to sepsis from urinary tract infection, electrolyte abnormality, congestive heart failure, cardiac ischemia.  Patient lactic acid was 1.7.  She is not septic.  Troponin was normal at 10.  CBC was normal metabolic panel showed a glucose of 185 735.  Protein of 8.3.  Urinalysis only had 1-5 white blood cells per high-power field.  Rare bacteria.  Chest x-ray performed.  I personally reviewed the radiographs my individual interpretation shows no CHF, pacemaker noted.  No pneumothorax.  I also reviewed the official report the patient's tingling in her arm is likely a peripheral nerve issue.  She is scheduled to have an EMG as an outpatient.  Return if she has any worsening symptomology.  The patient does not appear to have multiple white blood cells in the urine.  She is presently on Keflex for her urinary  tract infection for the culture that grew.  She is encouraged to follow-up with her neurologist as an outpatient due to her chronic infections as well as her primary care physician.  Return here if she has any worsening symptoms.  Note to Patient  The 21st Century Cures Act makes medical notes like these available to patients in the interest of transparency. However, be advised this is a medical document and is intended as fjol-hx-lodj communication; it is written in medical language and may appear blunt, direct, or contain abbreviations or verbiage that are unfamiliar. Medical documents are intended to carry relevant information, facts as evident, and the clinical opinion of the practitioner.  Patient was evaluated and a screening exam was performed.   As a treating physician attending to the patient, I determined, within reasonable clinical confidence and prior to discharge, that an emergency medical condition was not or was no longer present.  There was no indication for further evaluation, treatment or admission on an emergency basis.  Comprehensive verbal and written discharge and follow-up instructions were provided to help prevent relapse or worsening.  Patient was instructed to follow-up with their primary care provider for further evaluation and treatment, but to return immediately to the ER for worsening, concerning, new, changing or persisting symptoms.  I discussed the case with the patient and they had no questions, complaints, or concerns.  Patient felt comfortable going home.    ^^Please note that this report has been produced using speech recognition software and may contain errors related to that system including, but not limited to, errors in grammar, punctuation, and spelling, as well as words and phrases that possibly may have been recognized inappropriately.  If there are any questions or concerns, contact the dictating provider for clarification.                     Medical Decision  Making      Disposition and Plan     Clinical Impression:  1. Recurrent UTI    2. Peripheral polyneuropathy         Disposition:  Discharge  2/12/2024  8:15 pm    Follow-up:  [address]  [facility] [address]  [facility] [address] [phone]  Call  choose option 1 for general neurology    Nkechi Chavez MD  [address]  [facility] [address]  [facility] [id number] [phone]    Schedule an appointment as soon as possible for a visit in 2 day(s)            Medications Prescribed:  Current Discharge Medication List

## 2024-02-13 LAB
ATRIAL RATE: 70 BPM
P AXIS: 74 DEGREES
P-R INTERVAL: 282 MS
Q-T INTERVAL: 452 MS
QRS DURATION: 130 MS
QTC CALCULATION (BEZET): 488 MS
R AXIS: -47 DEGREES
T AXIS: 52 DEGREES
VENTRICULAR RATE: 70 BPM

## 2024-02-15 ENCOUNTER — PATIENT OUTREACH (OUTPATIENT)
Dept: CASE MANAGEMENT | Age: 84
End: 2024-02-15

## 2024-02-15 NOTE — PROGRESS NOTES
1st attempt ER f/up apt request  No answer, LVMTCB to schedule apt  PCP -existing apt (3/13), unable to contact  NEURO -existing apt (2/27)  Closing encounter

## 2024-02-20 RX ORDER — MULTIVIT-MIN/IRON/FOLIC ACID/K 18-600-40
CAPSULE ORAL DAILY
COMMUNITY

## 2024-02-20 NOTE — PAT NURSING NOTE
PreOp Instructions     You are scheduled for: a Cardiac Procedure     Date of Procedure: 02/23/24     Diet Instructions: Do not eat or drink anything after midnight     Medications: Medications you are allowed to take can be taken with a sip of water the morning of your procedure. Take only aspirin, plavix, metoprolol and dofetilide morning of procedure     Diabetic Instructions: Metformin needs to be held 24 hours prior to procedure, your last dose should be the morning dose the day before procedure. Last dose on Wednesday evening    Skin Prep: Shower with antibacterial soap using a clean washcloth, prior to procedure     Driving After Procedure: If sedation is given, you WILL NOT be able to drive home. You will need a responsible adult  to drive you home.     You will receive a call on Thursday before 5:00 pm with time of arrival. Please check voicemail, as we will leave a message.     Discharge Teaching: Your nurse will give you specific instructions before discharge, Most people can resume normal activities in 2-3 days, Any questions, please call the physician's office

## 2024-02-23 ENCOUNTER — HOSPITAL ENCOUNTER (OUTPATIENT)
Dept: INTERVENTIONAL RADIOLOGY/VASCULAR | Facility: HOSPITAL | Age: 84
Discharge: HOME | End: 2024-02-23
Attending: INTERNAL MEDICINE | Admitting: INTERNAL MEDICINE
Payer: MEDICARE

## 2024-02-23 ENCOUNTER — ANESTHESIA EVENT (OUTPATIENT)
Dept: INTERVENTIONAL RADIOLOGY/VASCULAR | Facility: HOSPITAL | Age: 84
End: 2024-02-23
Payer: MEDICARE

## 2024-02-23 VITALS
DIASTOLIC BLOOD PRESSURE: 65 MMHG | RESPIRATION RATE: 25 BRPM | HEART RATE: 71 BPM | SYSTOLIC BLOOD PRESSURE: 136 MMHG | OXYGEN SATURATION: 94 % | TEMPERATURE: 98 F

## 2024-02-23 DIAGNOSIS — I48.91 A-FIB (HCC): ICD-10-CM

## 2024-02-23 LAB
GLUCOSE BLD-MCNC: 187 MG/DL (ref 70–99)
GLUCOSE BLD-MCNC: 191 MG/DL (ref 70–99)
ISTAT ACTIVATED CLOTTING TIME: 266 SECONDS (ref 74–137)

## 2024-02-23 PROCEDURE — 02583ZZ DESTRUCTION OF CONDUCTION MECHANISM, PERCUTANEOUS APPROACH: ICD-10-PCS | Performed by: INTERNAL MEDICINE

## 2024-02-23 PROCEDURE — 02K83ZZ MAP CONDUCTION MECHANISM, PERCUTANEOUS APPROACH: ICD-10-PCS | Performed by: INTERNAL MEDICINE

## 2024-02-23 PROCEDURE — 93656 COMPRE EP EVAL ABLTJ ATR FIB: CPT | Performed by: INTERNAL MEDICINE

## 2024-02-23 PROCEDURE — 82962 GLUCOSE BLOOD TEST: CPT

## 2024-02-23 PROCEDURE — 85347 COAGULATION TIME ACTIVATED: CPT

## 2024-02-23 RX ORDER — SODIUM CHLORIDE 9 MG/ML
INJECTION, SOLUTION INTRAVENOUS CONTINUOUS
Status: DISCONTINUED | OUTPATIENT
Start: 2024-02-23 | End: 2024-02-23

## 2024-02-23 RX ORDER — INSULIN ASPART 100 [IU]/ML
INJECTION, SOLUTION INTRAVENOUS; SUBCUTANEOUS
Status: COMPLETED
Start: 2024-02-23 | End: 2024-02-23

## 2024-02-23 RX ORDER — INSULIN ASPART 100 [IU]/ML
INJECTION, SOLUTION INTRAVENOUS; SUBCUTANEOUS ONCE
Status: COMPLETED | OUTPATIENT
Start: 2024-02-23 | End: 2024-02-23

## 2024-02-23 RX ORDER — SODIUM CHLORIDE, SODIUM LACTATE, POTASSIUM CHLORIDE, CALCIUM CHLORIDE 600; 310; 30; 20 MG/100ML; MG/100ML; MG/100ML; MG/100ML
INJECTION, SOLUTION INTRAVENOUS CONTINUOUS
Status: DISCONTINUED | OUTPATIENT
Start: 2024-02-23 | End: 2024-02-23

## 2024-02-23 RX ORDER — ONDANSETRON 2 MG/ML
INJECTION INTRAMUSCULAR; INTRAVENOUS AS NEEDED
Status: DISCONTINUED | OUTPATIENT
Start: 2024-02-23 | End: 2024-02-23 | Stop reason: SURG

## 2024-02-23 RX ORDER — NICOTINE POLACRILEX 4 MG
30 LOZENGE BUCCAL
Status: DISCONTINUED | OUTPATIENT
Start: 2024-02-23 | End: 2024-02-23

## 2024-02-23 RX ORDER — HYDROMORPHONE HYDROCHLORIDE 1 MG/ML
0.4 INJECTION, SOLUTION INTRAMUSCULAR; INTRAVENOUS; SUBCUTANEOUS EVERY 5 MIN PRN
Status: DISCONTINUED | OUTPATIENT
Start: 2024-02-23 | End: 2024-02-23

## 2024-02-23 RX ORDER — DEXTROSE MONOHYDRATE 25 G/50ML
50 INJECTION, SOLUTION INTRAVENOUS
Status: DISCONTINUED | OUTPATIENT
Start: 2024-02-23 | End: 2024-02-23

## 2024-02-23 RX ORDER — HYDROMORPHONE HYDROCHLORIDE 1 MG/ML
0.6 INJECTION, SOLUTION INTRAMUSCULAR; INTRAVENOUS; SUBCUTANEOUS EVERY 5 MIN PRN
Status: DISCONTINUED | OUTPATIENT
Start: 2024-02-23 | End: 2024-02-23

## 2024-02-23 RX ORDER — ONDANSETRON 2 MG/ML
4 INJECTION INTRAMUSCULAR; INTRAVENOUS EVERY 6 HOURS PRN
Status: DISCONTINUED | OUTPATIENT
Start: 2024-02-23 | End: 2024-02-23

## 2024-02-23 RX ORDER — HYDROCODONE BITARTRATE AND ACETAMINOPHEN 5; 325 MG/1; MG/1
1 TABLET ORAL ONCE AS NEEDED
Status: DISCONTINUED | OUTPATIENT
Start: 2024-02-23 | End: 2024-02-23

## 2024-02-23 RX ORDER — SODIUM CHLORIDE 9 MG/ML
INJECTION, SOLUTION INTRAVENOUS
Status: COMPLETED | OUTPATIENT
Start: 2024-02-24 | End: 2024-02-23

## 2024-02-23 RX ORDER — HEPARIN SODIUM 5000 [USP'U]/ML
INJECTION, SOLUTION INTRAVENOUS; SUBCUTANEOUS
Status: COMPLETED
Start: 2024-02-23 | End: 2024-02-23

## 2024-02-23 RX ORDER — SODIUM CHLORIDE 9 MG/ML
INJECTION, SOLUTION INTRAVENOUS CONTINUOUS
OUTPATIENT
Start: 2024-02-23 | End: 2024-02-23

## 2024-02-23 RX ORDER — PROTAMINE SULFATE 10 MG/ML
INJECTION, SOLUTION INTRAVENOUS AS NEEDED
Status: DISCONTINUED | OUTPATIENT
Start: 2024-02-23 | End: 2024-02-23 | Stop reason: SURG

## 2024-02-23 RX ORDER — HYDROMORPHONE HYDROCHLORIDE 1 MG/ML
0.2 INJECTION, SOLUTION INTRAMUSCULAR; INTRAVENOUS; SUBCUTANEOUS EVERY 5 MIN PRN
Status: DISCONTINUED | OUTPATIENT
Start: 2024-02-23 | End: 2024-02-23

## 2024-02-23 RX ORDER — DEXAMETHASONE SODIUM PHOSPHATE 4 MG/ML
VIAL (ML) INJECTION AS NEEDED
Status: DISCONTINUED | OUTPATIENT
Start: 2024-02-23 | End: 2024-02-23 | Stop reason: SURG

## 2024-02-23 RX ORDER — NICOTINE POLACRILEX 4 MG
15 LOZENGE BUCCAL
Status: DISCONTINUED | OUTPATIENT
Start: 2024-02-23 | End: 2024-02-23

## 2024-02-23 RX ORDER — NALOXONE HYDROCHLORIDE 0.4 MG/ML
0.08 INJECTION, SOLUTION INTRAMUSCULAR; INTRAVENOUS; SUBCUTANEOUS AS NEEDED
Status: DISCONTINUED | OUTPATIENT
Start: 2024-02-23 | End: 2024-02-23

## 2024-02-23 RX ORDER — HEPARIN SODIUM 1000 [USP'U]/ML
INJECTION, SOLUTION INTRAVENOUS; SUBCUTANEOUS
Status: COMPLETED
Start: 2024-02-23 | End: 2024-02-23

## 2024-02-23 RX ORDER — HYDROCODONE BITARTRATE AND ACETAMINOPHEN 5; 325 MG/1; MG/1
2 TABLET ORAL ONCE AS NEEDED
Status: DISCONTINUED | OUTPATIENT
Start: 2024-02-23 | End: 2024-02-23

## 2024-02-23 RX ORDER — ACETAMINOPHEN 500 MG
1000 TABLET ORAL ONCE AS NEEDED
Status: DISCONTINUED | OUTPATIENT
Start: 2024-02-23 | End: 2024-02-23

## 2024-02-23 RX ORDER — LIDOCAINE HYDROCHLORIDE 10 MG/ML
INJECTION, SOLUTION EPIDURAL; INFILTRATION; INTRACAUDAL; PERINEURAL
Status: COMPLETED
Start: 2024-02-23 | End: 2024-02-23

## 2024-02-23 RX ORDER — LIDOCAINE HYDROCHLORIDE 10 MG/ML
INJECTION, SOLUTION EPIDURAL; INFILTRATION; INTRACAUDAL; PERINEURAL AS NEEDED
Status: DISCONTINUED | OUTPATIENT
Start: 2024-02-23 | End: 2024-02-23 | Stop reason: SURG

## 2024-02-23 RX ORDER — ROCURONIUM BROMIDE 10 MG/ML
INJECTION, SOLUTION INTRAVENOUS AS NEEDED
Status: DISCONTINUED | OUTPATIENT
Start: 2024-02-23 | End: 2024-02-23 | Stop reason: SURG

## 2024-02-23 RX ORDER — HEPARIN SODIUM 1000 [USP'U]/ML
INJECTION, SOLUTION INTRAVENOUS; SUBCUTANEOUS AS NEEDED
Status: DISCONTINUED | OUTPATIENT
Start: 2024-02-23 | End: 2024-02-23 | Stop reason: SURG

## 2024-02-23 RX ADMIN — LIDOCAINE HYDROCHLORIDE 50 MG: 10 INJECTION, SOLUTION EPIDURAL; INFILTRATION; INTRACAUDAL; PERINEURAL at 12:57:00

## 2024-02-23 RX ADMIN — ROCURONIUM BROMIDE 50 MG: 10 INJECTION, SOLUTION INTRAVENOUS at 12:57:00

## 2024-02-23 RX ADMIN — INSULIN ASPART 2 UNITS: 100 INJECTION, SOLUTION INTRAVENOUS; SUBCUTANEOUS at 14:46:00

## 2024-02-23 RX ADMIN — PROTAMINE SULFATE 50 MG: 10 INJECTION, SOLUTION INTRAVENOUS at 14:11:00

## 2024-02-23 RX ADMIN — DEXAMETHASONE SODIUM PHOSPHATE 4 MG: 4 MG/ML VIAL (ML) INJECTION at 12:57:00

## 2024-02-23 RX ADMIN — HEPARIN SODIUM 5000 UNITS: 1000 INJECTION, SOLUTION INTRAVENOUS; SUBCUTANEOUS at 13:13:00

## 2024-02-23 RX ADMIN — ONDANSETRON 4 MG: 2 INJECTION INTRAMUSCULAR; INTRAVENOUS at 14:11:00

## 2024-02-23 RX ADMIN — HEPARIN SODIUM 1000 UNITS: 1000 INJECTION, SOLUTION INTRAVENOUS; SUBCUTANEOUS at 13:48:00

## 2024-02-23 RX ADMIN — HEPARIN SODIUM 5000 UNITS: 1000 INJECTION, SOLUTION INTRAVENOUS; SUBCUTANEOUS at 13:33:00

## 2024-02-23 RX ADMIN — SODIUM CHLORIDE: 9 INJECTION, SOLUTION INTRAVENOUS at 14:30:00

## 2024-02-23 RX ADMIN — SODIUM CHLORIDE: 9 INJECTION, SOLUTION INTRAVENOUS at 12:57:00

## 2024-02-23 NOTE — ANESTHESIA POSTPROCEDURE EVALUATION
[facility]    Teresita Henderson Patient Status:  Outpatient   Age/Gender 83 year old female MRN [id number]   Location [facility] POST ANESTHESIA CARE UNIT Attending Susan Marin MD   Hosp Day # 0 PCP Nkechi Ariza MD       Anesthesia Post-op Note        Procedure Summary       Date: 02/23/24 Room / Location: [facility] Interventional [facility]    Anesthesia Start: 1252 Anesthesia Stop: 1436    Procedure: CATH EP-RF cardiac ablation Diagnosis:       A-fib (HCC)      A-fib (HCC)    Scheduled Providers:  Anesthesiologist: Chandler Cuenca MD    Anesthesia Type: general ASA Status: 3            Anesthesia Type: general    Vitals Value Taken Time   /75 02/23/24 1435   Temp 97.6 °F (36.4 °C) 02/23/24 1430   Pulse 70 02/23/24 1435   Resp 14 02/23/24 1435   SpO2 100 % 02/23/24 1435   Vitals shown include unfiled device data.    Patient Location: PACU    Anesthesia Type: general    Airway Patency: patent    Postop Pain Control: adequate    Mental Status: mildly sedated but able to meaningfully participate in the post-anesthesia evaluation    Nausea/Vomiting: none    Cardiopulmonary/Hydration status: stable euvolemic    Complications: no apparent anesthesia related complications    Postop vital signs: stable    Dental Exam: Unchanged from Preop    Patient to be discharged from PACU when criteria met.

## 2024-02-23 NOTE — PROGRESS NOTES
Rc'd pt from PACU s/p RFA in stable condition. VSS. Vascade  x 3 to right groin is soft, clean and dry. No bleeding or hematoma. Pt denies c/o pain or discomfort. Pt tolerating po intake well.     17:00: Bedrest completed. Pt ambulated and tolerated well. Groin stable. IV x 2 removed with catheter intact. Pt emptied hooks bag. Reviewed discharge instructions with pt and daughter, verbalizes understanding. Home via w/c in stable condition without c/o. Pts daughter is the .

## 2024-02-23 NOTE — ANESTHESIA PREPROCEDURE EVALUATION
PRE-OP EVALUATION    Patient Name: Teresita Henderson    Admit Diagnosis: A-fib (HCC) [I48.91]    Pre-op Diagnosis: * No pre-op diagnosis entered *        Anesthesia Procedure: CATH EP    * No surgeons found in log *    Pre-op vitals reviewed.        There is no height or weight on file to calculate BMI.    Current medications reviewed.  Hospital Medications:   [COMPLETED] lidocaine PF (Xylocaine-MPF) 1 % injection        [COMPLETED] heparin (Porcine) 1000 UNIT/ML injection        [COMPLETED] heparin (Porcine) 5000 UNIT/ML injection           Outpatient Medications:     Medications Prior to Admission   Medication Sig Dispense Refill Last Dose    Cholecalciferol (VITAMIN D) 50 MCG (2000 UT) Oral Cap Take by mouth daily.       aspirin 81 MG Oral Tab EC Take 1 tablet (81 mg total) by mouth daily. 30 tablet 0     ezetimibe 10 MG Oral Tab Take 1 tablet (10 mg total) by mouth nightly. 90 tablet 0     nitrofurantoin monohydrate macro 100 MG Oral Cap Take 1 capsule (100 mg total) by mouth nightly. 30 capsule 0     methenamine 1 g Oral Tab Take 1 tablet (1 g total) by mouth 2 (two) times daily. 60 tablet 0     Polyethylene Glycol 3350 17 g Oral Powd Pack Take 17 g by mouth daily as needed. 90 each 1     levothyroxine 88 MCG Oral Tab Take 1 tablet (88 mcg total) by mouth daily. 90 tablet 0     pravastatin 10 MG Oral Tab Take 1 tablet (10 mg total) by mouth nightly. 90 tablet 0     metFORMIN  MG Oral Tablet 24 Hr Take 1 tablet (750 mg total) by mouth daily. 90 tablet 0     Meth-Hyo-M Bl-Na Phos-Ph Sal (URIBEL) 118 MG Oral Cap Take 1 capsule by mouth 3 (three) times daily as needed.       gabapentin 100 MG Oral Cap Take 2 capsules (200 mg total) by mouth nightly. 180 capsule 0     DIGOX 125 MCG Oral Tab Take 1 tablet (125 mcg total) by mouth daily.       Lancets (ONETOUCH DELICA PLUS [id number]) Does not apply Misc 1 each daily. 100 each 6     clopidogrel 75 MG Oral Tab Take 1 tablet (75 mg total) by mouth daily. 30 tablet 5      metoprolol succinate ER 25 MG Oral Tablet 24 Hr Take 1 tablet (25 mg total) by mouth in the morning and 1 tablet (25 mg total) before bedtime. (Patient taking differently: Take 1 tablet (25 mg total) by mouth in the morning and 1 tablet (25 mg total) before bedtime. 50 mg in am and 25 mg at HS.) 180 tablet 0     cetirizine 10 MG Oral Tab Take 1 tablet (10 mg total) by mouth daily.       pantoprazole 40 MG Oral Tab EC Take 1 tablet (40 mg total) by mouth daily.       dofetilide 125 MCG Oral Cap Take 1 capsule (125 mcg total) by mouth 2 (two) times daily.       fluticasone propionate 50 MCG/ACT Nasal Suspension 2 sprays by Each Nare route daily. 11.1 mL 3        Allergies: Cephalexin, Ciprofloxacin, Levofloxacin, Penicillins, Toviaz, Atorvastatin, Chlorhexidine, Chocolate, Colesevelam, Shellfish allergy, Chocolate, Fish-derived products, Mushrooms, Pecan, Shellfish, Shrimp, Tomatoes, and Hexachlorophene      Anesthesia Evaluation    Patient summary reviewed.    Anesthetic Complications  (+) history of anesthetic complications  History of: pseudocholinesterase deficiency       GI/Hepatic/Renal      (+) GERD                           Cardiovascular                  (+) hypertension     (+) CAD      (+) pacemaker/AICD       (+) dysrhythmias and atrial fibrillation                  Endo/Other      (+) diabetes                            Pulmonary                           Neuro/Psych                 (+) neuromuscular disease                     Past Surgical History:   Procedure Laterality Date    ANGIOPLASTY (CORONARY)      BREAST LUMPECTOMY Right 2015    Procedure: BREAST LUMPECTOMY;  Surgeon: Castro Marie MD;  Location: [facility]    BREAST RECONSTRUCTION Right       1977    CABG  1997    x3    CARDIAC PACEMAKER PLACEMENT      [place]    CATARACT  2017    CATH BARE METAL STENT (BMS)      CHOLECYSTECTOMY      COLONOSCOPY  2016    tics; repeat 10 yrs    COLONOSCOPY       COLONOSCOPY,DIAGNOSTIC N/A 2016    Procedure: COLONOSCOPY, POSSIBLE BIOPSY, POSSIBLE POLYPECTOMY 03081;  Surgeon: Rodolfo Ann MD;  Location: [facility]    CYSTOSCOPY,RX FEMALE URETHRAL SYND  05/10/2013    cysto UD, Ricardo    D & C  1976    HYSTERECTOMY  1985    precancer and irregular bleeding. w/ BSO    KNEE REPLACEMENT SURGERY  2013    left     LUMPECTOMY RIGHT Right [id number]    MASTECTOMY RIGHT  2015      1964    OOPHORECTOMY      AGE 47    OPEN HEART SURGERY (PBP)      OTHER SURGICAL HISTORY      LUMPECTOMY RIGHT BREAST     OTHER SURGICAL HISTORY      A PMM LEFT KNEE , replacement    OTHER SURGICAL HISTORY  2013    Venita DENTON    OTHER SURGICAL HISTORY  2014    Cystoscopy- Dr. Figueroa    OTHER SURGICAL HISTORY  2016    Bladder sling    PACEMAKER MONITOR      PATIENT DOCUMENTED NOT TO HAVE EXPERIENCED ANY OF THE FOLLOWING EVENTS N/A 2016    Procedure: COLONOSCOPY, POSSIBLE BIOPSY, POSSIBLE POLYPECTOMY 14543;  Surgeon: Rodolfo Ann MD;  Location: [facility]    PATIENT WITHOUGH PREOPERATIVE ORDER FOR IV ANTIBIOTIC SURGICAL SITE INFECTION PROPHYLAXIS. N/A 2016    Procedure: COLONOSCOPY, POSSIBLE BIOPSY, POSSIBLE POLYPECTOMY 15665;  Surgeon: Rodolfo Ann MD;  Location: [facility]    RADIATION RIGHT          REDUCTION LEFT  10/2015    TOTAL KNEE REPLACEMENT      LEFT    UPPER GI ENDOSCOPY,EXAM  2011    repeat 5 yeas     Social History     Socioeconomic History    Marital status:    Tobacco Use    Smoking status: Former     Packs/day: 0.00     Years: 0.00     Additional pack years: 0.00     Total pack years: 0.00     Types: Cigarettes     Quit date: 1997     Years since quittin.7    Smokeless tobacco: Never   Vaping Use    Vaping Use: Never used   Substance and Sexual Activity    Alcohol use: Not Currently    Drug use: No   Other Topics Concern    Caffeine Concern Yes    Exercise Yes     History   Drug  Use No     Available pre-op labs reviewed.  Lab Results   Component Value Date    WBC 5.7 02/12/2024    RBC 4.54 02/12/2024    HGB 12.5 02/12/2024    HCT 39.0 02/12/2024    MCV 85.9 02/12/2024    MCH 27.5 02/12/2024    MCHC 32.1 02/12/2024    RDW 14.6 02/12/2024    .0 02/12/2024     Lab Results   Component Value Date     (L) 02/12/2024    K 4.2 02/12/2024     02/12/2024    CO2 30.0 02/12/2024    BUN 12 02/12/2024    CREATSERUM 0.81 02/12/2024     (H) 02/12/2024    CA 9.4 02/12/2024            Airway      Mallampati: III  Mouth opening: 3 FB  TM distance: 4 - 6 cm   Cardiovascular             Dental  Comment: No loose teeth per patient        Dental appliance(s): veneers       Pulmonary                     Other findings              ASA: 3   Plan: general  NPO status verified and     Post-procedure pain management plan discussed with surgeon and patient.    Comment: GETA/LMA discussed in detail.  Risk of complications discussed including but not limited to sore throat, cough, PONV discussed. Also, discussed risks including dental injury particularly on any weakened, treated or diseased teeth & pt wishes to proceed  All questions answered.    Plan/risks discussed with: patient                Present on Admission:  **None**

## 2024-02-23 NOTE — H&P
H&P    Teresita Henderson Patient Status:  Outpatient    5/15/1940 MRN [id number]   [facility] INTERVENTIONAL SUITES Attending Susan Marin MD   Hosp Day # 0 PCP Nkechi Ariza MD     Reason for Admission:  EPS and ablation     Assessment/Plan:  Patient Active Problem List   Diagnosis    Hypothyroid    CAD (coronary artery disease)    post hysterectomy prolapse    Pure hypercholesterolemia    Malignant neoplasm of breast (HCC)    Type 2 diabetes mellitus with hyperglycemia, without long-term current use of insulin (HCC)    Benign essential HTN    Paget's disease of nipple (HCC)    Stress incontinence (female) (male)    Vitamin D deficiency    Primary osteoarthritis of right knee    Diverticulosis    Benign essential tremor    Sick sinus syndrome (HCC)    Paroxysmal atrial fibrillation ([facility])    S/P primary angioplasty with coronary stent    History of pacemaker    Acute cystitis with hematuria    Urinary tract infection associated with indwelling urethral catheter, initial encounter ([facility])       Discussed risks and benefits of procedure including bleeding, infection, tamponade, stroke, AV block, and rare chance of life threatening complication.     History of Present Illness:  Teresita Henderson is a a(n) 83 year old female. Presents for EPS and ablation for atrial fibrillation.     History:  Past Medical History:   Diagnosis Date    A-fib ([facility])     Abnormal mammogram 2016    Allergic rhinitis     Anesthesia complication     Pt's sons have a history of pseudocholinesterase deficiency.    Antral gastritis     Arrhythmia     Breast CA ([facility])     Calculus of kidney     Cancer ([facility])     right breast cancer    Carpal tunnel syndrome     Cataract     Cervical spondylosis without myelopathy     Chondromalacia of patella 2012    Coronary atherosclerosis     stents    Coronary atherosclerosis of native coronary artery     Diabetes (HCC)     Diet controlled    Diverticulosis     Ductal  carcinoma in situ (DCIS) of right breast, 2015    Esophageal reflux     Exposure to unspecified radiation     last dose     Female stress incontinence 2016    Gall stones     GERD (gastroesophageal reflux disease) 2012    Glucose intolerance (impaired glucose tolerance)     High blood pressure     High cholesterol     History of blood transfusion         Hypothyroidism     Incomplete bladder emptying (aka [address])     trial of CIC 2014    L knee global 3/27/14 2013    Malignant neoplasm of breast (female), unspecified site     R    Neoplasm of right breast, 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), 2015    Osteoarthrosis, unspecified whether generalized or localized, unspecified site     generalized    Other and unspecified hyperlipidemia     Peripheral vascular disease (HCC)     PONV (postoperative nausea and vomiting)     Post-traumatic osteoarthritis of left elbow 2018    Postmenopausal atrophic vaginitis 2016    Primary localized osteoarthrosis, lower leg 2012    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)     Pseudocholinesterase deficiency     2 sons have had it    Pure hypercholesterolemia     Rectocele 2016    Recurrent UTI     group B strep    Recurrent UTI 2014    S/P total knee replacement using cement 2015    Unspecified disorder of thyroid     Visual impairment     reading glasses    Vitamin D deficiency      Past Surgical History:   Procedure Laterality Date    ANGIOPLASTY (CORONARY)      BREAST LUMPECTOMY Right 2015    Procedure: BREAST LUMPECTOMY;  Surgeon: Castro Marie MD;  Location: [facility]    BREAST RECONSTRUCTION Right       1977    CABG  1997    x3    CARDIAC PACEMAKER PLACEMENT      [place]    CATARACT  2017    CATH BARE METAL STENT (BMS)      CHOLECYSTECTOMY      COLONOSCOPY  2016    tics; repeat 10 yrs    COLONOSCOPY       COLONOSCOPY,DIAGNOSTIC N/A 2016    Procedure: COLONOSCOPY, POSSIBLE BIOPSY, POSSIBLE POLYPECTOMY 32607;  Surgeon: Rodolfo Ann MD;  Location: [facility]    CYSTOSCOPY,RX FEMALE URETHRAL SYND  05/10/2013    cysto UD, Ricardo    D & C  1976    HYSTERECTOMY  1985    precancer and irregular bleeding. w/ BSO    KNEE REPLACEMENT SURGERY  2013    left     LUMPECTOMY RIGHT Right [id number]    MASTECTOMY RIGHT  2015      1964    OOPHORECTOMY      AGE 47    OPEN HEART SURGERY (PBP)      OTHER SURGICAL HISTORY      LUMPECTOMY RIGHT BREAST     OTHER SURGICAL HISTORY      A PMM LEFT KNEE , replacement    OTHER SURGICAL HISTORY  2013    Venita DENTON    OTHER SURGICAL HISTORY  2014    Cystoscopy- Dr. Figueroa    OTHER SURGICAL HISTORY  2016    Bladder sling    PACEMAKER MONITOR      PATIENT DOCUMENTED NOT TO HAVE EXPERIENCED ANY OF THE FOLLOWING EVENTS N/A 2016    Procedure: COLONOSCOPY, POSSIBLE BIOPSY, POSSIBLE POLYPECTOMY 04413;  Surgeon: Rodolfo Ann MD;  Location: [facility]    PATIENT WITHOUGH PREOPERATIVE ORDER FOR IV ANTIBIOTIC SURGICAL SITE INFECTION PROPHYLAXIS. N/A 2016    Procedure: COLONOSCOPY, POSSIBLE BIOPSY, POSSIBLE POLYPECTOMY 01642;  Surgeon: Rodolfo Ann MD;  Location: [facility]    RADIATION RIGHT          REDUCTION LEFT  10/2015    TOTAL KNEE REPLACEMENT      LEFT    UPPER GI ENDOSCOPY,EXAM  2011    repeat 5 yeas     Family History   Problem Relation Age of Onset    Cancer Sister         throat    Diabetes Mother     Breast Cancer Self 52    Heart Disease Neg     Stroke Neg       reports that she quit smoking about 26 years ago. Her smoking use included cigarettes. She has never used smokeless tobacco. She reports that she does not currently use alcohol. She reports that she does not use drugs.    Allergies:  Allergies   Allergen Reactions    Cephalexin ANAPHYLAXIS, HIVES and FACE FLUSHING    Ciprofloxacin MYALGIA,  HIVES and OTHER (SEE COMMENTS)     Started on Rx 4/12/23? Not true allergy?    Levofloxacin MYALGIA, HIVES and OTHER (SEE COMMENTS)     Reports tendonitis    Reports tendonitis   Reports tendonitis   Reports tendonitis    Penicillins HIVES and RASH     Tolerates ceftriaxone    Toviaz SWELLING and TONGUE SWELLING     Throat tightening    Throat tightening   Throat tightening    Atorvastatin OTHER (SEE COMMENTS) and NAUSEA AND VOMITING     GI upset, nausea and vomitting    GI upset, nausea and vomitting   GI upset, nausea and vomitting   GI upset, nausea and vomitting    Chlorhexidine OTHER (SEE COMMENTS)     Rash, redness    Chocolate OTHER (SEE COMMENTS)     headache    headache   headache   headache    Colesevelam OTHER (SEE COMMENTS) and UNKNOWN     GI upset    GI upset   GI upset   GI upset    Shellfish Allergy OTHER (SEE COMMENTS)     Per allergy test, redness    Chocolate OTHER (SEE COMMENTS)     headache    Fish-Derived Products OTHER (SEE COMMENTS)     Per allergy testing    Mushrooms OTHER (SEE COMMENTS)     headache    Pecan OTHER (SEE COMMENTS)     Unknown     Shellfish OTHER (SEE COMMENTS)    Shrimp UNKNOWN     Per allergy testing    Tomatoes OTHER (SEE COMMENTS)     headache    Hexachlorophene RASH and OTHER (SEE COMMENTS)       Medications:  No current facility-administered medications for this encounter.    Facility-Administered Medications Ordered in Other Encounters:     lidocaine PF (Xylocaine-MPF) 1% injection, , Injection, PRN    propofol (Diprivan) 10 MG/ML injection, , Intravenous, PRN    rocuronium (Zemuron) 50 mg/5mL injection, , Intravenous, PRN    dexamethasone (Decadron) 4 MG/ML injection, , Intravenous, PRN    heparin (Porcine) 1000 UNIT/ML injection, , Intravenous, PRN    norepinephrine (Levophed) 4 mg/250mL infusion premix, , Intravenous, Continuous PRN    ondansetron (Zofran) 4 MG/2ML injection, , Intravenous, PRN    protamine 10 mg/mL injection, , Intravenous, PRN    sugammadex  ([place]) 200 MG/2ML injection, , Intravenous, PRN    Review of Systems:  All systems were reviewed and are negative except as described above in HPI.    Physical Exam:  Blood pressure 146/58, pulse 70, temperature 97.2 °F (36.2 °C), temperature source Temporal, resp. rate (!) 70, SpO2 100%, not currently breastfeeding.  Temp (24hrs), Av.2 °F (36.2 °C), Min:97.2 °F (36.2 °C), Max:97.2 °F (36.2 °C)    Wt Readings from Last 3 Encounters:   24 132 lb (59.9 kg)   23 134 lb (60.8 kg)   23 131 lb (59.4 kg)       Telemetry: A pacing  General: Alert and oriented in no apparent distress.  HEENT: No focal deficits.  Neck: No JVD, carotids 2+ no bruits.  Cardiac: Regular rate and rhythm, S1, S2 normal, no murmur, rub or gallop.  Lungs: Clear without wheezes, rales, rhonchi or dullness.  Normal excursions and effort.  Abdomen: Soft, non-tender.   Extremities: Without clubbing, cyanosis or edema.  Peripheral pulses are 2+.  Neurologic: Alert and oriented, normal affect.  Skin: Warm and dry.     Laboratories and Data:  Diagnostics:    Labs:     Lab Results   Component Value Date    PGLU 191 2024     No results found for: \"PT\", \"INR\"      Susan Marin MD  2024  2:25 PM    Susan Marin MD  [facility]  Cardiac Electrophysiolgy

## 2024-02-23 NOTE — PROCEDURES
Procedures performed:  1. Comprehensive EP study with 3-D mapping using CARTO  2. CS catheter placement to record and pace the left atrium.   3. RFA of atrial fibrillation with pulmonary vein isolation.   4. Intra-cardiac echo (ICE)    : Susan Marin MD    Pre-Op: persistent atrial fibrillation.   Post-Op: sinus rhythm    Complication: none    Methods: The patient was brought to the EP lab in a fasting state and non-sedated state. The right and left groins were prepped and draped in a sterile fashion. 3 sheaths were placed in the right femoral sotero via the modified Seldinger technique. Catheters were placed in the appropriate position under ICE and 3D mapping. Baseline measurements were recorded and programmed stimulation from the atrium and ventricle was performed. At the conclusion of the procedure, catheters and sheaths were removed and hemostasis was achieved with Vascade closure devices. There were no apparent intra-operative complications no pericardial effusion visualized by ICE. An attune cooling balloon catheter was placed in the esophagus.The patient was transported in stable condition to recovery.    Mapping and ablation: A heparin bolus was given, intermittent boluses were subsequently given to maintain an ACT of at least 300 seconds. There was no effusion at baseline. The MICHAEL was visualized with ICE, there did not appear to be a thrombus on the WM device. Transseptal (TS) access was achieved with ICE guidance. The TS sheath was exchanged for a steerable sheath.    Using the CARTO mapping system and ICE, a multi-electrode catheter was used to create a 3D geometry of the LA. Using an open irrigation RFA catheter, RFA was performed using high power short duration to achieve pulmonary vein isolation (PVI). PVI was confirmed with differential pacing.     Post ablation findings:   SCL 1000 ms,  ms,  ms,  ms.     AH 68 ms, HV 48 ms.      CONCLUSIONS:  1. Sinus node function  normal  2. AV node function normal.  3. His [place] normal  4. Status post successful pulmonary vein isolation.   5. No pericardial effusion visualized by ICE.    Susan Marin MD  [facility]  Cardiac Electrophysiolgy  [phone]

## 2024-02-23 NOTE — ANESTHESIA PROCEDURE NOTES
Airway  Date/Time: 2/23/2024 1:03 PM  Urgency: elective      General Information and Staff    Patient location during procedure: OR  Anesthesiologist: Chandler Cuenca MD  Performed: anesthesiologist   Performed by: Chandler Cuenca MD  Authorized by: Chandler Cuenca MD      Indications and Patient Condition  Indications for airway management: anesthesia  Sedation level: deep  Preoxygenated: yes  Patient position: sniffing  Mask difficulty assessment: 1 - vent by mask    Final Airway Details  Final airway type: endotracheal airway      Successful airway: ETT  Cuffed: yes   Successful intubation technique: direct laryngoscopy  Endotracheal tube insertion site: oral  Blade size: #3  ETT size (mm): 7.0    Cormack-Lehane Classification: grade I - full view of glottis  Placement verified by: capnometry   Cuff volume (mL): 7  Measured from: lips  ETT to lips (cm): 21  Number of attempts at approach: 1    Additional Comments  atraumatic

## 2024-02-23 NOTE — DISCHARGE INSTRUCTIONS
HOME CARE INSTRUCTIONS FOLLOWING CARDIAC ABLATION (RFA) OR ELECTOPHYSIOLOGY STUDY (EPS)     ACTIVITY:  ~ DO NOT drive after the procedure. You may resume driving on Sunday.   ~ Plan on resting and relaxing tonight and tomorrow. It will be normal to tire easily for the first few days, depending on the length of your procedure and the amount of sedation you received.   ~Do not lift anything over 10 pounds for the next 48 hours and 20 pounds for a week.   ~Avoid sexual activity for the first 24-48 hours.  ~Avoid repeated stair use and excessive walking for the first 24-48 hours.   ~Avoid alcohol for the next 24 hours.  ~Resume your normal activity as tolerated in 48 hours, or as instructed by your physician.     WHAT IS NORMAL:  ~You may feel extra heart beats. If these beats come too often, or you feel an episode of multiple fast heart beats, notify your physician.  ~The procedure sight may appear bruised or discolored.   ~There may be a small amount of drainage on the bandage.  ~There may be mild tenderness to the procedure site when touched. This is common.     SPECIAL INSTRUCTIONS:  ~The bandage is to remain in place for 24 hours. Keep the bandage clean and dry. You may shower the next day (after 24 hours.) Do not submerge the site for 72 hours (no tub baths or pools)   ~After 24 hours, you must remove the bandage. Wash the procedure site gently with soap and water. Do not scrub. (If you chose to wear a bandage for a few days, make sure it remains clean and dry and change it daily)    WHEN YOU SHOULD NOTIFY YOUR PHYSICIAN:  ~If you have shortness of breath or a persistent cough.  ~If you have chest pains (slight discomfort in chest may be normal for 24 hours)   ~If you have persistent pain at the procedure site.   ~If you experience of a fever, temperature greater than 101, chills, infection (redness, swelling, thick yellow drainage, or a foul odor from the procedure site)    OTHER:  ~You may resume your present  diet.  ~You may resume all of your medications as prescribed, unless otherwise directed by your physician. A list of medications was provided at discharge.  ~ Follow up with Dr Marin on 3/19 at 9:00AM.

## 2024-02-23 NOTE — ANESTHESIA PROCEDURE NOTES
Arterial Line    Date/Time: 2/23/2024 1:19 PM    Performed by: Chandler Cuenca MD  Authorized by: Chandler Cuenca MD    General Information and Staff    Procedure Start:  2/23/2024 1:19 PM  Procedure End:  2/23/2024 1:19 PM  Anesthesiologist:  Chandler Cuenca MD  Performed By:  Anesthesiologist  Patient Location:  OR  Indication: continuous blood pressure monitoring and blood sampling needed    Site Identification: real time ultrasound guided and image stored and retrievable    Preanesthetic Checklist: 2 patient identifiers, IV checked, risks and benefits discussed, monitors and equipment checked, pre-op evaluation, timeout performed, anesthesia consent and sterile technique used    Procedure Details    Catheter Size:  20 G  Catheter Length:  1 and 3/4 inch  Catheter Type:  Arrow  Seldinger Technique?: Yes    Laterality:  Left  Site:  Radial artery  Site Prep: chlorhexidine    Line Secured:  Wrist Brace, tape and Tegaderm    Assessment    Events: patient tolerated procedure well with no complications      Medications  2/23/2024 1:19 PM      Additional Comments

## 2024-02-26 RX ORDER — GABAPENTIN 100 MG/1
200 CAPSULE ORAL NIGHTLY
Qty: 180 CAPSULE | Refills: 2 | Status: SHIPPED | OUTPATIENT
Start: 2024-02-26

## 2024-02-26 NOTE — TELEPHONE ENCOUNTER
Gabapentin 100mg     LOV: 12/11/23   RTC: 4 weeks   Labs: 12/20/23   Filled: 11/27/23 # 180 0 refills   Future Appointments   Date Time Provider Department Center   2/27/2024 10:30 AM Fallon Ugalde DO  [facility]   3/13/2024  9:40 AM Nkechi Chavez MD [facility]

## 2024-02-27 ENCOUNTER — OFFICE VISIT (OUTPATIENT)
Dept: ELECTROPHYSIOLOGY | Facility: HOSPITAL | Age: 84
End: 2024-02-27
Attending: INTERNAL MEDICINE
Payer: MEDICARE

## 2024-02-27 DIAGNOSIS — M54.12 CERVICAL RADICULOPATHY: ICD-10-CM

## 2024-02-27 PROCEDURE — 95886 MUSC TEST DONE W/N TEST COMP: CPT | Performed by: OTHER

## 2024-02-27 PROCEDURE — 95910 NRV CNDJ TEST 7-8 STUDIES: CPT | Performed by: OTHER
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
## 2024-02-28 ENCOUNTER — TELEPHONE (OUTPATIENT)
Dept: CARDIOLOGY CLINIC | Facility: HOSPITAL | Age: 84
End: 2024-02-28

## 2024-02-28 NOTE — TELEPHONE ENCOUNTER
6 month post Watchman call, ok to stop Plavix. States had ablation on Friday, will check with Tomas re: remaining on med.

## 2024-03-09 DIAGNOSIS — E83.51 HYPOCALCEMIA: ICD-10-CM

## 2024-03-09 DIAGNOSIS — N39.0 RECURRENT UTI: ICD-10-CM

## 2024-03-11 RX ORDER — NITROFURANTOIN 25; 75 MG/1; MG/1
100 CAPSULE ORAL NIGHTLY
Qty: 30 CAPSULE | Refills: 0 | Status: SHIPPED | OUTPATIENT
Start: 2024-03-11

## 2024-03-11 NOTE — TELEPHONE ENCOUNTER
NO PROTOCOL    Name from pharmacy: NITROFURANTOIN MONO/MAC 100MG CAPS         Will file in chart as: NITROFURANTOIN MONOHYDRATE MACRO 100 MG Oral Cap    Sig: TAKE 1 CAPSULE(100 MG) BY MOUTH EVERY NIGHT    Disp: 30 capsule    Refills: 0 (Pharmacy requested: Not specified)    Start: 3/9/2024    Class: Normal    Non-formulary For: Recurrent UTI, Hypocalcemia    Last ordered: 1 month ago (1/15/2024) by Nkechi Ariza MD    Last refill: 1/15/2024    Rx #: [id number]       To be filled at: [id number] [address] , [phone], [phone]     LOV: 12/11/23 w/ DV   RTC: 01/08/24   RECENT LABS: 12/2023  FOV: 03/13/24

## 2024-03-13 ENCOUNTER — OFFICE VISIT (OUTPATIENT)
Dept: INTERNAL MEDICINE CLINIC | Facility: CLINIC | Age: 84
End: 2024-03-13
Payer: MEDICARE

## 2024-03-13 VITALS
RESPIRATION RATE: 16 BRPM | HEART RATE: 79 BPM | TEMPERATURE: 98 F | DIASTOLIC BLOOD PRESSURE: 70 MMHG | BODY MASS INDEX: 23.99 KG/M2 | HEIGHT: 63 IN | WEIGHT: 135.38 LBS | SYSTOLIC BLOOD PRESSURE: 130 MMHG | OXYGEN SATURATION: 96 %

## 2024-03-13 DIAGNOSIS — N39.0 RECURRENT UTI: Primary | ICD-10-CM

## 2024-03-13 DIAGNOSIS — E11.65 TYPE 2 DIABETES MELLITUS WITH HYPERGLYCEMIA, WITHOUT LONG-TERM CURRENT USE OF INSULIN (HCC): Chronic | ICD-10-CM

## 2024-03-13 DIAGNOSIS — K21.9 GASTROESOPHAGEAL REFLUX DISEASE, UNSPECIFIED WHETHER ESOPHAGITIS PRESENT: ICD-10-CM

## 2024-03-13 DIAGNOSIS — R13.10 DYSPHAGIA, UNSPECIFIED TYPE: ICD-10-CM

## 2024-03-13 DIAGNOSIS — G56.22 ENTRAPMENT OF LEFT ULNAR NERVE: ICD-10-CM

## 2024-03-13 LAB
BILIRUBIN: NEGATIVE
GLUCOSE (URINE DIPSTICK): NEGATIVE MG/DL
KETONES (URINE DIPSTICK): NEGATIVE MG/DL
MULTISTIX LOT#: ABNORMAL NUMERIC
NITRITE, URINE: NEGATIVE
PH, URINE: 7 (ref 4.5–8)
PROTEIN (URINE DIPSTICK): NEGATIVE MG/DL
SPECIFIC GRAVITY: 1.01 (ref 1–1.03)
URINE-COLOR: YELLOW
UROBILINOGEN,SEMI-QN: 0.2 MG/DL (ref 0–1.9)

## 2024-03-13 PROCEDURE — 99214 OFFICE O/P EST MOD 30 MIN: CPT | Performed by: INTERNAL MEDICINE

## 2024-03-13 PROCEDURE — 87086 URINE CULTURE/COLONY COUNT: CPT | Performed by: INTERNAL MEDICINE

## 2024-03-13 PROCEDURE — 81003 URINALYSIS AUTO W/O SCOPE: CPT | Performed by: INTERNAL MEDICINE

## 2024-03-13 PROCEDURE — 82043 UR ALBUMIN QUANTITATIVE: CPT | Performed by: INTERNAL MEDICINE

## 2024-03-13 PROCEDURE — 82570 ASSAY OF URINE CREATININE: CPT | Performed by: INTERNAL MEDICINE

## 2024-03-13 RX ORDER — NITROFURANTOIN 25; 75 MG/1; MG/1
100 CAPSULE ORAL 2 TIMES DAILY
Qty: 10 CAPSULE | Refills: 0 | Status: SHIPPED | OUTPATIENT
Start: 2024-03-13 | End: 2024-03-18

## 2024-03-13 NOTE — PROGRESS NOTES
Subjective:   Teresita Henderson is a 83 year old female  who presents for Follow - Up (UTI Sym)     Recurrent UTI - on macrobid nightly for ppx.  Reports one week of on and off pressure/frequency.   Denies f/c.   Per daughter - has appt with [facility] at [facility] coming up.     Abnormal EMG- needs to see ortho given \"severe left ulnar compressive mononeuropathy.\"  Also has possible C7/8 radiculopathy without active denervation.     DM2- to check A1c    Today also reports years of dysphagia-    GERD-on pantoprazole.     Hx of Afib- sp Watchman. following with cardiology   History/Other:    Chief Complaint Reviewed and Verified  Nursing Notes Reviewed and   Verified  Allergies Reviewed  Medications Reviewed  OB Status Reviewed           Current Outpatient Medications   Medication Sig Dispense Refill    gabapentin 100 MG Oral Cap Take 2 capsules (200 mg total) by mouth nightly. 180 capsule 2    Cholecalciferol (VITAMIN D) 50 MCG (2000 UT) Oral Cap Take by mouth daily.      ezetimibe 10 MG Oral Tab Take 1 tablet (10 mg total) by mouth nightly. 90 tablet 0    Polyethylene Glycol 3350 17 g Oral Powd Pack Take 17 g by mouth daily as needed. 90 each 1    levothyroxine 88 MCG Oral Tab Take 1 tablet (88 mcg total) by mouth daily. 90 tablet 0    pravastatin 10 MG Oral Tab Take 1 tablet (10 mg total) by mouth nightly. 90 tablet 0    metFORMIN  MG Oral Tablet 24 Hr Take 1 tablet (750 mg total) by mouth daily. 90 tablet 0    Meth-Hyo-M Bl-Na Phos-Ph Sal (URIBEL) 118 MG Oral Cap Take 1 capsule by mouth 3 (three) times daily as needed.      DIGOX 125 MCG Oral Tab Take 1 tablet (125 mcg total) by mouth daily.      Lancets (ONETOUCH DELICA PLUS [id number]) Does not apply Misc 1 each daily. 100 each 6    aspirin 81 MG Oral Tab EC Take 1 tablet (81 mg total) by mouth daily. 30 tablet 0    metoprolol succinate ER 25 MG Oral Tablet 24 Hr Take 1 tablet (25 mg total) by mouth in the morning and 1 tablet (25 mg total) before bedtime. (Patient  taking differently: Take 1 tablet (25 mg total) by mouth in the morning and 1 tablet (25 mg total) before bedtime. 50 mg in am and 25 mg at HS.) 180 tablet 0    cetirizine 10 MG Oral Tab Take 1 tablet (10 mg total) by mouth daily.      pantoprazole 40 MG Oral Tab EC Take 1 tablet (40 mg total) by mouth daily.      dofetilide 125 MCG Oral Cap Take 1 capsule (125 mcg total) by mouth 2 (two) times daily.      fluticasone propionate 50 MCG/ACT Nasal Suspension 2 sprays by Each Nare route daily. 11.1 mL 3    methenamine 1 g Oral Tab Take 1 tablet (1 g total) by mouth 2 (two) times daily. (Patient not taking: Reported on 3/13/2024) 60 tablet 0       Review of Systems:  Pertinent items are noted in HPI.  A comprehensive 10 point review of systems was completed.  Pertinent positives and negatives noted in the the HPI.        Objective:   /70 (BP Location: Left arm, Patient Position: Sitting, Cuff Size: adult)   Pulse 79   Temp 97.7 °F (36.5 °C) (Temporal)   Resp 16   Ht 5' 3\" (1.6 m)   Wt 135 lb 6.4 oz (61.4 kg)   SpO2 96%   BMI 23.99 kg/m²  Estimated body mass index is 23.99 kg/m² as calculated from the following:    Height as of this encounter: 5' 3\" (1.6 m).    Weight as of this encounter: 135 lb 6.4 oz (61.4 kg).  PHYSICAL EXAM:   General: no acute distress   Respiratory: no increased work of breathing; good air exchange; CTAB; no crackles or wheezing   Cardiovascular: RRR; S1, S2;   Gastrointestinal: normal bowel sounds; soft; non-distended; non-tender  Neurological: awake, alert, oriented x3; CNII-XII grossly intact;  MSK: full ROM; slight weakness in L hand on lateral side   \no CVA tenderness   Behavioral/Psych: euthymic; appropriate affect   Skin: no elbow or hand lesions     Assessment & Plan:     Maribel was seen today for follow - up.    Diagnoses and all orders for this visit:    Recurrent UTI  -     Urine Culture, Routine [E]; Future  -     Urine Dip, auto without Micro  -     nitrofurantoin  monohydrate macro 100 MG Oral Cap; Take 1 capsule (100 mg total) by mouth 2 (two) times daily for 5 days.  -follow-up with ID and urology as directed (does [facility])    Gastroesophageal reflux disease, unspecified whether esophagitis present  Dysphagia, unspecified type  -     Gastro Referral - In Network  -pantoprazole     Entrapment of left ulnar nerve  -to see ortho     Type 2 diabetes mellitus with hyperglycemia, without long-term current use of insulin (HCC)  -     Hemoglobin A1C [E]; Future  -     Lipid Panel [E]; Future  -     Microalb/Creat Ratio, Random Urine [E]; Future          Nkechi Ariza MD

## 2024-03-14 LAB
CREAT UR-SCNC: 48.4 MG/DL
MICROALBUMIN UR-MCNC: 1.06 MG/DL
MICROALBUMIN/CREAT 24H UR-RTO: 21.9 UG/MG (ref ?–30)

## 2024-03-20 ENCOUNTER — LAB ENCOUNTER (OUTPATIENT)
Dept: LAB | Age: 84
End: 2024-03-20
Attending: INTERNAL MEDICINE
Payer: MEDICARE

## 2024-03-20 DIAGNOSIS — E11.65 TYPE 2 DIABETES MELLITUS WITH HYPERGLYCEMIA, WITHOUT LONG-TERM CURRENT USE OF INSULIN (HCC): Chronic | ICD-10-CM

## 2024-03-20 LAB
CHOLEST SERPL-MCNC: 172 MG/DL (ref ?–200)
EST. AVERAGE GLUCOSE BLD GHB EST-MCNC: 217 MG/DL (ref 68–126)
FASTING PATIENT LIPID ANSWER: YES
HBA1C MFR BLD: 9.2 % (ref ?–5.7)
HDLC SERPL-MCNC: 40 MG/DL (ref 40–59)
LDLC SERPL CALC-MCNC: 85 MG/DL (ref ?–100)
NONHDLC SERPL-MCNC: 132 MG/DL (ref ?–130)
TRIGL SERPL-MCNC: 284 MG/DL (ref 30–149)
VLDLC SERPL CALC-MCNC: 46 MG/DL (ref 0–30)

## 2024-03-20 PROCEDURE — 80061 LIPID PANEL: CPT

## 2024-03-20 PROCEDURE — 83036 HEMOGLOBIN GLYCOSYLATED A1C: CPT

## 2024-03-20 PROCEDURE — 36415 COLL VENOUS BLD VENIPUNCTURE: CPT

## 2024-04-02 RX ORDER — LEVOTHYROXINE SODIUM 88 UG/1
88 TABLET ORAL DAILY
Qty: 90 TABLET | Refills: 3 | Status: SHIPPED | OUTPATIENT
Start: 2024-04-02

## 2024-04-02 RX ORDER — LEVOTHYROXINE SODIUM 88 UG/1
88 TABLET ORAL DAILY
Qty: 90 TABLET | Refills: 0 | Status: CANCELLED | OUTPATIENT
Start: 2024-04-02

## 2024-04-02 NOTE — TELEPHONE ENCOUNTER
Name from pharmacy: LEVOTHYROXINE 0.088MG (88MCG) TAB         Will file in chart as: LEVOTHYROXINE 88 MCG Oral Tab    Sig: TAKE 1 TABLET(88 MCG) BY MOUTH DAILY    Disp: 90 tablet    Refills: 0 (Pharmacy requested: Not specified)    Start: 3/30/2024    Class: Normal    Non-formulary    Last ordered: 3 months ago (12/18/2023) by Abdon Johnson MD    Last refill: 12/19/2023    Rx #: [id number]    Thyroid Medication Protocol Apxqez7803/30/2024 09:48 AM   Protocol Details TSH in past 12 months    Last TSH value is normal    In person appointment or virtual visit in the past 12 mos or appointment in next 3 mos      To be filled at: [facility] #[id number] [place], [place] - [address]  AT [place] [address], [phone], [phone]

## 2024-04-11 PROBLEM — R33.9 RETENTION OF URINE: Status: ACTIVE | Noted: 2023-06-06

## 2024-04-11 PROBLEM — R00.2 PALPITATIONS: Status: ACTIVE | Noted: 2022-01-31

## 2024-04-11 PROBLEM — E83.51 HYPOCALCEMIA: Status: ACTIVE | Noted: 2023-11-29

## 2024-04-11 PROBLEM — N20.0 KIDNEY STONE: Status: ACTIVE | Noted: 2023-01-24

## 2024-04-11 PROBLEM — A49.8 INFECTION DUE TO ACINETOBACTER SPECIES: Status: ACTIVE | Noted: 2024-04-11

## 2024-04-11 PROBLEM — R35.0 URINARY FREQUENCY: Status: ACTIVE | Noted: 2023-01-24

## 2024-04-11 PROBLEM — I65.23 STENOSIS OF BOTH INTERNAL CAROTID ARTERIES: Status: ACTIVE | Noted: 2022-01-03

## 2024-04-19 ENCOUNTER — HOSPITAL ENCOUNTER (EMERGENCY)
Facility: HOSPITAL | Age: 84
Discharge: HOME | End: 2024-04-19
Attending: EMERGENCY MEDICINE
Payer: MEDICARE

## 2024-04-19 VITALS
RESPIRATION RATE: 16 BRPM | SYSTOLIC BLOOD PRESSURE: 162 MMHG | TEMPERATURE: 98 F | HEART RATE: 70 BPM | HEIGHT: 62 IN | WEIGHT: 132 LBS | OXYGEN SATURATION: 98 % | BODY MASS INDEX: 24.29 KG/M2 | DIASTOLIC BLOOD PRESSURE: 87 MMHG

## 2024-04-19 DIAGNOSIS — T83.011A MALFUNCTION OF FOLEY CATHETER, INITIAL ENCOUNTER (HCC): Primary | ICD-10-CM

## 2024-04-19 DIAGNOSIS — L03.90 CELLULITIS, UNSPECIFIED CELLULITIS SITE: ICD-10-CM

## 2024-04-19 LAB
ALBUMIN SERPL-MCNC: 3.8 G/DL (ref 3.4–5)
ALBUMIN/GLOB SERPL: 0.9 {RATIO} (ref 1–2)
ALP LIVER SERPL-CCNC: 93 U/L
ALT SERPL-CCNC: 27 U/L
ANION GAP SERPL CALC-SCNC: 4 MMOL/L (ref 0–18)
AST SERPL-CCNC: 18 U/L (ref 15–37)
BASOPHILS # BLD AUTO: 0.05 X10(3) UL (ref 0–0.2)
BASOPHILS NFR BLD AUTO: 0.7 %
BILIRUB SERPL-MCNC: 0.3 MG/DL (ref 0.1–2)
BILIRUB UR QL STRIP.AUTO: NEGATIVE
BUN BLD-MCNC: 13 MG/DL (ref 9–23)
CALCIUM BLD-MCNC: 9.3 MG/DL (ref 8.5–10.1)
CHLORIDE SERPL-SCNC: 102 MMOL/L (ref 98–112)
CLARITY UR REFRACT.AUTO: CLEAR
CO2 SERPL-SCNC: 28 MMOL/L (ref 21–32)
COLOR UR AUTO: COLORLESS
CREAT BLD-MCNC: 0.81 MG/DL
EGFRCR SERPLBLD CKD-EPI 2021: 72 ML/MIN/1.73M2 (ref 60–?)
EOSINOPHIL # BLD AUTO: 0.19 X10(3) UL (ref 0–0.7)
EOSINOPHIL NFR BLD AUTO: 2.8 %
ERYTHROCYTE [DISTWIDTH] IN BLOOD BY AUTOMATED COUNT: 14.7 %
GLOBULIN PLAS-MCNC: 4.1 G/DL (ref 2.8–4.4)
GLUCOSE BLD-MCNC: 156 MG/DL (ref 70–99)
GLUCOSE UR STRIP.AUTO-MCNC: NORMAL MG/DL
HCT VFR BLD AUTO: 35.6 %
HGB BLD-MCNC: 12.2 G/DL
IMM GRANULOCYTES # BLD AUTO: 0.02 X10(3) UL (ref 0–1)
IMM GRANULOCYTES NFR BLD: 0.3 %
KETONES UR STRIP.AUTO-MCNC: NEGATIVE MG/DL
LEUKOCYTE ESTERASE UR QL STRIP.AUTO: NEGATIVE
LYMPHOCYTES # BLD AUTO: 2.03 X10(3) UL (ref 1–4)
LYMPHOCYTES NFR BLD AUTO: 30.3 %
MCH RBC QN AUTO: 28.8 PG (ref 26–34)
MCHC RBC AUTO-ENTMCNC: 34.3 G/DL (ref 31–37)
MCV RBC AUTO: 84 FL
MONOCYTES # BLD AUTO: 0.65 X10(3) UL (ref 0.1–1)
MONOCYTES NFR BLD AUTO: 9.7 %
NEUTROPHILS # BLD AUTO: 3.75 X10 (3) UL (ref 1.5–7.7)
NEUTROPHILS # BLD AUTO: 3.75 X10(3) UL (ref 1.5–7.7)
NEUTROPHILS NFR BLD AUTO: 56.2 %
NITRITE UR QL STRIP.AUTO: NEGATIVE
OSMOLALITY SERPL CALC.SUM OF ELEC: 281 MOSM/KG (ref 275–295)
PH UR STRIP.AUTO: 7 [PH] (ref 5–8)
PLATELET # BLD AUTO: 229 10(3)UL (ref 150–450)
POTASSIUM SERPL-SCNC: 4.5 MMOL/L (ref 3.5–5.1)
PROT SERPL-MCNC: 7.9 G/DL (ref 6.4–8.2)
PROT UR STRIP.AUTO-MCNC: NEGATIVE MG/DL
RBC # BLD AUTO: 4.24 X10(6)UL
RBC UR QL AUTO: NEGATIVE
SODIUM SERPL-SCNC: 134 MMOL/L (ref 136–145)
SP GR UR STRIP.AUTO: <1.005 (ref 1–1.03)
UROBILINOGEN UR STRIP.AUTO-MCNC: NORMAL MG/DL
WBC # BLD AUTO: 6.7 X10(3) UL (ref 4–11)

## 2024-04-19 PROCEDURE — 80053 COMPREHEN METABOLIC PANEL: CPT | Performed by: EMERGENCY MEDICINE

## 2024-04-19 PROCEDURE — 81003 URINALYSIS AUTO W/O SCOPE: CPT | Performed by: EMERGENCY MEDICINE

## 2024-04-19 PROCEDURE — 99284 EMERGENCY DEPT VISIT MOD MDM: CPT

## 2024-04-19 PROCEDURE — 85025 COMPLETE CBC W/AUTO DIFF WBC: CPT

## 2024-04-19 PROCEDURE — 80053 COMPREHEN METABOLIC PANEL: CPT

## 2024-04-19 PROCEDURE — 99283 EMERGENCY DEPT VISIT LOW MDM: CPT

## 2024-04-19 PROCEDURE — 36415 COLL VENOUS BLD VENIPUNCTURE: CPT

## 2024-04-19 PROCEDURE — 85025 COMPLETE CBC W/AUTO DIFF WBC: CPT | Performed by: EMERGENCY MEDICINE

## 2024-04-19 RX ORDER — FLUCONAZOLE 100 MG/1
200 TABLET ORAL ONCE
Status: COMPLETED | OUTPATIENT
Start: 2024-04-19 | End: 2024-04-19

## 2024-04-19 RX ORDER — CLINDAMYCIN HYDROCHLORIDE 300 MG/1
300 CAPSULE ORAL 3 TIMES DAILY
Qty: 30 CAPSULE | Refills: 0 | Status: SHIPPED | OUTPATIENT
Start: 2024-04-19 | End: 2024-04-26

## 2024-04-19 RX ORDER — CLINDAMYCIN HYDROCHLORIDE 150 MG/1
300 CAPSULE ORAL ONCE
Status: COMPLETED | OUTPATIENT
Start: 2024-04-19 | End: 2024-04-19

## 2024-04-19 RX ORDER — FLUCONAZOLE 150 MG/1
150 TABLET ORAL ONCE
Qty: 1 TABLET | Refills: 0 | Status: SHIPPED | OUTPATIENT
Start: 2024-04-19 | End: 2024-04-27

## 2024-04-19 NOTE — ED INITIAL ASSESSMENT (HPI)
PT PRESENTS TO ED WITH SUPRAPUBIC CATH SITE INFECTION.  HAS DRAINAGE AND REDNESS, SYMPTOMS STARTED A FEW DAYS AGO

## 2024-04-19 NOTE — ED PROVIDER NOTES
Patient Seen in: [facility] Emergency Department      History     Chief Complaint   Patient presents with    Cath Tube Problem     Stated Complaint: sub cath inflamation    Subjective:   HPI    This is an 84-year-old woman, history of urinary retention recurrent UTI with suprapubic catheter in place, here for evaluation of some redness around the catheter site, present over the past 3 days.  Also reports some mucoid discharge, and some decreased drainage into the Dominique catheter bag over the past 3 days.  States she feels like she is passing more urine from her urethra compared to normal.  Catheter in place for approximately 8 months.    Objective:   Past Medical History:    A-fib (HCC)    Abdominal distention    Abnormal mammogram    Allergic rhinitis    Anesthesia complication    Pt's sons have a history of pseudocholinesterase deficiency.    Antral gastritis    Arrhythmia    Arthritis    Bad breath    Belching    Bloating    Blood in urine    Breast CA (HCC)    Calculus of kidney    Cancer (HCC)    right breast cancer    Carpal tunnel syndrome    Cataract    Cervical spondylosis without myelopathy    Chondromalacia of patella    Constipation    Coronary atherosclerosis    stents    Coronary atherosclerosis of native coronary artery    Diabetes (HCC)    Diet controlled    Diverticulosis    Ductal carcinoma in situ (DCIS) of right breast, 1992    Esophageal reflux    Exposure to unspecified radiation    last dose 1992    Female stress incontinence    Flatulence/gas pain/belching    Frequent urination    Frequent use of laxatives    Frequent UTI    Frequent UTI    Gall stones    GERD (gastroesophageal reflux disease)    Glucose intolerance (impaired glucose tolerance)    Heart palpitations    High blood pressure    High cholesterol    History of blood transfusion    1992    Hoarseness, chronic    Hypothyroidism    Incomplete bladder emptying (aka 80172)    trial of CIC June 2014    Indigestion    Irregular  bowel habits    L knee global 3/27/14    Leg swelling    Malignant neoplasm of breast (female), unspecified site    R    Nausea    Neoplasm of right breast, 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),     Osteoarthrosis, unspecified whether generalized or localized, unspecified site    generalized    Other and unspecified hyperlipidemia    Pacemaker    Pain in joints    Painful swallowing    Peripheral vascular disease (HCC)    PONV (postoperative nausea and vomiting)    Post-traumatic osteoarthritis of left elbow    Postmenopausal atrophic vaginitis    Presence of other cardiac implants and grafts    Primary localized osteoarthrosis, lower leg    Problems with swallowing    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)    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    Pure hypercholesterolemia    Rectocele    Recurrent UTI    group B strep    Recurrent UTI    S/P total knee replacement using cement    Sleep disturbance    Sputum production    Stented coronary artery    Uncomfortable fullness after meals    Unspecified disorder of thyroid    Visual impairment    reading glasses    Vitamin D deficiency              Past Surgical History:   Procedure Laterality Date    Angioplasty (coronary)      Breast lumpectomy Right 2015    Procedure: BREAST LUMPECTOMY;  Surgeon: Castro Marie MD;  Location: [facility]IN OR    Breast reconstruction Right       1977    Cabg  1997    x3    Cardiac pacemaker placement      [place] 2022    Cataract  2017    Cath bare metal stent (bms)      Cholecystectomy      Colonoscopy  2016    tics; repeat 10 yrs    Colonoscopy  2017    Colonoscopy,diagnostic N/A 2016    Procedure: COLONOSCOPY, POSSIBLE BIOPSY, POSSIBLE POLYPECTOMY 04224;  Surgeon: Rodolfo Ann MD;  Location: [facility]    Cystoscopy,rx female urethral synd  05/10/2013    cysto Ricardo DENTON & julia  1976    Hysterectomy  1985    precancer  and irregular bleeding. w/ BSO    Knee replacement surgery  2013    left     Lumpectomy right Right [id number]    Mastectomy right  2015      1964    Oophorectomy      AGE 47    Open heart surgery (pbp)      Other surgical history      LUMPECTOMY RIGHT BREAST     Other surgical history      A PMM LEFT KNEE , replacement    Other surgical history  2013    Venita DENTON    Other surgical history  2014    Cystoscopy- Dr. Figueroa    Other surgical history  2016    Bladder sling    Other surgical history       WATCHMEN    Pacemaker monitor      Patient documented not to have experienced any of the following events N/A 2016    Procedure: COLONOSCOPY, POSSIBLE BIOPSY, POSSIBLE POLYPECTOMY 05079;  Surgeon: Rodolfo Ann MD;  Location: [facility]    Patient withough preoperative order for iv antibiotic surgical site infection prophylaxis. N/A 2016    Procedure: COLONOSCOPY, POSSIBLE BIOPSY, POSSIBLE POLYPECTOMY 61756;  Surgeon: Rodolfo Ann MD;  Location: [facility]    Radiation right          Reduction left  10/2015    Total knee replacement      LEFT     Upper gi endoscopy,exam  2011    repeat 5 yeas                Social History     Socioeconomic History    Marital status:    Tobacco Use    Smoking status: Former     Current packs/day: 0.00     Types: Cigarettes     Quit date: 1997     Years since quittin.8    Smokeless tobacco: Never   Vaping Use    Vaping status: Never Used   Substance and Sexual Activity    Alcohol use: Not Currently    Drug use: No   Other Topics Concern    Caffeine Concern Yes    Exercise Yes     Social Determinants of Health     Financial Resource Strain: Low Risk  (2023)    [facility]    Overall Financial Resource Strain (CARDIA)     Difficulty of Paying Living Expenses: Not hard at all   Food Insecurity: No Food Insecurity (2023)    Received from  [facility]    Food Insecurity     Within the past 12 months, you worried that your food would run out before you got the money to buy more.: 1     Within the past 12 months, the food you bought just didn't last and you didn't have money to get more.: 1   Transportation Needs: No Transportation Needs (11/30/2023)    Received from [facility]    Transportation Needs     In the past 12 months, has lack of transportation kept you from medical appointments or from getting medications?: 2     In the past 12 months, has lack of transportation kept you from meetings, work, or from getting things needed for daily living?: 2   Physical Activity: Inactive (11/30/2023)    Received from [facility]    Physical Activity     On average, how many days per week do you engage in moderate to strenuous exercise (like a brisk walk)?: 0 days     On average, how many minutes do you engage in exercise at this level?: 0 min   Stress: No Stress Concern Present (11/30/2023)    Received from [facility]    [facility] Occupational [facility] - Occupational Stress Questionnaire     Feeling of Stress : Not at all   Housing Stability: Low Risk  (11/30/2023)    Received from [facility]    Housing Stability     In the last 12 months, was there a time when you were not able to pay the mortgage or rent on time?: 2     In the last 12 months, how many places have you lived?: 1     In the last 12 months, was there a time when you did not have a steady place to sleep or slept in a shelter (including now)?: 2              Review of Systems    Positive for stated complaint: sub cath inflamation  Other systems are as noted in HPI.  Constitutional and vital signs reviewed.      All other systems reviewed and negative except as noted above.    Physical Exam     ED Triage Vitals [04/19/24 1631]   BP (!) 156/92   Pulse 70   Resp 17   Temp 98.2 °F (36.8 °C)   Temp src Temporal    SpO2 98 %   O2 Device None (Room air)       Current:BP (!) 156/92   Pulse 70   Temp 98.2 °F (36.8 °C) (Temporal)   Resp 17   Ht 157.5 cm (5' 2\")   Wt 59.9 kg   SpO2 98%   BMI 24.14 kg/m²         Physical Exam      Vitals signs and nursing note reviewed.   General: Well-appearing older woman lying spine in the bed in no acute distress.  Head: Normocephalic and atraumatic.   Pulmonary:  Breathing comfortably, no respiratory distress.  Abdomen: No focal tenderness, there is a suprapubic catheter in place, some blotchy erythema surrounding, scant mucoid discharge.  Neurological: Awake alert, speech is normal        ED Course     Labs Reviewed   COMP METABOLIC PANEL (14) - Abnormal; Notable for the following components:       Result Value    Glucose 156 (*)     Sodium 134 (*)     A/G Ratio 0.9 (*)     All other components within normal limits   CBC WITH DIFFERENTIAL WITH PLATELET    Narrative:     The following orders were created for panel order CBC With Differential With Platelet.  Procedure                               Abnormality         Status                     ---------                               -----------         ------                     CBC W/ DIFFERENTIAL[[id number]]                              Final result                 Please view results for these tests on the individual orders.   URINALYSIS WITH CULTURE REFLEX   RAINBOW DRAW LAVENDER   RAINBOW DRAW LIGHT GREEN   RAINBOW DRAW BLUE   CBC W/ DIFFERENTIAL                      MDM      This is 83-year-old woman suprapubic catheter in place here for evaluation of decreased drainage from the catheter, also some erythema surrounding site.  Differential includes leaking around the catheter, localized cellulitis around the catheter, yeast infection, catheter blockage.  Discussed with urology plan to replace the suprapubic catheter here, send urine culture after new catheter placed, likely treat for fungal infection as well as potential cellulitis  patient is afebrile basic labs unremarkable, renal function normal.  Believe skin changes likely local fungal infection given the increased moisture from catheter overflow.  Patient did receive 1 dose of fluconazole here will discharge home with prescription for additional dose to be taken 1 week from now, also received 1 dose of clindamycin as patient has multiple antibiotic allergies, anaphylaxis to cephalexin, 5-day prescription sent to pharmacy Case discussed with Dr. Peralta he will follow-up with the patient in clinic next week return precautions discussed with patient and her daughter who is at bedside they are in agreement with plan.                                   Medical Decision Making  Problems Addressed:  Cellulitis, unspecified cellulitis site: acute illness or injury  Malfunction of Dominique catheter, initial encounter (HCC): acute illness or injury    Amount and/or Complexity of Data Reviewed  External Data Reviewed: notes.     Details: Reviewed urology note from 11/23, suprapubic catheter placement for urinary retention  Labs: ordered. Decision-making details documented in ED Course.    Risk  Prescription drug management.        Disposition and Plan     Clinical Impression:  1. Malfunction of Dominique catheter, initial encounter ([facility])    2. Cellulitis, unspecified cellulitis site         Disposition:  Discharge  4/19/2024  7:24 pm    Follow-up:  Nkechi Chavez MD  [address]  [address]  [facility] [address]  [phone]    Follow up  Follow-up with your PMD for reevaluation in 24 to 48 hours.  Return to ER if symptoms worsen or change or if any other new concerns.    Armen Peralta MD  [address]  [address]  [facility] [id number] [phone]    Follow up  Follow-up with your already scheduled appointment next week.          Medications Prescribed:  Current Discharge Medication List        START taking these medications    Details   clindamycin 300 MG Oral Cap Take 1 capsule (300 mg total)  by mouth 3 (three) times daily for 10 days.  Qty: 30 capsule, Refills: 0      fluconazole 150 MG Oral Tab Take 1 tablet (150 mg total) by mouth once for 1 dose.  Qty: 1 tablet, Refills: 0

## 2024-04-22 RX ORDER — BLOOD SUGAR DIAGNOSTIC
STRIP MISCELLANEOUS
Qty: 100 STRIP | Refills: 1 | Status: SHIPPED | OUTPATIENT
Start: 2024-04-22 | End: 2025-04-22

## 2024-04-22 RX ORDER — LANCETS 30 GAUGE
1 EACH MISCELLANEOUS DAILY
Qty: 100 EACH | Refills: 6 | Status: SHIPPED | OUTPATIENT
Start: 2024-04-22

## 2024-04-22 RX ORDER — METOPROLOL SUCCINATE 25 MG/1
25 TABLET, EXTENDED RELEASE ORAL 2 TIMES DAILY
Qty: 180 TABLET | Refills: 0 | OUTPATIENT
Start: 2024-04-22

## 2024-04-22 NOTE — TELEPHONE ENCOUNTER
metoprolol succinate ER 25 MG Oral Tablet 24 Hr         Sig: Take 1 tablet (25 mg total) by mouth in the morning and 1 tablet (25 mg total) before bedtime.    Disp: 180 tablet    Refills: 0    Start: 4/21/2024    Class: Normal    Last ordered: 8 months ago (8/15/2023) by Abdon Johnson MD    Hypertension Medications Protocol [id number]/21/2024 01:33 PM   Protocol Details Last BP reading less than 140/90    CMP or BMP in past 12 months    In person appointment or virtual visit in the past 12 mos or appointment in next 3 mos    EGFRCR or GFRNAA > 50      To be filled at: [facility] #[id number] [place], [place] - [address] AT [facility] [address] , [phone], [phone]

## 2024-04-24 ENCOUNTER — PATIENT OUTREACH (OUTPATIENT)
Dept: CASE MANAGEMENT | Age: 84
End: 2024-04-24

## 2024-04-24 ENCOUNTER — APPOINTMENT (OUTPATIENT)
Dept: GENERAL RADIOLOGY | Age: 84
End: 2024-04-24
Attending: NURSE PRACTITIONER
Payer: MEDICARE

## 2024-04-24 ENCOUNTER — HOSPITAL ENCOUNTER (OUTPATIENT)
Age: 84
Discharge: HOME | End: 2024-04-24
Payer: MEDICARE

## 2024-04-24 VITALS
HEART RATE: 70 BPM | SYSTOLIC BLOOD PRESSURE: 126 MMHG | TEMPERATURE: 97 F | WEIGHT: 130 LBS | BODY MASS INDEX: 23.33 KG/M2 | DIASTOLIC BLOOD PRESSURE: 61 MMHG | HEIGHT: 62.5 IN | OXYGEN SATURATION: 100 % | RESPIRATION RATE: 18 BRPM

## 2024-04-24 DIAGNOSIS — S46.212A STRAIN OF LEFT BICEPS MUSCLE, INITIAL ENCOUNTER: ICD-10-CM

## 2024-04-24 DIAGNOSIS — M25.522 ARTHRALGIA OF ELBOW, LEFT: Primary | ICD-10-CM

## 2024-04-24 DIAGNOSIS — M21.922 ELBOW DEFORMITY, LEFT: ICD-10-CM

## 2024-04-24 PROCEDURE — 99214 OFFICE O/P EST MOD 30 MIN: CPT

## 2024-04-24 PROCEDURE — 29125 APPL SHORT ARM SPLINT STATIC: CPT

## 2024-04-24 PROCEDURE — 99213 OFFICE O/P EST LOW 20 MIN: CPT

## 2024-04-24 PROCEDURE — 73080 X-RAY EXAM OF ELBOW: CPT | Performed by: NURSE PRACTITIONER

## 2024-04-24 RX ORDER — HYDROCODONE BITARTRATE AND ACETAMINOPHEN 5; 325 MG/1; MG/1
1 TABLET ORAL EVERY 6 HOURS PRN
Qty: 10 TABLET | Refills: 0 | Status: SHIPPED | OUTPATIENT
Start: 2024-04-24 | End: 2024-04-24 | Stop reason: ALTCHOICE

## 2024-04-24 RX ORDER — TRAMADOL HYDROCHLORIDE 25 MG/1
25 TABLET, COATED ORAL 2 TIMES DAILY PRN
Qty: 20 TABLET | Refills: 0 | Status: SHIPPED | OUTPATIENT
Start: 2024-04-24

## 2024-04-24 RX ORDER — EZETIMIBE 10 MG/1
10 TABLET ORAL NIGHTLY
Qty: 90 TABLET | Refills: 3 | Status: SHIPPED | OUTPATIENT
Start: 2024-04-24

## 2024-04-24 RX ORDER — HYDROCODONE BITARTRATE AND ACETAMINOPHEN 5; 325 MG/1; MG/1
1 TABLET ORAL ONCE
Status: COMPLETED | OUTPATIENT
Start: 2024-04-24 | End: 2024-04-24

## 2024-04-24 NOTE — PROGRESS NOTES
1st attempt ER f/up apt request  PCP -decline, pt dtr stated she has already been in contact w/ office and waiting on call back to schedule  URO -delcine, pt dtr stated she has already been in contact w/ office and waiting on call back to schedule  Closing encounter

## 2024-04-24 NOTE — ED INITIAL ASSESSMENT (HPI)
Pt here c/o left elbow pain.   States she was reaching for something and felt a pop to area.   Pt was in the ER on Friday and had an IV placed to left AC.

## 2024-04-24 NOTE — ED PROVIDER NOTES
History     Chief Complaint   Patient presents with    Elbow Pain       Subjective:   HPI    Teresita Henderson, 83 year old female with notable medical history of CAD, LUE nerve issue, hypocalcemia who presents with Left elbow pain. Patient reports reaching for an object a couple days ago, feeling and hearing a \"pop\" in her Left elbow, and having persistent pain with inability to bend or flex since. Patient reports having a known nerve issue in her Left elbow, and has an upcoming apt with orthopedics. Has attempted OTC analgesia w/o much relief. Patient reports chronic deformity from injury as an infant.    Patient lives with daughter.      Objective:   Past Medical History:    A-fib (HCC)    Abdominal distention    Abnormal mammogram    Allergic rhinitis    Anesthesia complication    Pt's sons have a history of pseudocholinesterase deficiency.    Antral gastritis    Arrhythmia    Arthritis    Bad breath    Belching    Bloating    Blood in urine    Breast CA (HCC)    Calculus of kidney    Cancer (HCC)    right breast cancer    Carpal tunnel syndrome    Cataract    Cervical spondylosis without myelopathy    Chondromalacia of patella    Constipation    Coronary atherosclerosis    stents    Coronary atherosclerosis of native coronary artery    Diabetes (HCC)    Diet controlled    Diverticulosis    Ductal carcinoma in situ (DCIS) of right breast, 1992    Esophageal reflux    Exposure to unspecified radiation    last dose 1992    Female stress incontinence    Flatulence/gas pain/belching    Frequent urination    Frequent use of laxatives    Frequent UTI    Frequent UTI    Gall stones    GERD (gastroesophageal reflux disease)    Glucose intolerance (impaired glucose tolerance)    Heart palpitations    High blood pressure    High cholesterol    History of blood transfusion    1992    Hoarseness, chronic    Hypothyroidism    Incomplete bladder emptying (aka 55624)    trial of CIC June 2014    Indigestion    Irregular  bowel habits    L knee global 3/27/14    Leg swelling    Malignant neoplasm of breast (female), unspecified site    R    Nausea    Neoplasm of right breast, 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),     Osteoarthrosis, unspecified whether generalized or localized, unspecified site    generalized    Other and unspecified hyperlipidemia    Pacemaker    Pain in joints    Painful swallowing    Peripheral vascular disease (HCC)    PONV (postoperative nausea and vomiting)    Post-traumatic osteoarthritis of left elbow    Postmenopausal atrophic vaginitis    Presence of other cardiac implants and grafts    Primary localized osteoarthrosis, lower leg    Problems with swallowing    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)    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    Pure hypercholesterolemia    Rectocele    Recurrent UTI    group B strep    Recurrent UTI    S/P total knee replacement using cement    Sleep disturbance    Sputum production    Stented coronary artery    Uncomfortable fullness after meals    Unspecified disorder of thyroid    Visual impairment    reading glasses    Vitamin D deficiency              Past Surgical History:   Procedure Laterality Date    Angioplasty (coronary)      Breast lumpectomy Right 2015    Procedure: BREAST LUMPECTOMY;  Surgeon: Castro Marie MD;  Location: [facility]    Breast reconstruction Right       1977    Cabg  1997    x3    Cardiac pacemaker placement      [place] 2022    Cataract  2017    Cath bare metal stent (bms)      Cholecystectomy      Colonoscopy  2016    tics; repeat 10 yrs    Colonoscopy  2017    Colonoscopy,diagnostic N/A 2016    Procedure: COLONOSCOPY, POSSIBLE BIOPSY, POSSIBLE POLYPECTOMY 62151;  Surgeon: Rodolfo Ann MD;  Location: [facility]    Cystoscopy,rx female urethral synd  05/10/2013    cysto Ricardo DENTON & julia  1976    Hysterectomy  1985    precancer  and irregular bleeding. w/ BSO    Knee replacement surgery  2013    left     Lumpectomy right Right [id number]    Mastectomy right  2015      1964    Oophorectomy      AGE 47    Open heart surgery (pbp)      Other surgical history      LUMPECTOMY RIGHT BREAST     Other surgical history      A PMM LEFT KNEE , replacement    Other surgical history  2013    Venita DENTON    Other surgical history  2014    Cystoscopy- Dr. Figueroa    Other surgical history  2016    Bladder sling    Other surgical history       WATCHMEN    Pacemaker monitor      Patient documented not to have experienced any of the following events N/A 2016    Procedure: COLONOSCOPY, POSSIBLE BIOPSY, POSSIBLE POLYPECTOMY 08161;  Surgeon: Rodolfo Ann MD;  Location: [facility]    Patient withough preoperative order for iv antibiotic surgical site infection prophylaxis. N/A 2016    Procedure: COLONOSCOPY, POSSIBLE BIOPSY, POSSIBLE POLYPECTOMY 69400;  Surgeon: Rodolfo Ann MD;  Location: [facility]    Radiation right          Reduction left  10/2015    Total knee replacement      LEFT     Upper gi endoscopy,exam  2011    repeat 5 yeas                No pertinent social history.            Current Facility-Administered Medications on File Prior to Encounter   Medication Dose Route Frequency Provider Last Rate Last Admin    [COMPLETED] fluconazole (Diflucan) tab 200 mg  200 mg Oral Once Teodoro Reynaga MD   200 mg at 24    [COMPLETED] clindamycin (Cleocin) cap 300 mg  300 mg Oral Once Teodoro Reynaga MD   300 mg at 24    [COMPLETED] lidocaine PF (Xylocaine-MPF) 1 % injection             [COMPLETED] heparin (Porcine) 1000 UNIT/ML injection             [COMPLETED] heparin (Porcine) 5000 UNIT/ML injection             [COMPLETED] sodium chloride 0.9% infusion   Intravenous On Call Susan Marin MD   New Bag at 24 1257    [COMPLETED] insulin aspart  (NovoLOG) 100 Units/mL vial 1-5 Units  1-5 Units Subcutaneous Once Chandler Cuenca MD   2 Units at 24 1446     Current Outpatient Medications on File Prior to Encounter   Medication Sig Dispense Refill    ezetimibe 10 MG Oral Tab Take 1 tablet (10 mg total) by mouth nightly. 90 tablet 3    Lancets (ONETOUCH DELICA PLUS [id number]) Does not apply Misc 1 each daily. 100 each 6    Glucose Blood (ONETOUCH VERIO) In Vitro Strip Use as directed. 100 strip 1    clindamycin 300 MG Oral Cap Take 1 capsule (300 mg total) by mouth 3 (three) times daily for 10 days. (Patient not taking: Reported on 2024) 30 capsule 0    [] fluconazole 150 MG Oral Tab Take 1 tablet (150 mg total) by mouth once for 1 dose. 1 tablet 0    fosfomycin 3 g Oral Powd Pack MIX AND DRINK 1 PACKET BY MOUTH EVERY 10 DAYS      levothyroxine 88 MCG Oral Tab TAKE 1 TABLET(88 MCG) BY MOUTH DAILY 90 tablet 3    metFORMIN HCl 1000 MG Oral Tab Take 1 tablet (1,000 mg total) by mouth 2 (two) times daily with meals. 180 tablet 0    [] nitrofurantoin monohydrate macro 100 MG Oral Cap Take 1 capsule (100 mg total) by mouth 2 (two) times daily for 5 days. 10 capsule 0    gabapentin 100 MG Oral Cap Take 2 capsules (200 mg total) by mouth nightly. 180 capsule 2    Cholecalciferol (VITAMIN D) 50 MCG (2000 UT) Oral Cap Take by mouth daily.      methenamine 1 g Oral Tab Take 1 tablet (1 g total) by mouth 2 (two) times daily. 60 tablet 0    Polyethylene Glycol 3350 17 g Oral Powd Pack Take 17 g by mouth daily as needed. 90 each 1    pravastatin 10 MG Oral Tab Take 1 tablet (10 mg total) by mouth nightly. 90 tablet 0    Meth-Hyo-M Bl-Na Phos-Ph Sal (URIBEL) 118 MG Oral Cap Take 1 capsule by mouth 3 (three) times daily as needed.      aspirin 81 MG Oral Tab EC Take 1 tablet (81 mg total) by mouth daily. 30 tablet 0    metoprolol succinate ER 25 MG Oral Tablet 24 Hr Take 1 tablet (25 mg total) by mouth in the morning and 1 tablet (25 mg total) before  bedtime. (Patient taking differently: Take 1 tablet (25 mg total) by mouth. 12.5MG IN MORNING AND 25MG IN EVENING) 180 tablet 0    cetirizine 10 MG Oral Tab Take 1 tablet (10 mg total) by mouth daily.      pantoprazole 40 MG Oral Tab EC Take 1 tablet (40 mg total) by mouth daily.      dofetilide 125 MCG Oral Cap Take 1 capsule (125 mcg total) by mouth 2 (two) times daily.      fluticasone propionate 50 MCG/ACT Nasal Suspension 2 sprays by Each Nare route daily. 11.1 mL 3         Review of Systems   Musculoskeletal:  Positive for arthralgias and joint swelling.   All other systems reviewed and are negative.        Constitutional and vital signs reviewed.      All other systems reviewed and negative except as noted above.    I have reviewed the family history, social history, allergies, and outpatient medications.     History reviewed from EMR: Encounters, problem list, allergies, medications      Physical Exam     ED Triage Vitals [04/24/24 1732]   /61   Pulse 70   Resp 18   Temp 97.4 °F (36.3 °C)   Temp src Temporal   SpO2 100 %   O2 Device None (Room air)       Current:/61   Pulse 70   Temp 97.4 °F (36.3 °C) (Temporal)   Resp 18   Ht 158.8 cm (5' 2.5\")   Wt 59 kg   SpO2 100%   BMI 23.40 kg/m²       Physical Exam  Vitals and nursing note reviewed.   Constitutional:       General: She is not in acute distress.     Appearance: Normal appearance. She is normal weight. She is not ill-appearing or toxic-appearing.   HENT:      Head: Normocephalic and atraumatic.      Right Ear: External ear normal.      Left Ear: External ear normal.      Nose: Nose normal.      Mouth/Throat:      Mouth: Mucous membranes are moist.   Eyes:      Extraocular Movements: Extraocular movements intact.      Conjunctiva/sclera: Conjunctivae normal.      Pupils: Pupils are equal, round, and reactive to light.   Cardiovascular:      Rate and Rhythm: Normal rate.      Pulses: Normal pulses.   Pulmonary:      Effort: Pulmonary  effort is normal. No respiratory distress.   Musculoskeletal:         General: No swelling or signs of injury.      Left elbow: Swelling present. Decreased range of motion. Tenderness present in lateral epicondyle and olecranon process.      Cervical back: Normal range of motion and neck supple.      Comments: Bruising noted to medical aspect r/t recent PIV catheter   Skin:     General: Skin is warm and dry.      Capillary Refill: Capillary refill takes less than 2 seconds.      Coloration: Skin is not jaundiced.   Neurological:      General: No focal deficit present.      Mental Status: She is alert and oriented to person, place, and time. Mental status is at baseline.      GCS: GCS eye subscore is 4. GCS verbal subscore is 5. GCS motor subscore is 6.      Motor: Tremor present.      Comments: +chronic tremor   Psychiatric:         Mood and Affect: Mood normal.         Behavior: Behavior normal.         Thought Content: Thought content normal.         Judgment: Judgment normal.            ED Course     Labs Reviewed - No data to display  XR ELBOW, COMPLETE (MIN 3 VIEWS), LEFT (CPT=73080)   Final Result   PROCEDURE:  XR ELBOW, COMPLETE (MIN 3 VIEWS), LEFT (CPT=73080)       TECHNIQUE:  Three views were obtained.       COMPARISON:  ANITA ALCOCER, XR ELBOW, COMPLETE (MIN 3 VIEWS), LEFT    (CPT=73080), 10/17/2023, 2:24 PM.       INDICATIONS:  Left lateral elbow injury with inability to bend or flex.       PATIENT STATED HISTORY: (As transcribed by Technologist)  Family member of    patient states patient had reached up to grab something above her head and    heard a pop, since then she has had pain to left elbow. Unable to    straighten out elbow.            FINDINGS:  Marked deformity of the elbow is again seen with fragmentation    and heterotopic ossification.  When compared to the radiograph from    10/17/2023, the displacement at the lateral epicondyle fracture fragment    appears mildly worse.                              =====   CONCLUSION:  See above.           LOCATION:  [facility]               Dictated by (CST): Stromberg, LeRoy, MD on 4/24/2024 at 7:23 PM        Finalized by (CST): Stromberg, LeRoy, MD on 4/24/2024 at 7:25 PM             Vitals:    04/24/24 1732   BP: 126/61   Pulse: 70   Resp: 18   Temp: 97.4 °F (36.3 °C)   TempSrc: Temporal   SpO2: 100%   Weight: 59 kg   Height: 158.8 cm (5' 2.5\")            [facility]        Teresita Henderson, 83 year old female with medical history as noted above who presents with Left elbow pain   - Patient in NAD, VSS   - fracture vs ligamentous vs contracture vs other   - Xray ordered       ** See ED course below for additional information on care provided / interventions / notable events throughout patient's encounter.    ED Course as of 04/24/24 1946  ------------------------------------------------------------  Time: 04/24 1900  Comment: Self read of Xray w/ notable, chronic changes, and w/o significant change from previous in 10/2023. Awaiting official read.  ------------------------------------------------------------  Time: 04/24 1944  Comment: Radiology noting worsening of chronic, marked deformity.  Sugar tong splint applied to help with pain  Sling provided  Supportive care discussed  Patient to keep apt with Orthopedics next week       ** I have independently reviewed the radiology images, clinical lab results, and ECG tracings as described above (if applicable)    ** See below for home care instructions (if applicable)          Medical Decision Making      Disposition and Plan     Clinical Impression:  1. Arthralgia of elbow, left    2. Strain of left biceps muscle, initial encounter    3. Elbow deformity, left         Disposition:  Discharge  4/24/2024  7:44 pm    Follow-up:  No follow-up provider specified.        Medications Prescribed:  Current Discharge Medication List        START taking these medications    Details   HYDROcodone-acetaminophen 5-325 MG Oral Tab Take 1  tablet by mouth every 6 (six) hours as needed for Pain (severe pain).  Qty: 10 tablet, Refills: 0    Associated Diagnoses: Arthralgia of elbow, left             The above patient (and/or guardian) was made aware that an appropriate evaluation has been performed, and that no additional testing is required at this time. In my medical judgment, there is currently no evidence of an immediate life-threatening or surgical condition, therefore discharge is indicated at this time. The patient (and/or guardian) was advised that a small risk still exists that a serious condition could develop. The patient was instructed to arrange close follow-up with their primary care provider (or the referral provider given today). The patient received written and verbal instructions regarding their condition / concerns, demonstrated understanding, and is agreement with the outpatient treatment plan.        Home care instructions:    Tylenol for pain as needed  Tramadol for severe pain (may make your drowsy and/or constipated)  DO NOT get splint wet  DO NOT put pressure on splint  Cover with bag and tape end when showering  Sleep in reclined position  Elevate splint as much as possible  Keep upcoming appointment with Orthopedics      Alexis Jo, DNP, APRN, AGACNP-BC, FNP-C, CNL  Adult-Gerontology Acute Care & Family Nurse Practitioner  [facility]

## 2024-04-24 NOTE — TELEPHONE ENCOUNTER
Requesting    Name from pharmacy: EZETIMIBE 10MG TABLETS         Will file in chart as: EZETIMIBE 10 MG Oral Tab    Sig: TAKE 1 TABLET(10 MG) BY MOUTH EVERY NIGHT    Disp: 90 tablet    Refills: 0 (Pharmacy requested: Not specified)    Start: 4/22/2024    Class: Normal    Non-formulary    Last ordered: 3 months ago (1/23/2024) by Melva Hannah MD    Last refill: 1/23/2024    Rx #: [id number]    Cholesterol Medication Protocol [id number]/22/2024 11:52 AM   Protocol Details ALT < 80    ALT resulted within past year    Lipid panel within past 12 months    In person appointment or virtual visit in the past 12 mos or appointment in next 3 mos      To be filled at: [facility] #[id number] [address] [place] [address] AT [facility] [address] , [phone], [phone]     LOV: 03/13/24 w/ DV  RTC: No Follow Up on File  Last Relevant Labs: 03/20/24  Filled: 01/23/24 #90 with 0 refills    Future Appointments   Date Time Provider Department Center   5/2/2024  9:15 AM Shan Alexandra MD [facility]   5/3/2024 10:30 AM [facility], PROCEDURE [facility] None

## 2024-04-25 ENCOUNTER — TELEPHONE (OUTPATIENT)
Dept: ORTHOPEDICS CLINIC | Facility: CLINIC | Age: 84
End: 2024-04-25

## 2024-04-25 NOTE — TELEPHONE ENCOUNTER
Hina Hathaway PA   to [facility] Clinical Pool  Shan Alexandra MD [facility]    4/25/24  4:08 PM   No availability sooner than that per Dr Alexandra

## 2024-04-25 NOTE — DISCHARGE INSTRUCTIONS
Tylenol for pain as needed  Tramadol for severe pain (may make your drowsy and/or constipated)  DO NOT get splint wet  DO NOT put pressure on splint  Cover with bag and tape end when showering  Sleep in reclined position  Elevate splint as much as possible  Keep upcoming appointment with Orthopedics

## 2024-04-25 NOTE — TELEPHONE ENCOUNTER
Patient already had an appointment left elbow 5/2/24, but injured it yesterday and went to ER.  Placed in sugar tong splint.  Daughter wants to know if she can be seen sooner?  Please advise.  Thank you!      XR 4/24/24  FINDINGS:  Marked deformity of the elbow is again seen with fragmentation and heterotopic ossification.  When compared to the radiograph from 10/17/2023, the displacement at the lateral epicondyle fracture fragment appears mildly worse.

## 2024-04-25 NOTE — TELEPHONE ENCOUNTER
Last night pt was taken to urgent care and a soft cast was put on the left elbow. Daughter is asking if she can be seen sooner than her appt.  Future Appointments   Date Time Provider Department Center   5/2/2024  9:15 AM Shan Alexandra MD [facility]   5/3/2024 10:30 AM [facility], PROCEDURE [facility] None

## 2024-04-26 ENCOUNTER — APPOINTMENT (OUTPATIENT)
Dept: CT IMAGING | Facility: HOSPITAL | Age: 84
End: 2024-04-26
Attending: EMERGENCY MEDICINE
Payer: MEDICARE

## 2024-04-26 ENCOUNTER — HOSPITAL ENCOUNTER (OUTPATIENT)
Facility: HOSPITAL | Age: 84
Setting detail: OBSERVATION
Discharge: HOME | End: 2024-04-27
Attending: EMERGENCY MEDICINE | Admitting: HOSPITALIST
Payer: MEDICARE

## 2024-04-26 DIAGNOSIS — R26.81 UNSTABLE GAIT: ICD-10-CM

## 2024-04-26 DIAGNOSIS — R42 DIZZINESS: Primary | ICD-10-CM

## 2024-04-26 DIAGNOSIS — Z93.59 SUPRAPUBIC CATHETER (HCC): ICD-10-CM

## 2024-04-26 DIAGNOSIS — E87.1 HYPONATREMIA: ICD-10-CM

## 2024-04-26 DIAGNOSIS — R94.31 PROLONGED Q-T INTERVAL ON ECG: ICD-10-CM

## 2024-04-26 DIAGNOSIS — N30.00 ACUTE CYSTITIS WITHOUT HEMATURIA: ICD-10-CM

## 2024-04-26 LAB
ALBUMIN SERPL-MCNC: 3.2 G/DL (ref 3.4–5)
ALBUMIN/GLOB SERPL: 0.7 {RATIO} (ref 1–2)
ALP LIVER SERPL-CCNC: 84 U/L
ALT SERPL-CCNC: 19 U/L
ANION GAP SERPL CALC-SCNC: 5 MMOL/L (ref 0–18)
AST SERPL-CCNC: 22 U/L (ref 15–37)
ATRIAL RATE: 73 BPM
BASOPHILS # BLD AUTO: 0.03 X10(3) UL (ref 0–0.2)
BASOPHILS NFR BLD AUTO: 0.4 %
BILIRUB SERPL-MCNC: 0.4 MG/DL (ref 0.1–2)
BILIRUB UR QL STRIP.AUTO: NEGATIVE
BUN BLD-MCNC: 15 MG/DL (ref 9–23)
CALCIUM BLD-MCNC: 9.2 MG/DL (ref 8.5–10.1)
CHLORIDE SERPL-SCNC: 96 MMOL/L (ref 98–112)
CLARITY UR REFRACT.AUTO: CLEAR
CO2 SERPL-SCNC: 30 MMOL/L (ref 21–32)
COLOR UR AUTO: YELLOW
CREAT BLD-MCNC: 0.8 MG/DL
EGFRCR SERPLBLD CKD-EPI 2021: 74 ML/MIN/1.73M2 (ref 60–?)
EOSINOPHIL # BLD AUTO: 0.01 X10(3) UL (ref 0–0.7)
EOSINOPHIL NFR BLD AUTO: 0.1 %
ERYTHROCYTE [DISTWIDTH] IN BLOOD BY AUTOMATED COUNT: 14.3 %
GLOBULIN PLAS-MCNC: 4.7 G/DL (ref 2.8–4.4)
GLUCOSE BLD-MCNC: 176 MG/DL (ref 70–99)
GLUCOSE BLD-MCNC: 183 MG/DL (ref 70–99)
GLUCOSE UR STRIP.AUTO-MCNC: NORMAL MG/DL
HCT VFR BLD AUTO: 33.3 %
HGB BLD-MCNC: 11 G/DL
IMM GRANULOCYTES # BLD AUTO: 0.03 X10(3) UL (ref 0–1)
IMM GRANULOCYTES NFR BLD: 0.4 %
KETONES UR STRIP.AUTO-MCNC: 10 MG/DL
LEUKOCYTE ESTERASE UR QL STRIP.AUTO: 75
LYMPHOCYTES # BLD AUTO: 1.25 X10(3) UL (ref 1–4)
LYMPHOCYTES NFR BLD AUTO: 17.3 %
MCH RBC QN AUTO: 27.9 PG (ref 26–34)
MCHC RBC AUTO-ENTMCNC: 33 G/DL (ref 31–37)
MCV RBC AUTO: 84.5 FL
MONOCYTES # BLD AUTO: 0.46 X10(3) UL (ref 0.1–1)
MONOCYTES NFR BLD AUTO: 6.4 %
NEUTROPHILS # BLD AUTO: 5.44 X10 (3) UL (ref 1.5–7.7)
NEUTROPHILS # BLD AUTO: 5.44 X10(3) UL (ref 1.5–7.7)
NEUTROPHILS NFR BLD AUTO: 75.4 %
NITRITE UR QL STRIP.AUTO: NEGATIVE
OSMOLALITY SERPL CALC.SUM OF ELEC: 278 MOSM/KG (ref 275–295)
P-R INTERVAL: 244 MS
PH UR STRIP.AUTO: 6.5 [PH] (ref 5–8)
PLATELET # BLD AUTO: 243 10(3)UL (ref 150–450)
POTASSIUM SERPL-SCNC: 4.6 MMOL/L (ref 3.5–5.1)
PROT SERPL-MCNC: 7.9 G/DL (ref 6.4–8.2)
PROT UR STRIP.AUTO-MCNC: 20 MG/DL
Q-T INTERVAL: 454 MS
QRS DURATION: 126 MS
QTC CALCULATION (BEZET): 500 MS
R AXIS: -50 DEGREES
RBC # BLD AUTO: 3.94 X10(6)UL
RBC UR QL AUTO: NEGATIVE
SODIUM SERPL-SCNC: 131 MMOL/L (ref 136–145)
SP GR UR STRIP.AUTO: 1.02 (ref 1–1.03)
T AXIS: 70 DEGREES
TROPONIN I SERPL HS-MCNC: <3 NG/L
UROBILINOGEN UR STRIP.AUTO-MCNC: NORMAL MG/DL
VENTRICULAR RATE: 73 BPM
WBC # BLD AUTO: 7.2 X10(3) UL (ref 4–11)

## 2024-04-26 PROCEDURE — 74177 CT ABD & PELVIS W/CONTRAST: CPT | Performed by: EMERGENCY MEDICINE

## 2024-04-26 PROCEDURE — 99223 1ST HOSP IP/OBS HIGH 75: CPT | Performed by: HOSPITALIST

## 2024-04-26 PROCEDURE — 70496 CT ANGIOGRAPHY HEAD: CPT | Performed by: EMERGENCY MEDICINE

## 2024-04-26 PROCEDURE — 70498 CT ANGIOGRAPHY NECK: CPT | Performed by: EMERGENCY MEDICINE

## 2024-04-26 RX ORDER — PANTOPRAZOLE SODIUM 40 MG/1
40 TABLET, DELAYED RELEASE ORAL DAILY
Status: DISCONTINUED | OUTPATIENT
Start: 2024-04-26 | End: 2024-04-27

## 2024-04-26 RX ORDER — ASPIRIN 81 MG/1
81 TABLET ORAL DAILY
Status: DISCONTINUED | OUTPATIENT
Start: 2024-04-26 | End: 2024-04-27

## 2024-04-26 RX ORDER — PROCHLORPERAZINE EDISYLATE 5 MG/ML
10 INJECTION INTRAMUSCULAR; INTRAVENOUS EVERY 6 HOURS PRN
Status: DISCONTINUED | OUTPATIENT
Start: 2024-04-26 | End: 2024-04-27

## 2024-04-26 RX ORDER — ENOXAPARIN SODIUM 100 MG/ML
40 INJECTION SUBCUTANEOUS DAILY
Status: DISCONTINUED | OUTPATIENT
Start: 2024-04-27 | End: 2024-04-27

## 2024-04-26 RX ORDER — LEVOTHYROXINE SODIUM 88 UG/1
88 TABLET ORAL DAILY
Status: DISCONTINUED | OUTPATIENT
Start: 2024-04-26 | End: 2024-04-27

## 2024-04-26 RX ORDER — KETOROLAC TROMETHAMINE 30 MG/ML
30 INJECTION, SOLUTION INTRAMUSCULAR; INTRAVENOUS EVERY 6 HOURS PRN
OUTPATIENT
Start: 2024-04-26 | End: 2024-04-28

## 2024-04-26 RX ORDER — METOPROLOL SUCCINATE 25 MG/1
25 TABLET, EXTENDED RELEASE ORAL NIGHTLY
Status: DISCONTINUED | OUTPATIENT
Start: 2024-04-26 | End: 2024-04-27

## 2024-04-26 RX ORDER — PROCHLORPERAZINE EDISYLATE 5 MG/ML
10 INJECTION INTRAMUSCULAR; INTRAVENOUS ONCE
Status: COMPLETED | OUTPATIENT
Start: 2024-04-26 | End: 2024-04-26

## 2024-04-26 RX ORDER — DOFETILIDE 0.25 MG/1
250 CAPSULE ORAL EVERY 12 HOURS
Status: DISCONTINUED | OUTPATIENT
Start: 2024-04-26 | End: 2024-04-26

## 2024-04-26 RX ORDER — ONDANSETRON 2 MG/ML
4 INJECTION INTRAMUSCULAR; INTRAVENOUS EVERY 6 HOURS PRN
Status: DISCONTINUED | OUTPATIENT
Start: 2024-04-26 | End: 2024-04-26

## 2024-04-26 RX ORDER — BENZONATATE 100 MG/1
200 CAPSULE ORAL 3 TIMES DAILY PRN
Status: DISCONTINUED | OUTPATIENT
Start: 2024-04-26 | End: 2024-04-27

## 2024-04-26 RX ORDER — ACETAMINOPHEN 500 MG
1000 TABLET ORAL EVERY 6 HOURS PRN
Status: DISCONTINUED | OUTPATIENT
Start: 2024-04-26 | End: 2024-04-27

## 2024-04-26 RX ORDER — METOCLOPRAMIDE HYDROCHLORIDE 5 MG/ML
10 INJECTION INTRAMUSCULAR; INTRAVENOUS EVERY 8 HOURS PRN
Status: DISCONTINUED | OUTPATIENT
Start: 2024-04-26 | End: 2024-04-26

## 2024-04-26 RX ORDER — METHENAMINE HIPPURATE 1000 MG/1
1 TABLET ORAL 2 TIMES DAILY
Status: DISCONTINUED | OUTPATIENT
Start: 2024-04-26 | End: 2024-04-27

## 2024-04-26 RX ORDER — SODIUM CHLORIDE 9 MG/ML
INJECTION, SOLUTION INTRAVENOUS CONTINUOUS
OUTPATIENT
Start: 2024-04-26

## 2024-04-26 RX ORDER — PRAVASTATIN SODIUM 10 MG
10 TABLET ORAL NIGHTLY
Status: DISCONTINUED | OUTPATIENT
Start: 2024-04-26 | End: 2024-04-27

## 2024-04-26 RX ORDER — MELATONIN
3 NIGHTLY PRN
Status: DISCONTINUED | OUTPATIENT
Start: 2024-04-26 | End: 2024-04-27

## 2024-04-26 RX ORDER — CLONIDINE HYDROCHLORIDE 0.1 MG/1
0.1 TABLET ORAL ONCE
Status: COMPLETED | OUTPATIENT
Start: 2024-04-26 | End: 2024-04-26

## 2024-04-26 RX ORDER — DOFETILIDE 0.12 MG/1
125 CAPSULE ORAL 2 TIMES DAILY
Status: DISCONTINUED | OUTPATIENT
Start: 2024-04-26 | End: 2024-04-27

## 2024-04-26 RX ORDER — SODIUM CHLORIDE 9 MG/ML
INJECTION, SOLUTION INTRAVENOUS CONTINUOUS
Status: DISCONTINUED | OUTPATIENT
Start: 2024-04-26 | End: 2024-04-27

## 2024-04-26 RX ORDER — GABAPENTIN 100 MG/1
200 CAPSULE ORAL NIGHTLY
Status: DISCONTINUED | OUTPATIENT
Start: 2024-04-26 | End: 2024-04-27

## 2024-04-26 RX ORDER — EZETIMIBE 10 MG/1
10 TABLET ORAL NIGHTLY
Status: DISCONTINUED | OUTPATIENT
Start: 2024-04-26 | End: 2024-04-27

## 2024-04-26 RX ORDER — ONDANSETRON 2 MG/ML
4 INJECTION INTRAMUSCULAR; INTRAVENOUS ONCE
Status: DISCONTINUED | OUTPATIENT
Start: 2024-04-26 | End: 2024-04-26

## 2024-04-26 RX ORDER — KETOROLAC TROMETHAMINE 15 MG/ML
15 INJECTION, SOLUTION INTRAMUSCULAR; INTRAVENOUS EVERY 6 HOURS PRN
OUTPATIENT
Start: 2024-04-26 | End: 2024-04-28

## 2024-04-26 NOTE — ED QUICK NOTES
Orders for admission, patient is aware of plan and ready to go upstairs. Any questions, please call ED RN charlotte at extension [phone].     Patient Covid vaccination status: Fully vaccinated     COVID Test Ordered in ED: None    COVID Suspicion at Admission: N/A    Running Infusions:  sodium chloride bolus, gentamicin    Mental Status/LOC at time of transport: a/ox4    Other pertinent information:   CIWA score: N/A   NIH score:  N/A

## 2024-04-26 NOTE — ED PROVIDER NOTES
Patient Seen in: [facility] Emergency Department      History     Chief Complaint   Patient presents with    Dizziness    Nausea/vomiting     Stated Complaint: lightheaded, dizziness starting last night    Subjective:   HPI    83-year-old female history of paroxysmal A-fib with a Watchman placed, pacemaker on baby aspirin, diabetes, coronary artery disease with history of stent, CABG presents to ED with complaint of onset of lightheadedness and dizziness starting 3 days ago.  Denies history of vertigo.  Denies dizziness related to position change.  She states that this morning she was nauseated and gagging, vomited a total of 4 times.  Her daughter is at bedside and gives history as well.  Her daughter states that she was there and she accompanied her mom up to her bedroom and that her mom had unstable gait, her mom reports that she felt disequilibrium and \" cloudy in the head\", daughter states that she was clammy.  Denies chest pain, shortness of breath.  She reports generalized abdominal discomfort as well.  She reports a history of cholecystectomy, , suprapubic catheter due to recurrent UTI.  She states her suprapubic catheter was last changed on Friday when she was here in the ED.    Of note, she has a fiberglass splint on her left arm after going to immediate care 2 days ago and being diagnosed with an elbow fracture.  She states that she had a history of a mastectomy on her right breast and cannot use her right arm for IVs.    Objective:   Past Medical History:    A-fib (HCC)    Abdominal distention    Abnormal mammogram    Allergic rhinitis    Anesthesia complication    Pt's sons have a history of pseudocholinesterase deficiency.    Antral gastritis    Arrhythmia    Arthritis    Bad breath    Belching    Bloating    Blood in urine    Breast CA (HCC)    Calculus of kidney    Cancer (HCC)    right breast cancer    Carpal tunnel syndrome    Cataract    Cervical spondylosis without myelopathy     Chondromalacia of patella    Constipation    Coronary atherosclerosis    stents    Coronary atherosclerosis of native coronary artery    Diabetes (HCC)    Diet controlled    Diverticulosis    Ductal carcinoma in situ (DCIS) of right breast, 1992    Esophageal reflux    Exposure to unspecified radiation    last dose 1992    Female stress incontinence    Flatulence/gas pain/belching    Frequent urination    Frequent use of laxatives    Frequent UTI    Frequent UTI    Gall stones    GERD (gastroesophageal reflux disease)    Glucose intolerance (impaired glucose tolerance)    Heart palpitations    High blood pressure    High cholesterol    History of blood transfusion    1992    Hoarseness, chronic    Hypothyroidism    Incomplete bladder emptying (aka 98349)    trial of CIC June 2014    Indigestion    Irregular bowel habits    L knee global 3/27/14    Leg swelling    Malignant neoplasm of breast (female), unspecified site    R    Nausea    Neoplasm of right breast, 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), 1992    Osteoarthrosis, unspecified whether generalized or localized, unspecified site    generalized    Other and unspecified hyperlipidemia    Pacemaker    Pain in joints    Painful swallowing    Peripheral vascular disease (HCC)    PONV (postoperative nausea and vomiting)    Post-traumatic osteoarthritis of left elbow    Postmenopausal atrophic vaginitis    Presence of other cardiac implants and grafts    Primary localized osteoarthrosis, lower leg    Problems with swallowing    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)    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    Pure hypercholesterolemia    Rectocele    Recurrent UTI    group B strep    Recurrent UTI    S/P total knee replacement using cement    Sleep disturbance    Sputum production    Stented coronary artery    Uncomfortable fullness after meals    Unspecified disorder of thyroid    Visual impairment     reading glasses    Vitamin D deficiency              Past Surgical History:   Procedure Laterality Date    Angioplasty (coronary)      Breast lumpectomy Right 2015    Procedure: BREAST LUMPECTOMY;  Surgeon: Castro Marie MD;  Location: [facility] MAIN OR    Breast reconstruction Right       1977    Cabg  1997    x3    Cardiac pacemaker placement      [place] 2022    Cataract      Cath bare metal stent (bms)      Cholecystectomy      Colonoscopy  2016    tics; repeat 10 yrs    Colonoscopy  2017    Colonoscopy,diagnostic N/A 2016    Procedure: COLONOSCOPY, POSSIBLE BIOPSY, POSSIBLE POLYPECTOMY 60775;  Surgeon: Rodolfo Ann MD;  Location: [facility]    Cystoscopy,rx female urethral synd  05/10/2013    cysto UD, Ricardo    D & c  1976    Hysterectomy  1985    precancer and irregular bleeding. w/ BSO    Knee replacement surgery  2013    left     Lumpectomy right Right [id number]    Mastectomy right  2015      1964    Oophorectomy      AGE 47    Open heart surgery (pbp)      Other surgical history      LUMPECTOMY RIGHT BREAST     Other surgical history      A PMM LEFT KNEE , replacement    Other surgical history  2013    Venita DENTON    Other surgical history  2014    Cystoscopy- Dr. Figueroa    Other surgical history  2016    Bladder sling    Other surgical history       WATCHMEN    Pacemaker monitor      Patient documented not to have experienced any of the following events N/A 2016    Procedure: COLONOSCOPY, POSSIBLE BIOPSY, POSSIBLE POLYPECTOMY 98018;  Surgeon: Rodolfo Ann MD;  Location: [facility]    Patient withough preoperative order for iv antibiotic surgical site infection prophylaxis. N/A 2016    Procedure: COLONOSCOPY, POSSIBLE BIOPSY, POSSIBLE POLYPECTOMY 53404;  Surgeon: Rodolfo Ann MD;  Location: [facility]    Radiation right          Reduction left  10/2015    Total knee replacement       LEFT 2013    Upper gi endoscopy,exam  2011    repeat 5 yeas                Social History     Socioeconomic History    Marital status:    Tobacco Use    Smoking status: Former     Current packs/day: 0.00     Types: Cigarettes     Quit date: 1997     Years since quittin.8    Smokeless tobacco: Never   Vaping Use    Vaping status: Never Used   Substance and Sexual Activity    Alcohol use: Not Currently    Drug use: No   Other Topics Concern    Caffeine Concern Yes    Exercise Yes     Social Determinants of Health     Financial Resource Strain: Low Risk  (2023)    Received from [facility]    Overall Financial Resource Strain (CARDIA)     Difficulty of Paying Living Expenses: Not hard at all   Food Insecurity: No Food Insecurity (2023)    Received from [facility]    Food Insecurity     Within the past 12 months, you worried that your food would run out before you got the money to buy more.: 1     Within the past 12 months, the food you bought just didn't last and you didn't have money to get more.: 1   Transportation Needs: No Transportation Needs (2023)    Received from [facility]    Transportation Needs     In the past 12 months, has lack of transportation kept you from medical appointments or from getting medications?: 2     In the past 12 months, has lack of transportation kept you from meetings, work, or from getting things needed for daily living?: 2   Physical Activity: Inactive (2023)    Received from [facility]    Physical Activity     On average, how many days per week do you engage in moderate to strenuous exercise (like a brisk walk)?: 0 days     On average, how many minutes do you engage in exercise at this level?: 0 min   Stress: No Stress Concern Present (2023)    Received from [facility]    [facility] [employer] - Occupational Stress Questionnaire      Feeling of Stress : Not at all   Housing Stability: Low Risk  (11/30/2023)    Received from [facility]    Housing Stability     In the last 12 months, was there a time when you were not able to pay the mortgage or rent on time?: 2     In the last 12 months, how many places have you lived?: 1     In the last 12 months, was there a time when you did not have a steady place to sleep or slept in a shelter (including now)?: 2              Review of Systems    Positive for stated complaint: lightheaded, dizziness starting last night  Other systems are as noted in HPI.  Constitutional and vital signs reviewed.      All other systems reviewed and negative except as noted above.    Physical Exam     ED Triage Vitals   BP 04/26/24 1638 (!) 189/82   Pulse 04/26/24 1338 71   Resp 04/26/24 1338 18   Temp 04/26/24 1338 98.3 °F (36.8 °C)   Temp src 04/26/24 1338 Temporal   SpO2 04/26/24 1338 96 %   O2 Device 04/26/24 1338 None (Room air)       Current:BP (!) 162/78   Pulse 71   Temp 98.3 °F (36.8 °C) (Temporal)   Resp 16   Ht 157.5 cm (5' 2\")   Wt 59 kg   SpO2 98%   BMI 23.78 kg/m²         Physical Exam    Vital signs reviewed.  Nursing note reviewed.  Constitutional: Alert, in distress  Head: Normocephalic, atraumatic  Mouth: Dry  Eyes: Extraocular muscles intact, pupils equal  Cardiovascular: Regular rate and rhythm  Pulmonary: Effort normal, breath sounds normal  Abdomen: Soft, nontender nondistended  Skin: Warm and dry  Musculoskeletal left arm splint in place range of motion grossly normal all extremities  Neuro: Alert, oriented x 3 at baseline, face symmetric, speech clear.  Moves all extremities against gravity.  Motor 5/5 all extremities.  Patient too dizzy to ambulate currently.  Psych: Mood normal          ED Course     Labs Reviewed   COMP METABOLIC PANEL (14) - Abnormal; Notable for the following components:       Result Value    Glucose 183 (*)     Sodium 131 (*)     Chloride 96 (*)     Albumin  3.2 (*)     Globulin  4.7 (*)     A/G Ratio 0.7 (*)     All other components within normal limits   URINALYSIS WITH CULTURE REFLEX - Abnormal; Notable for the following components:    Ketones Urine 10 (*)     Protein Urine 20 (*)     Leukocyte Esterase Urine 75 (*)     WBC Urine 11-20 (*)     RBC Urine 3-5 (*)     Squamous Epi. Cells Few (*)     All other components within normal limits   POCT GLUCOSE - Abnormal; Notable for the following components:    POC Glucose 176 (*)     All other components within normal limits   CBC W/ DIFFERENTIAL - Abnormal; Notable for the following components:    HGB 11.0 (*)     HCT 33.3 (*)     All other components within normal limits   TROPONIN I HIGH SENSITIVITY - Normal   CBC WITH DIFFERENTIAL WITH PLATELET    Narrative:     The following orders were created for panel order CBC With Differential With Platelet.  Procedure                               Abnormality         Status                     ---------                               -----------         ------                     CBC W/ DIFFERENTIAL[[id number]]          Abnormal            Final result                 Please view results for these tests on the individual orders.   RAINBOW DRAW LAVENDER   RAINBOW DRAW LIGHT GREEN   RAINBOW DRAW BLUE   URINE CULTURE, ROUTINE     EKG    Rate, intervals and axes as noted on EKG Report.  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  COMPARISON:  ANITA ALCOCER, XR ELBOW, COMPLETE (MIN 3 VIEWS), LEFT (CPT=73080), 10/17/2023, 2:24 PM.  INDICATIONS:  Left lateral elbow injury with inability to bend or flex.  PATIENT STATED HISTORY: (As  transcribed by Technologist)  Family member of patient states patient had reached up to grab something above her head and heard a pop, since then she has had pain to left elbow. Unable to straighten out elbow.    FINDINGS:  Marked deformity of the elbow is again seen with fragmentation and heterotopic ossification.  When compared to the radiograph from 10/17/2023, the displacement at the lateral epicondyle fracture fragment appears mildly worse.             CONCLUSION:  See above.   LOCATION:  [facility]    Dictated by (CST): Stromberg, LeRoy, MD on 4/24/2024 at 7:23 PM     Finalized by (CST): Stromberg, LeRoy, MD on 4/24/2024 at 7:25 PM               MDM      Medical Decision Making:    Differential diagnosis before testing includes dizziness and disequilibrium related to vertigo versus CVA, intra-abdominal process, viral vomiting illness, electrolyte abnormality potential life threatening diagnosis which is a medical condition that poses a threat to life/function.     Comorbidities that add complexity to management: See HPI    I reviewed prior external ED notes including reviewed recent cardiology note 2/24 patient history of A-fib, had a watchman's placed    Discussions of management with: Discussed with hospitalist; and pharmacist as below    I personally reviewed the CTA head and neck and my independent interpretation includes no acute abnormality.    Shared decision making:  Admission disposition: 4/26/2024  6:00 PM         Sodium 131, previous was 134  Dehydrated with ketones in urine.  Given IV fluids.  Urinalysis with 11-20 white blood cells, leukocyte Estrace positive, few squames cells this could be contaminated sample versus urine infection.  Discussed with pharmacist given her multiple allergies and QT prolongation, he recommends 1 single dose of 5 Mg/KG gentamicin.  Suprapubic catheter was just changed on Friday.    For nausea, discussed prolonged QT with pharmacist, recommends IV Compazine.    Awaiting  CTA head and neck, CT abdomen pelvis  Patient likely will require admission as she has dizziness, unstable gait and vomiting    CTA head and neck negative.  CT abdomen pelvis negative for acute abnormality.      Reevaluated patient, I set her up to try to ambulate, she preferred to sit back down, she states she still feels slightly dizzy although definitely improved from earlier.  No longer vomiting.                   Medical Decision Making      Disposition and Plan     Clinical Impression:  1. Dizziness    2. Unstable gait    3. Suprapubic catheter (HCC)    4. Acute cystitis without hematuria    5. Prolonged Q-T interval on ECG    6. Hyponatremia         Disposition:  Admit  4/26/2024  6:00 pm    Follow-up:  No follow-up provider specified.        Medications Prescribed:  Current Discharge Medication List                            Hospital Problems       Present on Admission  Date Reviewed: 4/24/2024            ICD-10-CM Noted POA    * (Principal) Dizziness R42 4/26/2024 Unknown

## 2024-04-26 NOTE — H&P
[facility]     Terestia Henderson Patient Status:  Emergency    5/15/1940 MRN [id number]   Location [facility] EMERGENCY DEPARTMENT Attending Radha Mejia DO   Hosp Day # 0 PCP Nkechi Ariza MD     Chief Complaint:     Subjective:    History of Present Illness:   Teresita Henderson is a 83 year old female with a history of paroxysmal atrial fibrillation and Watchman device presents with dizziness.  She states symptoms began approximately 3 days ago.  They were intermittent.  No history of vertigo.  No chest pain or shortness of breath.  She did have episodes of nausea and vomiting.  No abdominal pain no fevers or chills.  She did have an unsteady gait.  She denies any focal weakness.  Symptoms are improving in the emergency department.  She was recently on antibiotics as she has a chronic suprapubic catheter.  Also on tramadol with a left elbow fracture.  She is following up with orthopedic surgery.    History/Other:    Past Medical History:  Past Medical History:    A-fib (HCC)    Abdominal distention    Abnormal mammogram    Allergic rhinitis    Anesthesia complication    Pt's sons have a history of pseudocholinesterase deficiency.    Antral gastritis    Arrhythmia    Arthritis    Bad breath    Belching    Bloating    Blood in urine    Breast CA (HCC)    Calculus of kidney    Cancer (HCC)    right breast cancer    Carpal tunnel syndrome    Cataract    Cervical spondylosis without myelopathy    Chondromalacia of patella    Constipation    Coronary atherosclerosis    stents    Coronary atherosclerosis of native coronary artery    Diabetes (HCC)    Diet controlled    Diverticulosis    Ductal carcinoma in situ (DCIS) of right breast,     Esophageal reflux    Exposure to unspecified radiation    last dose     Female stress incontinence    Flatulence/gas pain/belching    Frequent urination    Frequent use of laxatives    Frequent UTI    Frequent UTI    Gall stones     GERD (gastroesophageal reflux disease)    Glucose intolerance (impaired glucose tolerance)    Heart palpitations    High blood pressure    High cholesterol    History of blood transfusion        Hoarseness, chronic    Hypothyroidism    Incomplete bladder emptying (aka 18721)    trial of CIC 2014    Indigestion    Irregular bowel habits    L knee global 3/27/14    Leg swelling    Malignant neoplasm of breast (female), unspecified site    R    Nausea    Neoplasm of right breast, 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),     Osteoarthrosis, unspecified whether generalized or localized, unspecified site    generalized    Other and unspecified hyperlipidemia    Pacemaker    Pain in joints    Painful swallowing    Peripheral vascular disease (HCC)    PONV (postoperative nausea and vomiting)    Post-traumatic osteoarthritis of left elbow    Postmenopausal atrophic vaginitis    Presence of other cardiac implants and grafts    Primary localized osteoarthrosis, lower leg    Problems with swallowing    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)    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    Pure hypercholesterolemia    Rectocele    Recurrent UTI    group B strep    Recurrent UTI    S/P total knee replacement using cement    Sleep disturbance    Sputum production    Stented coronary artery    Uncomfortable fullness after meals    Unspecified disorder of thyroid    Visual impairment    reading glasses    Vitamin D deficiency     Past Surgical History:   Past Surgical History:   Procedure Laterality Date    Angioplasty (coronary)      Breast lumpectomy Right 2015    Procedure: BREAST LUMPECTOMY;  Surgeon: Castro Marie MD;  Location: [facility] MAIN OR    Breast reconstruction Right       1977    Cabg  1997    x3    Cardiac pacemaker placement      Mahomet 2022    Cataract  2017    Cath bare metal stent (bms)      Cholecystectomy      Colonoscopy   2016    tics; repeat 10 yrs    Colonoscopy  2017    Colonoscopy,diagnostic N/A 2016    Procedure: COLONOSCOPY, POSSIBLE BIOPSY, POSSIBLE POLYPECTOMY 77534;  Surgeon: Rodolfo Ann MD;  Location: [facility]    Cystoscopy,rx female urethral synd  05/10/2013    cysto UD, Ricardo    D & c  1976    Hysterectomy  1985    precancer and irregular bleeding. w/ BSO    Knee replacement surgery  2013    left     Lumpectomy right Right [id number]    Mastectomy right  2015      1964    Oophorectomy      AGE 47    Open heart surgery (pbp)      Other surgical history      LUMPECTOMY RIGHT BREAST     Other surgical history      A PMM LEFT KNEE , replacement    Other surgical history  2013    Venita DENTON    Other surgical history  2014    Cystoscopy- Dr. Figueroa    Other surgical history  2016    Bladder sling    Other surgical history       WATCHMEN    Pacemaker monitor      Patient documented not to have experienced any of the following events N/A 2016    Procedure: COLONOSCOPY, POSSIBLE BIOPSY, POSSIBLE POLYPECTOMY 01707;  Surgeon: Rodolfo Ann MD;  Location: [facility]    Patient withough preoperative order for iv antibiotic surgical site infection prophylaxis. N/A 2016    Procedure: COLONOSCOPY, POSSIBLE BIOPSY, POSSIBLE POLYPECTOMY 91900;  Surgeon: Rodolfo Ann MD;  Location: [facility]    Radiation right          Reduction left  10/2015    Total knee replacement      LEFT     Upper gi endoscopy,exam  2011    repeat 5 yeas      Family History:   Family History   Problem Relation Age of Onset    Cancer Sister         throat    Diabetes Mother     Breast Cancer Self 52    Heart Disease Neg     Stroke Neg      Social History:    reports that she quit smoking about 26 years ago. Her smoking use included cigarettes. She has never used smokeless tobacco. She reports that she does not currently use alcohol. She reports that she  does not use drugs.   Allergies:   Allergies   Allergen Reactions    Cephalexin ANAPHYLAXIS, HIVES and FACE FLUSHING    Ciprofloxacin MYALGIA, HIVES and OTHER (SEE COMMENTS)     Started on Rx 4/12/23? Not true allergy?    Fesoterodine SWELLING     Throat tightening  Throat tightening Throat tightening    Throat tightening Throat tightening      Throat tightening    Levofloxacin MYALGIA, HIVES and OTHER (SEE COMMENTS)     Reports tendonitis    Reports tendonitis   Reports tendonitis   Reports tendonitis    Penicillins HIVES and RASH     Tolerates ceftriaxone    Toviaz SWELLING and TONGUE SWELLING     Throat tightening    Throat tightening   Throat tightening    Atorvastatin OTHER (SEE COMMENTS) and NAUSEA AND VOMITING     GI upset, nausea and vomitting    GI upset, nausea and vomitting   GI upset, nausea and vomitting   GI upset, nausea and vomitting    Chlorhexidine OTHER (SEE COMMENTS)     Rash, redness    Chocolate OTHER (SEE COMMENTS)     headache    headache   headache   headache    Colesevelam OTHER (SEE COMMENTS) and UNKNOWN     GI upset    GI upset   GI upset   GI upset    Shellfish Allergy OTHER (SEE COMMENTS)     Per allergy test, redness    Chocolate OTHER (SEE COMMENTS)     headache    Fish-Derived Products OTHER (SEE COMMENTS)     Per allergy testing    Mushrooms OTHER (SEE COMMENTS)     headache    Pecan OTHER (SEE COMMENTS)     Unknown     Shellfish OTHER (SEE COMMENTS)    Shrimp UNKNOWN     Per allergy testing    Tomatoes OTHER (SEE COMMENTS)     headache    Hexachlorophene RASH and OTHER (SEE COMMENTS)     Medications:    No current facility-administered medications on file prior to encounter.     Current Outpatient Medications on File Prior to Encounter   Medication Sig Dispense Refill    ezetimibe 10 MG Oral Tab Take 1 tablet (10 mg total) by mouth nightly. 90 tablet 3    traMADol HCl 25 MG Oral Tab Take 25 mg by mouth 2 (two) times daily as needed (severe pain). 20 tablet 0    Lancets (ONETOUCH  DELICA PLUS [id number]) Does not apply Misc 1 each daily. 100 each 6    Glucose Blood (ONETOUCH VERIO) In Vitro Strip Use as directed. 100 strip 1    fosfomycin 3 g Oral Powd Pack MIX AND DRINK 1 PACKET BY MOUTH EVERY 10 DAYS      levothyroxine 88 MCG Oral Tab TAKE 1 TABLET(88 MCG) BY MOUTH DAILY 90 tablet 3    metFORMIN HCl 1000 MG Oral Tab Take 1 tablet (1,000 mg total) by mouth 2 (two) times daily with meals. 180 tablet 0    gabapentin 100 MG Oral Cap Take 2 capsules (200 mg total) by mouth nightly. 180 capsule 2    Cholecalciferol (VITAMIN D) 50 MCG (2000 UT) Oral Cap Take by mouth daily.      methenamine 1 g Oral Tab Take 1 tablet (1 g total) by mouth 2 (two) times daily. 60 tablet 0    Polyethylene Glycol 3350 17 g Oral Powd Pack Take 17 g by mouth daily as needed. 90 each 1    pravastatin 10 MG Oral Tab Take 1 tablet (10 mg total) by mouth nightly. 90 tablet 0    Meth-Hyo-M Bl-Na Phos-Ph Sal (URIBEL) 118 MG Oral Cap Take 1 capsule by mouth 3 (three) times daily as needed.      aspirin 81 MG Oral Tab EC Take 1 tablet (81 mg total) by mouth daily. 30 tablet 0    metoprolol succinate ER 25 MG Oral Tablet 24 Hr Take 1 tablet (25 mg total) by mouth in the morning and 1 tablet (25 mg total) before bedtime. (Patient taking differently: Take 1 tablet (25 mg total) by mouth. 12.5MG IN MORNING AND 25MG IN EVENING) 180 tablet 0    cetirizine 10 MG Oral Tab Take 1 tablet (10 mg total) by mouth daily.      pantoprazole 40 MG Oral Tab EC Take 1 tablet (40 mg total) by mouth daily.      dofetilide 125 MCG Oral Cap Take 1 capsule (125 mcg total) by mouth 2 (two) times daily.      fluticasone propionate 50 MCG/ACT Nasal Suspension 2 sprays by Each Nare route daily. 11.1 mL 3     Review of Systems:   A comprehensive review of systems was completed.    Pertinent positives and negatives noted in the HPI.    Objective:   Physical Exam:    BP (!) 162/78   Pulse 71   Temp 98.3 °F (36.8 °C) (Temporal)   Resp 16   Ht 5' 2\" (1.575  m)   Wt 130 lb (59 kg)   SpO2 98%   BMI 23.78 kg/m²   General: No acute distress, Alert.  No nystagmus  Respiratory: No rhonchi, no wheezes  Cardiovascular: S1, S2. Regular rate and rhythm  Abdomen: Soft, NT/ND, +BS  Neuro: No new focal deficits  Extremities: No edema. left upper extremity with cast placed    Results:    Labs:    Labs Last 24 Hours:  Recent Labs   Lab 04/26/24  1522   RBC 3.94   HGB 11.0*   HCT 33.3*   MCV 84.5   MCH 27.9   MCHC 33.0   RDW 14.3   NEPRELIM 5.44   WBC 7.2   .0     Recent Labs   Lab 04/26/24  1522   *   BUN 15   CREATSERUM 0.80   EGFRCR 74   CA 9.2   ALB 3.2*   *   K 4.6   CL 96*   CO2 30.0   ALKPHO 84   AST 22   ALT 19   BILT 0.4   TP 7.9     No results found for: \"PT\", \"INR\"  Recent Labs   Lab 04/26/24  1522   TROPHS <3     No results for input(s): \"TROP\", \"PBNP\" in the last 168 hours.  No results for input(s): \"PCT\" in the last 168 hours.  Imaging: Imaging data reviewed in Epic.    Assessment & Plan:      #Dizzy with nausea/vomiting-clinically low suspicion for stroke or vertigo.  Patient improving with IV fluids.  Could be side effect of antibiotics and tramadol  -hold further antibiotics  -Hold tramadol  -start toradol as needed     #Possible UTI vs contamination with suprapubic catheter - gent given- hold until cx as asymptomatic at this time  #Hyponatremia- IVF  #PAF on tikosyn- Prolonged QT- consider Cardio evaluation. S/p watchman   -cont telemetry       Quality:  DVT Mechanical Prophylaxis:        DVT Pharmacologic Prophylaxis   Medication   None                Code Status: Full Code  Dominique: Suprapubic catheter in place  Dominique Duration (in days):   Central line:    MARIAH:     Plan of care discussed with patient, family, staff     Alfredo Ibrahim MD  Supplementary Documentation:   The 21st Century Cures Act makes medical notes like these available to patients in the interest of transparency. Please be advised this is a medical document. Medical documents  are intended to carry relevant information, facts as evident, and the clinical opinion of the practitioner. The medical note is intended as peer to peer communication and may appear blunt or direct. It is written in medical language and may contain abbreviations or verbiage that are unfamiliar.

## 2024-04-27 VITALS
HEIGHT: 62 IN | SYSTOLIC BLOOD PRESSURE: 137 MMHG | DIASTOLIC BLOOD PRESSURE: 60 MMHG | WEIGHT: 139.75 LBS | OXYGEN SATURATION: 94 % | BODY MASS INDEX: 25.72 KG/M2 | RESPIRATION RATE: 18 BRPM | TEMPERATURE: 98 F | HEART RATE: 70 BPM

## 2024-04-27 LAB
ANION GAP SERPL CALC-SCNC: 6 MMOL/L (ref 0–18)
ATRIAL RATE: 70 BPM
ATRIAL RATE: 70 BPM
ATRIAL RATE: 71 BPM
BUN BLD-MCNC: 11 MG/DL (ref 9–23)
CALCIUM BLD-MCNC: 9.2 MG/DL (ref 8.5–10.1)
CHLORIDE SERPL-SCNC: 103 MMOL/L (ref 98–112)
CO2 SERPL-SCNC: 25 MMOL/L (ref 21–32)
CREAT BLD-MCNC: 0.64 MG/DL
EGFRCR SERPLBLD CKD-EPI 2021: 88 ML/MIN/1.73M2 (ref 60–?)
ERYTHROCYTE [DISTWIDTH] IN BLOOD BY AUTOMATED COUNT: 14.6 %
GLUCOSE BLD-MCNC: 141 MG/DL (ref 70–99)
HCT VFR BLD AUTO: 34.8 %
HGB BLD-MCNC: 11.2 G/DL
MAGNESIUM SERPL-MCNC: 1.8 MG/DL (ref 1.6–2.6)
MCH RBC QN AUTO: 27.6 PG (ref 26–34)
MCHC RBC AUTO-ENTMCNC: 32.2 G/DL (ref 31–37)
MCV RBC AUTO: 85.7 FL
OSMOLALITY SERPL CALC.SUM OF ELEC: 280 MOSM/KG (ref 275–295)
P AXIS: -59 DEGREES
P-R INTERVAL: 278 MS
P-R INTERVAL: 280 MS
P-R INTERVAL: 284 MS
PLATELET # BLD AUTO: 245 10(3)UL (ref 150–450)
POTASSIUM SERPL-SCNC: 4.7 MMOL/L (ref 3.5–5.1)
Q-T INTERVAL: 470 MS
Q-T INTERVAL: 472 MS
Q-T INTERVAL: 476 MS
QRS DURATION: 120 MS
QRS DURATION: 126 MS
QRS DURATION: 128 MS
QTC CALCULATION (BEZET): 510 MS
QTC CALCULATION (BEZET): 512 MS
QTC CALCULATION (BEZET): 514 MS
R AXIS: -52 DEGREES
R AXIS: -57 DEGREES
R AXIS: -57 DEGREES
RBC # BLD AUTO: 4.06 X10(6)UL
SODIUM SERPL-SCNC: 134 MMOL/L (ref 136–145)
T AXIS: -8 DEGREES
T AXIS: 23 DEGREES
T AXIS: 28 DEGREES
VENTRICULAR RATE: 70 BPM
VENTRICULAR RATE: 71 BPM
VENTRICULAR RATE: 71 BPM
WBC # BLD AUTO: 4.9 X10(3) UL (ref 4–11)

## 2024-04-27 PROCEDURE — 99239 HOSP IP/OBS DSCHRG MGMT >30: CPT | Performed by: HOSPITALIST

## 2024-04-27 RX ORDER — METOPROLOL SUCCINATE 25 MG/1
25 TABLET, EXTENDED RELEASE ORAL DAILY
Qty: 45 TABLET | Refills: 0 | Status: SHIPPED | OUTPATIENT
Start: 2024-04-27

## 2024-04-27 RX ORDER — MAGNESIUM OXIDE 400 MG/1
400 TABLET ORAL ONCE
Status: COMPLETED | OUTPATIENT
Start: 2024-04-27 | End: 2024-04-27

## 2024-04-27 NOTE — PROGRESS NOTES
NURSING ADMISSION NOTE      Patient admitted via Cart  Oriented to room.  Safety precautions initiated.  Bed in low position.  Call light in reach.    Assumed care at 2100  VSS, tele applied, A&ox4, APACED on tele, RA, Suprapubic catheter, R arm precautions, 0.9 infusing at 100ml/hr, pt/ot eval, vascular access consulted for midline, regular diet. Safety precautions in place. All nees met at this time. Continue current POC

## 2024-04-27 NOTE — HISTORICAL OFFICE NOTE
Facility Logo [facility]  [address], [address]th [place] [address]  [phone]      Teresita Henderson  Progress Note  Demographics:  Name: Teresita Henderson YOB: 1940  Age: 83, Female Medical Record No: [id number]  Visited Date/Time: 03/18/2024 02:30 PM    Chief Complaints  2/23 RFA ablation  History of Present Illness  84 yo female following up for CAD, PAF, ppm and complex HTN.    She has a PMH history of CABG 1997 with cardiac angiogram 2013 showed LIMA-LAD patent but SVG-OM & SVG-diagonal are occluded with RCA mild-moderate disease.  Other cardiac risk factors including HTN, dyslipidemia, non-insulin diabetes.  Also, history of breast cancer s/p left sided mastectomy and breast augmentation surgery in 2017.    This is a symptomatic visit for recurrent AF.      Status post watchman, had recurrent AF, converted back to SR.   Cardiac risk factors Diabetes, Hypertension, Dyslipidemia and Former smoker  Past Medical History  1.Pre-procedural laboratory examination  2.Palpitations  3.Hypothyroid  4.Coronary artery disease involving coronary bypass graft of native heart without angina pectoris  5.Pure hypercholesterolemia  6.Malignant neoplasm of breast  7.Stenosis of both internal carotid arteries  8.Type 2 diabetes mellitus with both eyes affected by mild nonproliferative retinopathy without macular edema, with long-term current use of insulin  9.Hypertension  10.Vitamin D deficiency  11.Primary osteoarthritis of right knee  12.Diverticulosis  Past Surgical History  1.Mastectomy of right breast  2.Coronary artery disease involving coronary bypass graft of native heart without angina pectoris  Family History  1. Mother - DM Type 2 (diabetes mellitus)  2. Daughter - PAF (paroxysmal atrial fibrillation)  Social History  Smoking status Former smoker  Tobacco usage - No (Non-smoker for personal reasons (finding))  Review of systems  Cardiovascular No history of Chest pain, ELLIS,  Palpitations, Syncope, PND, Orthopnea, Edema and Claudication  Respiratory No history of SOB, Wheezing and Sputum  Physical Examination  Vitals Left Arm Sitting  / 70 mmHg, ., Pulse rate 70 bpm, Regular, Height in 5' 2\", BMI: 25.2, Weight in 137.6 lbs (or) 62.41 kgs and BSA : 1.67 cc/m²  General Appearance No Acute Distress  Head/Eyes/Ears/Nose/Mouth/Throat Conjunctiva pink, Sclera Clear and Mucous membranes Moist  Neck Normal carotid pulsations, No carotid bruits and No JVD  Respiratory Unlabored, Lungs clear with normal breath sounds and Equal bilaterally  Cardiovascular Intact distal pulses and Regular rhythm. Normal rate present. Normal and normal S1 and S2    Allergies  1.Atorvastatin(Reaction:, Severity:Mild)  2.chlorhexidine - Ingredient(Reaction:rash, burning, severe itching, Severity:Moderate)  3.Ciprofloxacin(Reaction:, Severity:Mild)  4.Colesevelam(Reaction:, Severity:Mild)  5.Hexachlorophene(Reaction:, Severity:Mild)  6.Levofloxacin(Reaction:, Severity:Mild)  Medications  1.aspirin 81 mg tablet,delayed release, Take 1 tablet orally once a day.  2.Digox 125 mcg (0.125 mg) tablet, Take 1 tablet orally once a day.  3.dofetilide 125 mcg capsule, Take 1 capsule orally 2 times a day.  4.EZETIMIBE 10 MG Oral Tab, TAKE 1 TABLET(10 MG) BY MOUTH EVERY NIGHT  5.Fluticasone Propionate 50 MCG/ACT Nasal Suspension, 1 spray by Each Nare route 2 (two) times a day.  6.gabapentin 100 mg capsule, Take 1 capsule orally once a day.  7.LEVOTHYROXINE 88 MCG Oral Tab, TAKE 1 TABLET(88 MCG) BY MOUTH DAILY  8.Macrobid 100 mg capsule, Take 1 capsule orally once a day at night.  9.metFORMIN  mg tablet,extended release 24 hr, Take 1 tablet orally once a day.  10.methenamine mandelate 1 gram tablet, Take 1 tablet orally 2 times a day.  11.METOPROLOL ER SUCCINATE 25MG TABS, TAKE 1/2 TABLET BY MOUTH THREE TIMES DAILY  12.PRAVASTATIN 10 MG Oral Tab, TAKE 1 TABLET(10 MG) BY MOUTH EVERY NIGHT  13.Protonix 20 mg tablet,delayed  release, Take 1 tablet orally 2 times a day.  Impression  1.Cardiac Risk Assessment exam, preop  2.Coronary artery disease involving coronary bypass graft of native heart without angina pectoris  3.History of coronary artery bypass graft x 3  4.Pure hypercholesterolemia  5.Stenosis of both internal carotid arteries  6.Hypertension  Assessment & Plan  84 y/o female with CABG, normal LVEF, tendency for low BP which is worsened by AF which she recently presented with, now back in SR.     1. CABG with normal LVEF, CAD, PCI and stent to RCA  2. HTN crisis recently with hypocalcemia  3. History of orthostatic hypotension.   4. Sick sinus, dual chamber ppm.  5. PAF- on tikosyn. Status post ablation, no recurrent AF.  6. Frequent UTIs, scheduled for supra-pubic catheter     - Continue clopidogrel, aspirin.   - Stop digoxin 0.125 mg QD.   - Continue metoprolol.  - Referral for RPM for HTN management with tendency for orthostatic changes requiring a strategy of permissive HTN.  - Follow up in 4-6 months.  -Continue antiarrhythmic therapy      Labs and Diagnostics ordered  1.EKG (electrocardiogram) (Today)  2.RPM Blood Pressure Monitoring (Schedule next available)  Future appointments  1.Follow up visit - Susan Marin MD (6 Months)  Miscellaneous  1.Weight monitoring (regime/therapy)  Nurses documentation  Triage - Nursing Doc:  Upcoming surgeries: no  Use of assistive devices(s): no  Triage & medication list reviewed by: BRIGHT  EKG: yes  Refills: no  Patient instructions  Stop digoxin   Enroll in RPM  Follow up with Dr. Marin in 6 months  Lab Details  POCT GLUCOSE  02/23/2024 02:33:26 PM  POC GLUCOSE 187 70-99 mg/dL H F  BASIC METABOLIC PANEL (8)  07/28/2023 04:01:01 PM  GLUCOSE 109 70-99 mg/dL H F  SODIUM 137 136-145 mmol/L  F  POTASSIUM 4.2 3.5-5.1 mmol/L  F  CHLORIDE 104  mmol/L  F  CO2 29.0 21.0-32.0 mmol/L  F  ANION GAP 4 0-18 mmol/L  F  BUN 16 7-18 mg/dL  F  CREATININE 0.86 0.55-1.02 mg/dL  F  CALCIUM 9.3 8.5-10.1  mg/dL  F  OSMOLALITY CALCULATED 286 275-295 mOsm/kg  F  E GFR CR 67 >=60 mL/min/1.73m2  F  FASTING PATIENT BMP ANSWER Yes   F  ABORH (BLOOD TYPE)  07/28/2023 01:58:22 PM  ABO BLOOD TYPE A   F  RH BLOOD TYPE Positive   F  ANTIBODY SCREEN  07/28/2023 01:58:22 PM  ANTIBODY SCREEN Negative   F  CBC, PLATELET; NO DIFFERENTIAL  07/28/2023 01:04:26 PM  WBC 5.2 4.0-11.0 x10(3) uL  F  RED BLOOD COUNT 4.13 3.80-5.30 x10(6)uL  F  HGB 11.5 12.0-16.0 g/dL L F  HCT 37.0 35.0-48.0 %  F  PLATELETS 235.0 150.0-450.0 10(3)uL  F  MEAN CELL VOLUME 89.6 80.0-100.0 fL  F  MEAN CORPUSCULAR HEMOGLOBIN 27.8 26.0-34.0 pg  F  MEAN CORPUSCULAR HGB CONC 31.1 31.0-37.0 g/dL  F  RED CELL DISTRIBUTION WIDTH CV 14.6 %  F  Diagnostics Details  Exercise Myocardial Perfusion Imaging 05/01/2023  1.Stress EKG is normal.    1.This is an abnormal perfusion study. Study is consistent with prior infarction.    2.Small reversible perfusion abnormality of mild intensity in the inferior lateral region.    3.The left ventricular cavity is noted to be normal on the stress study. The left ventricular ejection fraction was calculated to be 71% and left ventricular global function is normal.    4.This scan is suggestive of low risk for future cardiovascular events.    5.The study quality is good.    Exercise Treadmill Testing 01/31/2023  1.Stress EKG is non-diagnostic due to paced rhythm, however, completed 11 minutes on modified Melvin protocol.    2.Sub-maximal exercise treadmill test (MPHR : 65 %).    3.The functional capacity is fair (7.0 METs).    4.The study quality is average.    ACTMonitoring 08/03/2022  1.This is an excellent quality study.    2.Predominant rhythm is normal sinus rhythm.    3.The minimum heart rate recorded was 42 beats / minute. The maximum heart rate is 93 beats / minute. The mean heart rate is 55 beats / minute.    4.Afib burden 1%    5.- Episode of 2:1 AV block is actually sinus bradycardia during presumed hours of sleeping 3:35,  8/5.    Nuclear PET 03/14/2022  1.Stress EKG is suspicious for ischemia with 1.5 mm upsloping ST depression.    2.The heart rate recovery is normal.    1.This is an abnormal perfusion study. Study is consistent with prior infarction.    2.Small fixed perfusion abnormality of severe intensity in the basal anteroseptal segment. Small fixed perfusion abnormality of severe intensity in the inferior lateral region.    3.This scan is suggestive of low to moderate risk for future cardiovascular events.    4.When compared to the resting ejection fraction (74%), the stress ejection fraction (59%) has decreased.    5.The study quality is excellent.    Carotid Ultrasound 01/17/2022  1.The study quality is good.    2.1-39% stenosis in the proximal right internal carotid artery based on Bluth Criteria.    3.1-39% stenosis in the proximal left internal carotid artery based on Bluth Criteria.    4.Antegrade right vertebral artery flow.    5.Antegrade left vertebral artery flow.    6. On limited examination there appears to be left thyroid nodules. Follow-up with primary care physician for dedicated imaging if clinically indicated.    Trans Thoracic Echocardiogram 01/17/2022  1.The study quality is average.    2.The left ventricle is normal in size. Global left ventricular systolic function is borderline normal. The left ventricular ejection fraction is 50-55%. Left ventricular wall thickness is normal. No distinct regional wall motion abnormality.    3.The right ventricle is normal in size. Right ventricular systolic function is normal.    4.The left atrial diameter is miderately increased    5.The right atrial diameter is mildly increased.    6. Mild mitral regurgitation.    7.The pulmonary artery systolic pressure is 27 mmHg.    CPOE Orders carried out by: Edilia De La Garza  Care Providers: Susan Marin MD, Edilia De La Garza  Electronically Authenticated by  Susan Marin MD  03/20/2024  05:24:31 PM  Disclaimer: Components of this note were documented using voice recognition system and are subject to errors not corrected at proofreading. Contact the author of this note for any clarifications.

## 2024-04-27 NOTE — PROGRESS NOTES
[facility]   part of [facility]     Hospitalist Progress Note     Teresita Henderson Patient Status:  Observation    5/15/1940 MRN [id number]   Location [facility] 3NE-A Attending Scooby Dee MD   Hosp Day # 0 PCP Nkechi Ariza MD     Chief Complaint: Dizziness    Subjective:     Patient is feeling better.  Her dizziness and nausea have resolved.  Denies fever, chills, n/v. No other acute complaints.     Objective:    Review of Systems:   A comprehensive review of systems was completed; pertinent positive and negatives stated in subjective.    Vital signs:  Temp:  [97.6 °F (36.4 °C)-98.3 °F (36.8 °C)] 98 °F (36.7 °C)  Pulse:  [69-73] 70  Resp:  [13-18] 18  BP: (133-189)/(60-87) 137/60  SpO2:  [94 %-100 %] 94 %    Physical Exam:    General: No acute distress, pleasant, elderly   Respiratory: No wheezes, no rhonchi  Cardiovascular: S1, S2, regular rate and rhythm  Abdomen: Soft, Non-tender, non-distended, positive bowel sounds  Neuro: No new focal deficits.   Extremities: No edema    Diagnostic Data:    Labs:  Recent Labs   Lab 24  1522 24  0815   WBC 7.2 4.9   HGB 11.0* 11.2*   MCV 84.5 85.7   .0 245.0       Recent Labs   Lab 24  1522 24  0815   * 141*   BUN 15 11   CREATSERUM 0.80 0.64   CA 9.2 9.2   ALB 3.2*  --    * 134*   K 4.6 4.7   CL 96* 103   CO2 30.0 25.0   ALKPHO 84  --    AST 22  --    ALT 19  --    BILT 0.4  --    TP 7.9  --        Estimated Creatinine Clearance: 53.6 mL/min (based on SCr of 0.64 mg/dL).    Recent Labs   Lab 24  1522   TROPHS <3       No results for input(s): \"PTP\", \"INR\" in the last 168 hours.               Microbiology    No results found for this visit on 24.      Imaging: Reviewed in Epic.    Medications:    magnesium oxide  400 mg Oral Once    aspirin  81 mg Oral Daily    dofetilide  125 mcg Oral BID    enoxaparin  40 mg Subcutaneous Daily    ezetimibe  10 mg Oral Nightly    gabapentin  200 mg Oral Nightly     levothyroxine  88 mcg Oral Daily    [Held by provider] methenamine  1 g Oral BID    metoprolol succinate ER  25 mg Oral Nightly    pantoprazole  40 mg Oral Daily    pravastatin  10 mg Oral Nightly    metoprolol succinate  12.5 mg Oral Daily Beta Blocker       Assessment & Plan:      #Dizziness  #N/v  Symptoms better today  S/p IVF's, can hold  Hold abx, await urine cultures    #Prolonged QTC on Tikosyn  QTC > 508, will ask for cards input to see if okay to continue    #Hyponatremia - Resolved    #PAF - s/p watchman, tikosyn, metoprolol  #HLD - statin   #GERD - PPI  #Hypothyroidism - Synthroid       Scooby Dee MD    Supplementary Documentation:     Quality:  DVT Mechanical Prophylaxis:     Early ambuation  DVT Pharmacologic Prophylaxis   Medication    enoxaparin (Lovenox) 40 MG/0.4ML SUBQ injection 40 mg         DVT Pharmacologic prophylaxis: Aspirin 81 mg      Code Status: Full Code  Dominique: Suprapubic catheter in place  Dominique Duration (in days):   Central line:    MARIAH: 4/28/2024    Discharge is dependent on: clinical recovery   At this point Ms. Henderson is expected to be discharge to: Home    The 21st Century Cures Act makes medical notes like these available to patients in the interest of transparency. Please be advised this is a medical document. Medical documents are intended to carry relevant information, facts as evident, and the clinical opinion of the practitioner. The medical note is intended as peer to peer communication and may appear blunt or direct. It is written in medical language and may contain abbreviations or verbiage that are unfamiliar.

## 2024-04-27 NOTE — CONSULTS
[facility]  Consultation    Teresita Henderson Patient Status:  Observation    5/15/1940 MRN [id number]   Location [facility] 3NE-A Attending Scooby Dee MD   Hosp Day # 0 PCP Nkechi Ariza MD     IP Consult to Cardiology  Consult performed by: Julius Barakat PA-C  Consult ordered by: Scooby Dee MD          Reason for Consultation:  QT prolongation    History of Present Illness:  Teresita Henderson is an 83 year old female who presented with dizziness and nausea/vomiting. She has a PMH of atrial fibrillation s/p watchman device, afib ablation 2024, and dual chamber ppm for sick sinus syndrome 2022 who follows with Dr. Marin on Tikosyn for rhythm management.      She also is s/p CABG in , HTN, HLD, DM, suprapubic catheter, and breast cancer s/p mastectomy in .    She presented for severe nausea and mild lightheadedness she attributes to the tramadol she was prescribed for a L arm fracture and UTI. Her symptoms have resolved with hydration therapy and antibiotics.     She denies having had syncope, dizziness, palpitations, or chest pain. She was noted to have prolonged QTC on ECG and we have been consulted to weigh in on her continuation of Tikosyn.    History:  Past Medical History:    A-fib (HCC)    Abdominal distention    Abnormal mammogram    Allergic rhinitis    Anesthesia complication    Pt's sons have a history of pseudocholinesterase deficiency.    Antral gastritis    Arrhythmia    Arthritis    Bad breath    Belching    Bloating    Blood in urine    Breast CA (HCC)    Calculus of kidney    Cancer (HCC)    right breast cancer    Carpal tunnel syndrome    Cataract    Cervical spondylosis without myelopathy    Chondromalacia of patella    Constipation    Coronary atherosclerosis    stents    Coronary atherosclerosis of native coronary artery    Diabetes (HCC)    Diet controlled    Diverticulosis    Ductal carcinoma in situ (DCIS) of right breast,     Esophageal reflux     Exposure to unspecified radiation    last dose 1992    Female stress incontinence    Flatulence/gas pain/belching    Frequent urination    Frequent use of laxatives    Frequent UTI    Frequent UTI    Gall stones    GERD (gastroesophageal reflux disease)    Glucose intolerance (impaired glucose tolerance)    Heart palpitations    High blood pressure    High cholesterol    History of blood transfusion    1992    Hoarseness, chronic    Hypothyroidism    Incomplete bladder emptying (aka 67238)    trial of CIC June 2014    Indigestion    Irregular bowel habits    L knee global 3/27/14    Leg swelling    Malignant neoplasm of breast (female), unspecified site    R    Nausea    Neoplasm of right breast, 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), 1992    Osteoarthrosis, unspecified whether generalized or localized, unspecified site    generalized    Other and unspecified hyperlipidemia    Pacemaker    Pain in joints    Painful swallowing    Peripheral vascular disease (HCC)    PONV (postoperative nausea and vomiting)    Post-traumatic osteoarthritis of left elbow    Postmenopausal atrophic vaginitis    Presence of other cardiac implants and grafts    Primary localized osteoarthrosis, lower leg    Problems with swallowing    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)    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    Pure hypercholesterolemia    Rectocele    Recurrent UTI    group B strep    Recurrent UTI    S/P total knee replacement using cement    Sleep disturbance    Sputum production    Stented coronary artery    Uncomfortable fullness after meals    Unspecified disorder of thyroid    Visual impairment    reading glasses    Vitamin D deficiency     Past Surgical History:   Procedure Laterality Date    Angioplasty (coronary)  1997    Breast lumpectomy Right 06/26/2015    Procedure: BREAST LUMPECTOMY;  Surgeon: Castro Marie MD;  Location: [facility] MAIN OR    Breast  reconstruction Right           Cabg  1997    x3    Cardiac pacemaker placement      Irene 2022    Cataract  2017    Cath bare metal stent (bms)      Cholecystectomy      Colonoscopy  2016    tics; repeat 10 yrs    Colonoscopy  2017    Colonoscopy,diagnostic N/A 2016    Procedure: COLONOSCOPY, POSSIBLE BIOPSY, POSSIBLE POLYPECTOMY 34437;  Surgeon: Rodolfo Ann MD;  Location: [facility]    Cystoscopy,rx female urethral synd  05/10/2013    cysto UD, [place]    D & c  1976    Hysterectomy  1985    precancer and irregular bleeding. w/ BSO    Knee replacement surgery  2013    left     Lumpectomy right Right [id number]    Mastectomy right  2015      1964    Oophorectomy      AGE 47    Open heart surgery (pbp)      Other surgical history      LUMPECTOMY RIGHT BREAST     Other surgical history      A PMM LEFT KNEE , replacement    Other surgical history  2013    Venita DENTON    Other surgical history  2014    Cystoscopy- Dr. Figueroa    Other surgical history  2016    Bladder sling    Other surgical history       WATCHMEN    Pacemaker monitor      Patient documented not to have experienced any of the following events N/A 2016    Procedure: COLONOSCOPY, POSSIBLE BIOPSY, POSSIBLE POLYPECTOMY 54456;  Surgeon: Rodolfo Ann MD;  Location: [facility]    Patient withough preoperative order for iv antibiotic surgical site infection prophylaxis. N/A 2016    Procedure: COLONOSCOPY, POSSIBLE BIOPSY, POSSIBLE POLYPECTOMY 63262;  Surgeon: Rodolfo Ann MD;  Location: [facility]    Radiation right      1992    Reduction left  10/2015    Total knee replacement      LEFT     Upper gi endoscopy,exam  2011    repeat 5 yeas     Family History   Problem Relation Age of Onset    Cancer Sister         throat    Diabetes Mother     Breast Cancer Self 52    Heart Disease Neg     Stroke Neg              Review of  Systems:  Subjective:      OBJECTIVE  Blood pressure 137/60, pulse 70, temperature 98 °F (36.7 °C), temperature source Oral, resp. rate 18, height 5' 2\" (1.575 m), weight 139 lb 12.4 oz (63.4 kg), SpO2 94%, not currently breastfeeding.  Temp (24hrs), Av °F (36.7 °C), Min:97.6 °F (36.4 °C), Max:98.3 °F (36.8 °C)    Wt Readings from Last 3 Encounters:   24 139 lb 12.4 oz (63.4 kg)   24 130 lb (59 kg)   24 132 lb (59.9 kg)     Physical Exam:   /60 (BP Location: Right leg)   Pulse 70   Temp 98 °F (36.7 °C) (Oral)   Resp 18   Ht 5' 2\" (1.575 m)   Wt 139 lb 12.4 oz (63.4 kg)   SpO2 94%   BMI 25.56 kg/m²   Temp (24hrs), Av °F (36.7 °C), Min:97.6 °F (36.4 °C), Max:98.3 °F (36.8 °C)       Intake/Output Summary (Last 24 hours) at 2024 1307  Last data filed at 2024 1026  Gross per 24 hour   Intake --   Output 3425 ml   Net -3425 ml     Wt Readings from Last 3 Encounters:   24 139 lb 12.4 oz (63.4 kg)   24 130 lb (59 kg)   24 132 lb (59.9 kg)     Telemetry: Atrial paced  General: Alert and oriented in no apparent distress seated comfortably in her bedside chair.  HEENT: No focal deficits.  Neck: No JVD, carotids 2+ no bruits.  Cardiac: Regular rate and rhythm, S1, S2 normal, no murmur, rub or gallop.  Lungs: Clear without wheezes, rales, rhonchi or dullness.  Normal excursions and effort.  Abdomen: Soft, non-tender.   Extremities: Without clubbing, cyanosis or edema.  Peripheral pulses are 2+.  Neurologic: Alert and oriented, normal affect.  Skin: Warm and dry.        Diagnostics History:  BREN 10/25/2023:  LVEF preserved 60-65% without wall motion abnormalities. L atrial enlargement.  Mild mitral and tricuspid insufficiency and normal pulmonic valves  ASD on color flow interrogation consistent with prior transseptal puncture  Watchman device noted in L atrial appendage and is well seated. No kayla device flow. No evidence of intracardiac mass or thrombus over the  watchman device.    [place] device Check 3/18/2024: AP 85%,  11% Battery est. 12 years, No VHR events or AF/AT since AF ablation 2/23/2024        Exercise Myocardial Perfusion Imaging 05/01/2023  1.Stress EKG is normal.  1.This is an abnormal perfusion study. Study is consistent with prior infarction.  2.Small reversible perfusion abnormality of mild intensity in the inferior lateral region.  3.The left ventricular cavity is noted to be normal on the stress study. The left ventricular ejection fraction was calculated to be 71% and left ventricular global function is normal.  4.This scan is suggestive of low risk for future cardiovascular events.  5.The study quality is good.     Exercise Treadmill Testing 01/31/2023  1.Stress EKG is non-diagnostic due to paced rhythm, however, completed 11 minutes on modified Melvin protocol.  2.Sub-maximal exercise treadmill test (MPHR : 65 %).  3.The functional capacity is fair (7.0 METs).  4.The study quality is average.    Cath: Cardiac angiogram 2013: showed LIMA-LAD patent but SVG-OM & SVG-diagonal are occluded with RCA mild-moderate disease             Results:   Recent Labs   Lab 04/26/24  1522 04/27/24  0815   * 141*   BUN 15 11   CREATSERUM 0.80 0.64   EGFRCR 74 88   CA 9.2 9.2   * 134*   K 4.6 4.7   CL 96* 103   CO2 30.0 25.0     Recent Labs   Lab 04/26/24  1522 04/27/24  0815   RBC 3.94 4.06   HGB 11.0* 11.2*   HCT 33.3* 34.8*   MCV 84.5 85.7   MCH 27.9 27.6   MCHC 33.0 32.2   RDW 14.3 14.6   NEPRELIM 5.44  --    WBC 7.2 4.9   .0 245.0         No results for input(s): \"BNP\" in the last 168 hours.  No results found for: \"PT\", \"INR\"  Lab Results   Component Value Date    TROP <0.045 05/12/2019    TROP <0.046 03/15/2015    TROP <0.046 12/29/2013         CT ABDOMEN+PELVIS(CONTRAST ONLY)(CPT=74177)    Result Date: 4/26/2024  CONCLUSION:  1. Diffuse urinary bladder wall thickening with suprapubic catheter in place.  Correlate for cystitis.    LOCATION:  [id number]   Dictated by (CST): Ibrahima Estrada MD on 4/26/2024 at 6:19 PM     Finalized by (CST): Ibrahima Estrada MD on 4/26/2024 at 6:22 PM       CTA BRAIN + CTA CAROTIDS (CPT=70496/00624)    Result Date: 4/26/2024  CONCLUSION:  1. No acute intracranial abnormality.  Patent intracranial and cervical arterial vasculature, without large vessel occlusion, aneurysm, or significant stenosis.   LOCATION:  [id number]   Dictated by (CST): Ibrahima Estrada MD on 4/26/2024 at 6:13 PM     Finalized by (CST): Ibrahima Estrada MD on 4/26/2024 at 6:18 PM           Assessment and Plan  Nausea/vomiting/lightheadedness  Resolved off tramadol, gentamicin, and with IV rehydration  No syncope or indication of cardiac etiology  Paroxysmal atrial fibrillation S/P ablation  On 125 mcg BID of Tikosyn    maintenance of NSR/a-paced rhythm with intermittent v-pacing  QTc reviewed and calculated manually to be 454 ms consistent with prior ECG's  LVEF preserved on most recent BREN  S/p watchman August 2023  CAD s/p CABG and PCI w/ SUDEEP to RCA Nov 2022  No indication of ACS or chest pain  Continue ASA 81 mg  alone per patient report no longer taking Plavix  UTI VS suprapubic catheter contamination  On Abx  Defer to primary team  HLD  Zetia and statin    PLAN  Avoid QT prolonging medication including tramadol and flouroquinolones  Maintain a Potassium greater than 4.0 and Mg greater than 2.0  OK to continue Tikosyn at current dose  Continue ASA, metoprolol 25 mg daily, pravastatin, and zetia  Reasonable to discharge with EP follow up outpatient next week.            Julius Barakat PA-C  4/27/2024  1:03 PM     Reviewed with JOSEPH Barakat    ECG's reviewed    Mild QTc prolongation yet not inconsistent with prior tracings    As long as she is now eating and drinking without difficulty she may continue her baseline Tikosyn dosing.    Keep regularly scheduled follow up with Dr. Marin in our office.        C4

## 2024-04-27 NOTE — PROGRESS NOTES
Assumed care at 7:30 am  Patient is alert and oriented x4  PT/OT to see today  Denies pain  All safety precautions in place. Will continue to monitor patient.       Cardiology was put on consult for Qtc prolonged interval.  After review and EKGs post med administration. Patient good to go. Made sure she knows to follow up with EP in one week.

## 2024-04-27 NOTE — PHYSICAL THERAPY NOTE
PHYSICAL THERAPY EVALUATION - INPATIENT     Room Number: 3613/3613-A  Evaluation Date: 4/27/2024  Type of Evaluation: Initial  Physician Order: PT Eval and Treat    Presenting Problem: dizziness, L elbow fx  Co-Morbidities : afib with watchman, pacemaker, DM, CAD h/o stent, CABG, suprapubic catheter  Reason for Therapy: Mobility Dysfunction and Discharge Planning    PHYSICAL THERAPY ASSESSMENT   Patient is a 83 year old female admitted 4/26/2024 for dizziness.   Patient is currently functioning at baseline with bed mobility, transfers, gait, and stair negotiation. Prior to admission, patient's baseline is indep with amb without assistive device and supervision with stair negotiation.     Patient will benefit from continued skilled PT Services For duration of hospitalization, however, given the patient is functioning near baseline level do not anticipate skilled therapy needs at discharge .    PLAN  Patient has been evaluated and presents with no skilled Physical Therapy needs at this time.  Patient discharged from Physical Therapy services.  Please re-order if a new functional limitation presents during this admission.    GOALS  Patient was able to achieve the following goals ...    Patient was able to transfer At previous, functional level   Patient able to ambulate on level surfaces At previous, functional level         HOME SITUATION  Type of Home: House   Home Layout: Two level        Stairs to Bedroom: 14  Railing: Yes    Lives With: Daughter  Drives: Yes  Patient Owned Equipment: Rolling walker;Cane       Prior Level of [place]: per pt reports, pt had spontaneous fx R UE, no fall hx. Pt lives with daughter who works 5 mins from home and is able to assist with needs upon discharge. Pt amb without assistive device and daughter provides supervision when pt negotiates stairs to 2nd floor bedroom. Pt was driving prior to R UE fx.    SUBJECTIVE  \"My daughter makes sure I'm ok, I'm very  careful.\"      OBJECTIVE  Precautions: Limb alert - right (L UE sling)  Fall Risk: Standard fall risk    WEIGHT BEARING RESTRICTION  Weight Bearing Restriction: L upper extremity     L Upper Extremity: Non-Weight Bearing          PAIN ASSESSMENT  Ratin  Location: denies pain at this time       COGNITION  Overall Cognitive Status:  WFL - within functional limits    RANGE OF MOTION AND STRENGTH ASSESSMENT  Upper extremity ROM and strength are within functional limits except for the following:   L wrist not tested due to fx/cast    Lower extremity ROM is within functional limits     Lower extremity strength is within functional limits       BALANCE  Static Sitting: Good  Dynamic Sitting: Good  Static Standing: Good  Dynamic Standing: Fair +    ADDITIONAL TESTS                                    ACTIVITY TOLERANCE                         O2 WALK       NEUROLOGICAL FINDINGS  Neurological Findings: None                     AM-PAC '6-Clicks' INPATIENT SHORT FORM - BASIC MOBILITY  How much difficulty does the patient currently have...  Patient Difficulty: Turning over in bed (including adjusting bedclothes, sheets and blankets)?: None   Patient Difficulty: Sitting down on and standing up from a chair with arms (e.g., wheelchair, bedside commode, etc.): None   Patient Difficulty: Moving from lying on back to sitting on the side of the bed?: None   How much help from another person does the patient currently need...   Help from Another: Moving to and from a bed to a chair (including a wheelchair)?: None   Help from Another: Need to walk in hospital room?: None   Help from Another: Climbing 3-5 steps with a railing?: A Little       AM-PAC Score:  Raw Score: 23   Approx Degree of Impairment: 11.2%   Standardized Score (AM-PAC Scale): 56.93   CMS Modifier (G-Code): CI    FUNCTIONAL ABILITY STATUS  Gait Assessment   Functional Mobility/Gait Assessment  Gait Assistance: Modified independent  Distance (ft): 150  Assistive  Device: None  Pattern: Within Functional Limits  Stairs: Stairs  How Many Stairs: 1 flight  Device: 1 Rail  Assist: Supervision  Pattern: Ascend and Descend  Ascend and Descend : Reciprocal    Skilled Therapy Provided     Bed Mobility:  Rolling: not tested  Supine to sit: not tested   Sit to supine:  not tested     Transfer Mobility:  Sit to stand: modified indep   Stand to sit: modified indep  Gait = pt amb x 150 feet without assistive device with SBA initially and progressed to modified independent.    Therapist's comments:per RN pt ok for PT. Pt received sitting up in bedside chair and agreeable to PT.pt required min assist to don L UE sling.  Pt vitals monitored and stable. Pt denies dizziness throughout session. Pt amb in hallways with steady gait, no LOB. Pt able to ascend/descend 1 flight of stairs with one rail with SBA. Pt returns to room and is seated in bedside chair. Pt educated on call don't fall, activity recommendations and safety with amb. Pt RN updated and pt left in chair.    Exercise/Education Provided:  Functional activity tolerated  Gait training  Posture  Transfer training    Patient End of Session: Needs met;Call light within reach;RN aware of session/findings;All patient questions and concerns addressed;Alarm set    Patient Evaluation Complexity Level:  History Low - no personal factors and/or co-morbidities   Examination of body systems Low - addressing 1-2 elements   Clinical Presentation Low - Stable   Clinical Decision Making Low Complexity       PT Session Time: 25 minutes  Gait Training: 10 minutes

## 2024-04-29 ENCOUNTER — PATIENT OUTREACH (OUTPATIENT)
Dept: CASE MANAGEMENT | Age: 84
End: 2024-04-29

## 2024-04-30 ENCOUNTER — TELEPHONE (OUTPATIENT)
Dept: INTERNAL MEDICINE CLINIC | Facility: CLINIC | Age: 84
End: 2024-04-30

## 2024-04-30 DIAGNOSIS — N39.0 URINARY TRACT INFECTION WITHOUT HEMATURIA, SITE UNSPECIFIED: Primary | ICD-10-CM

## 2024-04-30 RX ORDER — GRANULES FOR ORAL 3 G/1
3 POWDER ORAL AS DIRECTED
Qty: 3 G | Refills: 0 | Status: SHIPPED | OUTPATIENT
Start: 2024-04-30 | End: 2024-04-30

## 2024-04-30 RX ORDER — GRANULES FOR ORAL 3 G/1
3 POWDER ORAL AS DIRECTED
Qty: 9 G | Refills: 0 | Status: SHIPPED | OUTPATIENT
Start: 2024-04-30 | End: 2024-04-30

## 2024-04-30 NOTE — TELEPHONE ENCOUNTER
I checked medication interactions and it shows that for tikosyn (dofetilide) and fosfomycin:   No interactions of Risk Level A or greater identified.   Could she have confused medication?   Bactrim interacts with dofetilide which is why I could not use that.  Fosfomycin not a macrolide.   Pt can double check with pharmacist before taking since limited in abx choices.     She has cephalosporins listed as anaphylaxis under allergies so can't  use that medication.     Alternatively could do trial macrobid BID (but pt was on that previously).

## 2024-04-30 NOTE — TELEPHONE ENCOUNTER
LMTCB x1 (appt available today if able to come in for TCM, if not available at time of call back, are you ok with adding in resp slot for Hospital TCM Follow up?)     Dr Silva - any further recs on lab results (urine culture 4/26/2024)?

## 2024-04-30 NOTE — TELEPHONE ENCOUNTER
Spoke to pt's daughter for TCM today.  Pt does not have an appointment scheduled at this time.  TCM appt recommended by 5/4/24 as pt is a high risk for readmission.  Please advise.    BOOK BY DATE (last date for TCM): 5/11/24    Clinical staff:  Please f/u with pt and try to get them to schedule as pt would greatly benefit from TCM appt.  Thank you!    Beryl is also requesting Dr. Silva review urine culture test results. She states no one has called them with results.  Please advise.

## 2024-04-30 NOTE — TELEPHONE ENCOUNTER
Reviewed urine culture ordered by another provider while pt was hospitalized-   No sensitivities available but given prior hx and allergy list and medication interactions-will send in fosfomycin. Should repeat urine culture after completing course. Should also exchange suprapubic catheter. UC/ER if symptoms worsen.     Please check with Reva- there was a different pt I was going to see for the resp slot open tomorrow. If that patient is not going to take it then this patient could but would need to arrive 20min early.

## 2024-04-30 NOTE — TELEPHONE ENCOUNTER
S/w Beryl, dtr on verbal release. Patient can't take Fosfomycin, dtr states it interacts with the Tikosyn and interacts with heart rhythm. Per patient Cardiologist also advised to avoid macrolides and fluoroquinolones. Per patient, Cardiologist advised cephalosporins is ok. Please advise?    Dtr states next suprapubic catheter change is scheduled for 5/13th but will call Duly to get sooner appt due to UTI.     Scheduled appt for 5/22, patient not able to come in sooner and leaves to [place] on 5/23.

## 2024-04-30 NOTE — PROGRESS NOTES
Initial Post Discharge Follow Up   Discharge Date: 4/27/24  Contact Date: 4/30/2024    Consent Verification:  Assessment Completed With: Other: Beryl-daughter Permission received per patient?  written  HIPAA Verified?  Yes    Discharge Dx:   Dizziness     General:   How have you been since your discharge from the hospital? NCM spoke with pt's daughter Beryl, states they are at her cardiology appointment, pt is doing fine per daughter, she declined assessment, but she would like for Dr. Silva to review UCX test results and advise. TE to PCP office re: urine culture test results. She declined PCP appointment or any further questions. NCM closing encounter.

## 2024-04-30 NOTE — TELEPHONE ENCOUNTER
Called pharmacy and cancelled rx for fosfomycin.     Called dtr Beryl and informed new abx Macrobid has been sent to pharmacy. Verbalized understanding.

## 2024-05-01 NOTE — DISCHARGE SUMMARY
[place] [facility]PITAL[facility]  DISCHARGE SUMMARY     Teresita Henderson Patient Status:  Observation    5/15/1940 MRN [id number]   Location [facility] 3NE-A Attending No att. providers found   Hosp Day # 0 PCP Nkechi Ariza MD     Date of Admission: 2024  Date of Discharge:  2024     Discharge Disposition: Home or Self Care    Discharge Diagnosis:    #Dizziness  #Nausea, vomiting   #Prolonged QTC on Tikosyn  #Hyponatremia   #PAF - s/p watchman  #HLD   #GERD  #Hypothyroidism     History of Present Illness: Teresita Henderson is a 83 year old female with a history of paroxysmal atrial fibrillation and Watchman device presents with dizziness.  She states symptoms began approximately 3 days ago.  They were intermittent.  No history of vertigo.  No chest pain or shortness of breath.  She did have episodes of nausea and vomiting.  No abdominal pain no fevers or chills.  She did have an unsteady gait.  She denies any focal weakness.  Symptoms are improving in the emergency department.  She was recently on antibiotics as she has a chronic suprapubic catheter.  Also on tramadol with a left elbow fracture.  She is following up with orthopedic surgery.    Brief Synopsis:   Patient presented with nausea vomiting with some dizziness.  She was admitted and treated for UTI and dehydration.  She responded well to IV fluids.  She also received 1 dose of IV gentamicin in the emergency department.  Patient was feeling better and wanted to go home and have her cultures followed up with in the outpatient setting.  Patient suffers from chronic recurrent urinary tract infections, she follows with a ID physician out of [facility].  Called and updated patient and family that her cultures grew Corynebacterium.  Patient has been started on Macrobid by her PCP.  Patient family has already spoke with her ID specialist, urologist specialist, and her PCP.  Instructed to return to ER if her symptoms come back or worsen.  All questions and  concerns were addressed with patient and family, they are agreeable to plan of care as stated.    Lace+ Score: 79  59-90 High Risk  29-58 Medium Risk  0-28   Low Risk       TCM Follow-Up Recommendation:  LACE > 58: High Risk of readmission after discharge from the hospital.      Procedures during hospitalization:   None    Incidental or significant findings and recommendations (brief descriptions):  N/a    Lab/Test results pending at Discharge:   Urine culture    Consultants:  None    Discharge Medication List:     Discharge Medications        CHANGE how you take these medications        Instructions Prescription details   metoprolol succinate ER 25 MG Tb24  Commonly known as: Toprol XL  What changed:   when to take this  additional instructions      Take 1 tablet (25 mg total) by mouth daily. 12.5MG IN MORNING AND 25MG IN EVENING   Quantity: 45 tablet  Refills: 0            CONTINUE taking these medications        Instructions Prescription details   aspirin 81 MG Tbec      Take 1 tablet (81 mg total) by mouth daily.   Quantity: 30 tablet  Refills: 0     cetirizine 10 MG Tabs  Commonly known as: ZyrTEC      Take 1 tablet (10 mg total) by mouth daily.   Refills: 0     dofetilide 125 MCG Caps  Commonly known as: Tikosyn      Take 1 capsule (125 mcg total) by mouth 2 (two) times daily.   Refills: 0     ezetimibe 10 MG Tabs  Commonly known as: Zetia      Take 1 tablet (10 mg total) by mouth nightly.   Quantity: 90 tablet  Refills: 3     fluticasone propionate 50 MCG/ACT Susp  Commonly known as: Flonase      2 sprays by Each Nare route daily.   Quantity: 11.1 mL  Refills: 3     gabapentin 100 MG Caps  Commonly known as: Neurontin      Take 2 capsules (200 mg total) by mouth nightly.   Quantity: 180 capsule  Refills: 2     levothyroxine 88 MCG Tabs  Commonly known as: Synthroid      TAKE 1 TABLET(88 MCG) BY MOUTH DAILY   Quantity: 90 tablet  Refills: 3     metFORMIN HCl 1000 MG Tabs  Commonly known as: GLUCOPHAGE       Take 1 tablet (1,000 mg total) by mouth 2 (two) times daily with meals.   Quantity: 180 tablet  Refills: 0     methenamine 1 g Tabs  Commonly known as: Hiprex      Take 1 tablet (1 g total) by mouth 2 (two) times daily.   Quantity: 60 tablet  Refills: 0     OneTouch Delica Plus [id number] Misc      1 each daily.   Quantity: 100 each  Refills: 6     OneTouch Verio Strp      Use as directed.   Quantity: 100 strip  Refills: 1     pantoprazole 40 MG Tbec  Commonly known as: Protonix      Take 1 tablet (40 mg total) by mouth daily.   Refills: 0     Polyethylene Glycol 3350 17 g Pack  Commonly known as: MIRALAX      Take 17 g by mouth daily as needed.   Quantity: 90 each  Refills: 1     pravastatin 10 MG Tabs  Commonly known as: Pravachol      Take 1 tablet (10 mg total) by mouth nightly.   Quantity: 90 tablet  Refills: 0     traMADol HCl 25 MG Tabs      Take 25 mg by mouth 2 (two) times daily as needed (severe pain).   Quantity: 20 tablet  Refills: 0     Uribel 118 MG Caps      Take 1 capsule by mouth 3 (three) times daily as needed.   Refills: 0     Vitamin D 50 MCG (2000 UT) Caps      Take by mouth daily.   Refills: 0            STOP taking these medications      fluconazole 150 MG Tabs  Commonly known as: Diflucan        fosfomycin 3 g Pack  Commonly known as: Monurol                  Where to Get Your Medications        These medications were sent to [id number] - [place], [place] - [address] AT [facility] [address], [phone], [phone]  [facility] access [address], [facility] [address]      Phone: [phone]   metoprolol succinate ER 25 MG Tb24         [place]    Follow-up appointment:   Allie Granger APRN  [address] [facility] [address] [phone]    Follow up in 1 week(s)  Our office will contact you with an appointment    Appointments for Next 30 Days 4/30/2024 - 5/30/2024        Date Arrival Time Visit Type Length Department Provider     5/2/2024  9:15  AM  [facility] NEW PATIENT OS [2374] 15 min [facility], [address] [place] [address] Shan Alexandra MD    Patient Instructions:     Please arrive 15 minutes prior to your scheduled appointment. Please also bring your Insurance card, Photo ID, and your medication bottles or a list of your current medication.    If your condition improves and this appointment is no longer needed, please contact your physician office to cancel.    Please verify with your primary care provider if your insurance requires a referral.        Location Instructions:     Masks are optional for all patients and visitors, unless otherwise indicated.               5/3/2024 10:30 AM  EGD [14526] 30 min [facility], PROCEDURE    Patient Instructions:     Please arrive 60 minutes prior to your scheduled procedure time.                    5/22/2024 10:00 AM  EXAM - ESTABLISHED [[id number]] 20 min [facility], [facility] Ricardo Nkechi Ariza MD    Patient Instructions:         Location Instructions:     Masks are optional for all patients and visitors, unless otherwise indicated.                      -----------------------------------------------------------------------------------------------  PATIENT DISCHARGE INSTRUCTIONS: See electronic chart    Scooby Dee MD      The 21st Century Cures Act makes medical notes like these available to patients in the interest of transparency. Please be advised this is a medical document. Medical documents are intended to carry relevant information, facts as evident, and the clinical opinion of the practitioner. The medical note is intended as peer to peer communication and may appear blunt or direct. It is written in medical language and may contain abbreviations or verbiage that are unfamiliar.

## 2024-05-02 ENCOUNTER — OFFICE VISIT (OUTPATIENT)
Dept: ORTHOPEDICS CLINIC | Facility: CLINIC | Age: 84
End: 2024-05-02
Payer: MEDICARE

## 2024-05-02 DIAGNOSIS — G56.21 CUBITAL TUNNEL SYNDROME ON RIGHT: Primary | ICD-10-CM

## 2024-05-02 PROCEDURE — 99214 OFFICE O/P EST MOD 30 MIN: CPT | Performed by: ORTHOPAEDIC SURGERY

## 2024-05-02 NOTE — H&P
Clinic Note     Assessment/Plan:  83 year old female    Chronic right elbow deformity - Radiographs are stable without significant change upon my review. Currently in minimal pain from elbow. Observe. Discontinue sling/splint. Pain in upper arm could be muscle strain.  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  Recommend surgical decompression with transposition SubQ. Surgery will prevent progression with hopes of improvement. Complete nerve recovery is not possible nor is the atrophy reversible.   Sx 7/26/24  Patient has been okayed from cardiology to proceed. MAC+Nerve block. Nylon sutures for closure  CuTR Consent: Patient consented to surgery after having understood the risks associated with surgery, expected outcomes, time to recovery and the possible need for therapy post-operatively. Risks include but not limited to: infection, wound complication, nerve injury resulting in temporary or permanent nerve damage, elbow stiffness, weakness, incomplete relief, recurrence of symptoms, persistent of symptoms, and need for additional surgery.   Follow Up: 7/26/24    Diagnostic Studies:     EMG/NCV:   1.  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  2.  In addition, there is suggestion of a chronic left C7 and C8 cervical radiculopathy, without active denervation changes.'    XR R elbow: Chronic deformity of right elbow. No acute changes from XR in October. HO at ulnar groove       Physical Exam:     There were no vitals taken for this visit.    Constitutional: NAD. AOx3. Well-developed and Well-nourished.   Psychiatric: Normal mood/ affect/ behavior. Judgment and thought content normal.     Right Upper Extremity:     Inspection    Skin intact. No skin lesions. No obvious mass visualized.    Palpation    No TTP over ulnar nerve      ROM    Full composite fist. and Normal symmetric wrist  motion.    Elbow ROM      Neurovascular    Normal sensation in the median and radial nerve distribution. Normal motor function of muscles innervated by median/AIN, ulnar, and radial/PIN nerves.    Normally perfused hand(s).    Ulnar Nerve  No subluxation of ulnar nerve, 10% dec SF, 50% dec RF, 10% dec MF  Atrophy FDI, Hypothenar, Subtle clawing             CC: Right elbow pain    HPI: This 83 year old RHD female presents with right elbow pain. She mentioned significant pain within her elbow. She has tried some conservative measures with minimal change. Numbness/Tingling and weakness are noted    Onset: elbow deformity since childhood, symptoms in ulnar nerve more recently  Pain Character: elbow  Pain Level: severe  Pain @ Night: Yes    Treatments Tried: Tramadol, Anti-inflammatory medications    Occupation: N/a    History/Other:   Past Medical History:    A-fib (HCC)    Abdominal distention    Abnormal mammogram    Allergic rhinitis    Anesthesia complication    Pt's sons have a history of pseudocholinesterase deficiency.    Antral gastritis    Arrhythmia    Arthritis    Bad breath    Belching    Bloating    Blood in urine    Breast CA (HCC)    Calculus of kidney    Cancer (HCC)    right breast cancer    Carpal tunnel syndrome    Cataract    Cervical spondylosis without myelopathy    Chondromalacia of patella    Constipation    Coronary atherosclerosis    stents    Coronary atherosclerosis of native coronary artery    Diabetes (HCC)    Diet controlled    Diverticulosis    Ductal carcinoma in situ (DCIS) of right breast, 1992    Esophageal reflux    Exposure to unspecified radiation    last dose 1992    Female stress incontinence    Flatulence/gas pain/belching    Frequent urination    Frequent use of laxatives    Frequent UTI    Frequent UTI    Gall stones    GERD (gastroesophageal reflux disease)    Glucose intolerance (impaired glucose tolerance)    Heart palpitations    High blood pressure    High  cholesterol    History of blood transfusion        Hoarseness, chronic    Hypothyroidism    Incomplete bladder emptying (aka 28252)    trial of CIC 2014    Indigestion    Irregular bowel habits    L knee global 3/27/14    Leg swelling    Malignant neoplasm of breast (female), unspecified site    R    Nausea    Neoplasm of right breast, 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),     Osteoarthrosis, unspecified whether generalized or localized, unspecified site    generalized    Other and unspecified hyperlipidemia    Pacemaker    Pain in joints    Painful swallowing    Peripheral vascular disease (HCC)    PONV (postoperative nausea and vomiting)    Post-traumatic osteoarthritis of left elbow    Postmenopausal atrophic vaginitis    Presence of other cardiac implants and grafts    Primary localized osteoarthrosis, lower leg    Problems with swallowing    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)    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    Pure hypercholesterolemia    Rectocele    Recurrent UTI    group B strep    Recurrent UTI    S/P total knee replacement using cement    Sleep disturbance    Sputum production    Stented coronary artery    Uncomfortable fullness after meals    Unspecified disorder of thyroid    Visual impairment    reading glasses    Vitamin D deficiency     Past Surgical History:   Procedure Laterality Date    Angioplasty (coronary)      Breast lumpectomy Right 2015    Procedure: BREAST LUMPECTOMY;  Surgeon: Castro Marie MD;  Location: [facility] MAIN OR    Breast reconstruction Right       1977    Cabg  1997    x3    Cardiac pacemaker placement      [place] 2022    Cataract      Cath bare metal stent (bms)      Cholecystectomy      Colonoscopy  2016    tics; repeat 10 yrs    Colonoscopy  2017    Colonoscopy,diagnostic N/A 2016    Procedure: COLONOSCOPY, POSSIBLE BIOPSY, POSSIBLE POLYPECTOMY 73016;   Surgeon: Rodolfo Ann MD;  Location: [facility]    Cystoscopy,rx female urethral synd  05/10/2013    cysto Ricardo DENTON    D & julia  1976    Hysterectomy  1985    precancer and irregular bleeding. w/ BSO    Knee replacement surgery  2013    left     Lumpectomy right Right [id number]    Mastectomy right  2015      1964    Oophorectomy      AGE 47    Open heart surgery (pbp)      Other surgical history      LUMPECTOMY RIGHT BREAST     Other surgical history      A PMM LEFT KNEE , replacement    Other surgical history  2013    Venita DENTON    Other surgical history  2014    Cystoscopy- Dr. Figueroa    Other surgical history  2016    Bladder sling    Other surgical history       WATCHMEN    Pacemaker monitor      Patient documented not to have experienced any of the following events N/A 2016    Procedure: COLONOSCOPY, POSSIBLE BIOPSY, POSSIBLE POLYPECTOMY 87152;  Surgeon: Rodolfo Ann MD;  Location: [facility]    Patient withough preoperative order for iv antibiotic surgical site infection prophylaxis. N/A 2016    Procedure: COLONOSCOPY, POSSIBLE BIOPSY, POSSIBLE POLYPECTOMY 15350;  Surgeon: Rodolfo Ann MD;  Location: [facility]    Radiation right      1992    Reduction left  10/2015    Total knee replacement      LEFT     Upper gi endoscopy,exam  2011    repeat 5 yeas     Current Outpatient Medications   Medication Sig Dispense Refill    nitrofurantoin monohydrate macro 100 MG Oral Cap Take 1 capsule (100 mg total) by mouth 2 (two) times daily for 5 days. 10 capsule 0    metoprolol succinate ER 25 MG Oral Tablet 24 Hr Take 1 tablet (25 mg total) by mouth daily. 12.5MG IN MORNING AND 25MG IN EVENING 45 tablet 0    ezetimibe 10 MG Oral Tab Take 1 tablet (10 mg total) by mouth nightly. 90 tablet 3    traMADol HCl 25 MG Oral Tab Take 25 mg by mouth 2 (two) times daily as needed (severe pain). 20 tablet 0    Lancets (ONETOUCH DELICA  PLUS [id number]) Does not apply Misc 1 each daily. 100 each 6    Glucose Blood (ONETOUCH VERIO) In Vitro Strip Use as directed. 100 strip 1    levothyroxine 88 MCG Oral Tab TAKE 1 TABLET(88 MCG) BY MOUTH DAILY 90 tablet 3    metFORMIN HCl 1000 MG Oral Tab Take 1 tablet (1,000 mg total) by mouth 2 (two) times daily with meals. 180 tablet 0    gabapentin 100 MG Oral Cap Take 2 capsules (200 mg total) by mouth nightly. 180 capsule 2    Cholecalciferol (VITAMIN D) 50 MCG (2000 UT) Oral Cap Take by mouth daily.      methenamine 1 g Oral Tab Take 1 tablet (1 g total) by mouth 2 (two) times daily. 60 tablet 0    Polyethylene Glycol 3350 17 g Oral Powd Pack Take 17 g by mouth daily as needed. 90 each 1    pravastatin 10 MG Oral Tab Take 1 tablet (10 mg total) by mouth nightly. 90 tablet 0    Meth-Hyo-M Bl-Na Phos-Ph Sal (URIBEL) 118 MG Oral Cap Take 1 capsule by mouth 3 (three) times daily as needed.      aspirin 81 MG Oral Tab EC Take 1 tablet (81 mg total) by mouth daily. 30 tablet 0    cetirizine 10 MG Oral Tab Take 1 tablet (10 mg total) by mouth daily.      pantoprazole 40 MG Oral Tab EC Take 1 tablet (40 mg total) by mouth daily.      dofetilide 125 MCG Oral Cap Take 1 capsule (125 mcg total) by mouth 2 (two) times daily.      fluticasone propionate 50 MCG/ACT Nasal Suspension 2 sprays by Each Nare route daily. 11.1 mL 3     Allergies   Allergen Reactions    Cephalexin ANAPHYLAXIS, HIVES and FACE FLUSHING    Ciprofloxacin MYALGIA, HIVES and OTHER (SEE COMMENTS)     Started on Rx 4/12/23? Not true allergy?    Fesoterodine SWELLING     Throat tightening  Throat tightening Throat tightening    Throat tightening Throat tightening      Throat tightening    Fosfomycin ARRHYTHMIA     Interaction of arrhythmias with Tykosin    Levofloxacin MYALGIA, HIVES and OTHER (SEE COMMENTS)     Reports tendonitis    Reports tendonitis   Reports tendonitis   Reports tendonitis    Other ARRHYTHMIA     Macrolides and Fluoroquinolones     Penicillins HIVES and RASH     Tolerates ceftriaxone    Toviaz SWELLING and TONGUE SWELLING     Throat tightening    Throat tightening   Throat tightening    Atorvastatin OTHER (SEE COMMENTS) and NAUSEA AND VOMITING     GI upset, nausea and vomitting    GI upset, nausea and vomitting   GI upset, nausea and vomitting   GI upset, nausea and vomitting    Chlorhexidine OTHER (SEE COMMENTS)     Rash, redness    Chocolate OTHER (SEE COMMENTS)     headache    headache   headache   headache    Colesevelam OTHER (SEE COMMENTS) and UNKNOWN     GI upset    GI upset   GI upset   GI upset    Shellfish Allergy OTHER (SEE COMMENTS)     Per allergy test, redness    Chocolate OTHER (SEE COMMENTS)     headache    Fish-Derived Products OTHER (SEE COMMENTS)     Per allergy testing    Mushrooms OTHER (SEE COMMENTS)     headache    Pecan OTHER (SEE COMMENTS)     Unknown     Shellfish OTHER (SEE COMMENTS)    Shrimp UNKNOWN     Per allergy testing    Tomatoes OTHER (SEE COMMENTS)     headache    Hexachlorophene RASH and OTHER (SEE COMMENTS)     Family History   Problem Relation Age of Onset    Cancer Sister         throat    Diabetes Mother     Breast Cancer Self 52    Heart Disease Neg     Stroke Neg      Social History     Occupational History    Not on file   Tobacco Use    Smoking status: Former     Current packs/day: 0.00     Types: Cigarettes     Quit date: 1997     Years since quittin.8    Smokeless tobacco: Never   Vaping Use    Vaping status: Never Used   Substance and Sexual Activity    Alcohol use: Not Currently    Drug use: No    Sexual activity: Not on file      Review of Systems (negative unless bolded):  General: fevers, chills, fatigue  CV:  chest pain, palpitations, leg swelling  Msk: bodyaches, neck pain, neck stiffness  Skin: rashes, open wounds, nonhealing ulcers  Hem: bleeds easily, bruise easily, immunocompromised  Neuro: dizziness, light headedness, headaches  Psych: anxious, depressed, anger  issues    Attention: This note has been scribed by Ibeth Isidro under the supervision of Shan Alexandra MD.      Shan Alexandra MD   Hand, Wrist, & Elbow Surgery  [email or web address]  t: [phone]  f: [phone]

## 2024-05-03 ENCOUNTER — ANESTHESIA EVENT (OUTPATIENT)
Dept: ENDOSCOPY | Facility: HOSPITAL | Age: 84
End: 2024-05-03
Payer: MEDICARE

## 2024-05-03 ENCOUNTER — HOSPITAL ENCOUNTER (OUTPATIENT)
Facility: HOSPITAL | Age: 84
Setting detail: HOSPITAL OUTPATIENT SURGERY
Discharge: HOME | End: 2024-05-03
Attending: INTERNAL MEDICINE | Admitting: INTERNAL MEDICINE
Payer: MEDICARE

## 2024-05-03 ENCOUNTER — ANESTHESIA (OUTPATIENT)
Dept: ENDOSCOPY | Facility: HOSPITAL | Age: 84
End: 2024-05-03
Payer: MEDICARE

## 2024-05-03 VITALS
WEIGHT: 130 LBS | DIASTOLIC BLOOD PRESSURE: 83 MMHG | BODY MASS INDEX: 23.33 KG/M2 | TEMPERATURE: 98 F | HEIGHT: 62.5 IN | SYSTOLIC BLOOD PRESSURE: 163 MMHG | OXYGEN SATURATION: 100 % | HEART RATE: 69 BPM | RESPIRATION RATE: 16 BRPM

## 2024-05-03 DIAGNOSIS — R13.19 OTHER DYSPHAGIA: ICD-10-CM

## 2024-05-03 DIAGNOSIS — K21.00 GASTROESOPHAGEAL REFLUX DISEASE WITH ESOPHAGITIS, UNSPECIFIED WHETHER HEMORRHAGE: ICD-10-CM

## 2024-05-03 LAB — GLUCOSE BLD-MCNC: 134 MG/DL (ref 70–99)

## 2024-05-03 PROCEDURE — 82962 GLUCOSE BLOOD TEST: CPT

## 2024-05-03 PROCEDURE — 88305 TISSUE EXAM BY PATHOLOGIST: CPT | Performed by: INTERNAL MEDICINE

## 2024-05-03 PROCEDURE — 0DB58ZX EXCISION OF ESOPHAGUS, VIA NATURAL OR ARTIFICIAL OPENING ENDOSCOPIC, DIAGNOSTIC: ICD-10-PCS | Performed by: INTERNAL MEDICINE

## 2024-05-03 RX ORDER — DEXTROSE MONOHYDRATE 25 G/50ML
50 INJECTION, SOLUTION INTRAVENOUS
Status: DISCONTINUED | OUTPATIENT
Start: 2024-05-03 | End: 2024-05-03

## 2024-05-03 RX ORDER — SODIUM CHLORIDE, SODIUM LACTATE, POTASSIUM CHLORIDE, CALCIUM CHLORIDE 600; 310; 30; 20 MG/100ML; MG/100ML; MG/100ML; MG/100ML
INJECTION, SOLUTION INTRAVENOUS CONTINUOUS
Status: DISCONTINUED | OUTPATIENT
Start: 2024-05-03 | End: 2024-05-03

## 2024-05-03 RX ORDER — NICOTINE POLACRILEX 4 MG
15 LOZENGE BUCCAL
Status: DISCONTINUED | OUTPATIENT
Start: 2024-05-03 | End: 2024-05-03

## 2024-05-03 RX ORDER — NALOXONE HYDROCHLORIDE 0.4 MG/ML
0.08 INJECTION, SOLUTION INTRAMUSCULAR; INTRAVENOUS; SUBCUTANEOUS ONCE AS NEEDED
Status: DISCONTINUED | OUTPATIENT
Start: 2024-05-03 | End: 2024-05-03

## 2024-05-03 RX ORDER — NICOTINE POLACRILEX 4 MG
30 LOZENGE BUCCAL
Status: DISCONTINUED | OUTPATIENT
Start: 2024-05-03 | End: 2024-05-03

## 2024-05-03 RX ORDER — LIDOCAINE HYDROCHLORIDE 10 MG/ML
INJECTION, SOLUTION EPIDURAL; INFILTRATION; INTRACAUDAL; PERINEURAL AS NEEDED
Status: DISCONTINUED | OUTPATIENT
Start: 2024-05-03 | End: 2024-05-03 | Stop reason: SURG

## 2024-05-03 RX ADMIN — LIDOCAINE HYDROCHLORIDE 25 MG: 10 INJECTION, SOLUTION EPIDURAL; INFILTRATION; INTRACAUDAL; PERINEURAL at 09:27:00

## 2024-05-03 RX ADMIN — SODIUM CHLORIDE, SODIUM LACTATE, POTASSIUM CHLORIDE, CALCIUM CHLORIDE: 600; 310; 30; 20 INJECTION, SOLUTION INTRAVENOUS at 09:37:00

## 2024-05-03 NOTE — ANESTHESIA PREPROCEDURE EVALUATION
PRE-OP EVALUATION    Patient Name: Teresita Henderson    Admit Diagnosis: Other dysphagia [R13.19]  Gastroesophageal reflux disease with esophagitis, unspecified whether hemorrhage [K21.00]    Pre-op Diagnosis: Other dysphagia [R13.19]  Gastroesophageal reflux disease with esophagitis, unspecified whether hemorrhage [K21.00]    ESOPHAGOGASTRODUODENOSCOPY (EGD) with possible dilation    Anesthesia Procedure: ESOPHAGOGASTRODUODENOSCOPY (EGD) with possible dilation    Surgeons and Role:     * Alec Romero, DO - Primary    Pre-op vitals reviewed.  Temp: 97.7 °F (36.5 °C)  Pulse: 71  Resp: 16  BP: 150/77  SpO2: 100 %  Body mass index is 23.4 kg/m².    Current medications reviewed.  Hospital Medications:   glucose (Dex4) 15 GM/59ML oral liquid 15 g  15 g Oral Q15 Min PRN    Or    glucose (Glutose) 40% oral gel 15 g  15 g Oral Q15 Min PRN    Or    glucose-vitamin C (Dex-4) chewable tab 4 tablet  4 tablet Oral Q15 Min PRN    Or    dextrose 50% injection 50 mL  50 mL Intravenous Q15 Min PRN    Or    glucose (Dex4) 15 GM/59ML oral liquid 30 g  30 g Oral Q15 Min PRN    Or    glucose (Glutose) 40% oral gel 30 g  30 g Oral Q15 Min PRN    Or    glucose-vitamin C (Dex-4) chewable tab 8 tablet  8 tablet Oral Q15 Min PRN    lactated ringers infusion   Intravenous Continuous       Outpatient Medications:     Medications Prior to Admission   Medication Sig Dispense Refill Last Dose    nitrofurantoin monohydrate macro 100 MG Oral Cap Take 1 capsule (100 mg total) by mouth 2 (two) times daily for 5 days. 10 capsule 0 5/2/2024    metoprolol succinate ER 25 MG Oral Tablet 24 Hr Take 1 tablet (25 mg total) by mouth daily. 12.5MG IN MORNING AND 25MG IN EVENING 45 tablet 0 5/2/2024    ezetimibe 10 MG Oral Tab Take 1 tablet (10 mg total) by mouth nightly. 90 tablet 3 5/2/2024    traMADol HCl 25 MG Oral Tab Take 25 mg by mouth 2 (two) times daily as needed (severe pain). 20 tablet 0 Past Week    levothyroxine 88 MCG Oral Tab TAKE 1 TABLET(88  MCG) BY MOUTH DAILY 90 tablet 3 5/2/2024    metFORMIN HCl 1000 MG Oral Tab Take 1 tablet (1,000 mg total) by mouth 2 (two) times daily with meals. 180 tablet 0 5/2/2024    gabapentin 100 MG Oral Cap Take 2 capsules (200 mg total) by mouth nightly. 180 capsule 2 Past Week    Polyethylene Glycol 3350 17 g Oral Powd Pack Take 17 g by mouth daily as needed. 90 each 1     pravastatin 10 MG Oral Tab Take 1 tablet (10 mg total) by mouth nightly. 90 tablet 0 5/2/2024    aspirin 81 MG Oral Tab EC Take 1 tablet (81 mg total) by mouth daily. 30 tablet 0 5/2/2024 at 0800    cetirizine 10 MG Oral Tab Take 1 tablet (10 mg total) by mouth daily.   Past Week    pantoprazole 40 MG Oral Tab EC Take 1 tablet (40 mg total) by mouth daily.   5/2/2024    dofetilide 125 MCG Oral Cap Take 1 capsule (125 mcg total) by mouth 2 (two) times daily.   5/2/2024    fluticasone propionate 50 MCG/ACT Nasal Suspension 2 sprays by Each Nare route daily. 11.1 mL 3 Past Week    Lancets (ONETOUCH DELICA PLUS [id number]) Does not apply Misc 1 each daily. 100 each 6     Glucose Blood (ONETOUCH VERIO) In Vitro Strip Use as directed. 100 strip 1     Cholecalciferol (VITAMIN D) 50 MCG (2000 UT) Oral Cap Take by mouth daily.   More than a month    methenamine 1 g Oral Tab Take 1 tablet (1 g total) by mouth 2 (two) times daily. 60 tablet 0     Meth-Hyo-M Bl-Na Phos-Ph Sal (URIBEL) 118 MG Oral Cap Take 1 capsule by mouth 3 (three) times daily as needed.   Unknown       Allergies: Cephalexin, Ciprofloxacin, Fesoterodine, Fosfomycin, Levofloxacin, Other, Penicillins, Toviaz, Atorvastatin, Chlorhexidine, Chocolate, Colesevelam, Shellfish allergy, Chocolate, Fish-derived products, Mushrooms, Pecan, Shellfish, Shrimp, Tomatoes, and Hexachlorophene      Anesthesia Evaluation    Patient summary reviewed.    Anesthetic Complications  (+) history of anesthetic complications  History of: pseudocholinesterase deficiency       GI/Hepatic/Renal  Comment: Swallowing  difficulties and nausea    (+) GERD                           Cardiovascular      ECG reviewed.            (+) hypertension     (+) CAD      (+) pacemaker/AICD       (+) dysrhythmias (s/p Watchman) and atrial fibrillation                  Endo/Other      (+) diabetes and well controlled, type 2, using insulin                         Pulmonary    Negative pulmonary ROS.                       Neuro/Psych                 (+) neuromuscular disease                     Past Surgical History:   Procedure Laterality Date    Angioplasty (coronary)      Breast lumpectomy Right 2015    Procedure: BREAST LUMPECTOMY;  Surgeon: Castro Marie MD;  Location: [facility] MAIN OR    Breast reconstruction Right       1977    Cabg  1997    x3    Cardiac pacemaker placement      [place] 2022    Cataract      Cath bare metal stent (bms)      Cholecystectomy      Colonoscopy  2016    tics; repeat 10 yrs    Colonoscopy      Colonoscopy,diagnostic N/A 2016    Procedure: COLONOSCOPY, POSSIBLE BIOPSY, POSSIBLE POLYPECTOMY 96336;  Surgeon: Rodolfo Ann MD;  Location: [facility]    Cystoscopy,rx female urethral synd  05/10/2013    cysto Ricardo DENTON & julia  1976    Hysterectomy  1985    precancer and irregular bleeding. w/ BSO    Knee replacement surgery  2013    left     Lumpectomy right Right [id number]    Mastectomy right  2015      1964    Oophorectomy      AGE 47    Open heart surgery (pbp)      Other surgical history      LUMPECTOMY RIGHT BREAST     Other surgical history      A PMM LEFT KNEE , replacement    Other surgical history  2013    Venita DENTON    Other surgical history  2014    Cystoscopy- Dr. Figueroa    Other surgical history  2016    Bladder sling    Other surgical history       WATCHMEN    Pacemaker monitor      Patient documented not to have experienced any of the following events N/A 2016    Procedure: COLONOSCOPY, POSSIBLE BIOPSY,  POSSIBLE POLYPECTOMY 76459;  Surgeon: Rodolfo Ann MD;  Location: [facility]    Patient withough preoperative order for iv antibiotic surgical site infection prophylaxis. N/A 2016    Procedure: COLONOSCOPY, POSSIBLE BIOPSY, POSSIBLE POLYPECTOMY 68315;  Surgeon: Rodolfo Ann MD;  Location: [facility]    Radiation right      1992    Reduction left  10/2015    Total knee replacement      LEFT     Upper gi endoscopy,exam  2011    repeat 5 yeas     Social History     Socioeconomic History    Marital status:    Tobacco Use    Smoking status: Former     Current packs/day: 0.00     Types: Cigarettes     Quit date: 1997     Years since quittin.8    Smokeless tobacco: Never   Vaping Use    Vaping status: Never Used   Substance and Sexual Activity    Alcohol use: Not Currently    Drug use: No   Other Topics Concern    Caffeine Concern Yes    Exercise Yes     History   Drug Use No     Available pre-op labs reviewed.  Lab Results   Component Value Date    WBC 4.9 2024    RBC 4.06 2024    HGB 11.2 (L) 2024    HCT 34.8 (L) 2024    MCV 85.7 2024    MCH 27.6 2024    MCHC 32.2 2024    RDW 14.6 2024    .0 2024     Lab Results   Component Value Date     (L) 2024    K 4.7 2024     2024    CO2 25.0 2024    BUN 11 2024    CREATSERUM 0.64 2024     (H) 2024    CA 9.2 2024            Airway      Mallampati: II  Mouth opening: 3 FB  TM distance: 4 - 6 cm  Neck ROM: full Cardiovascular    Cardiovascular exam normal.  Rhythm: regular  Rate: normal     Dental    Dentition appears grossly intact         Pulmonary    Pulmonary exam normal.  Breath sounds clear to auscultation bilaterally.               Other findings              ASA: 3   Plan: MAC  NPO status verified and patient meets guidelines.        Comment: MAC discussed.  All questions answered.  Patient  expressed understanding and agrees to proceed.    Plan/risks discussed with: patient and child/children                Present on Admission:  **None**

## 2024-05-03 NOTE — OPERATIVE REPORT
Teresita Henderson Patient Status:  [facility] Outpatient Surgery    5/15/1940 MRN [id number]   [facility] ENDOSCOPY PAIN CENTER Attending Alec Romero DO   Hosp Day # 0 PCP Nkechi Ariza MD       PREOPERATIVE DIAGNOSIS/INDICATION: Dysphagia  POSTOPERTATIVE DIAGNOSIS: See Impression  PROCEDURE PERFORMED:  EGD WITH BIOPSY  DATE: 5/3/24  SEDATION: MAC sedation provided by General Anesthesia    TIME OUT WAS PERFORMED    INFORMED CONSENT: I have discussed the risks, benefits, and alternatives to upper GI endoscopy with the patient including but not limited to the risks of bleeding, infection, pain, as well as the risks of anesthesia and perforation all possibly leading to prolonged hospitalization, surgical intervention. All questions were answered to the patient’s satisfaction. The patient elected to proceed with upper GI endoscopy with intervention as indicated.    PROCEDURE DESCRIPTION: A regular upper endoscope was introduced into the patient’s mouth, hypo pharynx, esophagus, stomach and the first and second portion of the duodenum. Retroflexion of the endoscope was performed in the stomach.  Careful examination of the above described areas was performed on withdrawal of the endoscope. The patient tolerated the procedure well.  There were no immediate complications immediately following the procedure and the patient was transferred to recovery in stable condition.  FINDINGS:  Esophagus: The esophagus was normal. The esophagogastric junction was 40 cm from the incisors. The squamocolumnar junction was 40 cm from the incisors and regular. Esophageal biopsies were taken using cold biopsy forceps to rule out eosinophilic esophagitis.     Stomach: The gastric mucosa was normal.     Duodenum: The duodenal bulb was normal. The examined descending duodenum was normal.      IMPRESSION:   1. Normal EGD.   RECOMMENDATIONS:    1. Follow up pathology results.    2. Follow up in GI office with NP.      PRIYANK Romero  Gastroenterology/Advanced Endoscopy  [facility]

## 2024-05-03 NOTE — DISCHARGE INSTRUCTIONS
Home Care Instructions for EGD With Sedation    Diet:  - Resume your regular diet as tolerated unless otherwise instructed.  - start with light meals to minimize bloating.  - Do not drink alcohol today.    Medication:  - If you have questions about resuming your normal medications, please contact your Primary Care Physician.    Activities:  - Take it easy today. Do not return to work today.  - Do not drive today.  - Do not operate any machinery today (including kitchen equipment).      EGD:  - You may have a sore throat for 2-3 days following the exam. This is normal. Gargling with warm salt water (1/2 tsp salt to 1 glass warm water) or using throat lozenges will help.  - If you experience any sharp pain in your neck, abdomen or chest, vomiting of blood, oral temperature over 100 degrees Farenheit, light-headedness or dizziness, or any other problems, contact your doctor.    **If unable to reach your doctor, please go to the [facility]**    - Your referring physician will receive a full report of your examination.  - If you do not hear from your doctor's office within two weeks of your biopsy, please call them for your results.    Additional Comments/Instructions (if applicable):

## 2024-05-03 NOTE — PROGRESS NOTES
SURGICAL BOOKING SHEET   Name: Teresita Henderson  MRN: [id number]   : 5/15/1940    Surgical Date:   24   Surgical Consent:   Right cubital tunnel release   Diagnosis:      ICD-10-CM   1. Cubital tunnel syndrome on right  G56.21       Workers Comp: No   Procedure Codes:   Cubital tunnel release (CPT 07046)   Disposition:   Outpatient   Operative Time:   45 mins   Antibiotics:   Ancef 2g   Anesthesia Type:   Monitored Anesthesia Care (MAC) + Regional - Supraclavicular   Clearance:    None   Equipment:   CuTR in Situ: Sterile Tourniquet, 1% lidocaine w/ epi (on field) Solomon Blade, 3-0 Monocryl, 3-0 nylon,  Telfa + Tegaderm, 3 inch ace wrap, sling, checkpoint nerve stimulator - standby   Assistant:   Ibrahima Assistant: Yes, Hina Chino PA-C   Follow Up:   10-14 days post op with Hina   Pain Medication:   Norco 325-5mg   Therapy:   None

## 2024-05-03 NOTE — ANESTHESIA POSTPROCEDURE EVALUATION
[facility]    Teresita Henderson Patient Status:  Hospital Outpatient Surgery   Age/Gender 83 year old female MRN [id number]   Location [facility] ENDOSCOPY PAIN CENTER Attending Alec Romero DO   Hosp Day # 0 PCP Nkechi Ariza MD       Anesthesia Post-op Note    ESOPHAGOGASTRODUODENOSCOPY (EGD) W/ BIOPSIES    Procedure Summary       Date: 05/03/24 Room / Location:  ENDOSCOPY 04 /  ENDOSCOPY    Anesthesia Start: 0923 Anesthesia Stop: 0937    Procedure: ESOPHAGOGASTRODUODENOSCOPY (EGD) W/ BIOPSIES Diagnosis:       Other dysphagia      Gastroesophageal reflux disease with esophagitis, unspecified whether hemorrhage      (NORMAL EGD)    Surgeons: Alec Romero DO Anesthesiologist: Kailash Morgan MD    Anesthesia Type: MAC ASA Status: 3            Anesthesia Type: MAC    Vitals Value Taken Time   /68 05/03/24 0938   Temp  05/03/24 0938   Pulse 71 05/03/24 0938   Resp 16 05/03/24 0938   SpO2 98 05/03/24 0938       Patient Location: Endoscopy    Anesthesia Type: MAC    Airway Patency: patent    Postop Pain Control: adequate    Mental Status: mildly sedated but able to meaningfully participate in the post-anesthesia evaluation    Nausea/Vomiting: none    Cardiopulmonary/Hydration status: stable euvolemic    Complications: no apparent anesthesia related complications    Postop vital signs: stable    Dental Exam: Unchanged from Preop    Patient to be discharged home when criteria met.

## 2024-05-03 NOTE — H&P
History & Physical Examination    Patient Name: Teresita Henderson  MRN: [id number]  CSN: [id number]  YOB: 1940    Diagnosis: dysphagia    Present Illness: 82 y/o F history as above presents for EGD.     Medications Prior to Admission   Medication Sig Dispense Refill Last Dose    nitrofurantoin monohydrate macro 100 MG Oral Cap Take 1 capsule (100 mg total) by mouth 2 (two) times daily for 5 days. 10 capsule 0 5/2/2024    metoprolol succinate ER 25 MG Oral Tablet 24 Hr Take 1 tablet (25 mg total) by mouth daily. 12.5MG IN MORNING AND 25MG IN EVENING 45 tablet 0 5/2/2024    ezetimibe 10 MG Oral Tab Take 1 tablet (10 mg total) by mouth nightly. 90 tablet 3 5/2/2024    traMADol HCl 25 MG Oral Tab Take 25 mg by mouth 2 (two) times daily as needed (severe pain). 20 tablet 0 Past Week    levothyroxine 88 MCG Oral Tab TAKE 1 TABLET(88 MCG) BY MOUTH DAILY 90 tablet 3 5/2/2024    metFORMIN HCl 1000 MG Oral Tab Take 1 tablet (1,000 mg total) by mouth 2 (two) times daily with meals. 180 tablet 0 5/2/2024    gabapentin 100 MG Oral Cap Take 2 capsules (200 mg total) by mouth nightly. 180 capsule 2 Past Week    Polyethylene Glycol 3350 17 g Oral Powd Pack Take 17 g by mouth daily as needed. 90 each 1     pravastatin 10 MG Oral Tab Take 1 tablet (10 mg total) by mouth nightly. 90 tablet 0 5/2/2024    aspirin 81 MG Oral Tab EC Take 1 tablet (81 mg total) by mouth daily. 30 tablet 0 5/2/2024 at 0800    cetirizine 10 MG Oral Tab Take 1 tablet (10 mg total) by mouth daily.   Past Week    pantoprazole 40 MG Oral Tab EC Take 1 tablet (40 mg total) by mouth daily.   5/2/2024    dofetilide 125 MCG Oral Cap Take 1 capsule (125 mcg total) by mouth 2 (two) times daily.   5/2/2024    fluticasone propionate 50 MCG/ACT Nasal Suspension 2 sprays by Each Nare route daily. 11.1 mL 3 Past Week    Lancets (ONETOUCH DELICA PLUS [id number]) Does not apply Misc 1 each daily. 100 each 6     Glucose Blood (ONETOUCH VERIO) In Vitro Strip Use as  directed. 100 strip 1     Cholecalciferol (VITAMIN D) 50 MCG (2000 UT) Oral Cap Take by mouth daily.   More than a month    methenamine 1 g Oral Tab Take 1 tablet (1 g total) by mouth 2 (two) times daily. 60 tablet 0     Meth-Hyo-M Bl-Na Phos-Ph Sal (URIBEL) 118 MG Oral Cap Take 1 capsule by mouth 3 (three) times daily as needed.   Unknown     Current Facility-Administered Medications   Medication Dose Route Frequency    glucose (Dex4) 15 GM/59ML oral liquid 15 g  15 g Oral Q15 Min PRN    Or    glucose (Glutose) 40% oral gel 15 g  15 g Oral Q15 Min PRN    Or    glucose-vitamin C (Dex-4) chewable tab 4 tablet  4 tablet Oral Q15 Min PRN    Or    dextrose 50% injection 50 mL  50 mL Intravenous Q15 Min PRN    Or    glucose (Dex4) 15 GM/59ML oral liquid 30 g  30 g Oral Q15 Min PRN    Or    glucose (Glutose) 40% oral gel 30 g  30 g Oral Q15 Min PRN    Or    glucose-vitamin C (Dex-4) chewable tab 8 tablet  8 tablet Oral Q15 Min PRN    lactated ringers infusion   Intravenous Continuous       Allergies:   Allergies   Allergen Reactions    Cephalexin ANAPHYLAXIS, HIVES and FACE FLUSHING    Ciprofloxacin MYALGIA, HIVES and OTHER (SEE COMMENTS)     Started on Rx 4/12/23? Not true allergy?    Fesoterodine SWELLING     Throat tightening  Throat tightening Throat tightening    Throat tightening Throat tightening      Throat tightening    Fosfomycin ARRHYTHMIA     Interaction of arrhythmias with Tykosin    Levofloxacin MYALGIA, HIVES and OTHER (SEE COMMENTS)     Reports tendonitis    Reports tendonitis   Reports tendonitis   Reports tendonitis    Other ARRHYTHMIA     Macrolides and Fluoroquinolones    Penicillins HIVES and RASH     Tolerates ceftriaxone    Toviaz SWELLING and TONGUE SWELLING     Throat tightening    Throat tightening   Throat tightening    Atorvastatin OTHER (SEE COMMENTS) and NAUSEA AND VOMITING     GI upset, nausea and vomitting    GI upset, nausea and vomitting   GI upset, nausea and vomitting   GI upset, nausea  and vomitting    Chlorhexidine OTHER (SEE COMMENTS)     Rash, redness    Chocolate OTHER (SEE COMMENTS)     headache    headache   headache   headache    Colesevelam OTHER (SEE COMMENTS) and UNKNOWN     GI upset    GI upset   GI upset   GI upset    Shellfish Allergy OTHER (SEE COMMENTS)     Per allergy test, redness    Chocolate OTHER (SEE COMMENTS)     headache    Fish-Derived Products OTHER (SEE COMMENTS)     Per allergy testing    Mushrooms OTHER (SEE COMMENTS)     headache    Pecan OTHER (SEE COMMENTS)     Unknown     Shellfish OTHER (SEE COMMENTS)    Shrimp UNKNOWN     Per allergy testing    Tomatoes OTHER (SEE COMMENTS)     headache    Hexachlorophene RASH and OTHER (SEE COMMENTS)       Past Medical History:    A-fib (HCC)    Abdominal distention    Abnormal mammogram    Allergic rhinitis    Anesthesia complication    Pt's sons have a history of pseudocholinesterase deficiency.    Antral gastritis    Arrhythmia    Arthritis    Bad breath    Belching    Bloating    Blood in urine    Breast CA (HCC)    Calculus of kidney    Cancer (HCC)    right breast cancer    Carpal tunnel syndrome    Cataract    Cervical spondylosis without myelopathy    Chondromalacia of patella    Constipation    Coronary atherosclerosis    stents    Coronary atherosclerosis of native coronary artery    Diabetes (HCC)    Diet controlled    Diverticulosis    Ductal carcinoma in situ (DCIS) of right breast, 1992    Esophageal reflux    Exposure to unspecified radiation    last dose 1992    Female stress incontinence    Flatulence/gas pain/belching    Frequent urination    Frequent use of laxatives    Frequent UTI    Frequent UTI    Gall stones    GERD (gastroesophageal reflux disease)    Glucose intolerance (impaired glucose tolerance)    Heart palpitations    High blood pressure    High cholesterol    History of blood transfusion    1992    Hoarseness, chronic    Hypothyroidism    Incomplete bladder emptying (aka 89418)    trial of CIC June      Indigestion    Irregular bowel habits    L knee global 3/27/14    Leg swelling    Malignant neoplasm of breast (female), unspecified site    R    Nausea    Neoplasm of right breast, 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),     Osteoarthrosis, unspecified whether generalized or localized, unspecified site    generalized    Other and unspecified hyperlipidemia    Pacemaker    Pain in joints    Painful swallowing    Peripheral vascular disease (HCC)    PONV (postoperative nausea and vomiting)    Post-traumatic osteoarthritis of left elbow    Postmenopausal atrophic vaginitis    Presence of other cardiac implants and grafts    Primary localized osteoarthrosis, lower leg    Problems with swallowing    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)    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    Pure hypercholesterolemia    Rectocele    Recurrent UTI    group B strep    Recurrent UTI    S/P total knee replacement using cement    Sleep disturbance    Sputum production    Stented coronary artery    Uncomfortable fullness after meals    Unspecified disorder of thyroid    Visual impairment    reading glasses    Vitamin D deficiency     Past Surgical History:   Procedure Laterality Date    Angioplasty (coronary)      Breast lumpectomy Right 2015    Procedure: BREAST LUMPECTOMY;  Surgeon: Castro Marie MD;  Location: [facility] MAIN OR    Breast reconstruction Right       1977    Cabg  1997    x3    Cardiac pacemaker placement      Winter 2022    Cataract  2017    Cath bare metal stent (bms)      Cholecystectomy      Colonoscopy  2016    tics; repeat 10 yrs    Colonoscopy  2017    Colonoscopy,diagnostic N/A 2016    Procedure: COLONOSCOPY, POSSIBLE BIOPSY, POSSIBLE POLYPECTOMY 42986;  Surgeon: Rodolfo Ann MD;  Location: [facility]    Cystoscopy,rx female urethral synd  05/10/2013    tommyo Ricardo DENTON & julia  1976     Hysterectomy  1985    precancer and irregular bleeding. w/ BSO    Knee replacement surgery  2013    left     Lumpectomy right Right [id number]    Mastectomy right  2015      1964    Oophorectomy      AGE 47    Open heart surgery (pbp)      Other surgical history      LUMPECTOMY RIGHT BREAST     Other surgical history      A PMM LEFT KNEE , replacement    Other surgical history  2013    Venita DENTON    Other surgical history  2014    Cystoscopy- Dr. Figueroa    Other surgical history  2016    Bladder sling    Other surgical history       WATCHMEN    Pacemaker monitor      Patient documented not to have experienced any of the following events N/A 2016    Procedure: COLONOSCOPY, POSSIBLE BIOPSY, POSSIBLE POLYPECTOMY 12673;  Surgeon: Rodolfo Ann MD;  Location: [facility]    Patient withough preoperative order for iv antibiotic surgical site infection prophylaxis. N/A 2016    Procedure: COLONOSCOPY, POSSIBLE BIOPSY, POSSIBLE POLYPECTOMY 55215;  Surgeon: Rodolfo Ann MD;  Location: [facility]    Radiation right          Reduction left  10/2015    Total knee replacement      LEFT     Upper gi endoscopy,exam  2011    repeat 5 yeas     Family History   Problem Relation Age of Onset    Cancer Sister         throat    Diabetes Mother     Breast Cancer Self 52    Heart Disease Neg     Stroke Neg      Social History     Tobacco Use    Smoking status: Former     Current packs/day: 0.00     Types: Cigarettes     Quit date: 1997     Years since quittin.8    Smokeless tobacco: Never   Substance Use Topics    Alcohol use: Not Currently       SYSTEM Check if Review is Normal Check if Physical Exam is Normal If not normal, please explain:   HEENT [ x] [x ]    NECK & BACK [ x] [ x]    HEART [ x] [x ]    LUNGS [ x] [ x]    ABDOMEN [ x] [x ]    UROGENITAL [ n/a] [ n/a]    EXTREMITIES [ x] [x ]    OTHER        [ x ] I have discussed the risks  and benefits and alternatives with the patient/family.  They understand and agree to proceed with plan of care.  [ x ] I have reviewed the History and Physical done within the last 30 days.  Any changes noted above.    Alec Romero DO  5/3/2024  9:04 AM

## 2024-05-06 ENCOUNTER — TELEPHONE (OUTPATIENT)
Dept: ORTHOPEDICS CLINIC | Facility: CLINIC | Age: 84
End: 2024-05-06

## 2024-05-06 NOTE — TELEPHONE ENCOUNTER
Patients daughter is calling and her mom is in extreme pain with her right arm. Saw Dr. Alexandra on 5/2. Earliest surgery was July 26th but feels like she can't wait that long. Arm is in a temp cast. Please advise.    Call back number   [place]  [phone]

## 2024-05-07 ENCOUNTER — TELEPHONE (OUTPATIENT)
Dept: ORTHOPEDICS CLINIC | Facility: CLINIC | Age: 84
End: 2024-05-07

## 2024-05-07 DIAGNOSIS — G56.22 CUBITAL TUNNEL SYNDROME ON LEFT: Primary | ICD-10-CM

## 2024-05-09 ENCOUNTER — LAB ENCOUNTER (OUTPATIENT)
Dept: LAB | Age: 84
End: 2024-05-09
Attending: INTERNAL MEDICINE
Payer: MEDICARE

## 2024-05-09 DIAGNOSIS — N39.0 URINARY TRACT INFECTION WITHOUT HEMATURIA, SITE UNSPECIFIED: ICD-10-CM

## 2024-05-09 PROCEDURE — 87086 URINE CULTURE/COLONY COUNT: CPT

## 2024-05-09 PROCEDURE — 87077 CULTURE AEROBIC IDENTIFY: CPT

## 2024-05-13 ENCOUNTER — TELEPHONE (OUTPATIENT)
Dept: INTERNAL MEDICINE CLINIC | Facility: CLINIC | Age: 84
End: 2024-05-13

## 2024-05-13 ENCOUNTER — HOSPITAL ENCOUNTER (EMERGENCY)
Age: 84
Discharge: HOME | End: 2024-05-13
Attending: EMERGENCY MEDICINE

## 2024-05-13 VITALS
OXYGEN SATURATION: 99 % | HEART RATE: 70 BPM | SYSTOLIC BLOOD PRESSURE: 135 MMHG | BODY MASS INDEX: 23.92 KG/M2 | HEIGHT: 62 IN | DIASTOLIC BLOOD PRESSURE: 68 MMHG | RESPIRATION RATE: 18 BRPM | TEMPERATURE: 98 F | WEIGHT: 130 LBS

## 2024-05-13 DIAGNOSIS — N39.0 URINARY TRACT INFECTION WITHOUT HEMATURIA, SITE UNSPECIFIED: Primary | ICD-10-CM

## 2024-05-13 DIAGNOSIS — R11.0 NAUSEA: Primary | ICD-10-CM

## 2024-05-13 DIAGNOSIS — R39.9 URINARY SYMPTOM OR SIGN: ICD-10-CM

## 2024-05-13 LAB
ALBUMIN SERPL-MCNC: 3.5 G/DL (ref 3.4–5)
ALBUMIN/GLOB SERPL: 0.8 {RATIO} (ref 1–2)
ALP LIVER SERPL-CCNC: 82 U/L
ALT SERPL-CCNC: 18 U/L
ANION GAP SERPL CALC-SCNC: 6 MMOL/L (ref 0–18)
AST SERPL-CCNC: 17 U/L (ref 15–37)
BASOPHILS # BLD AUTO: 0.06 X10(3) UL (ref 0–0.2)
BASOPHILS NFR BLD AUTO: 1.1 %
BILIRUB SERPL-MCNC: 0.2 MG/DL (ref 0.1–2)
BILIRUB UR QL STRIP.AUTO: NEGATIVE
BUN BLD-MCNC: 15 MG/DL (ref 9–23)
CALCIUM BLD-MCNC: 9.3 MG/DL (ref 8.5–10.1)
CHLORIDE SERPL-SCNC: 100 MMOL/L (ref 98–112)
CLARITY UR REFRACT.AUTO: CLEAR
CO2 SERPL-SCNC: 28 MMOL/L (ref 21–32)
COLOR UR AUTO: COLORLESS
CREAT BLD-MCNC: 0.77 MG/DL
EGFRCR SERPLBLD CKD-EPI 2021: 77 ML/MIN/1.73M2 (ref 60–?)
EOSINOPHIL # BLD AUTO: 0.19 X10(3) UL (ref 0–0.7)
EOSINOPHIL NFR BLD AUTO: 3.5 %
ERYTHROCYTE [DISTWIDTH] IN BLOOD BY AUTOMATED COUNT: 14.8 %
GLOBULIN PLAS-MCNC: 4.2 G/DL (ref 2.8–4.4)
GLUCOSE BLD-MCNC: 167 MG/DL (ref 70–99)
GLUCOSE UR STRIP.AUTO-MCNC: NEGATIVE MG/DL
HCT VFR BLD AUTO: 33.8 %
HGB BLD-MCNC: 11.1 G/DL
IMM GRANULOCYTES # BLD AUTO: 0.01 X10(3) UL (ref 0–1)
IMM GRANULOCYTES NFR BLD: 0.2 %
KETONES UR STRIP.AUTO-MCNC: NEGATIVE MG/DL
LACTATE SERPL-SCNC: 1.4 MMOL/L (ref 0.4–2)
LYMPHOCYTES # BLD AUTO: 1.8 X10(3) UL (ref 1–4)
LYMPHOCYTES NFR BLD AUTO: 32.8 %
MCH RBC QN AUTO: 28.2 PG (ref 26–34)
MCHC RBC AUTO-ENTMCNC: 32.8 G/DL (ref 31–37)
MCV RBC AUTO: 85.8 FL
MONOCYTES # BLD AUTO: 0.6 X10(3) UL (ref 0.1–1)
MONOCYTES NFR BLD AUTO: 10.9 %
NEUTROPHILS # BLD AUTO: 2.82 X10 (3) UL (ref 1.5–7.7)
NEUTROPHILS # BLD AUTO: 2.82 X10(3) UL (ref 1.5–7.7)
NEUTROPHILS NFR BLD AUTO: 51.5 %
NITRITE UR QL STRIP.AUTO: NEGATIVE
OSMOLALITY SERPL CALC.SUM OF ELEC: 283 MOSM/KG (ref 275–295)
PH UR STRIP.AUTO: 7 [PH] (ref 5–8)
PLATELET # BLD AUTO: 257 10(3)UL (ref 150–450)
POTASSIUM SERPL-SCNC: 4.1 MMOL/L (ref 3.5–5.1)
PROT SERPL-MCNC: 7.7 G/DL (ref 6.4–8.2)
PROT UR STRIP.AUTO-MCNC: NEGATIVE MG/DL
RBC # BLD AUTO: 3.94 X10(6)UL
SODIUM SERPL-SCNC: 134 MMOL/L (ref 136–145)
SP GR UR STRIP.AUTO: 1.01 (ref 1–1.03)
UROBILINOGEN UR STRIP.AUTO-MCNC: 0.2 MG/DL
WBC # BLD AUTO: 5.5 X10(3) UL (ref 4–11)

## 2024-05-13 PROCEDURE — 87186 SC STD MICRODIL/AGAR DIL: CPT | Performed by: EMERGENCY MEDICINE

## 2024-05-13 PROCEDURE — 87040 BLOOD CULTURE FOR BACTERIA: CPT | Performed by: EMERGENCY MEDICINE

## 2024-05-13 PROCEDURE — 81015 MICROSCOPIC EXAM OF URINE: CPT | Performed by: EMERGENCY MEDICINE

## 2024-05-13 PROCEDURE — 81001 URINALYSIS AUTO W/SCOPE: CPT | Performed by: EMERGENCY MEDICINE

## 2024-05-13 PROCEDURE — 83605 ASSAY OF LACTIC ACID: CPT | Performed by: EMERGENCY MEDICINE

## 2024-05-13 PROCEDURE — 87077 CULTURE AEROBIC IDENTIFY: CPT | Performed by: EMERGENCY MEDICINE

## 2024-05-13 PROCEDURE — 87086 URINE CULTURE/COLONY COUNT: CPT | Performed by: EMERGENCY MEDICINE

## 2024-05-13 PROCEDURE — 85025 COMPLETE CBC W/AUTO DIFF WBC: CPT | Performed by: EMERGENCY MEDICINE

## 2024-05-13 PROCEDURE — 99283 EMERGENCY DEPT VISIT LOW MDM: CPT

## 2024-05-13 PROCEDURE — 36415 COLL VENOUS BLD VENIPUNCTURE: CPT

## 2024-05-13 PROCEDURE — 80053 COMPREHEN METABOLIC PANEL: CPT | Performed by: EMERGENCY MEDICINE

## 2024-05-13 PROCEDURE — 99284 EMERGENCY DEPT VISIT MOD MDM: CPT

## 2024-05-13 RX ORDER — PROCHLORPERAZINE MALEATE 10 MG
10 TABLET ORAL EVERY 6 HOURS PRN
Qty: 15 TABLET | Refills: 0 | Status: SHIPPED | OUTPATIENT
Start: 2024-05-13

## 2024-05-13 RX ORDER — PROCHLORPERAZINE MALEATE 10 MG
10 TABLET ORAL ONCE
Status: COMPLETED | OUTPATIENT
Start: 2024-05-13 | End: 2024-05-13

## 2024-05-13 RX ORDER — NITROFURANTOIN 25; 75 MG/1; MG/1
100 CAPSULE ORAL 2 TIMES DAILY
Qty: 14 CAPSULE | Refills: 0 | Status: SHIPPED | OUTPATIENT
Start: 2024-05-13 | End: 2024-05-13

## 2024-05-13 RX ORDER — SULFAMETHOXAZOLE AND TRIMETHOPRIM 800; 160 MG/1; MG/1
1 TABLET ORAL 2 TIMES DAILY
Qty: 14 TABLET | Refills: 0 | Status: SHIPPED | OUTPATIENT
Start: 2024-05-13 | End: 2024-05-16 | Stop reason: ALTCHOICE

## 2024-05-13 NOTE — ED INITIAL ASSESSMENT (HPI)
Pt presents with urinary symptoms. Pt has a suprapubic catheter that was changed on Wednesday last week, had a urine culture done Thursday. Pt was antibiotics for previous urine infection that ended 1 week ago today. Pt has suprapubic catheter for long hx of UTIs. Pt has R limb alert due to hx of R mastectomy in 2015. Denies fever or any other symptoms except for burning.

## 2024-05-13 NOTE — TELEPHONE ENCOUNTER
We could do another round of Macrobid but as she did not have enough bacteria in specimen for sensitivities, we can't know for sure if this will help with her symptoms.  Will send in a prescription

## 2024-05-13 NOTE — TELEPHONE ENCOUNTER
RP, pt of DV --   Daughter, Beryl called in.   She stated she read RP's mcm in response to UC.   Daughter is concerned because she stated on 4/30 DV prescribed macrobid, and was told to finish Macrobid dose, then get her hooks catheter replaced, then recheck UC. Daughter stated she had tube changed on Wednesday, and repeated lab on Thursday.      She stated past few days her mom has been feeling a bit nauseous.  Intermittently, until today. She stated today her mom is feeling very nauseous. Doesn't have an appetite, trying chicken broth to settle stomach. Daughter is afraid, based of recent culture, that she still has a UTI? Daughter stated her mom has chronic UTI's and is concerned this one didn't clear.     Daughter stated they are both going to [place] in a week from Thursday (10 days). She's concerned pt needs further treatment or next steps based off culture?     Please advise. Thanks!      Hello,  I am helping cover for Dr. Silva while she is out of the office.  There was a small amount of mixed bacteria in the urine, not a large amount of one single bacteria like we see in urinary tract infections.  Recommend follow up with Dr. Silva if your symptoms do not improve within next 1-2 weeks. Let me know if you have questions about these results.  Take care,  Dr. Delong   Written by Isamar Delong MD on 5/13/2024 12:03 PM CDT

## 2024-05-13 NOTE — TELEPHONE ENCOUNTER
S/w dtr Beryl on verbal release and informed Dr Delong recommendations.   Dtr states she will take mom in to ED as she is having to empty her hooks bag every 20 min. Has suprapubic catheter. Per [facility] seeing Dr Peralta.

## 2024-05-13 NOTE — ED PROVIDER NOTES
Patient Seen in: [facility] In [place]      History     Chief Complaint   Patient presents with    Urinary Symptoms     Stated Complaint: urinary freq    Subjective:   HPI    Patient with history of multiple UTIs, has a suprapubic catheter in place which did not decrease the frequency of UTIs but does help that patient does not have to get up as often at night to urinate.  She had been given fosfomycin which apparently triggered an arrhythmia and was told by cardiology to never get this again.  In the meantime she has been on Macrobid multiple times, had corynebacterium, and then on a more recent urine culture from last Thursday had less than 50,000 mixed lisa.  Decision made to not treat this in particular.  However over the past day or 2 the patient seems to have increasing urinary frequency, some leakage from around the catheter, leakage from her urethra, and worsening nausea today.  No pain.    Objective:   Past Medical History:    A-fib (HCC)    Abdominal distention    Abnormal mammogram    Allergic rhinitis    Anesthesia complication    Pt's sons have a history of pseudocholinesterase deficiency.    Antral gastritis    Arrhythmia    Arthritis    Bad breath    Belching    Bloating    Blood in urine    Breast CA (HCC)    Calculus of kidney    Cancer (HCC)    right breast cancer    Carpal tunnel syndrome    Cataract    Cervical spondylosis without myelopathy    Chondromalacia of patella    Constipation    Coronary atherosclerosis    stents    Coronary atherosclerosis of native coronary artery    Diabetes (HCC)    Diet controlled    Diverticulosis    Ductal carcinoma in situ (DCIS) of right breast, 1992    Esophageal reflux    Exposure to unspecified radiation    last dose 1992    Female stress incontinence    Flatulence/gas pain/belching    Frequent urination    Frequent use of laxatives    Frequent UTI    Frequent UTI    Gall stones    GERD (gastroesophageal reflux disease)    Glucose  intolerance (impaired glucose tolerance)    Heart palpitations    High blood pressure    High cholesterol    History of blood transfusion        Hoarseness, chronic    Hypothyroidism    Incomplete bladder emptying (aka 91527)    trial of CIC 2014    Indigestion    Irregular bowel habits    L knee global 3/27/14    Leg swelling    Malignant neoplasm of breast (female), unspecified site    R    Nausea    Neoplasm of right breast, 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),     Osteoarthrosis, unspecified whether generalized or localized, unspecified site    generalized    Other and unspecified hyperlipidemia    Pacemaker    Pain in joints    Painful swallowing    Peripheral vascular disease (HCC)    PONV (postoperative nausea and vomiting)    Post-traumatic osteoarthritis of left elbow    Postmenopausal atrophic vaginitis    Presence of other cardiac implants and grafts    Primary localized osteoarthrosis, lower leg    Problems with swallowing    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)    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    Pure hypercholesterolemia    Rectocele    Recurrent UTI    group B strep    Recurrent UTI    S/P total knee replacement using cement    Sleep disturbance    Sputum production    Stented coronary artery    Uncomfortable fullness after meals    Unspecified disorder of thyroid    Visual impairment    reading glasses    Vitamin D deficiency              Past Surgical History:   Procedure Laterality Date    Angioplasty (coronary)      Breast lumpectomy Right 2015    Procedure: BREAST LUMPECTOMY;  Surgeon: Castro Marie MD;  Location: [facility]    Breast reconstruction Right 2015      1977    Cabg  1997    x3    Cardiac pacemaker placement      [place] 2022    Cataract      Cath bare metal stent (bms)      Cholecystectomy      Colonoscopy  2016    tics; repeat 10 yrs    Colonoscopy       Colonoscopy,diagnostic N/A 2016    Procedure: COLONOSCOPY, POSSIBLE BIOPSY, POSSIBLE POLYPECTOMY 17184;  Surgeon: Rodolfo Ann MD;  Location: [facility]    Cystoscopy,rx female urethral synd  05/10/2013    cysto UD, Ricardo    D & c  1976    Hysterectomy  1985    precancer and irregular bleeding. w/ BSO    Knee replacement surgery  2013    left     Lumpectomy right Right [id number]    Mastectomy right  2015      1964    Oophorectomy      AGE 47    Open heart surgery (pbp)      Other surgical history      LUMPECTOMY RIGHT BREAST     Other surgical history      A PMM LEFT KNEE , replacement    Other surgical history  2013    Venita DENTON    Other surgical history  2014    Cystoscopy- Dr. Figueroa    Other surgical history  2016    Bladder sling    Other surgical history       WATCHMEN    Pacemaker monitor      Patient documented not to have experienced any of the following events N/A 2016    Procedure: COLONOSCOPY, POSSIBLE BIOPSY, POSSIBLE POLYPECTOMY 14616;  Surgeon: Rodolfo Ann MD;  Location: [facility]    Patient withough preoperative order for iv antibiotic surgical site infection prophylaxis. N/A 2016    Procedure: COLONOSCOPY, POSSIBLE BIOPSY, POSSIBLE POLYPECTOMY 78428;  Surgeon: Rodolfo Ann MD;  Location: [facility]    Radiation right          Reduction left  10/2015    Total knee replacement      LEFT     Upper gi endoscopy,exam  2011    repeat 5 yeas                Social History     Socioeconomic History    Marital status:    Tobacco Use    Smoking status: Former     Current packs/day: 0.00     Types: Cigarettes     Quit date: 1997     Years since quittin.9    Smokeless tobacco: Never   Vaping Use    Vaping status: Never Used   Substance and Sexual Activity    Alcohol use: Not Currently    Drug use: No   Other Topics Concern    Caffeine Concern Yes    Exercise Yes     Social  Determinants of Health     Financial Resource Strain: Low Risk  (11/30/2023)    Received from [employer]    Overall Financial Resource Strain (CARDIA)     Difficulty of Paying Living Expenses: Not hard at all   Food Insecurity: No Food Insecurity (4/26/2024)    Food Insecurity     Food Insecurity: Never true   Transportation Needs: No Transportation Needs (4/26/2024)    Transportation Needs     Lack of Transportation: No   Physical Activity: Inactive (11/30/2023)    Received from [employer]    Physical Activity     On average, how many days per week do you engage in moderate to strenuous exercise (like a brisk walk)?: 0 days     On average, how many minutes do you engage in exercise at this level?: 0 min   Stress: No Stress Concern Present (11/30/2023)    Received from [employer]    [employer] [place] [employer] - Occupational Stress Questionnaire     Feeling of Stress : Not at all   Housing Stability: Low Risk  (4/26/2024)    Housing Stability     Housing Instability: No              Review of Systems    Positive for stated complaint: urinary freq  Other systems are as noted in HPI.  Constitutional and vital signs reviewed.      All other systems reviewed and negative except as noted above.    Physical Exam     ED Triage Vitals   BP 05/13/24 1810 123/67   Pulse 05/13/24 1810 70   Resp 05/13/24 1810 16   Temp 05/13/24 1810 98.2 °F (36.8 °C)   Temp src 05/13/24 2019 Oral   SpO2 05/13/24 1810 97 %   O2 Device 05/13/24 1810 None (Room air)       Current Vitals:   Vital Signs  BP: 135/68  Pulse: 70  Resp: 18  Temp: 97.6 °F (36.4 °C)  Temp src: Oral    Oxygen Therapy  SpO2: 99 %  O2 Device: None (Room air)            Physical Exam    General: Well-developed, elderly, somewhat frail, but alert, oriented, coherent, talking easily  Head and neck: Normocephalic, atraumatic  Cardiovascular: Regular rate and rhythm, normal S1 and S2, no obvious murmurs, rubs, or  gallops   Lungs: Clear to auscultation bilaterally with equal breath sounds, no wheezing, no rhonchi   Abdomen: Positive bowel sounds,  soft, no tenderness, no distension, no rebound, no guarding   Extremities: No cyanosis, no clubbing, no edema   Musculoskeletal: No tenderness, swelling, deformity   Skin: No significant lesions   Neuro: Grossly intact to patient's baseline    ED Course     Labs Reviewed   COMP METABOLIC PANEL (14) - Abnormal; Notable for the following components:       Result Value    Glucose 167 (*)     Sodium 134 (*)     A/G Ratio 0.8 (*)     All other components within normal limits   URINALYSIS WITH CULTURE REFLEX - Abnormal; Notable for the following components:    Urine Color Colorless (*)     Blood Urine Trace-lysed (*)     Leukocyte Esterase Urine Large (*)     All other components within normal limits   UA MICROSCOPIC ONLY, URINE - Abnormal; Notable for the following components:    WBC Urine 6-10 (*)     Bacteria Urine Rare (*)     All other components within normal limits   CBC W/ DIFFERENTIAL - Abnormal; Notable for the following components:    HGB 11.1 (*)     HCT 33.8 (*)     All other components within normal limits   LACTIC ACID, PLASMA - Normal   CBC WITH DIFFERENTIAL WITH PLATELET    Narrative:     The following orders were created for panel order CBC With Differential With Platelet.  Procedure                               Abnormality         Status                     ---------                               -----------         ------                     CBC W/ DIFFERENTIAL[[id number]]          Abnormal            Final result                 Please view results for these tests on the individual orders.   BLOOD CULTURE   BLOOD CULTURE   URINE CULTURE, ROUTINE     Differential diagnosis includes UTI, urosepsis, suprapubic catheter malfunction, among other processes               MDM      83-year-old, history of frequent UTIs, arrhythmia, finished Macrobid recently, then grew a mixed  lisa on a culture from last Thursday, now with worsening frequency, leakage from around the catheter and from urethra, worsening nausea.  She and daughter are worried about another UTI.  After discussion of various options with patient and her daughter, decision made to go ahead with workup here including lactic acid which is normal, chemistries, blood counts essentially unchanged from previous.  Urinalysis shows trace blood, large leukocytes, 6-10 white blood cells, rare bacteria, no squamous cells.  We discussed these results at length.  Also discussed various options for starting antibiotics, versus waiting for urine culture results.  Patient does not have a shellfish allergy does not seem to have a sulfa allergy, and looking at her allergy profile and recent meds given, this does seem a reasonable choice.  After some discussion decision made to go ahead with a Bactrim prescription, which patient will hold onto and not start unless the urine culture comes back positive or her symptoms continue to worsen.  She will also check in with her primary to make sure it is an appropriate choice once culture is resulted.    Patient remembers that what ever med was given to her in the ER on a recent visit helped with her nausea.  This seems to be Compazine.  Oral dose given here and prescription written for home.  Aftercare instruction reviewed, all questions answered.  Patient and her daughter are comfortable going home.                                 Medical Decision Making      Disposition and Plan     Clinical Impression:  1. Nausea    2. Urinary symptom or sign         Disposition:  Discharge  5/13/2024  9:06 pm    Follow-up:  Nkechi Chavez  N. RAI [address] [address]  [phone]    Follow up            Medications Prescribed:  Discharge Medication List as of 5/13/2024  9:13 PM        START taking these medications    Details   sulfamethoxazole-trimethoprim -160 MG Oral Tab per  tablet Take 1 tablet by mouth 2 (two) times daily for 7 days., Normal, Disp-14 tablet, R-0      prochlorperazine (COMPAZINE) 10 mg tablet Take 1 tablet (10 mg total) by mouth every 6 (six) hours as needed for Nausea., Normal, Disp-15 tablet, R-0

## 2024-05-14 NOTE — DISCHARGE INSTRUCTIONS
Compazine as needed for nausea.  Try to take additional clear liquids and stay well-hydrated.  Continue to monitor your culture results as discussed.  You should be getting a call from our ED pharmacist if antibiotics are warranted, but please follow-up with your primary as well.  Consider Bactrim until sensitivities are available.  Return for any problems

## 2024-05-16 ENCOUNTER — TELEPHONE (OUTPATIENT)
Dept: INTERNAL MEDICINE CLINIC | Facility: CLINIC | Age: 84
End: 2024-05-16

## 2024-05-16 RX ORDER — NITROFURANTOIN 25; 75 MG/1; MG/1
100 CAPSULE ORAL 2 TIMES DAILY
Qty: 14 CAPSULE | Refills: 0 | Status: SHIPPED | OUTPATIENT
Start: 2024-05-16 | End: 2024-05-23

## 2024-05-16 NOTE — TELEPHONE ENCOUNTER
Please see final Urine Culture results from visit to ED 5/13, per [facility] rx for Nitrofurantoin 100 mg 1 capsule bid x 7 days was sent today by Dr Vo. Previously was sent Bactrim on 5/13/2024. Should patient stop Bactrim and start Nitrofurantoin?      Collected 5/13/2024  7:04 PM       Status: Final result    Specimen Information: Urine, clean catch   0 Result Notes  URINE CULTURE 50,000-99,000 CFU/ML Enterococcus faecalis NOT VRE Abnormal    Amoxicillin or Nitrofurantoin remain the drugs of choice for uncomplicated urinary tract infections.

## 2024-05-16 NOTE — TELEPHONE ENCOUNTER
Patients daughter on verbal called in regards to patients culture for uti results coming back     Daughter would like to know what antibiotic will be prescribed for patient?    [id number] [place], [place] - [address] AT [facility] [address], [phone], [phone]    Please advise

## 2024-05-17 ENCOUNTER — PATIENT OUTREACH (OUTPATIENT)
Dept: CASE MANAGEMENT | Age: 84
End: 2024-05-17

## 2024-06-03 ENCOUNTER — TELEPHONE (OUTPATIENT)
Dept: INTERNAL MEDICINE CLINIC | Facility: CLINIC | Age: 84
End: 2024-06-03

## 2024-06-03 NOTE — TELEPHONE ENCOUNTER
Daughter Beryl calling because pt has surgery scheduled for June 14th to remove her nerve in L elbow, no openings until mid July. Next apt with any provider is June 18th.     Ok to double book? Please advise.    Alli with daughter at [phone]

## 2024-06-03 NOTE — TELEPHONE ENCOUNTER
Called and left a message for Maribel in regards to her upcoming surgery to discuss the surgical protocol and schedule her post op appt.

## 2024-06-04 ENCOUNTER — TELEPHONE (OUTPATIENT)
Dept: ORTHOPEDICS CLINIC | Facility: CLINIC | Age: 84
End: 2024-06-04

## 2024-06-05 ENCOUNTER — OFFICE VISIT (OUTPATIENT)
Dept: INTERNAL MEDICINE CLINIC | Facility: CLINIC | Age: 84
End: 2024-06-05
Payer: MEDICARE

## 2024-06-05 VITALS
WEIGHT: 137.81 LBS | HEIGHT: 62 IN | DIASTOLIC BLOOD PRESSURE: 72 MMHG | HEART RATE: 78 BPM | OXYGEN SATURATION: 94 % | BODY MASS INDEX: 25.36 KG/M2 | TEMPERATURE: 98 F | SYSTOLIC BLOOD PRESSURE: 122 MMHG

## 2024-06-05 DIAGNOSIS — E78.5 HYPERLIPIDEMIA ASSOCIATED WITH TYPE 2 DIABETES MELLITUS (HCC): ICD-10-CM

## 2024-06-05 DIAGNOSIS — I48.0 PAROXYSMAL ATRIAL FIBRILLATION (HCC): Chronic | ICD-10-CM

## 2024-06-05 DIAGNOSIS — G56.22 ULNAR NERVE COMPRESSION, LEFT: Primary | ICD-10-CM

## 2024-06-05 DIAGNOSIS — E11.59 HYPERTENSION ASSOCIATED WITH TYPE 2 DIABETES MELLITUS (HCC): ICD-10-CM

## 2024-06-05 DIAGNOSIS — E11.65 TYPE 2 DIABETES MELLITUS WITH HYPERGLYCEMIA, WITHOUT LONG-TERM CURRENT USE OF INSULIN (HCC): Chronic | ICD-10-CM

## 2024-06-05 DIAGNOSIS — Z95.818 PRESENCE OF WATCHMAN LEFT ATRIAL APPENDAGE CLOSURE DEVICE: ICD-10-CM

## 2024-06-05 DIAGNOSIS — I15.2 HYPERTENSION ASSOCIATED WITH TYPE 2 DIABETES MELLITUS (HCC): ICD-10-CM

## 2024-06-05 DIAGNOSIS — E11.69 HYPERLIPIDEMIA ASSOCIATED WITH TYPE 2 DIABETES MELLITUS (HCC): ICD-10-CM

## 2024-06-05 PROBLEM — R94.31 PROLONGED Q-T INTERVAL ON ECG: Status: RESOLVED | Noted: 2024-04-26 | Resolved: 2024-06-05

## 2024-06-05 PROBLEM — N30.01 ACUTE CYSTITIS WITH HEMATURIA: Status: RESOLVED | Noted: 2023-07-20 | Resolved: 2024-06-05

## 2024-06-05 PROBLEM — M17.11 PRIMARY OSTEOARTHRITIS OF RIGHT KNEE: Chronic | Status: RESOLVED | Noted: 2019-07-08 | Resolved: 2024-06-05

## 2024-06-05 PROBLEM — E83.51 HYPOCALCEMIA: Status: RESOLVED | Noted: 2023-11-29 | Resolved: 2024-06-05

## 2024-06-05 PROBLEM — R33.9 RETENTION OF URINE: Status: RESOLVED | Noted: 2023-06-06 | Resolved: 2024-06-05

## 2024-06-05 PROBLEM — N20.0 KIDNEY STONE: Status: RESOLVED | Noted: 2023-01-24 | Resolved: 2024-06-05

## 2024-06-05 PROBLEM — R00.2 PALPITATIONS: Status: RESOLVED | Noted: 2022-01-31 | Resolved: 2024-06-05

## 2024-06-05 PROBLEM — A49.8 INFECTION DUE TO ACINETOBACTER SPECIES: Status: RESOLVED | Noted: 2024-04-11 | Resolved: 2024-06-05

## 2024-06-05 PROBLEM — T83.511A URINARY TRACT INFECTION ASSOCIATED WITH INDWELLING URETHRAL CATHETER, INITIAL ENCOUNTER (HCC): Status: RESOLVED | Noted: 2023-08-05 | Resolved: 2024-06-05

## 2024-06-05 PROBLEM — N30.00 ACUTE CYSTITIS WITHOUT HEMATURIA: Status: RESOLVED | Noted: 2024-04-26 | Resolved: 2024-06-05

## 2024-06-05 PROBLEM — R35.0 URINARY FREQUENCY: Status: RESOLVED | Noted: 2023-01-24 | Resolved: 2024-06-05

## 2024-06-05 PROBLEM — R26.81 UNSTABLE GAIT: Status: RESOLVED | Noted: 2024-04-26 | Resolved: 2024-06-05

## 2024-06-05 PROBLEM — N39.0 URINARY TRACT INFECTION ASSOCIATED WITH INDWELLING URETHRAL CATHETER, INITIAL ENCOUNTER: Status: RESOLVED | Noted: 2023-08-05 | Resolved: 2024-06-05

## 2024-06-05 PROBLEM — R42 DIZZINESS: Status: RESOLVED | Noted: 2024-04-26 | Resolved: 2024-06-05

## 2024-06-05 PROBLEM — E87.1 HYPONATREMIA: Status: RESOLVED | Noted: 2024-04-26 | Resolved: 2024-06-05

## 2024-06-05 PROBLEM — N39.0 URINARY TRACT INFECTION ASSOCIATED WITH INDWELLING URETHRAL CATHETER, INITIAL ENCOUNTER (HCC): Status: RESOLVED | Noted: 2023-08-05 | Resolved: 2024-06-05

## 2024-06-05 PROBLEM — T83.511A URINARY TRACT INFECTION ASSOCIATED WITH INDWELLING URETHRAL CATHETER, INITIAL ENCOUNTER: Status: RESOLVED | Noted: 2023-08-05 | Resolved: 2024-06-05

## 2024-06-05 PROCEDURE — 99214 OFFICE O/P EST MOD 30 MIN: CPT | Performed by: INTERNAL MEDICINE

## 2024-06-05 RX ORDER — HYDROCODONE BITARTRATE AND ACETAMINOPHEN 5; 325 MG/1; MG/1
1 TABLET ORAL EVERY 8 HOURS PRN
COMMUNITY
Start: 2024-04-24 | End: 2024-06-05

## 2024-06-05 RX ORDER — NITROFURANTOIN 25; 75 MG/1; MG/1
100 CAPSULE ORAL DAILY
COMMUNITY

## 2024-06-05 NOTE — PROGRESS NOTES
Teresita Henderson is a 84 year old female.     HPI:   Patient presents for pre-op evaluation.  Comes in with daughter, Beryl, with whom she lives.  Ulnar nerve compression of left - she will undergo decompression/release by Dr. Ryne Alexandra on 6/14/2024.   Atrial fib - does not need anticoagulation because she has watchman in place. Tolerating metoprolol. Denies chest pain or palpitations.   HTN - does not check pressures at home b/c of left arm issues. Previously were less than 140/90s.   DM - checks her sugars a few times/week. FBS are < 140s. Tolerating metformin.     REVIEW OF SYSTEMS:   GENERAL HEALTH: feels well otherwise. No f/c  NEURO: denies any headaches, LH, dizzyness, LOC, falls  VISION: denies any blurred or double vision  RESPIRATORY: denies shortness of breath, cough, or congestion  CARDIOVASCULAR: see HPI  GI: denies abdominal pain,  diarrhea, n/v, BRBPR, melena. Chronic constipation usually improves with miralax.   : no dysuria or hematuria or vaginal bleeding  EXT: occ ankle edema after recent travel    Wt Readings from Last 6 Encounters:   06/05/24 137 lb 12.8 oz (62.5 kg)   05/13/24 130 lb (59 kg)   05/03/24 130 lb (59 kg)   04/26/24 139 lb 12.4 oz (63.4 kg)   04/24/24 130 lb (59 kg)   04/19/24 132 lb (59.9 kg)       Allergies   Allergen Reactions    Cephalexin ANAPHYLAXIS, HIVES and FACE FLUSHING    Ciprofloxacin MYALGIA, HIVES and OTHER (SEE COMMENTS)     Started on Rx 4/12/23? Not true allergy?    Fesoterodine SWELLING     Throat tightening  Throat tightening Throat tightening    Throat tightening Throat tightening      Throat tightening    Fosfomycin ARRHYTHMIA     Interaction of arrhythmias with Tykosin    Levofloxacin MYALGIA, HIVES and OTHER (SEE COMMENTS)     Reports tendonitis    Reports tendonitis   Reports tendonitis   Reports tendonitis    Other ARRHYTHMIA     Macrolides and Fluoroquinolones    Penicillins HIVES and RASH     Tolerates ceftriaxone    Toviaz SWELLING and TONGUE  SWELLING     Throat tightening    Throat tightening   Throat tightening    Atorvastatin OTHER (SEE COMMENTS) and NAUSEA AND VOMITING     GI upset, nausea and vomitting    GI upset, nausea and vomitting   GI upset, nausea and vomitting   GI upset, nausea and vomitting    Chlorhexidine OTHER (SEE COMMENTS)     Rash, redness    Chocolate OTHER (SEE COMMENTS)     headache    headache   headache   headache    Colesevelam OTHER (SEE COMMENTS) and UNKNOWN     GI upset    GI upset   GI upset   GI upset    Chocolate OTHER (SEE COMMENTS)     headache    Fish-Derived Products OTHER (SEE COMMENTS)     Per allergy testing    Mushrooms OTHER (SEE COMMENTS)     headache    Pecan OTHER (SEE COMMENTS)     Unknown     Shrimp UNKNOWN     Per allergy testing    Tomatoes OTHER (SEE COMMENTS)     headache    Hexachlorophene RASH and OTHER (SEE COMMENTS)       Family History   Problem Relation Age of Onset    Cancer Sister         throat    Diabetes Mother     Breast Cancer Self 52    Heart Disease Neg     Stroke Neg       Past Medical History:    A-fib (HCC)    Abdominal distention    Abnormal mammogram    Allergic rhinitis    Anesthesia complication    Pt's sons have a history of pseudocholinesterase deficiency.    Antral gastritis    Arrhythmia    AF    Arthritis    Bad breath    Belching    Bloating    Blood in urine    Breast CA (HCC)    Calculus of kidney    Cancer (HCC)    right breast cancer    Carpal tunnel syndrome    Cataract    Cervical spondylosis without myelopathy    Chondromalacia of patella    Constipation    Coronary atherosclerosis    stents    Coronary atherosclerosis of native coronary artery    Diabetes (HCC)    Diet controlled    Diverticulosis    Ductal carcinoma in situ (DCIS) of right breast, 1992    Esophageal reflux    Exposure to unspecified radiation    last dose 1992    Female stress incontinence    Flatulence/gas pain/belching    Frequent urination    Frequent use of laxatives    Frequent UTI    Frequent  UTI    Gall stones    GERD (gastroesophageal reflux disease)    Glucose intolerance (impaired glucose tolerance)    Heart palpitations    High blood pressure    High cholesterol    History of blood transfusion        Hoarseness, chronic    Hypothyroidism    Incomplete bladder emptying (aka 78863)    trial of CIC 2014    Indigestion    Irregular bowel habits    L knee global 3/27/14    Leg swelling    Malignant neoplasm of breast (female), unspecified site    R    Nausea    Neoplasm of right breast, 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),     Osteoarthrosis, unspecified whether generalized or localized, unspecified site    generalized    Other and unspecified hyperlipidemia    Pacemaker    Pain in joints    Painful swallowing    Peripheral vascular disease (HCC)    PONV (postoperative nausea and vomiting)    Post-traumatic osteoarthritis of left elbow    Postmenopausal atrophic vaginitis    Presence of other cardiac implants and grafts    Primary localized osteoarthrosis, lower leg    Problems with swallowing    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)    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    Pure hypercholesterolemia    Rectocele    Recurrent UTI    group B strep    Recurrent UTI    S/P total knee replacement using cement    Sleep disturbance    Sputum production    Stented coronary artery    Uncomfortable fullness after meals    Unspecified disorder of thyroid    Visual impairment    reading glasses    Vitamin D deficiency      Past Surgical History:   Procedure Laterality Date    Angioplasty (coronary)      Breast lumpectomy Right 2015    Procedure: BREAST LUMPECTOMY;  Surgeon: Castro Marie MD;  Location: [facility] OR    Breast reconstruction Right 2015      1977    Cabg  1997    x3    Cardiac pacemaker placement      [place] 2022    Cataract  2017    Cath bare metal stent (bms)      Cholecystectomy      Colonoscopy   2016    tics; repeat 10 yrs    Colonoscopy  2017    Colonoscopy,diagnostic N/A 2016    Procedure: COLONOSCOPY, POSSIBLE BIOPSY, POSSIBLE POLYPECTOMY 30720;  Surgeon: Rodolfo Ann MD;  Location: [facility]    Cystoscopy,rx female urethral synd  05/10/2013    cysto UD, Ricardo    D & c  1976    Hysterectomy  1985    precancer and irregular bleeding. w/ BSO    Knee replacement surgery  2013    left     Lumpectomy right Right [id number]    Mastectomy right  2015      1964    Oophorectomy      AGE 47    Open heart surgery (pbp)      Other surgical history      LUMPECTOMY RIGHT BREAST     Other surgical history      A PMM LEFT KNEE , replacement    Other surgical history  2013    Venita DENTON    Other surgical history  2014    Cystoscopy- Dr. Figueroa    Other surgical history  2016    Bladder sling    Other surgical history       WATCHMEN    Pacemaker monitor      Patient documented not to have experienced any of the following events N/A 2016    Procedure: COLONOSCOPY, POSSIBLE BIOPSY, POSSIBLE POLYPECTOMY 89126;  Surgeon: Rodolfo Ann MD;  Location: [facility]    Patient withough preoperative order for iv antibiotic surgical site infection prophylaxis. N/A 2016    Procedure: COLONOSCOPY, POSSIBLE BIOPSY, POSSIBLE POLYPECTOMY 49834;  Surgeon: Rodolfo Ann MD;  Location: [facility]    Radiation right          Reduction left  10/2015    Total knee replacement      LEFT     Upper gi endoscopy,exam  2011    repeat 5 yeas      Social History:    Social History     Socioeconomic History    Marital status:    Tobacco Use    Smoking status: Former     Current packs/day: 0.00     Types: Cigarettes     Quit date: 1997     Years since quittin.9    Smokeless tobacco: Never   Vaping Use    Vaping status: Never Used   Substance and Sexual Activity    Alcohol use: Not Currently    Drug use: No   Other Topics  Concern    Caffeine Concern Yes    Exercise Yes     Social Determinants of Health     Financial Resource Strain: Low Risk  (11/30/2023)    Received from [employer]    Overall Financial Resource Strain (CARDIA)     Difficulty of Paying Living Expenses: Not hard at all   Food Insecurity: No Food Insecurity (4/26/2024)    Food Insecurity     Food Insecurity: Never true   Transportation Needs: No Transportation Needs (4/26/2024)    Transportation Needs     Lack of Transportation: No   Physical Activity: Inactive (11/30/2023)    Received from [employer]    Physical Activity     On average, how many days per week do you engage in moderate to strenuous exercise (like a brisk walk)?: 0 days     On average, how many minutes do you engage in exercise at this level?: 0 min   Stress: No Stress Concern Present (11/30/2023)    Received from [employer]    [facility] - Occupational Stress Questionnaire     Feeling of Stress : Not at all   Housing Stability: Low Risk  (4/26/2024)    Housing Stability     Housing Instability: No           EXAM:   Temp 97.8 °F (36.6 °C) (Temporal)   Ht 5' 2\" (1.575 m)   Wt 137 lb 12.8 oz (62.5 kg)   BMI 25.20 kg/m²   GENERAL: A&O well developed, well nourished,in no apparent distress, has tremor of her head and dysphonia  SKIN: no rashes,no suspicious lesions  HEENT: atraumatic, MMM, throat is clear  NECK: supple, no jvd, no thyromegaly  LUNGS: clear to auscultation bilateraly, no c/w/r  CARDIO: RRR without g/m/r  GI: soft non tender nondistended no hsm bs throughout  NEURO: CN 2-12 grossly intact  PSYCH: pleasant  EXTREMITIES: no cyanosis, clubbing or edema  Left arm is in a sling for comfort    ASSESSMENT AND PLAN:   # Ulnar nerve compression of left - she will undergo decompression/release by Dr. Ryne Alexandra on 6/14/2024. I will confirm with cardiology but they have not held her ASA for procedures in past. She  recently had EGD w/o holding ASA.  Rate of cardiac death, nonfatal myocardial infarction, and nonfatal cardiac arrest according to the number of predictors is 1.0% (95% CI 0.5-1.4)  Rate of myocardial infarction, pulmonary edema, ventricular fibrillation, primary cardiac arrest, and complete heart block is  1.3% (95% CI 0.7-2.1)  She is at acceptable risk for surgery. Has already received clearance from cardiology.   # Afib - has watchman device for thrombosis prevention. On ASA but per family she remains on this during procedures. I will confirm.  # HTN - at goal on current regimen  # DM - uncontrolled in 3/2024. Metformin is being titrated by DR. Pena. They are due for repeat A1C in a few weeks.     The patient indicates understanding of these issues and agrees to the plan.  The patient is asked to return in 3 months with Dr. Pena for uncontrolled DM and wellness exam.   Erinn Israel MD

## 2024-06-12 NOTE — TELEPHONE ENCOUNTER
Spoke with Maribel's daughter Beryl in regards to her mother's upcoming surgery. I confirmed that surgery will take place at [facility]. I also discussed the surgical protocol and scheduled her post op appt. She states understanding with no questions or concerns. Advised to call the office back if they have any questions or concerns.

## 2024-06-14 ENCOUNTER — HOSPITAL ENCOUNTER (OUTPATIENT)
Facility: HOSPITAL | Age: 84
Setting detail: HOSPITAL OUTPATIENT SURGERY
Discharge: HOME | End: 2024-06-14
Attending: ORTHOPAEDIC SURGERY | Admitting: ORTHOPAEDIC SURGERY

## 2024-06-14 ENCOUNTER — ANESTHESIA EVENT (OUTPATIENT)
Dept: SURGERY | Facility: HOSPITAL | Age: 84
End: 2024-06-14
Payer: MEDICARE

## 2024-06-14 ENCOUNTER — ANESTHESIA (OUTPATIENT)
Dept: SURGERY | Facility: HOSPITAL | Age: 84
End: 2024-06-14
Payer: MEDICARE

## 2024-06-14 VITALS
HEIGHT: 62 IN | OXYGEN SATURATION: 96 % | HEART RATE: 72 BPM | WEIGHT: 135 LBS | DIASTOLIC BLOOD PRESSURE: 77 MMHG | SYSTOLIC BLOOD PRESSURE: 160 MMHG | RESPIRATION RATE: 14 BRPM | BODY MASS INDEX: 24.84 KG/M2 | TEMPERATURE: 98 F

## 2024-06-14 DIAGNOSIS — G56.22 ULNAR NERVE COMPRESSION, LEFT: Primary | ICD-10-CM

## 2024-06-14 LAB
GLUCOSE BLD-MCNC: 117 MG/DL (ref 70–99)
GLUCOSE BLD-MCNC: 94 MG/DL (ref 70–99)

## 2024-06-14 PROCEDURE — 82962 GLUCOSE BLOOD TEST: CPT

## 2024-06-14 PROCEDURE — 76942 ECHO GUIDE FOR BIOPSY: CPT | Performed by: ANESTHESIOLOGY

## 2024-06-14 PROCEDURE — 01N40ZZ RELEASE ULNAR NERVE, OPEN APPROACH: ICD-10-PCS | Performed by: ORTHOPAEDIC SURGERY

## 2024-06-14 RX ORDER — SODIUM CHLORIDE, SODIUM LACTATE, POTASSIUM CHLORIDE, CALCIUM CHLORIDE 600; 310; 30; 20 MG/100ML; MG/100ML; MG/100ML; MG/100ML
INJECTION, SOLUTION INTRAVENOUS CONTINUOUS
Status: DISCONTINUED | OUTPATIENT
Start: 2024-06-14 | End: 2024-06-14

## 2024-06-14 RX ORDER — NICOTINE POLACRILEX 4 MG
15 LOZENGE BUCCAL
Status: DISCONTINUED | OUTPATIENT
Start: 2024-06-14 | End: 2024-06-14 | Stop reason: HOSPADM

## 2024-06-14 RX ORDER — ACETAMINOPHEN 325 MG/1
650 TABLET ORAL ONCE
Status: DISCONTINUED | OUTPATIENT
Start: 2024-06-14 | End: 2024-06-14

## 2024-06-14 RX ORDER — CLINDAMYCIN PHOSPHATE 900 MG/50ML
INJECTION, SOLUTION INTRAVENOUS AS NEEDED
Status: DISCONTINUED | OUTPATIENT
Start: 2024-06-14 | End: 2024-06-14 | Stop reason: SURG

## 2024-06-14 RX ORDER — DEXTROSE MONOHYDRATE 25 G/50ML
50 INJECTION, SOLUTION INTRAVENOUS
Status: DISCONTINUED | OUTPATIENT
Start: 2024-06-14 | End: 2024-06-14 | Stop reason: HOSPADM

## 2024-06-14 RX ORDER — ACETAMINOPHEN 500 MG
1000 TABLET ORAL ONCE
Status: DISCONTINUED | OUTPATIENT
Start: 2024-06-14 | End: 2024-06-14 | Stop reason: HOSPADM

## 2024-06-14 RX ORDER — HYDROMORPHONE HYDROCHLORIDE 1 MG/ML
0.2 INJECTION, SOLUTION INTRAMUSCULAR; INTRAVENOUS; SUBCUTANEOUS EVERY 5 MIN PRN
Status: DISCONTINUED | OUTPATIENT
Start: 2024-06-14 | End: 2024-06-14

## 2024-06-14 RX ORDER — 0.9 % SODIUM CHLORIDE 0.9 %
INTRAVENOUS SOLUTION INTRAVENOUS
Status: DISCONTINUED
Start: 2024-06-14 | End: 2024-06-14

## 2024-06-14 RX ORDER — MIDAZOLAM HYDROCHLORIDE 1 MG/ML
INJECTION INTRAMUSCULAR; INTRAVENOUS AS NEEDED
Status: DISCONTINUED | OUTPATIENT
Start: 2024-06-14 | End: 2024-06-14 | Stop reason: SURG

## 2024-06-14 RX ORDER — ONDANSETRON 2 MG/ML
4 INJECTION INTRAMUSCULAR; INTRAVENOUS EVERY 6 HOURS PRN
Status: DISCONTINUED | OUTPATIENT
Start: 2024-06-14 | End: 2024-06-14

## 2024-06-14 RX ORDER — NICOTINE POLACRILEX 4 MG
30 LOZENGE BUCCAL
Status: DISCONTINUED | OUTPATIENT
Start: 2024-06-14 | End: 2024-06-14 | Stop reason: HOSPADM

## 2024-06-14 RX ORDER — METOCLOPRAMIDE HYDROCHLORIDE 5 MG/ML
10 INJECTION INTRAMUSCULAR; INTRAVENOUS EVERY 8 HOURS PRN
Status: DISCONTINUED | OUTPATIENT
Start: 2024-06-14 | End: 2024-06-14

## 2024-06-14 RX ORDER — VANCOMYCIN HYDROCHLORIDE 1 G/20ML
INJECTION, POWDER, LYOPHILIZED, FOR SOLUTION INTRAVENOUS
Status: DISCONTINUED
Start: 2024-06-14 | End: 2024-06-14

## 2024-06-14 RX ORDER — NALOXONE HYDROCHLORIDE 0.4 MG/ML
0.08 INJECTION, SOLUTION INTRAMUSCULAR; INTRAVENOUS; SUBCUTANEOUS AS NEEDED
Status: DISCONTINUED | OUTPATIENT
Start: 2024-06-14 | End: 2024-06-14

## 2024-06-14 RX ORDER — KETOROLAC TROMETHAMINE 30 MG/ML
15 INJECTION, SOLUTION INTRAMUSCULAR; INTRAVENOUS EVERY 6 HOURS PRN
Status: DISCONTINUED | OUTPATIENT
Start: 2024-06-14 | End: 2024-06-14

## 2024-06-14 RX ORDER — HYDROCODONE BITARTRATE AND ACETAMINOPHEN 5; 325 MG/1; MG/1
1 TABLET ORAL EVERY 6 HOURS PRN
Qty: 20 TABLET | Refills: 0 | Status: SHIPPED | OUTPATIENT
Start: 2024-06-14

## 2024-06-14 RX ORDER — KETOROLAC TROMETHAMINE 30 MG/ML
30 INJECTION, SOLUTION INTRAMUSCULAR; INTRAVENOUS EVERY 6 HOURS PRN
Status: DISCONTINUED | OUTPATIENT
Start: 2024-06-14 | End: 2024-06-14

## 2024-06-14 RX ORDER — LIDOCAINE HYDROCHLORIDE 10 MG/ML
INJECTION, SOLUTION EPIDURAL; INFILTRATION; INTRACAUDAL; PERINEURAL AS NEEDED
Status: DISCONTINUED | OUTPATIENT
Start: 2024-06-14 | End: 2024-06-14 | Stop reason: SURG

## 2024-06-14 RX ADMIN — MIDAZOLAM HYDROCHLORIDE 2 MG: 1 INJECTION INTRAMUSCULAR; INTRAVENOUS at 09:17:00

## 2024-06-14 RX ADMIN — CLINDAMYCIN PHOSPHATE 900 MG: 900 INJECTION, SOLUTION INTRAVENOUS at 09:45:00

## 2024-06-14 RX ADMIN — LIDOCAINE HYDROCHLORIDE 25 MG: 10 INJECTION, SOLUTION EPIDURAL; INFILTRATION; INTRACAUDAL; PERINEURAL at 09:28:00

## 2024-06-14 NOTE — ANESTHESIA PROCEDURE NOTES
Airway  Date/Time: 6/14/2024 9:29 AM  Urgency: elective      General Information and Staff    Patient location during procedure: OR  Anesthesiologist: Perlita Kaminski MD  Performed: anesthesiologist   Performed by: Perlita Kaminski MD  Authorized by: Perlita Kaminski MD      Indications and Patient Condition  Indications for airway management: anesthesia  Sedation level: deep  Preoxygenated: yes  Patient position: sniffing  Mask difficulty assessment: 1 - vent by mask    Final Airway Details  Final airway type: supraglottic airway      Successful airway: intubating  Size 3       Number of attempts at approach: 1

## 2024-06-14 NOTE — ANESTHESIA PREPROCEDURE EVALUATION
PRE-OP EVALUATION    Patient Name: Teresita Henderson    Admit Diagnosis: Cubital tunnel syndrome on right [G56.21]    Pre-op Diagnosis: Cubital tunnel syndrome on left [G56.21]    LEFT CUBITAL TUNNEL RELEASE    Anesthesia Procedure: LEFT CUBITAL TUNNEL RELEASE (Left: Elbow)    Surgeons and Role:     * Shan Alexadnra MD - Primary    Pre-op vitals reviewed.  Temp: 97.8 °F (36.6 °C)  Pulse: 69  Resp: 16  BP: 145/69  SpO2: 100 %  Body mass index is 24.69 kg/m².    Current medications reviewed.  Hospital Medications:   vancomycin (Vancocin) 1,000 mg in sodium chloride 0.9% 250 mL IVPB-ADDV  15 mg/kg Intravenous Once    And    gentamicin (Garamycin) 260 mg in sodium chloride 0.9% 100 mL IVPB  5 mg/kg (Ideal) Intravenous Once    [Transfer Hold] acetaminophen (Tylenol Extra Strength) tab 1,000 mg  1,000 mg Oral Once    [Transfer Hold] glucose (Dex4) 15 GM/59ML oral liquid 15 g  15 g Oral Q15 Min PRN    Or    [Transfer Hold] glucose (Glutose) 40% oral gel 15 g  15 g Oral Q15 Min PRN    Or    [Transfer Hold] glucose-vitamin C (Dex-4) chewable tab 4 tablet  4 tablet Oral Q15 Min PRN    Or    [Transfer Hold] dextrose 50% injection 50 mL  50 mL Intravenous Q15 Min PRN    Or    [Transfer Hold] glucose (Dex4) 15 GM/59ML oral liquid 30 g  30 g Oral Q15 Min PRN    Or    [Transfer Hold] glucose (Glutose) 40% oral gel 30 g  30 g Oral Q15 Min PRN    Or    [Transfer Hold] glucose-vitamin C (Dex-4) chewable tab 8 tablet  8 tablet Oral Q15 Min PRN    lactated ringers infusion   Intravenous Continuous    vancomycin (Vancocin) 1 g injection        sodium chloride 0.9% IVPB           Outpatient Medications:     Medications Prior to Admission   Medication Sig Dispense Refill Last Dose    nitrofurantoin monohydrate macro (MACROBID) 100 MG Oral Cap Take 1 capsule (100 mg total) by mouth daily.   6/13/2024    metoprolol succinate ER 25 MG Oral Tablet 24 Hr Take 1 tablet (25 mg total) by mouth daily. 12.5MG IN MORNING AND 25MG IN EVENING 45 tablet  0 6/13/2024    ezetimibe 10 MG Oral Tab Take 1 tablet (10 mg total) by mouth nightly. 90 tablet 3 6/13/2024    levothyroxine 88 MCG Oral Tab TAKE 1 TABLET(88 MCG) BY MOUTH DAILY 90 tablet 3 6/13/2024    metFORMIN HCl 1000 MG Oral Tab Take 1 tablet (1,000 mg total) by mouth 2 (two) times daily with meals. 180 tablet 0 6/13/2024    gabapentin 100 MG Oral Cap Take 2 capsules (200 mg total) by mouth nightly. 180 capsule 2 6/13/2024    Cholecalciferol (VITAMIN D) 50 MCG (2000 UT) Oral Cap Take by mouth daily.   Past Month    pravastatin 10 MG Oral Tab Take 1 tablet (10 mg total) by mouth nightly. 90 tablet 0 6/13/2024    aspirin 81 MG Oral Tab EC Take 1 tablet (81 mg total) by mouth daily. 30 tablet 0 6/11/2024    cetirizine 10 MG Oral Tab Take 1 tablet (10 mg total) by mouth daily.   6/13/2024    pantoprazole 40 MG Oral Tab EC Take 1 tablet (40 mg total) by mouth daily.   6/13/2024    dofetilide 125 MCG Oral Cap Take 1 capsule (125 mcg total) by mouth 2 (two) times daily.   6/13/2024    nystatin-triamcinolone 100,000-0.1 Units/g-% External Cream Apply around SPT site twice daily as directed       prochlorperazine (COMPAZINE) 10 mg tablet Take 1 tablet (10 mg total) by mouth every 6 (six) hours as needed for Nausea. 15 tablet 0 More than a month    traMADol HCl 25 MG Oral Tab Take 25 mg by mouth 2 (two) times daily as needed (severe pain). 20 tablet 0 More than a month    Lancets (ONETOUCH DELICA PLUS [id number]) Does not apply Misc 1 each daily. 100 each 6     Glucose Blood (ONETOUCH VERIO) In Vitro Strip Use as directed. 100 strip 1     Polyethylene Glycol 3350 17 g Oral Powd Pack Take 17 g by mouth daily as needed. 90 each 1     Meth-Hyo-M Bl-Na Phos-Ph Sal (URIBEL) 118 MG Oral Cap Take 1 capsule by mouth 3 (three) times daily as needed. (Patient not taking: Reported on 6/5/2024)   More than a month    fluticasone propionate 50 MCG/ACT Nasal Suspension 2 sprays by Each Nare route daily. 11.1 mL 3        Allergies:  Cephalexin, Ciprofloxacin, Fesoterodine, Fosfomycin, Levofloxacin, Other, Penicillins, Toviaz, Atorvastatin, Chlorhexidine, Chocolate, Colesevelam, Chocolate, Fish-derived products, Mushrooms, Pecan, Shrimp, Tomatoes, and Hexachlorophene      Anesthesia Evaluation        Anesthetic Complications  (+) history of anesthetic complications  History of: pseudocholinesterase deficiency       GI/Hepatic/Renal      (+) GERD                           Cardiovascular      ECG reviewed.  Exercise tolerance: poor     MET: <=4      (+) hypertension and well controlled    (+) CAD    (+) CABG/stent                            Endo/Other      (+) diabetes and well controlled, type 2,                          Pulmonary                           Neuro/Psych                 (+) neuromuscular disease                     Past Surgical History:   Procedure Laterality Date    Angioplasty (coronary)      Breast lumpectomy Right 2015    Procedure: BREAST LUMPECTOMY;  Surgeon: Castro Marie MD;  Location: [facility] OR    Breast reconstruction Right       1977    Cabg  1997    x3    Cardiac pacemaker placement      [place] 2022    Cataract      Cath bare metal stent (bms)      Cholecystectomy      Colonoscopy  2016    tics; repeat 10 yrs    Colonoscopy      Colonoscopy,diagnostic N/A 2016    Procedure: COLONOSCOPY, POSSIBLE BIOPSY, POSSIBLE POLYPECTOMY 11601;  Surgeon: Rodolfo Ann MD;  Location: [facility]    Cystoscopy,rx female urethral synd  05/10/2013    cysto Ricardo DENTON & julia  1976    Hysterectomy  1985    precancer and irregular bleeding. w/ BSO    Knee replacement surgery  2013    left     Lumpectomy right Right     Mastectomy right  2015      1964    Oophorectomy      AGE 47    Open heart surgery (pbp)      Other surgical history      LUMPECTOMY RIGHT BREAST     Other surgical history      A PMM LEFT KNEE , replacement    Other surgical history   2013    Venita DENTON    Other surgical history  2014    Cystoscopy- Dr. Figueroa    Other surgical history  2016    Bladder sling    Other surgical history       WATCHMEN    Pacemaker monitor      Patient documented not to have experienced any of the following events N/A 2016    Procedure: COLONOSCOPY, POSSIBLE BIOPSY, POSSIBLE POLYPECTOMY 92947;  Surgeon: Rodolfo Ann MD;  Location: [facility]    Patient withough preoperative order for iv antibiotic surgical site infection prophylaxis. N/A 2016    Procedure: COLONOSCOPY, POSSIBLE BIOPSY, POSSIBLE POLYPECTOMY 33952;  Surgeon: Rodolfo Ann MD;  Location: [facility]    Radiation right      1992    Reduction left  10/2015    Total knee replacement      LEFT     Upper gi endoscopy,exam  2011    repeat 5 yeas     Social History     Socioeconomic History    Marital status:    Tobacco Use    Smoking status: Former     Current packs/day: 0.00     Types: Cigarettes     Quit date: 1997     Years since quittin.0    Smokeless tobacco: Never   Vaping Use    Vaping status: Never Used   Substance and Sexual Activity    Alcohol use: Not Currently    Drug use: No   Other Topics Concern    Caffeine Concern Yes    Exercise Yes     History   Drug Use No     Available pre-op labs reviewed.  Lab Results   Component Value Date    WBC 5.5 2024    RBC 3.94 2024    HGB 11.1 (L) 2024    HCT 33.8 (L) 2024    MCV 85.8 2024    MCH 28.2 2024    MCHC 32.8 2024    RDW 14.8 2024    .0 2024     Lab Results   Component Value Date     (L) 2024    K 4.1 2024     2024    CO2 28.0 2024    BUN 15 2024    CREATSERUM 0.77 2024     (H) 2024    CA 9.3 2024            Airway      Mallampati: II  Mouth opening: 3 FB  TM distance: 4 - 6 cm  Neck ROM: full Cardiovascular    Cardiovascular exam normal.          Dental    Dentition appears grossly intact         Pulmonary    Pulmonary exam normal.                 Other findings              ASA: 3   Plan: general  NPO status verified and patient meets guidelines.    Post-procedure pain management plan discussed with surgeon and patient.  Surgeon requests: regional block    Plan/risks discussed with: patient                Present on Admission:  **None**

## 2024-06-14 NOTE — BRIEF OP NOTE
Pre-Operative Diagnosis: Cubital tunnel syndrome on left [G56.21]     Post-Operative Diagnosis: Cubital tunnel syndrome on left      Procedure Performed:   LEFT CUBITAL TUNNEL RELEASE    Surgeons and Role:     * Shan Alexandra MD - Primary    Assistant(s):  PA: Hina Hathaway PA     Surgical Findings: two kink points proximally and distally, dilated/flattened ulnar nerve, scarring of nerve     Specimen: none     Estimated Blood Loss: Blood Output: 5 mL (6/14/2024 10:54 AM)      Dictation Number:  becky Alexandra MD  6/14/2024  11:06 AM

## 2024-06-14 NOTE — H&P
Clinic Note     Assessment/Plan:  84 year old female    Chronic left elbow deformity - Radiographs are stable without significant change upon my review. Currently in minimal pain from elbow. Observe. Discontinue sling/splint. Pain in upper arm could be muscle strain.  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  Recommend surgical decompression with transposition SubQ. Surgery will prevent progression with hopes of improvement. Complete nerve recovery is not possible nor is the atrophy reversible.   Sx 7/26/24  Patient has been okayed from cardiology to proceed. MAC+Nerve block. Nylon sutures for closure  CuTR Consent: Patient consented to surgery after having understood the risks associated with surgery, expected outcomes, time to recovery and the possible need for therapy post-operatively. Risks include but not limited to: infection, wound complication, nerve injury resulting in temporary or permanent nerve damage, elbow stiffness, weakness, incomplete relief, recurrence of symptoms, persistent of symptoms, and need for additional surgery.   Follow Up: 7/26/24    Diagnostic Studies:     EMG/NCV:   1.  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  2.  In addition, there is suggestion of a chronic left C7 and C8 cervical radiculopathy, without active denervation changes.'    XR L elbow: Chronic deformity of left elbow. No acute changes from XR in October. HO at ulnar groove       Physical Exam:     /69   Pulse 69   Temp 97.8 °F (36.6 °C) (Temporal)   Resp 16   Ht 5' 2\" (1.575 m)   Wt 135 lb (61.2 kg)   SpO2 100%   BMI 24.69 kg/m²     Constitutional: NAD. AOx3. Well-developed and Well-nourished.   Psychiatric: Normal mood/ affect/ behavior. Judgment and thought content normal.     Left Upper Extremity:     Inspection    Skin intact. No skin lesions. No obvious mass visualized.     Palpation    No TTP over ulnar nerve      ROM    Full composite fist. and Normal symmetric wrist motion.    Elbow ROM      Neurovascular    Normal sensation in the median and radial nerve distribution. Normal motor function of muscles innervated by median/AIN, ulnar, and radial/PIN nerves.    Normally perfused hand(s).    Ulnar Nerve  No subluxation of ulnar nerve, 10% dec SF, 50% dec RF, 10% dec MF  Atrophy FDI, Hypothenar, Subtle clawing             CC: Left elbow pain    HPI: This 84 year old RHD female presents with left elbow pain. She mentioned significant pain within her elbow. She has tried some conservative measures with minimal change. Numbness/Tingling and weakness are noted    Onset: elbow deformity since childhood, symptoms in ulnar nerve more recently  Pain Character: elbow  Pain Level: severe  Pain @ Night: Yes    Treatments Tried: Tramadol, Anti-inflammatory medications    Occupation: N/a    History/Other:   Past Medical History:    A-fib (HCC)    Abdominal distention    Abnormal mammogram    Allergic rhinitis    Anesthesia complication    Pt's sons have a history of pseudocholinesterase deficiency.    Antral gastritis    Arrhythmia    AF    Arthritis    Bad breath    Belching    Bloating    Blood in urine    Breast CA (HCC)    Calculus of kidney    Cancer (HCC)    right breast cancer    Carpal tunnel syndrome    Cataract    Cervical spondylosis without myelopathy    Chondromalacia of patella    Constipation    Coronary atherosclerosis    stents    Coronary atherosclerosis of native coronary artery    Diabetes (HCC)    Diet controlled    Diverticulosis    Ductal carcinoma in situ (DCIS) of right breast, 1992    Esophageal reflux    Exposure to unspecified radiation    last dose 1992    Female stress incontinence    Flatulence/gas pain/belching    Frequent urination    Frequent use of laxatives    Frequent UTI    Frequent UTI    Gall stones    GERD (gastroesophageal reflux disease)    Glucose  intolerance (impaired glucose tolerance)    Heart palpitations    High blood pressure    High cholesterol    History of blood transfusion        Hoarseness, chronic    Hypothyroidism    Incomplete bladder emptying (aka 54264)    trial of CIC 2014    Indigestion    Irregular bowel habits    L knee global 3/27/14    Leg swelling    Malignant neoplasm of breast (female), unspecified site    R    Nausea    Neoplasm of right breast, 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),     Osteoarthrosis, unspecified whether generalized or localized, unspecified site    generalized    Other and unspecified hyperlipidemia    Pacemaker    Pain in joints    Painful swallowing    Peripheral vascular disease (HCC)    PONV (postoperative nausea and vomiting)    Post-traumatic osteoarthritis of left elbow    Postmenopausal atrophic vaginitis    Presence of other cardiac implants and grafts    Primary localized osteoarthrosis, lower leg    Problems with swallowing    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)    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    Pure hypercholesterolemia    Rectocele    Recurrent UTI    group B strep    Recurrent UTI    S/P total knee replacement using cement    Sleep disturbance    Sputum production    Stented coronary artery    Uncomfortable fullness after meals    Unspecified disorder of thyroid    Visual impairment    reading glasses    Vitamin D deficiency     Past Surgical History:   Procedure Laterality Date    Angioplasty (coronary)      Breast lumpectomy Right 2015    Procedure: BREAST LUMPECTOMY;  Surgeon: Castro Marie MD;  Location: [facility] MAIN OR    Breast reconstruction Right 2015      1977    Cabg  1997    x3    Cardiac pacemaker placement      [place] 2022    Cataract      Cath bare metal stent (bms)      Cholecystectomy      Colonoscopy  2016    tics; repeat 10 yrs    Colonoscopy       Colonoscopy,diagnostic N/A 2016    Procedure: COLONOSCOPY, POSSIBLE BIOPSY, POSSIBLE POLYPECTOMY 81971;  Surgeon: Rodolfo Ann MD;  Location: [facility]    Cystoscopy,rx female urethral synd  05/10/2013    cysto UD, Ricardo    D & c  1976    Hysterectomy  1985    precancer and irregular bleeding. w/ BSO    Knee replacement surgery  2013    left     Lumpectomy right Right [id number]    Mastectomy right  2015      1964    Oophorectomy      AGE 47    Open heart surgery (pbp)      Other surgical history      LUMPECTOMY RIGHT BREAST     Other surgical history      A PMM LEFT KNEE , replacement    Other surgical history  2013    Venita DENTON    Other surgical history  2014    Cystoscopy- Dr. Figueroa    Other surgical history  2016    Bladder sling    Other surgical history       WATCHMEN    Pacemaker monitor      Patient documented not to have experienced any of the following events N/A 2016    Procedure: COLONOSCOPY, POSSIBLE BIOPSY, POSSIBLE POLYPECTOMY 17432;  Surgeon: Rodolfo Ann MD;  Location: [facility]    Patient withough preoperative order for iv antibiotic surgical site infection prophylaxis. N/A 2016    Procedure: COLONOSCOPY, POSSIBLE BIOPSY, POSSIBLE POLYPECTOMY 00368;  Surgeon: Rodolfo Ann MD;  Location: [facility]    Radiation right          Reduction left  10/2015    Total knee replacement      LEFT     Upper gi endoscopy,exam  2011    repeat 5 yeas     No current outpatient medications on file.     Allergies   Allergen Reactions    Cephalexin ANAPHYLAXIS, HIVES and FACE FLUSHING    Ciprofloxacin MYALGIA, HIVES and OTHER (SEE COMMENTS)     Started on Rx 23? Not true allergy?    Fesoterodine SWELLING     Throat tightening  Throat tightening Throat tightening    Throat tightening Throat tightening      Throat tightening    Fosfomycin ARRHYTHMIA     Interaction of arrhythmias with Tykosin     Levofloxacin MYALGIA, HIVES and OTHER (SEE COMMENTS)     Reports tendonitis    Reports tendonitis   Reports tendonitis   Reports tendonitis    Other ARRHYTHMIA     Macrolides and Fluoroquinolones    Penicillins HIVES and RASH     Tolerates ceftriaxone    Toviaz SWELLING and TONGUE SWELLING     Throat tightening    Throat tightening   Throat tightening    Atorvastatin OTHER (SEE COMMENTS) and NAUSEA AND VOMITING     GI upset, nausea and vomitting    GI upset, nausea and vomitting   GI upset, nausea and vomitting   GI upset, nausea and vomitting    Chlorhexidine OTHER (SEE COMMENTS)     Rash, redness    Chocolate OTHER (SEE COMMENTS)     headache    headache   headache   headache    Colesevelam OTHER (SEE COMMENTS) and UNKNOWN     GI upset    GI upset   GI upset   GI upset    Chocolate OTHER (SEE COMMENTS)     headache    Fish-Derived Products OTHER (SEE COMMENTS)     Per allergy testing    Mushrooms OTHER (SEE COMMENTS)     headache    Pecan OTHER (SEE COMMENTS)     Unknown     Shrimp UNKNOWN     Per allergy testing    Tomatoes OTHER (SEE COMMENTS)     headache    Hexachlorophene RASH and OTHER (SEE COMMENTS)     Family History   Problem Relation Age of Onset    Cancer Sister         throat    Diabetes Mother     Breast Cancer Self 52    Heart Disease Neg     Stroke Neg      Social History     Occupational History    Not on file   Tobacco Use    Smoking status: Former     Current packs/day: 0.00     Types: Cigarettes     Quit date: 1997     Years since quittin.0    Smokeless tobacco: Never   Vaping Use    Vaping status: Never Used   Substance and Sexual Activity    Alcohol use: Not Currently    Drug use: No    Sexual activity: Not on file      Review of Systems (negative unless bolded):  General: fevers, chills, fatigue  CV:  chest pain, palpitations, leg swelling  Msk: bodyaches, neck pain, neck stiffness  Skin: rashes, open wounds, nonhealing ulcers  Hem: bleeds easily, bruise easily,  immunocompromised  Neuro: dizziness, light headedness, headaches  Psych: anxious, depressed, anger issues    Attention: This note has been scribed by Ibeth Isidro under the supervision of Shan Alexandra MD.      Shan Alexandra MD   Hand, Wrist, & Elbow Surgery  [email or web address]  t: [phone]  f: [phone]

## 2024-06-14 NOTE — ANESTHESIA PROCEDURE NOTES
Regional Block    Performed by: Perlita Kaminski MD  Authorized by: Perlita Kaminski MD      General Information and Staff    Start Time:   Anesthesiologist:  Perlita Kaminski MD  Performed by:  Anesthesiologist  Patient Location:  OR    Block Placement: Post Induction  Site Identification: real time ultrasound guided and image stored and retrievable    Block site/laterality marked before start: site marked  Reason for Block: at surgeon's request and post-op pain management    Preanesthetic Checklist: 2 patient identifers, IV checked, risks and benefits discussed, monitors and equipment checked, pre-op evaluation, timeout performed, anesthesia consent, sterile technique used, no prohibitive neurological deficits and no local skin infection at insertion site      Procedure Details    Patient Position:  Sitting  Prep: ChloraPrep    Monitoring:  Cardiac monitor, continuous pulse ox and blood pressure cuff  Block Type:  Supraclavicular  Laterality:  Left  Injection Technique:  Single-shot    Needle    Needle Type:  Short-bevel and echogenic  Needle Gauge:  21 G  Needle Length:  50 mm  Needle Localization:  Ultrasound guidance  Reason for Ultrasound Use: appropriate spread of the medication was noted in real time and no ultrasound evidence of intravascular and/or intraneural injection            Assessment    Injection Assessment:  Good spread noted, negative resistance, negative aspiration for heme, incremental injection, low pressure and local visualized surrounding nerve on ultrasound  Heart Rate Change: No    - Patient tolerated block procedure well without evidence of immediate block related complications.     Medications      Additional Comments    Medication:  Ropivacaine 0.5% 25mL with 1:200,000 epi

## 2024-06-14 NOTE — OPERATIVE REPORT
Operative Report     Patient Name: Teresita Henderson    6/14/2024     Preoperative Diagnosis:     Cubital tunnel syndrome on left [G56.21]    Postoperative Diagnosis:     Same as above    Surgeons and Role:     * Shan Alexandra MD - Primary     Assistant: PA: Hina Hathaway PA    A SA/PA was needed for the successful completion of this case. They were essential for the proper positioning of patient, manipulation of instruments, proper exposure, manipulation of soft tissue and/or fracture fragments, and wound closure.     Procedures:     Right ulnar nerve neurolysis with subcutaneous transposition (CPT 81493, Modifier 22 - 50% increase complexity due to altered tissue planes and severe elbow deformity)    Antibiotics: Clindamycin    Surgical Findings:  Two kink points -proximal kevon point was 5 to 6 cm proximal to the medial condyle, second kevon point was at the FCU heads.  Dilation and compression of the ulnar nerve was noted in between these 2 kink points.  Epineurial scarring/adhesions    Anesthesia: Regional + General    Complications: None    Condition: Stable    Estimated Blood Loss: 15 mL    Indications:  84 year old female cubital tunnel syndrome that failed non-surgical management.  Patient previously had surgery for a elbow fracture when she was much younger.  She subsequently was found to have severe ulnar nerve neuropathy localized to the elbow.  On examination  the nerve was found to be likely previously transposed.  Patient consented to revision ulnar nerve decompression at the level of the elbow with subcutaneous transposition having understood the risks associated with surgery, expected outcome, time to recovery and the need for possible rehab.  Risks that were discussed but not limited to infection, nerve injury, tendon injury, artery injury, need for additional surgery, and no improvements of present symptoms.      Procedure:  Patient was met in the preoperative holding area where consent was  verified, laterality was marked with the surgeon's initials, and the H&P was updated. Patient was brought to the operating room on a transport cart. Patient was then transferred onto the operating room table and placed in supine position with an arm table and with bony prominences well-padded.  SCDs were placed.  Antibiotics were fully infused. The arm was then prepped and draped in the usual sterile fashion. A surgical timeout was performed.  A sterile upper arm tourniquet was placed and the limb was exsanguinated using Esmarch bandage. Tourniquet was raised to 250mmHg.     A 15 blade was used to make incision through the previous incision.  Incision was carried through the dermis to the subcutaneous tissue. Bipolar cautery was used to cauterize any small crossing vessels. Adson and castorena scissors were used to dissect through the subcutaneous tissue proximal to the medial epicondyle.  The ulnar nerve was identified just medial to the triceps.  Decompression of the nerve was performed proximally first.  A kink point was identified near arcade of [place].  The arcade of [place] was divided.  At this level and proximal the ulnar nerve appeared to be normal caliber.  Distal to this point the ulnar nerve was noted to be dilated and flattened.  Careful neurolysis of the ulnar nerve was then performed distally.  The ulnar nerve was scarred down to the flexor pronator fascia.  The ulnar nerve was identified to be anterior to the medial epicondyle.  Branches to FCU muscle were identified and individually neurolysed.  The fascia overlying the FCU was isolated from the FCU muscle underneath with castorena scissors. The fascia was then divided. Castorena scissor was used to spread the FCU muscle fiber to prevent injury to motor branches to FCU muscle. Fascial bands between FCU muscles were identified and divided.  The ulnar nerve distal to the FCU heads was noted to be normal in caliber.  Bipolar cautery was used to elevate  subcutaneous tissue off of the flexor pronator mass. A 2-3 cm proximally based fascial flap was created using the humeral FCU fascia.  The FCU muscle was released off of the medial upper condyle. One motor branches was left proximal to fascial flap and one distal to fascial flap.  Once the nerve was transposed anterior to the medial epicondyle, fascial flap was sutured to the subcutaneous tissue using 0 Vicryl in a horizontal mattress fashion.  The elbow was fully flexed and extended with no compression of the ulnar nerve by the fascial flap.  Nerve was noted to be stable in the anterior subcutaneous pocket.  No kinking was noted along the path of the ulnar nerve.    The tourniquet was then lowered and bipolar cautery was used to achieve hemostasis.       Closure:    The cubital tunnel release incision was reapproximated using 3-0 Monocryl in a buried simple interrupted fashion. 4-0 Monocryl was used in a subcuticular fashion. Exofilm skin glue was applied and Steri-Strips were placed.     Dressing/Splint:  Tegaderm with a pad was applied over the incisions. An ace wrap was placed over the elbow for gentle compression and swelling control. Patient's arm was placed in a sling.    Post Operative:  Patient was woken up from anesthesia and taken to PACU for further recovery and discharge home.    Shan Alexandra MD   Hand, Wrist, & Elbow Surgery  [email or web address]  t: [phone]  f: [phone]

## 2024-06-14 NOTE — ANESTHESIA POSTPROCEDURE EVALUATION
[facility]    Teresita Henderson Patient Status:  Hospital Outpatient Surgery   Age/Gender 84 year old female MRN [id number]   Location [facility] POST ANESTHESIA CARE UNIT Attending Shan Alexandra MD   Hosp Day # 0 PCP Nkechi Ariza MD       Anesthesia Post-op Note    LEFT CUBITAL TUNNEL RELEASE    Procedure Summary       Date: 06/14/24 Room / Location:  MAIN OR 06 / [facility] MAIN OR    Anesthesia Start: 0917 Anesthesia Stop: 1129    Procedure: LEFT CUBITAL TUNNEL RELEASE (Left: Elbow) Diagnosis:       Cubital tunnel syndrome on right      (Cubital tunnel syndrome on left)    Surgeons: Shan Alexandra MD Anesthesiologist: Perlita Kaminski MD    Anesthesia Type: general ASA Status: 3            Anesthesia Type: general    Vitals Value Taken Time   /90 06/14/24 1120   Temp 98.6 °F (37 °C) 06/14/24 1130   Pulse 70 06/14/24 1132   Resp 13 06/14/24 1132   SpO2 97 % 06/14/24 1132   Vitals shown include unfiled device data.    Patient Location: PACU    Anesthesia Type: general    Airway Patency: patent    Postop Pain Control: adequate    Mental Status: preanesthetic baseline    Nausea/Vomiting: none    Cardiopulmonary/Hydration status: stable euvolemic    Complications: no apparent anesthesia related complications    Postop vital signs: stable    Dental Exam: Unchanged from Preop

## 2024-06-14 NOTE — DISCHARGE INSTRUCTIONS
What to expect after a cubital tunnel release     Immediately after your surgery, keep your hand elevated (fingers up pointing to ceiling) as much as possible for the first 7 days. Icing is good too, but not as important as elevation. It is normal for your fingers to swell and have discoloration (bruising) appear a couple days after surgery into your fingers or elbow. You should begin using your hand for light activities - writing, typing, dressing and feeding yourself immediately. You can start to move the elbow right away. Refrain from lifting greater than 2 pounds for the first 2 weeks after your surgery to allow the surgical site to heal completely.    Patient’s pain level is variable after this procedure most patients average a 2-3 out of 10 on a pain scale.. After the numbness wears off you can take pain medication as needed, including an anti-inflammatory (i.e. Aleve, Motrin, ibuprofen) as long as you don’t have any contraindications to taking these.    Keep the clear Tegaderm dressing on for 5 days. You can get the clear dressing wet right away when washing hands or showering.  Keep the ace bandage on to help with swelling, but you can take it off for showering but put back on after. You can continue to let it get wet after the clear dressing is removed around day 5. There are steri strips underneath that can get wet and will fall off on their own. Please refrain from soaking your arm in a bath for 3 weeks following your surgery. You only need to use the sling until the block wears off and you can control your arm again.     You will see Hina Hathaway PA-C or Dr Alexandra at your post op visit approximately 7-14 days after surgery.  An appointment was made for you but if you do not have an appointment or that time does not work please call the office     If you have any questions or concerns please reach out. Our office number is: [phone].

## 2024-06-27 ENCOUNTER — OFFICE VISIT (OUTPATIENT)
Dept: ORTHOPEDICS CLINIC | Facility: CLINIC | Age: 84
End: 2024-06-27

## 2024-06-27 VITALS — WEIGHT: 135 LBS | BODY MASS INDEX: 24.84 KG/M2 | HEIGHT: 62 IN

## 2024-06-27 DIAGNOSIS — G56.22 CUBITAL TUNNEL SYNDROME ON LEFT: Primary | ICD-10-CM

## 2024-06-27 PROCEDURE — 99024 POSTOP FOLLOW-UP VISIT: CPT | Performed by: PHYSICIAN ASSISTANT

## 2024-06-27 RX ORDER — NITROFURANTOIN 25; 75 MG/1; MG/1
100 CAPSULE ORAL DAILY
Qty: 30 CAPSULE | Refills: 0 | Status: ON HOLD | OUTPATIENT
Start: 2024-06-27

## 2024-06-27 NOTE — PROGRESS NOTES
Clinic Note     Assessment/Plan:  84 year old female    Chronic left elbow deformity - Radiographs are stable without significant change upon my review. Currently in minimal pain from elbow. Observe. Discontinue sling/splint. Pain in upper arm could be muscle strain.  Left ulnar nerve subcutaneous transposition 6/14/2024-patient is overall doing well.  Her pain in the elbow is likely a flareup of significant underlying arthritis.  I do think her elbow pain will improve.  We discussed working on active elbow flexion to prevent stiffness.  I do not want her to attend formal therapy at this point as she is having a lot of pain in the elbow I think that may aggravate it.  She will take pain medicine as needed.  She is mostly taking Tylenol.  Her numbness and tingling does seem somewhat better since before surgery.  All of the questions were answered.  Follow Up: 4 weeks    Diagnostic Studies:     EMG/NCV:   1.  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  2.  In addition, there is suggestion of a chronic left C7 and C8 cervical radiculopathy, without active denervation changes.'    XR L elbow: Chronic deformity of left elbow. No acute changes from XR in October. HO at ulnar groove       Physical Exam:     Ht 5' 2\" (1.575 m)   Wt 135 lb (61.2 kg)   BMI 24.69 kg/m²     Constitutional: NAD. AOx3. Well-developed and Well-nourished.   Psychiatric: Normal mood/ affect/ behavior. Judgment and thought content normal.     Left Upper Extremity:     Inspection    Incision healing well with no signs of infection   Palpation    No TTP over ulnar nerve      ROM    Full composite fist. and Normal symmetric wrist motion.    Elbow ROM      Neurovascular    Normal sensation in the median and radial nerve distribution. Normal motor function of muscles innervated by median/AIN, ulnar, and radial/PIN nerves.    Normally perfused hand(s).    Ulnar  Nerve  No subluxation of ulnar nerve, 10% dec SF, 50% dec RF, 10% dec MF  Atrophy FDI, Hypothenar, Subtle clawing      Sensory: Small finger has full sensation on today's exam             CC: Left elbow pain    HPI: This 84 year old RHD female presents with left elbow pain. She mentioned significant pain within her elbow. She has tried some conservative measures with minimal change. Numbness/Tingling and weakness are noted    Onset: elbow deformity since childhood, symptoms in ulnar nerve more recently  Pain Character: elbow  Pain Level: severe  Pain @ Night: Yes    Treatments Tried: Tramadol, Anti-inflammatory medications    Occupation: N/a    Interval history: Patient underwent ulnar nerve revision transposition.  She is having a lot of elbow pain but slight improvement in numbness and tingling      History/Other:   Past Medical History:    A-fib (HCC)    Abdominal distention    Abnormal mammogram    Allergic rhinitis    Anesthesia complication    Pt's sons have a history of pseudocholinesterase deficiency.    Antral gastritis    Arrhythmia    AF    Arthritis    Bad breath    Belching    Bloating    Blood in urine    Breast CA (HCC)    Calculus of kidney    Cancer (HCC)    right breast cancer    Carpal tunnel syndrome    Cataract    Cervical spondylosis without myelopathy    Chondromalacia of patella    Constipation    Coronary atherosclerosis    stents    Coronary atherosclerosis of native coronary artery    Diabetes (HCC)    Diet controlled    Diverticulosis    Ductal carcinoma in situ (DCIS) of right breast, 1992    Esophageal reflux    Exposure to unspecified radiation    last dose 1992    Female stress incontinence    Flatulence/gas pain/belching    Frequent urination    Frequent use of laxatives    Frequent UTI    Frequent UTI    Gall stones    GERD (gastroesophageal reflux disease)    Glucose intolerance (impaired glucose tolerance)    Heart palpitations    High blood pressure    High cholesterol    History  of blood transfusion        Hoarseness, chronic    Hypothyroidism    Incomplete bladder emptying (aka 62495)    trial of [facility]IC 2014    Indigestion    Irregular bowel habits    L knee global 3/27/14    Leg swelling    Malignant neoplasm of breast (female), unspecified site    R    Nausea    Neoplasm of right breast, 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),     Osteoarthrosis, unspecified whether generalized or localized, unspecified site    generalized    Other and unspecified hyperlipidemia    Pacemaker    Pain in joints    Painful swallowing    Peripheral vascular disease (HCC)    PONV (postoperative nausea and vomiting)    Post-traumatic osteoarthritis of left elbow    Postmenopausal atrophic vaginitis    Presence of other cardiac implants and grafts    Primary localized osteoarthrosis, lower leg    Problems with swallowing    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)    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    Pure hypercholesterolemia    Rectocele    Recurrent UTI    group B strep    Recurrent UTI    S/P total knee replacement using cement    Sleep disturbance    Sputum production    Stented coronary artery    Uncomfortable fullness after meals    Unspecified disorder of thyroid    Visual impairment    reading glasses    Vitamin D deficiency     Past Surgical History:   Procedure Laterality Date    Angioplasty (coronary)      Breast lumpectomy Right 2015    Procedure: BREAST LUMPECTOMY;  Surgeon: Castro Marie MD;  Location: [facility]    Breast reconstruction Right       1977    Cabg  1997    x3    Cardiac pacemaker placement      [place] 2022    Cataract      Cath bare metal stent (bms)      Cholecystectomy      Colonoscopy  2016    tics; repeat 10 yrs    Colonoscopy  2017    Colonoscopy,diagnostic N/A 2016    Procedure: COLONOSCOPY, POSSIBLE BIOPSY, POSSIBLE POLYPECTOMY 85151;  Surgeon: Marissa  Rodolfo ARZATE MD;  Location: [facility]    Cystoscopy,rx female urethral synd  05/10/2013    cysto Ricardo DENTON    D & c  1976    Hysterectomy  1985    precancer and irregular bleeding. w/ BSO    Knee replacement surgery  2013    left     Lumpectomy right Right [id number]    Mastectomy right  2015      1964    Oophorectomy      AGE 47    Open heart surgery (pbp)      Other surgical history      LUMPECTOMY RIGHT BREAST     Other surgical history      A PMM LEFT KNEE , replacement    Other surgical history  2013    Venita DENTON    Other surgical history  2014    Cystoscopy- Dr. Figueroa    Other surgical history  2016    Bladder sling    Other surgical history       WATCHMEN    Pacemaker monitor      Patient documented not to have experienced any of the following events N/A 2016    Procedure: COLONOSCOPY, POSSIBLE BIOPSY, POSSIBLE POLYPECTOMY 53789;  Surgeon: Rodolfo Ann MD;  Location: [facility]    Patient withough preoperative order for iv antibiotic surgical site infection prophylaxis. N/A 2016    Procedure: COLONOSCOPY, POSSIBLE BIOPSY, POSSIBLE POLYPECTOMY 11275;  Surgeon: Rodolfo Ann MD;  Location: [facility]    Radiation right      1992    Reduction left  10/2015    Total knee replacement      LEFT     Upper gi endoscopy,exam  2011    repeat 5 yeas     Current Outpatient Medications   Medication Sig Dispense Refill    HYDROcodone-acetaminophen (NORCO) 5-325 MG Oral Tab Take 1 tablet by mouth every 6 (six) hours as needed for Pain. 20 tablet 0    nystatin-triamcinolone 100,000-0.1 Units/g-% External Cream Apply around SPT site twice daily as directed      nitrofurantoin monohydrate macro (MACROBID) 100 MG Oral Cap Take 1 capsule (100 mg total) by mouth daily.      prochlorperazine (COMPAZINE) 10 mg tablet Take 1 tablet (10 mg total) by mouth every 6 (six) hours as needed for Nausea. 15 tablet 0    metoprolol succinate ER 25 MG  Oral Tablet 24 Hr Take 1 tablet (25 mg total) by mouth daily. 12.5MG IN MORNING AND 25MG IN EVENING 45 tablet 0    ezetimibe 10 MG Oral Tab Take 1 tablet (10 mg total) by mouth nightly. 90 tablet 3    traMADol HCl 25 MG Oral Tab Take 25 mg by mouth 2 (two) times daily as needed (severe pain). 20 tablet 0    Lancets (ONETOUCH DELICA PLUS [id number]) Does not apply Misc 1 each daily. 100 each 6    Glucose Blood (ONETOUCH VERIO) In Vitro Strip Use as directed. 100 strip 1    levothyroxine 88 MCG Oral Tab TAKE 1 TABLET(88 MCG) BY MOUTH DAILY 90 tablet 3    metFORMIN HCl 1000 MG Oral Tab Take 1 tablet (1,000 mg total) by mouth 2 (two) times daily with meals. 180 tablet 0    gabapentin 100 MG Oral Cap Take 2 capsules (200 mg total) by mouth nightly. 180 capsule 2    Cholecalciferol (VITAMIN D) 50 MCG (2000 UT) Oral Cap Take by mouth daily.      Polyethylene Glycol 3350 17 g Oral Powd Pack Take 17 g by mouth daily as needed. 90 each 1    pravastatin 10 MG Oral Tab Take 1 tablet (10 mg total) by mouth nightly. 90 tablet 0    aspirin 81 MG Oral Tab EC Take 1 tablet (81 mg total) by mouth daily. 30 tablet 0    cetirizine 10 MG Oral Tab Take 1 tablet (10 mg total) by mouth daily.      pantoprazole 40 MG Oral Tab EC Take 1 tablet (40 mg total) by mouth daily.      dofetilide 125 MCG Oral Cap Take 1 capsule (125 mcg total) by mouth 2 (two) times daily.      fluticasone propionate 50 MCG/ACT Nasal Suspension 2 sprays by Each Nare route daily. 11.1 mL 3    Meth-Hyo-M Bl-Na Phos-Ph Sal (URIBEL) 118 MG Oral Cap Take 1 capsule by mouth 3 (three) times daily as needed. (Patient not taking: Reported on 6/5/2024)       Allergies   Allergen Reactions    Cephalexin ANAPHYLAXIS, HIVES and FACE FLUSHING    Ciprofloxacin MYALGIA, HIVES and OTHER (SEE COMMENTS)     Started on Rx 4/12/23? Not true allergy?    Fesoterodine SWELLING     Throat tightening  Throat tightening Throat tightening    Throat tightening Throat tightening      Throat  tightening    Fosfomycin ARRHYTHMIA     Interaction of arrhythmias with Tykosin    Levofloxacin MYALGIA, HIVES and OTHER (SEE COMMENTS)     Reports tendonitis    Reports tendonitis   Reports tendonitis   Reports tendonitis    Other ARRHYTHMIA     Macrolides and Fluoroquinolones    Penicillins HIVES and RASH     Tolerates ceftriaxone    Toviaz SWELLING and TONGUE SWELLING     Throat tightening    Throat tightening   Throat tightening    Atorvastatin OTHER (SEE COMMENTS) and NAUSEA AND VOMITING     GI upset, nausea and vomitting    GI upset, nausea and vomitting   GI upset, nausea and vomitting   GI upset, nausea and vomitting    Chlorhexidine OTHER (SEE COMMENTS)     Rash, redness    Chocolate OTHER (SEE COMMENTS)     headache    headache   headache   headache    Colesevelam OTHER (SEE COMMENTS) and UNKNOWN     GI upset    GI upset   GI upset   GI upset    Chocolate OTHER (SEE COMMENTS)     headache    Fish-Derived Products OTHER (SEE COMMENTS)     Per allergy testing    Mushrooms OTHER (SEE COMMENTS)     headache    Pecan OTHER (SEE COMMENTS)     Unknown     Shrimp UNKNOWN     Per allergy testing    Tomatoes OTHER (SEE COMMENTS)     headache    Hexachlorophene RASH and OTHER (SEE COMMENTS)     Family History   Problem Relation Age of Onset    Cancer Sister         throat    Diabetes Mother     Breast Cancer Self 52    Heart Disease Neg     Stroke Neg      Social History     Occupational History    Not on file   Tobacco Use    Smoking status: Former     Current packs/day: 0.00     Types: Cigarettes     Quit date: 1997     Years since quittin.0    Smokeless tobacco: Never   Vaping Use    Vaping status: Never Used   Substance and Sexual Activity    Alcohol use: Not Currently    Drug use: No    Sexual activity: Not on file      Review of Systems (negative unless bolded):  General: fevers, chills, fatigue  CV:  chest pain, palpitations, leg swelling  Msk: bodyaches, neck pain, neck stiffness  Skin: rashes, open  wounds, nonhealing ulcers  Hem: bleeds easily, bruise easily, immunocompromised  Neuro: dizziness, light headedness, headaches  Psych: anxious, depressed, anger issues  Hina Hathaway PA-C  Hand, Wrist, & Elbow Surgery  Physician Assistant to Dr. [email or web address]  t: [phone]  f: [phone]

## 2024-06-27 NOTE — TELEPHONE ENCOUNTER
Requesting    nitrofurantoin monohydrate macro (MACROBID) 100 MG Oral Cap         Sig: Take 1 capsule (100 mg total) by mouth daily.    Disp: Not specified    Refills: 0    Start: 6/27/2024    Class: Normal    Last ordered: 3 weeks ago (6/5/2024) by Reported External/Patient       To be filled at: [id number] access hospital [address] AT [facility] [address] [facility] [address] , [phone], [phone]     LOV: 06/05/24 w/ KS  RTC: 09/05/24  Last Relevant Labs: 03/2024  No future appointments.

## 2024-06-28 ENCOUNTER — HOSPITAL ENCOUNTER (INPATIENT)
Facility: HOSPITAL | Age: 84
LOS: 4 days | Discharge: HOME | End: 2024-07-02
Attending: EMERGENCY MEDICINE | Admitting: STUDENT IN AN ORGANIZED HEALTH CARE EDUCATION/TRAINING PROGRAM
Payer: MEDICARE

## 2024-06-28 DIAGNOSIS — N39.0 URINARY TRACT INFECTION WITHOUT HEMATURIA, SITE UNSPECIFIED: Primary | ICD-10-CM

## 2024-06-28 PROBLEM — E78.5 DYSLIPIDEMIA: Status: ACTIVE | Noted: 2024-06-28

## 2024-06-28 LAB
ANION GAP SERPL CALC-SCNC: 5 MMOL/L (ref 0–18)
BASOPHILS # BLD AUTO: 0.05 X10(3) UL (ref 0–0.2)
BASOPHILS NFR BLD AUTO: 0.8 %
BUN BLD-MCNC: 19 MG/DL (ref 9–23)
CALCIUM BLD-MCNC: 9.2 MG/DL (ref 8.5–10.1)
CHLORIDE SERPL-SCNC: 102 MMOL/L (ref 98–112)
CO2 SERPL-SCNC: 28 MMOL/L (ref 21–32)
CREAT BLD-MCNC: 0.84 MG/DL
EGFRCR SERPLBLD CKD-EPI 2021: 69 ML/MIN/1.73M2 (ref 60–?)
EOSINOPHIL # BLD AUTO: 0.12 X10(3) UL (ref 0–0.7)
EOSINOPHIL NFR BLD AUTO: 2 %
ERYTHROCYTE [DISTWIDTH] IN BLOOD BY AUTOMATED COUNT: 15.3 %
GLUCOSE BLD-MCNC: 174 MG/DL (ref 70–99)
GLUCOSE BLD-MCNC: 223 MG/DL (ref 70–99)
HCT VFR BLD AUTO: 32.3 %
HGB BLD-MCNC: 10.5 G/DL
IMM GRANULOCYTES # BLD AUTO: 0.03 X10(3) UL (ref 0–1)
IMM GRANULOCYTES NFR BLD: 0.5 %
LYMPHOCYTES # BLD AUTO: 1.65 X10(3) UL (ref 1–4)
LYMPHOCYTES NFR BLD AUTO: 27.2 %
MCH RBC QN AUTO: 28.4 PG (ref 26–34)
MCHC RBC AUTO-ENTMCNC: 32.5 G/DL (ref 31–37)
MCV RBC AUTO: 87.3 FL
MONOCYTES # BLD AUTO: 0.64 X10(3) UL (ref 0.1–1)
MONOCYTES NFR BLD AUTO: 10.6 %
NEUTROPHILS # BLD AUTO: 3.57 X10 (3) UL (ref 1.5–7.7)
NEUTROPHILS # BLD AUTO: 3.57 X10(3) UL (ref 1.5–7.7)
NEUTROPHILS NFR BLD AUTO: 58.9 %
OSMOLALITY SERPL CALC.SUM OF ELEC: 286 MOSM/KG (ref 275–295)
PLATELET # BLD AUTO: 249 10(3)UL (ref 150–450)
POTASSIUM SERPL-SCNC: 4 MMOL/L (ref 3.5–5.1)
RBC # BLD AUTO: 3.7 X10(6)UL
SODIUM SERPL-SCNC: 135 MMOL/L (ref 136–145)
WBC # BLD AUTO: 6.1 X10(3) UL (ref 4–11)

## 2024-06-28 PROCEDURE — 99223 1ST HOSP IP/OBS HIGH 75: CPT | Performed by: INTERNAL MEDICINE

## 2024-06-28 RX ORDER — CHOLECALCIFEROL (VITAMIN D3) 125 MCG
2000 CAPSULE ORAL DAILY
Status: DISCONTINUED | OUTPATIENT
Start: 2024-06-29 | End: 2024-07-02

## 2024-06-28 RX ORDER — POLYETHYLENE GLYCOL 3350 17 G/17G
17 POWDER, FOR SOLUTION ORAL DAILY PRN
Qty: 90 EACH | Refills: 1 | Status: ON HOLD | OUTPATIENT
Start: 2024-06-28

## 2024-06-28 RX ORDER — HEPARIN SODIUM 5000 [USP'U]/ML
5000 INJECTION, SOLUTION INTRAVENOUS; SUBCUTANEOUS EVERY 8 HOURS SCHEDULED
Status: DISCONTINUED | OUTPATIENT
Start: 2024-06-28 | End: 2024-07-02

## 2024-06-28 RX ORDER — LEVOTHYROXINE SODIUM 88 UG/1
88 TABLET ORAL DAILY
Status: DISCONTINUED | OUTPATIENT
Start: 2024-06-29 | End: 2024-07-02

## 2024-06-28 RX ORDER — PRAVASTATIN SODIUM 10 MG
10 TABLET ORAL NIGHTLY
Status: DISCONTINUED | OUTPATIENT
Start: 2024-06-28 | End: 2024-07-02

## 2024-06-28 RX ORDER — DEXTROSE MONOHYDRATE 25 G/50ML
50 INJECTION, SOLUTION INTRAVENOUS
Status: DISCONTINUED | OUTPATIENT
Start: 2024-06-28 | End: 2024-07-02

## 2024-06-28 RX ORDER — METOPROLOL SUCCINATE 25 MG/1
25 TABLET, EXTENDED RELEASE ORAL EVERY EVENING
Status: DISCONTINUED | OUTPATIENT
Start: 2024-06-28 | End: 2024-07-02

## 2024-06-28 RX ORDER — METOPROLOL SUCCINATE 25 MG/1
25 TABLET, EXTENDED RELEASE ORAL EVERY EVENING
Status: DISCONTINUED | OUTPATIENT
Start: 2024-06-29 | End: 2024-06-28

## 2024-06-28 RX ORDER — ASPIRIN 81 MG/1
81 TABLET ORAL DAILY
Status: DISCONTINUED | OUTPATIENT
Start: 2024-06-28 | End: 2024-07-02

## 2024-06-28 RX ORDER — NICOTINE POLACRILEX 4 MG
15 LOZENGE BUCCAL
Status: DISCONTINUED | OUTPATIENT
Start: 2024-06-28 | End: 2024-07-02

## 2024-06-28 RX ORDER — ONDANSETRON 2 MG/ML
4 INJECTION INTRAMUSCULAR; INTRAVENOUS EVERY 6 HOURS PRN
Status: DISCONTINUED | OUTPATIENT
Start: 2024-06-28 | End: 2024-07-02

## 2024-06-28 RX ORDER — FLUTICASONE PROPIONATE 50 MCG
2 SPRAY, SUSPENSION (ML) NASAL DAILY
Status: DISCONTINUED | OUTPATIENT
Start: 2024-06-29 | End: 2024-07-02

## 2024-06-28 RX ORDER — SODIUM CHLORIDE 9 MG/ML
INJECTION, SOLUTION INTRAVENOUS CONTINUOUS
Status: DISCONTINUED | OUTPATIENT
Start: 2024-06-28 | End: 2024-06-30

## 2024-06-28 RX ORDER — NICOTINE POLACRILEX 4 MG
30 LOZENGE BUCCAL
Status: DISCONTINUED | OUTPATIENT
Start: 2024-06-28 | End: 2024-07-02

## 2024-06-28 RX ORDER — EZETIMIBE 10 MG/1
10 TABLET ORAL NIGHTLY
Status: DISCONTINUED | OUTPATIENT
Start: 2024-06-28 | End: 2024-07-02

## 2024-06-28 RX ORDER — DOFETILIDE 0.12 MG/1
125 CAPSULE ORAL 2 TIMES DAILY
Status: DISCONTINUED | OUTPATIENT
Start: 2024-06-28 | End: 2024-07-02

## 2024-06-28 RX ORDER — PANTOPRAZOLE SODIUM 40 MG/1
40 TABLET, DELAYED RELEASE ORAL DAILY
Status: DISCONTINUED | OUTPATIENT
Start: 2024-06-29 | End: 2024-07-02

## 2024-06-28 RX ORDER — ACETAMINOPHEN 500 MG
500 TABLET ORAL EVERY 4 HOURS PRN
Status: DISCONTINUED | OUTPATIENT
Start: 2024-06-28 | End: 2024-07-02

## 2024-06-28 RX ORDER — SODIUM CHLORIDE 9 MG/ML
125 INJECTION, SOLUTION INTRAVENOUS CONTINUOUS
Status: DISCONTINUED | OUTPATIENT
Start: 2024-06-28 | End: 2024-06-30

## 2024-06-28 RX ORDER — METOCLOPRAMIDE HYDROCHLORIDE 5 MG/ML
10 INJECTION INTRAMUSCULAR; INTRAVENOUS EVERY 8 HOURS PRN
Status: DISCONTINUED | OUTPATIENT
Start: 2024-06-28 | End: 2024-07-02

## 2024-06-28 RX ORDER — GABAPENTIN 100 MG/1
200 CAPSULE ORAL NIGHTLY
Status: DISCONTINUED | OUTPATIENT
Start: 2024-06-28 | End: 2024-07-02

## 2024-06-28 NOTE — H&P
[facility]     Teresita Henderson Patient Status:  Emergency    5/15/1940 MRN [id number]   Location [facility] EMERGENCY DEPARTMENT Attending Rohini Vo,    Hosp Day # 0 PCP Nkechi Ariza MD     Chief Complaint: UTI    Subjective:    History of Present Illness:     Teresita Henderson is a 84 year old female with PMhx of Afib, breast cancer, DM, HTN, DL, recurrent UTIs presents with UTI and positive urine culture. Pt states she has been taking macrobid without relief. She has noticed some abd pain and cramping. She denies any blood in her urine. No fevers but she does have chills. She does have suprapubic catheter. She denies any N/V/D. No CP or SOB. Pt is frustrated as she has had probably 15 UTIs in the past 20 months. Her suprapubic was exchanged yesterday.     History/Other:    Past Medical History:  Past Medical History:    A-fib (HCC)    Abdominal distention    Abnormal mammogram    Allergic rhinitis    Anesthesia complication    Pt's sons have a history of pseudocholinesterase deficiency.    Antral gastritis    Arrhythmia    AF    Arthritis    Bad breath    Belching    Bloating    Blood in urine    Breast CA (HCC)    Calculus of kidney    Cancer (HCC)    right breast cancer    Carpal tunnel syndrome    Cataract    Cervical spondylosis without myelopathy    Chondromalacia of patella    Constipation    Coronary atherosclerosis    stents    Coronary atherosclerosis of native coronary artery    Diabetes (HCC)    Diet controlled    Diverticulosis    Ductal carcinoma in situ (DCIS) of right breast,     Esophageal reflux    Exposure to unspecified radiation    last dose     Female stress incontinence    Flatulence/gas pain/belching    Frequent urination    Frequent use of laxatives    Frequent UTI    Frequent UTI    Gall stones    GERD (gastroesophageal reflux disease)    Glucose intolerance (impaired glucose tolerance)    Heart palpitations    High blood  pressure    High cholesterol    History of blood transfusion        Hoarseness, chronic    Hypothyroidism    Incomplete bladder emptying (aka 49142)    trial of CIC 2014    Indigestion    Irregular bowel habits    L knee global 3/27/14    Leg swelling    Malignant neoplasm of breast (female), unspecified site    R    Nausea    Neoplasm of right breast, 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),     Osteoarthrosis, unspecified whether generalized or localized, unspecified site    generalized    Other and unspecified hyperlipidemia    Pacemaker    Pain in joints    Painful swallowing    Peripheral vascular disease (HCC)    PONV (postoperative nausea and vomiting)    Post-traumatic osteoarthritis of left elbow    Postmenopausal atrophic vaginitis    Presence of other cardiac implants and grafts    Primary localized osteoarthrosis, lower leg    Problems with swallowing    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)    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    Pure hypercholesterolemia    Rectocele    Recurrent UTI    group B strep    Recurrent UTI    S/P total knee replacement using cement    Sleep disturbance    Sputum production    Stented coronary artery    Uncomfortable fullness after meals    Unspecified disorder of thyroid    Visual impairment    reading glasses    Vitamin D deficiency     Past Surgical History:   Past Surgical History:   Procedure Laterality Date    Angioplasty (coronary)      Breast lumpectomy Right 2015    Procedure: BREAST LUMPECTOMY;  Surgeon: Castro Marie MD;  Location: [facility] MAIN OR    Breast reconstruction Right       1977    Cabg  1997    x3    Cardiac pacemaker placement      [place] 2022    Cataract      Cath bare metal stent (bms)      Cholecystectomy      Colonoscopy  2016    tics; repeat 10 yrs    Colonoscopy  2017    Colonoscopy,diagnostic N/A 2016    Procedure: COLONOSCOPY,  POSSIBLE BIOPSY, POSSIBLE POLYPECTOMY 32514;  Surgeon: Rodolfo Ann MD;  Location: [facility]    Cystoscopy,rx female urethral synd  05/10/2013    cysto UD, Ricardo    D & c  1976    Hysterectomy  1985    precancer and irregular bleeding. w/ BSO    Knee replacement surgery  2013    left     Lumpectomy right Right [id number]    Mastectomy right  2015      1964    Oophorectomy      AGE 47    Open heart surgery (pbp)      Other surgical history      LUMPECTOMY RIGHT BREAST     Other surgical history      A PMM LEFT KNEE , replacement    Other surgical history  2013    Venita DENTON    Other surgical history  2014    Cystoscopy- Dr. Figueroa    Other surgical history  2016    Bladder sling    Other surgical history       WATCHMEN    Pacemaker monitor      Patient documented not to have experienced any of the following events N/A 2016    Procedure: COLONOSCOPY, POSSIBLE BIOPSY, POSSIBLE POLYPECTOMY 39666;  Surgeon: Rodolfo Ann MD;  Location: [facility]    Patient withough preoperative order for iv antibiotic surgical site infection prophylaxis. N/A 2016    Procedure: COLONOSCOPY, POSSIBLE BIOPSY, POSSIBLE POLYPECTOMY 72387;  Surgeon: Rodolfo Ann MD;  Location: [facility]    Radiation right          Reduction left  10/2015    Total knee replacement      LEFT     Upper gi endoscopy,exam  2011    repeat 5 yeas      Family History:   Family History   Problem Relation Age of Onset    Cancer Sister         throat    Diabetes Mother     Breast Cancer Self 52    Heart Disease Neg     Stroke Neg      Social History:    reports that she quit smoking about 27 years ago. Her smoking use included cigarettes. She has never used smokeless tobacco. She reports that she does not currently use alcohol. She reports that she does not use drugs.     Allergies:   Allergies   Allergen Reactions    Cephalexin ANAPHYLAXIS, HIVES and FACE FLUSHING     Ciprofloxacin MYALGIA, HIVES and OTHER (SEE COMMENTS)     Started on Rx 4/12/23? Not true allergy?    Fesoterodine SWELLING     Throat tightening  Throat tightening Throat tightening    Throat tightening Throat tightening      Throat tightening    Fosfomycin ARRHYTHMIA     Interaction of arrhythmias with Tykosin    Levofloxacin MYALGIA, HIVES and OTHER (SEE COMMENTS)     Reports tendonitis    Reports tendonitis   Reports tendonitis   Reports tendonitis    Other ARRHYTHMIA     Macrolides and Fluoroquinolones    Penicillins HIVES and RASH     Tolerates ceftriaxone    Toviaz SWELLING and TONGUE SWELLING     Throat tightening    Throat tightening   Throat tightening    Atorvastatin OTHER (SEE COMMENTS) and NAUSEA AND VOMITING     GI upset, nausea and vomitting    GI upset, nausea and vomitting   GI upset, nausea and vomitting   GI upset, nausea and vomitting    Chlorhexidine OTHER (SEE COMMENTS)     Rash, redness    Chocolate OTHER (SEE COMMENTS)     headache    headache   headache   headache    Colesevelam OTHER (SEE COMMENTS) and UNKNOWN     GI upset    GI upset   GI upset   GI upset    Chocolate OTHER (SEE COMMENTS)     headache    Fish-Derived Products OTHER (SEE COMMENTS)     Per allergy testing    Mushrooms OTHER (SEE COMMENTS)     headache    Pecan OTHER (SEE COMMENTS)     Unknown     Shrimp UNKNOWN     Per allergy testing    Tomatoes OTHER (SEE COMMENTS)     headache    Hexachlorophene RASH and OTHER (SEE COMMENTS)       Medications:    Current Facility-Administered Medications on File Prior to Encounter   Medication Dose Route Frequency Provider Last Rate Last Admin    [COMPLETED] prochlorperazine (Compazine) tab 10 mg  10 mg Oral Once Eva Atkinson MD   10 mg at 05/13/24 2112    [COMPLETED] magnesium oxide (Mag-Ox) tab 400 mg  400 mg Oral Once Allie Granger APRN   400 mg at 04/27/24 1230    [COMPLETED] sodium chloride 0.9 % IV bolus 1,000 mL  1,000 mL Intravenous Once Radha Mejia DO   Stopped at  04/26/24 1916    [COMPLETED] cloNIDine (Catapres) tab 0.1 mg  0.1 mg Oral Once Radha Mejia, DO   0.1 mg at 04/26/24 1705    [COMPLETED] prochlorperazine (Compazine) 10 MG/2ML injection 10 mg  10 mg Intravenous Once Yessenia Mejiaa S, DO   10 mg at 04/26/24 1704    [COMPLETED] iopamidol 76% (ISOVUE-370) injection for power injector  75 mL Intravenous ONCE PRN Mejia Radha S, DO   75 mL at 04/26/24 1742    [COMPLETED] gentamicin (Garamycin) 260 mg in sodium chloride 0.9% 100 mL IVPB  5 mg/kg (Ideal) Intravenous Once Mejia Radha S, DO   Stopped at 04/26/24 1916    [COMPLETED] iopamidol 76% (ISOVUE-370) injection for power injector  65 mL Intravenous ONCE PRN Mejia Radha S, DO   65 mL at 04/26/24 1752    [COMPLETED] HYDROcodone-acetaminophen (Norco) 5-325 MG per tab 1 tablet  1 tablet Oral Once Alexis Jo APRN   1 tablet at 04/24/24 1949    [COMPLETED] fluconazole (Diflucan) tab 200 mg  200 mg Oral Once Teodoro Reynaga MD   200 mg at 04/19/24 2011    [COMPLETED] clindamycin (Cleocin) cap 300 mg  300 mg Oral Once Teodoro Reynaga MD   300 mg at 04/19/24 2011     Current Outpatient Medications on File Prior to Encounter   Medication Sig Dispense Refill    Polyethylene Glycol 3350 17 g Oral Powd Pack Take 17 g by mouth daily as needed. 90 each 1    nitrofurantoin monohydrate macro (MACROBID) 100 MG Oral Cap Take 1 capsule (100 mg total) by mouth daily. 30 capsule 0    HYDROcodone-acetaminophen (NORCO) 5-325 MG Oral Tab Take 1 tablet by mouth every 6 (six) hours as needed for Pain. 20 tablet 0    nystatin-triamcinolone 100,000-0.1 Units/g-% External Cream Apply around SPT site twice daily as directed      prochlorperazine (COMPAZINE) 10 mg tablet Take 1 tablet (10 mg total) by mouth every 6 (six) hours as needed for Nausea. 15 tablet 0    metoprolol succinate ER 25 MG Oral Tablet 24 Hr Take 1 tablet (25 mg total) by mouth daily. 12.5MG IN MORNING AND 25MG IN EVENING 45 tablet 0    ezetimibe 10 MG Oral Tab  Take 1 tablet (10 mg total) by mouth nightly. 90 tablet 3    traMADol HCl 25 MG Oral Tab Take 25 mg by mouth 2 (two) times daily as needed (severe pain). 20 tablet 0    Lancets (ONETOUCH DELICA PLUS [id number]) Does not apply Misc 1 each daily. 100 each 6    Glucose Blood (ONETOUCH VERIO) In Vitro Strip Use as directed. 100 strip 1    levothyroxine 88 MCG Oral Tab TAKE 1 TABLET(88 MCG) BY MOUTH DAILY 90 tablet 3    metFORMIN HCl 1000 MG Oral Tab Take 1 tablet (1,000 mg total) by mouth 2 (two) times daily with meals. 180 tablet 0    gabapentin 100 MG Oral Cap Take 2 capsules (200 mg total) by mouth nightly. 180 capsule 2    Cholecalciferol (VITAMIN D) 50 MCG (2000 UT) Oral Cap Take by mouth daily.      pravastatin 10 MG Oral Tab Take 1 tablet (10 mg total) by mouth nightly. 90 tablet 0    Meth-Hyo-M Bl-Na Phos-Ph Sal (URIBEL) 118 MG Oral Cap Take 1 capsule by mouth 3 (three) times daily as needed. (Patient not taking: Reported on 6/5/2024)      aspirin 81 MG Oral Tab EC Take 1 tablet (81 mg total) by mouth daily. 30 tablet 0    cetirizine 10 MG Oral Tab Take 1 tablet (10 mg total) by mouth daily.      pantoprazole 40 MG Oral Tab EC Take 1 tablet (40 mg total) by mouth daily.      dofetilide 125 MCG Oral Cap Take 1 capsule (125 mcg total) by mouth 2 (two) times daily.      fluticasone propionate 50 MCG/ACT Nasal Suspension 2 sprays by Each Nare route daily. 11.1 mL 3       Review of Systems:   A comprehensive review of systems was completed.    Pertinent positives and negatives noted in the HPI.    Objective:   Physical Exam:    /72   Pulse 70   Temp 98 °F (36.7 °C)   Resp 14   SpO2 98%   General: No acute distress, Alert  Respiratory: No rhonchi, no wheezes  Cardiovascular: S1, S2. Regular rate and rhythm  Abdomen: Soft, mild lower tender, non-distended, positive bowel sounds  Neuro: No new focal deficits  Extremities: trace edema      Results:    Labs:      Labs Last 24 Hours:    Recent Labs   Lab  06/28/24  1726   RBC 3.70*   HGB 10.5*   HCT 32.3*   MCV 87.3   MCH 28.4   MCHC 32.5   RDW 15.3   NEPRELIM 3.57   WBC 6.1   .0       Recent Labs   Lab 06/28/24  1721   *   BUN 19   CREATSERUM 0.84   EGFRCR 69   CA 9.2   *   K 4.0      CO2 28.0       No results found for: \"PT\", \"INR\"    No results for input(s): \"TROP\", \"TROPHS\", \"CK\" in the last 168 hours.    No results for input(s): \"TROP\", \"PBNP\" in the last 168 hours.    No results for input(s): \"PCT\" in the last 168 hours.    Imaging: Imaging data reviewed in Epic.    Assessment & Plan:      #Recurrent Suprapubic Catheter associated UTI  Continue empiric merrem  Follow cultures  ID to eval     #Afib  Rate controlled  On ASA    #Ess HTN  Continue home meds    #Dyslipidemia  Statin    #Breast Cancer    #DM2  SSI        Plan of care discussed with patient, ED Physician     Dev Dow MD    Supplementary Documentation:     The 21st Century Cures Act makes medical notes like these available to patients in the interest of transparency. Please be advised this is a medical document. Medical documents are intended to carry relevant information, facts as evident, and the clinical opinion of the practitioner. The medical note is intended as peer to peer communication and may appear blunt or direct. It is written in medical language and may contain abbreviations or verbiage that are unfamiliar.

## 2024-06-28 NOTE — ED PROVIDER NOTES
Patient Seen in: [facility] Emergency Department      History     Chief Complaint   Patient presents with    Urinary Symptoms     Stated Complaint: UTI, early culture positive    Subjective:   HPI      This is an 84-year-old female past medical history of PICC catheter, A-fib on aspirin, kidney stones, cataracts, coronary disease, diabetes, GERD, hypertension, frequent UTIs who presents stating she has a UTI and a positive urine culture.  Patient has been on Macrobid with no improvement of urinary symptoms.  She complains of suprapubic cramping.  She had a urinalysis done yesterday which demonstrated cloudy urine, positive nitrite, 3+ leuk esterase, greater than 60 WBCs.  No RBCs.  Apparently this urine culture was sent after changing of the suprapubic catheter.  Cultures still pending.  She is still symptomatic was sent to the ER for IV antibiotics.  She is at multiple organisms causing UTIs in the past as well as multiple drug allergies.  She denies any fever.  She has been nauseous but no vomiting.  No diarrhea.  She presents here for further evaluation.      Objective:   Past Medical History:    A-fib (HCC)    Abdominal distention    Abnormal mammogram    Allergic rhinitis    Anesthesia complication    Pt's sons have a history of pseudocholinesterase deficiency.    Antral gastritis    Arrhythmia    AF    Arthritis    Bad breath    Belching    Bloating    Blood in urine    Breast CA (HCC)    Calculus of kidney    Cancer (HCC)    right breast cancer    Carpal tunnel syndrome    Cataract    Cervical spondylosis without myelopathy    Chondromalacia of patella    Constipation    Coronary atherosclerosis    stents    Coronary atherosclerosis of native coronary artery    Diabetes (HCC)    Diet controlled    Diverticulosis    Ductal carcinoma in situ (DCIS) of right breast, 1992    Esophageal reflux    Exposure to unspecified radiation    last dose 1992    Female stress incontinence    Flatulence/gas  pain/belching    Frequent urination    Frequent use of laxatives    Frequent UTI    Frequent UTI    Gall stones    GERD (gastroesophageal reflux disease)    Glucose intolerance (impaired glucose tolerance)    Heart palpitations    High blood pressure    High cholesterol    History of blood transfusion        Hoarseness, chronic    Hypothyroidism    Incomplete bladder emptying (aka 39603)    trial of CIC 2014    Indigestion    Irregular bowel habits    L knee global 3/27/14    Leg swelling    Malignant neoplasm of breast (female), unspecified site    R    Nausea    Neoplasm of right breast, 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),     Osteoarthrosis, unspecified whether generalized or localized, unspecified site    generalized    Other and unspecified hyperlipidemia    Pacemaker    Pain in joints    Painful swallowing    Peripheral vascular disease (HCC)    PONV (postoperative nausea and vomiting)    Post-traumatic osteoarthritis of left elbow    Postmenopausal atrophic vaginitis    Presence of other cardiac implants and grafts    Primary localized osteoarthrosis, lower leg    Problems with swallowing    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)    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    Pure hypercholesterolemia    Rectocele    Recurrent UTI    group B strep    Recurrent UTI    S/P total knee replacement using cement    Sleep disturbance    Sputum production    Stented coronary artery    Uncomfortable fullness after meals    Unspecified disorder of thyroid    Visual impairment    reading glasses    Vitamin D deficiency              Past Surgical History:   Procedure Laterality Date    Angioplasty (coronary)      Breast lumpectomy Right 2015    Procedure: BREAST LUMPECTOMY;  Surgeon: Castro Marie MD;  Location: [facility] MAIN OR    Breast reconstruction Right 2015      1977    Cabg  1997    x3    Cardiac pacemaker placement       [place] 2022    Cataract  2017    Cath bare metal stent (bms)      Cholecystectomy      Colonoscopy  2016    tics; repeat 10 yrs    Colonoscopy  2017    Colonoscopy,diagnostic N/A 2016    Procedure: COLONOSCOPY, POSSIBLE BIOPSY, POSSIBLE POLYPECTOMY 07418;  Surgeon: Rodolfo Ann MD;  Location: [facility]    Cystoscopy,rx female urethral synd  05/10/2013    cysto UD, [place]    D & c  1976    Hysterectomy  1985    precancer and irregular bleeding. w/ BSO    Knee replacement surgery  2013    left     Lumpectomy right Right [id number]    Mastectomy right  2015      1964    Oophorectomy      AGE 47    Open heart surgery (pbp)      Other surgical history      LUMPECTOMY RIGHT BREAST     Other surgical history      A PMM LEFT KNEE , replacement    Other surgical history  2013    Venita DENTON    Other surgical history  2014    Cystoscopy- Dr. Figueroa    Other surgical history  2016    Bladder sling    Other surgical history       WATCHMEN    Pacemaker monitor      Patient documented not to have experienced any of the following events N/A 2016    Procedure: COLONOSCOPY, POSSIBLE BIOPSY, POSSIBLE POLYPECTOMY 35973;  Surgeon: Rodolfo Ann MD;  Location: [facility]    Patient withough preoperative order for iv antibiotic surgical site infection prophylaxis. N/A 2016    Procedure: COLONOSCOPY, POSSIBLE BIOPSY, POSSIBLE POLYPECTOMY 95418;  Surgeon: Rodolfo Ann MD;  Location: [facility]    Radiation right      1992    Reduction left  10/2015    Total knee replacement      LEFT     Upper gi endoscopy,exam  2011    repeat 5 yeas                Social History     Socioeconomic History    Marital status:    Tobacco Use    Smoking status: Former     Current packs/day: 0.00     Types: Cigarettes     Quit date: 1997     Years since quittin.0    Smokeless tobacco: Never   Vaping Use    Vaping status: Never Used    Substance and Sexual Activity    Alcohol use: Not Currently    Drug use: No   Other Topics Concern    Caffeine Concern Yes    Exercise Yes     Social Determinants of Health     Financial Resource Strain: Low Risk  (11/30/2023)    Received from [facility]    Overall Financial Resource Strain (CARDIA)     Difficulty of Paying Living Expenses: Not hard at all   Food Insecurity: No Food Insecurity (4/26/2024)    Food Insecurity     Food Insecurity: Never true   Transportation Needs: No Transportation Needs (4/26/2024)    Transportation Needs     Lack of Transportation: No   Physical Activity: Inactive (11/30/2023)    Received from [facility]    Physical Activity     On average, how many days per week do you engage in moderate to strenuous exercise (like a brisk walk)?: 0 days     On average, how many minutes do you engage in exercise at this level?: 0 min   Stress: No Stress Concern Present (11/30/2023)    Received from [facility]    [facility] [employer] [facility] - Occupational Stress Questionnaire     Feeling of Stress : Not at all   Housing Stability: Low Risk  (4/26/2024)    Housing Stability     Housing Instability: No              Review of Systems    Positive for stated Chief Complaint: Urinary Symptoms    Other systems are as noted in HPI.  Constitutional and vital signs reviewed.      All other systems reviewed and negative except as noted above.    Physical Exam     ED Triage Vitals [06/28/24 1639]   /79   Pulse 70   Resp 14   Temp 98 °F (36.7 °C)   Temp src    SpO2 98 %   O2 Device None (Room air)       Current Vitals:   Vital Signs  BP: 143/72  Pulse: 70  Resp: 14  Temp: 98 °F (36.7 °C)  MAP (mmHg): 93    Oxygen Therapy  SpO2: 98 %  O2 Device: None (Room air)            Physical Exam    GENERAL: Awake, alert oriented x3, nontoxic appearing.   SKIN: Normal, warm, and dry.  HEENT:  Pupils equally round and reactive to light. Conjuctiva clear.   Oropharynx is clear and moist.   Lungs: Clear to auscultation bilaterally with no rales, no retractions, and no wheezing.  HEART:  Regular rate and rhythm. S1 and S2. No murmurs, no rubs or gallops.   ABDOMEN: Soft, nontender and nondistended.  Catheter present.  Normoactive bowel sounds. No rebound. No guarding.   EXTREMITIES: Warm with brisk capillary refill.         ED Course     Labs Reviewed   BASIC METABOLIC PANEL (8) - Abnormal; Notable for the following components:       Result Value    Glucose 174 (*)     Sodium 135 (*)     All other components within normal limits   CBC W/ DIFFERENTIAL - Abnormal; Notable for the following components:    RBC 3.70 (*)     HGB 10.5 (*)     HCT 32.3 (*)     All other components within normal limits   CBC WITH DIFFERENTIAL WITH PLATELET    Narrative:     The following orders were created for panel order CBC With Differential With Platelet.  Procedure                               Abnormality         Status                     ---------                               -----------         ------                     CBC W/ DIFFERENTIAL[[id number]]          Abnormal            Final result                 Please view results for these tests on the individual orders.   BLOOD CULTURE   BLOOD CULTURE                      MDM        This is an 84-year-old female past medical history of PICC catheter, A-fib on aspirin, kidney stones, cataracts, coronary disease, diabetes, GERD, hypertension, frequent UTIs who presents stating she has a UTI and a positive urine culture.  Patient has been on Macrobid with no improvement of urinary symptoms.         IV was established normal saline.  Basic labs were obtained.  CBC: White blood cell count 6.1.  Hemoglobin 10.5.  Previous hemoglobin 11.1 on 5/13/2024.  Platelet 249.  BMP: BUN 19.  Creatinine 0.8.  Glucose 174.  Bicarb 28.    Blood culture sent x 2 and pending    Urinalysis was not sent today as she had 1 sent outpatient yesterday with culture  pending.      Given Macrobid failure, multiple drug-resistant organisms and multiple drug allergies, infectious disease was consulted.  Will start meropenem.  Pending culture.      Patient will plan for admission expresses understanding.    TCase discussed with [place] hospitalist dr boone        Disposition and Plan     Clinical Impression:  1. Urinary tract infection without hematuria, site unspecified         Disposition:  Admit  6/28/2024  6:17 pm    Follow-up:  No follow-up provider specified.        Medications Prescribed:  Current Discharge Medication List                            Hospital Problems       Present on Admission  Date Reviewed: 6/27/2024            ICD-10-CM Noted POA    * (Principal) Urinary tract infection without hematuria, site unspecified N39.0 6/28/2024 Unknown

## 2024-06-28 NOTE — TELEPHONE ENCOUNTER
Polyethylene Glycol 3350 17 g Oral Powd Pack         Sig: Take 17 g by mouth daily as needed.    Disp: 90 each    Refills: 1    Start: 6/27/2024    Class: Normal    Non-formulary    Last ordered: 5 months ago (1/8/2024) by Abdon Johnson MD    Gastrointestional Medication Protocol [id number]/27/2024 10:22 AM   Protocol Details In person appointment or virtual visit in the past 12 mos or appointment in next 3 mos      To be filled at: [id number] [address]  AT [place] [address], [phone], [phone]

## 2024-06-28 NOTE — ED QUICK NOTES
Orders for admission, patient is aware of plan and ready to go upstairs. Any questions, please call ED RN Natali at extension [phone].     Patient Covid vaccination status: Fully vaccinated     COVID Test Ordered in ED: None    COVID Suspicion at Admission: N/A    Running Infusions:    sodium chloride 125 mL/hr (06/28/24 2555)        Mental Status/LOC at time of transport: A&O x 4, ambulatory with steady gait, would be helpful for standby assist  to prevent falls. Daughter is bedside, is primary caregiver at home and very helpful.    Other pertinent information:   CIWA score: N/A   NIH score:  N/A

## 2024-06-28 NOTE — ED INITIAL ASSESSMENT (HPI)
Patient to the ED via walk-in with c/o \"really bad UTI\". Patient states her urine culture came back positive.

## 2024-06-29 LAB
ANION GAP SERPL CALC-SCNC: 5 MMOL/L (ref 0–18)
BASOPHILS # BLD AUTO: 0.04 X10(3) UL (ref 0–0.2)
BASOPHILS NFR BLD AUTO: 0.9 %
BUN BLD-MCNC: 16 MG/DL (ref 9–23)
CALCIUM BLD-MCNC: 8.7 MG/DL (ref 8.5–10.1)
CHLORIDE SERPL-SCNC: 109 MMOL/L (ref 98–112)
CO2 SERPL-SCNC: 26 MMOL/L (ref 21–32)
CREAT BLD-MCNC: 0.61 MG/DL
EGFRCR SERPLBLD CKD-EPI 2021: 89 ML/MIN/1.73M2 (ref 60–?)
EOSINOPHIL # BLD AUTO: 0.16 X10(3) UL (ref 0–0.7)
EOSINOPHIL NFR BLD AUTO: 3.5 %
ERYTHROCYTE [DISTWIDTH] IN BLOOD BY AUTOMATED COUNT: 15.5 %
EST. AVERAGE GLUCOSE BLD GHB EST-MCNC: 169 MG/DL (ref 68–126)
GLUCOSE BLD-MCNC: 118 MG/DL (ref 70–99)
GLUCOSE BLD-MCNC: 118 MG/DL (ref 70–99)
GLUCOSE BLD-MCNC: 120 MG/DL (ref 70–99)
GLUCOSE BLD-MCNC: 128 MG/DL (ref 70–99)
GLUCOSE BLD-MCNC: 145 MG/DL (ref 70–99)
HBA1C MFR BLD: 7.5 % (ref ?–5.7)
HCT VFR BLD AUTO: 29.2 %
HGB BLD-MCNC: 9.6 G/DL
IMM GRANULOCYTES # BLD AUTO: 0.02 X10(3) UL (ref 0–1)
IMM GRANULOCYTES NFR BLD: 0.4 %
LYMPHOCYTES # BLD AUTO: 1.66 X10(3) UL (ref 1–4)
LYMPHOCYTES NFR BLD AUTO: 36.2 %
MCH RBC QN AUTO: 27.9 PG (ref 26–34)
MCHC RBC AUTO-ENTMCNC: 32.9 G/DL (ref 31–37)
MCV RBC AUTO: 84.9 FL
MONOCYTES # BLD AUTO: 0.53 X10(3) UL (ref 0.1–1)
MONOCYTES NFR BLD AUTO: 11.6 %
NEUTROPHILS # BLD AUTO: 2.17 X10 (3) UL (ref 1.5–7.7)
NEUTROPHILS # BLD AUTO: 2.17 X10(3) UL (ref 1.5–7.7)
NEUTROPHILS NFR BLD AUTO: 47.4 %
OSMOLALITY SERPL CALC.SUM OF ELEC: 292 MOSM/KG (ref 275–295)
PLATELET # BLD AUTO: 227 10(3)UL (ref 150–450)
POTASSIUM SERPL-SCNC: 4.1 MMOL/L (ref 3.5–5.1)
RBC # BLD AUTO: 3.44 X10(6)UL
SODIUM SERPL-SCNC: 140 MMOL/L (ref 136–145)
WBC # BLD AUTO: 4.6 X10(3) UL (ref 4–11)

## 2024-06-29 PROCEDURE — 99233 SBSQ HOSP IP/OBS HIGH 50: CPT | Performed by: HOSPITALIST

## 2024-06-29 RX ORDER — DOCUSATE SODIUM 100 MG/1
100 CAPSULE, LIQUID FILLED ORAL 2 TIMES DAILY
Status: DISCONTINUED | OUTPATIENT
Start: 2024-06-29 | End: 2024-07-02

## 2024-06-29 RX ORDER — POLYETHYLENE GLYCOL 3350 17 G/17G
17 POWDER, FOR SOLUTION ORAL DAILY PRN
Status: DISCONTINUED | OUTPATIENT
Start: 2024-06-29 | End: 2024-07-01

## 2024-06-29 RX ORDER — BISACODYL 10 MG
10 SUPPOSITORY, RECTAL RECTAL
Status: DISCONTINUED | OUTPATIENT
Start: 2024-06-29 | End: 2024-07-02

## 2024-06-29 RX ORDER — ENEMA 19; 7 G/133ML; G/133ML
1 ENEMA RECTAL ONCE AS NEEDED
Status: DISCONTINUED | OUTPATIENT
Start: 2024-06-29 | End: 2024-07-02

## 2024-06-29 NOTE — PLAN OF CARE
Pt A&Ox4. VSS on RA. IV fluids infusing as ordered. IV Merrem q12h. Voiding with suprapubic catheter. Cx pending. Up standby assist. POC updated with pt. Call light within reach. Will continue to monitor.

## 2024-06-29 NOTE — CONSULTS
INFECTIOUS DISEASE CONSULT NOTE    Teresita Henderson Patient Status:  Inpatient    5/15/1940 MRN [id number]   Formerly [facility] 3SW-A Attending Disha Meyers MD   Hosp Day # 1 PCP Nkechi Ariza MD         Reason for Consultation:  Recurrent CA-UTI    History of Present Illness:  Teresita Henderson is a a(n) 84 year old female with history of recurrent UTIs, suprapubic catheter, atrial fibrillation admitted due to recurrent dysuria and lower abdominal discomfort. Patient has mulitple antimicrobial allergies and sensitivities limiting antimicrobial therapy options. No recent urine culture results available.     History:  Past Medical History:    A-fib (HCC)    Abdominal distention    Abnormal mammogram    Allergic rhinitis    Anesthesia complication    Pt's sons have a history of pseudocholinesterase deficiency.    Antral gastritis    Arrhythmia    AF    Arthritis    Bad breath    Belching    Bloating    Blood in urine    Breast CA (HCC)    Calculus of kidney    Cancer (HCC)    right breast cancer    Carpal tunnel syndrome    Cataract    Cervical spondylosis without myelopathy    Chondromalacia of patella    Constipation    Coronary atherosclerosis    stents    Coronary atherosclerosis of native coronary artery    Diabetes (HCC)    Diet controlled    Diverticulosis    Ductal carcinoma in situ (DCIS) of right breast,     Esophageal reflux    Exposure to unspecified radiation    last dose     Female stress incontinence    Flatulence/gas pain/belching    Frequent urination    Frequent use of laxatives    Frequent UTI    Frequent UTI    Gall stones    GERD (gastroesophageal reflux disease)    Glucose intolerance (impaired glucose tolerance)    Heart palpitations    High blood pressure    High cholesterol    History of blood transfusion        Hoarseness, chronic    Hypothyroidism    Incomplete bladder emptying (aka 56549)    trial of CIC       Indigestion    Irregular bowel habits    L knee global 3/27/14    Leg swelling    Malignant neoplasm of breast (female), unspecified site    R    Nausea    Neoplasm of right breast, 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),     Osteoarthrosis, unspecified whether generalized or localized, unspecified site    generalized    Other and unspecified hyperlipidemia    Pacemaker    Pain in joints    Painful swallowing    Peripheral vascular disease (HCC)    PONV (postoperative nausea and vomiting)    Post-traumatic osteoarthritis of left elbow    Postmenopausal atrophic vaginitis    Presence of other cardiac implants and grafts    Primary localized osteoarthrosis, lower leg    Problems with swallowing    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)    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    Pure hypercholesterolemia    Rectocele    Recurrent UTI    group B strep    Recurrent UTI    S/P total knee replacement using cement    Sleep disturbance    Sputum production    Stented coronary artery    Uncomfortable fullness after meals    Unspecified disorder of thyroid    Visual impairment    reading glasses    Vitamin D deficiency     Past Surgical History:   Procedure Laterality Date    Angioplasty (coronary)      Breast lumpectomy Right 2015    Procedure: BREAST LUMPECTOMY;  Surgeon: Castro Marie MD;  Location: [facility]IN OR    Breast reconstruction Right       1977    Cabg  1997    x3    Cardiac pacemaker placement      [place] 2022    Cataract  2017    Cath bare metal stent (bms)      Cholecystectomy      Colonoscopy  2016    tics; repeat 10 yrs    Colonoscopy  2017    Colonoscopy,diagnostic N/A 2016    Procedure: COLONOSCOPY, POSSIBLE BIOPSY, POSSIBLE POLYPECTOMY 66024;  Surgeon: Rodolfo Ann MD;  Location: [facility]    Cystoscopy,rx female urethral synd  05/10/2013    tommyo Ricardo DENTON & julia  1976     Hysterectomy  1985    precancer and irregular bleeding. w/ BSO    Knee replacement surgery  2013    left     Lumpectomy right Right [id number]    Mastectomy right  2015      1964    Oophorectomy      AGE 47    Open heart surgery (pbp)      Other surgical history      LUMPECTOMY RIGHT BREAST     Other surgical history      A PMM LEFT KNEE , replacement    Other surgical history  2013    Venita DENTON    Other surgical history  2014    Cystoscopy- Dr. Figueroa    Other surgical history  2016    Bladder sling    Other surgical history       WATCHMEN    Pacemaker monitor      Patient documented not to have experienced any of the following events N/A 2016    Procedure: COLONOSCOPY, POSSIBLE BIOPSY, POSSIBLE POLYPECTOMY 13142;  Surgeon: Rodolfo Ann MD;  Location: [facility]    Patient withough preoperative order for iv antibiotic surgical site infection prophylaxis. N/A 2016    Procedure: COLONOSCOPY, POSSIBLE BIOPSY, POSSIBLE POLYPECTOMY 94977;  Surgeon: Rodolfo Ann MD;  Location: [facility]    Radiation right          Reduction left  10/2015    Total knee replacement      LEFT     Upper gi endoscopy,exam  2011    repeat 5 yeas     Family History   Problem Relation Age of Onset    Cancer Sister         throat    Diabetes Mother     Breast Cancer Self 52    Heart Disease Neg     Stroke Neg       reports that she quit smoking about 27 years ago. Her smoking use included cigarettes. She has never used smokeless tobacco. She reports that she does not currently use alcohol. She reports that she does not use drugs.    Allergies:  Allergies   Allergen Reactions    Cephalexin ANAPHYLAXIS, HIVES and FACE FLUSHING    Ciprofloxacin MYALGIA, HIVES and OTHER (SEE COMMENTS)     Started on Rx 23? Not true allergy?    Fesoterodine SWELLING     Throat tightening  Throat tightening Throat tightening    Throat tightening Throat tightening       Throat tightening    Fosfomycin ARRHYTHMIA     Interaction of arrhythmias with Tykosin    Levofloxacin MYALGIA, HIVES and OTHER (SEE COMMENTS)     Reports tendonitis    Reports tendonitis   Reports tendonitis   Reports tendonitis    Other ARRHYTHMIA     Macrolides and Fluoroquinolones    Penicillins HIVES and RASH     Tolerates ceftriaxone    Toviaz SWELLING and TONGUE SWELLING     Throat tightening    Throat tightening   Throat tightening    Atorvastatin OTHER (SEE COMMENTS) and NAUSEA AND VOMITING     GI upset, nausea and vomitting    GI upset, nausea and vomitting   GI upset, nausea and vomitting   GI upset, nausea and vomitting    Chlorhexidine OTHER (SEE COMMENTS)     Rash, redness    Chocolate OTHER (SEE COMMENTS)     headache    headache   headache   headache    Colesevelam OTHER (SEE COMMENTS) and UNKNOWN     GI upset    GI upset   GI upset   GI upset    Chocolate OTHER (SEE COMMENTS)     headache    Fish-Derived Products OTHER (SEE COMMENTS)     Per allergy testing    Mushrooms OTHER (SEE COMMENTS)     headache    Pecan OTHER (SEE COMMENTS)     Unknown     Shrimp UNKNOWN     Per allergy testing    Tomatoes OTHER (SEE COMMENTS)     headache    Hexachlorophene RASH and OTHER (SEE COMMENTS)       Medications:    Current Facility-Administered Medications:     sodium chloride 0.9% infusion, 125 mL/hr, Intravenous, Continuous    aspirin DR tab 81 mg, 81 mg, Oral, Daily    cholecalciferol (Vitamin D3) tab 2,000 Units, 2,000 Units, Oral, Daily    dofetilide (Tikosyn) cap 125 mcg, 125 mcg, Oral, BID    ezetimibe (Zetia) tab 10 mg, 10 mg, Oral, Nightly    fluticasone propionate (Flonase) 50 MCG/ACT nasal suspension 2 spray, 2 spray, Each Nare, Daily    gabapentin (Neurontin) cap 200 mg, 200 mg, Oral, Nightly    levothyroxine (Synthroid) tab 88 mcg, 88 mcg, Oral, Daily    pantoprazole (Protonix) DR tab 40 mg, 40 mg, Oral, Daily    pravastatin (Pravachol) tab 10 mg, 10 mg, Oral, Nightly    glucose (Dex4) 15 GM/59ML  oral liquid 15 g, 15 g, Oral, Q15 Min PRN **OR** glucose (Glutose) 40% oral gel 15 g, 15 g, Oral, Q15 Min PRN **OR** glucose-vitamin C (Dex-4) chewable tab 4 tablet, 4 tablet, Oral, Q15 Min PRN **OR** dextrose 50% injection 50 mL, 50 mL, Intravenous, Q15 Min PRN **OR** glucose (Dex4) 15 GM/59ML oral liquid 30 g, 30 g, Oral, Q15 Min PRN **OR** glucose (Glutose) 40% oral gel 30 g, 30 g, Oral, Q15 Min PRN **OR** glucose-vitamin C (Dex-4) chewable tab 8 tablet, 8 tablet, Oral, Q15 Min PRN    sodium chloride 0.9% infusion, , Intravenous, Continuous    heparin (Porcine) 5000 UNIT/ML injection 5,000 Units, 5,000 Units, Subcutaneous, Q8H NICCI    acetaminophen (Tylenol Extra Strength) tab 500 mg, 500 mg, Oral, Q4H PRN    ondansetron (Zofran) 4 MG/2ML injection 4 mg, 4 mg, Intravenous, Q6H PRN    metoclopramide (Reglan) 5 mg/mL injection 10 mg, 10 mg, Intravenous, Q8H PRN    insulin aspart (NovoLOG) 100 Units/mL FlexPen 1-10 Units, 1-10 Units, Subcutaneous, TID AC and HS    meropenem (Merrem) 500 mg in sodium chloride 0.9% 100 mL IVPB-MBP, 500 mg, Intravenous, Q12H    metoprolol succinate ER (Toprol XL) 24 hr tab 25 mg, 25 mg, Oral, QPM    metoprolol succinate (Toprol XL) partial tablet 12.5 mg, 12.5 mg, Oral, QAM    Review of Systems:    A comprehensive 10 point review of systems was completed.  Pertinent positives and negatives noted in the the HPI.    Physical Exam:    General: No acute distress. Alert and oriented x 3.  Vital signs: Blood pressure 125/60, pulse 70, temperature 98.1 °F (36.7 °C), temperature source Oral, resp. rate 16, weight 135 lb (61.2 kg), SpO2 96%, not currently breastfeeding.  HEENT: Moist mucous membranes. Extraocular muscles are intact.  Neck: No lymphadenopathy.  No JVD. No carotid bruits.  Respiratory: Clear to auscultation bilaterally.  No wheezes. No rhonchi.  Cardiovascular: S1, S2.  Regular rate and rhythm.  No murmurs.  Abdomen: Soft, minimal suprapubic TTP; suprapubic catheter in  place  Musculoskeletal: Full range of motion of all extremities.  No swelling noted.  Integument: No lesions. No erythema. No open wounds.    Laboratory Data:  Lab Results   Component Value Date    WBC 4.6 06/29/2024    HGB 9.6 06/29/2024    HCT 29.2 06/29/2024    .0 06/29/2024    CREATSERUM 0.61 06/29/2024    BUN 16 06/29/2024     06/29/2024    K 4.1 06/29/2024     06/29/2024    CO2 26.0 06/29/2024     06/29/2024    CA 8.7 06/29/2024    PGLU 118 06/29/2024       Microbiologic Data:   (this admission)    Reviewed in EMR     Imaging:  X-Ray    Imaging results reviewed     Impression:  Patient Active Problem List   Diagnosis    Hypothyroid    CAD (coronary artery disease)    Malignant neoplasm of breast (HCC)    Type 2 diabetes mellitus with hyperglycemia, without long-term current use of insulin ([facility])    Benign essential HTN    Paget's disease of nipple ([facility])    Vitamin D deficiency    Benign essential tremor    Sick sinus syndrome ([facility])    Atrial fibrillation ([facility])    S/P primary angioplasty with coronary stent    History of pacemaker    Cervical radiculopathy    Breast asymmetry between native breast and reconstructed breast    Mixed incontinence urge and stress (male)(female)    Stenosis of both internal carotid arteries    Suprapubic catheter ([facility])    Presence of Watchman left atrial appendage closure device    Hyperlipidemia associated with type 2 diabetes mellitus (HCC)    Hypertension associated with type 2 diabetes mellitus (HCC)    Ulnar nerve compression, left    Urinary tract infection without hematuria, site unspecified    Dyslipidemia       ASSESSMENT:    Recurrent CA-UTI  DM type 2  Atrial fibrillation  Multiple antimicrobial allergies     PLAN:    -Continue Meropenem day 1 for treatment of UTI  -Follow CBC and renal function  -Depending on patient's clinical course, she could complete short course of IV antimicrobials in-house vs. Outpatient IV antimicrobial therapy if  symptoms slow to improve    Discussed with patient at bedside. All questions answered     Misha De Los Santos MD  [facility], [facility]

## 2024-06-29 NOTE — PROGRESS NOTES
[facility]   part of [facility]     Hospitalist Progress Note     Teresita Henderson Patient Status:  Inpatient    5/15/1940 MRN [id number]   Location [facility] 3SW-A Attending Disha Meyers MD   Hosp Day # 1 PCP Nkechi Ariza MD     Chief Complaint: abd pain chills    Subjective:     Patient with anxiety  No abd pain  Rt knee pain    Objective:    Review of Systems:   A comprehensive review of systems was completed; pertinent positive and negatives stated in subjective.    Vital signs:  Temp:  [97.9 °F (36.6 °C)-98 °F (36.7 °C)] 98 °F (36.7 °C)  Pulse:  [70] 70  Resp:  [14-16] 16  BP: (125-158)/(60-79) 125/60  SpO2:  [96 %-100 %] 96 %    Physical Exam:    General: No acute distress  Respiratory: No wheezes, no rhonchi  Cardiovascular: S1, S2, regular rate and rhythm  Abdomen: Soft, Non-tender, non-distended, positive bowel sounds  Neuro: No new focal deficits.   Extremities: No edema      Diagnostic Data:    Labs:  Recent Labs   Lab 24  1726 24  0456   WBC 6.1 4.6   HGB 10.5* 9.6*   MCV 87.3 84.9   .0 227.0       Recent Labs   Lab 24  1721 24  0456   * 120*   BUN 19 16   CREATSERUM 0.84 0.61   CA 9.2 8.7   * 140   K 4.0 4.1    109   CO2 28.0 26.0       Estimated Creatinine Clearance: 55.3 mL/min (based on SCr of 0.61 mg/dL).    No results for input(s): \"TROP\", \"TROPHS\", \"CK\" in the last 168 hours.    No results for input(s): \"PTP\", \"INR\" in the last 168 hours.               Microbiology    No results found for this visit on 24.      Imaging: Reviewed in Epic.    Medications:    aspirin  81 mg Oral Daily    cholecalciferol  2,000 Units Oral Daily    dofetilide  125 mcg Oral BID    ezetimibe  10 mg Oral Nightly    fluticasone propionate  2 spray Each Nare Daily    gabapentin  200 mg Oral Nightly    levothyroxine  88 mcg Oral Daily    pantoprazole  40 mg Oral Daily    pravastatin  10 mg Oral Nightly    heparin  5,000 Units Subcutaneous Q8H  NICCI    insulin aspart  1-10 Units Subcutaneous TID AC and HS    meropenem  500 mg Intravenous Q12H    metoprolol succinate ER  25 mg Oral QPM    metoprolol succinate  12.5 mg Oral QAM       Assessment & Plan:      #Recurrent Suprapubic Catheter associated UTI  Continue empiric merrem  Follow cultures  ID to eval      #Afib  Rate controlled  On ASA     #Ess HTN  Continue home meds     #Dyslipidemia  Statin     #Breast Cancer    #DM2  SSI      Janet Rosas MD    Supplementary Documentation:     Quality:  DVT Mechanical Prophylaxis:   SCDs,    DVT Pharmacologic Prophylaxis   Medication    heparin (Porcine) 5000 UNIT/ML injection 5,000 Units         DVT Pharmacologic prophylaxis: Aspirin 81 mg      Code Status: Full Code  Dominique: Suprapubic catheter in place  Dominiuqe Duration (in days):   Central line:    MARIAH:     Discharge is dependent on: progress  At this point Ms. Henderson is expected to be discharge to: home    The 21st Century Cures Act makes medical notes like these available to patients in the interest of transparency. Please be advised this is a medical document. Medical documents are intended to carry relevant information, facts as evident, and the clinical opinion of the practitioner. The medical note is intended as peer to peer communication and may appear blunt or direct. It is written in medical language and may contain abbreviations or verbiage that are unfamiliar.             **Certification      PHYSICIAN Certification of Need for Inpatient Hospitalization - Initial Certification    Patient will require inpatient services that will reasonably be expected to span two midnight's based on the clinical documentation in H+P.   Based on patients current state of illness, I anticipate that, after discharge, patient will require TBD.

## 2024-06-29 NOTE — PLAN OF CARE
Pt resting comfortably in bed at this time. Suprapubic cath in place, per pt & family was replaced yesterday. Iv abx as ordered, ivf Infusing as ordered. Plan id to see pt in am. Vss at this time. Call light within reach, pt verbalized understanding of poc & call dont fall protocol.

## 2024-06-30 LAB
GLUCOSE BLD-MCNC: 111 MG/DL (ref 70–99)
GLUCOSE BLD-MCNC: 129 MG/DL (ref 70–99)
GLUCOSE BLD-MCNC: 155 MG/DL (ref 70–99)
GLUCOSE BLD-MCNC: 160 MG/DL (ref 70–99)

## 2024-06-30 PROCEDURE — 99233 SBSQ HOSP IP/OBS HIGH 50: CPT | Performed by: HOSPITALIST

## 2024-06-30 RX ORDER — TRAMADOL HYDROCHLORIDE 50 MG/1
50 TABLET ORAL EVERY 12 HOURS PRN
Status: DISCONTINUED | OUTPATIENT
Start: 2024-06-30 | End: 2024-07-02

## 2024-06-30 NOTE — PROGRESS NOTES
[facility]   part of [facility]     Hospitalist Progress Note     Teresita Henderson Patient Status:  Inpatient    5/15/1940 MRN [id number]   Location [facility] 3SW-A Attending Disha Meyers MD   Hosp Day # 2 PCP Nkechi Ariza MD     Chief Complaint: abd pain chills    Subjective:     Patient with anxiety  No abd pain  Rt knee pain    Objective:    Review of Systems:   A comprehensive review of systems was completed; pertinent positive and negatives stated in subjective.    Vital signs:  Temp:  [98 °F (36.7 °C)-98.5 °F (36.9 °C)] 98.5 °F (36.9 °C)  Pulse:  [70-72] 72  Resp:  [14-18] 15  BP: (138-149)/(62-69) 147/65  SpO2:  [95 %-99 %] 96 %    Physical Exam:    General: No acute distress  Respiratory: No wheezes, no rhonchi  Cardiovascular: S1, S2, regular rate and rhythm  Abdomen: Soft, Non-tender, non-distended, positive bowel sounds  Neuro: No new focal deficits.   Extremities: No edema      Diagnostic Data:    Labs:  Recent Labs   Lab 24  1726 24  0456   WBC 6.1 4.6   HGB 10.5* 9.6*   MCV 87.3 84.9   .0 227.0       Recent Labs   Lab 24  1721 24  0456   * 120*   BUN 19 16   CREATSERUM 0.84 0.61   CA 9.2 8.7   * 140   K 4.0 4.1    109   CO2 28.0 26.0       Estimated Creatinine Clearance: 55.3 mL/min (based on SCr of 0.61 mg/dL).    No results for input(s): \"TROP\", \"TROPHS\", \"CK\" in the last 168 hours.    No results for input(s): \"PTP\", \"INR\" in the last 168 hours.               Microbiology    Hospital Encounter on 24   1. Blood Culture     Status: None (Preliminary result)    Collection Time: 24  5:36 PM    Specimen: Blood,peripheral   Result Value Ref Range    Blood Culture Result No Growth 1 Day N/A         Imaging: Reviewed in Epic.    Medications:    docusate sodium  100 mg Oral BID    aspirin  81 mg Oral Daily    cholecalciferol  2,000 Units Oral Daily    dofetilide  125 mcg Oral BID    ezetimibe  10 mg Oral Nightly     fluticasone propionate  2 spray Each Nare Daily    gabapentin  200 mg Oral Nightly    levothyroxine  88 mcg Oral Daily    pantoprazole  40 mg Oral Daily    pravastatin  10 mg Oral Nightly    heparin  5,000 Units Subcutaneous Q8H NICCI    insulin aspart  1-10 Units Subcutaneous TID AC and HS    meropenem  500 mg Intravenous Q12H    metoprolol succinate ER  25 mg Oral QPM    metoprolol succinate  12.5 mg Oral QAM       Assessment & Plan:      #Recurrent Suprapubic Catheter associated UTI  Continue empiric merrem  Follow cultures  ID to eval      #Afib  Rate controlled  On ASA     #Ess HTN  Continue home meds     #Dyslipidemia  Statin     #Breast Cancer    #DM2  SSI      Janet Rosas MD    Supplementary Documentation:     Quality:  DVT Mechanical Prophylaxis:   SCDs,    DVT Pharmacologic Prophylaxis   Medication    heparin (Porcine) 5000 UNIT/ML injection 5,000 Units         DVT Pharmacologic prophylaxis: Aspirin 81 mg      Code Status: Full Code  Dominique: Suprapubic catheter in place  Dominique Duration (in days):   Central line:    AMRIAH: 6/30/2024    Discharge is dependent on: progress  At this point Ms. Henderson is expected to be discharge to: home    The 21st Century Cures Act makes medical notes like these available to patients in the interest of transparency. Please be advised this is a medical document. Medical documents are intended to carry relevant information, facts as evident, and the clinical opinion of the practitioner. The medical note is intended as peer to peer communication and may appear blunt or direct. It is written in medical language and may contain abbreviations or verbiage that are unfamiliar.             **Certification      PHYSICIAN Certification of Need for Inpatient Hospitalization - Initial Certification    Patient will require inpatient services that will reasonably be expected to span two midnight's based on the clinical documentation in H+P.   Based on patients current state of illness, I  anticipate that, after discharge, patient will require TBD.

## 2024-06-30 NOTE — PLAN OF CARE
Pt A&Ox4. VSS on room air. Tolerating general diet with accu-checks. Pt voiding via suprapubic catheter into hooks bag. Urine cx pending. On IV merrex q12. Pt having pain to L arm from previous recent surgery, controlled with PO medications. Fall precautions in place and pt reminded to \"call; don't fall.\" Plan to discharge home when medically cleared. POC discussed with pt.

## 2024-06-30 NOTE — PLAN OF CARE
pt resting comfortably in bed at this time. Suprapubic cath in place, draining see flowsheets. Iv abx as ordered, ivf Infusing as ordered. Plan continue course of iv abx till ok to dc home w family as per ID. Vss at this time. Call light within reach, pt verbalized understanding of poc & call dont fall protocol.

## 2024-07-01 LAB
GLUCOSE BLD-MCNC: 132 MG/DL (ref 70–99)
GLUCOSE BLD-MCNC: 136 MG/DL (ref 70–99)
GLUCOSE BLD-MCNC: 149 MG/DL (ref 70–99)
GLUCOSE BLD-MCNC: 187 MG/DL (ref 70–99)
GLUCOSE BLD-MCNC: 188 MG/DL (ref 70–99)

## 2024-07-01 PROCEDURE — 99232 SBSQ HOSP IP/OBS MODERATE 35: CPT | Performed by: HOSPITALIST

## 2024-07-01 RX ORDER — CETIRIZINE HYDROCHLORIDE 10 MG/1
10 TABLET ORAL DAILY
Status: DISCONTINUED | OUTPATIENT
Start: 2024-07-01 | End: 2024-07-02

## 2024-07-01 RX ORDER — HYDROCODONE BITARTRATE AND ACETAMINOPHEN 5; 325 MG/1; MG/1
1 TABLET ORAL EVERY 6 HOURS PRN
Status: DISCONTINUED | OUTPATIENT
Start: 2024-07-01 | End: 2024-07-02

## 2024-07-01 RX ORDER — POLYETHYLENE GLYCOL 3350 17 G/17G
17 POWDER, FOR SOLUTION ORAL DAILY
Status: DISCONTINUED | OUTPATIENT
Start: 2024-07-01 | End: 2024-07-02

## 2024-07-01 NOTE — CDS QUERY
Dear Dr. Rosas       Please clarify the atrial fibrillation :                   [ x ] Paroxysmal Atrial Fibrillation  [  ] Chronic Atrial Fibrillation  [  ] Other (please specify):             Documentation from the Medical Record:     h&p: recurrent suprapubic cath associated uti; afib; htn dyslipidemia; breast cancer ; dm2     6/29 pn: recurrent suprapubic catheter associated uti; afib - rate controlled - on aspirin; htn; dm2;     7/1 ID; recurrent ca - uti; dm2; atrial fibrillation    Treatment: metoprolol, tikosyn, aspirin, heparin     For questions regarding this query, please contact Clinical Documentation :  Marilee Fregoso RN CDS Cell# [phone]                          Thank you!                                                       THIS FORM IS A PERMANENT PART OF THE MEDICAL RECORD

## 2024-07-01 NOTE — PROGRESS NOTES
INFECTIOUS DISEASE PROGRESS NOTE    Teresita Henderson Patient Status:  Inpatient    5/15/1940 MRN [id number]   [facility] 3SW-A Attending Disha Meyers MD   Hosp Day # 3 PCP Nkechi Ariza MD     Abx: IV meropenem D#3    Subjective: Patient seen and examined today. States she is feeling better. Denies any abdominal pain or flank pain today. Afebrile.     Allergies:  Allergies   Allergen Reactions    Cephalexin ANAPHYLAXIS, HIVES and FACE FLUSHING    Ciprofloxacin MYALGIA, HIVES and OTHER (SEE COMMENTS)     Started on Rx 23? Not true allergy?    Fesoterodine SWELLING     Throat tightening  Throat tightening Throat tightening    Throat tightening Throat tightening      Throat tightening    Fosfomycin ARRHYTHMIA     Interaction of arrhythmias with Tykosin    Levofloxacin MYALGIA, HIVES and OTHER (SEE COMMENTS)     Reports tendonitis    Reports tendonitis   Reports tendonitis   Reports tendonitis    Other ARRHYTHMIA     Macrolides and Fluoroquinolones    Penicillins HIVES and RASH     Tolerates ceftriaxone    Toviaz SWELLING and TONGUE SWELLING     Throat tightening    Throat tightening   Throat tightening    Atorvastatin OTHER (SEE COMMENTS) and NAUSEA AND VOMITING     GI upset, nausea and vomitting    GI upset, nausea and vomitting   GI upset, nausea and vomitting   GI upset, nausea and vomitting    Chlorhexidine OTHER (SEE COMMENTS)     Rash, redness    Chocolate OTHER (SEE COMMENTS)     headache    headache   headache   headache    Colesevelam OTHER (SEE COMMENTS) and UNKNOWN     GI upset    GI upset   GI upset   GI upset    Chocolate OTHER (SEE COMMENTS)     headache    Fish-Derived Products OTHER (SEE COMMENTS)     Per allergy testing    Mushrooms OTHER (SEE COMMENTS)     headache    Pecan OTHER (SEE COMMENTS)     Unknown     Shrimp UNKNOWN     Per allergy testing    Tomatoes OTHER (SEE COMMENTS)     headache    Hexachlorophene RASH  and OTHER (SEE COMMENTS)       Medications:    Current Facility-Administered Medications:     traMADol (Ultram) tab 50 mg, 50 mg, Oral, Q12H PRN    docusate sodium (Colace) cap 100 mg, 100 mg, Oral, BID    polyethylene glycol (PEG 3350) (Miralax) 17 g oral packet 17 g, 17 g, Oral, Daily PRN    magnesium hydroxide (Milk of Magnesia) 400 MG/5ML oral suspension 30 mL, 30 mL, Oral, Daily PRN    bisacodyl (Dulcolax) 10 MG rectal suppository 10 mg, 10 mg, Rectal, Daily PRN    fleet enema (Fleet) 7-19 GM/118ML rectal enema 133 mL, 1 enema, Rectal, Once PRN    aspirin DR tab 81 mg, 81 mg, Oral, Daily    cholecalciferol (Vitamin D3) tab 2,000 Units, 2,000 Units, Oral, Daily    dofetilide (Tikosyn) cap 125 mcg, 125 mcg, Oral, BID    ezetimibe (Zetia) tab 10 mg, 10 mg, Oral, Nightly    fluticasone propionate (Flonase) 50 MCG/ACT nasal suspension 2 spray, 2 spray, Each Nare, Daily    gabapentin (Neurontin) cap 200 mg, 200 mg, Oral, Nightly    levothyroxine (Synthroid) tab 88 mcg, 88 mcg, Oral, Daily    pantoprazole (Protonix) DR tab 40 mg, 40 mg, Oral, Daily    pravastatin (Pravachol) tab 10 mg, 10 mg, Oral, Nightly    glucose (Dex4) 15 GM/59ML oral liquid 15 g, 15 g, Oral, Q15 Min PRN **OR** glucose (Glutose) 40% oral gel 15 g, 15 g, Oral, Q15 Min PRN **OR** glucose-vitamin C (Dex-4) chewable tab 4 tablet, 4 tablet, Oral, Q15 Min PRN **OR** dextrose 50% injection 50 mL, 50 mL, Intravenous, Q15 Min PRN **OR** glucose (Dex4) 15 GM/59ML oral liquid 30 g, 30 g, Oral, Q15 Min PRN **OR** glucose (Glutose) 40% oral gel 30 g, 30 g, Oral, Q15 Min PRN **OR** glucose-vitamin C (Dex-4) chewable tab 8 tablet, 8 tablet, Oral, Q15 Min PRN    heparin (Porcine) 5000 UNIT/ML injection 5,000 Units, 5,000 Units, Subcutaneous, Q8H [facility]    acetaminophen (Tylenol Extra Strength) tab 500 mg, 500 mg, Oral, Q4H PRN    ondansetron (Zofran) 4 MG/2ML injection 4 mg, 4 mg, Intravenous, Q6H PRN    metoclopramide (Reglan) 5 mg/mL injection 10 mg, 10 mg,  Intravenous, Q8H PRN    insulin aspart (NovoLOG) 100 Units/mL FlexPen 1-10 Units, 1-10 Units, Subcutaneous, TID AC and HS    meropenem (Merrem) 500 mg in sodium chloride 0.9% 100 mL IVPB-MBP, 500 mg, Intravenous, Q12H    metoprolol succinate ER (Toprol XL) 24 hr tab 25 mg, 25 mg, Oral, QPM    metoprolol succinate (Toprol XL) partial tablet 12.5 mg, 12.5 mg, Oral, QAM    Review of Systems:  A comprehensive 10 point review of systems was completed.  Pertinent positives and negatives noted in the the HPI.    Physical Exam:    General: No acute distress. Alert and oriented x 3. Sitting up in a chair.   Vital signs: Blood pressure 131/62, pulse 70, temperature 97.9 °F (36.6 °C), temperature source Oral, resp. rate 16, weight 135 lb (61.2 kg), SpO2 98%, not currently breastfeeding.  HEENT: Moist mucous membranes. Extraocular muscles are intact.  Neck: Supple  Respiratory: Clear to auscultation bilaterally.  No wheezes. No rhonchi.  Cardiovascular: S1, S2.  Regular rate and rhythm.  No murmurs.  Abdomen: Soft, non-tender to palpation; suprapubic catheter in place with clear urine in bag/tubing noted.  Musculoskeletal: Full range of motion of all extremities.  No swelling noted.  Integument: No lesions. No erythema.     Laboratory Data:  Lab Results   Component Value Date    PGLU 136 07/01/2024       Microbiologic Data:   (this admission)    Reviewed in EMR     Imaging: No new imaging.    Impression:  Patient Active Problem List   Diagnosis    Hypothyroid    CAD (coronary artery disease)    Malignant neoplasm of breast (HCC)    Type 2 diabetes mellitus with hyperglycemia, without long-term current use of insulin (HCC)    Benign essential HTN    Paget's disease of nipple (HCC)    Vitamin D deficiency    Benign essential tremor    Sick sinus syndrome (HCC)    Atrial fibrillation (HCC)    S/P primary angioplasty with coronary stent    History of pacemaker    Cervical radiculopathy    Breast asymmetry between native breast and  reconstructed breast    Mixed incontinence urge and stress (male)(female)    Stenosis of both internal carotid arteries    Suprapubic catheter (HCC)    Presence of Watchman left atrial appendage closure device    Hyperlipidemia associated with type 2 diabetes mellitus (HCC)    Hypertension associated with type 2 diabetes mellitus (HCC)    Ulnar nerve compression, left    Urinary tract infection without hematuria, site unspecified    Dyslipidemia       ASSESSMENT:  Recurrent CA-UTI  DM type 2  Atrial fibrillation  Multiple antimicrobial allergies     PLAN:  -Continue Meropenem for treatment of UTI  -Await outpatient urine culture from 6/27  -Follow CBC and renal function    Discussed with patient at bedside and RN.     Fallon Parsons PA-C    ID ATTENDING ADDENDUM     Pt seen an examined independently. Chart reviewed. Agree with above. Note has been reviewed by me and modified as needed.  Exam and Impression/ Recs as noted above.  Will follow  D/w staff and with pt and daughter. Still waiting for most recent Ucx. They are very frustrated re recurrent UTIs. Would treat this UTI and then consider clorpactin irrigation of SPT down the road to try to prevent more UTIs.  More than 50% of clinical time and 100% of the clinical decision making performed by me.    Silva Guillory MD

## 2024-07-01 NOTE — PROGRESS NOTES
[facility]   part of [facility]     Hospitalist Progress Note     Teresita Henderson Patient Status:  Inpatient    5/15/1940 MRN [id number]   Location [facility] 3SW-A Attending Disha Meyers MD   Hosp Day # 3 PCP Nkechi Ariza MD     Chief Complaint: abd pain chills    Subjective:     Patient with anxiety  No abd pain  Rt knee pain    Objective:    Review of Systems:   A comprehensive review of systems was completed; pertinent positive and negatives stated in subjective.    Vital signs:  Temp:  [97.9 °F (36.6 °C)-98.9 °F (37.2 °C)] 98.1 °F (36.7 °C)  Pulse:  [69-79] 70  Resp:  [16-18] 18  BP: (119-131)/(55-62) 131/62  SpO2:  [93 %-100 %] 93 %    Physical Exam:    General: No acute distress  Respiratory: No wheezes, no rhonchi  Cardiovascular: S1, S2, regular rate and rhythm  Abdomen: Soft, Non-tender, non-distended, positive bowel sounds  Neuro: No new focal deficits.   Extremities: No edema      Diagnostic Data:    Labs:  Recent Labs   Lab 24  1726 24  0456   WBC 6.1 4.6   HGB 10.5* 9.6*   MCV 87.3 84.9   .0 227.0       Recent Labs   Lab 24  1721 24  0456   * 120*   BUN 19 16   CREATSERUM 0.84 0.61   CA 9.2 8.7   * 140   K 4.0 4.1    109   CO2 28.0 26.0       Estimated Creatinine Clearance: 55.3 mL/min (based on SCr of 0.61 mg/dL).    No results for input(s): \"TROP\", \"TROPHS\", \"CK\" in the last 168 hours.    No results for input(s): \"PTP\", \"INR\" in the last 168 hours.               Microbiology    Hospital Encounter on 24   1. Blood Culture     Status: None (Preliminary result)    Collection Time: 24  5:36 PM    Specimen: Blood,peripheral   Result Value Ref Range    Blood Culture Result No Growth 2 Days N/A         Imaging: Reviewed in Epic.    Medications:    docusate sodium  100 mg Oral BID    aspirin  81 mg Oral Daily    cholecalciferol  2,000 Units Oral Daily    dofetilide  125 mcg Oral BID    ezetimibe  10 mg Oral Nightly     fluticasone propionate  2 spray Each Nare Daily    gabapentin  200 mg Oral Nightly    levothyroxine  88 mcg Oral Daily    pantoprazole  40 mg Oral Daily    pravastatin  10 mg Oral Nightly    heparin  5,000 Units Subcutaneous Q8H [facility]    insulin aspart  1-10 Units Subcutaneous TID AC and HS    meropenem  500 mg Intravenous Q12H    metoprolol succinate ER  25 mg Oral QPM    metoprolol succinate  12.5 mg Oral QAM       Assessment & Plan:      #Recurrent Suprapubic Catheter associated UTI  Continue empiric merrem  Follow cultures  ID to eval      #Afib  Rate controlled  On ASA     #Ess HTN  Continue home meds     #Dyslipidemia  Statin     #Breast Cancer    #DM2  SSI      Janet Rosas MD    Supplementary Documentation:     Quality:  DVT Mechanical Prophylaxis:   SCDs,    DVT Pharmacologic Prophylaxis   Medication    heparin (Porcine) 5000 UNIT/ML injection 5,000 Units         DVT Pharmacologic prophylaxis: Aspirin 81 mg      Code Status: Full Code  Dominique: Suprapubic catheter in place  Dominique Duration (in days):   Central line:    MARIAH:     Discharge is dependent on: progress  At this point Ms. Henderson is expected to be discharge to: home    The 21st Century Cures Act makes medical notes like these available to patients in the interest of transparency. Please be advised this is a medical document. Medical documents are intended to carry relevant information, facts as evident, and the clinical opinion of the practitioner. The medical note is intended as peer to peer communication and may appear blunt or direct. It is written in medical language and may contain abbreviations or verbiage that are unfamiliar.             **Certification      PHYSICIAN Certification of Need for Inpatient Hospitalization - Initial Certification    Patient will require inpatient services that will reasonably be expected to span two midnight's based on the clinical documentation in H+P.   Based on patients current state of illness, I anticipate that,  after discharge, patient will require TBD.

## 2024-07-01 NOTE — PLAN OF CARE
A/o x4. RA/. SCD's/ankle pumps encouraged. Supracatheter in place. LBM 6/29. R limb alert. Up sb. Ucx pending. POC updated with pt. All safety measures in place.

## 2024-07-01 NOTE — PLAN OF CARE
Patient A&Ox4, VSS on RA. Patient reports pain to left elbow related to previous ulnar nerve surgery. PO tylenol given per request. Ice applied for comfort. Suprapubic cath intact and draining. Skin around site cleansed. Light yellow clear urine in drainage bag. Cultures pending. Plan to continue IV antibiotics. Plan of care reviewed with patient. Patient demonstrates understanding.

## 2024-07-02 ENCOUNTER — APPOINTMENT (OUTPATIENT)
Facility: HOSPITAL | Age: 84
End: 2024-07-02
Attending: PHYSICIAN ASSISTANT
Payer: MEDICARE

## 2024-07-02 ENCOUNTER — APPOINTMENT (OUTPATIENT)
Dept: CT IMAGING | Facility: HOSPITAL | Age: 84
End: 2024-07-02
Attending: PHYSICIAN ASSISTANT
Payer: MEDICARE

## 2024-07-02 VITALS
TEMPERATURE: 98 F | DIASTOLIC BLOOD PRESSURE: 67 MMHG | OXYGEN SATURATION: 92 % | BODY MASS INDEX: 25 KG/M2 | RESPIRATION RATE: 18 BRPM | WEIGHT: 135 LBS | HEART RATE: 71 BPM | SYSTOLIC BLOOD PRESSURE: 151 MMHG

## 2024-07-02 LAB
GLUCOSE BLD-MCNC: 138 MG/DL (ref 70–99)
GLUCOSE BLD-MCNC: 158 MG/DL (ref 70–99)

## 2024-07-02 PROCEDURE — 99239 HOSP IP/OBS DSCHRG MGMT >30: CPT | Performed by: HOSPITALIST

## 2024-07-02 PROCEDURE — 74176 CT ABD & PELVIS W/O CONTRAST: CPT | Performed by: PHYSICIAN ASSISTANT

## 2024-07-02 RX ORDER — MEROPENEM 1 G/1
1 INJECTION, POWDER, FOR SOLUTION INTRAVENOUS EVERY 8 HOURS
Qty: 33 G | Refills: 0 | Status: SHIPPED | OUTPATIENT
Start: 2024-07-02 | End: 2024-07-13

## 2024-07-02 NOTE — HOME CARE LIAISON
Received referral via [facility] for Home Health services. Spoke w/ patient at the bedside and daughter via the phone and is agreeable for home health care. Updated AVS with contact information      Spoke with Susan from [facility] and confirmed IV medication delivery for 7/2, [facility] will complete start of care for patient 7/3 for teach and train at home

## 2024-07-02 NOTE — CM/SW NOTE
07/02/24 1000   CM/SW Referral Data   Referral Source Physician   Reason for Referral Discharge planning   Informant Patient;EMR;Clinical Staff Member   Patient Info   Patient's Current Mental Status at Time of Assessment Alert;Oriented   Patient lives with Daughter   Discharge Needs   Anticipated D/C needs Home health care;Infusion care       Spoke with Celeste from ID who stated pt will need 2 weeks of IV abx at DC.  Midline ordered.  Pt will need CT prior to DC as well.    Chart reviewed.  Pt is an 85 y/o woman admitted for recurrent UTIs.  Pt has had IV abx in the past with [facility] and [facility] infusion.  Referrals sent via [facility] to [facility] and HH agencies.    Met with pt at bedside to discuss DC planning.  Pt confirmed [facility] and [facility] as previous agencies and would be agreeable with providers again.  Pt lives with her dtr and stated her dtr helped her with the IV abx in the past.  Pt's neighbor has also previously helped.  No other DC needs/concerns at this time.    Await responses for accepting providers.  / to remain available for support and/or discharge planning.     Chela Bernard, [facility]  Discharge Planner  [phone]

## 2024-07-02 NOTE — PROGRESS NOTES
INFECTIOUS DISEASE PROGRESS NOTE    Teresita Henderson Patient Status:  Inpatient    5/15/1940 MRN [id number]   [facility] 3SW-A Attending Disha Meyers MD   Hosp Day # 4 PCP Nkechi Ariza MD     Abx: IV meropenem D#4    Subjective: Patient seen and examined today. Feeling well. Denies any abdominal pain or flank pain today. Denies any dysuria. Denies any pain around SPT. Afebrile.     Allergies:  Allergies   Allergen Reactions    Cephalexin ANAPHYLAXIS, HIVES and FACE FLUSHING    Ciprofloxacin MYALGIA, HIVES and OTHER (SEE COMMENTS)     Started on Rx 23? Not true allergy?    Fesoterodine SWELLING     Throat tightening  Throat tightening Throat tightening    Throat tightening Throat tightening      Throat tightening    Fosfomycin ARRHYTHMIA     Interaction of arrhythmias with Tykosin    Levofloxacin MYALGIA, HIVES and OTHER (SEE COMMENTS)     Reports tendonitis    Reports tendonitis   Reports tendonitis   Reports tendonitis    Other ARRHYTHMIA     Macrolides and Fluoroquinolones    Penicillins HIVES and RASH     Tolerates ceftriaxone    Toviaz SWELLING and TONGUE SWELLING     Throat tightening    Throat tightening   Throat tightening    Atorvastatin OTHER (SEE COMMENTS) and NAUSEA AND VOMITING     GI upset, nausea and vomitting    GI upset, nausea and vomitting   GI upset, nausea and vomitting   GI upset, nausea and vomitting    Chlorhexidine OTHER (SEE COMMENTS)     Rash, redness    Chocolate OTHER (SEE COMMENTS)     headache    headache   headache   headache    Colesevelam OTHER (SEE COMMENTS) and UNKNOWN     GI upset    GI upset   GI upset   GI upset    Chocolate OTHER (SEE COMMENTS)     headache    Fish-Derived Products OTHER (SEE COMMENTS)     Per allergy testing    Mushrooms OTHER (SEE COMMENTS)     headache    Pecan OTHER (SEE COMMENTS)     Unknown     Shrimp UNKNOWN     Per allergy testing    Tomatoes OTHER (SEE COMMENTS)      headache    Hexachlorophene RASH and OTHER (SEE COMMENTS)       Medications:    Current Facility-Administered Medications:     cetirizine (ZyrTEC) tab 10 mg, 10 mg, Oral, Daily    HYDROcodone-acetaminophen (Norco) 5-325 MG per tab 1 tablet, 1 tablet, Oral, Q6H PRN    polyethylene glycol (PEG 3350) (Miralax) 17 g oral packet 17 g, 17 g, Oral, Daily    traMADol (Ultram) tab 50 mg, 50 mg, Oral, Q12H PRN    docusate sodium (Colace) cap 100 mg, 100 mg, Oral, BID    magnesium hydroxide (Milk of Magnesia) 400 MG/5ML oral suspension 30 mL, 30 mL, Oral, Daily PRN    bisacodyl (Dulcolax) 10 MG rectal suppository 10 mg, 10 mg, Rectal, Daily PRN    fleet enema (Fleet) 7-19 GM/118ML rectal enema 133 mL, 1 enema, Rectal, Once PRN    aspirin DR tab 81 mg, 81 mg, Oral, Daily    cholecalciferol (Vitamin D3) tab 2,000 Units, 2,000 Units, Oral, Daily    dofetilide (Tikosyn) cap 125 mcg, 125 mcg, Oral, BID    ezetimibe (Zetia) tab 10 mg, 10 mg, Oral, Nightly    fluticasone propionate (Flonase) 50 MCG/ACT nasal suspension 2 spray, 2 spray, Each Nare, Daily    gabapentin (Neurontin) cap 200 mg, 200 mg, Oral, Nightly    levothyroxine (Synthroid) tab 88 mcg, 88 mcg, Oral, Daily    pantoprazole (Protonix) DR tab 40 mg, 40 mg, Oral, Daily    pravastatin (Pravachol) tab 10 mg, 10 mg, Oral, Nightly    glucose (Dex4) 15 GM/59ML oral liquid 15 g, 15 g, Oral, Q15 Min PRN **OR** glucose (Glutose) 40% oral gel 15 g, 15 g, Oral, Q15 Min PRN **OR** glucose-vitamin C (Dex-4) chewable tab 4 tablet, 4 tablet, Oral, Q15 Min PRN **OR** dextrose 50% injection 50 mL, 50 mL, Intravenous, Q15 Min PRN **OR** glucose (Dex4) 15 GM/59ML oral liquid 30 g, 30 g, Oral, Q15 Min PRN **OR** glucose (Glutose) 40% oral gel 30 g, 30 g, Oral, Q15 Min PRN **OR** glucose-vitamin C (Dex-4) chewable tab 8 tablet, 8 tablet, Oral, Q15 Min PRN    heparin (Porcine) 5000 UNIT/ML injection 5,000 Units, 5,000 Units, Subcutaneous, Q8H [facility]    acetaminophen (Tylenol Extra Strength) tab  500 mg, 500 mg, Oral, Q4H PRN    ondansetron (Zofran) 4 MG/2ML injection 4 mg, 4 mg, Intravenous, Q6H PRN    metoclopramide (Reglan) 5 mg/mL injection 10 mg, 10 mg, Intravenous, Q8H PRN    insulin aspart (NovoLOG) 100 Units/mL FlexPen 1-10 Units, 1-10 Units, Subcutaneous, TID AC and HS    meropenem (Merrem) 500 mg in sodium chloride 0.9% 100 mL IVPB-MBP, 500 mg, Intravenous, Q12H    metoprolol succinate ER (Toprol XL) 24 hr tab 25 mg, 25 mg, Oral, QPM    metoprolol succinate (Toprol XL) partial tablet 12.5 mg, 12.5 mg, Oral, QAM    Review of Systems:  A comprehensive 10 point review of systems was completed.  Pertinent positives and negatives noted in the the HPI.    Physical Exam:    General: No acute distress. Alert and oriented x 3. Resting in bed.  Vital signs: Blood pressure 117/56, pulse 70, temperature 97.7 °F (36.5 °C), temperature source Oral, resp. rate 18, weight 135 lb (61.2 kg), SpO2 97%, not currently breastfeeding.  HEENT: Moist mucous membranes. Extraocular muscles are intact.  Neck: Supple  Respiratory: Clear to auscultation bilaterally.  No wheezes. No rhonchi.  Cardiovascular: S1, S2.  Regular rate and rhythm.  No murmurs.  Abdomen: Soft, non-tender to palpation; suprapubic catheter in place with clear urine in bag/tubing noted.  Musculoskeletal: Movement of all extremities.  Integument: No lesions. No erythema.     Laboratory Data:  Lab Results   Component Value Date    PGLU 138 07/02/2024       Microbiologic Data:   (this admission)    Reviewed in EMR     Imaging: No new imaging.    Impression:  Patient Active Problem List   Diagnosis    Hypothyroid    CAD (coronary artery disease)    Malignant neoplasm of breast (HCC)    Type 2 diabetes mellitus with hyperglycemia, without long-term current use of insulin (HCC)    Benign essential HTN    Paget's disease of nipple (HCC)    Vitamin D deficiency    Benign essential tremor    Sick sinus syndrome (HCC)    Atrial fibrillation (HCC)    S/P primary  angioplasty with coronary stent    History of pacemaker    Cervical radiculopathy    Breast asymmetry between native breast and reconstructed breast    Mixed incontinence urge and stress (male)(female)    Stenosis of both internal carotid arteries    Suprapubic catheter (HCC)    Presence of Watchman left atrial appendage closure device    Hyperlipidemia associated with type 2 diabetes mellitus (HCC)    Hypertension associated with type 2 diabetes mellitus (HCC)    Ulnar nerve compression, left    Urinary tract infection without hematuria, site unspecified    Dyslipidemia       ASSESSMENT:  Recurrent CA-UTI  Cx 6/27 with pseudomonas   DM type 2  Atrial fibrillation  Multiple antimicrobial allergies     PLAN:  -Continue Meropenem for treatment of pseudomonas UTI  -Will check CT a/p stone protocol   -Follow CBC and renal function  -No po options to treat this infection. Will place midline and plan to complete 2 week course of IV meropenem. May need to consider clorpactin irrigation of SPT after completing abx treatment to help prevent more UTIs.     Discussed with patient at bedside, Dr. Guillory, JULIA and RN.     Fallon Parsons PA-C    ID ATTENDING ADDENDUM     Pt seen an examined independently. Chart reviewed. Agree with above. Note has been reviewed by me and modified as needed.  Exam and Impression/ Recs as noted above.  Will follow up with me in 2 weeks  D/w staff and with pt  More than 50% of clinical time and 100% of the clinical decision making performed by me.    Silva Guillory MD

## 2024-07-02 NOTE — PROGRESS NOTES
[facility]   part of [facility]     Hospitalist Progress Note     Teresita Henderson Patient Status:  Inpatient    5/15/1940 MRN [id number]   Location [facility] 3SW-A Attending Disha Meyers MD   Hosp Day # 4 PCP Nkechi Ariza MD     Chief Complaint: abd pain chills    Subjective:     Patient with anxiety  No abd pain  Rt knee pain  Awaiting  ucx from [place] office    Objective:    Review of Systems:   A comprehensive review of systems was completed; pertinent positive and negatives stated in subjective.    Vital signs:  Temp:  [97.9 °F (36.6 °C)-98.6 °F (37 °C)] 98.2 °F (36.8 °C)  Pulse:  [69-70] 70  Resp:  [16-18] 18  BP: (101-131)/(40-62) 101/40  SpO2:  [93 %-98 %] 98 %    Physical Exam:    General: No acute distress  Respiratory: No wheezes, no rhonchi  Cardiovascular: S1, S2, regular rate and rhythm  Abdomen: Soft, Non-tender, non-distended, positive bowel sounds  Neuro: No new focal deficits.   Extremities: No edema      Diagnostic Data:    Labs:  Recent Labs   Lab 24  1726 24  0456   WBC 6.1 4.6   HGB 10.5* 9.6*   MCV 87.3 84.9   .0 227.0       Recent Labs   Lab 24  1721 24  0456   * 120*   BUN 19 16   CREATSERUM 0.84 0.61   CA 9.2 8.7   * 140   K 4.0 4.1    109   CO2 28.0 26.0       Estimated Creatinine Clearance: 55.3 mL/min (based on SCr of 0.61 mg/dL).    No results for input(s): \"TROP\", \"TROPHS\", \"CK\" in the last 168 hours.    No results for input(s): \"PTP\", \"INR\" in the last 168 hours.               Microbiology    Hospital Encounter on 24   1. Blood Culture     Status: None (Preliminary result)    Collection Time: 24  5:36 PM    Specimen: Blood,peripheral   Result Value Ref Range    Blood Culture Result No Growth 3 Days N/A         Imaging: Reviewed in Epic.    Medications:    cetirizine  10 mg Oral Daily    polyethylene glycol (PEG 3350)  17 g Oral Daily    docusate sodium  100 mg Oral BID    aspirin  81 mg Oral  Daily    cholecalciferol  2,000 Units Oral Daily    dofetilide  125 mcg Oral BID    ezetimibe  10 mg Oral Nightly    fluticasone propionate  2 spray Each Nare Daily    gabapentin  200 mg Oral Nightly    levothyroxine  88 mcg Oral Daily    pantoprazole  40 mg Oral Daily    pravastatin  10 mg Oral Nightly    heparin  5,000 Units Subcutaneous Q8H NICCI    insulin aspart  1-10 Units Subcutaneous TID AC and HS    meropenem  500 mg Intravenous Q12H    metoprolol succinate ER  25 mg Oral QPM    metoprolol succinate  12.5 mg Oral QAM       Assessment & Plan:      #Recurrent Suprapubic Catheter associated UTI  Continue empiric merrem  Follow cultures  ID to eval      #Afib  Rate controlled  On ASA     #Ess HTN  Continue home meds     #Dyslipidemia  Statin     #Breast Cancer    #DM2  SSI      Janet Rosas MD    Supplementary Documentation:     Quality:  DVT Mechanical Prophylaxis:   SCDs,    DVT Pharmacologic Prophylaxis   Medication    heparin (Porcine) 5000 UNIT/ML injection 5,000 Units         DVT Pharmacologic prophylaxis: Aspirin 81 mg      Code Status: Full Code  Dominique: Suprapubic catheter in place  Dominique Duration (in days):   Central line:    MARIAH:     Discharge is dependent on: progress  At this point Ms. Henderson is expected to be discharge to: home    The 21st Century Cures Act makes medical notes like these available to patients in the interest of transparency. Please be advised this is a medical document. Medical documents are intended to carry relevant information, facts as evident, and the clinical opinion of the practitioner. The medical note is intended as peer to peer communication and may appear blunt or direct. It is written in medical language and may contain abbreviations or verbiage that are unfamiliar.             **Certification      PHYSICIAN Certification of Need for Inpatient Hospitalization - Initial Certification    Patient will require inpatient services that will reasonably be expected to span two  midnight's based on the clinical documentation in H+P.   Based on patients current state of illness, I anticipate that, after discharge, patient will require TBD.

## 2024-07-02 NOTE — DISCHARGE INSTRUCTIONS
Sometimes managing your health at home requires assistance.  The [facility] team has recognized your preference to use [facility].  They can be reached by phone at [phone].  The fax number for your reference is [phone].  A representative from the home health agency will contact you or your family to schedule your first visit.      It was recommended that you have IV antibiotics at home to facilitate your recovery and compliment your treatment.  The [facility] team has set up delivery with [facility].  Contact information:  [address], [place] [address].  Phone: [phone].  The estimated delivery is 7/2/24.    If you experienced any difficulties with the delivery, please contact the [facility] department at [phone].

## 2024-07-02 NOTE — PLAN OF CARE
Patient A&Ox4, VSS on RA. Patient reports discomfort to left arm; elevated on pillows. Suprapubic cath intact and draining. Urine clear and yellow. Denies burning. Cultures finalized, ID aware. Plan to continue IV antibiotics and complete CT. DC plan TBD. Plan of care reviewed with patient. Patient demonstrates understanding.

## 2024-07-02 NOTE — PROGRESS NOTES
Discharge instructions including medications, follow-up appointments, and self care expectations reviewed with patient. PIV removed. All questions answered at this time. Patient demonstrates understanding. Verified with hosp that patient appropriate for DC after CT resulted. Dc order received. Dr. Guillory at bedside reviewing results

## 2024-07-02 NOTE — PROGRESS NOTES
Pt A&O x 4 , on RA , BM-6/29, Pain controlled with current regimen,   up ad patrice, chronic suprapubic catheter-intact and draining, scd's in place, heparin and aspirin for vte, regular diet tolerating well, lives with daughter kang at home, iv abx, RLA- hx Breast CA.     Per daughter the OP duly urine culture results came back positive for Pseudomonas Aeruginosa. ID made aware, no new orders at this time.

## 2024-07-02 NOTE — CM/SW NOTE
07/02/24 8145   Choice of Post-Acute Provider   Informed patient of right to choose their preferred provider Yes   List of appropriate post-acute services provided to patient/family with quality data Yes   [facility]/family choice [facility]   Information given to Patient   [facility] financial disclosure given Yes       Met with pt and provided list of accepting  agencies.  [facility] chosen.  Pt s/p midline placement.  Order received for IV meropenem 1g Q8.  Orders/line details added to [facility] referral.  [facility] confirming out of pocket costs for medication.  Spoke with pt's RN about possible DC today.  / to remain available for support and/or discharge planning.     Chela Bernard, [facility]  Discharge Planner  [phone]

## 2024-07-03 ENCOUNTER — TELEPHONE (OUTPATIENT)
Dept: INTERNAL MEDICINE CLINIC | Facility: CLINIC | Age: 84
End: 2024-07-03

## 2024-07-03 ENCOUNTER — PATIENT OUTREACH (OUTPATIENT)
Dept: CASE MANAGEMENT | Age: 84
End: 2024-07-03

## 2024-07-03 DIAGNOSIS — N39.0 URINARY TRACT INFECTION WITHOUT HEMATURIA, SITE UNSPECIFIED: Primary | ICD-10-CM

## 2024-07-03 PROCEDURE — 1111F DSCHRG MED/CURRENT MED MERGE: CPT

## 2024-07-03 NOTE — PROGRESS NOTES
Left message on mailbox for patient to call nurse care manager back for transitional care management/hospital follow-up call.  Nurse care  information [phone]  included in message.      Urinary tract infection without hematuria, site unspecified     Follow-up Appointments:  Follow-up Information    Follow up With Specialties Details Why Contact Info   Armen Peralta MD UROLOGY Schedule an appointment as soon as possible for a visit  [address]  [address]  [facility] [id number] [phone]   Nkechi Chavez MD Internal Medicine Schedule an appointment as soon as possible for a visit follow up MRI from previous  [address] [facility]  [address]  [facility] [address]  [phone]   Silva Guillory MD INFECTIOUS DISEASES, Internal Medicine Follow up today  [address] [place] [address]  [facility] [id number] [phone]

## 2024-07-05 ENCOUNTER — TELEPHONE (OUTPATIENT)
Dept: INTERNAL MEDICINE CLINIC | Facility: CLINIC | Age: 84
End: 2024-07-05

## 2024-07-05 NOTE — PROGRESS NOTES
Transitions of Care Navigation  Discharge Date: 24  Contact Date: 2024    Urinary tract infection without hematuria, site unspecified     Transitions of Care Assessment:  FERNANDO Initial Assessment    General:  Assessment completed with: Patient;Children (dtr Beryl)  Patient Subjective: Pt feeling better, since hospital discharge--suprapubic catheter draining clear, yellow urine--denies hematuria, low back or flank pain or abdominal pain. Pt tolerating Meropenem, as prescribed, via left midline--denies pain, redness or bleeding. Pt and dtr deny any fever, chills, headache, vision changes, dizziness, nausea, vomiting, diarrhea, bleeding, chest pain or shortness of breath at this time. Appetite good, staying hydrated, independent with ADLs.  Chief Complaint: Urinary tract infection without hematuria, site unspecified  Verify patient name and  with patient/ caregiver: Yes    Hospital Stay/Discharge:  Tell me what you understand of why you were in the hospital or emergency department: + urine culture results--UTI  Prior to leaving the hospital were your Discharge Instructions reviewed with you?: Yes  Did you receive a copy of your written Discharge Instructions?: Yes  What questions do you have about your Discharge Instructions?: none  Do you feel better or worse since you left the hospital or emergency department?: Better    Follow - Up Appointment:  Do you have a follow-up appointment?: No  Are there any barriers to getting to your follow-up appointment?: No    Home Health/DME:  Prior to leaving the hospital was Home Health (HH) arranged for you?: Yes  Has HH been out or set up a visit to see you?: Been out ([facility] for [facility])     Prior to leaving the hospital or emergency department was Durable Medical Equipment (DME), medical supplies, or infusions arranged for you?: Yes  Have you received your DME/supplies/infusions?: Yes (suprapubic catheter, left midline)  Do you have questions about using your  DME/supplies/infusions?: No     Medications/Diet:  Did any of your medications change, during or after your hospital stay or ED visit?: Yes  Do you have your new or updated medications?: Yes  Do you understand what your medications are for and possible side effects?: Yes  Are there any reasons that keep you from taking your medication as prescribed?: No  Any concerns about medication refills?: No    Were you given a different diet per your Discharge Instructions?: No  Reason: not needed     Questions/Concerns:  Do you have any questions or concerns that have not been discussed?: No   FERNANDO Follow-up Assessment    General:  Assessment completed with: Patient;Children (radhika Beryl)  Patient Subjective: Pt feeling better, since hospital discharge--suprapubic catheter draining clear, yellow urine--denies hematuria, low back or flank pain or abdominal pain. Pt tolerating Meropenem, as prescribed, via left midline--denies pain, redness or bleeding. Pt and dtr deny any fever, chills, headache, vision changes, dizziness, nausea, vomiting, diarrhea, bleeding, chest pain or shortness of breath at this time. Appetite good, staying hydrated, independent with ADLs.  Chief Complaint: Urinary tract infection without hematuria, site unspecified  Community Resources: Home Health ([facility] RN for [facility])    Progress/Care Plan:  Is the patient progressing as planned?: Yes     Nursing Interventions: Discussed diet, activity, medications and need for f/u visits. Radhika Cordoba will call Dr. Guillory's office today to schedule f/u phone visit, dtr does not feel pt needs to see Dr. Peralta, at this time. Dtr declines sooner TCM/FERNANDO appt with PCP--prefers to keep 7/30/2024 f/u appt, as scheduled. Dtr also taking pt to [place] 7/15-/20/24. Sent TE to office staff, as FYI/FERNANDO protocol. Patient aware when to contact PCP/specialists and when to seek emergency care. No further questions/concerns at this time.        Medications:  Current Outpatient  Medications   Medication Sig Dispense Refill    meropenem 1 g Intravenous Recon Soln Inject 1 g into the vein every 8 (eight) hours for 11 days. Will need weekly CBC and BMP while on this medication. 33 g 0    Polyethylene Glycol 3350 17 g Oral Powd Pack Take 17 g by mouth daily as needed. 90 each 1    HYDROcodone-acetaminophen (NORCO) 5-325 MG Oral Tab Take 1 tablet by mouth every 6 (six) hours as needed for Pain. 20 tablet 0    nystatin-triamcinolone 100,000-0.1 Units/g-% External Cream Apply around SPT site twice daily as directed      prochlorperazine (COMPAZINE) 10 mg tablet Take 1 tablet (10 mg total) by mouth every 6 (six) hours as needed for Nausea. 15 tablet 0    metoprolol succinate ER 25 MG Oral Tablet 24 Hr Take 1 tablet (25 mg total) by mouth daily. 12.5MG IN MORNING AND 25MG IN EVENING 45 tablet 0    ezetimibe 10 MG Oral Tab Take 1 tablet (10 mg total) by mouth nightly. 90 tablet 3    traMADol HCl 25 MG Oral Tab Take 25 mg by mouth 2 (two) times daily as needed (severe pain). 20 tablet 0    Lancets (ONETOUCH DELICA PLUS JDOPUH18A) Does not apply Misc 1 each daily. 100 each 6    Glucose Blood (ONETOUCH VERIO) In Vitro Strip Use as directed. 100 strip 1    levothyroxine 88 MCG Oral Tab TAKE 1 TABLET(88 MCG) BY MOUTH DAILY 90 tablet 3    metFORMIN HCl 1000 MG Oral Tab Take 1 tablet (1,000 mg total) by mouth 2 (two) times daily with meals. 180 tablet 0    gabapentin 100 MG Oral Cap Take 2 capsules (200 mg total) by mouth nightly. 180 capsule 2    Cholecalciferol (VITAMIN D) 50 MCG (2000 UT) Oral Cap Take by mouth daily.      pravastatin 10 MG Oral Tab Take 1 tablet (10 mg total) by mouth nightly. 90 tablet 0    aspirin 81 MG Oral Tab EC Take 1 tablet (81 mg total) by mouth daily. 30 tablet 0    cetirizine 10 MG Oral Tab Take 1 tablet (10 mg total) by mouth daily.      pantoprazole 40 MG Oral Tab EC Take 1 tablet (40 mg total) by mouth daily.      dofetilide 125 MCG Oral Cap Take 1 capsule (125 mcg total)  by mouth 2 (two) times daily.      fluticasone propionate 50 MCG/ACT Nasal Suspension 2 sprays by Each Nare route daily. 11.1 mL 3       Follow-up Appointments:  [facility]. They can be reached by phone at [phone]. The fax number for your reference is [phone]  [facility]. Contact information: [address], [place] [address]. Phone: [phone]  Follow-up Information     Follow up With Specialties Details Why Contact Info   Armen Peralta MD UROLOGY Schedule an appointment as soon as possible for a visit   [address]  [address]  [facility] [id number] [phone]   Nkechi Chavez MD Internal Medicine Schedule an appointment as soon as possible for a visit follow up MRI from previous  [address] [facility]  [address]  [facility] [address] [phone]   Silva Guillory MD INFECTIOUS DISEASES, Internal Medicine Follow up today   [address] [place] [address]  [facility] [id number] [phone]      Your appointments       Date & Time Appointment Department (Center)    Jul 30, 2024 9:40 AM CDT Office Visit with Nkechi Chavez MD [facility] ([facility])    Please arrive 15 minutes prior to your scheduled appointment. Please also bring your Insurance card, Photo ID, and your medication bottles or a list of your current medication.    If your condition improves and this appointment is no longer needed, please contact your physician office to cancel.    Please verify with your primary care provider if your insurance requires a referral.              [facility]  [address]  [facility] [id number] [phone]            Transitional Care Clinic  Was TCC Ordered: No    Primary Care Provider (If no TCC appointment)  Does patient already have a PCP appointment scheduled? Yes, but not until 7/30/2024  Nurse Care Manager  Attempted to schedule PCP office TCM/FERNANDO appointment with patient   -If no appointment scheduled: Explain : dtr prefers to keep 7/30/24 appt, as scheduled    Specialist  Does the patient have any other follow-up appointment(s) need to be scheduled? Yes   -If yes: Nurse Care Manager reviewed upcoming specialist appointments with patient: Yes   -Does the patient need assistance scheduling appointment(s): No    Book By Date: 7/09/2024

## 2024-07-05 NOTE — TELEPHONE ENCOUNTER
Sent as FYI/FERNANDO protocol:    Spoke to patient and dtr Beryl for Transitions of Care call today. Dtr Beryl will call Dr. Gulilory's office today to schedule f/u phone visit, dtr does not feel pt needs to see Dr. Peralta, at this time. Dtr declines sooner TCM/FERNANDO appt with PCP--prefers to keep 7/30/2024 f/u appt, as scheduled. Dtr also taking pt to [place] 7/15-/2024.    TCM/FERNANDO appointment needed by 7/09/2024.      BOOK BY DATE: 7/16/2024      Future Appointments   Date Time Provider Department Center   7/30/2024  9:40 AM Nkechi Chavez MD [facility] 8 [facility]         Follow-up Information     Follow up With Specialties Details Why Contact Info   Armen Peralta MD UROLOGY Schedule an appointment as soon as possible for a visit   [address] [facility]  [address]  [facility] [address]  [phone]   Nkechi Chavez MD Internal Medicine Schedule an appointment as soon as possible for a visit follow up MRI from previous  [address] [facility]   [address]  [facility] [address]  [phone]   Silva Guillory MD INFECTIOUS DISEASES, Internal Medicine Follow up today   [address] [facility] [address]  [facility] [address] [phone]

## 2024-07-09 ENCOUNTER — TELEPHONE (OUTPATIENT)
Dept: INTERNAL MEDICINE CLINIC | Facility: CLINIC | Age: 84
End: 2024-07-09

## 2024-07-09 NOTE — TELEPHONE ENCOUNTER
Farrah nurse from [facility] would like a call back is requesting an order to pull pt's pick line and discharge pt on the same date.

## 2024-07-09 NOTE — TELEPHONE ENCOUNTER
Called and spoke to Farrah with [facility].     Patient is expected to discharge from  Saturday, July 13th. Patient will then be going out of town for a week.    They are asking to remove the picc line and discharge patient. Patient will receive her last treatment Friday.     Receiving meropenum 1g IV.     Overall patient is doing okay will have labs drawn CBC & bmp tomorrow.     Okay to remove and discharge patient?

## 2024-07-10 ENCOUNTER — HOSPITAL ENCOUNTER (OUTPATIENT)
Facility: HOSPITAL | Age: 84
Setting detail: OBSERVATION
Discharge: HOME | End: 2024-07-12
Attending: EMERGENCY MEDICINE | Admitting: HOSPITALIST
Payer: MEDICARE

## 2024-07-10 DIAGNOSIS — N39.0 URINARY TRACT INFECTION WITHOUT HEMATURIA, SITE UNSPECIFIED: Primary | ICD-10-CM

## 2024-07-10 DIAGNOSIS — T82.524A: ICD-10-CM

## 2024-07-10 LAB
ALBUMIN SERPL-MCNC: 3.7 G/DL (ref 3.4–5)
ALBUMIN/GLOB SERPL: 0.8 {RATIO} (ref 1–2)
ALP LIVER SERPL-CCNC: 94 U/L
ALT SERPL-CCNC: 29 U/L
ANION GAP SERPL CALC-SCNC: 4 MMOL/L (ref 0–18)
AST SERPL-CCNC: 23 U/L (ref 15–37)
BASOPHILS # BLD AUTO: 0.07 X10(3) UL (ref 0–0.2)
BASOPHILS NFR BLD AUTO: 1.2 %
BILIRUB SERPL-MCNC: 0.2 MG/DL (ref 0.1–2)
BUN BLD-MCNC: 19 MG/DL (ref 9–23)
CALCIUM BLD-MCNC: 9.9 MG/DL (ref 8.5–10.1)
CHLORIDE SERPL-SCNC: 98 MMOL/L (ref 98–112)
CO2 SERPL-SCNC: 32 MMOL/L (ref 21–32)
CREAT BLD-MCNC: 0.75 MG/DL
EGFRCR SERPLBLD CKD-EPI 2021: 79 ML/MIN/1.73M2 (ref 60–?)
EOSINOPHIL # BLD AUTO: 0.14 X10(3) UL (ref 0–0.7)
EOSINOPHIL NFR BLD AUTO: 2.4 %
ERYTHROCYTE [DISTWIDTH] IN BLOOD BY AUTOMATED COUNT: 14.9 %
GLOBULIN PLAS-MCNC: 4.6 G/DL (ref 2.8–4.4)
GLUCOSE BLD-MCNC: 129 MG/DL (ref 70–99)
GLUCOSE BLD-MCNC: 145 MG/DL (ref 70–99)
HCT VFR BLD AUTO: 35.3 %
HGB BLD-MCNC: 11.7 G/DL
IMM GRANULOCYTES # BLD AUTO: 0.01 X10(3) UL (ref 0–1)
IMM GRANULOCYTES NFR BLD: 0.2 %
LYMPHOCYTES # BLD AUTO: 1.94 X10(3) UL (ref 1–4)
LYMPHOCYTES NFR BLD AUTO: 32.8 %
MCH RBC QN AUTO: 28.3 PG (ref 26–34)
MCHC RBC AUTO-ENTMCNC: 33.1 G/DL (ref 31–37)
MCV RBC AUTO: 85.3 FL
MONOCYTES # BLD AUTO: 0.7 X10(3) UL (ref 0.1–1)
MONOCYTES NFR BLD AUTO: 11.8 %
NEUTROPHILS # BLD AUTO: 3.05 X10 (3) UL (ref 1.5–7.7)
NEUTROPHILS # BLD AUTO: 3.05 X10(3) UL (ref 1.5–7.7)
NEUTROPHILS NFR BLD AUTO: 51.6 %
OSMOLALITY SERPL CALC.SUM OF ELEC: 283 MOSM/KG (ref 275–295)
PLATELET # BLD AUTO: 275 10(3)UL (ref 150–450)
POTASSIUM SERPL-SCNC: 4.1 MMOL/L (ref 3.5–5.1)
PROT SERPL-MCNC: 8.3 G/DL (ref 6.4–8.2)
RBC # BLD AUTO: 4.14 X10(6)UL
SODIUM SERPL-SCNC: 134 MMOL/L (ref 136–145)
WBC # BLD AUTO: 5.9 X10(3) UL (ref 4–11)

## 2024-07-10 PROCEDURE — 99222 1ST HOSP IP/OBS MODERATE 55: CPT | Performed by: HOSPITALIST

## 2024-07-10 RX ORDER — GABAPENTIN 100 MG/1
200 CAPSULE ORAL NIGHTLY
Status: DISCONTINUED | OUTPATIENT
Start: 2024-07-10 | End: 2024-07-12

## 2024-07-10 RX ORDER — MEROPENEM 1 G/1
1 INJECTION, POWDER, FOR SOLUTION INTRAVENOUS EVERY 8 HOURS
Status: DISCONTINUED | OUTPATIENT
Start: 2024-07-10 | End: 2024-07-10 | Stop reason: DRUGHIGH

## 2024-07-10 RX ORDER — ONDANSETRON 2 MG/ML
4 INJECTION INTRAMUSCULAR; INTRAVENOUS EVERY 6 HOURS PRN
Status: DISCONTINUED | OUTPATIENT
Start: 2024-07-10 | End: 2024-07-12

## 2024-07-10 RX ORDER — HYDROCODONE BITARTRATE AND ACETAMINOPHEN 5; 325 MG/1; MG/1
1 TABLET ORAL EVERY 6 HOURS PRN
Status: DISCONTINUED | OUTPATIENT
Start: 2024-07-10 | End: 2024-07-12

## 2024-07-10 RX ORDER — POLYETHYLENE GLYCOL 3350 17 G/17G
17 POWDER, FOR SOLUTION ORAL DAILY
Status: DISCONTINUED | OUTPATIENT
Start: 2024-07-11 | End: 2024-07-12

## 2024-07-10 RX ORDER — ASPIRIN 81 MG/1
81 TABLET ORAL DAILY
Status: DISCONTINUED | OUTPATIENT
Start: 2024-07-11 | End: 2024-07-12

## 2024-07-10 RX ORDER — METOPROLOL SUCCINATE 25 MG/1
25 TABLET, EXTENDED RELEASE ORAL NIGHTLY
Status: DISCONTINUED | OUTPATIENT
Start: 2024-07-11 | End: 2024-07-12

## 2024-07-10 RX ORDER — TRAMADOL HYDROCHLORIDE 50 MG/1
25 TABLET ORAL 2 TIMES DAILY PRN
Status: DISCONTINUED | OUTPATIENT
Start: 2024-07-10 | End: 2024-07-12

## 2024-07-10 RX ORDER — METOCLOPRAMIDE HYDROCHLORIDE 5 MG/ML
10 INJECTION INTRAMUSCULAR; INTRAVENOUS EVERY 8 HOURS PRN
Status: DISCONTINUED | OUTPATIENT
Start: 2024-07-10 | End: 2024-07-12

## 2024-07-10 RX ORDER — LEVOTHYROXINE SODIUM 88 UG/1
88 TABLET ORAL
Status: DISCONTINUED | OUTPATIENT
Start: 2024-07-11 | End: 2024-07-12

## 2024-07-10 NOTE — ED INITIAL ASSESSMENT (HPI)
Pt was attempting to use midline today and it came out; pt being treated with abx for UTI; pt told by home health to come to ED

## 2024-07-11 ENCOUNTER — TELEPHONE (OUTPATIENT)
Dept: INTERNAL MEDICINE CLINIC | Facility: CLINIC | Age: 84
End: 2024-07-11

## 2024-07-11 LAB
GLUCOSE BLD-MCNC: 105 MG/DL (ref 70–99)
GLUCOSE BLD-MCNC: 117 MG/DL (ref 70–99)
GLUCOSE BLD-MCNC: 127 MG/DL (ref 70–99)
GLUCOSE BLD-MCNC: 138 MG/DL (ref 70–99)

## 2024-07-11 PROCEDURE — 99232 SBSQ HOSP IP/OBS MODERATE 35: CPT | Performed by: INTERNAL MEDICINE

## 2024-07-11 RX ORDER — DEXTROSE MONOHYDRATE 25 G/50ML
50 INJECTION, SOLUTION INTRAVENOUS
Status: DISCONTINUED | OUTPATIENT
Start: 2024-07-11 | End: 2024-07-12

## 2024-07-11 RX ORDER — DOFETILIDE 0.12 MG/1
125 CAPSULE ORAL 2 TIMES DAILY
Status: DISCONTINUED | OUTPATIENT
Start: 2024-07-11 | End: 2024-07-12

## 2024-07-11 RX ORDER — NICOTINE POLACRILEX 4 MG
30 LOZENGE BUCCAL
Status: DISCONTINUED | OUTPATIENT
Start: 2024-07-11 | End: 2024-07-12

## 2024-07-11 RX ORDER — NICOTINE POLACRILEX 4 MG
15 LOZENGE BUCCAL
Status: DISCONTINUED | OUTPATIENT
Start: 2024-07-11 | End: 2024-07-12

## 2024-07-11 NOTE — PLAN OF CARE
Pt A&O x 4 , on RA , suprapubic hooks catheter, iv abx, BM-7/10, Pain controlled with current regimen, R limb alert- hx Breast CA,  tremor-chronic, accucheck qid, plan: PICC placement tomorrow and possible dc?

## 2024-07-11 NOTE — PLAN OF CARE
Assumed care of patient at 0700.  Denies chest pain, sob, lightheadedness,dizziness.  Facial tremors- baseline.   Midline dislodged at home. Was on long term ABX> due to be completed tomorrow evening.   Plan to DC after tomorrow nights dose.           Problem: PAIN - ADULT  Goal: Verbalizes/displays adequate comfort level or patient's stated pain goal  Description: INTERVENTIONS:  - Encourage pt to monitor pain and request assistance  - Assess pain using appropriate pain scale  - Administer analgesics based on type and severity of pain and evaluate response  - Implement non-pharmacological measures as appropriate and evaluate response  - Consider cultural and social influences on pain and pain management  - Manage/alleviate anxiety  - Utilize distraction and/or relaxation techniques  - Monitor for opioid side effects  - Notify MD/LIP if interventions unsuccessful or patient reports new pain  - Anticipate increased pain with activity and pre-medicate as appropriate  Outcome: Progressing     Problem: SAFETY ADULT - FALL  Goal: Free from fall injury  Description: INTERVENTIONS:  - Assess pt frequently for physical needs  - Identify cognitive and physical deficits and behaviors that affect risk of falls.  - [place] fall precautions as indicated by assessment.  - Educate pt/family on patient safety including physical limitations  - Instruct pt to call for assistance with activity based on assessment  - Modify environment to reduce risk of injury  - Provide assistive devices as appropriate  - Consider OT/PT consult to assist with strengthening/mobility  - Encourage toileting schedule  Outcome: Progressing     Problem: Patient/Family Goals  Goal: Patient/Family Long Term Goal  Description: Patient's Long Term Goal: to be uti free    Interventions:  - iv abx  -po meds  - hooks care  - See additional Care Plan goals for specific interventions  Outcome: Progressing  Goal: Patient/Family Short Term Goal  Description:  Patient's Short Term Goal: to get some rest     Interventions:   - iv abx  -turn lights down  - sleeping meds   - See additional Care Plan goals for specific interventions  Outcome: Progressing

## 2024-07-11 NOTE — DISCHARGE INSTRUCTIONS
Eat a heart healthy, diabetic diet  Continue to monitor your blood sugars, as per your pre hospitalization routine.   Journal results & bring them to your follow up appointment with your primary care physician.  Stay well hydrated  No strenuous activities  No lifting anything over 5#'s until your follow up appointments    Continue your suprapubic catheter home routine            It has been a pleasure taking care of you!  Best wishes for a speedy recovery!  Emerita REYES

## 2024-07-11 NOTE — ED QUICK NOTES
Orders for admission, patient is aware of plan and ready to go upstairs. Any questions, please call ED RN Gail at extension [phone].     Patient Covid vaccination status: Fully vaccinated     COVID Test Ordered in ED: None    COVID Suspicion at Admission: N/A    Running Infusions:  None    Mental Status/LOC at time of transport: A&O x 4    Other pertinent information:   CIWA score: N/A   NIH score:  N/A

## 2024-07-11 NOTE — ED PROVIDER NOTES
Patient Seen in: [facility] Emergency Department      History     Chief Complaint   Patient presents with    Cath Tube Problem     Stated Complaint: midline issue    Subjective:   HPI    Patient is 84-year-old female who is midline was accidentally dislodged.  Patient was recently discharged for admission for recurrent UTIs.  She was started on meropenem 1 g, every 8 hours to take for 2 weeks postdischarge.    It was accidentally dislodged today.  She missed 1 dose this afternoon.  No other complaints.    Objective:   No pertinent past medical history.            No pertinent past surgical history.              No pertinent social history.            Review of Systems    Positive for stated Chief Complaint: Cath Tube Problem    Other systems are as noted in HPI.  Constitutional and vital signs reviewed.      All other systems reviewed and negative except as noted above.    Physical Exam     ED Triage Vitals [07/10/24 1718]   /70   Pulse 71   Resp 18   Temp 97.6 °F (36.4 °C)   Temp src Temporal   SpO2 96 %   O2 Device None (Room air)       Current Vitals:   Vital Signs  BP: 135/70  Pulse: 71  Resp: 18  Temp: 97.6 °F (36.4 °C)  Temp src: Temporal    Oxygen Therapy  SpO2: 96 %  O2 Device: None (Room air)            Physical Exam  Vitals and nursing note reviewed.   Constitutional:       General: She is not in acute distress.     Appearance: She is well-developed. She is not toxic-appearing.   HENT:      Head: Normocephalic and atraumatic.   Eyes:      General: No scleral icterus.     Conjunctiva/sclera: Conjunctivae normal.   Cardiovascular:      Rate and Rhythm: Normal rate.   Pulmonary:      Effort: Pulmonary effort is normal. No respiratory distress.   Abdominal:      General: There is no distension.   Musculoskeletal:         General: No tenderness. Normal range of motion.      Cervical back: Normal range of motion and neck supple.   Skin:     General: Skin is warm and dry.      Findings: No rash.    Neurological:      Mental Status: She is alert and oriented to person, place, and time.      Motor: No abnormal muscle tone.      Coordination: Coordination normal.   Psychiatric:         Behavior: Behavior normal.              ED Course     Labs Reviewed   CBC W/ DIFFERENTIAL - Abnormal; Notable for the following components:       Result Value    HGB 11.7 (*)     All other components within normal limits   CBC WITH DIFFERENTIAL WITH PLATELET    Narrative:     The following orders were created for panel order CBC With Differential With Platelet.  Procedure                               Abnormality         Status                     ---------                               -----------         ------                     CBC W/ DIFFERENTIAL[[id number]]          Abnormal            Final result                 Please view results for these tests on the individual orders.   COMP METABOLIC PANEL (14)   RAINBOW DRAW LAVENDER   RAINBOW DRAW LIGHT GREEN                         MDM          -Pulse oximetry was interpreted by me and was normal.  Pulse oximeter was ordered to monitor patient for hypoxia.        -History source other than patient - daughter        -Comorbidities did add complexity to the management are mentioned in the HPI above        -I personally reviewed the prior external notes and the medical record to obtain additional history reviewed discharge summary July 2, 2024.  She was admitted for recurrent UTI dysuria and started on meropenem status post urine cultures.        -DDX: Includes but not limited to -urinary tract infection, sepsis,        I discussed with case management and interventional radiology.  They are unable to arrange placement of midline catheter tonight.  Unable to provide any guarantee that it would even be placed in the morning.  Given that she is on this medication every 8 hours, it is quite possible she may miss another dose if line is not placed expeditiously.  Patient was given 1  dose here in the ED.  Patient not comfortable going home given the above and would like to be admitted as she could still receive the medication via peripheral line.  Patient will be admitted to the hospital service for further care.          Admission disposition: 7/10/2024  9:14 PM                                        Medical Decision Making      Disposition and Plan     Clinical Impression:  1. Urinary tract infection without hematuria, site unspecified    2. Displacement of infusion catheter, initial encounter ([facility])         Disposition:  Admit  7/10/2024  9:14 pm    Follow-up:  Gerson Estes MD  [address] [place] [id number]  [phone]    Follow up            Medications Prescribed:  Current Discharge Medication List                            Hospital Problems       Present on Admission  Date Reviewed: 6/27/2024            ICD-10-CM Noted POA    * (Principal) Urinary tract infection without hematuria, site unspecified N39.0 6/28/2024 Unknown

## 2024-07-11 NOTE — CM/SW NOTE
Received a call from Dr. PAM Packer, requesting assistance for midline placement in AM. Patient is on antibiotics IV q8h.    EDCM spoke to Dr. PAM Packer about pt's case for options and possibilities. Per Dr. Packer, he will speak to IR on call and will update EDCM .

## 2024-07-11 NOTE — H&P
[facility]     Teresita Henderson Patient Status:  Observation    5/15/1940 MRN [id number]   Location [facility] 0SW-A Attending Janet Rosas MD   Hosp Day # 0 PCP Nkechi Ariza MD     Chief Complaint: midline out today    Subjective:    History of Present Illness:     Teresita Henderson is a 84 year old female with history of multiple recurrent UTIs who presents to emergency room few weeks ago with fever abdominal pain and she was diagnosed again with UTI.  She was discharged to home with left arm midline on meropenem for 2 more days.  Patient states that today after lying was used it got dislodged and she lost the placement of the midline.  She denies any fever chills abdominal pain and she states that she is feeling better otherwise overall.    History/Other:    Past Medical History:  Past Medical History:    A-fib (HCC)    Abdominal distention    Abnormal mammogram    Allergic rhinitis    Anesthesia complication    Pt's sons have a history of pseudocholinesterase deficiency.    Antral gastritis    Arrhythmia    AF    Arthritis    Bad breath    Belching    Bloating    Blood in urine    Breast CA (HCC)    Calculus of kidney    Cancer (HCC)    right breast cancer    Carpal tunnel syndrome    Cataract    Cervical spondylosis without myelopathy    Chondromalacia of patella    Constipation    Coronary atherosclerosis    stents    Coronary atherosclerosis of native coronary artery    Diabetes (HCC)    Diet controlled    Diverticulosis    Ductal carcinoma in situ (DCIS) of right breast,     Esophageal reflux    Exposure to unspecified radiation    last dose     Female stress incontinence    Flatulence/gas pain/belching    Frequent urination    Frequent use of laxatives    Frequent UTI    Frequent UTI    Gall stones    GERD (gastroesophageal reflux disease)    Glucose intolerance (impaired glucose tolerance)    Heart palpitations    High blood pressure    High cholesterol     History of blood transfusion        Hoarseness, chronic    Hypothyroidism    Incomplete bladder emptying (aka 61100)    trial of CIC 2014    Indigestion    Irregular bowel habits    L knee global 3/27/14    Leg swelling    Malignant neoplasm of breast (female), unspecified site    R    Nausea    Neoplasm of right breast, 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),     Osteoarthrosis, unspecified whether generalized or localized, unspecified site    generalized    Other and unspecified hyperlipidemia    Pacemaker    Pain in joints    Painful swallowing    Peripheral vascular disease (HCC)    PONV (postoperative nausea and vomiting)    Post-traumatic osteoarthritis of left elbow    Postmenopausal atrophic vaginitis    Presence of other cardiac implants and grafts    Primary localized osteoarthrosis, lower leg    Problems with swallowing    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)    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    Pure hypercholesterolemia    Rectocele    Recurrent UTI    group B strep    Recurrent UTI    S/P total knee replacement using cement    Sleep disturbance    Sputum production    Stented coronary artery    Uncomfortable fullness after meals    Unspecified disorder of thyroid    Visual impairment    reading glasses    Vitamin D deficiency     Past Surgical History:   Past Surgical History:   Procedure Laterality Date    Angioplasty (coronary)      Breast lumpectomy Right 2015    Procedure: BREAST LUMPECTOMY;  Surgeon: Castro Marie MD;  Location: [facility] MAIN OR    Breast reconstruction Right       1977    Cabg  1997    x3    Cardiac pacemaker placement      [place] 2022    Cataract      Cath bare metal stent (bms)      Cholecystectomy      Colonoscopy  2016    tics; repeat 10 yrs    Colonoscopy  2017    Colonoscopy,diagnostic N/A 2016    Procedure: COLONOSCOPY, POSSIBLE BIOPSY, POSSIBLE  POLYPECTOMY 28922;  Surgeon: Rodolfo Ann MD;  Location: [facility]    Cystoscopy,rx female urethral synd  05/10/2013    cysto UD, Ricardo    D & c  1976    Hysterectomy  1985    precancer and irregular bleeding. w/ BSO    Knee replacement surgery  2013    left     Lumpectomy right Right [id number]    Mastectomy right  2015      1964    Oophorectomy      AGE 47    Open heart surgery (pbp)      Other surgical history      LUMPECTOMY RIGHT BREAST     Other surgical history      A PMM LEFT KNEE , replacement    Other surgical history  2013    Venita DENTON    Other surgical history  2014    Cystoscopy- Dr. Figueroa    Other surgical history  2016    Bladder sling    Other surgical history       WATCHMEN    Pacemaker monitor      Patient documented not to have experienced any of the following events N/A 2016    Procedure: COLONOSCOPY, POSSIBLE BIOPSY, POSSIBLE POLYPECTOMY 69006;  Surgeon: Rodolfo Ann MD;  Location: [facility]    Patient withough preoperative order for iv antibiotic surgical site infection prophylaxis. N/A 2016    Procedure: COLONOSCOPY, POSSIBLE BIOPSY, POSSIBLE POLYPECTOMY 80292;  Surgeon: Rodolfo Ann MD;  Location: [facility]    Radiation right          Reduction left  10/2015    Total knee replacement      LEFT     Upper gi endoscopy,exam  2011    repeat 5 yeas      Family History:   Family History   Problem Relation Age of Onset    Cancer Sister         throat    Diabetes Mother     Breast Cancer Self 52    Heart Disease Neg     Stroke Neg      Social History:    reports that she quit smoking about 27 years ago. Her smoking use included cigarettes. She has never used smokeless tobacco. She reports that she does not currently use alcohol. She reports that she does not use drugs.     Allergies:   Allergies   Allergen Reactions    Cephalexin ANAPHYLAXIS, HIVES and FACE FLUSHING    Ciprofloxacin MYALGIA,  HIVES and OTHER (SEE COMMENTS)     Started on Rx 4/12/23? Not true allergy?    Fesoterodine SWELLING     Throat tightening  Throat tightening Throat tightening    Throat tightening Throat tightening      Throat tightening    Fosfomycin ARRHYTHMIA     Interaction of arrhythmias with Tykosin    Levofloxacin MYALGIA, HIVES and OTHER (SEE COMMENTS)     Reports tendonitis    Reports tendonitis   Reports tendonitis   Reports tendonitis    Other ARRHYTHMIA     Macrolides and Fluoroquinolones    Penicillins HIVES and RASH     Tolerates ceftriaxone    Toviaz SWELLING and TONGUE SWELLING     Throat tightening    Throat tightening   Throat tightening    Atorvastatin OTHER (SEE COMMENTS) and NAUSEA AND VOMITING     GI upset, nausea and vomitting    GI upset, nausea and vomitting   GI upset, nausea and vomitting   GI upset, nausea and vomitting    Chlorhexidine OTHER (SEE COMMENTS)     Rash, redness    Chocolate OTHER (SEE COMMENTS)     headache    headache   headache   headache    Colesevelam OTHER (SEE COMMENTS) and UNKNOWN     GI upset    GI upset   GI upset   GI upset    Chocolate OTHER (SEE COMMENTS)     headache    Fish-Derived Products OTHER (SEE COMMENTS)     Per allergy testing    Mushrooms OTHER (SEE COMMENTS)     headache    Pecan OTHER (SEE COMMENTS)     Unknown     Shrimp UNKNOWN     Per allergy testing    Tomatoes OTHER (SEE COMMENTS)     headache    Hexachlorophene RASH and OTHER (SEE COMMENTS)       Medications:    Current Facility-Administered Medications on File Prior to Encounter   Medication Dose Route Frequency Provider Last Rate Last Admin    [COMPLETED] meropenem (Merrem) 1,000 mg in sodium chloride 0.9% 100 mL IVPB-MBP  1,000 mg Intravenous Once Fallon Parsons  mL/hr at 07/02/24 1601 1,000 mg at 07/02/24 1601    [COMPLETED] meropenem (Merrem) 1 g in sodium chloride 0.9% 100 mL IVPB-MBP  1 g Intravenous Once Rohini Vo DO   Stopped at 06/28/24 1815    [COMPLETED] prochlorperazine  (Compazine) tab 10 mg  10 mg Oral Once Eva Atkinson MD   10 mg at 05/13/24 2112    [COMPLETED] magnesium oxide (Mag-Ox) tab 400 mg  400 mg Oral Once Allie Granger APRN   400 mg at 04/27/24 1230    [COMPLETED] sodium chloride 0.9 % IV bolus 1,000 mL  1,000 mL Intravenous Once Radha Mejia S, DO   Stopped at 04/26/24 1916    [COMPLETED] cloNIDine (Catapres) tab 0.1 mg  0.1 mg Oral Once Mejia, Radha S, DO   0.1 mg at 04/26/24 1705    [COMPLETED] prochlorperazine (Compazine) 10 MG/2ML injection 10 mg  10 mg Intravenous Once Yessenia Mejiaa S, DO   10 mg at 04/26/24 1704    [COMPLETED] iopamidol 76% (ISOVUE-370) injection for power injector  75 mL Intravenous ONCE PRN Yessenia Mejiaa S, DO   75 mL at 04/26/24 1742    [COMPLETED] gentamicin (Garamycin) 260 mg in sodium chloride 0.9% 100 mL IVPB  5 mg/kg (Ideal) Intravenous Once Mejia Radha S, DO   Stopped at 04/26/24 1916    [COMPLETED] iopamidol 76% (ISOVUE-370) injection for power injector  65 mL Intravenous ONCE PRN Mejia, Radha S, DO   65 mL at 04/26/24 1752    [COMPLETED] HYDROcodone-acetaminophen (Norco) 5-325 MG per tab 1 tablet  1 tablet Oral Once Alexis Jo APRN   1 tablet at 04/24/24 1949    [COMPLETED] fluconazole (Diflucan) tab 200 mg  200 mg Oral Once Teodoro Reynaga MD   200 mg at 04/19/24 2011    [COMPLETED] clindamycin (Cleocin) cap 300 mg  300 mg Oral Once Teodoro Reynaga MD   300 mg at 04/19/24 2011     Current Outpatient Medications on File Prior to Encounter   Medication Sig Dispense Refill    meropenem 1 g Intravenous Recon Soln Inject 1 g into the vein every 8 (eight) hours for 11 days. Will need weekly CBC and BMP while on this medication. 33 g 0    Polyethylene Glycol 3350 17 g Oral Powd Pack Take 17 g by mouth daily as needed. 90 each 1    HYDROcodone-acetaminophen (NORCO) 5-325 MG Oral Tab Take 1 tablet by mouth every 6 (six) hours as needed for Pain. 20 tablet 0    nystatin-triamcinolone 100,000-0.1 Units/g-% External Cream  Apply around SPT site twice daily as directed      prochlorperazine (COMPAZINE) 10 mg tablet Take 1 tablet (10 mg total) by mouth every 6 (six) hours as needed for Nausea. 15 tablet 0    metoprolol succinate ER 25 MG Oral Tablet 24 Hr Take 1 tablet (25 mg total) by mouth daily. 12.5MG IN MORNING AND 25MG IN EVENING 45 tablet 0    ezetimibe 10 MG Oral Tab Take 1 tablet (10 mg total) by mouth nightly. 90 tablet 3    traMADol HCl 25 MG Oral Tab Take 25 mg by mouth 2 (two) times daily as needed (severe pain). 20 tablet 0    Lancets (ONETOUCH DELICA PLUS [id number]) Does not apply Misc 1 each daily. 100 each 6    Glucose Blood (ONETOUCH VERIO) In Vitro Strip Use as directed. 100 strip 1    levothyroxine 88 MCG Oral Tab TAKE 1 TABLET(88 MCG) BY MOUTH DAILY 90 tablet 3    metFORMIN HCl 1000 MG Oral Tab Take 1 tablet (1,000 mg total) by mouth 2 (two) times daily with meals. 180 tablet 0    gabapentin 100 MG Oral Cap Take 2 capsules (200 mg total) by mouth nightly. 180 capsule 2    Cholecalciferol (VITAMIN D) 50 MCG (2000 UT) Oral Cap Take by mouth daily.      pravastatin 10 MG Oral Tab Take 1 tablet (10 mg total) by mouth nightly. 90 tablet 0    aspirin 81 MG Oral Tab EC Take 1 tablet (81 mg total) by mouth daily. 30 tablet 0    cetirizine 10 MG Oral Tab Take 1 tablet (10 mg total) by mouth daily.      pantoprazole 40 MG Oral Tab EC Take 1 tablet (40 mg total) by mouth daily.      dofetilide 125 MCG Oral Cap Take 1 capsule (125 mcg total) by mouth 2 (two) times daily.      fluticasone propionate 50 MCG/ACT Nasal Suspension 2 sprays by Each Nare route daily. 11.1 mL 3       Review of Systems:   A comprehensive review of systems was completed.    Pertinent positives and negatives noted in the HPI.    Objective:   Physical Exam:    /70   Pulse 71   Temp 97.6 °F (36.4 °C) (Temporal)   Resp 18   Wt 134 lb (60.8 kg)   SpO2 96%   BMI 24.51 kg/m²   General: No acute distress, Alert  Respiratory: No rhonchi, no  wheezes  Cardiovascular: S1, S2. Regular rate and rhythm  Abdomen: Soft, Non-tender, non-distended, positive bowel sounds  Neuro: No new focal deficits  Extremities: No edema      Results:    Labs:      Labs Last 24 Hours:    Recent Labs   Lab 07/10/24  2021   RBC 4.14   HGB 11.7*   HCT 35.3   MCV 85.3   MCH 28.3   MCHC 33.1   RDW 14.9   NEPRELIM 3.05   WBC 5.9   .0       Recent Labs   Lab 07/10/24  2021   *   BUN 19   CREATSERUM 0.75   EGFRCR 79   CA 9.9   ALB 3.7   *   K 4.1   CL 98   CO2 32.0   ALKPHO 94   AST 23   ALT 29   BILT 0.2   TP 8.3*       No results found for: \"PT\", \"INR\"    No results for input(s): \"TROP\", \"TROPHS\", \"CK\" in the last 168 hours.    No results for input(s): \"TROP\", \"PBNP\" in the last 168 hours.    No results for input(s): \"PCT\" in the last 168 hours.    Imaging: Imaging data reviewed in Epic.    Assessment & Plan:      #Recurrent Suprapubic Catheter associated UTI  Continue merrem  To reevaluate in the morning for need for another line for 48 hours     #Afib  Rate controlled  On ASA     #Ess HTN  Continue home meds     #Dyslipidemia  Statin     #Breast Cancer    #DM2  SSI           Plan of care discussed with patient and emergency room physician    Janet Rosas MD    Supplementary Documentation:     The 21st Century Cures Act makes medical notes like these available to patients in the interest of transparency. Please be advised this is a medical document. Medical documents are intended to carry relevant information, facts as evident, and the clinical opinion of the practitioner. The medical note is intended as peer to peer communication and may appear blunt or direct. It is written in medical language and may contain abbreviations or verbiage that are unfamiliar.

## 2024-07-11 NOTE — PROGRESS NOTES
[facility]   part of [facility]     Hospitalist Progress Note     Teresita Henderson Patient Status:  Observation    5/15/1940 MRN [id number]   Location [facility] 0SW-A Attending Janet Rosas MD   Hosp Day # 0 PCP Nkechi Ariza MD     Chief Complaint: UTI    Subjective:     Patient with no N/V/abd pain, some skin irritation on her left inner thigh mentioned.     Objective:    Review of Systems:   A comprehensive review of systems was completed; pertinent positive and negatives stated in subjective.    Vital signs:  Temp:  [97.2 °F (36.2 °C)-97.8 °F (36.6 °C)] 97.2 °F (36.2 °C)  Pulse:  [70-71] 70  Resp:  [16-18] 17  BP: (121-165)/(61-83) 157/72  SpO2:  [96 %-100 %] 99 %    Physical Exam:    General: No acute distress  Respiratory: No wheezes, no rhonchi  Cardiovascular: S1, S2, regular rate and rhythm  Abdomen: Soft, Non-tender, non-distended, positive bowel sounds  Neuro: No new focal deficits.   Extremities: No edema      Diagnostic Data:    Labs:  Recent Labs   Lab 07/10/24  2021   WBC 5.9   HGB 11.7*   MCV 85.3   .0       Recent Labs   Lab 07/10/24  2021   *   BUN 19   CREATSERUM 0.75   CA 9.9   ALB 3.7   *   K 4.1   CL 98   CO2 32.0   ALKPHO 94   AST 23   ALT 29   BILT 0.2   TP 8.3*       Estimated Creatinine Clearance: 45 mL/min (based on SCr of 0.75 mg/dL).    No results for input(s): \"TROP\", \"TROPHS\", \"CK\" in the last 168 hours.    No results for input(s): \"PTP\", \"INR\" in the last 168 hours.               Microbiology    No results found for this visit on 07/10/24.      Imaging: Reviewed in Epic.    Medications:    metoprolol succinate ER  12.5 mg Oral Daily Beta Blocker    insulin aspart  2-10 Units Subcutaneous TID AC and HS    aspirin  81 mg Oral Daily    gabapentin  200 mg Oral Nightly    levothyroxine  88 mcg Oral Before breakfast    metoprolol succinate ER  25 mg Oral Nightly    polyethylene glycol (PEG 3350)  17 g Oral Daily    meropenem  1 g Intravenous Q12H        Assessment & Plan:      # Recurrent CA-UTI  - Cx 6/27 with pseudomonas , no PO options   - cont Merrem as scheduled PIV, pt does not wish to get midline since her abx course will be done on 7/13.    #DM type 2  - ISS    #Atrial fibrillation  - BB    #Multiple antimicrobial allergies       Sarahi Corral MD    Supplementary Documentation:     Quality:  DVT Mechanical Prophylaxis:   SCDs, Early ambuation  DVT Pharmacologic Prophylaxis   Medication   None         DVT Pharmacologic prophylaxis: Aspirin 81 mg      Code Status: Full Code  Dominique: Suprapubic catheter in place  Dominique Duration (in days):   Central line:    MARIAH: 7/13    Discharge is dependent on: course  At this point Ms. Henderson is expected to be discharge to: home     The 21st Century Cures Act makes medical notes like these available to patients in the interest of transparency. Please be advised this is a medical document. Medical documents are intended to carry relevant information, facts as evident, and the clinical opinion of the practitioner. The medical note is intended as peer to peer communication and may appear blunt or direct. It is written in medical language and may contain abbreviations or verbiage that are unfamiliar.

## 2024-07-12 VITALS
SYSTOLIC BLOOD PRESSURE: 112 MMHG | HEART RATE: 70 BPM | OXYGEN SATURATION: 96 % | RESPIRATION RATE: 20 BRPM | TEMPERATURE: 97 F | BODY MASS INDEX: 25 KG/M2 | WEIGHT: 134 LBS | DIASTOLIC BLOOD PRESSURE: 61 MMHG

## 2024-07-12 LAB
GLUCOSE BLD-MCNC: 106 MG/DL (ref 70–99)
GLUCOSE BLD-MCNC: 122 MG/DL (ref 70–99)
GLUCOSE BLD-MCNC: 136 MG/DL (ref 70–99)

## 2024-07-12 PROCEDURE — 99239 HOSP IP/OBS DSCHRG MGMT >30: CPT | Performed by: INTERNAL MEDICINE

## 2024-07-12 NOTE — PLAN OF CARE
Pt a&o x 4.  Friendly & cooperative w/ care  Sitting in chair most of shift.  Call light within reach  Denies c/o pain  Tolerating diabetic diet  Suprapubic cath draining clear yellow urine  Dressing to left elbow cdi  Plan of care:  merrem @ 5pm, infuse over 30 minutes, then discharge home   Pt states she lives w/ daughter   Pt verbalized agreement with plan of care

## 2024-07-12 NOTE — CM/SW NOTE
Pt current with [facility]. Will need CARRIE if flips to inpatient. Leona with [facility] aware of admission.    She has a history with [facility] for IV abx. Per chart review, IV course planning to complete today then plans for DC home without midline.    CM/SW to remain available.    Addendum: Discussed with [facility] liaison, Jennifer. Pt was only utilizing [facility] due to IV abx needs. Due to pt no longer needing IV abx on discharge, [facility] will discontinue services. Phone number in AVS for [facility] for pt reference.    Teena Richards, BRANDONN, RN-BC    [phone]

## 2024-07-12 NOTE — PLAN OF CARE
Received patient awake and alert in room #0011  On room air, clear lungs. 98%  Suprapubic catheter intact, CHG bath done.  QID accuchecks, 138 last night.  IV antibiotics.  No fever.     Problem: PAIN - ADULT  Goal: Verbalizes/displays adequate comfort level or patient's stated pain goal  Description: INTERVENTIONS:  - Encourage pt to monitor pain and request assistance  - Assess pain using appropriate pain scale  - Administer analgesics based on type and severity of pain and evaluate response  - Implement non-pharmacological measures as appropriate and evaluate response  - Consider cultural and social influences on pain and pain management  - Manage/alleviate anxiety  - Utilize distraction and/or relaxation techniques  - Monitor for opioid side effects  - Notify MD/LIP if interventions unsuccessful or patient reports new pain  - Anticipate increased pain with activity and pre-medicate as appropriate  7/11/2024 2225 by Darcie Pierce RN  Outcome: Progressing  7/11/2024 2225 by Darcie Pierce, RN  Outcome: Progressing     Problem: SAFETY ADULT - FALL  Goal: Free from fall injury  Description: INTERVENTIONS:  - Assess pt frequently for physical needs  - Identify cognitive and physical deficits and behaviors that affect risk of falls.  - [place] fall precautions as indicated by assessment.  - Educate pt/family on patient safety including physical limitations  - Instruct pt to call for assistance with activity based on assessment  - Modify environment to reduce risk of injury  - Provide assistive devices as appropriate  - Consider OT/PT consult to assist with strengthening/mobility  - Encourage toileting schedule  7/11/2024 2225 by Darcie Pierce, RN  Outcome: Progressing  7/11/2024 2225 by Darcie Pierce RN  Outcome: Progressing     Problem: Patient/Family Goals  Goal: Patient/Family Long Term Goal  Description: Patient's Long Term Goal: to be uti free    Interventions:  - iv abx  -po meds  - hooks care  - See  additional Care Plan goals for specific interventions  7/11/2024 2225 by Darcie Pierce, RN  Outcome: Progressing  7/11/2024 2225 by Darcie Pierce, RN  Outcome: Progressing  Goal: Patient/Family Short Term Goal  Description: Patient's Short Term Goal: to get some rest     Interventions:   - iv abx  -turn lights down  - sleeping meds   - See additional Care Plan goals for specific interventions  7/11/2024 2225 by Darcie Pierce, RN  Outcome: Progressing  7/11/2024 2225 by Darcie Pierce, RN  Outcome: Progressing

## 2024-07-12 NOTE — PROGRESS NOTES
Patient seen and examined.     Overall doing well. No overnight issues.     DC PLANNING THIS EVENING after last dose of abx.      Dc summary to follow.     Saarhi Corral MD

## 2024-07-12 NOTE — DISCHARGE SUMMARY
[place] HOSPITALIST  DISCHARGE SUMMARY     Teresita Henderson Patient Status:  Observation    5/15/1940 MRN [id number]   Location [facility] 0SW-A Attending Sarahi Corral MD   Hosp Day # 0 PCP Nkechi Ariza MD     Date of Admission: 7/10/2024  Date of Discharge:   24    Discharge Disposition: Home or Self Care    Discharge Diagnosis:  # Recurrent CA-UTI  # midline dislodged    #DM type 2       #Atrial fibrillation     #Multiple antimicrobial allergies        History of Present Illness: Teresita Henderson is a 84 year old female with history of multiple recurrent UTIs who presents to emergency room few weeks ago with fever abdominal pain and she was diagnosed again with UTI.  She was discharged to home with left arm midline on meropenem for 2 more days.  Patient states that today after lying was used it got dislodged and she lost the placement of the midline.  She denies any fever chills abdominal pain and she states that she is feeling better otherwise overall.     Brief Synopsis: patient admitted and completed course of her Merrem at the hospital and remained. Stable.     Lace+ Score: 79  59-90 High Risk  29-58 Medium Risk  0-28   Low Risk  Patient was referred to the [facility].    TCM Follow-Up Recommendation:  LACE 29-58: Moderate Risk of readmission after discharge from the hospital.      Discharge Medication List:     Discharge Medications        CONTINUE taking these medications        Instructions Prescription details   aspirin 81 MG Tbec      Take 1 tablet (81 mg total) by mouth daily.   Quantity: 30 tablet  Refills: 0     cetirizine 10 MG Tabs  Commonly known as: ZyrTEC      Take 1 tablet (10 mg total) by mouth daily.   Refills: 0     dofetilide 125 MCG Caps  Commonly known as: Tikosyn      Take 1 capsule (125 mcg total) by mouth 2 (two) times daily.   Refills: 0     ezetimibe 10 MG Tabs  Commonly known as: Zetia      Take 1 tablet (10 mg total) by mouth nightly.    Quantity: 90 tablet  Refills: 3     fluticasone propionate 50 MCG/ACT Susp  Commonly known as: Flonase      2 sprays by Each Nare route daily.   Quantity: 11.1 mL  Refills: 3     gabapentin 100 MG Caps  Commonly known as: Neurontin      Take 2 capsules (200 mg total) by mouth nightly.   Quantity: 180 capsule  Refills: 2     HYDROcodone-acetaminophen 5-325 MG Tabs  Commonly known as: Norco      Take 1 tablet by mouth every 6 (six) hours as needed for Pain.   Quantity: 20 tablet  Refills: 0     levothyroxine 88 MCG Tabs  Commonly known as: Synthroid      TAKE 1 TABLET(88 MCG) BY MOUTH DAILY   Quantity: 90 tablet  Refills: 3     metFORMIN HCl 1000 MG Tabs  Commonly known as: GLUCOPHAGE      Take 1 tablet (1,000 mg total) by mouth 2 (two) times daily with meals.   Quantity: 180 tablet  Refills: 0     metoprolol succinate ER 25 MG Tb24  Commonly known as: Toprol XL      Take 1 tablet (25 mg total) by mouth daily. 12.5MG IN MORNING AND 25MG IN EVENING   Quantity: 45 tablet  Refills: 0     nystatin-triamcinolone 100,000-0.1 Units/g-% Crea  Commonly known as: Mycolog II      Apply around SPT site twice daily as directed   Refills: 0     OneTouch Delica Plus [id number] Misc      1 each daily.   Quantity: 100 each  Refills: 6     OneTouch Verio Strp      Use as directed.   Quantity: 100 strip  Refills: 1     pantoprazole 40 MG Tbec  Commonly known as: Protonix      Take 1 tablet (40 mg total) by mouth daily.   Refills: 0     Polyethylene Glycol 3350 17 g Pack  Commonly known as: MIRALAX      Take 17 g by mouth daily as needed.   Quantity: 90 each  Refills: 1     pravastatin 10 MG Tabs  Commonly known as: Pravachol      Take 1 tablet (10 mg total) by mouth nightly.   Quantity: 90 tablet  Refills: 0     prochlorperazine 10 mg tablet  Commonly known as: Compazine      Take 1 tablet (10 mg total) by mouth every 6 (six) hours as needed for Nausea.   Quantity: 15 tablet  Refills: 0     traMADol HCl 25 MG Tabs      Take 25 mg by  mouth 2 (two) times daily as needed (severe pain).   Quantity: 20 tablet  Refills: 0     Vitamin D 50 MCG (2000) Caps      Take by mouth daily.   Refills: 0            STOP taking these medications      meropenem 1 g Solr  Commonly known as: Merrem                 [place]    Follow-up appointment:   Gerson Estes MD  [address] [facility] [address] [phone]    Follow up      [facility]  [address] [place] [id number] [phone]  Follow up in 3 day(s)      Appointments for Next 30 Days 2024 - 2024        Date Arrival Time Visit Type Length Department Provider     2024  9:40 AM  MYCHART EXAM [2964] 20 min [facility] Nkechi Chavez MD    Patient Instructions:     Please arrive 15 minutes prior to your scheduled appointment. Please also bring your Insurance card, Photo ID, and your medication bottles or a list of your current medication.    If your condition improves and this appointment is no longer needed, please contact your physician office to cancel.    Please verify with your primary care provider if your insurance requires a referral.        Location Instructions:     Masks are optional for all patients and visitors, unless otherwise indicated.                      Vital signs:  Temp:  [97.7 °F (36.5 °C)-98.3 °F (36.8 °C)] 98.2 °F (36.8 °C)  Pulse:  [69-70] 69  Resp:  [18-20] 20  BP: (109-135)/(55-78) 109/55  SpO2:  [92 %-98 %] 95 %    Physical Exam:    General: No acute distress   Lungs: clear to auscultation  Cardiovascular: S1, S2  Abdomen: Soft      -----------------------------------------------------------------------------------------------  PATIENT DISCHARGE INSTRUCTIONS: See electronic chart    Sarahi Corral MD    Total time spent on discharge plannin minutes     The  [place] C[employer] Act makes medical notes like these available to patients in the interest of  transparency. Please be advised this is a medical document. Medical documents are intended to carry relevant information, facts as evident, and the clinical opinion of the practitioner. The medical note is intended as peer to peer communication and may appear blunt or direct. It is written in medical language and may contain abbreviations or verbiage that are unfamiliar.

## 2024-07-13 NOTE — PLAN OF CARE
Pt discharged home.  Discharge instructions reviewed w/ pt & pt's daughter, including:  Review of home meds  Diet & hydration  Activity  Follow up care  Pt & daughter verbalized understanding of all instructions  Left unit stable via w/c

## 2024-07-15 ENCOUNTER — PATIENT OUTREACH (OUTPATIENT)
Dept: CASE MANAGEMENT | Age: 84
End: 2024-07-15

## 2024-07-16 NOTE — PROGRESS NOTES
NCM attempted to reach the patient to complete a TCM/HFU call. Left message to call back. [facility] provided direct contact info at [phone].

## 2024-07-17 ENCOUNTER — PATIENT OUTREACH (OUTPATIENT)
Dept: CASE MANAGEMENT | Age: 84
End: 2024-07-17

## 2024-07-17 NOTE — PROGRESS NOTES
Hospital follow up.    TCC request.    Patient does not wish to follow up.  Confirmed with patient's daughter.    Closing encounter.

## 2024-07-29 RX ORDER — PRAVASTATIN SODIUM 10 MG
10 TABLET ORAL NIGHTLY
Qty: 90 TABLET | Refills: 0 | Status: SHIPPED | OUTPATIENT
Start: 2024-07-29

## 2024-07-29 NOTE — TELEPHONE ENCOUNTER
Protocol passed     Pravastatin 10mg     LOV: 6/5/24   RTC: 3 months   Filled: 12/18/23 #90 0 refill   Labs: 3/20/24   Future Appointments   Date Time Provider Department Center   7/30/2024  9:40 AM Nkechi Chavez MD [facility] [place]

## 2024-07-30 ENCOUNTER — OFFICE VISIT (OUTPATIENT)
Dept: INTERNAL MEDICINE CLINIC | Facility: CLINIC | Age: 84
End: 2024-07-30
Payer: MEDICARE

## 2024-07-30 VITALS
WEIGHT: 139 LBS | SYSTOLIC BLOOD PRESSURE: 128 MMHG | HEART RATE: 70 BPM | TEMPERATURE: 98 F | BODY MASS INDEX: 25 KG/M2 | OXYGEN SATURATION: 96 % | DIASTOLIC BLOOD PRESSURE: 62 MMHG | RESPIRATION RATE: 15 BRPM

## 2024-07-30 DIAGNOSIS — K86.2 PANCREATIC CYST (HCC): Primary | ICD-10-CM

## 2024-07-30 DIAGNOSIS — Z93.59 SUPRAPUBIC CATHETER (HCC): ICD-10-CM

## 2024-07-30 DIAGNOSIS — Z87.440 HISTORY OF RECURRENT UTIS: ICD-10-CM

## 2024-07-30 PROCEDURE — 99215 OFFICE O/P EST HI 40 MIN: CPT | Performed by: INTERNAL MEDICINE

## 2024-07-30 RX ORDER — CEPHALEXIN 250 MG/1
250 CAPSULE ORAL DAILY
Qty: 90 CAPSULE | Refills: 0 | Status: SHIPPED | OUTPATIENT
Start: 2024-07-30

## 2024-07-30 NOTE — PROGRESS NOTES
Subjective:   Teresita Henderson is a 84 year old female  who presents for Hospital F/U     Frequent UTIs- ID recommended irrigations with clorpactin. But per urology recommendations that was not agreed on.   Pt was then hospitalized again at [facility].   Per pt and daughter recently completed IV abx completed 7/28/24- recommended to then do oral keflex.   -pt needs the script- will send in.   There is a lot of confusion between what the patient/daughter are recommended by ID and then what they are told by urology.   From my last connection- ID recommended irrigations but urology declined.   Patient and daughter are very frustrated. She reports being told that she will just have to \"live\" with recurrent infection.   Discussed getting centralized treatment from urology/ID at one location. Provided with referral.   In the interim will prescribe the keflex she was recommended to start as ppx but did not receive prescription.   -no current UTI symptoms     Incidentally noted to have pancreatic cyst on CT 7/2024. MRI recommended.     History/Other:    Chief Complaint Reviewed and Verified  No Further Nursing Notes to   Review  Tobacco Reviewed  Allergies Reviewed  Medications Reviewed  OB   Status Reviewed         Current Outpatient Medications   Medication Sig Dispense Refill    pravastatin 10 MG Oral Tab Take 1 tablet (10 mg total) by mouth nightly. 90 tablet 0    Polyethylene Glycol 3350 17 g Oral Powd Pack Take 17 g by mouth daily as needed. 90 each 1    metoprolol succinate ER 25 MG Oral Tablet 24 Hr Take 1 tablet (25 mg total) by mouth daily. 12.5MG IN MORNING AND 25MG IN EVENING 45 tablet 0    ezetimibe 10 MG Oral Tab Take 1 tablet (10 mg total) by mouth nightly. 90 tablet 3    Lancets (ONETOUCH DELICA PLUS [id number]) Does not apply Misc 1 each daily. 100 each 6    Glucose Blood (ONETOUCH VERIO) In Vitro Strip Use as directed. 100 strip 1    levothyroxine 88 MCG Oral Tab TAKE 1 TABLET(88 MCG) BY  MOUTH DAILY 90 tablet 3    metFORMIN HCl 1000 MG Oral Tab Take 1 tablet (1,000 mg total) by mouth 2 (two) times daily with meals. 180 tablet 0    gabapentin 100 MG Oral Cap Take 2 capsules (200 mg total) by mouth nightly. 180 capsule 2    Cholecalciferol (VITAMIN D) 50 MCG (2000 UT) Oral Cap Take by mouth daily.      aspirin 81 MG Oral Tab EC Take 1 tablet (81 mg total) by mouth daily. 30 tablet 0    cetirizine 10 MG Oral Tab Take 1 tablet (10 mg total) by mouth daily.      pantoprazole 40 MG Oral Tab EC Take 1 tablet (40 mg total) by mouth daily.      dofetilide 125 MCG Oral Cap Take 1 capsule (125 mcg total) by mouth 2 (two) times daily.      fluticasone propionate 50 MCG/ACT Nasal Suspension 2 sprays by Each Nare route daily. 11.1 mL 3       Review of Systems:  Pertinent items are noted in HPI.  A comprehensive 10 point review of systems was completed.  Pertinent positives and negatives noted in the the HPI.        Objective:   /62 (BP Location: Left arm, Patient Position: Sitting, Cuff Size: adult)   Pulse 70   Temp 97.8 °F (36.6 °C) (Temporal)   Resp 15   Wt 139 lb (63 kg)   SpO2 96%   BMI 25.42 kg/m²  Estimated body mass index is 25.42 kg/m² as calculated from the following:    Height as of 6/27/24: 5' 2\" (1.575 m).    Weight as of this encounter: 139 lb (63 kg).  PHYSICAL EXAM:   General: no acute distress   Respiratory: no increased work of breathing; good air exchange; CTAB; no crackles or wheezing   Cardiovascular: RRR; S1, S2;   Gastrointestinal: normal bowel sounds; soft; non-distended; non-tender  Neurological: awake, alert, oriented x3; CNII-XII grossly intact;   Skin: no skin infection by subrapubic cath     Assessment & Plan:     Maribel was seen today for hospital f/u.    Diagnoses and all orders for this visit:    Pancreatic cyst (HCC)  -     MRI ABDOMEN (W+WO) (CPT=74183); Future  -     Gastro Referral - In Network    History of recurrent UTIs  Suprapubic catheter (HCC)  -as above.  Unfortunately pt has had a long complicated hx with recurrent infections and limitations with abx and ppx treatment   -     cephalexin 250 MG Oral Cap; Take 1 capsule (250 mg total) by mouth daily.  -     Infectious Disease Referral - In Network                Nkechi Ariza MD

## 2024-08-01 ENCOUNTER — OFFICE VISIT (OUTPATIENT)
Dept: ORTHOPEDICS CLINIC | Facility: CLINIC | Age: 84
End: 2024-08-01
Payer: MEDICARE

## 2024-08-01 VITALS — BODY MASS INDEX: 25 KG/M2 | WEIGHT: 139 LBS

## 2024-08-01 DIAGNOSIS — G56.22 CUBITAL TUNNEL SYNDROME ON LEFT: Primary | ICD-10-CM

## 2024-08-01 PROCEDURE — 99024 POSTOP FOLLOW-UP VISIT: CPT | Performed by: PHYSICIAN ASSISTANT

## 2024-08-01 NOTE — PROGRESS NOTES
Clinic Note     Assessment/Plan:  84 year old female    Chronic left elbow deformity - Radiographs are stable without significant change upon my review.   Left ulnar nerve subcutaneous transposition 6/14/2024-patient is overall doing well.  Her pain in the elbow is likely a flareup of significant underlying arthritis.  I do think her elbow pain will improve.  We discussed working on active elbow flexion to prevent stiffness.  I do not want her to attend formal therapy at this point as she is having a lot of pain in the elbow I think that may aggravate it.  She will take pain medicine as needed. Her numbness and tingling does seem somewhat better since before surgery.  she has noticed slight improvement since last visit and most of her complaints are pain and locking with elbow flexion I went through the x-rays today with her to explain likely catching on large bone spurs.   Follow Up: 4 weeks    Diagnostic Studies:     EMG/NCV:   1.  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  2.  In addition, there is suggestion of a chronic left C7 and C8 cervical radiculopathy, without active denervation changes.'    XR L elbow: Chronic deformity of left elbow. No acute changes from XR in October. HO at ulnar groove       Physical Exam:     Wt 139 lb (63 kg)   BMI 25.42 kg/m²     Constitutional: NAD. AOx3. Well-developed and Well-nourished.   Psychiatric: Normal mood/ affect/ behavior. Judgment and thought content normal.     Left Upper Extremity:     Inspection    Incision healing well with no signs of infection   Palpation    No TTP over ulnar nerve      ROM    Full composite fist. and Normal symmetric wrist motion.    Elbow ROM      Neurovascular    Normal sensation in the median and radial nerve distribution. Normal motor function of muscles innervated by median/AIN, ulnar, and radial/PIN nerves.    Normally perfused  hand(s).    Ulnar Nerve  No subluxation of ulnar nerve, 10% dec SF, 50% dec RF, 10% dec MF  Atrophy FDI, Hypothenar, Subtle clawing      Sensory: Small finger has full sensation on today's exam             CC: Left elbow pain    HPI: This 84 year old RHD female presents with left elbow pain. She mentioned significant pain within her elbow. She has tried some conservative measures with minimal change. Numbness/Tingling and weakness are noted    Onset: elbow deformity since childhood, symptoms in ulnar nerve more recently  Pain Character: elbow  Pain Level: severe  Pain @ Night: Yes    Treatments Tried: Tramadol, Anti-inflammatory medications    Occupation: N/a    Interval history: Patient underwent ulnar nerve revision transposition.  She is having a lot of elbow pain but slight improvement in numbness and tingling      History/Other:   Past Medical History:    A-fib (HCC)    Abdominal distention    Abnormal mammogram    Allergic rhinitis    Anesthesia complication    Pt's sons have a history of pseudocholinesterase deficiency.    Antral gastritis    Arrhythmia    AF    Arthritis    Bad breath    Belching    Bloating    Blood in urine    Breast CA (HCC)    Calculus of kidney    Cancer (HCC)    right breast cancer    Carpal tunnel syndrome    Cataract    Cervical spondylosis without myelopathy    Chondromalacia of patella    Constipation    Coronary atherosclerosis    stents    Coronary atherosclerosis of native coronary artery    Diabetes (HCC)    Diet controlled    Diverticulosis    Ductal carcinoma in situ (DCIS) of right breast, 1992    Esophageal reflux    Exposure to unspecified radiation    last dose 1992    Female stress incontinence    Flatulence/gas pain/belching    Frequent urination    Frequent use of laxatives    Frequent UTI    Frequent UTI    Gall stones    GERD (gastroesophageal reflux disease)    Glucose intolerance (impaired glucose tolerance)    Heart palpitations    High blood pressure    High  cholesterol    History of blood transfusion        Hoarseness, chronic    Hypothyroidism    Incomplete bladder emptying (aka 83842)    trial of CIC 2014    Indigestion    Irregular bowel habits    L knee global 3/27/14    Leg swelling    Malignant neoplasm of breast (female), unspecified site    R    Nausea    Neoplasm of right breast, 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),     Osteoarthrosis, unspecified whether generalized or localized, unspecified site    generalized    Other and unspecified hyperlipidemia    Pacemaker    Pain in joints    Painful swallowing    Peripheral vascular disease (HCC)    PONV (postoperative nausea and vomiting)    Post-traumatic osteoarthritis of left elbow    Postmenopausal atrophic vaginitis    Presence of other cardiac implants and grafts    Primary localized osteoarthrosis, lower leg    Problems with swallowing    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)    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    Pure hypercholesterolemia    Rectocele    Recurrent UTI    group B strep    Recurrent UTI    S/P total knee replacement using cement    Sleep disturbance    Sputum production    Stented coronary artery    Uncomfortable fullness after meals    Unspecified disorder of thyroid    Visual impairment    reading glasses    Vitamin D deficiency     Past Surgical History:   Procedure Laterality Date    Angioplasty (coronary)      Breast lumpectomy Right 2015    Procedure: BREAST LUMPECTOMY;  Surgeon: Castro Marie MD;  Location: [facility] OR    Breast reconstruction Right       1977    Cabg  1997    x3    Cardiac pacemaker placement      [place] 2022    Cataract      Cath bare metal stent (bms)      Cholecystectomy      Colonoscopy  2016    tics; repeat 10 yrs    Colonoscopy  2017    Colonoscopy,diagnostic N/A 2016    Procedure: COLONOSCOPY, POSSIBLE BIOPSY, POSSIBLE POLYPECTOMY 72832;   Surgeon: Rodolfo Ann MD;  Location: [facility]    Cystoscopy,rx female urethral synd  05/10/2013    cysto Ricardo DENTON    D & julia  1976    Hysterectomy  1985    precancer and irregular bleeding. w/ BSO    Knee replacement surgery  2013    left     Lumpectomy right Right [id number]    Mastectomy right  2015      1964    Oophorectomy      AGE 47    Open heart surgery (pbp)      Other surgical history      LUMPECTOMY RIGHT BREAST     Other surgical history      A PMM LEFT KNEE , replacement    Other surgical history  2013    Venita DENTON    Other surgical history  2014    Cystoscopy- Dr. Figueroa    Other surgical history  2016    Bladder sling    Other surgical history       WATCHMEN    Pacemaker monitor      Patient documented not to have experienced any of the following events N/A 2016    Procedure: COLONOSCOPY, POSSIBLE BIOPSY, POSSIBLE POLYPECTOMY 42901;  Surgeon: Rodolfo Ann MD;  Location: [facility]    Patient withough preoperative order for iv antibiotic surgical site infection prophylaxis. N/A 2016    Procedure: COLONOSCOPY, POSSIBLE BIOPSY, POSSIBLE POLYPECTOMY 14139;  Surgeon: Rodolfo Ann MD;  Location: [facility]    Radiation right          Reduction left  10/2015    Total knee replacement      LEFT     Upper gi endoscopy,exam  2011    repeat 5 yeas     Current Outpatient Medications   Medication Sig Dispense Refill    cephalexin 250 MG Oral Cap Take 1 capsule (250 mg total) by mouth daily. 90 capsule 0    pravastatin 10 MG Oral Tab Take 1 tablet (10 mg total) by mouth nightly. 90 tablet 0    Polyethylene Glycol 3350 17 g Oral Powd Pack Take 17 g by mouth daily as needed. 90 each 1    metoprolol succinate ER 25 MG Oral Tablet 24 Hr Take 1 tablet (25 mg total) by mouth daily. 12.5MG IN MORNING AND 25MG IN EVENING 45 tablet 0    ezetimibe 10 MG Oral Tab Take 1 tablet (10 mg total) by mouth nightly. 90 tablet 3     Lancets (ONETOUCH DELICA PLUS [id number]) Does not apply Misc 1 each daily. 100 each 6    Glucose Blood (ONETOUCH VERIO) In Vitro Strip Use as directed. 100 strip 1    levothyroxine 88 MCG Oral Tab TAKE 1 TABLET(88 MCG) BY MOUTH DAILY 90 tablet 3    metFORMIN HCl 1000 MG Oral Tab Take 1 tablet (1,000 mg total) by mouth 2 (two) times daily with meals. 180 tablet 0    gabapentin 100 MG Oral Cap Take 2 capsules (200 mg total) by mouth nightly. 180 capsule 2    Cholecalciferol (VITAMIN D) 50 MCG (2000 UT) Oral Cap Take by mouth daily.      aspirin 81 MG Oral Tab EC Take 1 tablet (81 mg total) by mouth daily. 30 tablet 0    cetirizine 10 MG Oral Tab Take 1 tablet (10 mg total) by mouth daily.      pantoprazole 40 MG Oral Tab EC Take 1 tablet (40 mg total) by mouth daily.      dofetilide 125 MCG Oral Cap Take 1 capsule (125 mcg total) by mouth 2 (two) times daily.      fluticasone propionate 50 MCG/ACT Nasal Suspension 2 sprays by Each Nare route daily. 11.1 mL 3     Allergies   Allergen Reactions    Cephalexin HIVES and FACE FLUSHING    Ciprofloxacin MYALGIA, HIVES and OTHER (SEE COMMENTS)     Started on Rx 4/12/23? Not true allergy?    Fesoterodine SWELLING     Throat tightening  Throat tightening Throat tightening    Throat tightening Throat tightening      Throat tightening    Fosfomycin ARRHYTHMIA     Interaction of arrhythmias with Tykosin    Levofloxacin MYALGIA, HIVES and OTHER (SEE COMMENTS)     Reports tendonitis    Reports tendonitis   Reports tendonitis   Reports tendonitis    Other ARRHYTHMIA     Macrolides and Fluoroquinolones    Penicillins HIVES and RASH     Tolerates ceftriaxone    Toviaz SWELLING and TONGUE SWELLING     Throat tightening    Throat tightening   Throat tightening    Atorvastatin OTHER (SEE COMMENTS) and NAUSEA AND VOMITING     GI upset, nausea and vomitting    GI upset, nausea and vomitting   GI upset, nausea and vomitting   GI upset, nausea and vomitting    Chlorhexidine OTHER (SEE  COMMENTS)     Rash, redness    Chocolate OTHER (SEE COMMENTS)     headache    headache   headache   headache    Colesevelam OTHER (SEE COMMENTS) and UNKNOWN     GI upset    GI upset   GI upset   GI upset    Chocolate OTHER (SEE COMMENTS)     headache    Fish-Derived Products OTHER (SEE COMMENTS)     Per allergy testing    Mushrooms OTHER (SEE COMMENTS)     headache    Pecan OTHER (SEE COMMENTS)     Unknown     Shrimp UNKNOWN     Per allergy testing    Tomatoes OTHER (SEE COMMENTS)     headache    Hexachlorophene RASH and OTHER (SEE COMMENTS)     Family History   Problem Relation Age of Onset    Cancer Sister         throat    Diabetes Mother     Breast Cancer Self 52    Heart Disease Neg     Stroke Neg      Social History     Occupational History    Not on file   Tobacco Use    Smoking status: Former     Current packs/day: 0.00     Types: Cigarettes     Quit date: 1997     Years since quittin.1    Smokeless tobacco: Never   Vaping Use    Vaping status: Never Used   Substance and Sexual Activity    Alcohol use: Not Currently    Drug use: No    Sexual activity: Not on file      Review of Systems (negative unless bolded):  General: fevers, chills, fatigue  CV:  chest pain, palpitations, leg swelling  Msk: bodyaches, neck pain, neck stiffness  Skin: rashes, open wounds, nonhealing ulcers  Hem: bleeds easily, bruise easily, immunocompromised  Neuro: dizziness, light headedness, headaches  Psych: anxious, depressed, anger issues  Hina Hathaway PA-C  Hand, Wrist, & Elbow Surgery  Physician Assistant to Dr. Shan [email or web address]  t: [phone]  f: [phone]

## 2024-08-19 ENCOUNTER — MED REC SCAN ONLY (OUTPATIENT)
Dept: INTERNAL MEDICINE CLINIC | Facility: CLINIC | Age: 84
End: 2024-08-19

## 2024-08-27 ENCOUNTER — TELEPHONE (OUTPATIENT)
Dept: INTERNAL MEDICINE CLINIC | Facility: CLINIC | Age: 84
End: 2024-08-27

## 2024-09-02 DIAGNOSIS — E11.65 TYPE 2 DIABETES MELLITUS WITH HYPERGLYCEMIA, WITHOUT LONG-TERM CURRENT USE OF INSULIN (HCC): Chronic | ICD-10-CM

## 2024-09-03 ENCOUNTER — TELEMEDICINE (OUTPATIENT)
Dept: INTERNAL MEDICINE CLINIC | Facility: CLINIC | Age: 84
End: 2024-09-03
Payer: MEDICARE

## 2024-09-03 ENCOUNTER — TELEPHONE (OUTPATIENT)
Dept: INTERNAL MEDICINE CLINIC | Facility: CLINIC | Age: 84
End: 2024-09-03

## 2024-09-03 DIAGNOSIS — U07.1 COVID-19: Primary | ICD-10-CM

## 2024-09-03 PROCEDURE — G2211 COMPLEX E/M VISIT ADD ON: HCPCS | Performed by: INTERNAL MEDICINE

## 2024-09-03 PROCEDURE — 99213 OFFICE O/P EST LOW 20 MIN: CPT | Performed by: INTERNAL MEDICINE

## 2024-09-03 NOTE — TELEPHONE ENCOUNTER
Requesting    metFORMIN HCl 1000 MG Oral Tab         Sig: Take 1 tablet (1,000 mg total) by mouth 2 (two) times daily with meals.    Disp: 180 tablet    Refills: 0    Start: 9/2/2024    Class: Normal    Non-formulary For: Type 2 diabetes mellitus with hyperglycemia, without long-term current use of insulin (HCC)    Last ordered: 5 months ago (3/20/2024) by Nkechi Ariza MD    Patient comment: Refill    Diabetes Medication Protocol Tlbzjh2909/02/2024 09:30 AM   Protocol Details Last A1C < 7.5 and within past 6 months    In person appointment or virtual visit in the past 6 mos or appointment in next 3 mos    Microalbumin procedure in past 12 months or taking ACE/ARB    EGFRCR or GFRNAA > 50    GFR in the past 12 months      To be filled at: [facility] #[id number] [facility] [address] AT [place] [address], [phone], [phone]     LOV: 07/30/24 w/ DVF  RTC: No Follow Up on File   Last Relevant Labs: 08/27/24  Future Appointments   Date Time Provider Department Center   9/17/2024  8:20 AM Alec Romreo DO [facility] SUB GI   9/30/2024  1:00 PM [id number]

## 2024-09-03 NOTE — PROGRESS NOTES
Virtual Telephone Check-In    This visit is conducted using Telemedicine with live, interactive video and audio. Patient resides in [place]   Patient understands and accepts financial responsibility for any deductible, co-insurance and/or co-pays associated with this service.    Telehealth outside of [facility]  Telehealth Verbal Consent   I conducted a telehealth visit with BOY on 9/3/2024   which was completed using two-way, real-time interactive audio and video  communication. This has been done in good samara to provide continuity of care in the best interest of the provider-patient relationship, due to the COVID -19 public health crisis/national emergency where restrictions of face-to-face office visits are ongoing. Every conscious effort was taken to allow for sufficient and adequate time to complete the visit.  The patient was made aware of the limitations of the telehealth visit, including treatment limitations as no physical exam could be performed.  The patient was advised to call 911 or to go to the ER in case there was an emergency.  The patient was also advised of the potential privacy & security concerns related to the telehealth platform.   The patient was made aware of where to find [facility]'s notice of privacy practices, telehealth consent form and other related consent forms and documents.  which are located on the [facility] website. The patient verbally agreed to telehealth consent form, related consents and the risks discussed.    Lastly, the patient confirmed that they were in [place].   Included in this visit, time may have been spent reviewing labs, medications, radiology tests and decision making. Appropriate medical decision-making and tests are ordered as detailed in the plan of care above.  Coding/billing information is submitted for this visit based on complexity of care and/or time spent for the visit.  Time spent: 10 minutes   HPI: Teresita Henderson is an 84-year-old female who is calling  about COVID symptoms.  She is having runny nose sinus congestion fatigue that started 1 day ago.  No shortness of breath or chest pain.  ROS:  General: Feels well overall  Skin: Denies any unusual skin lesions  Eyes: Denies blurred vision or double vision  All systems negative, except for above  Physical:  GENERAL: Alert and oriented, well developed, well nourished,in no apparent distress  SKIN: no rashes,no suspicious lesions on face  LUNGS: no audible wheezing  PSYCH: pleasant, appropriate mood and affect    Assessment and Plan  1. COVID-19  Note: As patient is on several medications we will start medicine below due to lack of interaction.  Discussed the side effects and if symptoms worsen in any way then advised patient to go to the ER immediately  - molnupiravir 200 MG Oral capsule; Take 800 mg by mouth every 12 (twelve) hours for 5 days.  Dispense: 40 capsule; Refill: 0      Genet Lopez DO

## 2024-09-05 RX ORDER — GABAPENTIN 100 MG/1
200 CAPSULE ORAL NIGHTLY
Qty: 180 CAPSULE | Refills: 2 | Status: SHIPPED | OUTPATIENT
Start: 2024-09-05

## 2024-09-05 NOTE — TELEPHONE ENCOUNTER
Requesting    Name from pharmacy: GABAPENTIN 100MG CAPSULES         Will file in chart as: GABAPENTIN 100 MG Oral Cap    Sig: TAKE 2 CAPSULES(200 MG) BY MOUTH EVERY NIGHT    Disp: 180 capsule    Refills: 2 (Pharmacy requested: Not specified)    Start: 9/5/2024    Class: Normal    Non-formulary    To pharmacy: ZERO refills remain on this prescription. Your patient is requesting advance approval of refills for this medication to PREVENT ANY MISSED DOSES    Last ordered: 6 months ago (2/26/2024) by Nkechi Ariza MD    Last refill: 9/2/2024    Rx #: [id number]    Neurology Medications Zwodai1109/05/2024 08:41 AM   Protocol Details In person appointment or virtual visit in the past 6 mos or appointment in next 3 mos      To be filled at: [facility] #[id number] [address] [place] [address] AT [place] [address], [phone], [phone]        LOV: 07/30/24 w/ DVF  RTC: No Follow up on File   Last Relevant Labs: 08/27/24    Future Appointments   Date Time Provider Department Center   9/17/2024  8:20 AM Alec Romero DO [facility] SUB GI   9/30/2024  1:00 PM Shan Alexandra MD [id number]

## 2024-09-17 PROBLEM — E11.9 DIABETES MELLITUS (HCC): Status: ACTIVE | Noted: 2024-07-24

## 2024-09-17 PROBLEM — I48.91 FIBRILLATION, ATRIAL (HCC): Status: ACTIVE | Noted: 2024-07-24

## 2024-09-30 ENCOUNTER — OFFICE VISIT (OUTPATIENT)
Dept: ORTHOPEDICS CLINIC | Facility: CLINIC | Age: 84
End: 2024-09-30
Payer: MEDICARE

## 2024-09-30 VITALS — BODY MASS INDEX: 24.66 KG/M2 | WEIGHT: 134 LBS | HEIGHT: 62 IN

## 2024-09-30 DIAGNOSIS — G56.02 CARPAL TUNNEL SYNDROME OF LEFT WRIST: Primary | ICD-10-CM

## 2024-09-30 RX ORDER — BETAMETHASONE SODIUM PHOSPHATE AND BETAMETHASONE ACETATE 3; 3 MG/ML; MG/ML
6 INJECTION, SUSPENSION INTRA-ARTICULAR; INTRALESIONAL; INTRAMUSCULAR; SOFT TISSUE ONCE
Status: COMPLETED | OUTPATIENT
Start: 2024-09-30 | End: 2024-09-30

## 2024-09-30 RX ORDER — CEPHALEXIN 500 MG/1
CAPSULE ORAL
COMMUNITY
Start: 2024-09-19

## 2024-09-30 RX ADMIN — BETAMETHASONE SODIUM PHOSPHATE AND BETAMETHASONE ACETATE 6 MG: 3; 3 INJECTION, SUSPENSION INTRA-ARTICULAR; INTRALESIONAL; INTRAMUSCULAR; SOFT TISSUE at 13:25:00

## 2024-09-30 NOTE — PROGRESS NOTES
Clinic Note     Assessment/Plan:  84 year old female    Chronic left elbow deformity - Radiographs are stable without significant change upon my review.   Left ulnar nerve subcutaneous transposition 6/14/2024-ulnar nerve symptoms today compared to preop are significantly improved.  Patient denies any numbness in the small finger and has no evidence of clawing which she did preoperatively.  Left carpal tunnel syndrome?-EMG/NCV attributed symptoms to left C7 and C8 cervical radiculopathy however patient does have provocative exam findings to suggest carpal tunnel syndrome as well as nighttime symptoms.  Would encourage the patient to wear a brace at nighttime for the wrist and we will try a therapeutic/diagnostic carpal tunnel steroid injection.  Follow Up: 6-8 weeks, if doing well after the steroid injection can cancel follow-up until carpal tunnel symptoms recur.      Injection:    Written consent was obtained.  The skin was prepped with alcohol.  Ethyl chloride spray was used anesthetize the superficial skin.  A 25-gauge needle was used to inject 1.5 mL mixture of 1 mL of 6 mg of betamethasone and 1 mL of 1% lidocaine into left carpal tunnel.  Hemostasis achieved.  Band-Aid was applied.  Patient tolerated procedure without complication.    Diagnostic Studies:     EMG/NCV:   1.  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  2.  In addition, there is suggestion of a chronic left C7 and C8 cervical radiculopathy, without active denervation changes.'    XR L elbow: Chronic deformity of left elbow. No acute changes from XR in October. HO at ulnar groove       Physical Exam:     Ht 5' 2\" (1.575 m)   Wt 134 lb (60.8 kg)   BMI 24.51 kg/m²     Constitutional: NAD. AOx3. Well-developed and Well-nourished.   Psychiatric: Normal mood/ affect/ behavior. Judgment and thought content normal.     Left Upper Extremity:      Inspection    Incision healing well with no signs of infection   Palpation    No TTP over ulnar nerve      ROM    Full composite fist. and Normal symmetric wrist motion.         Neurovascular    Normal sensation in radial nerve distribution. Normal motor function of muscles innervated by median/AIN, ulnar, and radial/PIN nerves.    Normally perfused hand(s).    Ulnar Nerve  No subluxation of ulnar nerve, no numbness in the small finger or ring finger.  Slightly decreased in the middle finger.  Resolution of ulnar clawing.  Atrophy FDI, Hypothenar      Sensory: Small finger has full sensation on today's exam    Positive for Durkan             CC: Left elbow pain    HPI: This 84 year old RHD female presents with left elbow pain. She mentioned significant pain within her elbow. She has tried some conservative measures with minimal change. Numbness/Tingling and weakness are noted    Onset: elbow deformity since childhood, symptoms in ulnar nerve more recently  Pain Character: elbow  Pain Level: severe  Pain @ Night: Yes    Treatments Tried: Tramadol, Anti-inflammatory medications    Occupation: N/a    Interval Hx (9/30/2024): Patient reports no numbness/tingling in the small finger.  She does have shooting pain in the middle finger as well as symptoms at nighttime with the hand falling asleep.  The numbness is predominately the median nerve distribution.  She does have a history of carpal tunnel release on the right side        History/Other:   Past Medical History:    A-fib (HCC)    Abdominal distention    Abnormal mammogram    Allergic rhinitis    Anesthesia complication    Pt's sons have a history of pseudocholinesterase deficiency.    Antral gastritis    Arrhythmia    AF    Arthritis    Bad breath    Belching    Bloating    Blood in urine    Breast CA (HCC)    Calculus of kidney    Cancer (HCC)    right breast cancer    Carpal tunnel syndrome    Cataract    Cervical spondylosis without myelopathy     Chondromalacia of patella    Constipation    Coronary atherosclerosis    stents    Coronary atherosclerosis of native coronary artery    Diabetes (HCC)    Diet controlled    Diverticulosis    Ductal carcinoma in situ (DCIS) of right breast, 1992    Esophageal reflux    Exposure to unspecified radiation    last dose 1992    Female stress incontinence    Flatulence/gas pain/belching    Frequent urination    Frequent use of laxatives    Frequent UTI    Frequent UTI    Gall stones    GERD (gastroesophageal reflux disease)    Glucose intolerance (impaired glucose tolerance)    Heart palpitations    High blood pressure    High cholesterol    History of blood transfusion    1992    Hoarseness, chronic    Hypothyroidism    Incomplete bladder emptying (aka 24643)    trial of CIC June 2014    Indigestion    Irregular bowel habits    L knee global 3/27/14    Leg swelling    Malignant neoplasm of breast (female), unspecified site    R    Nausea    Neoplasm of right breast, 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), 1992    Osteoarthrosis, unspecified whether generalized or localized, unspecified site    generalized    Other and unspecified hyperlipidemia    Pacemaker    Pain in joints    Painful swallowing    Peripheral vascular disease (HCC)    PONV (postoperative nausea and vomiting)    Post-traumatic osteoarthritis of left elbow    Postmenopausal atrophic vaginitis    Presence of other cardiac implants and grafts    Primary localized osteoarthrosis, lower leg    Problems with swallowing    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)    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    Pure hypercholesterolemia    Rectocele    Recurrent UTI    group B strep    Recurrent UTI    S/P total knee replacement using cement    Sleep disturbance    Sputum production    Stented coronary artery    Uncomfortable fullness after meals    Unspecified disorder of thyroid    Visual impairment     reading glasses    Vitamin D deficiency     Past Surgical History:   Procedure Laterality Date    Angioplasty (coronary)      Breast lumpectomy Right 2015    Procedure: BREAST LUMPECTOMY;  Surgeon: Castro Marie MD;  Location: [facility] OR    Breast reconstruction Right       1977    Cabg  1997    x3    Cardiac pacemaker placement      Rampart 2022    Cataract      Cath bare metal stent (bms)      Cholecystectomy      Colonoscopy  2016    tics; repeat 10 yrs    Colonoscopy  2017    Colonoscopy,diagnostic N/A 2016    Procedure: COLONOSCOPY, POSSIBLE BIOPSY, POSSIBLE POLYPECTOMY 91490;  Surgeon: Rodolfo Ann MD;  Location: [facility]    Cystoscopy,rx female urethral synd  05/10/2013    cysto UD, Ricardo    D & c  1976    Hysterectomy  1985    precancer and irregular bleeding. w/ BSO    Knee replacement surgery  2013    left     Lumpectomy right Right [id number]    Mastectomy right  2015      1964    Oophorectomy      AGE 47    Open heart surgery (pbp)      Other surgical history      LUMPECTOMY RIGHT BREAST     Other surgical history      A PMM LEFT KNEE , replacement    Other surgical history  2013    Venita DENTON    Other surgical history  2014    Cystoscopy- Dr. Figueroa    Other surgical history  2016    Bladder sling    Other surgical history       WATCHMEN    Pacemaker monitor      Patient documented not to have experienced any of the following events N/A 2016    Procedure: COLONOSCOPY, POSSIBLE BIOPSY, POSSIBLE POLYPECTOMY 85999;  Surgeon: Rodolfo Ann MD;  Location: [facility]    Patient withough preoperative order for iv antibiotic surgical site infection prophylaxis. N/A 2016    Procedure: COLONOSCOPY, POSSIBLE BIOPSY, POSSIBLE POLYPECTOMY 10713;  Surgeon: Rodolfo Ann MD;  Location: [facility]    Radiation right          Reduction left  10/2015    Total knee replacement      LEFT      Upper gi endoscopy,exam  02/25/2011    repeat 5 yeas     Current Outpatient Medications   Medication Sig Dispense Refill    cephALEXin 500 MG Oral Cap       prednisoLONE 1 % Ophthalmic Suspension Apply 1 drop to eye.      GABAPENTIN 100 MG Oral Cap TAKE 2 CAPSULES(200 MG) BY MOUTH EVERY NIGHT 180 capsule 2    metFORMIN HCl 1000 MG Oral Tab Take 1 tablet (1,000 mg total) by mouth 2 (two) times daily with meals. 180 tablet 3    pravastatin 10 MG Oral Tab Take 1 tablet (10 mg total) by mouth nightly. 90 tablet 0    Polyethylene Glycol 3350 17 g Oral Powd Pack Take 17 g by mouth daily as needed. 90 each 1    metoprolol succinate ER 25 MG Oral Tablet 24 Hr Take 1 tablet (25 mg total) by mouth daily. 12.5MG IN MORNING AND 25MG IN EVENING 45 tablet 0    ezetimibe 10 MG Oral Tab Take 1 tablet (10 mg total) by mouth nightly. 90 tablet 3    Lancets (ONETOUCH DELICA PLUS [id number]) Does not apply Misc 1 each daily. 100 each 6    Glucose Blood (ONETOUCH VERIO) In Vitro Strip Use as directed. 100 strip 1    levothyroxine 88 MCG Oral Tab TAKE 1 TABLET(88 MCG) BY MOUTH DAILY 90 tablet 3    Cholecalciferol (VITAMIN D) 50 MCG (2000 UT) Oral Cap Take by mouth daily.      aspirin 81 MG Oral Tab EC Take 1 tablet (81 mg total) by mouth daily. 30 tablet 0    cetirizine 10 MG Oral Tab Take 1 tablet (10 mg total) by mouth daily.      pantoprazole 40 MG Oral Tab EC Take 1 tablet (40 mg total) by mouth daily.      dofetilide 125 MCG Oral Cap Take 1 capsule (125 mcg total) by mouth 2 (two) times daily.      fluticasone propionate 50 MCG/ACT Nasal Suspension 2 sprays by Each Nare route daily. 11.1 mL 3    cephalexin 250 MG Oral Cap Take 1 capsule (250 mg total) by mouth daily. (Patient not taking: Reported on 9/30/2024) 90 capsule 0     Allergies   Allergen Reactions    Cephalexin HIVES and FACE FLUSHING    Ciprofloxacin MYALGIA, HIVES and OTHER (SEE COMMENTS)     Started on Rx 4/12/23? Not true allergy?    Fesoterodine SWELLING     Throat  tightening  Throat tightening Throat tightening    Throat tightening Throat tightening      Throat tightening    Fosfomycin ARRHYTHMIA     Interaction of arrhythmias with Tykosin    Levofloxacin MYALGIA, HIVES and OTHER (SEE COMMENTS)     Reports tendonitis    Reports tendonitis   Reports tendonitis   Reports tendonitis    Other ARRHYTHMIA     Macrolides and Fluoroquinolones    Penicillins HIVES and RASH     Tolerates ceftriaxone    Toviaz SWELLING and TONGUE SWELLING     Throat tightening    Throat tightening   Throat tightening    Atorvastatin OTHER (SEE COMMENTS) and NAUSEA AND VOMITING     GI upset, nausea and vomitting    GI upset, nausea and vomitting   GI upset, nausea and vomitting   GI upset, nausea and vomitting    Chlorhexidine OTHER (SEE COMMENTS)     Rash, redness    Chocolate OTHER (SEE COMMENTS)     headache    headache   headache   headache    Colesevelam OTHER (SEE COMMENTS) and UNKNOWN     GI upset    GI upset   GI upset   GI upset    Chocolate OTHER (SEE COMMENTS)     headache    Fish-Derived Products OTHER (SEE COMMENTS)     Per allergy testing    Mushrooms OTHER (SEE COMMENTS)     headache    Pecan OTHER (SEE COMMENTS)     Unknown     Shrimp UNKNOWN     Per allergy testing    Tomatoes OTHER (SEE COMMENTS)     headache    Hexachlorophene RASH and OTHER (SEE COMMENTS)     Family History   Problem Relation Age of Onset    Cancer Sister         throat    Diabetes Mother     Breast Cancer Self 52    Heart Disease Neg     Stroke Neg      Social History     Occupational History    Not on file   Tobacco Use    Smoking status: Former     Current packs/day: 0.00     Types: Cigarettes     Quit date: 1997     Years since quittin.3    Smokeless tobacco: Never   Vaping Use    Vaping status: Never Used   Substance and Sexual Activity    Alcohol use: Not Currently    Drug use: No    Sexual activity: Not on file      Review of Systems (negative unless bolded):  General: fevers, chills, fatigue  CV:   chest pain, palpitations, leg swelling  Msk: bodyaches, neck pain, neck stiffness  Skin: rashes, open wounds, nonhealing ulcers  Hem: bleeds easily, bruise easily, immunocompromised  Neuro: dizziness, light headedness, headaches  Psych: anxious, depressed, anger issues    Shan Alexandra MD   Hand, Wrist, & Elbow Surgery  [email or web address]  t: [phone]  f: [phone]

## 2024-10-23 NOTE — TELEPHONE ENCOUNTER
Requesting    Name from pharmacy: PRAVASTATIN 10MG TABLETS         Will file in chart as: PRAVASTATIN 10 MG Oral Tab    Sig: TAKE 1 TABLET(10 MG) BY MOUTH EVERY NIGHT    Disp: 90 tablet    Refills: 0 (Pharmacy requested: Not specified)    Start: 10/23/2024    Class: Normal    Non-formulary    Last ordered: 2 months ago (7/29/2024) by Nkechi Ariza MD    Last refill: 7/29/2024    Rx #: [id number]    Cholesterol Medication Protocol Beoaoo06/23/2024 04:24 PM   Protocol Details ALT < 80    ALT resulted within past year    Lipid panel within past 12 months    In person appointment or virtual visit in the past 12 mos or appointment in next 3 mos      To be filled at: [id number] [address] [place] [address] AT [facility] & [place] [address], [phone], [phone]     LOV: 07/30/24 w/ DVF  RTC: No Follow Up on File   Last Relevant Labs: No recent labs   Future Appointments   Date Time Provider Department Center   1/6/2025  9:00 AM [facility]

## 2024-10-24 RX ORDER — PRAVASTATIN SODIUM 10 MG
10 TABLET ORAL NIGHTLY
Qty: 90 TABLET | Refills: 1 | Status: SHIPPED | OUTPATIENT
Start: 2024-10-24

## 2024-12-05 ENCOUNTER — PATIENT OUTREACH (OUTPATIENT)
Dept: INTERNAL MEDICINE CLINIC | Facility: CLINIC | Age: 84
End: 2024-12-05

## 2024-12-26 RX ORDER — LEVOTHYROXINE SODIUM 88 UG/1
88 TABLET ORAL DAILY
Qty: 90 TABLET | Refills: 0 | Status: SHIPPED | OUTPATIENT
Start: 2024-12-26

## 2024-12-26 NOTE — TELEPHONE ENCOUNTER
Requesting    Name from pharmacy: LEVOTHYROXINE 0.088MG (88MCG) TAB         Will file in chart as: LEVOTHYROXINE 88 MCG Oral Tab    Sig: TAKE 1 TABLET(88 MCG) BY MOUTH DAILY    Disp: 90 tablet    Refills: 3 (Pharmacy requested: Not specified)    Start: 12/25/2024    Class: Normal    Non-formulary    To pharmacy: ZERO refills remain on this prescription. Your patient is requesting advance approval of refills for this medication to PREVENT ANY MISSED DOSES    Last ordered: 8 months ago (4/2/2024) by Nkechi Ariza MD    Last refill: 12/19/2024    Rx #: [id number]    Thyroid Medication Protocol Kfgmly6612/25/2024 08:06 AM   Protocol Details TSH in past 12 months    Last TSH value is normal    In person appointment or virtual visit in the past 12 mos or appointment in next 3 mos      [id number] [address] [facility] AT [facility] & [facility] [address] , [phone], [phone]     LOV: 09/03/24 w/ SV  RTC: No Follow Up on File   Last Relevant Labs: 12/2024   Future Appointments   Date Time Provider Department Center   1/6/2025  9:00 AM [facility]

## 2025-01-06 ENCOUNTER — TELEPHONE (OUTPATIENT)
Dept: ORTHOPEDICS CLINIC | Facility: CLINIC | Age: 85
End: 2025-01-06

## 2025-01-06 ENCOUNTER — OFFICE VISIT (OUTPATIENT)
Dept: ORTHOPEDICS CLINIC | Facility: CLINIC | Age: 85
End: 2025-01-06
Payer: MEDICARE

## 2025-01-06 VITALS — WEIGHT: 139 LBS | HEIGHT: 62 IN | BODY MASS INDEX: 25.58 KG/M2

## 2025-01-06 DIAGNOSIS — G56.02 CARPAL TUNNEL SYNDROME OF LEFT WRIST: Primary | ICD-10-CM

## 2025-01-06 PROCEDURE — 99214 OFFICE O/P EST MOD 30 MIN: CPT | Performed by: ORTHOPAEDIC SURGERY

## 2025-01-06 NOTE — TELEPHONE ENCOUNTER
Date of Surgery: 2025    Post Op Appt: 25 10AM    Case ID: [id number]    Notes:     SURGICAL BOOKING SHEET   Name: Teresita Henderson  MRN: [id number]   : 5/15/1940     Surgical Date:    25   Surgical Consent:    Left carpal tunnel release   Diagnosis:         ICD-10-CM   1. Carpal tunnel syndrome of left wrist  G56.02       Workers Comp: No   Procedure Codes:    Open carpal tunnel release (CPT 97765)   Disposition:    Outpatient   Operative Time:    15 mins   Antibiotics:    Clindamycin   Anesthesia Type:    Monitored Anesthesia Care (MAC)   Clearance:     None   Equipment:    TFR  OCTR (Local/MAC): [place], Lead Hand, 4-0 Nylon Suture PS-2, Local Pre-Mix (1% lidocaine w/ epi, 0.5% marcaine, and 8.4% NaBicarb), Bacitracin, Adaptic, 4x4 gauze, 2 inch ace wrap   Assistant:    Ibrahima Assistant: Yes, Hina Hathaway    Follow Up:    10-14 days post op with Hina   Pain Medication:    TBD   Therapy:    None

## 2025-01-06 NOTE — PROGRESS NOTES
SURGICAL BOOKING SHEET   Name: Teresita Henderson  MRN: [id number]   : 5/15/1940    Surgical Date:   25   Surgical Consent:   Left carpal tunnel release   Diagnosis:      ICD-10-CM   1. Carpal tunnel syndrome of left wrist  G56.02       Workers Comp: No   Procedure Codes:   Open carpal tunnel release (CPT 03073)   Disposition:   Outpatient   Operative Time:   15 mins   Antibiotics:   Clindamycin   Anesthesia Type:   Monitored Anesthesia Care (MAC)   Clearance:    None   Equipment:   TFR  OCTR (Local/MAC): [place] Blade, Lead Hand, 4-0 Nylon Suture PS-2, Local Pre-Mix (1% lidocaine w/ epi, 0.5% marcaine, and 8.4% NaBicarb), Bacitracin, Adaptic, 4x4 gauze, 2 inch ace wrap   Assistant:   Ibrahima Assistant: Jordyn, Hina Hathaway    Follow Up:   10-14 days post op with Hina   Pain Medication:   TBD   Therapy:   None

## 2025-01-06 NOTE — PROGRESS NOTES
Clinic Note     Assessment/Plan:  84 year old female    Chronic left elbow deformity - Radiographs are stable without significant change upon my review.   Left ulnar nerve subcutaneous transposition 6/14/2024-ulnar nerve symptoms today compared to preop are significantly improved.  Patient denies any numbness in the small finger and has no evidence of clawing which she did preoperatively.  Left carpal tunnel syndrome?-EMG/NCV attributed symptoms to left C7 and C8 cervical radiculopathy however patient does have provocative exam findings to suggest carpal tunnel syndrome as well as nighttime symptoms.  Patient saw significant improvement in symptoms after the carpal tunnel steroid injection with resolution of symptoms for a number of weeks with no recurrence.  Patient is clinically as well as her symptomatology stays consistent and more consistent with carpal tunnel syndrome.  We elected proceed with a carpal tunnel release.   CTR Consent: Non-surgical and surgical treatment options where reviewed with the patient. Patient elected to proceed with surgical decompression of median nerve in the carpal tunnel. Patient consented to surgery after having understood the risks associated with surgery, expected outcomes, time to recovery and the possible need for therapy post-operatively. Risks include but not limited to: infection, wound complication, nerve injury resulting in temporary or permanent nerve damage, finger/hand stiffness, persistent pain/symptoms, swelling, CRPS, recurrence of carpal tunnel syndrome, and need for additional surgery.   Surgery scheduled for 2/13/2025    Follow Up: Surgery date  Well-healed  Diagnostic Studies:     EMG/NCV:   1.  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  2.  In addition, there is suggestion of a chronic left C7 and C8 cervical radiculopathy, without active denervation changes.'    XR L  elbow: Chronic deformity of left elbow. No acute changes from XR in October. HO at ulnar groove       Physical Exam:     Ht 5' 2\" (1.575 m)   Wt 139 lb (63 kg)   BMI 25.42 kg/m²     Constitutional: NAD. AOx3. Well-developed and Well-nourished.   Psychiatric: Normal mood/ affect/ behavior. Judgment and thought content normal.     Left Upper Extremity:     Inspection    Incision well-healed   Palpation    No TTP over ulnar nerve      ROM    Full composite fist. and Normal symmetric wrist motion.         Neurovascular    Normal sensation in radial nerve distribution. Normal motor function of muscles innervated by median/AIN, ulnar, and radial/PIN nerves.    Normally perfused hand(s).    Ulnar Nerve  No subluxation of ulnar nerve, no numbness in the small finger or ring finger.  Slightly decreased in the middle finger.  Resolution of ulnar clawing.  Atrophy FDI, Hypothenar      Sensory: Small finger has full sensation on today's exam    Positive for Durkan             CC: Left elbow pain    HPI: This 84 year old RHD female presents with left elbow pain. She mentioned significant pain within her elbow. She has tried some conservative measures with minimal change. Numbness/Tingling and weakness are noted    Onset: elbow deformity since childhood, symptoms in ulnar nerve more recently  Pain Character: elbow  Pain Level: severe  Pain @ Night: Yes    Treatments Tried: Tramadol, Anti-inflammatory medications    Occupation: N/a    Interval Hx (9/30/2024): Patient reports no numbness/tingling in the small finger.  She does have shooting pain in the middle finger as well as symptoms at nighttime with the hand falling asleep.  The numbness is predominately the median nerve distribution.  She does have a history of carpal tunnel release on the right side    Interval Hx (1/6/2025): Patient reports near complete resolution of carpal tunnel symptoms after the steroid injection performed in the last visit.  Since then her symptoms  are reoccurring.  Remain consistent predominately in the median nerve distribution with small finger being normal.      History/Other:   Past Medical History:    A-fib (HCC)    Abdominal distention    Abnormal mammogram    Allergic rhinitis    Anesthesia complication    Pt's sons have a history of pseudocholinesterase deficiency.    Antral gastritis    Arrhythmia    AF    Arthritis    Bad breath    Belching    Bloating    Blood in urine    Breast CA (HCC)    Calculus of kidney    Cancer (HCC)    right breast cancer    Carpal tunnel syndrome    Cataract    Cervical spondylosis without myelopathy    Chondromalacia of patella    Constipation    Coronary atherosclerosis    stents    Coronary atherosclerosis of native coronary artery    Diabetes (HCC)    Diet controlled    Diverticulosis    Ductal carcinoma in situ (DCIS) of right breast, 1992    Esophageal reflux    Exposure to unspecified radiation    last dose 1992    Female stress incontinence    Flatulence/gas pain/belching    Frequent urination    Frequent use of laxatives    Frequent UTI    Frequent UTI    Gall stones    GERD (gastroesophageal reflux disease)    Glucose intolerance (impaired glucose tolerance)    Heart palpitations    High blood pressure    High cholesterol    History of blood transfusion    1992    Hoarseness, chronic    Hypothyroidism    Incomplete bladder emptying (aka 82223)    trial of CIC June 2014    Indigestion    Irregular bowel habits    L knee global 3/27/14    Leg swelling    Malignant neoplasm of breast (female), unspecified site    R    Nausea    Neoplasm of right breast, 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), 1992    Osteoarthrosis, unspecified whether generalized or localized, unspecified site    generalized    Other and unspecified hyperlipidemia    Pacemaker    Pain in joints    Painful swallowing    Peripheral vascular disease (HCC)    PONV (postoperative nausea and vomiting)    Post-traumatic  osteoarthritis of left elbow    Postmenopausal atrophic vaginitis    Presence of other cardiac implants and grafts    Primary localized osteoarthrosis, lower leg    Problems with swallowing    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)    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    Pure hypercholesterolemia    Rectocele    Recurrent UTI    group B strep    Recurrent UTI    S/P total knee replacement using cement    Sleep disturbance    Sputum production    Stented coronary artery    Uncomfortable fullness after meals    Unspecified disorder of thyroid    Visual impairment    reading glasses    Vitamin D deficiency     Past Surgical History:   Procedure Laterality Date    Angioplasty (coronary)      Breast lumpectomy Right 2015    Procedure: BREAST LUMPECTOMY;  Surgeon: Castro Marie MD;  Location: [facility]    Breast reconstruction Right       1977    Cabg  1997    x3    Cardiac pacemaker placement      [place] 2022    Cataract      Cath bare metal stent (bms)      Cholecystectomy      Colonoscopy  2016    tics; repeat 10 yrs    Colonoscopy  2017    Colonoscopy,diagnostic N/A 2016    Procedure: COLONOSCOPY, POSSIBLE BIOPSY, POSSIBLE POLYPECTOMY 70839;  Surgeon: Rodolfo Ann MD;  Location: [facility]    Cystoscopy,rx female urethral synd  05/10/2013    cysto Ricardo DENTON    D & julia  1976    Hysterectomy  1985    precancer and irregular bleeding. w/ BSO    Knee replacement surgery  2013    left     Lumpectomy right Right [id number]    Mastectomy right  2015      1964    Oophorectomy      AGE 47    Open heart surgery (pbp)      Other surgical history      LUMPECTOMY RIGHT BREAST     Other surgical history      A PMM LEFT KNEE , replacement    Other surgical history  2013    Venita DENTON    Other surgical history  2014    Cystoscopy- Dr. Figueroa    Other surgical history  2016    Bladder sling    Other surgical  history      2023 WATCHMEN    Pacemaker monitor      Patient documented not to have experienced any of the following events N/A 04/20/2016    Procedure: COLONOSCOPY, POSSIBLE BIOPSY, POSSIBLE POLYPECTOMY 63412;  Surgeon: Rodolfo Ann MD;  Location: [facility]    Patient withough preoperative order for iv antibiotic surgical site infection prophylaxis. N/A 04/20/2016    Procedure: COLONOSCOPY, POSSIBLE BIOPSY, POSSIBLE POLYPECTOMY 88246;  Surgeon: Rodolfo Ann MD;  Location: [facility]    Radiation right      1992    Reduction left  10/2015    Total knee replacement      LEFT 2013    Upper gi endoscopy,exam  02/25/2011    repeat 5 yeas     Current Outpatient Medications   Medication Sig Dispense Refill    levothyroxine 88 MCG Oral Tab Take 1 tablet (88 mcg total) by mouth daily. 90 tablet 0    pravastatin 10 MG Oral Tab Take 1 tablet (10 mg total) by mouth nightly. 90 tablet 1    cephALEXin 500 MG Oral Cap       prednisoLONE 1 % Ophthalmic Suspension Apply 1 drop to eye.      GABAPENTIN 100 MG Oral Cap TAKE 2 CAPSULES(200 MG) BY MOUTH EVERY NIGHT 180 capsule 2    metFORMIN HCl 1000 MG Oral Tab Take 1 tablet (1,000 mg total) by mouth 2 (two) times daily with meals. 180 tablet 3    Polyethylene Glycol 3350 17 g Oral Powd Pack Take 17 g by mouth daily as needed. 90 each 1    metoprolol succinate ER 25 MG Oral Tablet 24 Hr Take 1 tablet (25 mg total) by mouth daily. 12.5MG IN MORNING AND 25MG IN EVENING 45 tablet 0    ezetimibe 10 MG Oral Tab Take 1 tablet (10 mg total) by mouth nightly. 90 tablet 3    Lancets (ONETOUCH DELICA PLUS [id number]) Does not apply Misc 1 each daily. 100 each 6    Glucose Blood (ONETOUCH VERIO) In Vitro Strip Use as directed. 100 strip 1    Cholecalciferol (VITAMIN D) 50 MCG (2000 UT) Oral Cap Take by mouth daily.      aspirin 81 MG Oral Tab EC Take 1 tablet (81 mg total) by mouth daily. 30 tablet 0    cetirizine 10 MG Oral Tab Take 1 tablet (10 mg total) by mouth daily.       pantoprazole 40 MG Oral Tab EC Take 1 tablet (40 mg total) by mouth daily.      dofetilide 125 MCG Oral Cap Take 1 capsule (125 mcg total) by mouth 2 (two) times daily.      fluticasone propionate 50 MCG/ACT Nasal Suspension 2 sprays by Each Nare route daily. 11.1 mL 3    cephalexin 250 MG Oral Cap Take 1 capsule (250 mg total) by mouth daily. (Patient not taking: Reported on 1/6/2025) 90 capsule 0     Allergies   Allergen Reactions    Cephalexin HIVES and FACE FLUSHING    Ciprofloxacin MYALGIA, HIVES and OTHER (SEE COMMENTS)     Started on Rx 4/12/23? Not true allergy?    Fesoterodine SWELLING     Throat tightening  Throat tightening Throat tightening    Throat tightening Throat tightening      Throat tightening    Fosfomycin ARRHYTHMIA     Interaction of arrhythmias with Tykosin    Levofloxacin MYALGIA, HIVES and OTHER (SEE COMMENTS)     Reports tendonitis    Reports tendonitis   Reports tendonitis   Reports tendonitis    Other ARRHYTHMIA     Macrolides and Fluoroquinolones    Penicillins HIVES and RASH     Tolerates ceftriaxone    Toviaz SWELLING and TONGUE SWELLING     Throat tightening    Throat tightening   Throat tightening    Atorvastatin OTHER (SEE COMMENTS) and NAUSEA AND VOMITING     GI upset, nausea and vomitting    GI upset, nausea and vomitting   GI upset, nausea and vomitting   GI upset, nausea and vomitting    Chlorhexidine OTHER (SEE COMMENTS)     Rash, redness    Chocolate OTHER (SEE COMMENTS)     headache    headache   headache   headache    Colesevelam OTHER (SEE COMMENTS) and UNKNOWN     GI upset    GI upset   GI upset   GI upset    Chocolate OTHER (SEE COMMENTS)     headache    Fish-Derived Products OTHER (SEE COMMENTS)     Per allergy testing    Mushrooms OTHER (SEE COMMENTS)     headache    Pecan OTHER (SEE COMMENTS)     Unknown     Shrimp UNKNOWN     Per allergy testing    Tomatoes OTHER (SEE COMMENTS)     headache    Hexachlorophene RASH and OTHER (SEE COMMENTS)     Family History    Problem Relation Age of Onset    Cancer Sister         throat    Diabetes Mother     Breast Cancer Self 52    Heart Disease Neg     Stroke Neg      Social History     Occupational History    Not on file   Tobacco Use    Smoking status: Former     Current packs/day: 0.00     Types: Cigarettes     Quit date: 1997     Years since quittin.5    Smokeless tobacco: Never   Vaping Use    Vaping status: Never Used   Substance and Sexual Activity    Alcohol use: Not Currently    Drug use: No    Sexual activity: Not on file      Review of Systems (negative unless bolded):  General: fevers, chills, fatigue  CV:  chest pain, palpitations, leg swelling  Msk: bodyaches, neck pain, neck stiffness  Skin: rashes, open wounds, nonhealing ulcers  Hem: bleeds easily, bruise easily, immunocompromised  Neuro: dizziness, light headedness, headaches  Psych: anxious, depressed, anger issues    Shan Alexandra MD   Hand, Wrist, & Elbow Surgery  [email or web address]  t: [phone]  f: [phone]

## 2025-01-09 NOTE — TELEPHONE ENCOUNTER
S/P Daughter went over surgical protocol and scheduled Post Op Appointment. Patient had no other questions or concerns. I did advise the patient if they have any additional questions to give us a call back for further assistance.

## 2025-02-04 ENCOUNTER — APPOINTMENT (OUTPATIENT)
Dept: ADMINISTRATIVE | Facility: HOSPITAL | Age: 85
End: 2025-02-04
Payer: MEDICARE

## 2025-02-04 RX ORDER — NITROFURANTOIN 25; 75 MG/1; MG/1
100 CAPSULE ORAL NIGHTLY
COMMUNITY

## 2025-02-05 ENCOUNTER — TELEPHONE (OUTPATIENT)
Dept: INTERNAL MEDICINE CLINIC | Facility: CLINIC | Age: 85
End: 2025-02-05

## 2025-02-05 ENCOUNTER — HOSPITAL ENCOUNTER (OUTPATIENT)
Age: 85
Discharge: HOME | End: 2025-02-05
Attending: EMERGENCY MEDICINE
Payer: MEDICARE

## 2025-02-05 ENCOUNTER — APPOINTMENT (OUTPATIENT)
Dept: GENERAL RADIOLOGY | Age: 85
End: 2025-02-05
Attending: EMERGENCY MEDICINE
Payer: MEDICARE

## 2025-02-05 VITALS
TEMPERATURE: 98 F | OXYGEN SATURATION: 100 % | SYSTOLIC BLOOD PRESSURE: 123 MMHG | DIASTOLIC BLOOD PRESSURE: 65 MMHG | HEART RATE: 70 BPM | RESPIRATION RATE: 16 BRPM

## 2025-02-05 DIAGNOSIS — S60.221A CONTUSION OF RIGHT HAND, INITIAL ENCOUNTER: Primary | ICD-10-CM

## 2025-02-05 PROCEDURE — 99213 OFFICE O/P EST LOW 20 MIN: CPT

## 2025-02-05 PROCEDURE — 73130 X-RAY EXAM OF HAND: CPT | Performed by: EMERGENCY MEDICINE

## 2025-02-05 PROCEDURE — 99214 OFFICE O/P EST MOD 30 MIN: CPT

## 2025-02-05 NOTE — ED PROVIDER NOTES
Patient Seen in: [facility]      History     Chief Complaint   Patient presents with    Finger Injury     Stated Complaint: right hand smashed in door    Subjective:   HPI      85 yo female slammed her right hand in a door a few weeks ago. She complains of pain across the proximal 2-5 fingers and swelling to the palmar area. No numbness or weakness    Objective:     Past Medical History:    A-fib (HCC)    Abdominal distention    Abnormal mammogram    Allergic rhinitis    Anesthesia complication    Pt's sons have a history of pseudocholinesterase deficiency.    Antral gastritis    Arrhythmia    AF    Arthritis    Bad breath    Belching    Bloating    Blood in urine    Breast CA (HCC)    Calculus of kidney    Cancer (HCC)    right breast cancer    Carpal tunnel syndrome    Cataract    Cervical spondylosis without myelopathy    Chondromalacia of patella    Constipation    Coronary atherosclerosis    stents    Coronary atherosclerosis of native coronary artery    Diabetes (HCC)    Diet controlled    Diverticulosis    Ductal carcinoma in situ (DCIS) of right breast, 1992    Esophageal reflux    Exposure to unspecified radiation    last dose 1992    Family history of pseudocholinesterase deficiency    Female stress incontinence    Flatulence/gas pain/belching    Frequent urination    Frequent use of laxatives    Frequent UTI    Frequent UTI    Gall stones    GERD (gastroesophageal reflux disease)    Glucose intolerance (impaired glucose tolerance)    Heart palpitations    High blood pressure    High cholesterol    History of blood transfusion    1992    Hoarseness, chronic    Hx of motion sickness    Hypothyroidism    Incomplete bladder emptying (aka 95690)    trial of CIC June 2014    Indigestion    Irregular bowel habits    L knee global 3/27/14    Leg swelling    Malignant neoplasm of breast (female), unspecified site    R    Nausea    Neoplasm of right breast, primary tumor staging category Tis:  lobular carcinoma in situ (LCIS),     Osteoarthrosis, unspecified whether generalized or localized, unspecified site    generalized    Other and unspecified hyperlipidemia    Pacemaker    Pain in joints    Painful swallowing    Peripheral vascular disease    PONV (postoperative nausea and vomiting)    Post-traumatic osteoarthritis of left elbow    Postmenopausal atrophic vaginitis    Presence of other cardiac implants and grafts    Primary localized osteoarthrosis, lower leg    Problems with swallowing    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)    Pure hypercholesterolemia    Rectocele    Recurrent UTI    group B strep    Recurrent UTI    S/P total knee replacement using cement    Sleep disturbance    Sputum production    Stented coronary artery    Uncomfortable fullness after meals    Unspecified disorder of thyroid    Visual impairment    reading glasses    Vitamin D deficiency              Past Surgical History:   Procedure Laterality Date    Angioplasty (coronary)      Breast lumpectomy Right 2015    Procedure: BREAST LUMPECTOMY;  Surgeon: Castro Marie MD;  Location: [facility]    Breast reconstruction Right       1977    Cabg  1997    x3    Cardiac pacemaker placement      [place] 2022    Cataract  2017    Cath bare metal stent (bms)      Cholecystectomy      Colonoscopy  2016    tics; repeat 10 yrs    Colonoscopy  2017    Colonoscopy,diagnostic N/A 2016    Procedure: COLONOSCOPY, POSSIBLE BIOPSY, POSSIBLE POLYPECTOMY 42188;  Surgeon: Rodolfo Ann MD;  Location: [facility]    Cystoscopy,rx female urethral synd  05/10/2013    cysto Ricardo DENTON    D & julia  1976    Hysterectomy  1985    precancer and irregular bleeding. w/ BSO    Knee replacement surgery  2013    left     Lumpectomy right Right [id number]    Mastectomy right  2015      1964    Oophorectomy      AGE 47    Open heart surgery (pbp)      Other surgical history       LUMPECTOMY RIGHT BREAST     Other surgical history      A PMM LEFT KNEE , replacement    Other surgical history  2013    Venita DENTON    Other surgical history  2014    Cystoscopy- Dr. Figueroa    Other surgical history  2016    Bladder sling    Other surgical history       WATCHMEN    Pacemaker monitor      Patient documented not to have experienced any of the following events N/A 2016    Procedure: COLONOSCOPY, POSSIBLE BIOPSY, POSSIBLE POLYPECTOMY 95467;  Surgeon: Rodolfo Ann MD;  Location: [facility]    Patient withough preoperative order for iv antibiotic surgical site infection prophylaxis. N/A 2016    Procedure: COLONOSCOPY, POSSIBLE BIOPSY, POSSIBLE POLYPECTOMY 04882;  Surgeon: Rodolfo Ann MD;  Location: [facility]    Radiation right      1992    Reduction left  10/2015    Total knee replacement      LEFT     Upper gi endoscopy,exam  2011    repeat 5 yeas                Social History     Socioeconomic History    Marital status:    Tobacco Use    Smoking status: Former     Current packs/day: 0.00     Types: Cigarettes     Quit date: 1997     Years since quittin.6    Smokeless tobacco: Never   Vaping Use    Vaping status: Never Used   Substance and Sexual Activity    Alcohol use: Not Currently    Drug use: No   Other Topics Concern    Caffeine Concern Yes    Exercise Yes     Social Drivers of Health     Food Insecurity: No Food Insecurity (2025)    Received from [facility]    Food Insecurity     Currently or in the past 3 months, have you worried your food would run out before you had money to buy more?: No     In the past 12 months, have you run out of food or been unable to get more?: No   Transportation Needs: No Transportation Needs (2025)    Received from [facility]    Transportation Needs     Currently or in the past 3 months, has lack of transportation kept you from medical  appointments, getting food or medicine, or providing care to a family member?: Unrecognized value     Medical Transportation Needs?: No   Housing Stability: Not At Risk (8/27/2024)    Received from [facility]     What is your living situation today?: I have a steady place to live     Think about the place you live. Do you have problems with any of the following?: None of the above              Review of Systems    Positive for stated complaint: right hand smashed in door  Other systems are as noted in HPI.  Constitutional and vital signs reviewed.      All other systems reviewed and negative except as noted above.    Physical Exam     ED Triage Vitals [02/05/25 1524]   /65   Pulse 70   Resp 16   Temp 97.8 °F (36.6 °C)   Temp src Oral   SpO2 100 %   O2 Device None (Room air)       Current Vitals:   Vital Signs  BP: 123/65  Pulse: 70  Resp: 16  Temp: 97.8 °F (36.6 °C)  Temp src: Oral    Oxygen Therapy  SpO2: 100 %  O2 Device: None (Room air)        Physical Exam  Vitals and nursing note reviewed.   Constitutional:       Appearance: Normal appearance. She is well-developed.   HENT:      Head: Normocephalic and atraumatic.   Cardiovascular:      Rate and Rhythm: Normal rate and regular rhythm.   Pulmonary:      Effort: Pulmonary effort is normal. No respiratory distress.   Musculoskeletal:      Comments: Right hand: mild swelling over the head of the second metacarpal palmar surface. No erythema. No warmth. Minimally tender. No other focal bony tenderness. Neurovascular intact.    Skin:     General: Skin is warm and dry.      Capillary Refill: Capillary refill takes less than 2 seconds.   Neurological:      General: No focal deficit present.      Mental Status: She is alert.   Psychiatric:         Mood and Affect: Mood normal.         Behavior: Behavior normal.            ED Course   Labs Reviewed - No data to display                MDM             Medical Decision Making  Fracdture vs contusion  in differential. Xray images reviewed by myself. No fracture. Discharge home on otc meds for pain. Elevate when able. Follow up with primary care as needed    Disposition and Plan     Clinical Impression:  1. Contusion of right hand, initial encounter         Disposition:  Discharge  2/5/2025  4:11 pm    Follow-up:  Nkechi Chavez MD  [address] [address]  [phone]      As needed          Medications Prescribed:  Current Discharge Medication List              Supplementary Documentation:

## 2025-02-05 NOTE — TELEPHONE ENCOUNTER
Called pt, spoke to pt + daughter Beryl, pre op scheduled.    Future Appointments   Date Time Provider Department Center   2/6/2025  2:30 PM Isamar Delong MD [facility] [place]   2/24/2025 10:00 AM Hina Hathaway PA [id number]   3/31/2025 11:00 AM Nkechi Chavez MD [facility] [place]     *They wanted to know why the office waited so long to schedule pre op. They were informed that the MA has tried multiple times but no answer or c/b.

## 2025-02-06 ENCOUNTER — OFFICE VISIT (OUTPATIENT)
Dept: INTERNAL MEDICINE CLINIC | Facility: CLINIC | Age: 85
End: 2025-02-06
Payer: MEDICARE

## 2025-02-06 VITALS
OXYGEN SATURATION: 98 % | TEMPERATURE: 98 F | HEIGHT: 61.5 IN | RESPIRATION RATE: 16 BRPM | SYSTOLIC BLOOD PRESSURE: 104 MMHG | BODY MASS INDEX: 26.91 KG/M2 | DIASTOLIC BLOOD PRESSURE: 60 MMHG | HEART RATE: 72 BPM | WEIGHT: 144.38 LBS

## 2025-02-06 DIAGNOSIS — E11.69 HYPERLIPIDEMIA ASSOCIATED WITH TYPE 2 DIABETES MELLITUS (HCC): ICD-10-CM

## 2025-02-06 DIAGNOSIS — I25.810 CORONARY ARTERY DISEASE INVOLVING CORONARY BYPASS GRAFT OF NATIVE HEART WITHOUT ANGINA PECTORIS: Chronic | ICD-10-CM

## 2025-02-06 DIAGNOSIS — E11.65 TYPE 2 DIABETES MELLITUS WITH HYPERGLYCEMIA, WITHOUT LONG-TERM CURRENT USE OF INSULIN (HCC): Chronic | ICD-10-CM

## 2025-02-06 DIAGNOSIS — Z01.818 PREOPERATIVE EXAMINATION: Primary | ICD-10-CM

## 2025-02-06 DIAGNOSIS — I48.20 CHRONIC ATRIAL FIBRILLATION (HCC): ICD-10-CM

## 2025-02-06 DIAGNOSIS — E78.5 HYPERLIPIDEMIA ASSOCIATED WITH TYPE 2 DIABETES MELLITUS (HCC): ICD-10-CM

## 2025-02-06 DIAGNOSIS — Z23 NEED FOR IMMUNIZATION AGAINST INFLUENZA: ICD-10-CM

## 2025-02-06 DIAGNOSIS — E11.59 HYPERTENSION ASSOCIATED WITH TYPE 2 DIABETES MELLITUS (HCC): ICD-10-CM

## 2025-02-06 DIAGNOSIS — I49.5 SICK SINUS SYNDROME (HCC): Chronic | ICD-10-CM

## 2025-02-06 DIAGNOSIS — I15.2 HYPERTENSION ASSOCIATED WITH TYPE 2 DIABETES MELLITUS (HCC): ICD-10-CM

## 2025-02-06 DIAGNOSIS — G56.02 CARPAL TUNNEL SYNDROME, LEFT: ICD-10-CM

## 2025-02-06 RX ORDER — AMOXICILLIN 250 MG
1 CAPSULE ORAL
COMMUNITY
Start: 2024-12-10

## 2025-02-06 RX ORDER — ACETAMINOPHEN 500 MG
500 TABLET ORAL 2 TIMES DAILY PRN
COMMUNITY
Start: 2024-12-10

## 2025-02-06 NOTE — PATIENT INSTRUCTIONS
- Ok for surgery  - Hold aspirin as directed by Dr. Marin  - Ok to take Tylenol  - Hold over the counter vitamins, supplements, Zyrtec, anti-inflammatories starting today.  Can resume the day after surgery.    It was a pleasure seeing you in the clinic today.  Thank you for choosing the [facility] [place] office for your healthcare needs. Please call at [phone] with any questions or concerns.    Isamar Delong MD

## 2025-02-06 NOTE — PROGRESS NOTES
Teresiat Henderson is a 84 year old female who presents for a pre-operative physical exam.   Teresita Henderson is scheduled for a left carpal tunnel release procedure at [facility] on 2/13/25 performed by Dr. SHAHID Alexandra, who has requested that I provide pre-operative consultation before this procedure. Indication: Carpal tunnel syndrome, left, Sick sinus syndrome (HCC), Type 2 diabetes mellitus with hyperglycemia, without long-term current use of insulin (HCC), Hypertension associated with type 2 diabetes mellitus (HCC), Hyperlipidemia associated with type 2 diabetes mellitus (HCC), Chronic atrial fibrillation (HCC), Coronary artery disease involving coronary bypass graft of native heart without angina pectoris  HPI related to surgery:   She  has had previous anesthesia:  Yes.  Previous complications:  No  Patient has good functional status (>4 METS).  No active anginal symptoms.  No history of major valvular disease.  History of Afib, coronary artery disease.  Patient with carpal tunnel syndrome in left hand.  She has been following with [facility], will now be having surgery.    Pertinent chronic medical issues:  Hypertension - at goal blood pressure.  Hyperlipidemia - on statin therapy, tolerating.  DM II - reasonable control, A1c of 7.5% last summer.   CAD, atrial fibrillation, sick sinus syndrome - following with EP/Cardiology.  Has pacemaker in place.      REVIEW OF SYSTEMS:   GENERAL/ const: no fevers/chills. feels well, no weight loss  SKIN: denies any unusual skin lesions  EYES:no vision problems  HEENT: denies sinus pain or sinus tenderness  LUNGS: denies shortness of breath   CARDIOVASCULAR: denies chest pain  GI: denies nausea/emesis/ abdominal pain diarrhea constipation  : denies dysuria   MUSCULOSKELETAL: no acute arthralgias  NEURO: carpal tunnel syndrome left hand  PSYCHIATRIC: denies issues  ENDOCRINE: no hot/cold intolerance  ALLERGY: Allergies[1]  PAST HISTORY:     Current Outpatient Medications:      acetaminophen 500 MG Oral Tab, Take 1 tablet (500 mg total) by mouth 2 (two) times daily as needed for Pain., Disp: , Rfl:     sennosides-docusate 8.6-50 MG Oral Tab, Take 1 tablet by mouth daily as needed., Disp: , Rfl:     nitrofurantoin monohydrate macro 100 MG Oral Cap, Take 1 capsule (100 mg total) by mouth nightly., Disp: , Rfl:     levothyroxine 88 MCG Oral Tab, Take 1 tablet (88 mcg total) by mouth daily., Disp: 90 tablet, Rfl: 0    pravastatin 10 MG Oral Tab, Take 1 tablet (10 mg total) by mouth nightly., Disp: 90 tablet, Rfl: 1    GABAPENTIN 100 MG Oral Cap, TAKE 2 CAPSULES(200 MG) BY MOUTH EVERY NIGHT, Disp: 180 capsule, Rfl: 2    metFORMIN HCl 1000 MG Oral Tab, Take 1 tablet (1,000 mg total) by mouth 2 (two) times daily with meals. (Patient taking differently: Take 0.5 tablets (500 mg total) by mouth daily with breakfast. And 1000 mg by mouth with dinner.), Disp: 180 tablet, Rfl: 3    Polyethylene Glycol 3350 17 g Oral Powd Pack, Take 17 g by mouth daily as needed., Disp: 90 each, Rfl: 1    metoprolol succinate ER 25 MG Oral Tablet 24 Hr, Take 1 tablet (25 mg total) by mouth daily. 12.5MG IN MORNING AND 25MG IN EVENING (Patient taking differently: Take 0.5 tablets (12.5 mg total) by mouth every morning. AND 25 MG BY MOUTH EVERY EVENING.), Disp: 45 tablet, Rfl: 0    ezetimibe 10 MG Oral Tab, Take 1 tablet (10 mg total) by mouth nightly., Disp: 90 tablet, Rfl: 3    Lancets (ONETOUCH DELICA PLUS [id number]) Does not apply Misc, 1 each daily., Disp: 100 each, Rfl: 6    Glucose Blood (ONETOUCH VERIO) In Vitro Strip, Use as directed., Disp: 100 strip, Rfl: 1    Cholecalciferol (VITAMIN D) 50 MCG (2000 UT) Oral Cap, Take by mouth daily., Disp: , Rfl:     aspirin 81 MG Oral Tab EC, Take 1 tablet (81 mg total) by mouth daily., Disp: 30 tablet, Rfl: 0    cetirizine 10 MG Oral Tab, Take 1 tablet (10 mg total) by mouth daily., Disp: , Rfl:     pantoprazole 40 MG Oral Tab EC, Take 1 tablet (40 mg total) by mouth  daily., Disp: , Rfl:     fluticasone propionate 50 MCG/ACT Nasal Suspension, 2 sprays by Each Nare route daily. (Patient taking differently: 2 sprays by Each Nare route daily as needed.), Disp: 11.1 mL, Rfl: 3  Medical:  has a past medical history of A-fib (HCC), Abdominal distention, Abnormal mammogram (06/21/2016), Allergic rhinitis (1980), Anesthesia complication, Antral gastritis, Arrhythmia, Arthritis, Bad breath, Belching, Bloating, Blood in urine, Breast CA ([facility]), Calculus of kidney, Cancer (HCC) (1993), Carpal tunnel syndrome, Cataract, Cervical spondylosis without myelopathy, Chondromalacia of patella (02/22/2012), Constipation, Coronary atherosclerosis, Coronary atherosclerosis of native coronary artery, Diabetes ([facility]), Diverticulosis, Ductal carcinoma in situ (DCIS) of right breast, 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), Esophageal reflux, Exposure to unspecified radiation, Family history of pseudocholinesterase deficiency, Female stress incontinence (04/18/2016), Flatulence/gas pain/belching, Frequent urination, Frequent use of laxatives, Frequent UTI, Frequent UTI, Gall stones, GERD (gastroesophageal reflux disease) (02/22/2012), Glucose intolerance (impaired glucose tolerance), Heart palpitations, High blood pressure, High cholesterol, History of blood transfusion, Hoarseness, chronic, motion sickness, Hypothyroidism, Incomplete bladder emptying (aka [address]), Indigestion, Irregular bowel habits, L knee global 3/27/14 (12/30/2013), Leg swelling, Malignant neoplasm of breast (female), unspecified site, Nausea, Neoplasm of right breast, 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), 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), Osteoarthrosis, unspecified whether generalized or localized, unspecified site, Other and unspecified hyperlipidemia, Pacemaker, Pain in joints, Painful swallowing, Peripheral vascular disease, PONV (postoperative nausea and vomiting), 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  Surgical:  has a past surgical history that includes  (); cabg (); open heart surgery (pbp) (); knee replacement surgery (2013); cystoscopy,rx female urethral synd (05/10/2013); upper gi endoscopy,exam (2011); oophorectomy; radiation right; Breast lumpectomy (Right, 2015); total knee replacement; hysterectomy (); colonoscopy (2016); colonoscopy,diagnostic (N/A, 2016); patient withough preoperative order for iv antibiotic surgical site infection prophylaxis. (N/A, 2016); patient documented not to have experienced any of the following events (N/A, 2016); lumpectomy right (Right, [id number]); other surgical history (); other surgical history; other surgical history (2013); other surgical history (2014); other surgical history (2016); Breast reconstruction (Right, ); angioplasty (coronary) (); cataract (); colonoscopy (); d & c ();  (); mastectomy right (2015); reduction left (10/2015); Cardiac pacemaker placement; cath bare metal stent (bms); cholecystectomy; pacemaker monitor; and other surgical history.  Family: family history includes Breast Cancer (age of onset: 51) in her self; Cancer in her sister; Diabetes in her mother.  Social:   Social History     Socioeconomic History    Marital status:    Tobacco Use    Smoking status: Former      Current packs/day: 0.00     Types: Cigarettes     Quit date: 1997     Years since quittin.6    Smokeless tobacco: Never   Vaping Use    Vaping status: Never Used   Substance and Sexual Activity    Alcohol use: Not Currently    Drug use: No   Other Topics Concern    Caffeine Concern Yes     Comment: Maybe 1.5 cups of tea daily.    Exercise Yes     Comment: Dance Class     Social Drivers of Health     Food Insecurity: No Food Insecurity (2025)    Received from [facility]    Food Insecurity     Currently or in the past 3 months, have you worried your food would run out before you had money to buy more?: No     In the past 12 months, have you run out of food or been unable to get more?: No   Transportation Needs: No Transportation Needs (2025)    Received from [facility]    Transportation Needs     Currently or in the past 3 months, has lack of transportation kept you from medical appointments, getting food or medicine, or providing care to a family member?: Unrecognized value     Medical Transportation Needs?: No   Stress: No Stress Concern Present (2023)    [facility] [employer] [employer] [facility] - Occupational Stress Questionnaire     Feeling of Stress : Not at all   Housing Stability: Not At Risk (2024)    Received from [employer]    [facility]     What is your living situation today?: I have a steady place to live     Think about the place you live. Do you have problems with any of the following?: None of the above     PHYSICAL EXAM:   /60 (BP Location: Left arm, Patient Position: Sitting, Cuff Size: adult)   Pulse 72   Temp 97.7 °F (36.5 °C) (Temporal)   Resp 16   Ht 5' 1.5\" (1.562 m)   Wt 144 lb 6.4 oz (65.5 kg)   SpO2 98%   Breastfeeding No   BMI 26.84 kg/m²    GENERAL: Alert and oriented, well developed, well nourished,in no apparent distress  SKIN: no rashes,no suspicious  lesions  HEENT: atraumatic, PERRLA, EOMI, normal lid and conjunctiva, normal external ear canals and tympanic membranes, normal pharynx, no sinus tenderness  NECK: supple, no jvd, no thyromegaly  LUNGS: clear to auscultation bilaterally, no wheezing/rubs  CARDIO: RRR without murmurs.  No clubbing, cyanosis or edema.  GI: soft non tender nondistended no hepatosplenomegaly, bowel sounds throughout  NEURO: CN II-XII intact  PSYCH: pleasant, appropriate mood and affect  LABORATORY DATA:   EKG - done by Cardiology (Dr. Marin) on 1/22/25 - in Care Everywhere - Paced rhythm with right bundle branch block  ASSESSMENT AND PLAN:   1.  Pre-operative examination  Patient presents for pre-operative examination at the request of performing surgeon.  Patient has reasonable functional status.  No active anginal symptoms, no history of major valvular disease.  History of CAD/CABG, atrial fibrillation.  RCRI score of 1,  Class II risk, 6.0% risk of major cardiac event.  She is considered an intermediate risk patient undergoing an intermediate risk procedure, and is ok to proceed with surgery.  Note and pertinent pre-operative testing results will be faxed to referring surgeon's office and/or surgical center as requested.    2. Carpal tunnel syndrome, left  To have surgery as above.    3. Type 2 diabetes mellitus with hyperglycemia, without long-term current use of insulin (HCC)  A1c of 7.5% in 2024.  On metformin therapy.     4. Hypertension associated with type 2 diabetes mellitus (HCC)  At goal blood pressure.  Continue metoprolol succinate 12.5 mg AM/25 mg PM.    5. Hyperlipidemia associated with type 2 diabetes mellitus (HCC)  6. Coronary artery disease involving coronary bypass graft of native heart without angina pectoris  7. Chronic atrial fibrillation (HCC)  8. Sick sinus syndrome (HCC)  Following with EP/Cardiology. Has pacemaker.  Saw Dr. Marin 2 weeks ago.  Had EKG done in office by Dr. Marin then.  She was given ok for  EGD/EUS by Dr. Marin at that visit.      9. Need for immunization against influenza  Flu shot administered in office today.  - High Dose Fluzone trivalent influenza, 65 yrs+ PFS [13451]    Patient Care Team:  Nkechi Chavez MD as PCP - General (Internal Medicine)  Castro Marie MD as Surgeon (SURGERY, GENERAL)  Canelo Le MD (SURGERY, PLASTIC)  Rodolfo Ann MD as Consulting Physician (GASTROENTEROLOGY)  Soraya Sutherland MD as Consulting Physician (NEUROLOGY)  Charito Rojas PT as Physical Therapist  Alec Romero DO (GASTROENTEROLOGY)  Susan Marin MD (Cardiovascular Diseases)  The patient indicates understanding of these issues and agrees to the plan.  The patient is asked to return to clinic as needed/scheduled/due with Dr. Silva.    Isamar Delong MD          [1]   Allergies  Allergen Reactions    Atorvastatin NAUSEA AND VOMITING, OTHER (SEE COMMENTS) and NAUSEA ONLY     GI upset, nausea and vomitting    Cephalexin HIVES and FACE FLUSHING    Ciprofloxacin MYALGIA, HIVES and OTHER (SEE COMMENTS)     Started on Rx 4/12/23? Not true allergy?    Cocoa Extract, Fruit OTHER (SEE COMMENTS)     headache    Fesoterodine SWELLING     Throat tightening  Throat tightening Throat tightening      Fosfomycin ARRHYTHMIA     Interaction of arrhythmias with Tykosin    Levofloxacin HIVES, MYALGIA, OTHER (SEE COMMENTS) and RASH     Reports tendonitis    Reports tendonitis   Reports tendonitis   Reports tendonitis    Nuts OTHER (SEE COMMENTS)     Unknown       Unknown, per allergy test    Other ARRHYTHMIA     Macrolides and Fluoroquinolones    Penicillins HIVES and RASH     Tolerates ceftriaxone    Toviaz SWELLING and TONGUE SWELLING     Throat tightening      Chlorhexidine OTHER (SEE COMMENTS)     Rash, redness    Chocolate OTHER (SEE COMMENTS)     headache    Colesevelam OTHER (SEE COMMENTS) and UNKNOWN     GI upset    GI upset   GI upset   GI upset    Chocolate OTHER (SEE COMMENTS)      headache    Fish-Derived Products OTHER (SEE COMMENTS)     Per allergy testing    Mushrooms OTHER (SEE COMMENTS)     headache    Pecan OTHER (SEE COMMENTS)     Unknown     Shrimp UNKNOWN     Per allergy testing    Tomatoes OTHER (SEE COMMENTS)     headache    Hexachlorophene RASH and OTHER (SEE COMMENTS)

## 2025-02-06 NOTE — TELEPHONE ENCOUNTER
Veena Lemos  Signed 12:08 PM     Copy     Called pt, spoke to pt + daughter Beryl, pre op scheduled.            Future Appointments   Date Time Provider Department Center   2/6/2025  2:30 PM Isamar Delong MD [facility] [place]   2/24/2025 10:00 AM Hina Hathaway PA [id number]   3/31/2025 11:00 AM Nkechi Chavez MD [facility] [place]      *They wanted to know why the office waited so long to schedule pre op. They were informed that the MA has tried multiple times but no answer or c/b.          We barley received pre-op form today.

## 2025-02-07 ENCOUNTER — TELEPHONE (OUTPATIENT)
Dept: INTERNAL MEDICINE CLINIC | Facility: CLINIC | Age: 85
End: 2025-02-07

## 2025-02-09 ENCOUNTER — APPOINTMENT (OUTPATIENT)
Dept: GENERAL RADIOLOGY | Facility: HOSPITAL | Age: 85
End: 2025-02-09
Attending: EMERGENCY MEDICINE
Payer: MEDICARE

## 2025-02-09 ENCOUNTER — HOSPITAL ENCOUNTER (EMERGENCY)
Facility: HOSPITAL | Age: 85
Discharge: HOME | End: 2025-02-09
Attending: EMERGENCY MEDICINE
Payer: MEDICARE

## 2025-02-09 VITALS
BODY MASS INDEX: 26.06 KG/M2 | HEIGHT: 61 IN | OXYGEN SATURATION: 100 % | DIASTOLIC BLOOD PRESSURE: 72 MMHG | RESPIRATION RATE: 17 BRPM | TEMPERATURE: 97 F | WEIGHT: 138 LBS | HEART RATE: 70 BPM | SYSTOLIC BLOOD PRESSURE: 143 MMHG

## 2025-02-09 DIAGNOSIS — R42 LIGHTHEADEDNESS: Primary | ICD-10-CM

## 2025-02-09 DIAGNOSIS — R03.0 ELEVATED BLOOD PRESSURE READING: ICD-10-CM

## 2025-02-09 LAB
ALBUMIN SERPL-MCNC: 4.5 G/DL (ref 3.2–4.8)
ALBUMIN/GLOB SERPL: 1.5 {RATIO} (ref 1–2)
ALP LIVER SERPL-CCNC: 81 U/L
ALT SERPL-CCNC: 9 U/L
ANION GAP SERPL CALC-SCNC: 7 MMOL/L (ref 0–18)
AST SERPL-CCNC: 18 U/L (ref ?–34)
ATRIAL RATE: 70 BPM
BASOPHILS # BLD AUTO: 0.06 X10(3) UL (ref 0–0.2)
BASOPHILS NFR BLD AUTO: 1.2 %
BILIRUB SERPL-MCNC: 0.3 MG/DL (ref 0.2–1.1)
BILIRUB UR QL STRIP.AUTO: NEGATIVE
BUN BLD-MCNC: 14 MG/DL (ref 9–23)
CALCIUM BLD-MCNC: 9.8 MG/DL (ref 8.7–10.6)
CHLORIDE SERPL-SCNC: 100 MMOL/L (ref 98–112)
CLARITY UR REFRACT.AUTO: CLEAR
CO2 SERPL-SCNC: 30 MMOL/L (ref 21–32)
CREAT BLD-MCNC: 0.77 MG/DL
EGFRCR SERPLBLD CKD-EPI 2021: 76 ML/MIN/1.73M2 (ref 60–?)
EOSINOPHIL # BLD AUTO: 0.1 X10(3) UL (ref 0–0.7)
EOSINOPHIL NFR BLD AUTO: 1.9 %
ERYTHROCYTE [DISTWIDTH] IN BLOOD BY AUTOMATED COUNT: 14.4 %
GLOBULIN PLAS-MCNC: 3 G/DL (ref 2–3.5)
GLUCOSE BLD-MCNC: 110 MG/DL (ref 70–99)
GLUCOSE UR STRIP.AUTO-MCNC: NORMAL MG/DL
HCT VFR BLD AUTO: 35.5 %
HGB BLD-MCNC: 11.8 G/DL
IMM GRANULOCYTES # BLD AUTO: 0.02 X10(3) UL (ref 0–1)
IMM GRANULOCYTES NFR BLD: 0.4 %
KETONES UR STRIP.AUTO-MCNC: NEGATIVE MG/DL
LEUKOCYTE ESTERASE UR QL STRIP.AUTO: NEGATIVE
LYMPHOCYTES # BLD AUTO: 1.34 X10(3) UL (ref 1–4)
LYMPHOCYTES NFR BLD AUTO: 25.9 %
MCH RBC QN AUTO: 28.4 PG (ref 26–34)
MCHC RBC AUTO-ENTMCNC: 33.2 G/DL (ref 31–37)
MCV RBC AUTO: 85.3 FL
MONOCYTES # BLD AUTO: 0.62 X10(3) UL (ref 0.1–1)
MONOCYTES NFR BLD AUTO: 12 %
NEUTROPHILS # BLD AUTO: 3.04 X10 (3) UL (ref 1.5–7.7)
NEUTROPHILS # BLD AUTO: 3.04 X10(3) UL (ref 1.5–7.7)
NEUTROPHILS NFR BLD AUTO: 58.6 %
NITRITE UR QL STRIP.AUTO: NEGATIVE
OSMOLALITY SERPL CALC.SUM OF ELEC: 285 MOSM/KG (ref 275–295)
P-R INTERVAL: 268 MS
PH UR STRIP.AUTO: 7 [PH] (ref 5–8)
PLATELET # BLD AUTO: 221 10(3)UL (ref 150–450)
POTASSIUM SERPL-SCNC: 4.4 MMOL/L (ref 3.5–5.1)
PROT SERPL-MCNC: 7.5 G/DL (ref 5.7–8.2)
PROT UR STRIP.AUTO-MCNC: NEGATIVE MG/DL
Q-T INTERVAL: 410 MS
QRS DURATION: 104 MS
QTC CALCULATION (BEZET): 442 MS
R AXIS: -41 DEGREES
RBC # BLD AUTO: 4.16 X10(6)UL
RBC UR QL AUTO: NEGATIVE
SODIUM SERPL-SCNC: 137 MMOL/L (ref 136–145)
SP GR UR STRIP.AUTO: 1.01 (ref 1–1.03)
T AXIS: 74 DEGREES
TROPONIN I SERPL HS-MCNC: <3 NG/L
UROBILINOGEN UR STRIP.AUTO-MCNC: NORMAL MG/DL
VENTRICULAR RATE: 70 BPM
WBC # BLD AUTO: 5.2 X10(3) UL (ref 4–11)

## 2025-02-09 PROCEDURE — 99285 EMERGENCY DEPT VISIT HI MDM: CPT

## 2025-02-09 PROCEDURE — 93005 ELECTROCARDIOGRAM TRACING: CPT

## 2025-02-09 PROCEDURE — 81003 URINALYSIS AUTO W/O SCOPE: CPT | Performed by: EMERGENCY MEDICINE

## 2025-02-09 PROCEDURE — 85025 COMPLETE CBC W/AUTO DIFF WBC: CPT | Performed by: EMERGENCY MEDICINE

## 2025-02-09 PROCEDURE — 93010 ELECTROCARDIOGRAM REPORT: CPT

## 2025-02-09 PROCEDURE — 84484 ASSAY OF TROPONIN QUANT: CPT | Performed by: EMERGENCY MEDICINE

## 2025-02-09 PROCEDURE — 80053 COMPREHEN METABOLIC PANEL: CPT | Performed by: EMERGENCY MEDICINE

## 2025-02-09 PROCEDURE — 36415 COLL VENOUS BLD VENIPUNCTURE: CPT

## 2025-02-09 PROCEDURE — 71045 X-RAY EXAM CHEST 1 VIEW: CPT | Performed by: EMERGENCY MEDICINE

## 2025-02-09 RX ORDER — AMLODIPINE BESYLATE 5 MG/1
2.5 TABLET ORAL DAILY
Status: DISCONTINUED | OUTPATIENT
Start: 2025-02-09 | End: 2025-02-09

## 2025-02-09 RX ORDER — AMLODIPINE BESYLATE 5 MG/1
2.5 TABLET ORAL NIGHTLY
Qty: 7 TABLET | Refills: 0 | Status: SHIPPED | OUTPATIENT
Start: 2025-02-09

## 2025-02-09 NOTE — ED PROVIDER NOTES
Patient Seen in: [facility] Emergency Department      History     Chief Complaint   Patient presents with    Hypertension     Stated Complaint: htn 190/90 at home    Subjective:   HPI  Patient is a 84-year-old female with a history of CAD, hypothyroidism, diabetes, A-fib, frequent UTIs status post suprapubic catheter who presents to ED for evaluation of lightheadedness and elevated BP. Patient is accompanied by her daughter who she lives with. Pt states that after standing up yesterday she became lightheaded and felt \"brain fog\". Her symptoms improved after sitting down. She took her BP and her SBP was in the 190s. She retook it again and it was int he 150s. Daughter states her BP was elevated this past week. Normally pt's SBP is in the 110s. Pt states this morning she also some intermittent lightheadedness. She had 1 episode of room spinning dizziness with head movement that resolved after a few seconds. Pt reports this felt similar to prior episodes of vertigo but less severe and improved with rest. Pt denies any CP, SOB, abd pain, fevers, vomiting. No HA, focal weakness, numbness. Pt and daughter also concerned that pt could have another UTI given history of recurrent UTI. Pt is currently on macrobid for UTI and follows with urology for this. Pt takes metoprolol but otherwise no BP medications.       Objective:     Past Medical History:    A-fib (HCC)    Abdominal distention    Abnormal mammogram    Allergic rhinitis    Anesthesia complication    Pt's sons have a history of pseudocholinesterase deficiency.    Antral gastritis    Arrhythmia    AF    Arthritis    Bad breath    Belching    Bloating    Blood in urine    Breast CA (HCC)    Calculus of kidney    Cancer (HCC)    right breast cancer    Carpal tunnel syndrome    Cataract    Cervical spondylosis without myelopathy    Chondromalacia of patella    Constipation    Coronary atherosclerosis    stents    Coronary atherosclerosis of native coronary artery     Diabetes (HCC)    Diet controlled    Diverticulosis    Ductal carcinoma in situ (DCIS) of right breast, 1992    Esophageal reflux    Exposure to unspecified radiation    last dose 1992    Family history of pseudocholinesterase deficiency    Female stress incontinence    Flatulence/gas pain/belching    Frequent urination    Frequent use of laxatives    Frequent UTI    Frequent UTI    Gall stones    GERD (gastroesophageal reflux disease)    Glucose intolerance (impaired glucose tolerance)    Heart palpitations    High blood pressure    High cholesterol    History of blood transfusion    1992    Hoarseness, chronic    Hx of motion sickness    Hypothyroidism    Incomplete bladder emptying (aka 16867)    trial of CIC June 2014    Indigestion    Irregular bowel habits    L knee global 3/27/14    Leg swelling    Malignant neoplasm of breast (female), unspecified site    R    Nausea    Neoplasm of right breast, 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), 1992    Osteoarthrosis, unspecified whether generalized or localized, unspecified site    generalized    Other and unspecified hyperlipidemia    Pacemaker    Pain in joints    Painful swallowing    Peripheral vascular disease    PONV (postoperative nausea and vomiting)    Post-traumatic osteoarthritis of left elbow    Postmenopausal atrophic vaginitis    Presence of other cardiac implants and grafts    Primary localized osteoarthrosis, lower leg    Problems with swallowing    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)    Pure hypercholesterolemia    Rectocele    Recurrent UTI    group B strep    Recurrent UTI    S/P total knee replacement using cement    Sleep disturbance    Sputum production    Stented coronary artery    Uncomfortable fullness after meals    Unspecified disorder of thyroid    Visual impairment    reading glasses    Vitamin D deficiency              Past Surgical History:   Procedure Laterality Date    Angioplasty  (coronary)      Breast lumpectomy Right 2015    Procedure: BREAST LUMPECTOMY;  Surgeon: Castro Marie MD;  Location: [facility] MAIN OR    Breast reconstruction Right       1977    Cabg  1997    x3    Cardiac pacemaker placement      Toney 2022    Cataract  2017    Cath bare metal stent (bms)      Cholecystectomy      Colonoscopy  2016    tics; repeat 10 yrs    Colonoscopy  2017    Colonoscopy,diagnostic N/A 2016    Procedure: COLONOSCOPY, POSSIBLE BIOPSY, POSSIBLE POLYPECTOMY 82255;  Surgeon: Rodolfo Ann MD;  Location: [facility]    Cystoscopy,rx female urethral synd  05/10/2013    cysto Ricardo DENTON    D & c  1976    Hysterectomy  1985    precancer and irregular bleeding. w/ BSO    Knee replacement surgery  2013    left     Lumpectomy right Right [id number]    Mastectomy right  2015      1964    Oophorectomy      AGE 47    Open heart surgery (pbp)      Other surgical history      LUMPECTOMY RIGHT BREAST     Other surgical history      A PMM LEFT KNEE , replacement    Other surgical history  2013    Venita DENTON    Other surgical history  2014    Cystoscopy- Dr. Figueroa    Other surgical history  2016    Bladder sling    Other surgical history       WATCHMEN    Pacemaker monitor      Patient documented not to have experienced any of the following events N/A 2016    Procedure: COLONOSCOPY, POSSIBLE BIOPSY, POSSIBLE POLYPECTOMY 19442;  Surgeon: Rodolfo Ann MD;  Location: [facility]    Patient withough preoperative order for iv antibiotic surgical site infection prophylaxis. N/A 2016    Procedure: COLONOSCOPY, POSSIBLE BIOPSY, POSSIBLE POLYPECTOMY 14178;  Surgeon: Rodolfo Ann MD;  Location: [facility]    Radiation right          Reduction left  10/2015    Total knee replacement      LEFT     Upper gi endoscopy,exam  2011    repeat 5 yeas                No pertinent social history.                 Physical Exam     ED Triage Vitals [02/09/25 1208]   /75   Pulse 70   Resp 18   Temp 97.1 °F (36.2 °C)   Temp src Temporal   SpO2 96 %   O2 Device None (Room air)       Current Vitals:   Vital Signs  BP: 143/72  Pulse: 70  Resp: 17  Temp: 97.1 °F (36.2 °C)  Temp src: Temporal  MAP (mmHg): 93    Oxygen Therapy  SpO2: 100 %  O2 Device: None (Room air)        Physical Exam  Vitals and nursing note reviewed.   Constitutional:       General: She is not in acute distress.     Appearance: She is not ill-appearing.   HENT:      Head: Normocephalic and atraumatic.      Mouth/Throat:      Mouth: Mucous membranes are moist.   Eyes:      Extraocular Movements: Extraocular movements intact.      Pupils: Pupils are equal, round, and reactive to light.   Cardiovascular:      Rate and Rhythm: Normal rate and regular rhythm.   Pulmonary:      Effort: Pulmonary effort is normal.      Breath sounds: Normal breath sounds.   Abdominal:      General: There is no distension.      Palpations: Abdomen is soft.      Tenderness: There is no abdominal tenderness.      Comments: Suprapubic catheter, no surrounding drainage or redness   Musculoskeletal:      Cervical back: Neck supple.      Right lower leg: No edema.      Left lower leg: No edema.   Skin:     General: Skin is warm and dry.      Capillary Refill: Capillary refill takes less than 2 seconds.   Neurological:      General: No focal deficit present.      Mental Status: She is alert.      Comments: 5/5 strength in UE and LE bilaterally  Normal sensation  CN II-XII in tact  No pronator drift  Normal finger to nose testing     Psychiatric:         Mood and Affect: Mood normal.             ED Course     Labs Reviewed   CBC WITH DIFFERENTIAL WITH PLATELET - Abnormal; Notable for the following components:       Result Value    HGB 11.8 (*)     All other components within normal limits   COMP METABOLIC PANEL (14) - Abnormal; Notable for the following components:    Glucose  110 (*)     ALT 9 (*)     All other components within normal limits   TROPONIN I HIGH SENSITIVITY - Normal   URINALYSIS, ROUTINE   RAINBOW DRAW BLUE     EKG    Rate, intervals and axes as noted on EKG Report.  Rate: 70  Rhythm: Paced  Reading: atrial paced rhythm with no acute ST changes, TWI in leads V1-V3 similar to prior.                 MDM      Patient is a 84-year-old female with a history of CAD, hypothyroidism, diabetes, A-fib, frequent UTIs status post suprapubic catheter who presents to ED for evaluation of lightheadedness and elevated BP. Pt is afebrile, HDS, satting well on RA. Patient has normal neurologic exam. Abd exam benign.     Differential diagnosis includes but not limited to orthostatic hypotension, arrhythmia, electrolyte abnormality, dehydration. Low suspicion for CVA given no neurologic deficits, dizziness resolved. Plan for labs, EKG, CXR.     ED Course as of 02/09/25 2024  ------------------------------------------------------------  Time: 02/09 1541  Comment: CBC, CMP unremarkable. Trop neg. EKG shows paced rhythm, nonischemic. UA negative for UTI. CXR   ------------------------------------------------------------  Time: 02/09 1556  Comment: CXR independently reviewed and shows no large focal consolidation. Per radiology report, CXR shows small opacity near L costophrenic  angle which could be atelectasis or possibly small pneumonia. Pt reports she has had occasional chronic cough for several months. No fevers, no leukocytosis. Suspect less likely pneumonia. I discussed this with pt and daughter who feel comfortable holding off on antibiotics at this time and f/u for repeat CXR and re-evaluation.     Discussed with Allie grant [facility]. Plan for close outpatient f/u this week. Recommended starting on low dose amlodipine 2.5 mg nightly. Pt re-evaluated, feels comfortable with plan. Denies any dizziness or lightheadedness.  Remains HDS.     Patient is stable for discharge home with close f/u.  Discussed strict return precautions and supportive care.  Patient verbalized understanding and agreement of plan.                Medical Decision Making      Disposition and Plan     Clinical Impression:  1. Lightheadedness    2. Elevated blood pressure reading         Disposition:  Discharge  2/9/2025  3:56 pm    Follow-up:  Susan Marin MD  [address] [address]  [phone]    Schedule an appointment as soon as possible for a visit            Medications Prescribed:  Discharge Medication List as of 2/9/2025  3:58 PM        START taking these medications    Details   amLODIPine 5 MG Oral Tab Take 0.5 tablets (2.5 mg total) by mouth at bedtime., Normal, Disp-7 tablet, R-0                 Supplementary Documentation:

## 2025-02-09 NOTE — ED INITIAL ASSESSMENT (HPI)
Pt c/o HTN since last night, highest reading 190/96. Per family, the last 10 days \"her pressures have been all over the place.\" Pt states last night when her pressure was high she felt lightheaded, and this morning having vertigo. Pt currently denies symptoms.

## 2025-02-10 ENCOUNTER — ANESTHESIA EVENT (OUTPATIENT)
Dept: SURGERY | Facility: HOSPITAL | Age: 85
End: 2025-02-10
Payer: MEDICARE

## 2025-02-11 ENCOUNTER — PATIENT OUTREACH (OUTPATIENT)
Dept: CASE MANAGEMENT | Age: 85
End: 2025-02-11

## 2025-02-11 NOTE — PROGRESS NOTES
[facility] s/w patient who states that she is just getting ready to leave but she is doing better and denied having any lightheaded or dizziness. She has not checked her bp yet. She was agreeable to a cb tomorrow. [facility] will call back tomorrow.

## 2025-02-12 NOTE — PROGRESS NOTES
NCM attempted to contact the patient for FERNANDO. Phone appeared to be answered but was silent when NCM was introducing self and then disconnected.

## 2025-02-13 ENCOUNTER — ANESTHESIA (OUTPATIENT)
Dept: SURGERY | Facility: HOSPITAL | Age: 85
End: 2025-02-13
Payer: MEDICARE

## 2025-02-13 ENCOUNTER — HOSPITAL ENCOUNTER (OUTPATIENT)
Facility: HOSPITAL | Age: 85
Setting detail: HOSPITAL OUTPATIENT SURGERY
Discharge: HOME | End: 2025-02-13
Attending: ORTHOPAEDIC SURGERY | Admitting: ORTHOPAEDIC SURGERY
Payer: MEDICARE

## 2025-02-13 VITALS
TEMPERATURE: 98 F | RESPIRATION RATE: 16 BRPM | DIASTOLIC BLOOD PRESSURE: 67 MMHG | BODY MASS INDEX: 25.88 KG/M2 | HEART RATE: 70 BPM | SYSTOLIC BLOOD PRESSURE: 159 MMHG | HEIGHT: 62 IN | OXYGEN SATURATION: 100 % | WEIGHT: 140.63 LBS

## 2025-02-13 LAB — GLUCOSE BLD-MCNC: 112 MG/DL (ref 70–99)

## 2025-02-13 PROCEDURE — 82962 GLUCOSE BLOOD TEST: CPT

## 2025-02-13 PROCEDURE — 01N50ZZ RELEASE MEDIAN NERVE, OPEN APPROACH: ICD-10-PCS | Performed by: ORTHOPAEDIC SURGERY

## 2025-02-13 RX ORDER — HYDROMORPHONE HYDROCHLORIDE 1 MG/ML
0.2 INJECTION, SOLUTION INTRAMUSCULAR; INTRAVENOUS; SUBCUTANEOUS EVERY 5 MIN PRN
Status: DISCONTINUED | OUTPATIENT
Start: 2025-02-13 | End: 2025-02-13

## 2025-02-13 RX ORDER — DEXTROSE MONOHYDRATE 25 G/50ML
50 INJECTION, SOLUTION INTRAVENOUS
Status: DISCONTINUED | OUTPATIENT
Start: 2025-02-13 | End: 2025-02-13 | Stop reason: HOSPADM

## 2025-02-13 RX ORDER — NICOTINE POLACRILEX 4 MG
15 LOZENGE BUCCAL
Status: DISCONTINUED | OUTPATIENT
Start: 2025-02-13 | End: 2025-02-13 | Stop reason: HOSPADM

## 2025-02-13 RX ORDER — NICOTINE POLACRILEX 4 MG
30 LOZENGE BUCCAL
Status: DISCONTINUED | OUTPATIENT
Start: 2025-02-13 | End: 2025-02-13 | Stop reason: HOSPADM

## 2025-02-13 RX ORDER — HYDROMORPHONE HYDROCHLORIDE 1 MG/ML
0.4 INJECTION, SOLUTION INTRAMUSCULAR; INTRAVENOUS; SUBCUTANEOUS EVERY 5 MIN PRN
Status: DISCONTINUED | OUTPATIENT
Start: 2025-02-13 | End: 2025-02-13

## 2025-02-13 RX ORDER — PHENYLEPHRINE HCL 10 MG/ML
VIAL (ML) INJECTION AS NEEDED
Status: DISCONTINUED | OUTPATIENT
Start: 2025-02-13 | End: 2025-02-13 | Stop reason: SURG

## 2025-02-13 RX ORDER — NALOXONE HYDROCHLORIDE 0.4 MG/ML
0.08 INJECTION, SOLUTION INTRAMUSCULAR; INTRAVENOUS; SUBCUTANEOUS AS NEEDED
Status: DISCONTINUED | OUTPATIENT
Start: 2025-02-13 | End: 2025-02-13

## 2025-02-13 RX ORDER — ONDANSETRON 2 MG/ML
INJECTION INTRAMUSCULAR; INTRAVENOUS AS NEEDED
Status: DISCONTINUED | OUTPATIENT
Start: 2025-02-13 | End: 2025-02-13 | Stop reason: SURG

## 2025-02-13 RX ORDER — OXYCODONE HYDROCHLORIDE 5 MG/1
2.5 TABLET ORAL ONCE AS NEEDED
Status: DISCONTINUED | OUTPATIENT
Start: 2025-02-13 | End: 2025-02-13

## 2025-02-13 RX ORDER — VANCOMYCIN HYDROCHLORIDE 1 G/20ML
INJECTION, POWDER, LYOPHILIZED, FOR SOLUTION INTRAVENOUS
Status: DISCONTINUED
Start: 2025-02-13 | End: 2025-02-13

## 2025-02-13 RX ORDER — ACETAMINOPHEN 500 MG
1000 TABLET ORAL ONCE AS NEEDED
Status: DISCONTINUED | OUTPATIENT
Start: 2025-02-13 | End: 2025-02-13

## 2025-02-13 RX ORDER — ACETAMINOPHEN 500 MG
1000 TABLET ORAL ONCE
Status: DISCONTINUED | OUTPATIENT
Start: 2025-02-13 | End: 2025-02-13 | Stop reason: HOSPADM

## 2025-02-13 RX ORDER — SODIUM CHLORIDE, SODIUM LACTATE, POTASSIUM CHLORIDE, CALCIUM CHLORIDE 600; 310; 30; 20 MG/100ML; MG/100ML; MG/100ML; MG/100ML
INJECTION, SOLUTION INTRAVENOUS CONTINUOUS
Status: DISCONTINUED | OUTPATIENT
Start: 2025-02-13 | End: 2025-02-13

## 2025-02-13 RX ORDER — HYDROMORPHONE HYDROCHLORIDE 1 MG/ML
0.6 INJECTION, SOLUTION INTRAMUSCULAR; INTRAVENOUS; SUBCUTANEOUS EVERY 5 MIN PRN
Status: DISCONTINUED | OUTPATIENT
Start: 2025-02-13 | End: 2025-02-13

## 2025-02-13 RX ADMIN — SODIUM CHLORIDE, SODIUM LACTATE, POTASSIUM CHLORIDE, CALCIUM CHLORIDE: 600; 310; 30; 20 INJECTION, SOLUTION INTRAVENOUS at 12:19:00

## 2025-02-13 RX ADMIN — ONDANSETRON 4 MG: 2 INJECTION INTRAMUSCULAR; INTRAVENOUS at 12:36:00

## 2025-02-13 RX ADMIN — PHENYLEPHRINE HCL 100 MCG: 10 MG/ML VIAL (ML) INJECTION at 12:46:00

## 2025-02-13 NOTE — OPERATIVE REPORT
Operative Report       Patient Name: Teresita Henderson    2/13/2025    Preoperative Diagnosis:     Carpal tunnel syndrome of left wrist [G56.02]    Postoperative Diagnosis:     Carpal tunnel syndrome of left wrist [G56.02]    Surgeons and Role:     * Shan Alexandra MD - Primary     Assistant:      Procedures:     Left carpal tunnel release (CPT 88973)    Antibiotics: None    Surgical Findings: thickened TCL, hyperemia    Anesthesia: MAC + Local     Complications: None    Implants: None    Condition: Stable    Estimated Blood Loss: 1 mL    Indications:  84 year old female carpal tunnel syndrome that failed non-surgical management. . The risks associated with the procedure, expected  outcome, the expected time to recovery, and the rehab required were reviewed. Any patient's questions were answered.      Procedure:  Patient was met in the preoperative holding area where consent was verified, laterality was marked with the surgeon's initials, and the H&P was updated. Patient was brought to the operating room on a transport cart. Patient was placed in supine position. SCDs were placed. An upper arm tourniquet was placed.  The extremity was prepped and draped in usual sterile fashion.  A surgical timeout was performed. Local (1% lidocaine with epi, 0.5% Marcaine) was infiltrated in the distal forearm and along the incision site of the carpal tunnel release.  The limb was elevated and exsanguinated using Esmarch.  Tourniquet was raised to 250 mmHg.  A 15 blade was used to make an incision along the radial border of the ring finger with the distal extent being the hook of the hamate and the proximal extent being the pisiform.  15 blade was used to make an incision through the dermis.  A Ray-Odette was used to perform blunt dissection onto the palmar fascia.  Palmar fascia was incised longitudinally.  Blunt dissection was once again used to identify the transverse carpal ligament which was then divided longitudinally along the  entirety of its length.  A tight carpal tunnel was noted.    The soft tissue was dissected off the proximal edge of transverse carpal ligament and antebrachial fascia superficial and deep to it using Castorena scissors.  Sandi scissors was used to perform a push cut to release antebrachial fascia.  The wound was irrigated with sterile normal saline.  Tourniquet was let down and bipolar cautery was used to achieve hemostasis.         Closure:  The incision was then closed with 4-0 nylon in a horizontal mattress fashion.    Dressing/Splint:  Bacitracin, 4 x 4 gauze, and a loosely wrapped Ace wrap was used to hold the dressing in place.    Post Operative:  Patient was woken up from anesthesia and taken to PACU for further recovery and discharge home.    Shan Alexandra MD   Hand, Wrist, & Elbow Surgery  [email or web address]  t: [phone]  f: [phone]

## 2025-02-13 NOTE — DISCHARGE INSTRUCTIONS
Dressing: Keep the dressing on until postop day 7. Keep dressing/incision covered and dry while showering. Once dressing comes off, keep incision covered with band aid until follow up.    Weight Bearing: No lifting greater than 1 pounds.    Activity: Resume your normal activities of daily living within the 1 pound lifting restriction.    Pain: Ice and elevate extremity to minimize swelling.  Take acetaminophen and ibuprofen for pain.     Follow-up: Call for follow-up appointment if you do not have one.

## 2025-02-13 NOTE — ANESTHESIA PREPROCEDURE EVALUATION
PRE-OP EVALUATION    Patient Name: Teresita Henderson    Admit Diagnosis: Carpal tunnel syndrome of left wrist [G56.02]    Pre-op Diagnosis: Carpal tunnel syndrome of left wrist [G56.02]    Left carpal tunnel release    Anesthesia Procedure: Left carpal tunnel release (Left)    Surgeons and Role:     * Shan Alexandra MD - Primary    Pre-op vitals reviewed.  Temp: 97.4 °F (36.3 °C)  Pulse: 70  Resp: 18  BP: 134/72  SpO2: 93 %  Body mass index is 26.57 kg/m².    Current medications reviewed.  Hospital Medications:   [Transfer Hold] acetaminophen (Tylenol Extra Strength) tab 1,000 mg  1,000 mg Oral Once    [Transfer Hold] glucose (Dex4) 15 GM/59ML oral liquid 15 g  15 g Oral Q15 Min PRN    Or    [Transfer Hold] glucose (Glutose) 40% oral gel 15 g  15 g Oral Q15 Min PRN    Or    [Transfer Hold] glucose-vitamin C (Dex-4) chewable tab 4 tablet  4 tablet Oral Q15 Min PRN    Or    [Transfer Hold] dextrose 50% injection 50 mL  50 mL Intravenous Q15 Min PRN    Or    [Transfer Hold] glucose (Dex4) 15 GM/59ML oral liquid 30 g  30 g Oral Q15 Min PRN    Or    [Transfer Hold] glucose (Glutose) 40% oral gel 30 g  30 g Oral Q15 Min PRN    Or    [Transfer Hold] glucose-vitamin C (Dex-4) chewable tab 8 tablet  8 tablet Oral Q15 Min PRN    lactated ringers infusion   Intravenous Continuous    vancomycin (Vancocin) 1,000 mg in sodium chloride 0.9% 250 mL IVPB-ADDV  15 mg/kg Intravenous Once    And    gentamicin (Garamycin) 280 mg in sodium chloride 0.9% 100 mL IVPB  5 mg/kg (Adjusted) Intravenous Once    vancomycin (Vancocin) 1 g injection        bupivacaine/lidocaine w Epi/sodium bicarbonate local mixture 10mL syringe   Infiltration Once (Intra-Op)       Outpatient Medications:   Prescriptions Prior to Admission[1]    Allergies: Atorvastatin; Cephalexin; Ciprofloxacin; Cocoa extract, fruit; Fesoterodine; Levofloxacin; Other; Penicillins; Toviaz; Chlorhexidine; Chocolate; Colesevelam; Chocolate; Mushrooms; Tomatoes; and  Hexachlorophene      Anesthesia Evaluation        Anesthetic Complications  (+) history of anesthetic complications  History of: pseudocholinesterase deficiency       GI/Hepatic/Renal      (+) GERD                           Cardiovascular  Comment: BREN   · Overall normal LVSF with an estimated LVEF 60-65% and without wall motion abnormalities. Chamber sizes were within normal limits. Left atrial enlargement.  · Mild mitral and tricuspid insufficiency and normal pulmonic valves were seen.   · There was a trileaflet aortic valve without stenosis or insufficiency.   · There was no evidence for reversal of flow in the pulmonary veins.   · There was a atrial septal defect by color flow interrogation consistent with prior transseptal puncture.  · The aorta had mild scattered atherosclerotic plaque but no evidence for mobile atheromata or thrombus.  · No pericardial effusion.     · WATCHMAN device noted in left atrial appendage (MICHAEL). Device appears well seated.  · Color flow interrogation revealed no kayla-device flow   · There was no evidence for intracardiac mass or thrombus  over the WATCHMAN device.      ECG reviewed.  Exercise tolerance: poor     MET: <=4      (+) hypertension and well controlled    (+) CAD    (+) CABG/stent  (+) pacemaker/AICD                          Endo/Other      (+) diabetes and well controlled, type 2, not using insulin                         Pulmonary    Negative pulmonary ROS.                       Neuro/Psych          (-) CVA   (-) TIA  (-) seizures  (+) neuromuscular disease                     Past Surgical History:   Procedure Laterality Date    Angioplasty (coronary)      Breast lumpectomy Right 2015    Procedure: BREAST LUMPECTOMY;  Surgeon: Castro Marie MD;  Location: [facility] OR    Breast reconstruction Right       1977    Cabg  1997    x3    Cardiac pacemaker placement      [place] 2022    Cataract  2017    Cath bare metal stent (bms)       Cholecystectomy      Colonoscopy  2016    tics; repeat 10 yrs    Colonoscopy  2017    Colonoscopy,diagnostic N/A 2016    Procedure: COLONOSCOPY, POSSIBLE BIOPSY, POSSIBLE POLYPECTOMY 17477;  Surgeon: Rodolfo Ann MD;  Location: [facility]    Cystoscopy,rx female urethral synd  05/10/2013    cysto UD, Ricardo    D & c  1976    Hysterectomy  1985    precancer and irregular bleeding. w/ BSO    Knee replacement surgery  2013    left     Lumpectomy right Right [id number]    Mastectomy right  2015      1964    Oophorectomy      AGE 47    Open heart surgery (pbp)      Other surgical history      LUMPECTOMY RIGHT BREAST     Other surgical history      A PMM LEFT KNEE , replacement    Other surgical history  2013    Venita DENTON    Other surgical history  2014    Cystoscopy- Dr. Figueroa    Other surgical history  2016    Bladder sling    Other surgical history       WATCHMEN    Pacemaker monitor      Patient documented not to have experienced any of the following events N/A 2016    Procedure: COLONOSCOPY, POSSIBLE BIOPSY, POSSIBLE POLYPECTOMY 64256;  Surgeon: Rodolfo Ann MD;  Location: [facility]    Patient withough preoperative order for iv antibiotic surgical site infection prophylaxis. N/A 2016    Procedure: COLONOSCOPY, POSSIBLE BIOPSY, POSSIBLE POLYPECTOMY 52536;  Surgeon: Rodolfo Ann MD;  Location: [facility]    Radiation right          Reduction left  10/2015    Total knee replacement      LEFT     Upper gi endoscopy,exam  2011    repeat 5 yeas     Social History     Socioeconomic History    Marital status:    Tobacco Use    Smoking status: Former     Current packs/day: 0.00     Types: Cigarettes     Quit date: 1997     Years since quittin.6    Smokeless tobacco: Never   Vaping Use    Vaping status: Never Used   Substance and Sexual Activity    Alcohol use: Not Currently    Drug use: No    Other Topics Concern    Caffeine Concern Yes     Comment: Maybe 1.5 cups of tea daily.    Exercise Yes     Comment: Dance Class     History   Drug Use No     Available pre-op labs reviewed.  Lab Results   Component Value Date    WBC 5.2 02/09/2025    RBC 4.16 02/09/2025    HGB 11.8 (L) 02/09/2025    HCT 35.5 02/09/2025    MCV 85.3 02/09/2025    MCH 28.4 02/09/2025    MCHC 33.2 02/09/2025    RDW 14.4 02/09/2025    .0 02/09/2025     Lab Results   Component Value Date     02/09/2025    K 4.4 02/09/2025     02/09/2025    CO2 30.0 02/09/2025    BUN 14 02/09/2025    CREATSERUM 0.77 02/09/2025     (H) 02/09/2025    CA 9.8 02/09/2025            Airway      Mallampati: II  Mouth opening: >3 FB  TM distance: > 6 cm  Neck ROM: full Cardiovascular    Cardiovascular exam normal.         Dental    Dentition appears grossly intact         Pulmonary    Pulmonary exam normal.                 Other findings              ASA: 3   Plan: MAC  NPO status verified and patient meets guidelines.  Patient has taken beta blockers in last 24 hours.      Comment: Plan is MAC anesthesia, which likely will include deep sedation.  Implied that memory of procedure is unlikely although intraop recall, if it occurs, may be a reasonable and comfortable experience with this anesthetic.  Aware that general anesthesia is not intended though deep sedation may include brief moments of general anesthesia.      Plan/risks discussed with: patient                Present on Admission:  **None**             [1]   Medications Prior to Admission   Medication Sig Dispense Refill Last Dose/Taking    amLODIPine 5 MG Oral Tab Take 0.5 tablets (2.5 mg total) by mouth at bedtime. 7 tablet 0 Past Week    acetaminophen 500 MG Oral Tab Take 1 tablet (500 mg total) by mouth 2 (two) times daily as needed for Pain.   2/13/2025 Morning    nitrofurantoin monohydrate macro 100 MG Oral Cap Take 1 capsule (100 mg total) by mouth nightly.   Past Month     levothyroxine 88 MCG Oral Tab Take 1 tablet (88 mcg total) by mouth daily. 90 tablet 0 2/12/2025    pravastatin 10 MG Oral Tab Take 1 tablet (10 mg total) by mouth nightly. 90 tablet 1 2/12/2025    GABAPENTIN 100 MG Oral Cap TAKE 2 CAPSULES(200 MG) BY MOUTH EVERY NIGHT 180 capsule 2 2/12/2025    metFORMIN HCl 1000 MG Oral Tab Take 1 tablet (1,000 mg total) by mouth 2 (two) times daily with meals. (Patient taking differently: Take 0.5 tablets (500 mg total) by mouth daily with breakfast. And 1000 mg by mouth with dinner.) 180 tablet 3 2/12/2025    Polyethylene Glycol 3350 17 g Oral Powd Pack Take 17 g by mouth daily as needed. 90 each 1 Taking As Needed    metoprolol succinate ER 25 MG Oral Tablet 24 Hr Take 1 tablet (25 mg total) by mouth daily. 12.5MG IN MORNING AND 25MG IN EVENING (Patient taking differently: Take 0.5 tablets (12.5 mg total) by mouth every morning. AND 25 MG BY MOUTH EVERY EVENING.) 45 tablet 0 2/12/2025    ezetimibe 10 MG Oral Tab Take 1 tablet (10 mg total) by mouth nightly. 90 tablet 3 2/12/2025    Cholecalciferol (VITAMIN D) 50 MCG (2000 UT) Oral Cap Take by mouth daily.   Past Week    aspirin 81 MG Oral Tab EC Take 1 tablet (81 mg total) by mouth daily. 30 tablet 0 2/12/2025    cetirizine 10 MG Oral Tab Take 1 tablet (10 mg total) by mouth daily.   Past Month    pantoprazole 40 MG Oral Tab EC Take 1 tablet (40 mg total) by mouth daily.   2/12/2025    fluticasone propionate 50 MCG/ACT Nasal Suspension 2 sprays by Each Nare route daily. (Patient taking differently: 2 sprays by Each Nare route daily as needed.) 11.1 mL 3 Taking    sennosides-docusate 8.6-50 MG Oral Tab Take 1 tablet by mouth daily as needed.       Lancets (ONETOUCH DELICA PLUS [id number]) Does not apply Misc 1 each daily. 100 each 6     Glucose Blood (ONETOUCH VERIO) In Vitro Strip Use as directed. 100 strip 1

## 2025-02-13 NOTE — H&P
Clinic Note     Assessment/Plan:  84 year old female    Chronic left elbow deformity - Radiographs are stable without significant change upon my review.   Left ulnar nerve subcutaneous transposition 6/14/2024-ulnar nerve symptoms today compared to preop are significantly improved.  Patient denies any numbness in the small finger and has no evidence of clawing which she did preoperatively.  Left carpal tunnel syndrome?-EMG/NCV attributed symptoms to left C7 and C8 cervical radiculopathy however patient does have provocative exam findings to suggest carpal tunnel syndrome as well as nighttime symptoms.  Patient saw significant improvement in symptoms after the carpal tunnel steroid injection with resolution of symptoms for a number of weeks with no recurrence.  Patient is clinically as well as her symptomatology stays consistent and more consistent with carpal tunnel syndrome.  We elected proceed with a carpal tunnel release.   CTR Consent: Non-surgical and surgical treatment options where reviewed with the patient. Patient elected to proceed with surgical decompression of median nerve in the carpal tunnel. Patient consented to surgery after having understood the risks associated with surgery, expected outcomes, time to recovery and the possible need for therapy post-operatively. Risks include but not limited to: infection, wound complication, nerve injury resulting in temporary or permanent nerve damage, finger/hand stiffness, persistent pain/symptoms, swelling, CRPS, recurrence of carpal tunnel syndrome, and need for additional surgery.   Surgery scheduled for 2/13/2025    Follow Up: Surgery date  Well-healed  Diagnostic Studies:     EMG/NCV:   1.  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  2.  In addition, there is suggestion of a chronic left C7 and C8 cervical radiculopathy, without active denervation changes.'    XR L  elbow: Chronic deformity of left elbow. No acute changes from XR in October. HO at ulnar groove       Physical Exam:     Ht 5' 2\" (1.575 m)   Wt 139 lb (63 kg)   BMI 25.42 kg/m²     Constitutional: NAD. AOx3. Well-developed and Well-nourished.   Psychiatric: Normal mood/ affect/ behavior. Judgment and thought content normal.     Left Upper Extremity:     Inspection    Incision well-healed   Palpation    No TTP over ulnar nerve      ROM    Full composite fist. and Normal symmetric wrist motion.         Neurovascular    Normal sensation in radial nerve distribution. Normal motor function of muscles innervated by median/AIN, ulnar, and radial/PIN nerves.    Normally perfused hand(s).    Ulnar Nerve  No subluxation of ulnar nerve, no numbness in the small finger or ring finger.  Slightly decreased in the middle finger.  Resolution of ulnar clawing.  Atrophy FDI, Hypothenar      Sensory: Small finger has full sensation on today's exam    Positive for Durkan             CC: Left elbow pain    HPI: This 84 year old RHD female presents with left elbow pain. She mentioned significant pain within her elbow. She has tried some conservative measures with minimal change. Numbness/Tingling and weakness are noted    Onset: elbow deformity since childhood, symptoms in ulnar nerve more recently  Pain Character: elbow  Pain Level: severe  Pain @ Night: Yes    Treatments Tried: Tramadol, Anti-inflammatory medications    Occupation: N/a    Interval Hx (9/30/2024): Patient reports no numbness/tingling in the small finger.  She does have shooting pain in the middle finger as well as symptoms at nighttime with the hand falling asleep.  The numbness is predominately the median nerve distribution.  She does have a history of carpal tunnel release on the right side    Interval Hx (1/6/2025): Patient reports near complete resolution of carpal tunnel symptoms after the steroid injection performed in the last visit.  Since then her symptoms  are reoccurring.  Remain consistent predominately in the median nerve distribution with small finger being normal.      History/Other:   Past Medical History:    A-fib (HCC)    Abdominal distention    Abnormal mammogram    Allergic rhinitis    Anesthesia complication    Pt's sons have a history of pseudocholinesterase deficiency.    Antral gastritis    Arrhythmia    AF    Arthritis    Bad breath    Belching    Bloating    Blood in urine    Breast CA (HCC)    Calculus of kidney    Cancer (HCC)    right breast cancer    Carpal tunnel syndrome    Cataract    Cervical spondylosis without myelopathy    Chondromalacia of patella    Constipation    Coronary atherosclerosis    stents    Coronary atherosclerosis of native coronary artery    Diabetes (HCC)    Diet controlled    Diverticulosis    Ductal carcinoma in situ (DCIS) of right breast, 1992    Esophageal reflux    Exposure to unspecified radiation    last dose 1992    Family history of pseudocholinesterase deficiency    Female stress incontinence    Flatulence/gas pain/belching    Frequent urination    Frequent use of laxatives    Frequent UTI    Frequent UTI    Gall stones    GERD (gastroesophageal reflux disease)    Glucose intolerance (impaired glucose tolerance)    Heart palpitations    High blood pressure    High cholesterol    History of blood transfusion    1992    Hoarseness, chronic    Hx of motion sickness    Hypothyroidism    Incomplete bladder emptying (aka 56973)    trial of CIC June 2014    Indigestion    Irregular bowel habits    L knee global 3/27/14    Leg swelling    Malignant neoplasm of breast (female), unspecified site    R    Nausea    Neoplasm of right breast, 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), 1992    Osteoarthrosis, unspecified whether generalized or localized, unspecified site    generalized    Other and unspecified hyperlipidemia    Pacemaker    Pain in joints    Painful swallowing    Peripheral vascular  disease    PONV (postoperative nausea and vomiting)    Post-traumatic osteoarthritis of left elbow    Postmenopausal atrophic vaginitis    Presence of other cardiac implants and grafts    Primary localized osteoarthrosis, lower leg    Problems with swallowing    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)    Pure hypercholesterolemia    Rectocele    Recurrent UTI    group B strep    Recurrent UTI    S/P total knee replacement using cement    Sleep disturbance    Sputum production    Stented coronary artery    Uncomfortable fullness after meals    Unspecified disorder of thyroid    Visual impairment    reading glasses    Vitamin D deficiency     Past Surgical History:   Procedure Laterality Date    Angioplasty (coronary)      Breast lumpectomy Right 2015    Procedure: BREAST LUMPECTOMY;  Surgeon: Castro Marie MD;  Location: [facility] OR    Breast reconstruction Right       1977    Cabg  1997    x3    Cardiac pacemaker placement      [place] 2022    Cataract      Cath bare metal stent (bms)      Cholecystectomy      Colonoscopy  2016    tics; repeat 10 yrs    Colonoscopy  2017    Colonoscopy,diagnostic N/A 2016    Procedure: COLONOSCOPY, POSSIBLE BIOPSY, POSSIBLE POLYPECTOMY 23101;  Surgeon: Rodolfo Ann MD;  Location: [facility]    Cystoscopy,rx female urethral synd  05/10/2013    cysto Ricardo DENTON    D & julia  1976    Hysterectomy  1985    precancer and irregular bleeding. w/ BSO    Knee replacement surgery  2013    left     Lumpectomy right Right [id number]    Mastectomy right  2015      1964    Oophorectomy      AGE 47    Open heart surgery (pbp)      Other surgical history      LUMPECTOMY RIGHT BREAST     Other surgical history      A PMM LEFT KNEE , replacement    Other surgical history  2013    Venita DENTON    Other surgical history  2014    Cystoscopy- Dr. Figueroa    Other surgical history  2016    Bladder sling     Other surgical history      2023 WATCHMEN    Pacemaker monitor      Patient documented not to have experienced any of the following events N/A 04/20/2016    Procedure: COLONOSCOPY, POSSIBLE BIOPSY, POSSIBLE POLYPECTOMY 27571;  Surgeon: Rodolfo Ann MD;  Location: [facility]    Patient withough preoperative order for iv antibiotic surgical site infection prophylaxis. N/A 04/20/2016    Procedure: COLONOSCOPY, POSSIBLE BIOPSY, POSSIBLE POLYPECTOMY 74616;  Surgeon: Rodolfo Ann MD;  Location: [facility]    Radiation right      1992    Reduction left  10/2015    Total knee replacement      LEFT 2013    Upper gi endoscopy,exam  02/25/2011    repeat 5 yeas     No current outpatient medications on file.     Allergies   Allergen Reactions    Atorvastatin NAUSEA AND VOMITING, OTHER (SEE COMMENTS) and NAUSEA ONLY     GI upset, nausea and vomitting    Cephalexin HIVES and FACE FLUSHING    Ciprofloxacin MYALGIA, HIVES and OTHER (SEE COMMENTS)     Started on Rx 4/12/23? Not true allergy?    Cocoa Extract, Fruit OTHER (SEE COMMENTS)     headache    Fesoterodine SWELLING     Throat tightening  Throat tightening Throat tightening      Fosfomycin ARRHYTHMIA     Interaction of arrhythmias with Tykosin    Levofloxacin HIVES, MYALGIA, OTHER (SEE COMMENTS) and RASH     Reports tendonitis    Reports tendonitis   Reports tendonitis   Reports tendonitis    Nuts OTHER (SEE COMMENTS)     Unknown       Unknown, per allergy test    Other ARRHYTHMIA     Macrolides and Fluoroquinolones    Penicillins HIVES and RASH     Tolerates ceftriaxone    Toviaz SWELLING and TONGUE SWELLING     Throat tightening      Chlorhexidine OTHER (SEE COMMENTS)     Rash, redness    Chocolate OTHER (SEE COMMENTS)     headache    Colesevelam OTHER (SEE COMMENTS) and UNKNOWN     GI upset    GI upset   GI upset   GI upset    Chocolate OTHER (SEE COMMENTS)     headache    Fish-Derived Products OTHER (SEE COMMENTS)     Per allergy testing    Mushrooms  OTHER (SEE COMMENTS)     headache    Pecan OTHER (SEE COMMENTS)     Unknown     Shrimp UNKNOWN     Per allergy testing    Tomatoes OTHER (SEE COMMENTS)     headache    Hexachlorophene RASH and OTHER (SEE COMMENTS)     Family History   Problem Relation Age of Onset    Cancer Sister         throat    Diabetes Mother     Breast Cancer Self 52    Heart Disease Neg     Stroke Neg      Social History     Occupational History    Not on file   Tobacco Use    Smoking status: Former     Current packs/day: 0.00     Types: Cigarettes     Quit date: 1997     Years since quittin.6    Smokeless tobacco: Never   Vaping Use    Vaping status: Never Used   Substance and Sexual Activity    Alcohol use: Not Currently    Drug use: No    Sexual activity: Not on file      Review of Systems (negative unless bolded):  General: fevers, chills, fatigue  CV:  chest pain, palpitations, leg swelling  Msk: bodyaches, neck pain, neck stiffness  Skin: rashes, open wounds, nonhealing ulcers  Hem: bleeds easily, bruise easily, immunocompromised  Neuro: dizziness, light headedness, headaches  Psych: anxious, depressed, anger issues    Shan Alexandra MD   Hand, Wrist, & Elbow Surgery  [email or web address]  t: [phone]  f: [phone]

## 2025-02-13 NOTE — ANESTHESIA POSTPROCEDURE EVALUATION
[facility]    Teresita Henderson Patient Status:  Hospital Outpatient Surgery   Age/Gender 84 year old female MRN [id number]   Location [facility] PERIOPERATIVE SERVICE Attending Shan Alexnadra MD   Hosp Day # 0 PCP Nkechi Ariza MD       Anesthesia Post-op Note    Left carpal tunnel release    Procedure Summary       Date: 02/13/25 Room / Location:  MAIN OR 04 /  MAIN OR    Anesthesia Start: 1219 Anesthesia Stop: 1301    Procedure: Left carpal tunnel release (Left) Diagnosis:       Carpal tunnel syndrome of left wrist      (Carpal tunnel syndrome of left wrist [G56.02])    Surgeons: Shan Alexandra MD Anesthesiologist: Latasha Joe DO    Anesthesia Type: MAC ASA Status: 3            Anesthesia Type: MAC    Vitals Value Taken Time   /77 02/13/25 1313   Temp 98 °F (36.7 °C) 02/13/25 1300   Pulse 71 02/13/25 1314   Resp 16 02/13/25 1300   SpO2 99 % 02/13/25 1314   Vitals shown include unfiled device data.        Patient Location: [facility]    Anesthesia Type: MAC    Airway Patency: patent    Postop Pain Control: adequate    Mental Status: preanesthetic baseline    Nausea/Vomiting: none    Cardiopulmonary/Hydration status: stable euvolemic    Complications: no apparent anesthesia related complications    Postop vital signs: stable    Dental Exam: Unchanged from Preop    Patient to be discharged home when criteria met.

## 2025-02-13 NOTE — INTERVAL H&P NOTE
Pre-op Diagnosis: Carpal tunnel syndrome of left wrist [G56.02]    The above referenced H&P was reviewed by JOSEPH Mejia on 2/13/2025, the patient was examined and no significant changes have occurred in the patient's condition since the H&P was performed.  I discussed with the patient and/or legal representative the potential benefits, risks and side effects of this procedure; the likelihood of the patient achieving goals; and potential problems that might occur during recuperation.  I discussed reasonable alternatives to the procedure, including risks, benefits and side effects related to the alternatives and risks related to not receiving this procedure.  We will proceed with procedure as planned.

## 2025-02-24 ENCOUNTER — OFFICE VISIT (OUTPATIENT)
Dept: ORTHOPEDICS CLINIC | Facility: CLINIC | Age: 85
End: 2025-02-24
Payer: MEDICARE

## 2025-02-24 VITALS — WEIGHT: 140 LBS | BODY MASS INDEX: 25.76 KG/M2 | HEIGHT: 62 IN

## 2025-02-24 DIAGNOSIS — M65.341 TRIGGER RING FINGER OF RIGHT HAND: Primary | ICD-10-CM

## 2025-02-24 PROCEDURE — 20550 NJX 1 TENDON SHEATH/LIGAMENT: CPT | Performed by: PHYSICIAN ASSISTANT

## 2025-02-24 PROCEDURE — 99213 OFFICE O/P EST LOW 20 MIN: CPT | Performed by: PHYSICIAN ASSISTANT

## 2025-02-24 RX ORDER — BETAMETHASONE SODIUM PHOSPHATE AND BETAMETHASONE ACETATE 3; 3 MG/ML; MG/ML
6 INJECTION, SUSPENSION INTRA-ARTICULAR; INTRALESIONAL; INTRAMUSCULAR; SOFT TISSUE ONCE
Status: COMPLETED | OUTPATIENT
Start: 2025-02-24 | End: 2025-02-24

## 2025-02-24 RX ADMIN — BETAMETHASONE SODIUM PHOSPHATE AND BETAMETHASONE ACETATE 6 MG: 3; 3 INJECTION, SUSPENSION INTRA-ARTICULAR; INTRALESIONAL; INTRAMUSCULAR; SOFT TISSUE at 10:14:00

## 2025-02-24 NOTE — PROGRESS NOTES
Clinic Note     Assessment/Plan:  84 year old female    Chronic left elbow deformity - Radiographs are stable without significant change upon my review.   Left ulnar nerve subcutaneous transposition 6/14/2024-ulnar nerve symptoms today compared to preop are significantly improved.  Patient denies any numbness in the small finger and has no evidence of clawing which she did preoperatively.  Left open carpal tunnel release 2/13/2025-she has had slight improvement in numbness and tingling but still has numbness in the fingertips mostly in the ring and the middle finger.  She does have slight opening of the incision and maceration.  Will keep the stitches in for an additional 5 days.  She just was placed on Bactrim for a UTI.  Right ring finger trigger finger-we discussed cortisone injection.  She elected proceed and tolerated the procedure very well.    Injection: Written consent was obtained.  Skin was prepped with alcohol.  1 mL of 6 mg of betamethasone and 1 mL of 1% lidocaine was injected into the right ring finger a1.  Patient tolerated the procedure well.  No complications were encountered.  Band-Aid was applied.      Follow Up: Surgery date  Well-healed  Diagnostic Studies:     EMG/NCV:   1.  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  2.  In addition, there is suggestion of a chronic left C7 and C8 cervical radiculopathy, without active denervation changes.'    XR L elbow: Chronic deformity of left elbow. No acute changes from XR in October. HO at ulnar groove       Physical Exam:     Ht 5' 2\" (1.575 m)   Wt 140 lb (63.5 kg)   BMI 25.61 kg/m²     Constitutional: NAD. AOx3. Well-developed and Well-nourished.   Psychiatric: Normal mood/ affect/ behavior. Judgment and thought content normal.     Left Upper Extremity:     Inspection    Incision well-healed   Palpation    No TTP over ulnar nerve      ROM    Full composite  fist. and Normal symmetric wrist motion.         Neurovascular    Normal sensation in radial nerve distribution. Normal motor function of muscles innervated by median/AIN, ulnar, and radial/PIN nerves.    Normally perfused hand(s).    Ulnar Nerve  No subluxation of ulnar nerve, no numbness in the small finger or ring finger.  Slightly decreased in the middle finger.  Resolution of ulnar clawing.  Atrophy FDI, Hypothenar      Sensory: Small finger has full sensation on today's exam    Positive for Durkan             CC: Left elbow pain    HPI: This 84 year old RHD female presents with left elbow pain. She mentioned significant pain within her elbow. She has tried some conservative measures with minimal change. Numbness/Tingling and weakness are noted    Onset: elbow deformity since childhood, symptoms in ulnar nerve more recently  Pain Character: elbow  Pain Level: severe  Pain @ Night: Yes    Treatments Tried: Tramadol, Anti-inflammatory medications    Occupation: N/a    Interval Hx (9/30/2024): Patient reports no numbness/tingling in the small finger.  She does have shooting pain in the middle finger as well as symptoms at nighttime with the hand falling asleep.  The numbness is predominately the median nerve distribution.  She does have a history of carpal tunnel release on the right side    Interval Hx (1/6/2025): Patient reports near complete resolution of carpal tunnel symptoms after the steroid injection performed in the last visit.  Since then her symptoms are reoccurring.  Remain consistent predominately in the median nerve distribution with small finger being normal.    Interval history 2/24/2025: Patient underwent open carpal tunnel release.  She also has pain and triggering of her right ring finger.      History/Other:   Past Medical History:    A-fib (HCC)    Abdominal distention    Abnormal mammogram    Allergic rhinitis    Anesthesia complication    Pt's sons have a history of pseudocholinesterase  deficiency.    Antral gastritis    Arrhythmia    AF    Arthritis    Bad breath    Belching    Bloating    Blood in urine    Breast CA (HCC)    Calculus of kidney    Cancer (HCC)    right breast cancer    Carpal tunnel syndrome    Cataract    Cervical spondylosis without myelopathy    Chondromalacia of patella    Constipation    Coronary atherosclerosis    stents    Coronary atherosclerosis of native coronary artery    Diabetes (HCC)    Diet controlled    Diverticulosis    Ductal carcinoma in situ (DCIS) of right breast, 1992    Esophageal reflux    Exposure to unspecified radiation    last dose 1992    Family history of pseudocholinesterase deficiency    Female stress incontinence    Flatulence/gas pain/belching    Frequent urination    Frequent use of laxatives    Frequent UTI    Frequent UTI    Gall stones    GERD (gastroesophageal reflux disease)    Glucose intolerance (impaired glucose tolerance)    Heart palpitations    High blood pressure    High cholesterol    History of blood transfusion    1992    Hoarseness, chronic    Hx of motion sickness    Hypothyroidism    Incomplete bladder emptying (aka 28874)    trial of CIC June 2014    Indigestion    Irregular bowel habits    L knee global 3/27/14    Leg swelling    Malignant neoplasm of breast (female), unspecified site    R    Nausea    Neoplasm of right breast, 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), 1992    Osteoarthrosis, unspecified whether generalized or localized, unspecified site    generalized    Other and unspecified hyperlipidemia    Pacemaker    Pain in joints    Painful swallowing    Peripheral vascular disease    PONV (postoperative nausea and vomiting)    Post-traumatic osteoarthritis of left elbow    Postmenopausal atrophic vaginitis    Presence of other cardiac implants and grafts    Primary localized osteoarthrosis, lower leg    Problems with swallowing    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)     Pure hypercholesterolemia    Rectocele    Recurrent UTI    group B strep    Recurrent UTI    S/P total knee replacement using cement    Sleep disturbance    Sputum production    Stented coronary artery    Uncomfortable fullness after meals    Unspecified disorder of thyroid    Visual impairment    reading glasses    Vitamin D deficiency     Past Surgical History:   Procedure Laterality Date    Angioplasty (coronary)      Breast lumpectomy Right 2015    Procedure: BREAST LUMPECTOMY;  Surgeon: Castro Marie MD;  Location: [facility]    Breast reconstruction Right       1977    Cabg  1997    x3    Cardiac pacemaker placement      [place] 2022    Cataract      Cath bare metal stent (bms)      Cholecystectomy      Colonoscopy  2016    tics; repeat 10 yrs    Colonoscopy  2017    Colonoscopy,diagnostic N/A 2016    Procedure: COLONOSCOPY, POSSIBLE BIOPSY, POSSIBLE POLYPECTOMY 34721;  Surgeon: Rodolfo Ann MD;  Location: [facility]    Cystoscopy,rx female urethral synd  05/10/2013    cysto Ricardo DENTON    D & c  1976    Hysterectomy  1985    precancer and irregular bleeding. w/ BSO    Knee replacement surgery  2013    left     Lumpectomy right Right [id number]    Mastectomy right  2015      1964    Oophorectomy      AGE 47    Open heart surgery (pbp)      Other surgical history      LUMPECTOMY RIGHT BREAST     Other surgical history      A PMM LEFT KNEE , replacement    Other surgical history  2013    Venita DENTON    Other surgical history  2014    Cystoscopy- Dr. Figueroa    Other surgical history  2016    Bladder sling    Other surgical history       WATCHMEN    Pacemaker monitor      Patient documented not to have experienced any of the following events N/A 2016    Procedure: COLONOSCOPY, POSSIBLE BIOPSY, POSSIBLE POLYPECTOMY 45707;  Surgeon: Rodolfo Ann MD;  Location: [facility]    Patient withough preoperative order  for iv antibiotic surgical site infection prophylaxis. N/A 04/20/2016    Procedure: COLONOSCOPY, POSSIBLE BIOPSY, POSSIBLE POLYPECTOMY 82382;  Surgeon: Rodolfo Ann MD;  Location: [facility]    Radiation right      1992    Reduction left  10/2015    Total knee replacement      LEFT 2013    Upper gi endoscopy,exam  02/25/2011    repeat 5 yeas     Current Outpatient Medications   Medication Sig Dispense Refill    amLODIPine 5 MG Oral Tab Take 0.5 tablets (2.5 mg total) by mouth at bedtime. 7 tablet 0    acetaminophen 500 MG Oral Tab Take 1 tablet (500 mg total) by mouth 2 (two) times daily as needed for Pain.      sennosides-docusate 8.6-50 MG Oral Tab Take 1 tablet by mouth daily as needed.      nitrofurantoin monohydrate macro 100 MG Oral Cap Take 1 capsule (100 mg total) by mouth nightly.      levothyroxine 88 MCG Oral Tab Take 1 tablet (88 mcg total) by mouth daily. 90 tablet 0    pravastatin 10 MG Oral Tab Take 1 tablet (10 mg total) by mouth nightly. 90 tablet 1    GABAPENTIN 100 MG Oral Cap TAKE 2 CAPSULES(200 MG) BY MOUTH EVERY NIGHT 180 capsule 2    metFORMIN HCl 1000 MG Oral Tab Take 1 tablet (1,000 mg total) by mouth 2 (two) times daily with meals. (Patient taking differently: Take 0.5 tablets (500 mg total) by mouth daily with breakfast. And 1000 mg by mouth with dinner.) 180 tablet 3    Polyethylene Glycol 3350 17 g Oral Powd Pack Take 17 g by mouth daily as needed. 90 each 1    metoprolol succinate ER 25 MG Oral Tablet 24 Hr Take 1 tablet (25 mg total) by mouth daily. 12.5MG IN MORNING AND 25MG IN EVENING (Patient taking differently: Take 0.5 tablets (12.5 mg total) by mouth every morning. AND 25 MG BY MOUTH EVERY EVENING.) 45 tablet 0    ezetimibe 10 MG Oral Tab Take 1 tablet (10 mg total) by mouth nightly. 90 tablet 3    Lancets (ONETOUCH DELICA PLUS [id number]) Does not apply Misc 1 each daily. 100 each 6    Glucose Blood (ONETOUCH VERIO) In Vitro Strip Use as directed. 100 strip 1     Cholecalciferol (VITAMIN D) 50 MCG (2000 UT) Oral Cap Take by mouth daily.      aspirin 81 MG Oral Tab EC Take 1 tablet (81 mg total) by mouth daily. 30 tablet 0    cetirizine 10 MG Oral Tab Take 1 tablet (10 mg total) by mouth daily.      pantoprazole 40 MG Oral Tab EC Take 1 tablet (40 mg total) by mouth daily.      fluticasone propionate 50 MCG/ACT Nasal Suspension 2 sprays by Each Nare route daily. (Patient taking differently: 2 sprays by Each Nare route daily as needed.) 11.1 mL 3     Allergies   Allergen Reactions    Atorvastatin NAUSEA AND VOMITING, OTHER (SEE COMMENTS) and NAUSEA ONLY     GI upset, nausea and vomitting    Cephalexin HIVES and FACE FLUSHING    Ciprofloxacin MYALGIA, HIVES and OTHER (SEE COMMENTS)     Started on Rx 4/12/23? Not true allergy?    Cocoa Extract, Fruit OTHER (SEE COMMENTS)     headache    Fesoterodine SWELLING     Throat tightening  Throat tightening Throat tightening      Levofloxacin HIVES, MYALGIA, OTHER (SEE COMMENTS) and RASH     Reports tendonitis    Reports tendonitis   Reports tendonitis   Reports tendonitis    Other ARRHYTHMIA     Macrolides and Fluoroquinolones    Penicillins HIVES and RASH     Tolerates ceftriaxone    Toviaz SWELLING and TONGUE SWELLING     Throat tightening      Chlorhexidine OTHER (SEE COMMENTS)     Rash, redness    Chocolate OTHER (SEE COMMENTS)     headache    Colesevelam OTHER (SEE COMMENTS) and UNKNOWN     GI upset    GI upset   GI upset   GI upset    Chocolate OTHER (SEE COMMENTS)     headache    Mushrooms OTHER (SEE COMMENTS)     headache    Tomatoes OTHER (SEE COMMENTS)     headache    Hexachlorophene RASH and OTHER (SEE COMMENTS)     Family History   Problem Relation Age of Onset    Cancer Sister         throat    Diabetes Mother     Breast Cancer Self 52    Heart Disease Neg     Stroke Neg      Social History     Occupational History    Not on file   Tobacco Use    Smoking status: Former     Current packs/day: 0.00     Types: Cigarettes      Quit date: 1997     Years since quittin.7    Smokeless tobacco: Never   Vaping Use    Vaping status: Never Used   Substance and Sexual Activity    Alcohol use: Not Currently    Drug use: No    Sexual activity: Not on file      Review of Systems (negative unless bolded):  General: fevers, chills, fatigue  CV:  chest pain, palpitations, leg swelling  Msk: bodyaches, neck pain, neck stiffness  Skin: rashes, open wounds, nonhealing ulcers  Hem: bleeds easily, bruise easily, immunocompromised  Neuro: dizziness, light headedness, headaches  Psych: anxious, depressed, anger issues  Hina Hathaway PA-C  Hand, Wrist, & Elbow Surgery  Physician Assistant to Dr. [email or web address]  t: [phone]  f: [phone]

## 2025-02-28 ENCOUNTER — OFFICE VISIT (OUTPATIENT)
Dept: ORTHOPEDICS CLINIC | Facility: CLINIC | Age: 85
End: 2025-02-28
Payer: MEDICARE

## 2025-02-28 ENCOUNTER — TELEPHONE (OUTPATIENT)
Dept: ORTHOPEDICS CLINIC | Facility: CLINIC | Age: 85
End: 2025-02-28

## 2025-02-28 VITALS — HEIGHT: 62 IN | WEIGHT: 140 LBS | BODY MASS INDEX: 25.76 KG/M2

## 2025-02-28 DIAGNOSIS — M65.341 TRIGGER RING FINGER OF RIGHT HAND: Primary | ICD-10-CM

## 2025-02-28 DIAGNOSIS — G56.02 CARPAL TUNNEL SYNDROME OF LEFT WRIST: ICD-10-CM

## 2025-02-28 PROCEDURE — 99024 POSTOP FOLLOW-UP VISIT: CPT | Performed by: PHYSICIAN ASSISTANT

## 2025-02-28 NOTE — PROGRESS NOTES
Clinic Note     Assessment/Plan:  84 year old female    Chronic left elbow deformity - Radiographs are stable without significant change upon my review.   Left ulnar nerve subcutaneous transposition 6/14/2024-ulnar nerve symptoms today compared to preop are significantly improved.  Patient denies any numbness in the small finger and has no evidence of clawing which she did preoperatively.  Left open carpal tunnel release 2/13/2025-incision is healing over more.  Slight open area but we discussed wound care with keeping it covered and preventing it from getting wet until it scabs over.  She will submit photo next week to touch base.  I do not think there is an active infection.  I remove the stitches today.  Overall she is doing great.  Right ring finger trigger finger-status post 1 injection.      Follow Up: 4-6 weeks with Dr Alexandra        Diagnostic Studies:     EMG/NCV:   1.  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  2.  In addition, there is suggestion of a chronic left C7 and C8 cervical radiculopathy, without active denervation changes.'    XR L elbow: Chronic deformity of left elbow. No acute changes from XR in October. HO at ulnar groove       Physical Exam:     Ht 5' 2\" (1.575 m)   Wt 140 lb (63.5 kg)   BMI 25.61 kg/m²     Constitutional: NAD. AOx3. Well-developed and Well-nourished.   Psychiatric: Normal mood/ affect/ behavior. Judgment and thought content normal.     Left Upper Extremity:     Inspection    Incision well-healed   Palpation    No TTP over ulnar nerve      ROM    Full composite fist. and Normal symmetric wrist motion.         Neurovascular    Normal sensation in radial nerve distribution. Normal motor function of muscles innervated by median/AIN, ulnar, and radial/PIN nerves.    Normally perfused hand(s).    Ulnar Nerve  No subluxation of ulnar nerve, no numbness in the small finger or ring finger.   Slightly decreased in the middle finger.  Resolution of ulnar clawing.  Atrophy FDI, Hypothenar      Sensory: Small finger has full sensation on today's exam    Positive for Durkan             CC: Left elbow pain    HPI: This 84 year old RHD female presents with left elbow pain. She mentioned significant pain within her elbow. She has tried some conservative measures with minimal change. Numbness/Tingling and weakness are noted    Onset: elbow deformity since childhood, symptoms in ulnar nerve more recently  Pain Character: elbow  Pain Level: severe  Pain @ Night: Yes    Treatments Tried: Tramadol, Anti-inflammatory medications    Occupation: N/a    Interval Hx (9/30/2024): Patient reports no numbness/tingling in the small finger.  She does have shooting pain in the middle finger as well as symptoms at nighttime with the hand falling asleep.  The numbness is predominately the median nerve distribution.  She does have a history of carpal tunnel release on the right side    Interval Hx (1/6/2025): Patient reports near complete resolution of carpal tunnel symptoms after the steroid injection performed in the last visit.  Since then her symptoms are reoccurring.  Remain consistent predominately in the median nerve distribution with small finger being normal.    Interval history : Patient underwent open carpal tunnel release.     History/Other:   Past Medical History:    A-fib (HCC)    Abdominal distention    Abnormal mammogram    Allergic rhinitis    Anesthesia complication    Pt's sons have a history of pseudocholinesterase deficiency.    Antral gastritis    Arrhythmia    AF    Arthritis    Bad breath    Belching    Bloating    Blood in urine    Breast CA (HCC)    Calculus of kidney    Cancer (HCC)    right breast cancer    Carpal tunnel syndrome    Cataract    Cervical spondylosis without myelopathy    Chondromalacia of patella    Constipation    Coronary atherosclerosis    stents    Coronary atherosclerosis of native  coronary artery    Diabetes (HCC)    Diet controlled    Diverticulosis    Ductal carcinoma in situ (DCIS) of right breast, 1992    Esophageal reflux    Exposure to unspecified radiation    last dose 1992    Family history of pseudocholinesterase deficiency    Female stress incontinence    Flatulence/gas pain/belching    Frequent urination    Frequent use of laxatives    Frequent UTI    Frequent UTI    Gall stones    GERD (gastroesophageal reflux disease)    Glucose intolerance (impaired glucose tolerance)    Heart palpitations    High blood pressure    High cholesterol    History of blood transfusion    1992    Hoarseness, chronic    Hx of motion sickness    Hypothyroidism    Incomplete bladder emptying (aka 33668)    trial of CIC June 2014    Indigestion    Irregular bowel habits    L knee global 3/27/14    Leg swelling    Malignant neoplasm of breast (female), unspecified site    R    Nausea    Neoplasm of right breast, 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), 1992    Osteoarthrosis, unspecified whether generalized or localized, unspecified site    generalized    Other and unspecified hyperlipidemia    Pacemaker    Pain in joints    Painful swallowing    Peripheral vascular disease    PONV (postoperative nausea and vomiting)    Post-traumatic osteoarthritis of left elbow    Postmenopausal atrophic vaginitis    Presence of other cardiac implants and grafts    Primary localized osteoarthrosis, lower leg    Problems with swallowing    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)    Pure hypercholesterolemia    Rectocele    Recurrent UTI    group B strep    Recurrent UTI    S/P total knee replacement using cement    Sleep disturbance    Sputum production    Stented coronary artery    Uncomfortable fullness after meals    Unspecified disorder of thyroid    Visual impairment    reading glasses    Vitamin D deficiency     Past Surgical History:   Procedure Laterality Date     Angioplasty (coronary)      Breast lumpectomy Right 2015    Procedure: BREAST LUMPECTOMY;  Surgeon: Castro Marie MD;  Location: [facility] OR    Breast reconstruction Right       1977    Cabg  1997    x3    Cardiac pacemaker placement      Mobile 2022    Cataract  2017    Cath bare metal stent (bms)      Cholecystectomy      Colonoscopy  2016    tics; repeat 10 yrs    Colonoscopy  2017    Colonoscopy,diagnostic N/A 2016    Procedure: COLONOSCOPY, POSSIBLE BIOPSY, POSSIBLE POLYPECTOMY 90107;  Surgeon: Rodolfo Ann MD;  Location: [facility]    Cystoscopy,rx female urethral synd  05/10/2013    cysto Ricardo DENTON    D & c  1976    Hysterectomy  1985    precancer and irregular bleeding. w/ BSO    Knee replacement surgery  2013    left     Lumpectomy right Right [id number]    Mastectomy right  2015      1964    Oophorectomy      AGE 47    Open heart surgery (pbp)      Other surgical history      LUMPECTOMY RIGHT BREAST     Other surgical history      A PMM LEFT KNEE , replacement    Other surgical history  2013    Venita DENTON    Other surgical history  2014    Cystoscopy- Dr. Figueroa    Other surgical history  2016    Bladder sling    Other surgical history       WATCHMEN    Pacemaker monitor      Patient documented not to have experienced any of the following events N/A 2016    Procedure: COLONOSCOPY, POSSIBLE BIOPSY, POSSIBLE POLYPECTOMY 06340;  Surgeon: Rodolfo Ann MD;  Location: [facility]    Patient withough preoperative order for iv antibiotic surgical site infection prophylaxis. N/A 2016    Procedure: COLONOSCOPY, POSSIBLE BIOPSY, POSSIBLE POLYPECTOMY 46227;  Surgeon: Rodolfo Ann MD;  Location: [facility]    Radiation right          Reduction left  10/2015    Total knee replacement      LEFT     Upper gi endoscopy,exam  2011    repeat 5 yeas     Current Outpatient Medications    Medication Sig Dispense Refill    amLODIPine 5 MG Oral Tab Take 0.5 tablets (2.5 mg total) by mouth at bedtime. 7 tablet 0    acetaminophen 500 MG Oral Tab Take 1 tablet (500 mg total) by mouth 2 (two) times daily as needed for Pain.      sennosides-docusate 8.6-50 MG Oral Tab Take 1 tablet by mouth daily as needed.      nitrofurantoin monohydrate macro 100 MG Oral Cap Take 1 capsule (100 mg total) by mouth nightly.      levothyroxine 88 MCG Oral Tab Take 1 tablet (88 mcg total) by mouth daily. 90 tablet 0    pravastatin 10 MG Oral Tab Take 1 tablet (10 mg total) by mouth nightly. 90 tablet 1    GABAPENTIN 100 MG Oral Cap TAKE 2 CAPSULES(200 MG) BY MOUTH EVERY NIGHT 180 capsule 2    metFORMIN HCl 1000 MG Oral Tab Take 1 tablet (1,000 mg total) by mouth 2 (two) times daily with meals. (Patient taking differently: Take 0.5 tablets (500 mg total) by mouth daily with breakfast. And 1000 mg by mouth with dinner.) 180 tablet 3    Polyethylene Glycol 3350 17 g Oral Powd Pack Take 17 g by mouth daily as needed. 90 each 1    metoprolol succinate ER 25 MG Oral Tablet 24 Hr Take 1 tablet (25 mg total) by mouth daily. 12.5MG IN MORNING AND 25MG IN EVENING (Patient taking differently: Take 0.5 tablets (12.5 mg total) by mouth every morning. AND 25 MG BY MOUTH EVERY EVENING.) 45 tablet 0    ezetimibe 10 MG Oral Tab Take 1 tablet (10 mg total) by mouth nightly. 90 tablet 3    Lancets (ONETOUCH DELICA PLUS [id number]) Does not apply Misc 1 each daily. 100 each 6    Glucose Blood (ONETOUCH VERIO) In Vitro Strip Use as directed. 100 strip 1    Cholecalciferol (VITAMIN D) 50 MCG (2000 UT) Oral Cap Take by mouth daily.      aspirin 81 MG Oral Tab EC Take 1 tablet (81 mg total) by mouth daily. 30 tablet 0    cetirizine 10 MG Oral Tab Take 1 tablet (10 mg total) by mouth daily.      pantoprazole 40 MG Oral Tab EC Take 1 tablet (40 mg total) by mouth daily.      fluticasone propionate 50 MCG/ACT Nasal Suspension 2 sprays by Each Nare  route daily. (Patient taking differently: 2 sprays by Each Nare route daily as needed.) 11.1 mL 3     Allergies   Allergen Reactions    Atorvastatin NAUSEA AND VOMITING, OTHER (SEE COMMENTS) and NAUSEA ONLY     GI upset, nausea and vomitting    Cephalexin HIVES and FACE FLUSHING    Ciprofloxacin MYALGIA, HIVES and OTHER (SEE COMMENTS)     Started on Rx 23? Not true allergy?    Cocoa Extract, Fruit OTHER (SEE COMMENTS)     headache    Fesoterodine SWELLING     Throat tightening  Throat tightening Throat tightening      Levofloxacin HIVES, MYALGIA, OTHER (SEE COMMENTS) and RASH     Reports tendonitis    Reports tendonitis   Reports tendonitis   Reports tendonitis    Other ARRHYTHMIA     Macrolides and Fluoroquinolones    Penicillins HIVES and RASH     Tolerates ceftriaxone    Toviaz SWELLING and TONGUE SWELLING     Throat tightening      Chlorhexidine OTHER (SEE COMMENTS)     Rash, redness    Chocolate OTHER (SEE COMMENTS)     headache    Colesevelam OTHER (SEE COMMENTS) and UNKNOWN     GI upset    GI upset   GI upset   GI upset    Chocolate OTHER (SEE COMMENTS)     headache    Mushrooms OTHER (SEE COMMENTS)     headache    Tomatoes OTHER (SEE COMMENTS)     headache    Hexachlorophene RASH and OTHER (SEE COMMENTS)     Family History   Problem Relation Age of Onset    Cancer Sister         throat    Diabetes Mother     Breast Cancer Self 52    Heart Disease Neg     Stroke Neg      Social History     Occupational History    Not on file   Tobacco Use    Smoking status: Former     Current packs/day: 0.00     Types: Cigarettes     Quit date: 1997     Years since quittin.7    Smokeless tobacco: Never   Vaping Use    Vaping status: Never Used   Substance and Sexual Activity    Alcohol use: Not Currently    Drug use: No    Sexual activity: Not on file      Review of Systems (negative unless bolded):  General: fevers, chills, fatigue  CV:  chest pain, palpitations, leg swelling  Msk: bodyaches, neck pain, neck  stiffness  Skin: rashes, open wounds, nonhealing ulcers  Hem: bleeds easily, bruise easily, immunocompromised  Neuro: dizziness, light headedness, headaches  Psych: anxious, depressed, anger issues  Hina Hathaway PA-C  Hand, Wrist, & Elbow Surgery  Physician Assistant to Dr. [email or web address]  t: [phone]  f: [phone]

## 2025-03-24 PROBLEM — Z85.3 HISTORY OF BREAST CANCER: Status: ACTIVE | Noted: 2025-03-24

## 2025-03-25 ENCOUNTER — TELEPHONE (OUTPATIENT)
Dept: INTERNAL MEDICINE CLINIC | Facility: CLINIC | Age: 85
End: 2025-03-25

## 2025-03-31 ENCOUNTER — OFFICE VISIT (OUTPATIENT)
Dept: INTERNAL MEDICINE CLINIC | Facility: CLINIC | Age: 85
End: 2025-03-31
Payer: MEDICARE

## 2025-03-31 VITALS
BODY MASS INDEX: 25.3 KG/M2 | TEMPERATURE: 98 F | HEIGHT: 62.5 IN | HEART RATE: 70 BPM | WEIGHT: 141 LBS | DIASTOLIC BLOOD PRESSURE: 58 MMHG | OXYGEN SATURATION: 97 % | SYSTOLIC BLOOD PRESSURE: 120 MMHG

## 2025-03-31 DIAGNOSIS — E78.5 HYPERLIPIDEMIA, UNSPECIFIED HYPERLIPIDEMIA TYPE: ICD-10-CM

## 2025-03-31 DIAGNOSIS — M89.9 DISORDER OF BONE, UNSPECIFIED: ICD-10-CM

## 2025-03-31 DIAGNOSIS — D64.9 ANEMIA, UNSPECIFIED TYPE: ICD-10-CM

## 2025-03-31 DIAGNOSIS — Z95.0 HISTORY OF PACEMAKER: Chronic | ICD-10-CM

## 2025-03-31 DIAGNOSIS — Z93.59 SUPRAPUBIC CATHETER (HCC): ICD-10-CM

## 2025-03-31 DIAGNOSIS — Z85.3 HISTORY OF BREAST CANCER: ICD-10-CM

## 2025-03-31 DIAGNOSIS — Z87.440 HISTORY OF RECURRENT UTIS: ICD-10-CM

## 2025-03-31 DIAGNOSIS — I49.5 SICK SINUS SYNDROME (HCC): Chronic | ICD-10-CM

## 2025-03-31 DIAGNOSIS — Z00.00 ENCOUNTER FOR ANNUAL WELLNESS VISIT (AWV) IN MEDICARE PATIENT: Primary | ICD-10-CM

## 2025-03-31 DIAGNOSIS — J32.9 SINUSITIS, UNSPECIFIED CHRONICITY, UNSPECIFIED LOCATION: ICD-10-CM

## 2025-03-31 DIAGNOSIS — Z98.82 PRESENCE OF RIGHT BREAST IMPLANT: ICD-10-CM

## 2025-03-31 DIAGNOSIS — I10 HYPERTENSION, UNSPECIFIED TYPE: ICD-10-CM

## 2025-03-31 DIAGNOSIS — Z12.31 ENCOUNTER FOR SCREENING MAMMOGRAM FOR MALIGNANT NEOPLASM OF BREAST: ICD-10-CM

## 2025-03-31 DIAGNOSIS — I48.20 CHRONIC ATRIAL FIBRILLATION (HCC): ICD-10-CM

## 2025-03-31 DIAGNOSIS — Z95.818 PRESENCE OF WATCHMAN LEFT ATRIAL APPENDAGE CLOSURE DEVICE: ICD-10-CM

## 2025-03-31 DIAGNOSIS — R79.89 LOW VITAMIN D LEVEL: ICD-10-CM

## 2025-03-31 DIAGNOSIS — E11.9 TYPE 2 DIABETES MELLITUS WITHOUT COMPLICATION, WITHOUT LONG-TERM CURRENT USE OF INSULIN (HCC): ICD-10-CM

## 2025-03-31 DIAGNOSIS — E03.4 HYPOTHYROIDISM DUE TO ACQUIRED ATROPHY OF THYROID: Chronic | ICD-10-CM

## 2025-03-31 DIAGNOSIS — I25.810 CORONARY ARTERY DISEASE INVOLVING CORONARY BYPASS GRAFT OF NATIVE HEART WITHOUT ANGINA PECTORIS: Chronic | ICD-10-CM

## 2025-03-31 PROBLEM — E11.69 HYPERLIPIDEMIA ASSOCIATED WITH TYPE 2 DIABETES MELLITUS (HCC): Status: RESOLVED | Noted: 2024-06-05 | Resolved: 2025-03-31

## 2025-03-31 PROBLEM — E11.59 HYPERTENSION ASSOCIATED WITH TYPE 2 DIABETES MELLITUS (HCC): Status: RESOLVED | Noted: 2024-06-05 | Resolved: 2025-03-31

## 2025-03-31 PROBLEM — I15.2 HYPERTENSION ASSOCIATED WITH TYPE 2 DIABETES MELLITUS (HCC): Status: RESOLVED | Noted: 2024-06-05 | Resolved: 2025-03-31

## 2025-03-31 RX ORDER — AZITHROMYCIN 250 MG/1
TABLET, FILM COATED ORAL
Qty: 6 TABLET | Refills: 0 | Status: SHIPPED | OUTPATIENT
Start: 2025-03-31 | End: 2025-04-05

## 2025-03-31 NOTE — PROGRESS NOTES
Subjective:   Teresita Henderson is a 84 year old female who presents for a Subsequent Annual Wellness visit (Pt already had Initial Annual Wellness) and the following:          The following individual(s) verbally consented to be recorded using ambient AI listening technology and understand that they can each withdraw their consent to this listening technology at any point by asking the clinician to turn off or pause the recording:    Patient name: Teresita Henderson  Additional names:  Beryl Henderson    History of Present Illness  The patient, with a history of recurrent UTIs associated with a suprapubic catheter, hypothyroidism, diabetes, atrial fibrillation, sick sinus syndrome, coronary artery disease, and hx breast cancer, presents with sinus symptoms and discomfort from a breast implant. The sinus symptoms have been ongoing for a week and include congestion, sputum production, and a sensation of fullness. The patient has tried Mucinex, Flonase, and Zyrtec without significant improvement. The patient also reports that the breast implant, which was placed in 2015 following a mastectomy, feels stiff and occasionally uncomfortable.The patient denies any changes in the implant or any nipple discharge. The patient's diabetes is generally well-controlled, but the patient and her daughter have noticed that blood sugar levels tend to increase during UTI episodes. The patient's other chronic conditions are reportedly stable.    History/Other:   Fall Risk Assessment:   She has been screened for Falls and is low risk.      Cognitive Assessment:   She had a completely normal cognitive assessment - see flowsheet entries     Functional Ability/Status:   Teresita Henderson has a completely normal functional assessment. See flowsheet for details.      Depression Screening (PHQ):  PHQ-2 SCORE: 0  , done 3/26/2025   Last [place] Suicide Screening on 3/31/2025 was No Risk.         Advanced Directives:   She does NOT have a Living Will.  [Do you have a living will?: (Patient-Rptd) No]  She does NOT have a Power of  for Health Care. [Do you have a healthcare power of ?: (Patient-Rptd) No]  Not discussed      Patient Active Problem List   Diagnosis    Hypothyroid    CAD (coronary artery disease)    Benign essential HTN    Benign essential tremor    Sick sinus syndrome (HCC)    Atrial fibrillation (HCC)    S/P primary angioplasty with coronary stent    History of pacemaker    Cervical radiculopathy    Breast asymmetry between native breast and reconstructed breast    Mixed incontinence urge and stress (male)(female)    Stenosis of both internal carotid arteries    Suprapubic catheter (HCC)    Presence of Watchman left atrial appendage closure device    Hyperlipidemia    Ulnar nerve compression, left    Urinary tract infection without hematuria, site unspecified    Displacement of infusion catheter, initial encounter    Diabetes mellitus (HCC)    History of breast cancer     Allergies:  She is allergic to atorvastatin; cephalexin; ciprofloxacin; cocoa extract, fruit; fesoterodine; levofloxacin; other; penicillins; toviaz; chlorhexidine; chocolate; colesevelam; chocolate; mushrooms; tomatoes; and hexachlorophene.    Current Medications:  Outpatient Medications Marked as Taking for the 3/31/25 encounter (Office Visit) with Nkechi Chavez MD   Medication Sig    metFORMIN HCl 1000 MG Oral Tab Take 0.5 tablets (500 mg total) by mouth 2 (two) times daily with meals.    azithromycin 250 MG Oral Tab Take 2 tablets (500 mg total) by mouth daily for 1 day, THEN 1 tablet (250 mg total) daily for 4 days.    acetaminophen 500 MG Oral Tab Take 1 tablet (500 mg total) by mouth 2 (two) times daily as needed for Pain.    levothyroxine 88 MCG Oral Tab Take 1 tablet (88 mcg total) by mouth daily.    pravastatin 10 MG Oral Tab Take 1 tablet (10 mg total) by mouth nightly.    metoprolol succinate ER 25 MG Oral Tablet 24 Hr Take 1 tablet (25 mg total)  by mouth daily. 12.5MG IN MORNING AND 25MG IN EVENING (Patient taking differently: Take 0.5 tablets (12.5 mg total) by mouth every morning. AND 25 MG BY MOUTH EVERY EVENING.)    ezetimibe 10 MG Oral Tab Take 1 tablet (10 mg total) by mouth nightly.    Lancets (ONETOUCH DELICA PLUS [id number]) Does not apply Misc 1 each daily.    Glucose Blood (ONETOUCH VERIO) In Vitro Strip Use as directed.    aspirin 81 MG Oral Tab EC Take 1 tablet (81 mg total) by mouth daily.    cetirizine 10 MG Oral Tab Take 1 tablet (10 mg total) by mouth daily.    pantoprazole 40 MG Oral Tab EC Take 1 tablet (40 mg total) by mouth daily.    fluticasone propionate 50 MCG/ACT Nasal Suspension 2 sprays by Each Nare route daily. (Patient taking differently: 2 sprays by Each Nare route daily as needed.)       Medical History:  She  has a past medical history of A-fib ([facility]), Abdominal distention, Abnormal mammogram (06/21/2016), Allergic rhinitis (1980), Anesthesia complication, Antral gastritis, Arrhythmia, Arthritis, Bad breath, Belching, Bloating, Blood in urine, Breast CA ([facility]), Calculus of kidney, Cancer ([facility]) (1993), Carpal tunnel syndrome, Cataract, Cervical spondylosis without myelopathy, Chondromalacia of patella (02/22/2012), Constipation, Coronary atherosclerosis, Coronary atherosclerosis of native coronary artery, Diabetes ([facility]), Diverticulosis, Ductal carcinoma in situ (DCIS) of right breast, 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), Esophageal reflux, Exposure to unspecified radiation, Family history of pseudocholinesterase deficiency, Female stress incontinence (04/18/2016), Flatulence/gas pain/belching, Frequent urination, Frequent use of laxatives, Frequent UTI, Frequent UTI, Gall stones, GERD (gastroesophageal reflux disease) (02/22/2012), Glucose intolerance (impaired glucose tolerance), Heart palpitations, High blood pressure, High cholesterol, History of blood transfusion, Hoarseness, chronic, motion sickness, Hypothyroidism, Incomplete bladder  emptying (aka [address]), Indigestion, Irregular bowel habits, L knee global 3/27/14 (2013), Leg swelling, Malignant neoplasm of breast (female), unspecified site, Nausea, Neoplasm of right breast, 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), 1992 (2015), Osteoarthrosis, unspecified whether generalized or localized, unspecified site, Other and unspecified hyperlipidemia, Pacemaker, Pain in joints, Painful swallowing, Peripheral vascular disease, PONV (postoperative nausea and vomiting), Post-traumatic osteoarthritis of left elbow (2018), Postmenopausal atrophic vaginitis (2016), Presence of other cardiac implants and grafts, Primary localized osteoarthrosis, lower leg (2012), Problems with swallowing, 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), Pure hypercholesterolemia, Rectocele (2016), Recurrent UTI, Recurrent UTI (2014), S/P total knee replacement using cement (2015), Sleep disturbance, Sputum production, Stented coronary artery, Uncomfortable fullness after meals, Unspecified disorder of thyroid, Visual impairment, and Vitamin D deficiency.  Surgical History:  She  has a past surgical history that includes  (); cabg (); open heart surgery (pbp) (); knee replacement surgery (2013); cystoscopy,rx female urethral synd (05/10/2013); upper gi endoscopy,exam (2011); oophorectomy; radiation right; Breast lumpectomy (Right, 2015); total knee replacement; hysterectomy (); colonoscopy (2016); colonoscopy,diagnostic (N/A, 2016); patient withough preoperative order for iv antibiotic surgical site infection prophylaxis. (N/A, 2016); patient documented not to have experienced any of the following events (N/A, 2016); lumpectomy right (Right, ); other surgical history (); other surgical history; other surgical history (2013); other surgical history (2014);  other surgical history (2016); Breast reconstruction (Right, 2015); angioplasty (coronary) (); cataract (); colonoscopy (); d & c ();  (); mastectomy right (2015); reduction left (10/2015); Cardiac pacemaker placement; cath bare metal stent (bms); cholecystectomy; pacemaker monitor; and other surgical history.   Family History:  Her family history includes Breast Cancer (age of onset: 51) in her self; Cancer in her sister; Diabetes in her mother.  Social History:  She  reports that she quit smoking about 27 years ago. Her smoking use included cigarettes. She has never used smokeless tobacco. She reports that she does not currently use alcohol. She reports that she does not use drugs.    Tobacco:  She smoked tobacco in the past but quit greater than 12 months ago.  Social History     Tobacco Use   Smoking Status Former    Current packs/day: 0.00    Types: Cigarettes    Quit date: 1997    Years since quittin.8   Smokeless Tobacco Never        CAGE Alcohol Screen:   CAGE screening score of 0 on 3/26/2025, showing low risk of alcohol abuse.      Patient Care Team:  Nkechi Chavez MD as PCP - General (Internal Medicine)  Castro Marie MD as Surgeon (SURGERY, GENERAL)  Canelo Le MD (SURGERY, PLASTIC)  Rodolfo Ann MD as Consulting Physician (GASTROENTEROLOGY)  Soraya Sutherland MD as Consulting Physician (NEUROLOGY)  Charito Rojas PT as Physical Therapist  Alec Romero DO (GASTROENTEROLOGY)  Susan Marin MD (Cardiovascular Diseases)    Review of Systems  As in HPI    Objective:   Physical Exam  PHYSICAL EXAM:   General: no acute distress   Eyes: pupils equal and reactive; EOMI; sclera normal; conjunctiva normal   ENT:sinuses non-tender on palpation;  without erythema or exudate  Neck: trachea midline; no adenopathy; thyroid not enlarged   Hematologic/lymphatic:no cervical lymphadenopathy; no supraclavicular adenopathy   Respiratory: no  increased work of breathing; good air exchange; CTAB; no crackles or wheezing   Cardiovascular: RRR; S1, S2; no murmurs; no carotid bruits appreciated; no edema   Gastrointestinal: normal bowel sounds; soft; non-distended; non-tender  Neurological: awake, alert, oriented x3; CNII-XII grossly intact; casual gait is normal;   Behavioral/Psych: euthymic; appropriate affect   Breasts: s/p R mastectomy now with implant that feels hard. No L breast findings       /58 (BP Location: Left arm, Patient Position: Sitting, Cuff Size: adult)   Pulse 70   Temp 97.5 °F (36.4 °C) (Temporal)   Ht 5' 2.5\" (1.588 m)   Wt 141 lb (64 kg)   SpO2 97%   BMI 25.38 kg/m²  Estimated body mass index is 25.38 kg/m² as calculated from the following:    Height as of this encounter: 5' 2.5\" (1.588 m).    Weight as of this encounter: 141 lb (64 kg).    Medicare Hearing Assessment:   Hearing Screening    Time taken: 3/31/2025 11:22 AM  Entry User: Nikki Anand MA  Screening Method: Finger Rub  Finger Rub Result: Pass               Assessment & Plan:   Teresita Henderson is a 84 year old female who presents for a Medicare Assessment.     Assessment & Plan  Recurrent Urinary Tract Infections (UTIs)  Recurrent UTIs associated with suprapubic catheter, with  hospitalizations due to exacerbations. Current urinalysis shows trace blood, trace protein, leukocytes, and negative nitrates. Awaiting culture results as previous culture was lost- reports done with specialist  - Follow up with urology at [facility].  -long course of abx as per urology   - Monitor for infection signs and adjust treatment as necessary.    Anemia  Mild anemia likely due to recurrent infections and frequent blood draws. Reassessment needed in the context of overall health and recent antibiotic use.  - Repeat CBC, iron, vitamin B12, and folate levels within the next month.  - Monitor for symptoms of worsening anemia.    Hypothyroidism  Due for thyroid function tests to  assess current thyroid hormone levels and adjust medication if necessary.  - Order TSH, free T4, and free T3 tests   -levothyroxine     Type 2 Diabetes Mellitus  Requesting to change Metformin dosage to 500 mg twice daily.   - Adjust metformin to 500 mg twice daily.  - Order A1c and urine microalbumin tests   - Monitor fasting blood glucose levels  -close follow-up     Hyperlipidemia  Hyperlipidemia with previous treatment including pravastatin and Zetia. No recent lipid panel available. Reassessment needed due to history of coronary artery disease and atrial fibrillation. Current medications well-tolerated without liver function abnormalities.  - Order fasting lipid panel .  - Continue current cholesterol medications and follow-up with cardiologist     Atrial Fibrillation and Sick Sinus Syndrome  Atrial fibrillation and sick sinus syndrome with pacemaker and Watchman procedure. Asymptomatic with controlled blood pressure and cholesterol. Recent discontinuation of Tikosyn due to antibiotic interactions.  - Follow up with cardiologist as directed   - Monitor for symptoms of arrhythmia or palpitations.    Breast Cancer History  Right mastectomy due to breast cancer. No recent mammograms since November 2023. No new breast changes reported. Occasional firmness and edema in breast implant per pt report  - Order mammogram for left breast.  - Refer to reconstructive surgeon for evaluation of breast implant.    Sinusitis  Symptoms of sinus congestion, mucus production, and ear fullness for one week. Conservative treatments with Flonase and Zyrtec ineffective. No severe symptoms present, so a Z-Mark is considered a benign option.  - Prescribe Z-Mark (azithromycin) for sinusitis.    Follow-up  Plan to monitor various health conditions and adjust treatment as necessary. Labs to be completed   - Schedule follow-up appointment in three months to reassess diabetes management and overall health.  -sooner follow-up prn     The  patient indicates understanding of these issues and agrees to the plan.  Reinforced healthy diet, lifestyle, and exercise.      No follow-ups on file.     Nkechi Ariza MD, 3/31/2025     Supplementary Documentation:   General Health:  In the past six months, have you lost more than 10 pounds without trying?: (Patient-Rptd) 2 - No  Has your appetite been poor?: (Patient-Rptd) No  Type of Diet: (Patient-Rptd) Balanced  How does the patient maintain a good energy level?: (Patient-Rptd) Appropriate Exercise  How would you describe your daily physical activity?: (Patient-Rptd) Light  How would you describe your current health state?: (Patient-Rptd) Fair  How do you maintain positive mental well-being?: (Patient-Rptd) Social Interaction, Puzzles, Games, Visiting Friends, Visiting Family  On a scale of 0 to 10, with 0 being no pain and 10 being severe pain, what is your pain level?: (Patient-Rptd) 0 - (None)  In the past six months, have you experienced urine leakage?: (Patient-Rptd) 0-No  At any time do you feel concerned for the safety/well-being of yourself and/or your children, in your home or elsewhere?: (Patient-Rptd) No  Have you had any immunizations at another office such as Influenza, Hepatitis B, Tetanus, or Pneumococcal?: (Patient-Rptd) No    Health Maintenance   Topic Date Due    Zoster Vaccines (1 of 2) Never done    Annual Physical  02/27/2024    COVID-19 Vaccine (4 - 2024-25 season) 09/01/2024    Diabetes Care A1C  12/28/2024    Annual Depression Screening  01/01/2025    Diabetes Care: Foot Exam (Annual)  01/01/2025    Diabetes Care: Microalb/Creat Ratio (Annual)  01/01/2025    Diabetes Care Dilated Eye Exam  08/22/2025    Diabetes Care: GFR  02/09/2026    Influenza Vaccine  Completed    DEXA Scan  Completed    Fall Risk Screening (Annual)  Completed    Pneumococcal Vaccine: 50+ Years  Completed    Meningococcal B Vaccine  Aged Out

## 2025-04-15 ENCOUNTER — HOSPITAL ENCOUNTER (OUTPATIENT)
Dept: MAMMOGRAPHY | Age: 85
Discharge: HOME | End: 2025-04-15
Attending: INTERNAL MEDICINE
Payer: MEDICARE

## 2025-04-15 DIAGNOSIS — Z85.3 HISTORY OF BREAST CANCER: ICD-10-CM

## 2025-04-15 DIAGNOSIS — Z12.31 ENCOUNTER FOR SCREENING MAMMOGRAM FOR MALIGNANT NEOPLASM OF BREAST: ICD-10-CM

## 2025-04-15 PROCEDURE — 77067 SCR MAMMO BI INCL CAD: CPT | Performed by: INTERNAL MEDICINE

## 2025-04-15 PROCEDURE — 77063 BREAST TOMOSYNTHESIS BI: CPT | Performed by: INTERNAL MEDICINE

## 2025-04-16 ENCOUNTER — TELEPHONE (OUTPATIENT)
Dept: INTERNAL MEDICINE CLINIC | Facility: CLINIC | Age: 85
End: 2025-04-16

## 2025-04-16 RX ORDER — PRAVASTATIN SODIUM 10 MG
10 TABLET ORAL NIGHTLY
Qty: 90 TABLET | Refills: 1 | Status: SHIPPED | OUTPATIENT
Start: 2025-04-16

## 2025-04-16 NOTE — TELEPHONE ENCOUNTER
Requesting    Name from pharmacy: PRAVASTATIN 10MG TABLETS         Will file in chart as: PRAVASTATIN 10 MG Oral Tab    Sig: TAKE 1 TABLET(10 MG) BY MOUTH EVERY NIGHT    Disp: 90 tablet    Refills: 1 (Pharmacy requested: Not specified)    Start: 4/15/2025    Class: Normal    Non-formulary    Last ordered: 5 months ago (10/24/2024) by Nkechi Ariza MD    Last refill: 1/18/2025    Rx #: [id number]    Cholesterol Medication Protocol Tsrhpz20/15/2025 12:49 PM   Protocol Details Lipid panel within past 12 months    ALT < 80    ALT resulted within past year    In person appointment or virtual visit in the past 12 mos or appointment in next 3 mos    Medication is active on med list      To be filled at: [id number] On [facility] of [facility] [address] [facility] [address] [place] [address], [phone], [phone]     LOV: 03/31/25 w/ DURAN  RTC: 06/30/25  Last Relevant Labs: 03/2025  Future Appointments   Date Time Provider Department Center   5/5/2025  8:30 AM [facility] [place]

## 2025-04-16 NOTE — TELEPHONE ENCOUNTER
We received  Order # [id number] signed and reveiwed by DVF  I faxed signed  Order # [id number]  Pages 6/6 Status CP  Original sent to scan, copy placed in accordion Pod 1

## 2025-04-23 ENCOUNTER — TELEPHONE (OUTPATIENT)
Dept: INTERNAL MEDICINE CLINIC | Facility: CLINIC | Age: 85
End: 2025-04-23

## 2025-04-23 NOTE — TELEPHONE ENCOUNTER
We received  Order # [id number] signed and reviewed by DVF.   Pages 4/4 Status CP   Original sent to scan, copy placed in accordion

## 2025-04-28 RX ORDER — LEVOTHYROXINE SODIUM 88 UG/1
88 TABLET ORAL DAILY
Qty: 90 TABLET | Refills: 0 | OUTPATIENT
Start: 2025-04-28

## 2025-04-28 RX ORDER — EZETIMIBE 10 MG/1
10 TABLET ORAL NIGHTLY
Qty: 90 TABLET | Refills: 3 | Status: SHIPPED | OUTPATIENT
Start: 2025-04-28

## 2025-04-28 RX ORDER — FLUTICASONE PROPIONATE 50 MCG
2 SPRAY, SUSPENSION (ML) NASAL DAILY PRN
Qty: 11.1 ML | Refills: 3 | Status: SHIPPED | OUTPATIENT
Start: 2025-04-28

## 2025-04-28 NOTE — TELEPHONE ENCOUNTER
Ezetimibe 10 mg  Cholesterol Medication Protocol Kyfumi2704/27/2025 05:34 PM   Protocol Details Lipid panel within past 12 months   Filled 4-24-24  Qty 90  3 refills  Future Appointments   Date Time Provider Department Center   5/5/2025  8:30 AM [facility] [place]   LOV 3-31-25 DV  Labs due for lab work

## 2025-04-28 NOTE — TELEPHONE ENCOUNTER
fluticasone propionate 50 MCG/ACT Nasal Suspension         Si sprays by Each Nare route daily.    Disp: 11.1 mL    Refills: 3    Start: 2025    Class: Normal    Non-formulary    Last ordered: 2 years ago (2022) by Abdon Johnson MD    Allergy Medication Protocol [id number] 05:34 PM   Protocol Details In person appointment or virtual visit in the past 12 mos or appointment in next 3 mos    Medication is active on med list      To be filled at: [id number] [address] AT [facility] [address], [phone], [phone]

## 2025-05-05 ENCOUNTER — LAB ENCOUNTER (OUTPATIENT)
Dept: LAB | Age: 85
End: 2025-05-05
Attending: INTERNAL MEDICINE
Payer: MEDICARE

## 2025-05-05 DIAGNOSIS — D64.9 ANEMIA, UNSPECIFIED TYPE: ICD-10-CM

## 2025-05-05 DIAGNOSIS — E11.9 TYPE 2 DIABETES MELLITUS WITHOUT COMPLICATION, WITHOUT LONG-TERM CURRENT USE OF INSULIN (HCC): ICD-10-CM

## 2025-05-05 DIAGNOSIS — Z93.59 SUPRAPUBIC CATHETER (HCC): ICD-10-CM

## 2025-05-05 DIAGNOSIS — E03.4 HYPOTHYROIDISM DUE TO ACQUIRED ATROPHY OF THYROID: ICD-10-CM

## 2025-05-05 DIAGNOSIS — Z00.00 ENCOUNTER FOR ANNUAL WELLNESS VISIT (AWV) IN MEDICARE PATIENT: ICD-10-CM

## 2025-05-05 DIAGNOSIS — M89.9 DISORDER OF BONE, UNSPECIFIED: ICD-10-CM

## 2025-05-05 DIAGNOSIS — R79.89 LOW VITAMIN D LEVEL: ICD-10-CM

## 2025-05-05 DIAGNOSIS — I10 HYPERTENSION, UNSPECIFIED TYPE: ICD-10-CM

## 2025-05-05 DIAGNOSIS — I49.5 SICK SINUS SYNDROME (HCC): ICD-10-CM

## 2025-05-05 DIAGNOSIS — I48.20 CHRONIC ATRIAL FIBRILLATION (HCC): ICD-10-CM

## 2025-05-05 LAB
BASOPHILS # BLD AUTO: 0.07 X10(3) UL (ref 0–0.2)
BASOPHILS NFR BLD AUTO: 1.3 %
CHOLEST SERPL-MCNC: 128 MG/DL (ref ?–200)
CREAT UR-SCNC: 114.1 MG/DL
EOSINOPHIL # BLD AUTO: 0.17 X10(3) UL (ref 0–0.7)
EOSINOPHIL NFR BLD AUTO: 3.1 %
ERYTHROCYTE [DISTWIDTH] IN BLOOD BY AUTOMATED COUNT: 15.1 %
EST. AVERAGE GLUCOSE BLD GHB EST-MCNC: 163 MG/DL (ref 68–126)
FASTING PATIENT LIPID ANSWER: YES
FOLATE SERPL-MCNC: 14.1 NG/ML (ref 5.4–?)
HBA1C MFR BLD: 7.3 % (ref ?–5.7)
HCT VFR BLD AUTO: 35.5 % (ref 35–48)
HDLC SERPL-MCNC: 56 MG/DL (ref 40–59)
HGB BLD-MCNC: 11.5 G/DL (ref 12–16)
IMM GRANULOCYTES # BLD AUTO: 0.02 X10(3) UL (ref 0–1)
IMM GRANULOCYTES NFR BLD: 0.4 %
IRON SATN MFR SERPL: 14 % (ref 15–50)
IRON SERPL-MCNC: 50 UG/DL (ref 50–170)
LDLC SERPL CALC-MCNC: 53 MG/DL (ref ?–100)
LYMPHOCYTES # BLD AUTO: 1.49 X10(3) UL (ref 1–4)
LYMPHOCYTES NFR BLD AUTO: 27.2 %
MCH RBC QN AUTO: 28 PG (ref 26–34)
MCHC RBC AUTO-ENTMCNC: 32.4 G/DL (ref 31–37)
MCV RBC AUTO: 86.4 FL (ref 80–100)
MICROALBUMIN UR-MCNC: 5 MG/DL
MICROALBUMIN/CREAT 24H UR-RTO: 43.8 UG/MG (ref ?–30)
MONOCYTES # BLD AUTO: 0.69 X10(3) UL (ref 0.1–1)
MONOCYTES NFR BLD AUTO: 12.6 %
NEUTROPHILS # BLD AUTO: 3.04 X10 (3) UL (ref 1.5–7.7)
NEUTROPHILS # BLD AUTO: 3.04 X10(3) UL (ref 1.5–7.7)
NEUTROPHILS NFR BLD AUTO: 55.4 %
NONHDLC SERPL-MCNC: 72 MG/DL (ref ?–130)
PLATELET # BLD AUTO: 265 10(3)UL (ref 150–450)
RBC # BLD AUTO: 4.11 X10(6)UL (ref 3.8–5.3)
T3FREE SERPL-MCNC: 2.55 PG/ML (ref 2.4–4.2)
T4 FREE SERPL-MCNC: 1 NG/DL (ref 0.8–1.7)
TOTAL IRON BINDING CAPACITY: 349 UG/DL (ref 250–425)
TRANSFERRIN SERPL-MCNC: 272 MG/DL (ref 250–380)
TRIGL SERPL-MCNC: 101 MG/DL (ref 30–149)
TSI SER-ACNC: 3.65 UIU/ML (ref 0.55–4.78)
VIT B12 SERPL-MCNC: 390 PG/ML (ref 211–911)
VIT D+METAB SERPL-MCNC: 30.7 NG/ML (ref 30–100)
VLDLC SERPL CALC-MCNC: 15 MG/DL (ref 0–30)
WBC # BLD AUTO: 5.5 X10(3) UL (ref 4–11)

## 2025-05-05 PROCEDURE — 36415 COLL VENOUS BLD VENIPUNCTURE: CPT

## 2025-05-05 PROCEDURE — 82570 ASSAY OF URINE CREATININE: CPT

## 2025-05-05 PROCEDURE — 82607 VITAMIN B-12: CPT

## 2025-05-05 PROCEDURE — 84443 ASSAY THYROID STIM HORMONE: CPT

## 2025-05-05 PROCEDURE — 82043 UR ALBUMIN QUANTITATIVE: CPT

## 2025-05-05 PROCEDURE — 84439 ASSAY OF FREE THYROXINE: CPT

## 2025-05-05 PROCEDURE — 83550 IRON BINDING TEST: CPT

## 2025-05-05 PROCEDURE — 83540 ASSAY OF IRON: CPT

## 2025-05-05 PROCEDURE — 83036 HEMOGLOBIN GLYCOSYLATED A1C: CPT

## 2025-05-05 PROCEDURE — 80061 LIPID PANEL: CPT

## 2025-05-05 PROCEDURE — 82306 VITAMIN D 25 HYDROXY: CPT

## 2025-05-05 PROCEDURE — 84481 FREE ASSAY (FT-3): CPT

## 2025-05-05 PROCEDURE — 85025 COMPLETE CBC W/AUTO DIFF WBC: CPT

## 2025-05-05 PROCEDURE — 82746 ASSAY OF FOLIC ACID SERUM: CPT

## 2025-05-12 ENCOUNTER — TELEPHONE (OUTPATIENT)
Dept: INTERNAL MEDICINE CLINIC | Facility: CLINIC | Age: 85
End: 2025-05-12

## 2025-05-12 NOTE — TELEPHONE ENCOUNTER
We received  Order # [id number] signed and reviewed by DVF   Pages 5/5 Status CP  Original sent to scan, copy placed in accordion

## 2025-05-13 ENCOUNTER — TELEPHONE (OUTPATIENT)
Dept: INTERNAL MEDICINE CLINIC | Facility: CLINIC | Age: 85
End: 2025-05-13

## 2025-05-13 NOTE — TELEPHONE ENCOUNTER
PT and daughter returned RN call for lab results. This MA provided them the results and they had no further questions or concerns. Provided referral information as well. Will follow up with Hematology.    Information relayed to patient:    \"Nkechi Ariza MD  5/7/2025 6:03 PM CDT    Results reviewed. Please inform patient that DM overall controlled but some protein in urine. Continue current regimen/optimizing BG. Repeat A1c in 3 months. Cholesterol controlled; continue statin and zetia.  Thyroid labs wnl. Continue current levothyroxine. Vitamin d wnl  Still mildly anemic with low iron-referral placed for hematology for possible iron infusion\"

## 2025-07-07 RX ORDER — LEVOTHYROXINE SODIUM 88 UG/1
88 TABLET ORAL DAILY
Qty: 90 TABLET | Refills: 3 | Status: SHIPPED | OUTPATIENT
Start: 2025-07-07

## 2025-07-07 NOTE — TELEPHONE ENCOUNTER
Protocol passed     LOV: 3/31/25   RTC: 3 months  Filled: 12/26/26 #90   Labs: 5/5/25   Future Appointments   Date Time Provider Department Center   8/18/2025 10:00 AM [id number]

## 2025-07-10 ENCOUNTER — TELEPHONE (OUTPATIENT)
Dept: ORTHOPEDICS CLINIC | Facility: CLINIC | Age: 85
End: 2025-07-10

## 2025-07-10 DIAGNOSIS — T14.90XA INJURY: Primary | ICD-10-CM

## 2025-07-11 ENCOUNTER — HOSPITAL ENCOUNTER (OUTPATIENT)
Dept: GENERAL RADIOLOGY | Age: 85
Discharge: HOME | End: 2025-07-11
Attending: PHYSICIAN ASSISTANT
Payer: MEDICARE

## 2025-07-11 ENCOUNTER — OFFICE VISIT (OUTPATIENT)
Dept: ORTHOPEDICS CLINIC | Facility: CLINIC | Age: 85
End: 2025-07-11
Payer: MEDICARE

## 2025-07-11 VITALS — HEIGHT: 62 IN | WEIGHT: 135 LBS | BODY MASS INDEX: 24.84 KG/M2

## 2025-07-11 DIAGNOSIS — Z98.890 S/P CARPAL TUNNEL RELEASE: ICD-10-CM

## 2025-07-11 DIAGNOSIS — T14.90XA INJURY: ICD-10-CM

## 2025-07-11 DIAGNOSIS — M79.89 SWELLING OF RIGHT HAND: Primary | ICD-10-CM

## 2025-07-11 DIAGNOSIS — M18.12 PRIMARY OSTEOARTHRITIS OF FIRST CARPOMETACARPAL JOINT OF LEFT HAND: ICD-10-CM

## 2025-07-11 PROCEDURE — 99213 OFFICE O/P EST LOW 20 MIN: CPT | Performed by: PHYSICIAN ASSISTANT

## 2025-07-11 PROCEDURE — 73130 X-RAY EXAM OF HAND: CPT | Performed by: PHYSICIAN ASSISTANT

## 2025-07-11 RX ORDER — METHYLPREDNISOLONE 4 MG/1
TABLET ORAL
Qty: 1 EACH | Refills: 0 | Status: SHIPPED | OUTPATIENT
Start: 2025-07-11

## 2025-07-11 NOTE — H&P
[facility] - ORTHOPEDICS  [address] [place] [id number]  [phone]     NEW PATIENT VISIT - HISTORY AND PHYSICAL EXAMINATION     Name: Teresita Henderson   MRN: [id number]  Date: 2025     CC: Right hand/wrist pain.     REFERRED BY: Nkechi Ariza MD    HPI:   Teresita Henderson is a very pleasant 85 year old right-hand dominant female who presents today for evaluation, consultation, and management of right hand swelling that occurred approximately 1 month ago without a clear injury or mechanism of trauma. She rates her pain to be a swelling, stiffness. Pain is a 8/10. She has hx of right carpal tunnel release 20 years ago. She denies any tingling or numbness.     PMH:   Past Medical History[1]    PAST SURGICAL HX:  Past Surgical History[2]    FAMILY HX:  Family History[3]    ALLERGIES:  Atorvastatin; Cephalexin; Ciprofloxacin; Cocoa extract, fruit; Fesoterodine; Levofloxacin; Other; Penicillins; Toviaz; Chlorhexidine; Chocolate; Colesevelam; Chocolate; Mushrooms; Tomatoes; and Hexachlorophene    MEDICATIONS: Current Medications[4]    ROS: A complete 10 point review of systems performed and negative aside from the aforementioned per history of present illness.     SOCIAL HX:  Social History     Occupational History    Not on file   Tobacco Use    Smoking status: Former     Current packs/day: 0.00     Types: Cigarettes     Quit date: 1997     Years since quittin.0    Smokeless tobacco: Never   Vaping Use    Vaping status: Never Used   Substance and Sexual Activity    Alcohol use: Not Currently    Drug use: No    Sexual activity: Not on file         PE:   Vitals:    25 1007   Weight: 135 lb (61.2 kg)   Height: 5' 2\" (1.575 m)     Estimated body mass index is 24.69 kg/m² as calculated from the following:    Height as of this encounter: 5' 2\" (1.575 m).    Weight as of this encounter: 135 lb (61.2 kg).    Physical Exam  Constitutional:       Appearance: Normal  appearance.   HENT:      Head: Normocephalic and atraumatic.   Eyes:      Extraocular Movements: Extraocular movements intact.   Neck:      Musculoskeletal: Normal range of motion and neck supple.   Cardiovascular:      Pulses: Normal pulses.   Pulmonary:      Effort: Pulmonary effort is normal. No respiratory distress.   Abdominal:      General: There is no distension.   Skin:     General: Skin is warm.      Capillary Refill: Capillary refill takes less than 2 seconds.      Findings: No bruising.   Neurological:      General: No focal deficit present.      Mental Status: Alert.   Psychiatric:         Mood and Affect: Mood normal.     Examination of the right hand/wrist demonstrates:     Skin is intact, warm and dry.     Inspection:   Atrophy: none    Effusion: none    Edema: none    Erythema: none    Hematoma: none    Nail changes:  none    Deformities: none      Palpation:   Radius: none    Ulna: none    Scaphoid: none    Lunate:  none    Triquetrum: none    Pisiform: none    Trapezium: none    Trapezoid: none    Capitate: none  Hamate: none   DRUJ: none   Dorsal Wrist: none   Pisano Wrist: none   Metacarpals mild   DIP: none   PIP: none     ROM:   Wrist: Full and symmetric   Fingers: Full and symmetric     Strength: significantly weak     Special Tests:   Median Nerve: Negative  Ulnar Nerve: Negative   Radial Nerve:  Negative     No obvious peripheral edema noted.   Distal neurovascular exam demonstrates normal perfusion, intact sensation to light touch and full strength.     Examination of the contralateral hand/wrist demonstrates:  No significant atrophy, swelling or effusion. Full range of motion. Neurovascularly intact distally.      Radiographic Examination/Diagnostics:    I personally viewed, independently interpreted and radiology report was reviewed.  X-rays, Right Hand - 7/11/2025- there is evidence of CMC joint severe osteoarthritis, with no fractures- fb or soft tissue injury.    IMPRESSION: Teresita HODGES  Santiago is a 85 year old Right hand dominant female with right hand swelling, and pain in the setting of prior carpal tunnel release 20 years ago.     PLAN:   We had a detailed discussion outlining the etiology, anatomy, pathophysiology, and natural history of the patient's findings. Imaging was reviewed in detail and correlated to a 3-dimensional model of the patient's pathology.     We reviewed the treatment of this disease condition.      We recommend medrol dose pack. We recommended occupational therapy to aid in strengthening, range of motion, functional improvement, and return to baseline activity.      The patient had the opportunity to ask questions and all questions were answered appropriately.      FOLLOW-UP:   Continue follow up with Dr. Alexandra's team.           Devin Shine, [facility], PA-C Orthopedic Surgery / Sports Medicine Specialist  [facility]  [address] [place], [place] [email or web address]  [email or web address]  t: [phone]  o: [phone]  f: [phone]    This note was dictated using Dragon software.  While it was briefly proofread prior to completion, some grammatical, spelling, and word choice errors due to dictation may still occur.           [1]   Past Medical History:   A-fib (HCC)    Abdominal distention    Abnormal mammogram    Allergic rhinitis    Anesthesia complication    Pt's sons have a history of pseudocholinesterase deficiency.    Antral gastritis    Arrhythmia    AF    Arthritis    Bad breath    Belching    Bloating    Blood in urine    Breast CA (HCC)    Calculus of kidney    Cancer (HCC)    right breast cancer    Carpal tunnel syndrome    Cataract    Cervical spondylosis without myelopathy    Chondromalacia of patella    Constipation    Coronary atherosclerosis    stents    Coronary atherosclerosis of native coronary artery    Diabetes (HCC)    Diet controlled    Diverticulosis    Ductal carcinoma in situ (DCIS) of right breast, 1992     Esophageal reflux    Exposure to unspecified radiation    last dose 1992    Family history of pseudocholinesterase deficiency    Female stress incontinence    Flatulence/gas pain/belching    Frequent urination    Frequent use of laxatives    Frequent UTI    Frequent UTI    Gall stones    GERD (gastroesophageal reflux disease)    Glucose intolerance (impaired glucose tolerance)    Heart palpitations    High blood pressure    High cholesterol    History of blood transfusion    1992    Hoarseness, chronic    Hx of motion sickness    Hypothyroidism    Incomplete bladder emptying (aka 38940)    trial of CIC June 2014    Indigestion    Irregular bowel habits    L knee global 3/27/14    Leg swelling    Malignant neoplasm of breast (female), unspecified site    R    Nausea    Neoplasm of right breast, 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), 1992    Osteoarthrosis, unspecified whether generalized or localized, unspecified site    generalized    Other and unspecified hyperlipidemia    Pacemaker    Pain in joints    Painful swallowing    Peripheral vascular disease    PONV (postoperative nausea and vomiting)    Post-traumatic osteoarthritis of left elbow    Postmenopausal atrophic vaginitis    Presence of other cardiac implants and grafts    Primary localized osteoarthrosis, lower leg    Problems with swallowing    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)    Pure hypercholesterolemia    Rectocele    Recurrent UTI    group B strep    Recurrent UTI    S/P total knee replacement using cement    Sleep disturbance    Sputum production    Stented coronary artery    Uncomfortable fullness after meals    Unspecified disorder of thyroid    Visual impairment    reading glasses    Vitamin D deficiency   [2]   Past Surgical History:  Procedure Laterality Date    Angioplasty (coronary)  1997    Breast lumpectomy Right 06/26/2015    Procedure: BREAST LUMPECTOMY;  Surgeon: Castro Marie MD;   Location: [facility] MAIN OR    Breast reconstruction Right           Cabg  1997    x3    Cardiac pacemaker placement      [place] 2022    Cataract  2017    Cath bare metal stent (bms)      Cholecystectomy      Colonoscopy  2016    tics; repeat 10 yrs    Colonoscopy  2017    Colonoscopy,diagnostic N/A 2016    Procedure: COLONOSCOPY, POSSIBLE BIOPSY, POSSIBLE POLYPECTOMY 94607;  Surgeon: Rodolfo Ann MD;  Location: [facility]    Cystoscopy,rx female urethral synd  05/10/2013    cysto UD, Ricardo    D & c  1976    Hysterectomy  1985    precancer and irregular bleeding. w/ BSO    Knee replacement surgery  2013    left     Lumpectomy right Right [id number]    Mastectomy right  2015      1964    Oophorectomy      AGE 47    Open heart surgery (pbp)      Other surgical history      LUMPECTOMY RIGHT BREAST     Other surgical history      A PMM LEFT KNEE , replacement    Other surgical history  2013    Venita DENTON    Other surgical history  2014    Cystoscopy- Dr. Figueroa    Other surgical history  2016    Bladder sling    Other surgical history       WATCHMEN    Pacemaker monitor      Patient documented not to have experienced any of the following events N/A 2016    Procedure: COLONOSCOPY, POSSIBLE BIOPSY, POSSIBLE POLYPECTOMY 67833;  Surgeon: Rodolfo Ann MD;  Location: [facility]    Patient withough preoperative order for iv antibiotic surgical site infection prophylaxis. N/A 2016    Procedure: COLONOSCOPY, POSSIBLE BIOPSY, POSSIBLE POLYPECTOMY 54584;  Surgeon: Rodolfo Ann MD;  Location: [facility]    Radiation right      1992    Reduction left  10/2015    Total knee replacement      LEFT     Upper gi endoscopy,exam  2011    repeat 5 yeas   [3]   Family History  Problem Relation Age of Onset    Cancer Sister         throat    Diabetes Mother     Breast Cancer Self 52    Heart Disease Neg     Stroke Neg     [4]   Current Outpatient Medications   Medication Sig Dispense Refill    methylPREDNISolone (MEDROL) 4 MG Oral Tablet Therapy Pack As directed. 1 each 0    levothyroxine 88 MCG Oral Tab Take 1 tablet (88 mcg total) by mouth daily. 90 tablet 3    fluticasone propionate 50 MCG/ACT Nasal Suspension 2 sprays by Each Nare route daily as needed for Rhinitis. 11.1 mL 3    ezetimibe 10 MG Oral Tab Take 1 tablet (10 mg total) by mouth nightly. 90 tablet 3    PRAVASTATIN 10 MG Oral Tab TAKE 1 TABLET(10 MG) BY MOUTH EVERY NIGHT 90 tablet 1    metFORMIN HCl 1000 MG Oral Tab Take 0.5 tablets (500 mg total) by mouth 2 (two) times daily with meals.      acetaminophen 500 MG Oral Tab Take 1 tablet (500 mg total) by mouth 2 (two) times daily as needed for Pain.      sennosides-docusate 8.6-50 MG Oral Tab Take 1 tablet by mouth daily as needed.      metoprolol succinate ER 25 MG Oral Tablet 24 Hr Take 1 tablet (25 mg total) by mouth daily. 12.5MG IN MORNING AND 25MG IN EVENING (Patient taking differently: Take 0.5 tablets (12.5 mg total) by mouth every morning. AND 25 MG BY MOUTH EVERY EVENING.) 45 tablet 0    Lancets (ONETOUCH DELICA PLUS [id number]) Does not apply Misc 1 each daily. 100 each 6    aspirin 81 MG Oral Tab EC Take 1 tablet (81 mg total) by mouth daily. 30 tablet 0    cetirizine 10 MG Oral Tab Take 1 tablet (10 mg total) by mouth daily.      pantoprazole 40 MG Oral Tab EC Take 1 tablet (40 mg total) by mouth daily.

## 2025-08-18 ENCOUNTER — OFFICE VISIT (OUTPATIENT)
Dept: ORTHOPEDICS CLINIC | Facility: CLINIC | Age: 85
End: 2025-08-18

## 2025-08-18 VITALS — HEIGHT: 62 IN | WEIGHT: 135 LBS | BODY MASS INDEX: 24.84 KG/M2

## 2025-08-18 DIAGNOSIS — M65.331 TRIGGER MIDDLE FINGER OF RIGHT HAND: Primary | ICD-10-CM

## 2025-08-18 DIAGNOSIS — M65.341 TRIGGER RING FINGER OF RIGHT HAND: ICD-10-CM

## 2025-08-18 RX ORDER — BETAMETHASONE SODIUM PHOSPHATE AND BETAMETHASONE ACETATE 3; 3 MG/ML; MG/ML
12 INJECTION, SUSPENSION INTRA-ARTICULAR; INTRALESIONAL; INTRAMUSCULAR; SOFT TISSUE ONCE
Status: COMPLETED | OUTPATIENT
Start: 2025-08-18 | End: 2025-08-18

## 2025-08-18 RX ADMIN — BETAMETHASONE SODIUM PHOSPHATE AND BETAMETHASONE ACETATE 12 MG: 3; 3 INJECTION, SUSPENSION INTRA-ARTICULAR; INTRALESIONAL; INTRAMUSCULAR; SOFT TISSUE at 10:52:00

## (undated) DIAGNOSIS — E78.00 PURE HYPERCHOLESTEROLEMIA: ICD-10-CM

## (undated) DIAGNOSIS — R39.9 UTI SYMPTOMS: Primary | ICD-10-CM

## (undated) DIAGNOSIS — R30.0 DYSURIA: Primary | ICD-10-CM

## (undated) DIAGNOSIS — M25.561 RIGHT KNEE PAIN, UNSPECIFIED CHRONICITY: Primary | ICD-10-CM

## (undated) DIAGNOSIS — Z02.9 ENCOUNTERS FOR ADMINISTRATIVE PURPOSE: ICD-10-CM

## (undated) DIAGNOSIS — E03.4 HYPOTHYROIDISM DUE TO ACQUIRED ATROPHY OF THYROID: Chronic | ICD-10-CM

## (undated) DEVICE — VISUALIZATION SYSTEM: Brand: CLEARIFY

## (undated) DEVICE — DISPOSABLE TOURNIQUET CUFF SINGLE BLADDER, DUAL PORT AND QUICK CONNECT CONNECTOR: Brand: COLOR CUFF

## (undated) DEVICE — SUT ETHLN 4-0 18IN NABSRB BLK 19MM PS

## (undated) DEVICE — GLOVE SUR 7.5 SENSICARE PI PIP GRN PWD F

## (undated) DEVICE — SLEEVE COMPR MD KNEE LEN SGL USE KENDALL SCD

## (undated) DEVICE — ADHESIVE SKIN TOP FOR WND CLSR DERMBND ADV

## (undated) DEVICE — TROCAR: Brand: KII FIOS FIRST ENTRY

## (undated) DEVICE — UPPER EXTREMITY CDS-LF: Brand: MEDLINE INDUSTRIES, INC.

## (undated) DEVICE — GLOVE SUR 7 SENSICARE PI PIP CRM PWD F

## (undated) DEVICE — TROCAR: Brand: KII® SLEEVE

## (undated) DEVICE — TIGERTAIL 5F FLXTIP 70CM

## (undated) DEVICE — GLOVE SUR 7.5 SENSICARE PI PIP CRM PWD F

## (undated) DEVICE — KIT VLV 5 PC AIR H2O SUCT BX ENDOGATOR CONN

## (undated) DEVICE — 3M™ RED DOT™ MONITORING ELECTRODE WITH FOAM TAPE AND STICKY GEL, 50/BAG, 20/CASE, 72/PLT 2570: Brand: RED DOT™

## (undated) DEVICE — TISSUE RETRIEVAL SYSTEM: Brand: INZII RETRIEVAL SYSTEM

## (undated) DEVICE — ZZ-CONVERTED-TO-522442- SPONGE 4X4 10PK

## (undated) DEVICE — MINI-BLADE®: Brand: BEAVER®

## (undated) DEVICE — 1200CC GUARDIAN II: Brand: GUARDIAN

## (undated) DEVICE — MONOFILAMENT ABSORBABLE SUTURE: Brand: MAXON

## (undated) DEVICE — GOWN,SIRUS,FABRIC-REINFORCED,X-LARGE: Brand: MEDLINE

## (undated) DEVICE — SUT MCRYL 4-0 18IN PS-2 ABSRB UD 19MM 3/8 CIR

## (undated) DEVICE — GIJAW SINGLE-USE BIOPSY FORCEPS WITH NEEDLE: Brand: GIJAW

## (undated) DEVICE — LAP CHOLE/APPY CDS-LF: Brand: MEDLINE INDUSTRIES, INC.

## (undated) DEVICE — Device

## (undated) DEVICE — ZZ- DISC- NOSUB-SOLUTION RUBBING 4OZ 70% ISO ALC CLR

## (undated) DEVICE — SOLUTION IRRIG 1000ML 0.9% NACL USP BTL

## (undated) DEVICE — LIGHT HANDLE

## (undated) DEVICE — BITEBLOCK ENDOSCP 60FR MAXI STRP

## (undated) DEVICE — DISPOSABLE BIPOLAR FORCEPS 4" (10.2CM) JEWELERS, STRAIGHT 0.4MM TIP AND 12 FT. (3.6M) CABLE: Brand: KIRWAN

## (undated) DEVICE — SUT MCRYL 3-0 27IN ABSRB UD 19MM PS-2 3/8

## (undated) DEVICE — DERMABOND LIQUID ADHESIVE

## (undated) DEVICE — ABSORBABLE HEMOSTAT (OXIDIZED REGENERATED CELLULOSE, U.S.P.): Brand: SURGICEL

## (undated) DEVICE — ENDOPATH 5MM CURVED SCISSORS WITH MONOPOLAR CAUTERY: Brand: ENDOPATH

## (undated) DEVICE — SUT COAT VCRL+ 0 27IN UR-6 ABSRB VLT ANTIBACT

## (undated) DEVICE — 3M™ STERI-STRIP™ REINFORCED ADHESIVE SKIN CLOSURES, R1547, 1/2 IN X 4 IN (12 MM X 100 MM), 6 STRIPS/ENVELOPE: Brand: 3M™ STERI-STRIP™

## (undated) DEVICE — GLOVE SUR 6.5 SENSICARE PI PIP CRM PWD F

## (undated) DEVICE — #11 STERILE BLADE: Brand: POLYMER COATED BLADES

## (undated) DEVICE — STERILE POLYISOPRENE POWDER-FREE SURGICAL GLOVES: Brand: PROTEXIS

## (undated) DEVICE — DRAPE C-ARM UNIVERSAL

## (undated) DEVICE — SOL  .9 1000ML BTL

## (undated) DEVICE — SPONGE GZ 4X4IN COT 12 PLY TYP VII WVN C

## (undated) DEVICE — ZIPWIRE GUIDEWIRE .035X150 STR

## (undated) DEVICE — INTENDED TO BE USED TO OCCLUDE, RETRACT AND IDENTIFY ARTERIES, VEINS, TENDONS AND NERVES IN SURGICAL PROCEDURES: Brand: STERION®  VESSEL LOOP

## (undated) DEVICE — KENDALL SCD EXPRESS SLEEVES, KNEE LENGTH, MEDIUM: Brand: KENDALL SCD

## (undated) DEVICE — 10FT COMBINED O2 DELIVERY/CO2 MONITORING. FILTER WITH MICROSTREAM TYPE LUER: Brand: DUAL ADULT NASAL CANNULA

## (undated) DEVICE — SLING ORTH MED 15X8.5IN 32IN

## (undated) DEVICE — DRAPE HALF 40X58 DYNJP2410

## (undated) DEVICE — ENDOPATH ULTRA VERESS INSUFFLATION NEEDLES WITH LUER LOCK CONNECTORS: Brand: ENDOPATH

## (undated) DEVICE — KIT CUSTOM ENDOPROCEDURE STERIS

## (undated) DEVICE — DRESSING ADAPTIC L3XW3IN OIL ADH

## (undated) DEVICE — SUTURE MONOCRYL 4-0 PS-2

## (undated) DEVICE — DISPOSABLE LAPAROSCOPIC CLIP APPLIER WITH 20 CLIPS.: Brand: EPIX® UNIVERSAL CLIP APPLIER

## (undated) DEVICE — ANTISEPTIC 4OZ 70% ISO ALC

## (undated) NOTE — LETTER
Your patient was recently seen at the [facility] for a hospital follow-up visit. The visit note is attached. Please contact the clinic with any questions at [phone].     Thank you,  ZACKERY Clemente

## (undated) NOTE — MR AVS SNAPSHOT
[place]  [address] [facility] [facility] [address]  [address] [facility] [phone]               Thank you for choosing us for your health care visit with Cristi Farias MD.  We are glad to serve you and happy to provide you with this paredes headache    Welchol [Colesevelam] Unknown    GI upset                Today's Vital Signs     BP Pulse Temp Height Weight BMI    128/70 mmHg 86 98.5 °F (36.9 °C) (Oral) 62\" 134 lb 4 oz 24.55 kg/m2         Current Medications          This list is accurate Commonly known as:  PRAVACHOL           PX B COMPLEX/VITAMIN C Tabs   Take by mouth. [address] vaginally. Vitamin D3 2000 units Caps   Take 2,000 Units by mouth daily. Commonly known as:  VITAMIN D3           * Notice:   This

## (undated) NOTE — LETTER
20    Patient: Jessica Mejias  : [birth year] Visit date: 2020    Dear  Sebas Lazar MD    Thank you for referring Jessica Mejias to my practice. Please find my assessment and plan below.        Assessment   Calculus of gallbladder with chr

## (undated) NOTE — MR AVS SNAPSHOT
[place]  [address] [facility]  [address] [facility] [phone]               Thank you for choosing us for your health care visit with Oli Junior MD.  We are glad to serve you and happy to provide you with this paredes Hexachlorophene Rash    Lipitor [Atorvastatin] Other (See Comments)    GI upset, nausea and vomitting    Mushrooms Other (See Comments)    headache    Pecan Other (See Comments)    Unknown     Penicillins Rash    Shrimp Unknown    Per allergy testing    T Sulfamethoxazole-TMP -160 MG Tabs per tablet   Take 1 tablet by mouth 2 (two) times daily. Commonly known as:  BACTRIM DS           Vitamin D3 2000 units Caps   Take 2,000 Units by mouth daily.    Commonly known as:  VITAMIN D3           * Notice:

## (undated) NOTE — LETTER
[employer] Cardiac Device Communication Tool    Preop to complete    [facility] ([facility]) Phone: [phone] Fax: [phone]   Patient Name Teresita Henderson   Patient  - AGE - SEX 5/15/1940 - A: 84 y - female   Surgical Date 2025   Surgical Procedure Left carpal tunnel release   Surgical Location [facility]   Type of cautery anticipated: Monopolar   Prone Position      Device Clinic to Complete the Information Below, Sign and Fax to [phone]    Pacemaker or ICD    Atrial or atrial-ventricular lead?        Indication for device    Is patient pacemaker dependent?    Has pt had routine f/u and is battery life > 3 months    Is ICD programmed to inhibit therapy w/magnet?    Does device have rate response or other sensor?      Surgical Procedure above [facility] Surgical Procedure @ [facility] and below   < 6 in. from ICD: Reprogram therapies OFF w/  asynchronous pacing if PM dependent  < 6 in. from PM: Reprogram asynchronous if PM   dependent ICD: No Change  PM: No Change   > 6 in. from ICD: Magnet*  > 6 in. from PM:  No Change*  * If PM dependent observe for pacing inhibition and minimize cautery if inhibition is seen                                              Bipolar cautery: No Change     Cardiac Device Management Plan (check one)     ___  Reprogram (PAT Dept to provide Rep w/ arrival date/time)   ___ Magnet    ___ No Change    Comments: ___________________________________________________________________        Signature: ________________________________ Date: ____________ Time: ______________     Print Name: ___________________________________

## (undated) NOTE — Clinical Note
MICHELLE Romeo completed TCM. Patient has upcoming TCM/HFU appointment scheduled on 09/23/22. Thank you.

## (undated) NOTE — Clinical Note
Titolo All,  I saw Maribel for pre-op evaluation (though does not look like you requested this Shan. She just showed up).   It appears she has remained on her ASA therapy in past during procedures (most recently an EGD). She has a watchman device for CVA prevention from afib.   do you want her to remain on ASA for upcoming ulnar nerve release?  Thanks, Erinn [facility] [phone]

## (undated) NOTE — LETTER
[facility] Testing Department  Phone: [phone]  Right Fax: [phone]    ADDRESSEE INFORMATION: SENDER INFORMATION:   To:   Dr Friedman Backbone From: Bran Leblanc RN     Department: Pre-Admission Testing   Fax Number: [phone] Date: 9/25/202

## (undated) NOTE — LETTER
OUTSIDE TESTING RESULT REQUEST     IMPORTANT: FOR YOUR IMMEDIATE ATTENTION  Please FAX all test results listed below to: [phone]       * * * * If testing is NOT complete, arrange with patient A.S.A.P. * * * *      Patient Name: Teresita Henderson  Surgery Date: 2025  Medical Record: [id number]  CSN: [id number]  : 5/15/1940 - A: 84 y     Sex: female  Surgeon(s):  Shan Alexandra MD  Procedure: Left carpal tunnel release  Anesthesia Type: MAC     Surgeon: Shan Alexandra MD     The following Testing and Time Line are REQUIRED PER ANESTHESIA     EKG READ AND SIGNED WITHIN   90 days      Thank You,   Sent by:ARIANNA REYES

## (undated) NOTE — Clinical Note
Spoke with pt today for FERNANDO--please see notes. Dtr declines sooner appt than 7/30/24--sent TE to office staff as FYI/FERNANDO protocol--thank you.  Future Appointments 7/30/2024  9:40 AM    Nkechi Chavez MD   [facility]               [facility] [place]

## (undated) NOTE — LETTER
[facility] [address], [address]  Consent for Procedure/Sedation  Date:11/15/2022                                  Time: _______    1. I hereby authorize Irene Solares, my physician and his/her assistants (if applicable), which may include medical students, residents, and/or fellows, to perform the following surgical operation/ procedure and administer such anesthesia as may be determined necessary by my physician:  Operation/Procedure name (s)  Cardiac Catheterization, Left Ventricular Cineangiography, Bilateral Selective Coronary Angiography and/or Right Heart Catheterization; possible Percutaneous Transluminal Coronary Angioplasty, Coronary Atherectomy, Coronary Stent, Intracoronary Thrombolytic therapy, Antiplatelet therapy and/or Intravascular Ultrasound on Cuco Class   2. I recognize that during the surgical operation/procedure, unforeseen conditions may necessitate additional or different procedures than those listed above. I, therefore, further authorize and request that the above-named surgeon, assistants, or designees perform such procedures as are, in their judgment, necessary and desirable. 3.   My surgeon/physician has discussed prior to my surgery the potential benefits, risks and side effects of this procedure; the likelihood of achieving goals; and potential problems that might occur during recuperation. They also discussed reasonable alternatives to the procedure, including risks, benefits, and side effects related to the alternatives and risks related to not receiving this procedure. I have had all my questions answered and I acknowledge that no guarantee has been made as to the result that may be obtained. 4.   Should the need arise during my operation/procedure, which includes change of level of care prior to discharge, I also consent to the administration of blood and/or blood products.   Further, I understand that despite careful testing and screening of blood or blood products by collecting agencies, I may still be subject to ill effects as a result of receiving a blood transfusion and/or blood products. The following are some, but not all, of the potential risks that can occur: fever and allergic reactions, hemolytic reactions, transmission of diseases such as Hepatitis, AIDS and Cytomegalovirus (CMV) and fluid overload. In the event that I wish to have an autologous transfusion of my own blood, or a directed donor transfusion, I will discuss this with my physician. Check only if Refusing Blood or Blood Products  I understand refusal of blood or blood products as deemed necessary by my physician may have serious consequences to my condition to include possible death. I hereby assume responsibility for my refusal and release the hospital, its personnel, and my physicians from any responsibility for the consequences of my refusal.           [    ] Refuse      5. I authorize the use of any specimen, organs, tissues, body parts or foreign objects that may be removed from my body during the operation/procedure for diagnosis, research or teaching purposes and their subsequent disposal by hospital authorities. I also authorize the release of specimen test results and/or written reports to my treating physician on the hospital medical staff or other referring or consulting physicians involved in my care, at the discretion of the Pathologist or my treating physician. 6.   I consent to the photographing or videotaping of the operations or procedures to be performed, including appropriate portions of my body for medical, scientific, or educational purposes, provided my identity is not revealed by the pictures or by descriptive texts accompanying them. If the procedure has been photographed/videotaped, the surgeon will obtain the original picture, image, videotape or CD.   The hospital will not be responsible for storage, release or maintenance of the picture, image, tape or CD.    7.   I consent to the presence of a  or observers in the operating room as deemed necessary by my physician or their designees. 8.   I recognize that in the event my procedure results in extended X-Ray/fluoroscopy time, I may develop a skin reaction. 9. If I have a Do Not Attempt Resuscitation (DNAR) order in place, that status will be suspended while in the operating room, procedural suite, and during the recovery period unless otherwise explicitly stated by me (or a person authorized to consent on my behalf). The surgeon or my attending physician will determine when the applicable recovery period ends for purposes of reinstating the DNAR order. 10. Patients having a sterilization procedure: I understand that if the procedure is successful the results will be permanent and it will therefore be impossible for me to inseminate, conceive, or bear children. I also understand that the procedure is intended to result in sterility, although the result has not been guaranteed. 11. I acknowledge that my physician has explained sedation/analgesia administration to me including the risk and benefits I consent to the administration of sedation/analgesia as may be necessary or desirable in the judgment of my physician.     I CERTIFY THAT I HAVE READ AND FULLY UNDERSTAND THE ABOVE CONSENT TO OPERATION and/or OTHER PROCEDURE.        ____________________________________       _________________________________      ______________________________  Signature of Patient         Signature of Responsible Person        Printed Name of Responsible Person    ____________________________________      _________________________________      ______________________________       Signature of Witness          Relationship to Patient                       Date                                       Time  Patient Name: Adal Houston     :                  Printed: November 15, 2022 Medical Record #: [id number]                      Page 1 of 2

## (undated) NOTE — LETTER
Patient Name: Arthur Barnhart  : [id number]  MRN: [id number]  Patient Address: [address] [place] [id number]      Coronavirus Disease 2019 (COVID-19)     Ludin Catalan is committed to the safety and well-being of our patients, me carefully. If your symptoms get worse, call your healthcare provider immediately. 3. Get rest and stay hydrated.    4. If you have a medical appointment, call the healthcare provider ahead of time and tell them that you have or may have COVID-19.  5. For m of fever-reducing medications; and  · Improvement in respiratory symptoms (e.g., cough, shortness of breath); and  · At least 10 days have passed since symptoms first appeared OR if asymptomatic patient or date of symptom onset is unclear then use 10 days donors must:    · Have had a confirmed diagnosis of COVID-19  · Be symptom-free for at least 14 days*    *Some people will be required to have a repeat COVID-19 test in order to be eligible to donate.  If you’re instructed by Loraine that a repeat test is r random. Researchers are trying to identify similarities between people with a Post-COVID condition to better understand if there are risk factors. How do I prevent a Post-COVID condition?   The best way to prevent the long-term symptoms of COVID-19 is

## (undated) NOTE — ED AVS SNAPSHOT
[facility] in [address]    [address] [facility]    Phone:  [phone]    Fax:  [phone]           Skippy Dark   MRN: [id number]    Department:  THE [facility] in [address]   Date of Visit:  1/12/2017 may not be covered by your plan. Please contact your insurance company to determine coverage for follow-up care and referrals. [facility]  [address] [facility], [address] [place] [address]  [phone] [address]  [address] prescription right away and begin taking the medication(s) as directed. If the Immediate Care Provider has read X-rays, these will be re-interpreted by a radiologist.  If there is a significant change in your reading, you will be contacted.  Please make Medicaid plans. To get signed up and covered, please call [phone] and ask to get set up for an insurance coverage that is in-network with Ludin Catalan.         MyChart     Visit [employer]  You can access your MyChart to more actively manage

## (undated) NOTE — LETTER
Casper Garcia. MERT Maloney, F.A.C.S.     Surgical Clearance Needed    Date: 9/24/2020                                                                       From: ANEUDY Neil: DR. Abigail Mendoza

## (undated) NOTE — ED AVS SNAPSHOT
Stalin Bernard   MRN: [id number]    Department:  [facility] Emergency Department   Date of Visit:  5/12/2019           Disclosure     Insurance plans vary and the physician(s) referred by the ER may not be covered by your plan.  Please contact you tell this physician (or your personal doctor if your instructions are to return to your personal doctor) about any new or lasting problems. The primary care or specialist physician will see patients referred from the [facility] Emergency Department.  Che Delgado

## (undated) NOTE — Clinical Note
Charly Hernandez - please continue to work with Millie Hanson on pelvic muscle strength & voiding mechanism. She's going to make a few more appts.  Thanks, [facility]

## (undated) NOTE — MR AVS SNAPSHOT
[address]  [address]  [address] [place] [address]  [phone]               Thank you for choosing us for your health care visit with Maggie Novoaa.   We are glad to serve you and happy to provide you with this summar Commonly known as:  VITAMIN C           aspirin 81 MG Tabs   Take 81 mg by mouth daily. CALCIUM + D OR   Take 1 tablet by mouth daily. * Ciprofloxacin HCl 250 MG Tabs   Take 1 tablet (250 mg total) by mouth 2 (two) times daily.    [place] - [address], [phone], [phone]  [address] [address]     Phone:  [phone]    - Ciprofloxacin HCl 250 MG Tabs            Results of Recent Testing     EH UG STRAIGHT CATH PVR

## (undated) NOTE — LETTER
OUTSIDE TESTING RESULT REQUEST     IMPORTANT: FOR YOUR IMMEDIATE ATTENTION  Please FAX all test results listed below to: [phone]     Testing already done on or about: 2025     * * * * If testing is NOT complete, arrange with patient A.S.A.P. * * * *      Patient Name: Teresita Henderson  Surgery Date: 2025  Medical Record: [id number]  CSN: [id number]  : 5/15/1940 - A: 84 y     Sex: female  Surgeon(s):  Shan Alexandra MD  Procedure: Left carpal tunnel release  Anesthesia Type: MAC     Surgeon: Shan Alexandra MD     The following Testing and Time Line are REQUIRED PER ANESTHESIA     EKG READ AND SIGNED WITHIN   90 days      Thank You,   Sent by:Nidhi REYES

## (undated) NOTE — LETTER
Patient Name: Fabiana Gonzalez  YOB: 1940          MRN number:  [id number]  Date:  7/29/2019  Referring Physician:  Corey Tolentino    Discharge Summary  Pt has attended 11 visits in Physical Therapy.       Dx: Incomplete emptying of bladder, o incision>restricted in all directions, B lumbar paraspinal tightness- no pain      Skin condition: normal      Internal Evaluation:     Pain: Negative  Spasm: Negative     Voluntary contraction: present (was:absent)  Voluntary relaxation: present (was:abse treatment options and has agreed to actively participate in planning and for this course of care. Thank you for your referral. If you have any questions, please contact me at Dept: [phone].     Sincerely,  Electronically signed by therapist: Daysi Mishra

## (undated) NOTE — MR AVS SNAPSHOT
After Visit Summary   2/27/2024    Teresita Henderson   MRN: [id number]           Visit Information     Date & Time  2/27/2024 10:30 AM Provider  Fallon Ugalde DO [facility] Dept. Phone  [phone]      Allergies as of 2/27/2024  Review status set to Review Complete on 2/23/2024       Noted Reaction Type Reactions    Cephalexin 04/15/2023   Systemic ANAPHYLAXIS, HIVES, FACE FLUSHING    Ciprofloxacin 11/05/2018    MYALGIA, HIVES, OTHER (SEE COMMENTS)    Started on Rx 4/12/23? Not true allergy?    Levofloxacin 11/05/2018    MYALGIA, HIVES, OTHER (SEE COMMENTS)    Reports tendonitis  Reports tendonitis   Reports tendonitis   Reports tendonitis    Penicillins 10/11/2010   Systemic HIVES, RASH    Tolerates ceftriaxone    Toviaz 03/25/2013   Intolerance SWELLING, TONGUE SWELLING    Throat tightening  Throat tightening   Throat tightening    Atorvastatin 08/24/2014    OTHER (SEE COMMENTS), NAUSEA AND VOMITING    GI upset, nausea and vomitting  GI upset, nausea and vomitting   GI upset, nausea and vomitting   GI upset, nausea and vomitting    Chlorhexidine 11/01/2022    OTHER (SEE COMMENTS)    Rash, redness    Chocolate 12/04/2013    OTHER (SEE COMMENTS)    headache  headache   headache   headache    Colesevelam 08/24/2014    OTHER (SEE COMMENTS), UNKNOWN    GI upset  GI upset   GI upset   GI upset    Shellfish Allergy 09/17/2022    OTHER (SEE COMMENTS)    Per allergy test, redness    Chocolate 12/04/2013    OTHER (SEE COMMENTS)    headache    Fish-derived Products 09/23/2022    OTHER (SEE COMMENTS)    Per allergy testing    Mushrooms 12/04/2013    OTHER (SEE COMMENTS)    headache    Pecan 02/02/2016    OTHER (SEE COMMENTS)    Unknown     Shellfish 09/17/2022    OTHER (SEE COMMENTS)    Shrimp 06/17/2015    UNKNOWN    Per allergy testing    Tomatoes 12/04/2013    OTHER (SEE COMMENTS)    headache    Hexachlorophene 07/14/2015    RASH, OTHER (SEE COMMENTS)      Your Current  Medications        Dosage    gabapentin 100 MG Oral Cap Take 2 capsules (200 mg total) by mouth nightly.    Cholecalciferol (VITAMIN D) 50 MCG (2000 UT) Oral Cap Take by mouth daily.    ezetimibe 10 MG Oral Tab Take 1 tablet (10 mg total) by mouth nightly.    nitrofurantoin monohydrate macro 100 MG Oral Cap Take 1 capsule (100 mg total) by mouth nightly.    methenamine 1 g Oral Tab Take 1 tablet (1 g total) by mouth 2 (two) times daily.    Polyethylene Glycol 3350 17 g Oral Powd Pack Take 17 g by mouth daily as needed.    levothyroxine 88 MCG Oral Tab Take 1 tablet (88 mcg total) by mouth daily.    pravastatin 10 MG Oral Tab Take 1 tablet (10 mg total) by mouth nightly.    metFORMIN  MG Oral Tablet 24 Hr Take 1 tablet (750 mg total) by mouth daily.    Meth-Hyo-M Bl-Na Phos-Ph Sal (URIBEL) 118 MG Oral Cap Take 1 capsule by mouth 3 (three) times daily as needed.    DIGOX 125 MCG Oral Tab Take 1 tablet (125 mcg total) by mouth daily.    Lancets (ONETOUCH DELICA PLUS [id number]) Does not apply Misc 1 each daily.    clopidogrel 75 MG Oral Tab Take 1 tablet (75 mg total) by mouth daily.    aspirin 81 MG Oral Tab EC Take 1 tablet (81 mg total) by mouth daily.    metoprolol succinate ER 25 MG Oral Tablet 24 Hr Take 1 tablet (25 mg total) by mouth in the morning and 1 tablet (25 mg total) before bedtime.    cetirizine 10 MG Oral Tab Take 1 tablet (10 mg total) by mouth daily.    pantoprazole 40 MG Oral Tab EC Take 1 tablet (40 mg total) by mouth daily.    dofetilide 125 MCG Oral Cap Take 1 capsule (125 mcg total) by mouth 2 (two) times daily.    fluticasone propionate 50 MCG/ACT Nasal Suspension 2 sprays by Each Nare route daily.      Diagnoses for This Visit    Cervical radiculopathy   [187103]             We Ordered the Following     Normal Orders This Visit    EMG (at [place]) [NEU5 CUSTOM]       Future Appointments        Provider Department    3/13/2024 9:40 AM Nkechi Chavez [facility],  [facility]                Did you know that [facility] primary care physicians now offer Video Visits through check24 for adult patients for a variety of conditions such as allergies, back pain and cold symptoms? Skip the drive and waiting room and online chat with a doctor face-to-face using your web-cam enabled computer or mobile device wherever you are. Video Visits cost $50 and can be paid hassle-free using a credit, debit, or health savings card.  Not active on check24? Ask us how to get signed up today!          If you receive a survey from [employer], please take a few minutes to complete it and provide feedback. We strive to deliver the best patient experience and are looking for ways to make improvements. Your feedback will help us do so. For more information on [employer], please visit [email or web address]           No text in SmartText           No text in SmartText

## (undated) NOTE — ED AVS SNAPSHOT
Doc Camargo   MRN: [id number]    Department:  [facility] Emergency Department   Date of Visit:  1/8/2019           Disclosure     Insurance plans vary and the physician(s) referred by the ER may not be covered by your plan.  Please contact your tell this physician (or your personal doctor if your instructions are to return to your personal doctor) about any new or lasting problems. The primary care or specialist physician will see patients referred from the [facility] Emergency Department.  Kory Mittal

## (undated) NOTE — ED AVS SNAPSHOT
Doc Camargo   MRN: [id number]    Department:  [facility] Emergency Department   Date of Visit:  7/7/2019           Disclosure     Insurance plans vary and the physician(s) referred by the ER may not be covered by your plan.  Please contact your tell this physician (or your personal doctor if your instructions are to return to your personal doctor) about any new or lasting problems. The primary care or specialist physician will see patients referred from the [facility] Emergency Department.  Reji Serrano

## (undated) NOTE — LETTER
10/01/21        Berea Joao  [address] [address] [address]      Dear Eron Culver,    [address] records indicate that you have outstanding lab work and or testing that was ordered for you and has not yet been completed:  Orders Placed This Encoun

## (undated) NOTE — Clinical Note
May 16, 2017    Arthur Barnhart  [address] [facility] [address]      Dear Colby Shin: The following are the results of your recent tests. Please review the list of test results.   Your result is the value on the left; we have also supplied t

## (undated) NOTE — LETTER
[email or web address] Cardiac Device Communication Tool    Preop to complete    [facility]) Phone: [phone] Fax: [phone]   Patient Name Teresita Henderson   Patient  - AGE - SEX 5/15/1940 - A: 84 y - female   Surgical Date 2024   Surgical Procedure RIGHT CUBITAL TUNNEL RELEASE   Surgical Location [facility]   Type of cautery anticipated: Monopolar   Surgical procedure > 2 hours      Device Clinic to Complete the Information Below, Sign and Fax to [phone]    Pacemaker or ICD    Atrial or atrial-ventricular lead?        Indication for device    Is patient pacemaker dependent?    Has pt had routine f/u and is battery life > 3 months    Is ICD programmed to inhibit therapy w/magnet?    Does device have rate response or other sensor?      Surgical Procedure above I[facility] Surgical Procedure @ [facility] [place] and below   < 6 in. from ICD: Reprogram therapies OFF w/  asynchronous pacing if PM dependent  < 6 in. from PM: Reprogram asynchronous if PM   dependent ICD: No Change  PM: No Change   > 6 in. from ICD: Magnet*  > 6 in. from PM:  No Change*  * If PM dependent observe for pacing inhibition and minimize cautery if inhibition is seen                                              Bipolar cautery: No Change     Cardiac Device Management Plan (check one)     ___  Reprogram (PAT Dept to provide Rep w/ arrival date/time)   ___ Magnet    ___ No Change    Comments: ___________________________________________________________________        Signature: ________________________________ Date: ____________ Time: ______________     Print Name: ___________________________________